# Patient Record
Sex: FEMALE | Race: WHITE | NOT HISPANIC OR LATINO | Employment: OTHER | URBAN - METROPOLITAN AREA
[De-identification: names, ages, dates, MRNs, and addresses within clinical notes are randomized per-mention and may not be internally consistent; named-entity substitution may affect disease eponyms.]

---

## 2017-01-02 ENCOUNTER — ANTICOAGULATION THERAPY VISIT (OUTPATIENT)
Dept: NURSING | Facility: CLINIC | Age: 72
End: 2017-01-02
Payer: MEDICARE

## 2017-01-02 DIAGNOSIS — Z79.01 LONG-TERM (CURRENT) USE OF ANTICOAGULANTS: Primary | ICD-10-CM

## 2017-01-02 LAB — INR POINT OF CARE: 2.1 (ref 0.86–1.14)

## 2017-01-02 PROCEDURE — 99207 ZZC NO CHARGE NURSE ONLY: CPT

## 2017-01-02 PROCEDURE — 85610 PROTHROMBIN TIME: CPT | Mod: QW

## 2017-01-02 PROCEDURE — 36416 COLLJ CAPILLARY BLOOD SPEC: CPT

## 2017-01-02 NOTE — PROGRESS NOTES
ANTICOAGULATION FOLLOW-UP CLINIC VISIT    Patient Name:  Agnes Saravia  Date:  1/2/2017  Contact Type:  Face to Face    SUBJECTIVE:        OBJECTIVE    INR PROTIME   Date Value Ref Range Status   01/02/2017 2.1* 0.86 - 1.14 Final       ASSESSMENT / PLAN  INR assessment THER    Recheck INR In: 2 WEEKS    INR Location Clinic      Anticoagulation Summary as of 1/2/2017     INR goal 2.0-3.0   Selected INR 2.1 (1/2/2017)   Maintenance plan 4 mg (1 mg x 4) on Wed, Sat; 3 mg (1 mg x 3) all other days   Full instructions 4 mg on Wed, Sat; 3 mg all other days   Weekly total 23 mg   No change documented Stacy Castle RN   Plan last modified Ritu Rayo RN (12/23/2016)   Next INR check 1/16/2017   Priority INR   Target end date     Indications   Long-term (current) use of anticoagulants [Z79.01] [Z79.01]  Pulmonary embolism (H) (Resolved) [I26.99]         Anticoagulation Episode Summary     INR check location     Preferred lab     Send INR reminders to [facility] ANTICOAGULATION    Comments       Anticoagulation Care Providers     Provider Role Specialty Phone number    KjlaurenJosé Miguel rios MD Responsible Internal Medicine [phone]            See the Encounter Report to view Anticoagulation Flowsheet and Dosing Calendar (Go to Encounters tab in chart review, and find the Anticoagulation Therapy Visit)    Patient is on prednisone. Dosing based on [facility] Protocol and Provider directed care plan.      Stacy Castle, RN

## 2017-01-02 NOTE — MR AVS SNAPSHOT
Agnes Saravia   1/2/2017 8:45 AM   Anticoagulation Therapy Visit    Description:  71 year old female   Provider:   ANTICOAGULATION CLINIC   Department:   Nurse           INR as of 1/2/2017     Selected INR 2.1 (1/2/2017)      Anticoagulation Summary as of 1/2/2017     INR goal 2.0-3.0   Selected INR 2.1 (1/2/2017)   Full instructions 4 mg on Wed, Sat; 3 mg all other days   Next INR check 1/16/2017    Indications   Long-term (current) use of anticoagulants [Z79.01] [Z79.01]  Pulmonary embolism (H) (Resolved) [I26.99]         Your next Anticoagulation Clinic appointment(s)     Jan 16, 2017  8:30 AM   Anticoagulation Visit with  ANTICOAGULATION CLINIC   [facility] [place] ([facility]    [address]  [place] [address]   [phone]              Contact Numbers     Clinic Number:         January 2017 Details    Sun Mon Tue Wed Thu Fri Sat     1               2      3 mg   See details      3      3 mg         4      4 mg         5      3 mg         6      3 mg         7      4 mg           8      3 mg         9      3 mg         10      3 mg         11      4 mg         12      3 mg         13      3 mg         14      4 mg           15      3 mg         16            17               18               19               20               21                 22               23               24               25               26               27               28                 29               30               31                    Date Details   01/02 This INR check       Date of next INR:  1/16/2017         How to take your warfarin dose     To take:  3 mg Take 3 of the 1 mg tablets.    To take:  4 mg Take 4 of the 1 mg tablets.

## 2017-01-03 ENCOUNTER — MYC MEDICAL ADVICE (OUTPATIENT)
Dept: FAMILY MEDICINE | Facility: CLINIC | Age: 72
End: 2017-01-03

## 2017-01-03 DIAGNOSIS — R07.9 ACUTE CHEST PAIN: Primary | ICD-10-CM

## 2017-01-03 RX ORDER — HYDROCODONE BITARTRATE AND ACETAMINOPHEN 5; 325 MG/1; MG/1
1 TABLET ORAL EVERY 4 HOURS PRN
Qty: 30 TABLET | Refills: 0 | Status: SHIPPED | OUTPATIENT
Start: 2017-01-03 | End: 2017-01-06

## 2017-01-04 ENCOUNTER — MYC MEDICAL ADVICE (OUTPATIENT)
Dept: FAMILY MEDICINE | Facility: CLINIC | Age: 72
End: 2017-01-04

## 2017-01-04 NOTE — TELEPHONE ENCOUNTER
Can we schedule her for after the INR appointment - OK to schedule her at 10:00 and I will try to see her earlier    oJsé Miguel Wallace MD

## 2017-01-04 NOTE — TELEPHONE ENCOUNTER
To PCP  Please see below message.  Looks like you have MPOC that morning.  Thank you.  Stacy Castle RN

## 2017-01-05 ENCOUNTER — CARE COORDINATION (OUTPATIENT)
Dept: CARE COORDINATION | Facility: CLINIC | Age: 72
End: 2017-01-05

## 2017-01-05 NOTE — PROGRESS NOTES
Clinic Care Coordination Contact  OUTREACH    Referral Information:  Referral Source: CTS  Reason for Contact: follow up  Care Conference: No     Universal Utilization:   ED Visits in last year: 1  Hospital visits in last year: 2  Last PCP appointment: 11/28/16  Missed Appointments: 0  Concerns: none  Multiple Providers or Specialists: Yes (Rheumatology, dermatology, pulmonology)    Clinical Concerns:  Current Medical Concerns:  Severe pain on Left side. Feels this is associated to months of hard coughing, she suspects she may have strained a muscle.[facility].  Was put on prednisone burst.  PCP prescribed [place] for pain.  Has f/u in clinic with PCP on 01/19/2017  Per patient, her pain was 10/10 without  Norco and 9/10 with it. She denies fever, worsening SOB, dizziness, lightheadedness, HA.  Has thick sticky yellow mucus.  Is taking Robitussin AC. Waking hourly at night d/t pain under L breast.  Pain is reproducible with palpation. Will get up and walk around and cough, then go back to sleep. Drinking plenty of fluids, using cough drops.  Current Behavioral Concerns: none  Education Provided to patient: continue home  Therapies, call with any worsening SOB or fever.    Clinical Pathway: None    Medication Management:  Patient was taking ibuprofen for RA 800mg BID prior to 11/23 admission.  No PCP or Rheum notes indicating need to d/c. Patient would appreciate PCP advice if she may re start ibuprofen.        Functional Status:  Mobility Status: Independent      Psychosocial:  Current living arrangement:: Lives in a private home  Financial/Insurance: no gaps identified        Advanced Care Plans/Directives on file:: Yes        Goals: relief of pain and cough     Patient/Caregiver understanding:  Patient states understanding of f/u advice and when to seek medical attentoin  Frequency of Care Coordination: prn  Upcoming appointment: 01/19/17     Plan:   Will route to PCP for advice  re: restarting PTA 800mg bid ibuprofen.   Patient to reach out as needed to clinic/RN CCC    Tamiko Knight RN  Clinic Care Coordinator  [facility]  [phone]

## 2017-01-05 NOTE — TELEPHONE ENCOUNTER
As she has recently started anticoagulation, I would recommend she avoid all NSAIDs in order to reduce bleeding risk.   José Miguel Wallace MD

## 2017-01-06 ENCOUNTER — MYC MEDICAL ADVICE (OUTPATIENT)
Dept: FAMILY MEDICINE | Facility: CLINIC | Age: 72
End: 2017-01-06

## 2017-01-06 DIAGNOSIS — R05.9 COUGH: Primary | ICD-10-CM

## 2017-01-06 DIAGNOSIS — R07.1 PAINFUL RESPIRATION: ICD-10-CM

## 2017-01-06 RX ORDER — BENZONATATE 200 MG/1
200 CAPSULE ORAL 3 TIMES DAILY PRN
Qty: 60 CAPSULE | Refills: 0 | Status: SHIPPED | OUTPATIENT
Start: 2017-01-06 | End: 2017-01-16

## 2017-01-06 RX ORDER — GABAPENTIN 100 MG/1
100 CAPSULE ORAL 3 TIMES DAILY
Qty: 90 CAPSULE | Refills: 0 | Status: SHIPPED | OUTPATIENT
Start: 2017-01-06 | End: 2017-02-02

## 2017-01-06 NOTE — TELEPHONE ENCOUNTER
I did speak to Agnes Saravia and we will stop the hydrocodone and start gabapentin today.  In addition a prescription for tessalon perles was sent in.  She will come into the nic on Monday AM.  I advised emergency department if worsening shortness of breath or pleuritic pain    José Miguel Wallace MD

## 2017-01-09 ENCOUNTER — RADIANT APPOINTMENT (OUTPATIENT)
Dept: GENERAL RADIOLOGY | Facility: CLINIC | Age: 72
End: 2017-01-09
Attending: INTERNAL MEDICINE
Payer: MEDICARE

## 2017-01-09 ENCOUNTER — OFFICE VISIT (OUTPATIENT)
Dept: FAMILY MEDICINE | Facility: CLINIC | Age: 72
End: 2017-01-09
Payer: MEDICARE

## 2017-01-09 VITALS
BODY MASS INDEX: 34.55 KG/M2 | OXYGEN SATURATION: 96 % | HEIGHT: 63 IN | HEART RATE: 80 BPM | TEMPERATURE: 97 F | SYSTOLIC BLOOD PRESSURE: 136 MMHG | WEIGHT: 195 LBS | DIASTOLIC BLOOD PRESSURE: 80 MMHG

## 2017-01-09 DIAGNOSIS — M35.00 SJOGREN'S SYNDROME (H): ICD-10-CM

## 2017-01-09 DIAGNOSIS — R73.01 IFG (IMPAIRED FASTING GLUCOSE): ICD-10-CM

## 2017-01-09 DIAGNOSIS — R07.1 PAINFUL RESPIRATION: ICD-10-CM

## 2017-01-09 DIAGNOSIS — I26.99 OTHER ACUTE PULMONARY EMBOLISM WITHOUT ACUTE COR PULMONALE (H): ICD-10-CM

## 2017-01-09 DIAGNOSIS — R05.9 COUGH: ICD-10-CM

## 2017-01-09 DIAGNOSIS — R07.1 PAINFUL RESPIRATION: Primary | ICD-10-CM

## 2017-01-09 LAB
ANION GAP SERPL CALCULATED.3IONS-SCNC: 9 MMOL/L (ref 3–14)
BASOPHILS # BLD AUTO: 0.1 10E9/L (ref 0–0.2)
BASOPHILS NFR BLD AUTO: 0.5 %
BUN SERPL-MCNC: 17 MG/DL (ref 7–30)
CALCIUM SERPL-MCNC: 9.3 MG/DL (ref 8.5–10.1)
CHLORIDE SERPL-SCNC: 98 MMOL/L (ref 94–109)
CO2 SERPL-SCNC: 30 MMOL/L (ref 20–32)
CREAT SERPL-MCNC: 0.74 MG/DL (ref 0.52–1.04)
DIFFERENTIAL METHOD BLD: ABNORMAL
EOSINOPHIL # BLD AUTO: 0.1 10E9/L (ref 0–0.7)
EOSINOPHIL NFR BLD AUTO: 0.3 %
ERYTHROCYTE [DISTWIDTH] IN BLOOD BY AUTOMATED COUNT: 15.4 % (ref 10–15)
GFR SERPL CREATININE-BSD FRML MDRD: 76 ML/MIN/1.7M2
GLUCOSE SERPL-MCNC: 128 MG/DL (ref 70–99)
HCT VFR BLD AUTO: 41 % (ref 35–47)
HGB BLD-MCNC: 13.3 G/DL (ref 11.7–15.7)
LYMPHOCYTES # BLD AUTO: 1 10E9/L (ref 0.8–5.3)
LYMPHOCYTES NFR BLD AUTO: 6.5 %
MCH RBC QN AUTO: 30.2 PG (ref 26.5–33)
MCHC RBC AUTO-ENTMCNC: 32.4 G/DL (ref 31.5–36.5)
MCV RBC AUTO: 93 FL (ref 78–100)
MONOCYTES # BLD AUTO: 0.8 10E9/L (ref 0–1.3)
MONOCYTES NFR BLD AUTO: 5.4 %
NEUTROPHILS # BLD AUTO: 13.3 10E9/L (ref 1.6–8.3)
NEUTROPHILS NFR BLD AUTO: 87.3 %
PLATELET # BLD AUTO: 442 10E9/L (ref 150–450)
POTASSIUM SERPL-SCNC: 3.4 MMOL/L (ref 3.4–5.3)
RBC # BLD AUTO: 4.41 10E12/L (ref 3.8–5.2)
SODIUM SERPL-SCNC: 137 MMOL/L (ref 133–144)
WBC # BLD AUTO: 15.2 10E9/L (ref 4–11)

## 2017-01-09 PROCEDURE — 99214 OFFICE O/P EST MOD 30 MIN: CPT | Performed by: INTERNAL MEDICINE

## 2017-01-09 PROCEDURE — 36415 COLL VENOUS BLD VENIPUNCTURE: CPT | Performed by: INTERNAL MEDICINE

## 2017-01-09 PROCEDURE — 87102 FUNGUS ISOLATION CULTURE: CPT | Performed by: INTERNAL MEDICINE

## 2017-01-09 PROCEDURE — 99000 SPECIMEN HANDLING OFFICE-LAB: CPT | Performed by: INTERNAL MEDICINE

## 2017-01-09 PROCEDURE — 71020 XR CHEST 2 VW: CPT

## 2017-01-09 PROCEDURE — 85025 COMPLETE CBC W/AUTO DIFF WBC: CPT | Performed by: INTERNAL MEDICINE

## 2017-01-09 PROCEDURE — 80048 BASIC METABOLIC PNL TOTAL CA: CPT | Performed by: INTERNAL MEDICINE

## 2017-01-09 PROCEDURE — 87070 CULTURE OTHR SPECIMN AEROBIC: CPT | Performed by: INTERNAL MEDICINE

## 2017-01-09 PROCEDURE — 87799 DETECT AGENT NOS DNA QUANT: CPT | Performed by: INTERNAL MEDICINE

## 2017-01-09 PROCEDURE — 87205 SMEAR GRAM STAIN: CPT | Performed by: INTERNAL MEDICINE

## 2017-01-09 RX ORDER — AZITHROMYCIN 250 MG/1
TABLET, FILM COATED ORAL
Qty: 6 TABLET | Refills: 0 | Status: SHIPPED | OUTPATIENT
Start: 2017-01-09 | End: 2017-01-16

## 2017-01-09 RX ORDER — CODEINE PHOSPHATE AND GUAIFENESIN 10; 100 MG/5ML; MG/5ML
2 SOLUTION ORAL EVERY 6 HOURS PRN
Qty: 180 ML | Refills: 3 | Status: SHIPPED | OUTPATIENT
Start: 2017-01-09 | End: 2017-05-08

## 2017-01-09 NOTE — PATIENT INSTRUCTIONS
(R07.1) Painful respiration  (primary encounter diagnosis)  Comment: From pulmonology notes the cough is thought to be secondary to underlying inflammatory condition.  I am suspicious for possible immune suppression and possible opportunistic infection.  We will not increase prednisone today and rather continue on current immune suppression and request a sputum culture and labs today.  We can treat empirically for atypical bacterial infection with azithromycin.  We will discuss with Dr. Carlos his thoughts regarding this condition.  We may wish to speak to a infectious disease specialist.  Plan: Sputum Culture Aerobic Bacterial, Gram stain,         Fungus Culture, non-blood, XR Chest 2 Views,         CMV DNA quantification, EBV DNA PCR         Quantitative Whole Blood, Respiratory Virus         Panel by PCR, RSV rapid antigen, azithromycin         (ZITHROMAX) 250 MG tablet            (M35.00) Sjogren's syndrome (H)  Comment: as above  - continue current immune supressant   Plan:     (I26.99) Other acute pulmonary embolism without acute cor pulmonale (H)  Comment: We will continue coumadin - azithromycin may accentuate your bleeding risk and will need follow up on Wednesday of Thursday  Plan:

## 2017-01-09 NOTE — TELEPHONE ENCOUNTER
PCP:    Please see below. FYI Pt is scheduled to see you today at 7:45. Thanks.     Daphne Mesa RN

## 2017-01-09 NOTE — PROGRESS NOTES
Quick Note:    Gurdeep Alarcon,    I have had the opportunity to review your recent results and an interpretation is as follows:  Your chest x-ray shows only stable linear atelectasis on the left side, but no significant effusion or pneumoina. Also your right sided atelectasis has improved. This is an encouraging chest x-ray - Let's see what your culutures show and follow up in pulm as we discussed.     Sincerely,  José Miguel Wallace MD  ______

## 2017-01-09 NOTE — PROGRESS NOTES
"[facility]  CLINIC PROGRESS NOTE    Subjective:  Pleurisy   Agnes Saravia returns to the clinic for return for ongoing left sided pain with history of cough ongoing since September.  She is not able to get much sleep.  She is taking robitussin AC and tessalon perles.  She has noticed that her breathing is OK, but she may have some shortness of breath.  She has wheezing.  She is using her inhaler which is working.,  She robitussin AC does help.  She did try gabapentin and it did not seem to help over the weekend.  She is currently in the middle of a prednisone burst as recommended by pulmonologist.  Per notes from pulmonary office visit it was felt that her pleuritic pain is due to underlying inflammatory condition such as RA and Sjogrens.  She is taking methotrexate, rituxan, prednisone.        Past medical history, medications, allergies, social history, family history reviewed and updated in [facility] as of 1/9/2017 .    ROS  CONSTITUTIONAL: + fatigue, no unexpected change in weight  SKIN: no worrisome rashes, no worrisome moles, no worrisome lesions  EYES: no acute vision problems or changes  ENT: no ear problems, no mouth problems, no throat problems  RESP: as above   CV: no chest pain, no palpitations, no new or worsening peripheral edema  GI: no nausea, no vomiting, no constipation, no diarrhea  : no frequency, no dysuria, no hematuria  MS: no claudication, no myalgias, no joint aches  PSYCHIATRIC: no changes in mood or affect      Objective:  Vitals  /80 mmHg  Pulse 80  Temp(Src) 97  F (36.1  C) (Oral)  Ht 5' 3\" (1.6 m)  Wt 195 lb (88.451 kg)  BMI 34.55 kg/m2  SpO2 96%  Breastfeeding? No  GEN: Alert Oriented x3 NAD  HEENT: Atraumatic, normocephalic, neck supple, no thyromegaly, negative cervical adenopathy  TM: TM bilaterally pearly and grey with normal light reflex  CV: RRR no murmurs or rubs  PULM: wheezing in all lung fields  ABD: Soft, nontender nondistended  SKIN: No visible " skin lesion or ulcerations  EXT:  no edema bilateral lower extremities  NEURO: Gait and station deferred, No focal neurologic deficits  PSYCH: Mood good, affect mood congruent    Results for orders placed or performed in visit on 01/09/17 (from the past 24 hour(s))   CBC with platelets and differential   Result Value Ref Range    WBC 15.2 (H) 4.0 - 11.0 10e9/L    RBC Count 4.41 3.8 - 5.2 10e12/L    Hemoglobin 13.3 11.7 - 15.7 g/dL    Hematocrit 41.0 35.0 - 47.0 %    MCV 93 78 - 100 fl    MCH 30.2 26.5 - 33.0 pg    MCHC 32.4 31.5 - 36.5 g/dL    RDW 15.4 (H) 10.0 - 15.0 %    Platelet Count 442 150 - 450 10e9/L    Diff Method Automated Method     % Neutrophils 87.3 %    % Lymphocytes 6.5 %    % Monocytes 5.4 %    % Eosinophils 0.3 %    % Basophils 0.5 %    Absolute Neutrophil 13.3 (H) 1.6 - 8.3 10e9/L    Absolute Lymphocytes 1.0 0.8 - 5.3 10e9/L    Absolute Monocytes 0.8 0.0 - 1.3 10e9/L    Absolute Eosinophils 0.1 0.0 - 0.7 10e9/L    Absolute Basophils 0.1 0.0 - 0.2 10e9/L   Basic metabolic panel   Result Value Ref Range    Sodium 137 133 - 144 mmol/L    Potassium 3.4 3.4 - 5.3 mmol/L    Chloride 98 94 - 109 mmol/L    Carbon Dioxide 30 20 - 32 mmol/L    Anion Gap 9 3 - 14 mmol/L    Glucose 128 (H) 70 - 99 mg/dL    Urea Nitrogen 17 7 - 30 mg/dL    Creatinine 0.74 0.52 - 1.04 mg/dL    GFR Estimate 76 >60 mL/min/1.7m2    GFR Estimate If Black >90   GFR Calc   >60 mL/min/1.7m2    Calcium 9.3 8.5 - 10.1 mg/dL       Assessment/Plan:  Patient Instructions   (R07.1) Painful respiration  (primary encounter diagnosis)  Comment: From pulmonology notes the cough is thought to be secondary to underlying inflammatory condition.  I am suspicious for possible immune suppression and possible opportunistic infection.  We will not increase prednisone today and rather continue on current immune suppression and request a sputum culture and labs today.  We can treat empirically for atypical bacterial infection with  azithromycin.  We will discuss with Dr. Carlos his thoughts regarding this condition.  We may wish to speak to a infectious disease specialist.  Plan: Sputum Culture Aerobic Bacterial, Gram stain,         Fungus Culture, non-blood, XR Chest 2 Views,         CMV DNA quantification, EBV DNA PCR         Quantitative Whole Blood, Respiratory Virus         Panel by PCR, RSV rapid antigen, azithromycin         (ZITHROMAX) 250 MG tablet            (M35.00) Sjogren's syndrome (H)  Comment: as above  - continue current immune supressant   Plan:     (I26.99) Other acute pulmonary embolism without acute cor pulmonale (H)  Comment: We will continue coumadin - azithromycin may accentuate your bleeding risk and will need follow up on Wednesday of Thursday  Plan:           Follow up in rheumatology     Disclaimer: This note consists of symbols derived from keyboarding, dictation and/or voice recognition software. As a result, there may be errors in the script that have gone undetected. Please consider this when interpreting information found in this chart.    José Miguel Wallace MD  [phone]

## 2017-01-09 NOTE — NURSING NOTE
"Chief Complaint   Patient presents with     Cough     4 mos, pain on left side,        Initial /80 mmHg  Pulse 80  Temp(Src) 97  F (36.1  C) (Oral)  Ht 5' 3\" (1.6 m)  Wt 195 lb (88.451 kg)  BMI 34.55 kg/m2  SpO2 96%  Breastfeeding? No Estimated body mass index is 34.55 kg/(m^2) as calculated from the following:    Height as of this encounter: 5' 3\" (1.6 m).    Weight as of this encounter: 195 lb (88.451 kg).  BP completed using cuff size: large, left arm  Luh Saravia CMA    "

## 2017-01-09 NOTE — MR AVS SNAPSHOT
After Visit Summary   1/9/2017    Agnes Saravia    MRN: [id number]           Patient Information     Date Of Birth          1945        Visit Information        Provider Department      1/9/2017 7:45 AM José Miguel Wallace MD [facility] [place]        Today's Diagnoses     Painful respiration    -  1     Sjogren's syndrome (H)         Other acute pulmonary embolism without acute cor pulmonale (H)           Care Instructions    (R07.1) Painful respiration  (primary encounter diagnosis)  Comment: From pulmonology notes the cough is thought to be secondary to underlying inflammatory condition.  I am suspicious for possible immune suppression and possible opportunistic infection.  We will not increase prednisone today and rather continue on current immune suppression and request a sputum culture and labs today.  We can treat empirically for atypical bacterial infection with azithromycin.  We will discuss with Dr. Carlos his thoughts regarding this condition.  We may wish to speak to a infectious disease specialist.  Plan: Sputum Culture Aerobic Bacterial, Gram stain,         Fungus Culture, non-blood, XR Chest 2 Views,         CMV DNA quantification, EBV DNA PCR         Quantitative Whole Blood, Respiratory Virus         Panel by PCR, RSV rapid antigen, azithromycin         (ZITHROMAX) 250 MG tablet            (M35.00) Sjogren's syndrome (H)  Comment: as above  - continue current immune supressant   Plan:     (I26.99) Other acute pulmonary embolism without acute cor pulmonale (H)  Comment: We will continue coumadin - azithromycin may accentuate your bleeding risk and will need follow up on Wednesday of Thursday  Plan:             Follow-ups after your visit        Your next 10 appointments already scheduled     Jan 16, 2017  9:45 AM   Anticoagulation Visit with  ANTICOAGULATION CLINIC   [facility] [place] ([facility])    [address]  [place] [address]    [phone]            Jan 16, 2017 10:00 AM   Office Visit with José Miguel Wallace MD   [facility]    [address]  [facility] [address] [phone]           Bring a current list of meds and any records pertaining to this visit.  For Physicals, please bring immunization records and any forms needing to be filled out.  Please arrive 10 minutes early to complete paperwork.              Future tests that were ordered for you today     Open Future Orders        Priority Expected Expires Ordered    Sputum Culture Aerobic Bacterial Routine  2/8/2017 1/9/2017    Gram stain Routine  3/10/2017 1/9/2017    XR Chest 2 Views Routine 1/9/2017 1/9/2018 1/9/2017            Who to contact     If you have questions or need follow up information about today's clinic visit or your schedule please contact [facility] directly at [phone].  Normal or non-critical lab and imaging results will be communicated to you by [employer], letter or phone within 4 business days after the clinic has received the results. If you do not hear from us within 7 days, please contact the clinic through [employer] or phone. If you have a critical or abnormal lab result, we will notify you by phone as soon as possible.  Submit refill requests through [employer] or call your pharmacy and they will forward the refill request to us. Please allow 3 business days for your refill to be completed.          Additional Information About Your Visit        [employer] Information     [employer] gives you secure access to your electronic health record. If you see a primary care provider, you can also send messages to your care team and make appointments. If you have questions, please call your primary care clinic.  If you do not have a primary care provider, please call [phone] and they will assist you.        Care EveryWhere ID     This is your Care EveryWhere ID. This could be used by other organizations  "to access your [facility] medical records  [id number]        Your Vitals Were     Pulse Temperature Height BMI (Body Mass Index) Pulse Oximetry Breastfeeding?    80 97  F (36.1  C) (Oral) 5' 3\" (1.6 m) 34.55 kg/m2 96% No       Blood Pressure from Last 3 Encounters:   01/09/17 136/80   12/31/16 131/77   11/28/16 128/74    Weight from Last 3 Encounters:   01/09/17 195 lb (88.451 kg)   12/31/16 195 lb (88.451 kg)   11/28/16 199 lb (90.266 kg)              We Performed the Following     CMV DNA quantification     EBV DNA PCR Quantitative Whole Blood     Fungus Culture, non-blood     Respiratory Virus Panel by PCR     RSV rapid antigen          Today's Medication Changes          These changes are accurate as of: 1/9/17  8:21 AM.  If you have any questions, ask your nurse or doctor.               Start taking these medicines.        Dose/Directions    azithromycin 250 MG tablet   Commonly known as:  ZITHROMAX   Used for:  Painful respiration   Started by:  José Miguel Wallace MD        Two tablets first day, then one tablet daily for four days.   Quantity:  6 tablet   Refills:  0            Where to get your medicines      These medications were sent to [id number] - [place], [place] - [address] [address]     Phone:  [phone]    - azithromycin 250 MG tablet             Primary Care Provider Office Phone # Fax #    José Miguel Wallace [phone]       [place] [address] [facility] [address]  [facility] [address]        Thank you!     Thank you for choosing [facility]  for your care. Our goal is always to provide you with excellent care. Hearing back from our patients is one way we can continue to improve our services. Please take a few minutes to complete the written survey that you may receive in the mail after your visit with us. Thank you!             Your Updated Medication List - Protect others around you: Learn how to safely use, " store and throw away your medicines at www.disposemymeds.org.          This list is accurate as of: 1/9/17  8:21 AM.  Always use your most recent med list.                   Brand Name Dispense Instructions for use    albuterol 108 (90 BASE) MCG/ACT Inhaler    PROAIR HFA/PROVENTIL HFA/VENTOLIN HFA    1 Inhaler    Inhale 2 puffs into the lungs every 4 hours as needed for shortness of breath / dyspnea or wheezing       azithromycin 250 MG tablet    ZITHROMAX    6 tablet    Two tablets first day, then one tablet daily for four days.       benzonatate 200 MG capsule    TESSALON    60 capsule    Take 1 capsule (200 mg) by mouth 3 times daily as needed for cough       cevimeline 30 MG capsule    EVOXAC     Take 30 mg by mouth 3 times daily.       Clorazepate Dipotassium 15 MG Tabs     90 tablet    Take 1 tablet by mouth daily       FOLIC ACID PO      Take 2 mg by mouth daily.       gabapentin 100 MG capsule    NEURONTIN    90 capsule    Take 1 capsule (100 mg) by mouth 3 times daily       guaiFENesin-codeine 100-10 MG/5ML Soln solution    ROBITUSSIN AC    180 mL    Take 10 mLs by mouth every 6 hours as needed       hydrochlorothiazide 50 MG tablet    HYDRODIURIL    90 tablet    Take 1 tablet (50 mg) by mouth daily Please call to schedule a follow up visit       methotrexate (Anti-Rheumatic) 2.5 MG Tabs      Take 5 tablets by mouth once a week On Tuesdays       ondansetron 4 MG tablet    ZOFRAN    10 tablet    Take 1 every 8 hours prn nausea       potassium chloride 10 MEQ tablet    K-TAB,KLOR-CON    180 tablet    Take 2 tablets (20 mEq) by mouth daily       * predniSONE 5 MG tablet    DELTASONE     Take 5 mg by mouth daily       * predniSONE 10 MG tablet    DELTASONE    32 tablet    Take 4 tablets daily for 5 days,  take 2 tablets daily for 3 days, take 1 tablet daily for 3 days, take half a tablet for 3 days.       RITUXAN 10 MG/ML injection   Generic drug:  riTUXimab      Receives 2 infusions, 2 weeks apart, Every 5  months       simvastatin 40 MG tablet    ZOCOR    90 tablet    Take 1 tablet (40 mg) by mouth At Bedtime       traZODone 50 MG tablet    DESYREL    45 tablet    Take 0.5 tablets (25 mg) by mouth At Bedtime       warfarin 1 MG tablet    COUMADIN    100 tablet    Take 3-5 tablets (3-5 mg) by mouth daily       * Notice:  This list has 2 medication(s) that are the same as other medications prescribed for you. Read the directions carefully, and ask your doctor or other care provider to review them with you.

## 2017-01-10 DIAGNOSIS — R07.1 PAINFUL RESPIRATION: ICD-10-CM

## 2017-01-10 LAB
EBV DNA # SPEC NAA+PROBE: NORMAL {COPIES}/ML
EBV DNA SPEC NAA+PROBE-LOG#: NORMAL {LOG_COPIES}/ML
GRAM STN SPEC: NORMAL
Lab: NORMAL
MICRO REPORT STATUS: NORMAL
RSV AG SPEC QL: ABNORMAL
SPECIMEN SOURCE: ABNORMAL
SPECIMEN SOURCE: NORMAL

## 2017-01-11 ENCOUNTER — MYC MEDICAL ADVICE (OUTPATIENT)
Dept: FAMILY MEDICINE | Facility: CLINIC | Age: 72
End: 2017-01-11

## 2017-01-12 ENCOUNTER — ANTICOAGULATION THERAPY VISIT (OUTPATIENT)
Dept: NURSING | Facility: CLINIC | Age: 72
End: 2017-01-12
Payer: MEDICARE

## 2017-01-12 DIAGNOSIS — Z79.01 LONG-TERM (CURRENT) USE OF ANTICOAGULANTS: Primary | ICD-10-CM

## 2017-01-12 DIAGNOSIS — I26.99 PULMONARY EMBOLISM, OTHER: ICD-10-CM

## 2017-01-12 LAB
BACTERIA SPEC CULT: NORMAL
CMV DNA SPEC NAA+PROBE-ACNC: NORMAL [IU]/ML
CMV DNA SPEC NAA+PROBE-LOG#: NORMAL {LOG_IU}/ML
INR POINT OF CARE: 1.9 (ref 0.86–1.14)
MICRO REPORT STATUS: NORMAL
SPECIMEN SOURCE: NORMAL
SPECIMEN SOURCE: NORMAL

## 2017-01-12 PROCEDURE — 85610 PROTHROMBIN TIME: CPT | Mod: QW

## 2017-01-12 PROCEDURE — 36416 COLLJ CAPILLARY BLOOD SPEC: CPT

## 2017-01-12 PROCEDURE — 99207 ZZC NO CHARGE NURSE ONLY: CPT

## 2017-01-12 RX ORDER — WARFARIN SODIUM 1 MG/1
3-5 TABLET ORAL DAILY
Qty: 100 TABLET | Refills: 0 | Status: SHIPPED | OUTPATIENT
Start: 2017-01-12 | End: 2017-04-20 | Stop reason: ALTCHOICE

## 2017-01-12 NOTE — MR AVS SNAPSHOT
Agnes Saravia   1/12/2017 8:30 AM   Anticoagulation Therapy Visit    Description:  71 year old female   Provider:   ANTICOAGULATION CLINIC   Department:   Nurse           INR as of 1/12/2017     Selected INR 1.9! (1/12/2017)      Anticoagulation Summary as of 1/12/2017     INR goal 2.0-3.0   Selected INR 1.9! (1/12/2017)   Full instructions 4 mg on Wed, Sat; 3 mg all other days   Next INR check 1/16/2017    Indications   Long-term (current) use of anticoagulants [Z79.01] [Z79.01]  Pulmonary embolism (H) (Resolved) [I26.99]         Your next Anticoagulation Clinic appointment(s)     Jan 12, 2017  8:30 AM   Anticoagulation Visit with  ANTICOAGULATION CLINIC   [facility]    [address]  [place] [address]   [phone]            Jan 16, 2017  9:45 AM   Anticoagulation Visit with  ANTICOAGULATION CLINIC   [facility]    [address]  [place] [address]   [phone]              Contact Numbers     Clinic Number:         January 2017 Details    Sun Mon Tue Wed Thu Fri Sat     1               2               3               4               5               6               7                 8               9               10               11               12      3 mg   See details      13      3 mg         14      4 mg           15      3 mg         16            17               18               19               20               21                 22               23               24               25               26               27               28                 29               30               31                    Date Details   01/12 This INR check       Date of next INR:  1/16/2017         How to take your warfarin dose     To take:  3 mg Take 3 of the 1 mg tablets.    To take:  4 mg Take 4 of the 1 mg tablets.

## 2017-01-12 NOTE — PROGRESS NOTES
ANTICOAGULATION FOLLOW-UP CLINIC VISIT    Patient Name:  Agnes Saravia  Date:  1/12/2017  Contact Type:  Face to Face    SUBJECTIVE:     Patient Findings     Comments Taking Zpak for URI: Concurrent use of AZITHROMYCIN and WARFARIN may result in an increased risk of bleeding.              OBJECTIVE    INR PROTIME   Date Value Ref Range Status   01/12/2017 1.9* 0.86 - 1.14 Final       ASSESSMENT / PLAN  INR assessment THER    Recheck INR In: 4 DAYS    INR Location Clinic      Anticoagulation Summary as of 1/12/2017     INR goal 2.0-3.0   Selected INR 1.9! (1/12/2017)   Maintenance plan 4 mg (1 mg x 4) on Wed, Sat; 3 mg (1 mg x 3) all other days   Full instructions 4 mg on Wed, Sat; 3 mg all other days   Weekly total 23 mg   No change documented Fadia Junior RN   Plan last modified Ritu Rayo RN (12/23/2016)   Next INR check 1/16/2017   Priority INR   Target end date     Indications   Long-term (current) use of anticoagulants [Z79.01] [Z79.01]  Pulmonary embolism (H) (Resolved) [I26.99]         Anticoagulation Episode Summary     INR check location     Preferred lab     Send INR reminders to CS ANTICOAGULATION    Comments       Anticoagulation Care Providers     Provider Role Specialty Phone number    José Miguel Wallace MD [facility] Internal Medicine [phone]            See the Encounter Report to view Anticoagulation Flowsheet and Dosing Calendar (Go to Encounters tab in chart review, and find the Anticoagulation Therapy Visit)    Dosage adjustment made based on physician directed care plan.    Fadia Junior RN

## 2017-01-16 ENCOUNTER — ANTICOAGULATION THERAPY VISIT (OUTPATIENT)
Dept: NURSING | Facility: CLINIC | Age: 72
End: 2017-01-16
Payer: MEDICARE

## 2017-01-16 ENCOUNTER — OFFICE VISIT (OUTPATIENT)
Dept: FAMILY MEDICINE | Facility: CLINIC | Age: 72
End: 2017-01-16
Payer: MEDICARE

## 2017-01-16 VITALS
HEIGHT: 63 IN | BODY MASS INDEX: 34.55 KG/M2 | DIASTOLIC BLOOD PRESSURE: 75 MMHG | OXYGEN SATURATION: 95 % | WEIGHT: 195 LBS | TEMPERATURE: 97.6 F | HEART RATE: 78 BPM | SYSTOLIC BLOOD PRESSURE: 127 MMHG

## 2017-01-16 DIAGNOSIS — R07.1 PAINFUL RESPIRATION: ICD-10-CM

## 2017-01-16 DIAGNOSIS — M35.00 SJOGREN'S SYNDROME (H): Primary | ICD-10-CM

## 2017-01-16 DIAGNOSIS — I26.99 OTHER ACUTE PULMONARY EMBOLISM WITHOUT ACUTE COR PULMONALE (H): ICD-10-CM

## 2017-01-16 DIAGNOSIS — Z79.01 LONG-TERM (CURRENT) USE OF ANTICOAGULANTS: Primary | ICD-10-CM

## 2017-01-16 LAB — INR POINT OF CARE: 1.8 (ref 0.86–1.14)

## 2017-01-16 PROCEDURE — 85610 PROTHROMBIN TIME: CPT | Mod: QW

## 2017-01-16 PROCEDURE — 99214 OFFICE O/P EST MOD 30 MIN: CPT | Performed by: INTERNAL MEDICINE

## 2017-01-16 PROCEDURE — 99207 ZZC NO CHARGE NURSE ONLY: CPT

## 2017-01-16 PROCEDURE — 36416 COLLJ CAPILLARY BLOOD SPEC: CPT

## 2017-01-16 RX ORDER — AZITHROMYCIN 250 MG/1
TABLET, FILM COATED ORAL
Qty: 5 TABLET | Refills: 0 | Status: SHIPPED | OUTPATIENT
Start: 2017-01-16 | End: 2017-02-14

## 2017-01-16 RX ORDER — TRAMADOL HYDROCHLORIDE 50 MG/1
50 TABLET ORAL EVERY 6 HOURS PRN
Qty: 60 TABLET | Refills: 0 | Status: SHIPPED | OUTPATIENT
Start: 2017-01-16 | End: 2017-02-13

## 2017-01-16 NOTE — PROGRESS NOTES
ANTICOAGULATION FOLLOW-UP CLINIC VISIT    Patient Name:  Agnes Saravia  Date:  1/16/2017  Contact Type:  Face to Face    SUBJECTIVE:     Patient Findings     Positives Antibiotic use or infection           OBJECTIVE    INR PROTIME   Date Value Ref Range Status   01/16/2017 1.8* 0.86 - 1.14 Final       ASSESSMENT / PLAN  No question data found.  Anticoagulation Summary as of 1/16/2017     INR goal 2.0-3.0   Selected INR 1.8! (1/16/2017)   Maintenance plan 4 mg (1 mg x 4) on Wed, Sat; 3 mg (1 mg x 3) all other days   Full instructions 1/16: 4 mg; 1/23: 4 mg; Otherwise 4 mg on Wed, Sat; 3 mg all other days   Weekly total 23 mg   Plan last modified Ritu Rayo RN (12/23/2016)   Next INR check 1/30/2017   Priority INR   Target end date     Indications   Long-term (current) use of anticoagulants [Z79.01] [Z79.01]  Pulmonary embolism (H) (Resolved) [I26.99]         Anticoagulation Episode Summary     INR check location     Preferred lab     Send INR reminders to CS ANTICOAGULATION    Comments       Anticoagulation Care Providers     Provider Role Specialty Phone number    Mohsenmary José Miguel Barrera MD Responsible Internal Medicine [phone]            See the Encounter Report to view Anticoagulation Flowsheet and Dosing Calendar (Go to Encounters tab in chart review, and find the Anticoagulation Therapy Visit)    Patient has been on antibiotics.  Dosing based on [facility] Protocol and Provider directed care plan.      Stacy Castle RN

## 2017-01-16 NOTE — PROGRESS NOTES
Quick Note:    3 please also fax these results to her pulmonologist as well as her rheumatologist    Gurdeep Alarcon,    I have had the opportunity to review your recent results and an interpretation is as follows:  Your viral DNA testing returned negative for both CMV and EBV  Your blood counts did show a persistent elevation of your white blood cell count which is consistent with recent prednisone administration  Your basic metabolic panel shows stable renal function and electrolytes   Your blood glucose is a bit elevated and I am slightly suspicious of the potential for steroid induced diabetes - we should check a hemoglobin A1c at your convenience    Sincerely,  José Miguel Wallace MD  ______

## 2017-01-16 NOTE — PROGRESS NOTES
Quick Note:    Can we also fax these results to her pulmonologist and rheumatologist?    Gurdeep Alarcon,    I have had the opportunity to review your recent results and an interpretation is as follows:  Thus far no growth from your sputum culture     Sincerely,  José Miguel Wallace MD  ______

## 2017-01-16 NOTE — MR AVS SNAPSHOT
After Visit Summary   1/16/2017    Agnes Saravia    MRN: [id number]           Patient Information     Date Of Birth          1945        Visit Information        Provider Department      1/16/2017 10:00 AM José Miguel Wallace MD [facility]        Today's Diagnoses     Sjogren's syndrome (H)    -  1     Painful respiration         Other acute pulmonary embolism without acute cor pulmonale (H)           Care Instructions    (M35.00) Sjogren's syndrome (H)  (primary encounter diagnosis)  Comment: Plan to follow up in rheum and note that you are no longer taking rituximab, and continuing on lower dose of prednisone and methotrexate  Plan: traMADol (ULTRAM) 50 MG tablet            (R07.1) Painful respiration  Comment: OK to continue gabapentin as well as tramadol.  Continue albuterol inhalers scheduled.  We will consider a follow up in pulmonologist - Referral given.  As you improved with course of azithromycin we will prolong this for another 5 days.  Plan: traMADol (ULTRAM) 50 MG tablet, azithromycin         (ZITHROMAX) 250 MG tablet            (I26.99) Other acute pulmonary embolism without acute cor pulmonale (H)  Comment: We will need another INR appointment as scheduled  Plan:             Follow-ups after your visit        Additional Services     PULMONARY MEDICINE REFERRAL       Your provider has referred you to: [facility]: Lung Disease and Pulmonary [facility] - [place] [phone]   [email or web address]    Please be aware that coverage of these services is subject to the terms and limitations of your health insurance plan.  Call member services at your health plan with any benefit or coverage questions.      Please bring the following with you to your appointment:    (1) Any X-Rays, CTs or MRIs which have been performed.  Contact the facility where they were done to arrange for  prior to your scheduled appointment.   "  (2) List of current medications   (3) This referral request   (4) Any documents/labs given to you for this referral                  Your next 10 appointments already scheduled     Jan 30, 2017  8:30 AM   Anticoagulation Visit with  ANTICOAGULATION CLINIC   [facility] ([facility] [place])    [address]  [place] [address] [phone]              Who to contact     If you have questions or need follow up information about today's clinic visit or your schedule please contact [facility] directly at [phone].  Normal or non-critical lab and imaging results will be communicated to you by [employer], letter or phone within 4 business days after the clinic has received the results. If you do not hear from us within 7 days, please contact the clinic through [employer] or phone. If you have a critical or abnormal lab result, we will notify you by phone as soon as possible.  Submit refill requests through [employer] or call your pharmacy and they will forward the refill request to us. Please allow 3 business days for your refill to be completed.          Additional Information About Your Visit        MyChart Information     [employer] gives you secure access to your electronic health record. If you see a primary care provider, you can also send messages to your care team and make appointments. If you have questions, please call your primary care clinic.  If you do not have a primary care provider, please call [phone] and they will assist you.        Care EveryWhere ID     This is your Care EveryWhere ID. This could be used by other organizations to access your [place] medical records  [id number]        Your Vitals Were     Pulse Temperature Height BMI (Body Mass Index) Pulse Oximetry Breastfeeding?    78 97.6  F (36.4  C) (Oral) 5' 3\" (1.6 m) 34.55 kg/m2 95% No       Blood Pressure from Last 3 Encounters:   01/16/17 127/75   01/09/17 136/80   12/31/16 131/77    Weight from Last 3 " Encounters:   01/16/17 195 lb (88.451 kg)   01/09/17 195 lb (88.451 kg)   12/31/16 195 lb (88.451 kg)              We Performed the Following     PULMONARY MEDICINE REFERRAL          Today's Medication Changes          These changes are accurate as of: 1/16/17 10:40 AM.  If you have any questions, ask your nurse or doctor.               These medicines have changed or have updated prescriptions.        Dose/Directions    azithromycin 250 MG tablet   Commonly known as:  ZITHROMAX   This may have changed:  additional instructions   Used for:  Painful respiration   Changed by:  José Miguel Wallace MD        then one tablet daily for five days.   Quantity:  5 tablet   Refills:  0       traMADol 50 MG tablet   Commonly known as:  ULTRAM   This may have changed:  reasons to take this   Used for:  Sjogren's syndrome (H), Painful respiration   Changed by:  José Miguel Wallace MD        Dose:  50 mg   Take 1 tablet (50 mg) by mouth every 6 hours as needed   Quantity:  60 tablet   Refills:  0            Where to get your medicines      These medications were sent to [id number] - [place], [place] - [address]  [address] [place] [address]     Phone:  [phone]    - azithromycin 250 MG tablet      Some of these will need a paper prescription and others can be bought over the counter.  Ask your nurse if you have questions.     Bring a paper prescription for each of these medications    - traMADol 50 MG tablet             Primary Care Provider Office Phone # Fax #    José Miguel Wallace [phone]       [place] [address] [address]        Thank you!     Thank you for choosing [facility]  for your care. Our goal is always to provide you with excellent care. Hearing back from our patients is one way we can continue to improve our services. Please take a few minutes to complete the written survey that you may receive in the mail  after your visit with us. Thank you!             Your Updated Medication List - Protect others around you: Learn how to safely use, store and throw away your medicines at www.disposemymeds.org.          This list is accurate as of: 1/16/17 10:40 AM.  Always use your most recent med list.                   Brand Name Dispense Instructions for use    albuterol 108 (90 BASE) MCG/ACT Inhaler    PROAIR HFA/PROVENTIL HFA/VENTOLIN HFA    1 Inhaler    Inhale 2 puffs into the lungs every 4 hours as needed for shortness of breath / dyspnea or wheezing       azithromycin 250 MG tablet    ZITHROMAX    5 tablet    then one tablet daily for five days.       cevimeline 30 MG capsule    EVOXAC     Take 30 mg by mouth 3 times daily.       Clorazepate Dipotassium 15 MG Tabs     90 tablet    Take 1 tablet by mouth daily       FOLIC ACID PO      Take 2 mg by mouth daily.       gabapentin 100 MG capsule    NEURONTIN    90 capsule    Take 1 capsule (100 mg) by mouth 3 times daily       guaiFENesin-codeine 100-10 MG/5ML Soln solution    ROBITUSSIN AC    180 mL    Take 10 mLs by mouth every 6 hours as needed       hydrochlorothiazide 50 MG tablet    HYDRODIURIL    90 tablet    Take 1 tablet (50 mg) by mouth daily Please call to schedule a follow up visit       methotrexate (Anti-Rheumatic) 2.5 MG Tabs      Take 5 tablets by mouth once a week On Tuesdays       ondansetron 4 MG tablet    ZOFRAN    10 tablet    Take 1 every 8 hours prn nausea       potassium chloride 10 MEQ tablet    K-TAB,KLOR-CON    180 tablet    Take 2 tablets (20 mEq) by mouth daily       predniSONE 5 MG tablet    DELTASONE     Take 5 mg by mouth daily       RITUXAN 10 MG/ML injection   Generic drug:  riTUXimab      Receives 2 infusions, 2 weeks apart, Every 5 months       simvastatin 40 MG tablet    ZOCOR    90 tablet    Take 1 tablet (40 mg) by mouth At Bedtime       traMADol 50 MG tablet    ULTRAM    60 tablet    Take 1 tablet (50 mg) by mouth every 6 hours as needed        traZODone 50 MG tablet    DESYREL    45 tablet    Take 0.5 tablets (25 mg) by mouth At Bedtime       warfarin 1 MG tablet    COUMADIN    100 tablet    Take 3-5 tablets (3-5 mg) by mouth daily

## 2017-01-16 NOTE — PATIENT INSTRUCTIONS
(M35.00) Sjogren's syndrome (H)  (primary encounter diagnosis)  Comment: Plan to follow up in rheum and note that you are no longer taking rituximab, and continuing on lower dose of prednisone and methotrexate  Plan: traMADol (ULTRAM) 50 MG tablet            (R07.1) Painful respiration  Comment: OK to continue gabapentin as well as tramadol.  Continue albuterol inhalers scheduled.  We will consider a follow up in pulmonologist - Referral given.  As you improved with course of azithromycin we will prolong this for another 5 days.  Plan: traMADol (ULTRAM) 50 MG tablet, azithromycin         (ZITHROMAX) 250 MG tablet            (I26.99) Other acute pulmonary embolism without acute cor pulmonale (H)  Comment: We will need another INR appointment as scheduled  Plan:

## 2017-01-16 NOTE — NURSING NOTE
"Chief Complaint   Patient presents with     Flank Pain     Left sided       Initial /75 mmHg  Pulse 78  Temp(Src) 97.6  F (36.4  C) (Oral)  Ht 5' 3\" (1.6 m)  Wt 195 lb (88.451 kg)  BMI 34.55 kg/m2  SpO2 95%  Breastfeeding? No Estimated body mass index is 34.55 kg/(m^2) as calculated from the following:    Height as of this encounter: 5' 3\" (1.6 m).    Weight as of this encounter: 195 lb (88.451 kg).  BP completed using cuff size: large right arm  Karin Orlando- CMA      "

## 2017-01-16 NOTE — PROGRESS NOTES
"[facility]  CLINIC PROGRESS NOTE    Subjective:  Pleurisy   Agnes Saravia returns to the clinic for return for ongoing left sided pain with history of cough ongoing since September.  Over the weekend she was able to get better sleep.   From pulmonology notes the cough was thought to be secondary to underlying inflammatory condition.  Given our suspicion for possible immune suppression and possible opportunistic infection we decided not increase prednisone and rather continue on current immune suppression and request a sputum culture which was negative for any infectious pathogens.  We did treat empirically for atypical bacterial infection with azithromycin.  She continues to take robitussin AC and tessalon perles.  She has noticed that her breathing is OK.  She is using her inhaler which is working.  She did try gabapentin and this has been improving her pain.  She has recently completed prednisone burst as recommended by pulmonologist and now back to 5 mg daily      Past medical history, medications, allergies, social history, family history reviewed and updated in [facility] as of 1/16/2017.    ROS  CONSTITUTIONAL: + fatigue, no unexpected change in weight  SKIN: no worrisome rashes, no worrisome moles, no worrisome lesions  EYES: no acute vision problems or changes  ENT: no ear problems, no mouth problems, no throat problems  RESP: as above   CV: no chest pain, no palpitations, no new or worsening peripheral edema  GI: no nausea, no vomiting, no constipation, no diarrhea  : no frequency, no dysuria, no hematuria  MS: Not discussed  PSYCHIATRIC: no changes in mood or affect      Objective:  Vitals  /75 mmHg  Pulse 78  Temp(Src) 97.6  F (36.4  C) (Oral)  Ht 5' 3\" (1.6 m)  Wt 195 lb (88.451 kg)  BMI 34.55 kg/m2  SpO2 95%  Breastfeeding? No  GEN: Alert Oriented x3 NAD  HEENT: Atraumatic, normocephalic, neck supple, no thyromegaly, negative cervical adenopathy  CV: RRR no murmurs or " rubs  PULM: wheezing in all lung fields - no change  ABD: Soft, nontender nondistended  SKIN: No visible skin lesion or ulcerations  EXT:  no edema bilateral lower extremities   NEURO: Gait and station deferred, No focal neurologic deficits  PSYCH: Mood good, affect mood congruent    Results for orders placed or performed in visit on 01/16/17 (from the past 24 hour(s))   INR point of care   Result Value Ref Range    INR Protime 1.8 (A) 0.86 - 1.14       Assessment/Plan:  Patient Instructions   (M35.00) Sjogren's syndrome (H)  (primary encounter diagnosis)  Comment: Plan to follow up in rheum and note that you are no longer taking rituximab, and continuing on lower dose of prednisone and methotrexate  Plan: traMADol (ULTRAM) 50 MG tablet            (R07.1) Painful respiration  Comment: OK to continue gabapentin as well as tramadol.  Continue albuterol inhalers scheduled.  We will consider a follow up in pulmonologist - Referral given.  As you improved with course of azithromycin we will prolong this for another 5 days.  Plan: traMADol (ULTRAM) 50 MG tablet, azithromycin         (ZITHROMAX) 250 MG tablet            (I26.99) Other acute pulmonary embolism without acute cor pulmonale (H)  Comment: We will need another INR appointment as scheduled  Plan:           Follow up in rheumatology     Disclaimer: This note consists of symbols derived from keyboarding, dictation and/or voice recognition software. As a result, there may be errors in the script that have gone undetected. Please consider this when interpreting information found in this chart.    José Miguel Wallace MD  [phone]

## 2017-01-16 NOTE — MR AVS SNAPSHOT
Agnes Saravia   1/16/2017 9:45 AM   Anticoagulation Therapy Visit    Description:  71 year old female   Provider:   ANTICOAGULATION CLINIC   Department:   Nurse           INR as of 1/16/2017     Selected INR 1.8! (1/16/2017)      Anticoagulation Summary as of 1/16/2017     INR goal 2.0-3.0   Selected INR 1.8! (1/16/2017)   Full instructions 1/16: 4 mg; 1/23: 4 mg; Otherwise 4 mg on Wed, Sat; 3 mg all other days   Next INR check 1/30/2017    Indications   Long-term (current) use of anticoagulants [Z79.01] [Z79.01]  Pulmonary embolism (H) (Resolved) [I26.99]         Your next Anticoagulation Clinic appointment(s)     Jan 30, 2017  8:30 AM   Anticoagulation Visit with  ANTICOAGULATION CLINIC   [facility] [place] ([facility])    [address]  [place] [address]   [phone]              Contact Numbers     Clinic Number:         January 2017 Details    Sun Mon Tue Wed Thu Fri Sat     1               2               3               4               5               6               7                 8               9               10               11               12               13               14                 15               16      4 mg   See details      17      3 mg         18      4 mg         19      3 mg         20      3 mg         21      4 mg           22      3 mg         23      4 mg         24      3 mg         25      4 mg         26      3 mg         27      3 mg         28      4 mg           29      3 mg         30            31                    Date Details   01/16 This INR check       Date of next INR:  1/30/2017         How to take your warfarin dose     To take:  3 mg Take 3 of the 1 mg tablets.    To take:  4 mg Take 4 of the 1 mg tablets.

## 2017-01-17 ENCOUNTER — MYC MEDICAL ADVICE (OUTPATIENT)
Dept: FAMILY MEDICINE | Facility: CLINIC | Age: 72
End: 2017-01-17

## 2017-01-26 DIAGNOSIS — J18.9 PNEUMONIA OF LEFT LOWER LOBE DUE TO INFECTIOUS ORGANISM: Primary | ICD-10-CM

## 2017-01-26 RX ORDER — ALBUTEROL SULFATE 90 UG/1
AEROSOL, METERED RESPIRATORY (INHALATION)
Qty: 18 G | Refills: 0 | Status: SHIPPED | OUTPATIENT
Start: 2017-01-26 | End: 2017-01-26

## 2017-01-26 RX ORDER — ALBUTEROL SULFATE 90 UG/1
AEROSOL, METERED RESPIRATORY (INHALATION)
Qty: 18 G | Refills: 0 | Status: SHIPPED | OUTPATIENT
Start: 2017-01-26 | End: 2017-02-02

## 2017-01-26 NOTE — TELEPHONE ENCOUNTER
Ventolin       Last Written Prescription Date: 11/15/2016  Last Fill Quantity: 1, # refills: 1    Last Office Visit with [facility] or [facility] prescribing provider:  1/16/2017   Future Office Visit:       Date of Last Asthma Action Plan Letter:   There are no preventive care reminders to display for this patient.   Asthma Control Test: No flowsheet data found.    Date of Last Spirometry Test:   No results found for this or any previous visit.    Prescription approved per [facility] or [facility] refill protocol.  rTacy Graham RN  Triage Flex Workforce

## 2017-01-28 ENCOUNTER — HOSPITAL ENCOUNTER (EMERGENCY)
Facility: CLINIC | Age: 72
Discharge: HOME | End: 2017-01-28
Attending: EMERGENCY MEDICINE | Admitting: EMERGENCY MEDICINE
Payer: MEDICARE

## 2017-01-28 ENCOUNTER — APPOINTMENT (OUTPATIENT)
Dept: GENERAL RADIOLOGY | Facility: CLINIC | Age: 72
End: 2017-01-28
Attending: EMERGENCY MEDICINE
Payer: MEDICARE

## 2017-01-28 VITALS
DIASTOLIC BLOOD PRESSURE: 70 MMHG | TEMPERATURE: 98.9 F | BODY MASS INDEX: 34.55 KG/M2 | HEIGHT: 63 IN | WEIGHT: 195 LBS | OXYGEN SATURATION: 97 % | HEART RATE: 78 BPM | SYSTOLIC BLOOD PRESSURE: 137 MMHG | RESPIRATION RATE: 20 BRPM

## 2017-01-28 DIAGNOSIS — R07.89 CHEST WALL PAIN: ICD-10-CM

## 2017-01-28 DIAGNOSIS — J40 BRONCHITIS: ICD-10-CM

## 2017-01-28 LAB
ALBUMIN SERPL-MCNC: 3.2 G/DL (ref 3.4–5)
ALP SERPL-CCNC: 152 U/L (ref 40–150)
ALT SERPL W P-5'-P-CCNC: 43 U/L (ref 0–50)
ANION GAP SERPL CALCULATED.3IONS-SCNC: 10 MMOL/L (ref 3–14)
AST SERPL W P-5'-P-CCNC: 42 U/L (ref 0–45)
BASOPHILS # BLD AUTO: 0.1 10E9/L (ref 0–0.2)
BASOPHILS NFR BLD AUTO: 0.5 %
BILIRUB SERPL-MCNC: 0.4 MG/DL (ref 0.2–1.3)
BUN SERPL-MCNC: 15 MG/DL (ref 7–30)
CALCIUM SERPL-MCNC: 8.7 MG/DL (ref 8.5–10.1)
CHLORIDE SERPL-SCNC: 101 MMOL/L (ref 94–109)
CO2 SERPL-SCNC: 27 MMOL/L (ref 20–32)
CREAT SERPL-MCNC: 0.71 MG/DL (ref 0.52–1.04)
D DIMER PPP FEU-MCNC: NORMAL UG/ML FEU (ref 0–0.5)
DIFFERENTIAL METHOD BLD: ABNORMAL
EOSINOPHIL # BLD AUTO: 0.2 10E9/L (ref 0–0.7)
EOSINOPHIL NFR BLD AUTO: 1.6 %
ERYTHROCYTE [DISTWIDTH] IN BLOOD BY AUTOMATED COUNT: 14.5 % (ref 10–15)
GFR SERPL CREATININE-BSD FRML MDRD: 80 ML/MIN/1.7M2
GLUCOSE SERPL-MCNC: 109 MG/DL (ref 70–99)
HCT VFR BLD AUTO: 37.9 % (ref 35–47)
HGB BLD-MCNC: 13 G/DL (ref 11.7–15.7)
IMM GRANULOCYTES # BLD: 0.1 10E9/L (ref 0–0.4)
IMM GRANULOCYTES NFR BLD: 0.5 %
INR PPP: 1.48 (ref 0.86–1.14)
INTERPRETATION ECG - MUSE: NORMAL
LIPASE SERPL-CCNC: 211 U/L (ref 73–393)
LYMPHOCYTES # BLD AUTO: 2.6 10E9/L (ref 0.8–5.3)
LYMPHOCYTES NFR BLD AUTO: 19.4 %
MCH RBC QN AUTO: 31.4 PG (ref 26.5–33)
MCHC RBC AUTO-ENTMCNC: 34.3 G/DL (ref 31.5–36.5)
MCV RBC AUTO: 92 FL (ref 78–100)
MONOCYTES # BLD AUTO: 1.1 10E9/L (ref 0–1.3)
MONOCYTES NFR BLD AUTO: 8.4 %
NEUTROPHILS # BLD AUTO: 9.2 10E9/L (ref 1.6–8.3)
NEUTROPHILS NFR BLD AUTO: 69.6 %
NRBC # BLD AUTO: 0 10*3/UL
NRBC BLD AUTO-RTO: 0 /100
PLATELET # BLD AUTO: 474 10E9/L (ref 150–450)
POTASSIUM SERPL-SCNC: 3.3 MMOL/L (ref 3.4–5.3)
PROT SERPL-MCNC: 7.4 G/DL (ref 6.8–8.8)
RBC # BLD AUTO: 4.14 10E12/L (ref 3.8–5.2)
SODIUM SERPL-SCNC: 138 MMOL/L (ref 133–144)
TROPONIN I SERPL-MCNC: NORMAL UG/L (ref 0–0.04)
WBC # BLD AUTO: 13.3 10E9/L (ref 4–11)

## 2017-01-28 PROCEDURE — 96375 TX/PRO/DX INJ NEW DRUG ADDON: CPT

## 2017-01-28 PROCEDURE — 84484 ASSAY OF TROPONIN QUANT: CPT | Performed by: EMERGENCY MEDICINE

## 2017-01-28 PROCEDURE — 71020 XR CHEST 2 VW: CPT

## 2017-01-28 PROCEDURE — 94640 AIRWAY INHALATION TREATMENT: CPT | Mod: 76

## 2017-01-28 PROCEDURE — 94640 AIRWAY INHALATION TREATMENT: CPT

## 2017-01-28 PROCEDURE — 96374 THER/PROPH/DIAG INJ IV PUSH: CPT

## 2017-01-28 PROCEDURE — 85025 COMPLETE CBC W/AUTO DIFF WBC: CPT | Performed by: EMERGENCY MEDICINE

## 2017-01-28 PROCEDURE — 25000125 ZZHC RX 250: Performed by: EMERGENCY MEDICINE

## 2017-01-28 PROCEDURE — 25000128 H RX IP 250 OP 636: Performed by: EMERGENCY MEDICINE

## 2017-01-28 PROCEDURE — 85379 FIBRIN DEGRADATION QUANT: CPT | Performed by: EMERGENCY MEDICINE

## 2017-01-28 PROCEDURE — 83690 ASSAY OF LIPASE: CPT | Performed by: EMERGENCY MEDICINE

## 2017-01-28 PROCEDURE — 85610 PROTHROMBIN TIME: CPT | Performed by: EMERGENCY MEDICINE

## 2017-01-28 PROCEDURE — 80053 COMPREHEN METABOLIC PANEL: CPT | Performed by: EMERGENCY MEDICINE

## 2017-01-28 PROCEDURE — 99285 EMERGENCY DEPT VISIT HI MDM: CPT | Mod: 25

## 2017-01-28 PROCEDURE — 93005 ELECTROCARDIOGRAM TRACING: CPT

## 2017-01-28 RX ORDER — IPRATROPIUM BROMIDE AND ALBUTEROL SULFATE 2.5; .5 MG/3ML; MG/3ML
3 SOLUTION RESPIRATORY (INHALATION) ONCE
Status: COMPLETED | OUTPATIENT
Start: 2017-01-28 | End: 2017-01-28

## 2017-01-28 RX ORDER — PREDNISONE 20 MG/1
TABLET ORAL
Qty: 20 TABLET | Refills: 0 | Status: SHIPPED | OUTPATIENT
Start: 2017-01-28 | End: 2017-02-14

## 2017-01-28 RX ORDER — HYDROMORPHONE HYDROCHLORIDE 1 MG/ML
0.5 INJECTION, SOLUTION INTRAMUSCULAR; INTRAVENOUS; SUBCUTANEOUS
Status: DISCONTINUED | OUTPATIENT
Start: 2017-01-28 | End: 2017-01-29 | Stop reason: HOSPADM

## 2017-01-28 RX ORDER — HYDROMORPHONE HYDROCHLORIDE 2 MG/1
2 TABLET ORAL EVERY 4 HOURS PRN
Qty: 18 TABLET | Refills: 0 | Status: SHIPPED | OUTPATIENT
Start: 2017-01-28 | End: 2017-03-29

## 2017-01-28 RX ORDER — MORPHINE SULFATE 4 MG/ML
4 INJECTION, SOLUTION INTRAMUSCULAR; INTRAVENOUS ONCE
Status: COMPLETED | OUTPATIENT
Start: 2017-01-28 | End: 2017-01-28

## 2017-01-28 RX ORDER — METHYLPREDNISOLONE SODIUM SUCCINATE 125 MG/2ML
125 INJECTION, POWDER, LYOPHILIZED, FOR SOLUTION INTRAMUSCULAR; INTRAVENOUS ONCE
Status: COMPLETED | OUTPATIENT
Start: 2017-01-28 | End: 2017-01-28

## 2017-01-28 RX ORDER — LIDOCAINE 40 MG/G
CREAM TOPICAL
Status: DISCONTINUED | OUTPATIENT
Start: 2017-01-28 | End: 2017-01-29 | Stop reason: HOSPADM

## 2017-01-28 RX ORDER — ALBUTEROL SULFATE 0.83 MG/ML
1 SOLUTION RESPIRATORY (INHALATION) EVERY 4 HOURS PRN
Qty: 1 BOX | Refills: 0 | Status: SHIPPED | OUTPATIENT
Start: 2017-01-28 | End: 2017-02-28

## 2017-01-28 RX ADMIN — METHYLPREDNISOLONE SODIUM SUCCINATE 125 MG: 125 INJECTION, POWDER, FOR SOLUTION INTRAMUSCULAR; INTRAVENOUS at 22:33

## 2017-01-28 RX ADMIN — HYDROMORPHONE HYDROCHLORIDE 0.5 MG: 1 INJECTION, SOLUTION INTRAMUSCULAR; INTRAVENOUS; SUBCUTANEOUS at 22:33

## 2017-01-28 RX ADMIN — IPRATROPIUM BROMIDE AND ALBUTEROL SULFATE 3 ML: .5; 3 SOLUTION RESPIRATORY (INHALATION) at 22:33

## 2017-01-28 RX ADMIN — MORPHINE SULFATE 4 MG: 4 INJECTION, SOLUTION INTRAMUSCULAR; INTRAVENOUS at 21:16

## 2017-01-28 RX ADMIN — IPRATROPIUM BROMIDE AND ALBUTEROL SULFATE 3 ML: .5; 3 SOLUTION RESPIRATORY (INHALATION) at 20:54

## 2017-01-28 ASSESSMENT — ENCOUNTER SYMPTOMS
NAUSEA: 0
ABDOMINAL PAIN: 1
VOMITING: 0
FEVER: 0
DIZZINESS: 0
LIGHT-HEADEDNESS: 0
COUGH: 1
SHORTNESS OF BREATH: 1

## 2017-01-28 NOTE — ED AVS SNAPSHOT
Emergency Department    [address] [facility] [address]    Phone:  [phone]    Fax:  [phone]                                       Agnes Saravia   MRN: [id number]    Department:   Emergency Department   Date of Visit:  1/28/2017           Patient Information     Date Of Birth          1945        Your diagnoses for this visit were:     Bronchitis     Chest wall pain        You were seen by Rambo Ruiz MD.      Follow-up Information     Follow up with José Miguel Wallace MD.    Specialty:  Internal Medicine    Contact information:    [facility]  [address]  [facility] [address]  [phone]          Follow up with your pulmonologist In 2 days.        Discharge Instructions         Bronchitis with Wheezing (Viral or Bacterial: Adult)    Bronchitis is an infection of the air passages. It often occurs during the common cold and is usually caused by a virus. Symptoms include cough with mucus (phlegm) and low-grade fever. This illness is contagious during the first few days and is spread through the air by coughing and sneezing, or by direct contact (touching the sick person and then touching your own eyes, nose, or mouth).  If there is a lot of inflammation, air flow is restricted. The air passages may also go into spasm, especially if you have asthma. This causes wheezing and difficulty breathing even in people who do not have asthma.  Bronchitis usually lasts 7 to 14 days. The wheezing should improve with treatment during the first week. An inhaler is often prescribed to relax the air passages and stop wheezing. Antibiotics will be prescribed if your doctor thinks there is also a secondary bacterial infection.  Home care    If symptoms are severe, rest at home for the first 2 to 3 days. When you go back to your usual activities, don't let yourself get too tired.    Do not smoke. Also avoid being exposed to secondhand smoke.    You may use  over-the-counter medicine to control fever or pain, unless another medicine was prescribed. Note: If you have chronic liver or kidney disease or have ever had a stomach ulcer or gastrointestinal bleeding, talk with your healthcare provider before using these medicines. Also talk to your provider if you are taking medicine to prevent blood clots.) Aspirin should never be given to anyone younger than 18 years of age who is ill with a viral infection or fever. It may cause severe liver or brain damage.    Your appetite may be poor, so a light diet is fine. Avoid dehydration by drinking 6 to 8 glasses of fluids per day (such as water, soft drinks, sports drinks, juices, tea, or soup). Extra fluids will help loosen secretions in the nose and lungs.    Over-the-counter cough, cold, and sore-throat medicines will not shorten the length of the illness, but they may be helpful to reduce symptoms. (Note: Do not use decongestants if you have high blood pressure.)    If you were given an inhaler, use it exactly as directed. If you need to use it more often than prescribed, your condition may be worsening. If this happens, contact your healthcare provider.    If prescribed, finish all antibiotic medicine, even if you are feeling better after only a few days.  Follow-up care  Follow up with your healthcare provider, or as advised. If you had an X-ray or ECG (electrocardiogram), a specialist will review it. You will be notified of any new findings that may affect your care.  Note: If you are age 65 or older, or if you have a chronic lung disease or condition that affects your immune system, or you smoke, talk to your healthcare provider about having a pneumococcal vaccinations and a yearly influenza vaccination (flu shot).  When to seek medical advice   Call your healthcare provider right away if any of these occur:    Fever of 100.4 F (38 C) or higher    Coughing up increasing amounts of colored sputum    Weakness, drowsiness,  headache, facial pain, ear pain, or a stiff neck  Call 911, or get immediate medical care  Contact emergency services right away if any of these occur.    Coughing up blood    Worsening weakness, drowsiness, headache, or stiff neck    Increased wheezing not helped with medication, shortness of breath, or pain with breathing    6634-3306 [employer]. [address] [place] [address], [place] [address]. All rights reserved. This information is not intended as a substitute for professional medical care. Always follow your healthcare professional's instructions.          Chest Wall Pain: Costochondritis    The chest pain that you have had today is caused by costochondritis. This condition is caused by an inflammation of the cartilage joining your ribs to your breastbone. It is not caused by heart or lung problems. The inflammation may have been brought on by a blow to the chest, lifting heavy objects, intense exercise, or an illness that made you cough and sneeze. It often occurs during times of emotional stress. It can be painful, but it is not dangerous. It usually goes away in 1 to 2 weeks. But it may happen again. Rarely, a more serious condition may cause symptoms similar to costochondritis. That s why it s important to watch for the warning signs listed below.  Home care  Follow these guidelines when caring for yourself at home:    If you feel that emotional stress is a cause of your condition, try to figure out the sources of that stress. It may not be obvious! Learn ways to deal with the stress in your life. This can include regular exercise, muscle relaxation, meditation, or simply taking time out for yourself. For more information about this, talk with your health care provider. Or go to your local library and look at books on  stress reduction.     You may use acetaminophen or ibuprofen to control pain, unless another pain medicine was prescribed. If you have liver disease or ever had a stomach ulcer,  talk with your health care provider before using these medicines.    You can also help ease pain by using a hot, wet compress or heating pad. Use this with or without a medicated skin cream that helps relieves pain.    Do stretching exercise as advised by your provider.    Take any prescribed medicines as directed.  Follow-up care  Follow up with your health care provider, or as advised, if you do not start to get better in the next 2 days.  When to seek medical advice  Call your health care provider right away if any of these occur:    A change in the type of pain. Call if it feels different, becomes more serious, lasts longer, or spreads into your shoulder, arm, neck, jaw, or back.    Shortness of breath or pain gets worse when you breathe    Weakness, dizziness, or fainting    Cough with dark-colored sputum (phlegm) or blood    Abdominal pain    Dark red or black stools    Fever of 100.4 F (38 C) or higher, or as directed by your health care provider    6387-9110 [employer]. [address] [place] [address]. All rights reserved. This information is not intended as a substitute for professional medical care. Always follow your healthcare professional's instructions.          Future Appointments        Provider Department Dept Phone Center    1/30/2017 8:30 AM  ANTICOAGULATION CLINIC [facility] [phone]     3/29/2017 1:30 PM Pulmonary Function Lab [facility] [phone] [facility]    3/29/2017 2:45 PM Arlet Tomlinson MD [facility] [phone] [facility]      24 Hour Appointment Hotline       To make an appointment at any [facility] call [phone] ([phone]). If you don't have a family doctor or clinic, we will help you find one. [facility] are conveniently located to serve the needs of you and your family.             Review of your medicines      START taking        Dose / Directions Last  dose taken    HYDROmorphone 2 MG tablet   Commonly known as:  DILAUDID   Dose:  2 mg   Quantity:  18 tablet        Take 1 tablet (2 mg) by mouth every 4 hours as needed for pain maximum 6 tablet(s) per day   Refills:  0          CONTINUE these medicines which may have CHANGED, or have new prescriptions. If we are uncertain of the size of tablets/capsules you have at home, strength may be listed as something that might have changed.        Dose / Directions Last dose taken    * albuterol 108 (90 BASE) MCG/ACT Inhaler   Commonly known as:  VENTOLIN HFA   What changed:  Another medication with the same name was added. Make sure you understand how and when to take each.   Quantity:  18 g        INHALE TWO PUFFS BY MOUTH EVERY FOUR HOURS AS NEEDED FOR SHORTNESS OF BREATH/ DYSPNEA OR WHEEZING   Refills:  0        * albuterol (2.5 MG/3ML) 0.083% neb solution   Dose:  1 vial   What changed:  You were already taking a medication with the same name, and this prescription was added. Make sure you understand how and when to take each.   Quantity:  1 Box        Take 1 vial (2.5 mg) by nebulization every 4 hours as needed for shortness of breath / dyspnea   Refills:  0        * predniSONE 5 MG tablet   Commonly known as:  DELTASONE   Dose:  5 mg   What changed:  Another medication with the same name was added. Make sure you understand how and when to take each.        Take 5 mg by mouth daily   Refills:  0        * predniSONE 20 MG tablet   Commonly known as:  DELTASONE   What changed:  You were already taking a medication with the same name, and this prescription was added. Make sure you understand how and when to take each.   Quantity:  20 tablet        Take 3 tabs (60 mg) by mouth daily x 3 days, 2 tabs (40 mg) daily x 3 days, 1 tab (20 mg) daily x 3 days, then 1/2 tab (10 mg) x 3 days.   Refills:  0        * Notice:  This list has 4 medication(s) that are the same as other medications prescribed for you. Read the directions  carefully, and ask your doctor or other care provider to review them with you.      Our records show that you are taking the medicines listed below. If these are incorrect, please call your family doctor or clinic.        Dose / Directions Last dose taken    azithromycin 250 MG tablet   Commonly known as:  ZITHROMAX   Quantity:  5 tablet        then one tablet daily for five days.   Refills:  0        cevimeline 30 MG capsule   Commonly known as:  EVOXAC   Dose:  30 mg        Take 30 mg by mouth 3 times daily.   Refills:  0        Clorazepate Dipotassium 15 MG Tabs   Dose:  1 tablet   Quantity:  90 tablet        Take 1 tablet by mouth daily   Refills:  1        FOLIC ACID PO   Dose:  2 mg        Take 2 mg by mouth daily.   Refills:  0        gabapentin 100 MG capsule   Commonly known as:  NEURONTIN   Dose:  100 mg   Quantity:  90 capsule        Take 1 capsule (100 mg) by mouth 3 times daily   Refills:  0        guaiFENesin-codeine 100-10 MG/5ML Soln solution   Commonly known as:  ROBITUSSIN AC   Dose:  2 tsp.   Quantity:  180 mL        Take 10 mLs by mouth every 6 hours as needed   Refills:  3        hydrochlorothiazide 50 MG tablet   Commonly known as:  HYDRODIURIL   Dose:  50 mg   Quantity:  90 tablet        Take 1 tablet (50 mg) by mouth daily Please call to schedule a follow up visit   Refills:  2        methotrexate (Anti-Rheumatic) 2.5 MG Tabs   Dose:  5 tablet        Take 5 tablets by mouth once a week On Tuesdays   Refills:  0        ondansetron 4 MG tablet   Commonly known as:  ZOFRAN   Quantity:  10 tablet        Take 1 every 8 hours prn nausea   Refills:  0        potassium chloride 10 MEQ tablet   Commonly known as:  K-TAB,KLOR-CON   Dose:  20 mEq   Quantity:  180 tablet        Take 2 tablets (20 mEq) by mouth daily   Refills:  0        RITUXAN 10 MG/ML injection   Generic drug:  riTUXimab        Receives 2 infusions, 2 weeks apart, Every 5 months   Refills:  0        simvastatin 40 MG tablet   Commonly  known as:  ZOCOR   Dose:  40 mg   Quantity:  90 tablet        Take 1 tablet (40 mg) by mouth At Bedtime   Refills:  3        traMADol 50 MG tablet   Commonly known as:  ULTRAM   Dose:  50 mg   Quantity:  60 tablet        Take 1 tablet (50 mg) by mouth every 6 hours as needed   Refills:  0        traZODone 50 MG tablet   Commonly known as:  DESYREL   Dose:  25 mg   Quantity:  45 tablet        Take 0.5 tablets (25 mg) by mouth At Bedtime   Refills:  3        warfarin 1 MG tablet   Commonly known as:  COUMADIN   Dose:  3-5 mg   Quantity:  100 tablet        Take 3-5 tablets (3-5 mg) by mouth daily   Refills:  0                Prescriptions were sent or printed at these locations (3 Prescriptions)                   Other Prescriptions                Printed at Department/Unit printer (3 of 3)         HYDROmorphone (DILAUDID) 2 MG tablet               albuterol (2.5 MG/3ML) 0.083% neb solution               predniSONE (DELTASONE) 20 MG tablet                Procedures and tests performed during your visit     Procedure/Test Number of Times Performed    CBC with platelets differential 1    Cardiac Continuous Monitoring 1    Comprehensive metabolic panel 1    D dimer quantitative 1    EKG 12-lead, tracing only 2    INR 1    Lipase 1    Peripheral IV: Standard 1    Pulse oximetry nursing 1    Troponin I 1    XR Chest 2 Views 1      Orders Needing Specimen Collection     None      Pending Results     No orders found from 1/27/2017 to 1/29/2017.            Pending Culture Results     No orders found from 1/27/2017 to 1/29/2017.       Test Results from your hospital stay           1/28/2017  8:59 PM - Interface, Decisivb Results      Component Results     Component Value Ref Range & Units Status    WBC 13.3 (H) 4.0 - 11.0 10e9/L Final    RBC Count 4.14 3.8 - 5.2 10e12/L Final    Hemoglobin 13.0 11.7 - 15.7 g/dL Final    Hematocrit 37.9 35.0 - 47.0 % Final    MCV 92 78 - 100 fl Final    MCH 31.4 26.5 - 33.0 pg Final    MCHC 34.3  31.5 - 36.5 g/dL Final    RDW 14.5 10.0 - 15.0 % Final    Platelet Count 474 (H) 150 - 450 10e9/L Final    Diff Method Automated Method  Final    % Neutrophils 69.6 % Final    % Lymphocytes 19.4 % Final    % Monocytes 8.4 % Final    % Eosinophils 1.6 % Final    % Basophils 0.5 % Final    % Immature Granulocytes 0.5 % Final    Nucleated RBCs 0 0 /100 Final    Absolute Neutrophil 9.2 (H) 1.6 - 8.3 10e9/L Final    Absolute Lymphocytes 2.6 0.8 - 5.3 10e9/L Final    Absolute Monocytes 1.1 0.0 - 1.3 10e9/L Final    Absolute Eosinophils 0.2 0.0 - 0.7 10e9/L Final    Absolute Basophils 0.1 0.0 - 0.2 10e9/L Final    Abs Immature Granulocytes 0.1 0 - 0.4 10e9/L Final    Absolute Nucleated RBC 0.0  Final         1/28/2017  9:17 PM - Interface, Flexilab Results      Component Results     Component Value Ref Range & Units Status    Troponin I ES  0.000 - 0.045 ug/L Final    <0.015  The 99th percentile for upper reference range is 0.045 ug/L.  Troponin values in   the range of 0.045 - 0.120 ug/L may be associated with risks of adverse   clinical events.           1/28/2017  9:17 PM - Interface, Flexilab Results      Component Results     Component Value Ref Range & Units Status    Sodium 138 133 - 144 mmol/L Final    Potassium 3.3 (L) 3.4 - 5.3 mmol/L Final    Chloride 101 94 - 109 mmol/L Final    Carbon Dioxide 27 20 - 32 mmol/L Final    Anion Gap 10 3 - 14 mmol/L Final    Glucose 109 (H) 70 - 99 mg/dL Final    Urea Nitrogen 15 7 - 30 mg/dL Final    Creatinine 0.71 0.52 - 1.04 mg/dL Final    GFR Estimate 80 >60 mL/min/1.7m2 Final    Non  GFR Calc    GFR Estimate If Black >90   GFR Calc   >60 mL/min/1.7m2 Final    Calcium 8.7 8.5 - 10.1 mg/dL Final    Bilirubin Total 0.4 0.2 - 1.3 mg/dL Final    Albumin 3.2 (L) 3.4 - 5.0 g/dL Final    Protein Total 7.4 6.8 - 8.8 g/dL Final    Alkaline Phosphatase 152 (H) 40 - 150 U/L Final    ALT 43 0 - 50 U/L Final    AST 42 0 - 45 U/L Final         1/28/2017   9:12 PM - Interface, Flexilab Results      Component Results     Component Value Ref Range & Units Status    Lipase 211 73 - 393 U/L Final         1/28/2017 10:51 PM - Interface, Radiant Ib      Narrative     CHEST TWO VIEW 1/28/2017 9:20 PM     COMPARISON: Two-view chest x-ray 1/9/2017.    HISTORY: Chest pain.    FINDINGS: Streaky fibrotic change in the lower lateral left lung again  noted. The cardiac silhouette, pulmonary vasculature, lungs and  pleural spaces are within normal limits.        Impression     IMPRESSION: Clear lungs.    WILLIAN LANTIGUA MD         1/28/2017  9:12 PM - Interface, Flexilab Results      Component Results     Component Value Ref Range & Units Status    INR 1.48 (H) 0.86 - 1.14 Final         1/28/2017  9:12 PM - Interface, Flexilab Results      Component Results     Component Value Ref Range & Units Status    D Dimer  0.0 - 0.50 ug/ml FEU Final    <0.3  This D-dimer assay is intended for use in conjuntion with a clinical pretest   probability assessment model to exclude pulmonary embolism (PE) and as an aid   in the diagnosis of deep venous thrombosis (DVT) in outpatients suspected of PE   or DVT. The cut-off value is 0.5 g/mL FEU.                  Clinical Quality Measure: Blood Pressure Screening     Your blood pressure was checked while you were in the emergency department today. The last reading we obtained was  BP: 137/70 mmHg . Please read the guidelines below about what these numbers mean and what you should do about them.  If your systolic blood pressure (the top number) is less than 120 and your diastolic blood pressure (the bottom number) is less than 80, then your blood pressure is normal. There is nothing more that you need to do about it.  If your systolic blood pressure (the top number) is 120-139 or your diastolic blood pressure (the bottom number) is 80-89, your blood pressure may be higher than it should be. You should have your blood pressure rechecked within a year by a  primary care provider.  If your systolic blood pressure (the top number) is 140 or greater or your diastolic blood pressure (the bottom number) is 90 or greater, you may have high blood pressure. High blood pressure is treatable, but if left untreated over time it can put you at risk for heart attack, stroke, or kidney failure. You should have your blood pressure rechecked by a primary care provider within the next 4 weeks.  If your provider in the emergency department today gave you specific instructions to follow-up with your doctor or provider even sooner than that, you should follow that instruction and not wait for up to 4 weeks for your follow-up visit.        Thank you for choosing [facility]       Thank you for choosing [facility] for your care. Our goal is always to provide you with excellent care. Hearing back from our patients is one way we can continue to improve our services. Please take a few minutes to complete the written survey that you may receive in the mail after you visit with us. Thank you!        [employer] Information     [employer] gives you secure access to your electronic health record. If you see a primary care provider, you can also send messages to your care team and make appointments. If you have questions, please call your primary care clinic.  If you do not have a primary care provider, please call [phone] and they will assist you.        Care EveryWhere ID     This is your Care EveryWhere ID. This could be used by other organizations to access your [facility] medical records  [id number]        After Visit Summary       This is your record. Keep this with you and show to your community pharmacist(s) and doctor(s) at your next visit.

## 2017-01-28 NOTE — ED AVS SNAPSHOT
Emergency Department    [address] [place] [address]    Phone:  [phone]    Fax:  [phone]                                       Agnes Saravia   MRN: [id number]    Department:   Emergency Department   Date of Visit:  1/28/2017           After Visit Summary Signature Page     I have received my discharge instructions, and my questions have been answered. I have discussed any challenges I see with this plan with the nurse or doctor.    ..........................................................................................................................................  Patient/Patient Representative Signature      ..........................................................................................................................................  Patient Representative Print Name and Relationship to Patient    ..................................................               ................................................  Date                                            Time    ..........................................................................................................................................  Reviewed by Signature/Title    ...................................................              ..............................................  Date                                                            Time

## 2017-01-29 NOTE — DISCHARGE INSTRUCTIONS
Bronchitis with Wheezing (Viral or Bacterial: Adult)    Bronchitis is an infection of the air passages. It often occurs during the common cold and is usually caused by a virus. Symptoms include cough with mucus (phlegm) and low-grade fever. This illness is contagious during the first few days and is spread through the air by coughing and sneezing, or by direct contact (touching the sick person and then touching your own eyes, nose, or mouth).  If there is a lot of inflammation, air flow is restricted. The air passages may also go into spasm, especially if you have asthma. This causes wheezing and difficulty breathing even in people who do not have asthma.  Bronchitis usually lasts 7 to 14 days. The wheezing should improve with treatment during the first week. An inhaler is often prescribed to relax the air passages and stop wheezing. Antibiotics will be prescribed if your doctor thinks there is also a secondary bacterial infection.  Home care    If symptoms are severe, rest at home for the first 2 to 3 days. When you go back to your usual activities, don't let yourself get too tired.    Do not smoke. Also avoid being exposed to secondhand smoke.    You may use over-the-counter medicine to control fever or pain, unless another medicine was prescribed. Note: If you have chronic liver or kidney disease or have ever had a stomach ulcer or gastrointestinal bleeding, talk with your healthcare provider before using these medicines. Also talk to your provider if you are taking medicine to prevent blood clots.) Aspirin should never be given to anyone younger than 18 years of age who is ill with a viral infection or fever. It may cause severe liver or brain damage.    Your appetite may be poor, so a light diet is fine. Avoid dehydration by drinking 6 to 8 glasses of fluids per day (such as water, soft drinks, sports drinks, juices, tea, or soup). Extra fluids will help loosen secretions in the nose  and lungs.    Over-the-counter cough, cold, and sore-throat medicines will not shorten the length of the illness, but they may be helpful to reduce symptoms. (Note: Do not use decongestants if you have high blood pressure.)    If you were given an inhaler, use it exactly as directed. If you need to use it more often than prescribed, your condition may be worsening. If this happens, contact your healthcare provider.    If prescribed, finish all antibiotic medicine, even if you are feeling better after only a few days.  Follow-up care  Follow up with your healthcare provider, or as advised. If you had an X-ray or ECG (electrocardiogram), a specialist will review it. You will be notified of any new findings that may affect your care.  Note: If you are age 65 or older, or if you have a chronic lung disease or condition that affects your immune system, or you smoke, talk to your healthcare provider about having a pneumococcal vaccinations and a yearly influenza vaccination (flu shot).  When to seek medical advice   Call your healthcare provider right away if any of these occur:    Fever of 100.4 F (38 C) or higher    Coughing up increasing amounts of colored sputum    Weakness, drowsiness, headache, facial pain, ear pain, or a stiff neck  Call 911, or get immediate medical care  Contact emergency services right away if any of these occur.    Coughing up blood    Worsening weakness, drowsiness, headache, or stiff neck    Increased wheezing not helped with medication, shortness of breath, or pain with breathing    0091-6386 [employer]. [address] [place] [address], [place] [address]. All rights reserved. This information is not intended as a substitute for professional medical care. Always follow your healthcare professional's instructions.          Chest Wall Pain: Costochondritis    The chest pain that you have had today is caused by costochondritis. This condition is caused by an inflammation of the cartilage  joining your ribs to your breastbone. It is not caused by heart or lung problems. The inflammation may have been brought on by a blow to the chest, lifting heavy objects, intense exercise, or an illness that made you cough and sneeze. It often occurs during times of emotional stress. It can be painful, but it is not dangerous. It usually goes away in 1 to 2 weeks. But it may happen again. Rarely, a more serious condition may cause symptoms similar to costochondritis. That s why it s important to watch for the warning signs listed below.  Home care  Follow these guidelines when caring for yourself at home:    If you feel that emotional stress is a cause of your condition, try to figure out the sources of that stress. It may not be obvious! Learn ways to deal with the stress in your life. This can include regular exercise, muscle relaxation, meditation, or simply taking time out for yourself. For more information about this, talk with your health care provider. Or go to your local library and look at books on  stress reduction.     You may use acetaminophen or ibuprofen to control pain, unless another pain medicine was prescribed. If you have liver disease or ever had a stomach ulcer, talk with your health care provider before using these medicines.    You can also help ease pain by using a hot, wet compress or heating pad. Use this with or without a medicated skin cream that helps relieves pain.    Do stretching exercise as advised by your provider.    Take any prescribed medicines as directed.  Follow-up care  Follow up with your health care provider, or as advised, if you do not start to get better in the next 2 days.  When to seek medical advice  Call your health care provider right away if any of these occur:    A change in the type of pain. Call if it feels different, becomes more serious, lasts longer, or spreads into your shoulder, arm, neck, jaw, or back.    Shortness of breath or pain gets worse when you  breathe    Weakness, dizziness, or fainting    Cough with dark-colored sputum (phlegm) or blood    Abdominal pain    Dark red or black stools    Fever of 100.4 F (38 C) or higher, or as directed by your health care provider    [id number] [employer]. [address] [place] [address]. All rights reserved. This information is not intended as a substitute for professional medical care. Always follow your healthcare professional's instructions.

## 2017-01-29 NOTE — ED NOTES
Pt presents to the ED with c/o bilateral lower chest pain, right side > left side. Onset 1 week ago. Worsened with coughing and deep breathing. Associated wheezing, sinus congestion, and productive cough. Denies fevers, chills, or changes with bowel or bladder.    Lungs have audible wheezes bilaterally.    IV inserted on second attempt. Blood specimens obtained and sent to the lab.

## 2017-01-29 NOTE — ED NOTES
Patient discharged in stable condition. Patient received follow-up instructions and RXs. No questions at time of discharge. IV removed - catheter intact.

## 2017-01-29 NOTE — ED PROVIDER NOTES
History     Chief Complaint:  Shortness of Breath    HPI   Agnes Saravia is a 71 year old female with a history or PE, currently anticoagulated with coumadin who presents with shortness of breath. She further complains of a stabbing pain under the left breast, preventing her from breathing deeply. The patient does endorse similar pain on the left side as well, though this is much less severe. She states that she has difficulty coughing, though when she does her cough is extremely productive. The pain is not exacerbated with eating. The patient explains that she feels as though she is very wheezy. Denies any nausea, vomiting, fevers, issues with recent congestion or cold symptoms.     Allergies:  Mellaril  Percocet      Medications:    albuterol (VENTOLIN HFA) 108 (90 BASE) MCG/ACT Inhaler   traMADol (ULTRAM) 50 MG tablet   azithromycin (ZITHROMAX) 250 MG tablet   warfarin (COUMADIN) 1 MG tablet   guaiFENesin-codeine (ROBITUSSIN AC) 100-10 MG/5ML SOLN solution   gabapentin (NEURONTIN) 100 MG capsule   ondansetron (ZOFRAN) 4 MG tablet   traZODone (DESYREL) 50 MG tablet   simvastatin (ZOCOR) 40 MG tablet   potassium chloride (K-TAB,KLOR-CON) 10 MEQ tablet   Clorazepate Dipotassium 15 MG TABS   hydrochlorothiazide (HYDRODIURIL) 50 MG tablet   methotrexate, Anti-Rheumatic, 2.5 MG TABS   riTUXimab (RITUXAN) 10 MG/ML injection   FOLIC ACID PO   predniSONE (DELTASONE) 5 MG tablet   cevimeline (EVOXAC) 30 MG capsule     Past Medical History:    RA  Sjogren's syndrom  HDL  PONV  Insomnia  Anxiety     Past Surgical History:    Shoulder surgery  Blepharoplasty  Hysterectomy  Mammoplasty  Discectomy lumbar     Family History:    Cerebrovascular disease     Social History:  The patient was accompanied to the ED by family.  Smoking Status: Former smoker  Smokeless Tobacco: No  Alcohol Use: No   Marital Status:  Single [1]     Review of Systems   Constitutional: Negative for fever.   Respiratory: Positive for cough and  "shortness of breath.    Cardiovascular: Negative for chest pain.   Gastrointestinal: Positive for abdominal pain. Negative for nausea and vomiting.   Neurological: Negative for dizziness and light-headedness.   All other systems reviewed and are negative.    Physical Exam   Vitals:  Patient Vitals for the past 24 hrs:   BP Temp Temp src Pulse Heart Rate Resp SpO2 Height Weight   01/28/17 2239 137/70 mmHg - - 78 - 20 97 % - -   01/28/17 2014 158/76 mmHg 98.9  F (37.2  C) Oral 93 93 20 93 % 1.6 m (5' 3\") 88.451 kg (195 lb)      Physical Exam  GENERAL: well developed, pleasant  HEAD: atraumatic  EYES: pupils reactive, extraocular muscles intact, conjunctivae normal  ENT:  mucus membranes moist  NECK:  trachea midline, normal range of motion  RESPIRATORY: no tachypnea. Diffuse wheezing bilaterally.  CVS: normal S1/S2, no murmurs, intact distal pulses  ABDOMEN: soft, nondistention. Moderate right upper quadrant pain  MUSCULOSKELETAL: no deformities  SKIN: warm and dry, no acute rashes or ulceration  NEURO: GCS 15, cranial nerves intact, alert and oriented x3  PSYCH:  Mood/affect normal     Emergency Department Course     ECG:  ECG taken at 2016, ECG read at 2041  Normal sinus rhythm  Possible anterior infarct, age undetermined  Abnormal ECG  Rate 88 bpm. PA interval 180. QRS duration 82. QT/QTc 374/452. P-R-T axes 36 -16 54.     Imaging:  Radiology findings were communicated with the patient who voiced understanding of the findings.  XR Chest:  Clear lungs.  Final reading per radiology     Laboratory:  Laboratory findings were communicated with the patient who voiced understanding of the findings.  CBC: WBC: 13.3 (H), PLT: 474 (H) o/w WNL. (HGB 13.0)   CMP: Glucose: 109 (H), Albumin: 3.2 (L), AlkPhos: 152 (H), o/w WNL (Creatinine 0.71)  Lipase: 211  INR: 1.48 (H)  Troponin (Collected 2045): <0.015  D Dimer (Collected 2045): <0.3     Interventions:  2054 Duoneb 3 mL Nebulizer  2116 Morphine 4 mg IV   2233 Dilaudid 0.5 mg " IV  2233 Duoneb 3 mL Nebulizer  2233 Solumedrol 125 mg IV     Emergency Department Course:  Nursing notes and vitals reviewed.  I performed an exam of the patient as documented above.   IV was inserted and blood was drawn for laboratory testing, results above.  The patient was sent for a chest x-ray while in the emergency department, results above.    EKG obtained in the ED, see results above.    I updated the patient on the results of her imaging and laboratory tests.    I discussed the treatment plan with the patient. They expressed understanding of this plan and consented to discharge. They will be discharged home with instructions for care and follow up. In addition, the patient will return to the emergency department if their symptoms persist, worsen, if new symptoms arise or if there is any concern.  All questions were answered.    I personally reviewed the laboratory results with the Patient and answered all related questions prior to discharge.    Impression & Plan      Medical Decision Making:  Agnes Saravia is a 71 year old female who presents to the emergency department today with shortness of breath. She has a history of persistent wheezing and bronchitis type symptoms for the last several months. She recently completed antibiotics and notes pain to the right anterior, lateral rib cage that is worse with coughing and moving. Certainly musculoskeletal injury is high on the differential. Labs and x-rays were fairly stable. Patient was wheezing and was given nebs and pain medicine. She is still having a fair amount of pain and was given dilaudid with improvement of symptoms. Will send her home with neb machine with solution, dilaudid for pain control and having her stop the ultram and cough with codeine. Cautioned her about narcotics in terms of constipation and addiction. Ideally she would take ibuprofen for this like she has in the past. She is sub therapeutic in her INR though her d-dimer is  negative and I see that she had a CT of her chest in December, with similar presentation that was normal. Given a normal d-dimer and a semi-recent CT that was normal with similar symptoms I do not think another Ct is warranted. However, we will increase her coumadin.    Diagnosis:    ICD-10-CM    1. Bronchitis J40    2. Chest wall pain R07.89       Disposition:   Discharge to home    Discharge Medications:  Discharge Medication List as of 1/28/2017 11:02 PM      START taking these medications    Details   HYDROmorphone (DILAUDID) 2 MG tablet Take 1 tablet (2 mg) by mouth every 4 hours as needed for pain maximum 6 tablet(s) per day, Disp-18 tablet, R-0, Local Print      albuterol (2.5 MG/3ML) 0.083% neb solution Take 1 vial (2.5 mg) by nebulization every 4 hours as needed for shortness of breath / dyspnea, Disp-1 Box, R-0, Local Print      !! predniSONE (DELTASONE) 20 MG tablet Take 3 tabs (60 mg) by mouth daily x 3 days, 2 tabs (40 mg) daily x 3 days, 1 tab (20 mg) daily x 3 days, then 1/2 tab (10 mg) x 3 days., Disp-20 tablet, R-0, Local Print       !! - Potential duplicate medications found. Please discuss with provider.        Scribe Disclosure:  I, Shahid Bocanegra, am serving as a scribe at 8:31 PM on 1/28/2017 to document services personally performed by Rambo Ruiz MD, based on my observations and the provider's statements to me.   1/28/2017    EMERGENCY DEPARTMENT        Rambo Ruiz MD  01/30/17 0142

## 2017-01-30 ENCOUNTER — ANTICOAGULATION THERAPY VISIT (OUTPATIENT)
Dept: NURSING | Facility: CLINIC | Age: 72
End: 2017-01-30
Payer: MEDICARE

## 2017-01-30 ENCOUNTER — CARE COORDINATION (OUTPATIENT)
Dept: CARE COORDINATION | Facility: CLINIC | Age: 72
End: 2017-01-30

## 2017-01-30 DIAGNOSIS — Z79.01 LONG-TERM (CURRENT) USE OF ANTICOAGULANTS: Primary | ICD-10-CM

## 2017-01-30 LAB — INR POINT OF CARE: 1.8 (ref 0.86–1.14)

## 2017-01-30 PROCEDURE — 36416 COLLJ CAPILLARY BLOOD SPEC: CPT

## 2017-01-30 PROCEDURE — 85610 PROTHROMBIN TIME: CPT | Mod: QW

## 2017-01-30 PROCEDURE — 99207 ZZC NO CHARGE NURSE ONLY: CPT

## 2017-01-30 NOTE — PROGRESS NOTES
ANTICOAGULATION FOLLOW-UP CLINIC VISIT    Patient Name:  Agnes Saravia  Date:  1/30/2017  Contact Type:  Face to Face    SUBJECTIVE:     Patient Findings     Positives Hospitalization           OBJECTIVE    INR PROTIME   Date Value Ref Range Status   01/30/2017 1.8* 0.86 - 1.14 Final       ASSESSMENT / PLAN  INR assessment SUB    Recheck INR In: 1 WEEK    INR Location Clinic      Anticoagulation Summary as of 1/30/2017     INR goal 2.0-3.0   Selected INR 1.8! (1/30/2017)   Maintenance plan 4 mg (1 mg x 4) on Wed, Sat; 3 mg (1 mg x 3) all other days   Full instructions 1/30: 4 mg; 1/31: 4 mg; 2/3: 4 mg; 2/5: 4 mg; Otherwise 4 mg on Wed, Sat; 3 mg all other days   Weekly total 23 mg   Plan last modified Ritu Rayo RN (12/23/2016)   Next INR check 2/6/2017   Priority INR   Target end date     Indications   Long-term (current) use of anticoagulants [Z79.01] [Z79.01]  Pulmonary embolism (H) (Resolved) [I26.99]         Anticoagulation Episode Summary     INR check location     Preferred lab     Send INR reminders to [facility] ANTICOAGUL[facility]    Comments       Anticoagulation Care Providers     Provider Role Specialty Phone number    KjlaurenJosé Miguel rios MD Responsible Internal Medicine [phone]            See the Encounter Report to view Anticoagulation Flowsheet and Dosing Calendar (Go to Encounters tab in chart review, and find the Anticoagulation Therapy Visit)    Dosage adjustment made based on physician directed care plan.  Increased weekly level by 1 dose, spread throughout week as pt has been running low.    Tracy Montgomery, RN

## 2017-01-30 NOTE — TELEPHONE ENCOUNTER
Ventolin HFA Inhalation Aerosol Solution 108 (90base) MCG/ACT       Last Written Prescription Date: 2/27/2017  Last Fill Quantity: 1box,  # refills: 0   Last Office Visit with [facility], [facility] or [facility] prescribing provider: 1/16/2017

## 2017-01-30 NOTE — MR AVS SNAPSHOT
Agnes Saravia   1/30/2017 8:30 AM   Anticoagulation Therapy Visit    Description:  71 year old female   Provider:   ANTICOAGULATION CLINIC   Department:   Nurse           INR as of 1/30/2017     Selected INR 1.8! (1/30/2017)      Anticoagulation Summary as of 1/30/2017     INR goal 2.0-3.0   Selected INR 1.8! (1/30/2017)   Full instructions 1/30: 4 mg; 1/31: 4 mg; 2/3: 4 mg; 2/5: 4 mg; Otherwise 4 mg on Wed, Sat; 3 mg all other days   Next INR check 2/6/2017    Indications   Long-term (current) use of anticoagulants [Z79.01] [Z79.01]  Pulmonary embolism (H) (Resolved) [I26.99]         Your next Anticoagulation Clinic appointment(s)     Feb 06, 2017  8:45 AM   Anticoagulation Visit with  ANTICOAGULATION CLINIC   [facility] ([facility])    [address] [facility] [address]   [phone]              Contact Numbers     Clinic Number:         January 2017 Details    Sun Mon Tue Wed Thu Fri Sat     1               2               3               4               5               6               7                 8               9               10               11               12               13               14                 15               16               17               18               19               20               21                 22               23               24               25               26               27               28                 29               30      4 mg   See details      31      4 mg              Date Details   01/30 This INR check               How to take your warfarin dose     To take:  4 mg Take 4 of the 1 mg tablets.           February 2017 Details    Sun Mon Tue Wed Thu Fri Sat        1      4 mg         2      3 mg         3      4 mg         4      4 mg           5      4 mg         6            7               8               9               10               11                 12               13               14                15               16               17               18                 19               20               21               22               23               24               25                 26               27               28                    Date Details   No additional details    Date of next INR:  2/6/2017         How to take your warfarin dose     To take:  3 mg Take 3 of the 1 mg tablets.    To take:  4 mg Take 4 of the 1 mg tablets.

## 2017-01-30 NOTE — PROGRESS NOTES
"Clinic Care Coordination Contact  OUTREACH    Referral Information:  Referral Source: CTS  Reason for Contact: ED follow up call  [facility] ED 1/28/17  Care Conference: No     Universal Utilization:   ED Visits in last year: 2  Hospital visits in last year: 2  Last PCP appointment: 11/28/16  Missed Appointments: 0  Concerns: none  Multiple Providers or Specialists: Yes (Rheumatology, dermatology, pulmonology)    Clinical Concerns:  Current Medical Concerns: Patient seen at [facility] ED on 1/28 for c/o SOB and left breast pain.  Has 5 month history of bronchitis like symptoms and wheezing.  She recently completed antibiotic, pain is worse with coughing and moving. Certainly musculoskeletal injury is high on the differential. Labs and x-rays were fairly stable. Patient was wheezing and was given nebs and pain medicine. She was send home with neb machine with solution, dilaudid for pain control and having her stop the ultram and cough with codeine. Reports her energy level has been \"lousy\" due to keeping herself indoors for the better part of 5 months in order to reduce exposure to illness.   Per patient, pulmonology suggested patient seen ENT, and then perhaps an allergist as there wasn't much he could do.  Patient requested names of  affiliated ENT and Allergists in [place]. Provided list per [place] website.   Per patient, pulmonology said there is nothing for them to do.  Wanted to see pt \"in May to do a final complete workup.\"  Patient has no desire to the same pulmonologist again, does not feel there was any point as she feels nothing was done.      Current Behavioral Concerns: none  Education Provided to patient: Reviewed medications and use of nebulizer   Clinical Pathway Name: None    Medication Management:  As above     Functional Status:  Mobility Status: Independent     Transportation: drives self      Psychosocial:  Current living arrangement:: Lives  in a private home  Financial/Insurance: Medicare/BCBS   "   Resources and Interventions:  Current Resources: DME (Nebulizer)        Advanced Care Plans/Directives on file:: Yes        Goals: Establish with ENT and Allergist     Barriers: none  Strengths: Demonstrates health seeking behaviors.   Patient/Caregiver understanding: Patient states understanding to f/u as above.  Is frustrated that she has not had resolution of her symptoms.   Frequency of Care Coordination: PRN  Upcoming appointment:     Plan:   RN CCC To f/u with patient in 2-3 weeks.     Tamiko Knight RN  Clinic Care Coordinator  [facility]  [phone]\

## 2017-01-31 RX ORDER — ALBUTEROL SULFATE 0.83 MG/ML
1 SOLUTION RESPIRATORY (INHALATION) EVERY 4 HOURS PRN
Qty: 1 BOX | Refills: 0 | OUTPATIENT
Start: 2017-01-31

## 2017-01-31 NOTE — TELEPHONE ENCOUNTER
Refill below denied.  Called Patient and verified that she has already picked up the medication.    Faith Banks RN

## 2017-02-06 ENCOUNTER — TELEPHONE (OUTPATIENT)
Dept: FAMILY MEDICINE | Facility: CLINIC | Age: 72
End: 2017-02-06

## 2017-02-06 ENCOUNTER — ANTICOAGULATION THERAPY VISIT (OUTPATIENT)
Dept: NURSING | Facility: CLINIC | Age: 72
End: 2017-02-06
Payer: MEDICARE

## 2017-02-06 DIAGNOSIS — I26.99 PULMONARY EMBOLISM, OTHER: ICD-10-CM

## 2017-02-06 DIAGNOSIS — Z79.01 LONG-TERM (CURRENT) USE OF ANTICOAGULANTS: Primary | ICD-10-CM

## 2017-02-06 LAB — INR POINT OF CARE: 1.6 (ref 0.86–1.14)

## 2017-02-06 PROCEDURE — 85610 PROTHROMBIN TIME: CPT | Mod: QW

## 2017-02-06 PROCEDURE — 36416 COLLJ CAPILLARY BLOOD SPEC: CPT

## 2017-02-06 PROCEDURE — 99207 ZZC NO CHARGE NURSE ONLY: CPT

## 2017-02-06 RX ORDER — WARFARIN SODIUM 1 MG/1
TABLET ORAL
Qty: 15 TABLET | Refills: 0 | Status: SHIPPED | OUTPATIENT
Start: 2017-02-06 | End: 2017-02-09

## 2017-02-06 RX ORDER — WARFARIN SODIUM 1 MG/1
TABLET ORAL
Qty: 120 TABLET | Refills: 0 | Status: SHIPPED | OUTPATIENT
Start: 2017-02-06 | End: 2017-02-06

## 2017-02-06 NOTE — TELEPHONE ENCOUNTER
I sent small quantity for [facility].   I called patient letting her know this was done.   She will get the other prescription for quantity #120 amount through [facility] once they have the medication in stock for her.     America Saldana RN

## 2017-02-06 NOTE — PROGRESS NOTES
"  ANTICOAGULATION FOLLOW-UP CLINIC VISIT    Patient Name:  Agnes Saravia  Date:  2/6/2017  Contact Type:  Face to Face    SUBJECTIVE:     Patient Findings     Positives Medication Changes, Unexplained INR or factor level change    Comments Patient has been prescribed TARO brand which may be causing the INR results to go down even though dosing has been increased.            OBJECTIVE    INR PROTIME   Date Value Ref Range Status   02/06/2017 1.6* 0.86 - 1.14 Final       ASSESSMENT / PLAN  INR assessment SUB    Recheck INR In: 3 DAYS    INR Location Clinic      Anticoagulation Summary as of 2/6/2017     INR goal 2.0-3.0   Selected INR 1.6! (2/6/2017)   Maintenance plan 4 mg (1 mg x 4) on Wed, Sat; 3 mg (1 mg x 3) all other days   Full instructions 2/6: 5 mg; 2/7: 4 mg; Otherwise 4 mg on Wed, Sat; 3 mg all other days   Weekly total 23 mg   Plan last modified Ritu Rayo RN (12/23/2016)   Next INR check 2/9/2017   Priority INR   Target end date     Indications   Long-term (current) use of anticoagulants [Z79.01] [Z79.01]  Pulmonary embolism (H) (Resolved) [I26.99]         Anticoagulation Episode Summary     INR check location     Preferred lab     Send INR reminders to [facility] ANTICOAGULATION    Comments       Anticoagulation Care Providers     Provider Role Specialty Phone number    MohsenmaryJosé Miguel MD Responsible Internal Medicine [phone]            See the Encounter Report to view Anticoagulation Flowsheet and Dosing Calendar (Go to Encounters tab in chart review, and find the Anticoagulation Therapy Visit)    Dosage adjustment made based on physician directed care plan.  Patient brought in Warfarin bottle. It is TARO brand which could be causing the fluctuation in INR results.   Dosing has been increased and INR results have gone down.   Sent in new prescription with note saying \"DO NOT DISPENSE TARO BRAND\"  Patient will  new prescription and start new medication tonight.   Recheck in 3 days. "     America Saldana, RN

## 2017-02-06 NOTE — TELEPHONE ENCOUNTER
Pt called and Cub has to order the coumadin, was wondering if the med can be sent to the [facility] pharmacy for 3 days?  Please call her back to confirm that this can be done. Thank you.     Nia Mccain MA

## 2017-02-06 NOTE — TELEPHONE ENCOUNTER
Reason for Call:  Other call back    Detailed comments: Patient was in today and receicevidan prescription a is unable to fill it  . An she needs a new prescription for some where else. And the medication is   warfarin (COUMADIN) 1 MG tablet 120 tablet 0 2/6/2017  Yes      Sig: Take 3-5 tablets (3-5 mg) by mouth daily.     Class: E-Prescribe           Phone Number Patient can be reached at: Cell number on file:    Telephone Information:   Mobile [phone]       Best Time: anytime    Can we leave a detailed message on this number? YES    Call taken on 2/6/2017 at 9:41 AM by Jenifer Park

## 2017-02-07 LAB
FUNGUS SPEC CULT: NORMAL
MICRO REPORT STATUS: NORMAL
SPECIMEN SOURCE: NORMAL

## 2017-02-09 ENCOUNTER — TRANSFERRED RECORDS (OUTPATIENT)
Dept: HEALTH INFORMATION MANAGEMENT | Facility: CLINIC | Age: 72
End: 2017-02-09

## 2017-02-09 ENCOUNTER — ANTICOAGULATION THERAPY VISIT (OUTPATIENT)
Dept: NURSING | Facility: CLINIC | Age: 72
End: 2017-02-09
Payer: MEDICARE

## 2017-02-09 DIAGNOSIS — Z79.01 LONG-TERM (CURRENT) USE OF ANTICOAGULANTS: Primary | ICD-10-CM

## 2017-02-09 DIAGNOSIS — I26.99 PULMONARY EMBOLISM, OTHER: ICD-10-CM

## 2017-02-09 LAB
AST SERPL-CCNC: 41 U/L (ref 5–34)
CREAT SERPL-MCNC: 0.74 MG/DL (ref 0.5–1.3)
GFR SERPL CREATININE-BSD FRML MDRD: 81.8 ML/MIN/1.73M2
INR POINT OF CARE: 1.9 (ref 0.86–1.14)

## 2017-02-09 PROCEDURE — 36416 COLLJ CAPILLARY BLOOD SPEC: CPT

## 2017-02-09 PROCEDURE — 85610 PROTHROMBIN TIME: CPT | Mod: QW

## 2017-02-09 PROCEDURE — 99207 ZZC NO CHARGE NURSE ONLY: CPT

## 2017-02-09 RX ORDER — WARFARIN SODIUM 1 MG/1
1 TABLET ORAL DAILY
Qty: 120 TABLET | Refills: 0 | COMMUNITY
Start: 2017-02-09 | End: 2017-02-23

## 2017-02-09 NOTE — PROGRESS NOTES
"  ANTICOAGULATION FOLLOW-UP CLINIC VISIT    Patient Name:  Agnes Saravia  Date:  2/9/2017  Contact Type:  Face to Face    SUBJECTIVE:     Patient Findings     Comments New RX was sent with last appointment for another Warfarin besides TARO, however pharmacy ordered \"Coumadin\" which was very expensive. There was a note on the RX to call the clinic with questions, which pharmacy did not do. Another manufactured Warfarin would have been OK.  Changed RX in epic for Jantoven going forward.            OBJECTIVE    INR PROTIME   Date Value Ref Range Status   02/09/2017 1.9* 0.86 - 1.14 Final       ASSESSMENT / PLAN  INR assessment THER    Recheck INR In: 5 DAYS    INR Location Clinic      Anticoagulation Summary as of 2/9/2017     INR goal 2.0-3.0   Selected INR 1.9! (2/9/2017)   Maintenance plan 3 mg (1 mg x 3) on Fri; 4 mg (1 mg x 4) all other days   Full instructions 3 mg on Fri; 4 mg all other days   Weekly total 27 mg   Plan last modified Fadia Junior RN (2/9/2017)   Next INR check 2/14/2017   Priority INR   Target end date     Indications   Long-term (current) use of anticoagulants [Z79.01] [Z79.01]  Pulmonary embolism (H) (Resolved) [I26.99]         Anticoagulation Episode Summary     INR check location     Preferred lab     Send INR reminders to CS ANTICOAGULATION    Comments       Anticoagulation Care Providers     Provider Role Specialty Phone number    MohsenroxyJosé Miguel rios MD [facility] Internal Medicine [phone]            See the Encounter Report to view Anticoagulation Flowsheet and Dosing Calendar (Go to Encounters tab in chart review, and find the Anticoagulation Therapy Visit)    Dosage adjustment made based on physician directed care plan.    Fadia Junior RN                 "

## 2017-02-09 NOTE — MR AVS SNAPSHOT
Agnes Saravia   2/9/2017 8:45 AM   Anticoagulation Therapy Visit    Description:  71 year old female   Provider:   ANTICOAGULATION CLINIC   Department:   Nurse           INR as of 2/9/2017     Selected INR 1.9! (2/9/2017)      Anticoagulation Summary as of 2/9/2017     INR goal 2.0-3.0   Selected INR 1.9! (2/9/2017)   Full instructions 3 mg on Fri; 4 mg all other days   Next INR check 2/14/2017    Indications   Long-term (current) use of anticoagulants [Z79.01] [Z79.01]  Pulmonary embolism (H) (Resolved) [I26.99]         Your next Anticoagulation Clinic appointment(s)     Feb 14, 2017  8:45 AM   Anticoagulation Visit with  ANTICOAGULATION CLINIC   [facility] [place] ([facility])    [address]  [place] [address]   [phone]              Contact Numbers     Clinic Number:         February 2017 Details    Sun Mon Tue Wed Thu Fri Sat        1               2               3               4                 5               6               7               8               9      4 mg   See details      10      3 mg         11      4 mg           12      4 mg         13      4 mg         14            15               16               17               18                 19               20               21               22               23               24               25                 26               27               28                    Date Details   02/09 This INR check       Date of next INR:  2/14/2017         How to take your warfarin dose     To take:  3 mg Take 3 of the 1 mg tablets.    To take:  4 mg Take 4 of the 1 mg tablets.

## 2017-02-12 ENCOUNTER — MYC MEDICAL ADVICE (OUTPATIENT)
Dept: FAMILY MEDICINE | Facility: CLINIC | Age: 72
End: 2017-02-12

## 2017-02-12 DIAGNOSIS — M35.00 SJOGREN'S SYNDROME (H): ICD-10-CM

## 2017-02-12 DIAGNOSIS — R07.1 PAINFUL RESPIRATION: ICD-10-CM

## 2017-02-13 RX ORDER — TRAMADOL HYDROCHLORIDE 50 MG/1
50 TABLET ORAL EVERY 6 HOURS PRN
Qty: 60 TABLET | Refills: 0 | Status: SHIPPED | OUTPATIENT
Start: 2017-02-13 | End: 2017-03-09

## 2017-02-13 NOTE — TELEPHONE ENCOUNTER
Controlled Substance Refill Request for Tramadol  Problem List Complete:  Yes    Last Written Prescription Date:  01/16/17  Last Fill Quantity: 60,   # refills: 0    Last Office Visit with [facility] primary care provider: 01/16/17    Clinic visit frequency required:      Future Office visit:     Controlled substance agreement on file: Yes:  Date 12/30/15.     Processing:  Fax Rx to [facility] pharmacy   checked in past 6 months?  No.   Nia Mccain MA

## 2017-02-14 ENCOUNTER — ANTICOAGULATION THERAPY VISIT (OUTPATIENT)
Dept: NURSING | Facility: CLINIC | Age: 72
End: 2017-02-14
Payer: MEDICARE

## 2017-02-14 DIAGNOSIS — Z79.01 LONG-TERM (CURRENT) USE OF ANTICOAGULANTS: ICD-10-CM

## 2017-02-14 DIAGNOSIS — Z53.9 ERRONEOUS ENCOUNTER--DISREGARD: Primary | ICD-10-CM

## 2017-02-14 LAB — INR POINT OF CARE: 2 (ref 0.86–1.14)

## 2017-02-14 PROCEDURE — 36416 COLLJ CAPILLARY BLOOD SPEC: CPT

## 2017-02-14 PROCEDURE — 99207 ZZC NO CHARGE NURSE ONLY: CPT

## 2017-02-14 PROCEDURE — 85610 PROTHROMBIN TIME: CPT | Mod: QW

## 2017-02-14 NOTE — PROGRESS NOTES
ANTICOAGULATION FOLLOW-UP CLINIC VISIT    Patient Name:  Agnes Saravia  Date:  2/14/2017  Contact Type:  Face to Face    SUBJECTIVE:        OBJECTIVE    INR Protime   Date Value Ref Range Status   02/14/2017 2.0 (A) 0.86 - 1.14 Final       ASSESSMENT / PLAN  INR assessment THER    Recheck INR In: 1 WEEK    INR Location Clinic      Anticoagulation Summary as of 2/14/2017     INR goal 2.0-3.0   Today's INR 2.0   Maintenance plan 3 mg (1 mg x 3) on Fri; 4 mg (1 mg x 4) all other days   Full instructions 3 mg on Fri; 4 mg all other days   Weekly total 27 mg   No change documented America Saldana RN   Plan last modified Fadia Junior RN (2/9/2017)   Next INR check 2/23/2017   Priority INR   Target end date     Indications   Long-term (current) use of anticoagulants [Z79.01] [Z79.01]  Pulmonary embolism (H) (Resolved) [I26.99]         Anticoagulation Episode Summary     INR check location     Preferred lab     Send INR reminders to [facility] ANTICOAGULATION    Comments       Anticoagulation Care Providers     Provider Role Specialty Phone number    José Miguel Wallace MD Responsible Internal Medicine [phone]            See the Encounter Report to view Anticoagulation Flowsheet and Dosing Calendar (Go to Encounters tab in chart review, and find the Anticoagulation Therapy Visit)    Dosage adjustment made based on physician directed care plan.  Patient recently was switched off of Taro Brand. She was then given 15 tablets of Jantoven.   Patient then picked up prescription for Coumadin for +$200 at [facility]. She will finish with the Coumadin prescription of 120 tablets. Then at next refill she will request the Jantoven brand.   INR is in range. Would like to recheck in 1 week to make sure INR does not elevate too much as we had increase her dose when she was taking TARO brand.     America Saldana RN

## 2017-02-14 NOTE — MR AVS SNAPSHOT
Agnes Saravia   2/14/2017 8:45 AM   Anticoagulation Therapy Visit    Description:  71 year old female   Provider:   ANTICOAGULATION CLINIC   Department:  Cs Nurse           INR as of 2/14/2017     Today's INR 2.0      Anticoagulation Summary as of 2/14/2017     INR goal 2.0-3.0   Today's INR 2.0   Full instructions 3 mg on Fri; 4 mg all other days   Next INR check 2/23/2017    Indications   Long-term (current) use of anticoagulants [Z79.01] [Z79.01]  Pulmonary embolism (H) (Resolved) [I26.99]         Your next Anticoagulation Clinic appointment(s)     Feb 23, 2017  8:30 AM CST   Anticoagulation Visit with  ANTICOAGULATION CLINIC   [facility] [place] ([facility])    [address]  [place] [address]   [phone]              Contact Numbers     Clinic Number:         February 2017 Details    Sun Mon Tue Wed Thu Fri Sat        1               2               3               4                 5               6               7               8               9               10               11                 12               13               14      4 mg   See details      15      4 mg         16      4 mg         17      3 mg         18      4 mg           19      4 mg         20      4 mg         21      4 mg         22      4 mg         23            24               25                 26               27               28                    Date Details   02/14 This INR check       Date of next INR:  2/23/2017         How to take your warfarin dose     To take:  3 mg Take 3 of the 1 mg tablets.    To take:  4 mg Take 4 of the 1 mg tablets.

## 2017-02-22 DIAGNOSIS — E87.6 HYPOKALEMIA: ICD-10-CM

## 2017-02-22 NOTE — TELEPHONE ENCOUNTER
Pending Prescriptions:                       Disp   Refills    potassium chloride (K-TAB,KLOR-CON) 10 ME*180 ta*0            Sig: Take 2 tablets (20 mEq) by mouth daily          Last Written Prescription Date: 09/30/16  Last Fill Quantity: 180, # refills: 0  Last Office Visit with [facility], P or [facility] prescribing provider: 01/16/17       Potassium   Date Value Ref Range Status   01/28/2017 3.3 (L) 3.4 - 5.3 mmol/L Final     Creatinine   Date Value Ref Range Status   01/28/2017 0.71 0.52 - 1.04 mg/dL Final     BP Readings from Last 3 Encounters:   01/28/17 137/70   01/16/17 127/75   01/09/17 136/80

## 2017-02-23 ENCOUNTER — ANTICOAGULATION THERAPY VISIT (OUTPATIENT)
Dept: NURSING | Facility: CLINIC | Age: 72
End: 2017-02-23
Payer: MEDICARE

## 2017-02-23 DIAGNOSIS — R73.01 IFG (IMPAIRED FASTING GLUCOSE): ICD-10-CM

## 2017-02-23 DIAGNOSIS — I26.99 PULMONARY EMBOLISM, OTHER: ICD-10-CM

## 2017-02-23 DIAGNOSIS — Z79.01 LONG-TERM (CURRENT) USE OF ANTICOAGULANTS: ICD-10-CM

## 2017-02-23 LAB
HBA1C MFR BLD: 5.9 % (ref 4.3–6)
INR POINT OF CARE: 2.2 (ref 0.86–1.14)

## 2017-02-23 PROCEDURE — 99207 ZZC NO CHARGE NURSE ONLY: CPT

## 2017-02-23 PROCEDURE — 36416 COLLJ CAPILLARY BLOOD SPEC: CPT

## 2017-02-23 PROCEDURE — 85610 PROTHROMBIN TIME: CPT | Mod: QW

## 2017-02-23 PROCEDURE — 83036 HEMOGLOBIN GLYCOSYLATED A1C: CPT | Performed by: INTERNAL MEDICINE

## 2017-02-23 RX ORDER — WARFARIN SODIUM 1 MG/1
3-4 TABLET ORAL DAILY
Qty: 120 TABLET | Refills: 0 | Status: SHIPPED | OUTPATIENT
Start: 2017-02-23 | End: 2017-04-11

## 2017-02-23 RX ORDER — POTASSIUM CHLORIDE 750 MG/1
20 TABLET, EXTENDED RELEASE ORAL DAILY
Qty: 180 TABLET | Refills: 3 | Status: SHIPPED | OUTPATIENT
Start: 2017-02-23 | End: 2018-03-04

## 2017-02-23 NOTE — MR AVS SNAPSHOT
Agnes Saravia   2/23/2017 8:30 AM   Anticoagulation Therapy Visit    Description:  71 year old female   Provider:   ANTICOAGULATION CLINIC   Department:   Nurse           INR as of 2/23/2017     Today's INR 2.2      Anticoagulation Summary as of 2/23/2017     INR goal 2.0-3.0   Today's INR 2.2   Full instructions 3 mg on Fri; 4 mg all other days   Next INR check 3/13/2017    Indications   Long-term (current) use of anticoagulants [Z79.01] [Z79.01]  Pulmonary embolism (H) (Resolved) [I26.99]         Your next Anticoagulation Clinic appointment(s)     Feb 23, 2017  8:30 AM CST   Anticoagulation Visit with  [facility]ULATION CLINIC   [facility]    [address]  [place] [address]   [phone]            Mar 13, 2017  8:45 AM CDT   Anticoagulation Visit with  ANTICOAGULATION CLINIC   [facility]    [address]  [place] [address]   [phone]              Contact Numbers     Clinic Number:         February 2017 Details    Sun Mon Tue Wed Thu Fri Sat        1               2               3               4                 5               6               7               8               9               10               11                 12               13               14               15               16               17               18                 19               20               21               22               23      4 mg   See details      24      3 mg         25      4 mg           26      4 mg         27      4 mg         28      4 mg              Date Details   02/23 This INR check               How to take your warfarin dose     To take:  3 mg Take 3 of the 1 mg tablets.    To take:  4 mg Take 4 of the 1 mg tablets.           March 2017 Details    Sun Mon Tue Wed Thu Fri Sat        1      4 mg         2      4 mg         3      3 mg         4      4 mg           5      4 mg         6      4 mg         7       4 mg         8      4 mg         9      4 mg         10      3 mg         11      4 mg           12      4 mg         13            14               15               16               17               18                 19               20               21               22               23               24               25                 26               27               28               29               30               31                 Date Details   No additional details    Date of next INR:  3/13/2017         How to take your warfarin dose     To take:  3 mg Take 3 of the 1 mg tablets.    To take:  4 mg Take 4 of the 1 mg tablets.

## 2017-02-23 NOTE — PROGRESS NOTES
ANTICOAGULATION FOLLOW-UP CLINIC VISIT    Patient Name:  Agnes Saravia  Date:  2/23/2017  Contact Type:  Face to Face    SUBJECTIVE:     Patient Findings     Positives No Problem Findings           OBJECTIVE    INR Protime   Date Value Ref Range Status   02/23/2017 2.2 (A) 0.86 - 1.14 Final       ASSESSMENT / PLAN  INR assessment THER    Recheck INR In: 2 WEEKS    INR Location Clinic      Anticoagulation Summary as of 2/23/2017     INR goal 2.0-3.0   Today's INR 2.2   Maintenance plan 3 mg (1 mg x 3) on Fri; 4 mg (1 mg x 4) all other days   Full instructions 3 mg on Fri; 4 mg all other days   Weekly total 27 mg   No change documented Stacy Castle RN   Plan last modified Fadia Junior RN (2/9/2017)   Next INR check 3/13/2017   Priority INR   Target end date     Indications   Long-term (current) use of anticoagulants [Z79.01] [Z79.01]  Pulmonary embolism (H) (Resolved) [I26.99]         Anticoagulation Episode Summary     INR check location     Preferred lab     Send INR reminders to [facility] ANTICOAGULATION    Comments       Anticoagulation Care Providers     Provider Role Specialty Phone number    José Miguel Wallace MD Responsible Internal Medicine [phone]            See the Encounter Report to view Anticoagulation Flowsheet and Dosing Calendar (Go to Encounters tab in chart review, and find the Anticoagulation Therapy Visit)    Dosing based on [facility] Protocol and Provider directed care plan.      Stacy Castle, RN

## 2017-02-23 NOTE — TELEPHONE ENCOUNTER
Routing refill request to provider for review/approval because:  Labs out of range:  Potassium  Last Rx from outside provider Govind SHI RN

## 2017-02-24 NOTE — PROGRESS NOTES
Gurdeep Alarcon,    I have had the opportunity to review your recent results and an interpretation is as follows:  Your hemoglobin A1c shows excellent average blood glucose control     Sincerely,  José Miguel Wallace MD

## 2017-02-28 ENCOUNTER — CARE COORDINATION (OUTPATIENT)
Dept: CARE COORDINATION | Facility: CLINIC | Age: 72
End: 2017-02-28

## 2017-02-28 NOTE — PROGRESS NOTES
Clinic Care Coordination Contact  OUTREACH    Referral Information:  Referral Source: CTS  Reason for Contact: Follow up Pleurisy   Care Conference: No     Universal Utilization:   ED Visits in last year: 2  Hospital visits in last year: 2  Last PCP appointment: 01/16/17  Missed Appointments: 0  Concerns: none  Multiple Providers or Specialists: Yes (Rheumatology, dermatology, pulmonology)    Clinical Concerns:  Current Medical Concerns: Pleurisy x 5 months. Productive cough of yellow brown, coming up in big spoonfuls.  All day, worse in the morning. Recent visit to ENT was unremarkable. .   Current Behavioral Concerns: none    Education Provided to patient: when to f/u with clinic. Respiratory hygiene.  Use of mucinex. Continue light activity at home as tolerated.   Clinical Pathway Name: None    Medication Management:  No medication changes. Patient self-manages medications.    Due for rtuxan infusion in April. Rheum holding until cleared from pulm perspective.  Managing pain with Tylenol, gabapentin, Guaifenesin with codeine.       Functional Status:  Mobility Status: Independent     Transportation: Drives self      Psychosocial:  Current living arrangement:: Lives in a private home with friend Lima who handles shopping  Financial/Insurance: Medicare/BCBS     Resources and Interventions:  Current Resources: DME ([employer])        Advanced Care Plans/Directives on file:: Yes        Goals:   Goal 1 Statement: I will follow up with pulmonology on 03/29/17.   Goal 1 Progression Percent: 30%  Goal 1 Progression Date: 02/28/17              Barriers: Reduced physiological reserve. Pain  Strengths: understanding and in agreement with medical advice.   Patient/Caregiver understanding: Patient to see new pulmonologist at the [facility] on 03/29.  Will call clinic if any new/worsening symptoms.   Frequency of Care Coordination: PRN  Upcoming appointment: 03/29/17 (Pulmonology)     Plan: RN CCC to f/u with patient in  4-5 wseks (after pulm visit).   Patient will outreach for support as needed.    Tamiko Knight RN  Clinic Care Coordinator  [facility]  [phone]

## 2017-02-28 NOTE — LETTER
[facility]  Complex Care Plan  About Me  Patient Name:  Agnes Saravia    YOB: 1945  Age:   71 year old   [place] MRN: [id number] Telephone Information:     Home Phone [phone]   Mobile [phone]       Address:    [address] [address] Email address:  [email or web address]      Emergency Contact(s)  Name Relationship Lgl Grd Work Phone Home Phone Mobile Phone   WILBER BURTON Roommate No [phone]           Primary language:  English     needed? No   [employer]:  [phone] op. 1  Other communication barriers: No  Preferred Method of Communication:  Maria Isabel Quan  Current living arrangement: I live in a private home  Mobility Status/ Medical Equipment: Independent  Other information to know about me:    Health Maintenance  Health Maintenance Reviewed: Up to date    My Access Plan  Medical Emergency 911   Primary Clinic Line [facility] [phone]   24 Hour Appointment Line [phone] or  [phone]) (toll-free)   24 Hour Nurse Line [phone] (toll-free)   Preferred Urgent Care [facility], [phone]   Preferred Hospital [facility]  [phone]   Preferred Pharmacy [id number] - [place], [place] - [address] [facility] Crisis Line [facility], [phone] or 911     My Care Team Members  Patient Care Team       Relationship Specialty Notifications Start End    José Miguel Wallace MD PCP - General Internal Medicine  3/4/13     Phone: [phone] Pager: [phone] Fax: [phone]        [facility] [address]  [facility] [address]    Bruce Nash MD MD   5/16/13     Phone: [phone] Fax: [phone]         [facility] [address]  [facility] [address]    Ajay Scott MD MD Internal Medicine  5/16/13     Phone:  [phone] Fax: [phone]         [facility] [address]  [place] [address]    Dudley Bernal MD MD Neurosurgery  8/21/14     Phone: [phone] Fax: [phone]         [facility] [address] [place] [address]    Tamiko Knight, RN Clinic Care Coordinator  Admissions 1/5/17     Comment:  Ph: [phone]         My Care Plans  Self Management and Treatment Plan  Goals and (Comments)  Goal #1: I will follow up with pulmonology on 03/29/17.       30% of goal reached    Action Plans on File: None  Advance Care Plans/Directives Type:   Type Advanced Care Plans/Directives: Advanced Directive - On File    My Medical and Care Information  Problem List   Patient Active Problem List   Diagnosis     Sjogren's syndrome (H)     Post menopausal syndrome     HTN (hypertension)     Hyperlipidemia LDL goal <130     Hypokalemia     Pain     Squamous cell carcinoma (H)     Lumbar disc disease     Health Care Home     IFG (impaired fasting glucose)     Obesity     Advance Care Planning     Pneumonia     Other acute pulmonary embolism without acute cor pulmonale (H)     Long-term (current) use of anticoagulants [Z79.01]      Current Medications and Allergies:  See printed Medication Report.    Care Coordination Start Date:     Frequency of Care Coordination: PRN   Form Last Updated: 02/28/2017

## 2017-03-08 ENCOUNTER — MYC MEDICAL ADVICE (OUTPATIENT)
Dept: FAMILY MEDICINE | Facility: CLINIC | Age: 72
End: 2017-03-08

## 2017-03-09 ENCOUNTER — TRANSFERRED RECORDS (OUTPATIENT)
Dept: HEALTH INFORMATION MANAGEMENT | Facility: CLINIC | Age: 72
End: 2017-03-09

## 2017-03-09 DIAGNOSIS — R07.1 PAINFUL RESPIRATION: ICD-10-CM

## 2017-03-09 DIAGNOSIS — M35.00 SJOGREN'S SYNDROME (H): ICD-10-CM

## 2017-03-10 NOTE — TELEPHONE ENCOUNTER
traMADol (ULTRAM) 50 MG tablet      Last Written Prescription Date:  2/13/2017  Last Fill Quantity: 60,   # refills: 0  Last Office Visit with [facility], P or  Health prescribing provider: 1/16/2017  Future Office visit:       Routing refill request to provider for review/approval because:  Drug not on the [facility], [facility] or  Health refill protocol or controlled substance

## 2017-03-12 DIAGNOSIS — J18.9 PNEUMONIA OF LEFT LOWER LOBE DUE TO INFECTIOUS ORGANISM: ICD-10-CM

## 2017-03-13 ENCOUNTER — ANTICOAGULATION THERAPY VISIT (OUTPATIENT)
Dept: NURSING | Facility: CLINIC | Age: 72
End: 2017-03-13
Payer: MEDICARE

## 2017-03-13 DIAGNOSIS — Z79.01 LONG-TERM (CURRENT) USE OF ANTICOAGULANTS: ICD-10-CM

## 2017-03-13 LAB — INR POINT OF CARE: 2.5 (ref 0.86–1.14)

## 2017-03-13 PROCEDURE — 99207 ZZC NO CHARGE NURSE ONLY: CPT

## 2017-03-13 PROCEDURE — 36416 COLLJ CAPILLARY BLOOD SPEC: CPT

## 2017-03-13 PROCEDURE — 85610 PROTHROMBIN TIME: CPT | Mod: QW

## 2017-03-13 RX ORDER — TRAMADOL HYDROCHLORIDE 50 MG/1
TABLET ORAL
Qty: 60 TABLET | Refills: 3 | Status: SHIPPED | OUTPATIENT
Start: 2017-03-13 | End: 2017-05-11

## 2017-03-13 NOTE — TELEPHONE ENCOUNTER
Patient here for INR. Wondering about Tramadol prescription.     Please see pended medication    America Saldana RN

## 2017-03-13 NOTE — MR AVS SNAPSHOT
Agnes Saravia   3/13/2017 8:45 AM   Anticoagulation Therapy Visit    Description:  71 year old female   Provider:   ANTICOAGULATION CLINIC   Department:   Nurse           INR as of 3/13/2017     Today's INR 2.5      Anticoagulation Summary as of 3/13/2017     INR goal 2.0-3.0   Today's INR 2.5   Full instructions 3 mg on Fri; 4 mg all other days   Next INR check 4/11/2017    Indications   Long-term (current) use of anticoagulants [Z79.01] [Z79.01]  Pulmonary embolism (H) (Resolved) [I26.99]         Your next Anticoagulation Clinic appointment(s)     Mar 13, 2017  8:45 AM CDT   Anticoagulation Visit with  ANTICOAGULATION CLINIC   [facility]    [address]  [place] [address]   [phone]            Apr 11, 2017  8:30 AM CDT   Anticoagulation Visit with  ANTICOAGULATION CLINIC   [facility]    [address]  [place] [address]   [phone]              Contact Numbers     Clinic Number:         March 2017 Details    Sun Mon Tue Wed Thu Fri Sat        1               2               3               4                 5               6               7               8               9               10               11                 12               13      4 mg   See details      14      4 mg         15      4 mg         16      4 mg         17      3 mg         18      4 mg           19      4 mg         20      4 mg         21      4 mg         22      4 mg         23      4 mg         24      3 mg         25      4 mg           26      4 mg         27      4 mg         28      4 mg         29      4 mg         30      4 mg         31      3 mg           Date Details   03/13 This INR check               How to take your warfarin dose     To take:  3 mg Take 3 of the 1 mg tablets.    To take:  4 mg Take 4 of the 1 mg tablets.           April 2017 Details    Sun Mon Tue Wed Thu Fri Sat           1      4 mg           2       4 mg         3      4 mg         4      4 mg         5      4 mg         6      4 mg         7      3 mg         8      4 mg           9      4 mg         10      4 mg         11            12               13               14               15                 16               17               18               19               20               21               22                 23               24               25               26               27               28               29                 30                      Date Details   No additional details    Date of next INR:  4/11/2017         How to take your warfarin dose     To take:  3 mg Take 3 of the 1 mg tablets.    To take:  4 mg Take 4 of the 1 mg tablets.

## 2017-03-13 NOTE — PROGRESS NOTES
ANTICOAGULATION FOLLOW-UP CLINIC VISIT    Patient Name:  Agnes Saravia  Date:  3/13/2017  Contact Type:  Face to Face    SUBJECTIVE:        OBJECTIVE    INR Protime   Date Value Ref Range Status   03/13/2017 2.5 (A) 0.86 - 1.14 Final       ASSESSMENT / PLAN  INR assessment THER    Recheck INR In: 4 WEEKS    INR Location Clinic      Anticoagulation Summary as of 3/13/2017     INR goal 2.0-3.0   Today's INR 2.5   Maintenance plan 3 mg (1 mg x 3) on Fri; 4 mg (1 mg x 4) all other days   Full instructions 3 mg on Fri; 4 mg all other days   Weekly total 27 mg   No change documented America Saldana RN   Plan last modified Fadia Junior RN (2/9/2017)   Next INR check 4/11/2017   Priority INR   Target end date     Indications   Long-term (current) use of anticoagulants [Z79.01] [Z79.01]  Pulmonary embolism (H) (Resolved) [I26.99]         Anticoagulation Episode Summary     INR check location     Preferred lab     Send INR reminders to CS ANTICOAGULATION    Comments       Anticoagulation Care Providers     Provider Role Specialty Phone number    José Miguel Wallace MD [facility] Internal Medicine [phone]            See the Encounter Report to view Anticoagulation Flowsheet and Dosing Calendar (Go to Encounters tab in chart review, and find the Anticoagulation Therapy Visit)    Dosage adjustment made based on physician directed care plan.  No changes.    America Saldana RN

## 2017-03-14 RX ORDER — ALBUTEROL SULFATE 90 UG/1
AEROSOL, METERED RESPIRATORY (INHALATION)
Qty: 18 G | Refills: 3 | Status: SHIPPED | OUTPATIENT
Start: 2017-03-14 | End: 2017-05-08

## 2017-03-14 NOTE — TELEPHONE ENCOUNTER
Prescription approved per [facility] Refill Protocol.  Ritu Rayo RN

## 2017-03-14 NOTE — TELEPHONE ENCOUNTER
VENTOLIN  (90 BASE) MCG/ACT Inhaler         Last Written Prescription Date: 2/2/2017  Last Fill Quantity: 18g, # refills: 0    Last Office Visit with G, P or [facility] prescribing provider:  1/16/2017   Future Office Visit:       Date of Last Asthma Action Plan Letter:   There are no preventive care reminders to display for this patient.   Asthma Control Test: No flowsheet data found.    Date of Last Spirometry Test:   No results found for this or any previous visit.

## 2017-03-15 ENCOUNTER — MYC MEDICAL ADVICE (OUTPATIENT)
Dept: FAMILY MEDICINE | Facility: CLINIC | Age: 72
End: 2017-03-15

## 2017-03-17 ENCOUNTER — TRANSFERRED RECORDS (OUTPATIENT)
Dept: HEALTH INFORMATION MANAGEMENT | Facility: CLINIC | Age: 72
End: 2017-03-17

## 2017-03-17 DIAGNOSIS — M35.00 SJOGREN'S SYNDROME (H): ICD-10-CM

## 2017-03-20 NOTE — TELEPHONE ENCOUNTER
Routing refill request to provider for review/approval because:  Drug not on the [facility] refill protocol     Fadia Junior RN, BSN

## 2017-03-20 NOTE — TELEPHONE ENCOUNTER
Clorazepate Dipotassium 15 MG TABS        Last Written Prescription Date: 9/20/2016  Last Fill Quantity: 90,  # refills: 1   Last Office Visit with [facility], [facility] or [facility] prescribing provider: 1/16/2017

## 2017-03-21 ENCOUNTER — PRE VISIT (OUTPATIENT)
Dept: PULMONOLOGY | Facility: CLINIC | Age: 72
End: 2017-03-21

## 2017-03-21 RX ORDER — CLORAZEPATE DIPOTASSIUM 15 MG/1
TABLET ORAL
Qty: 90 TABLET | Refills: 0 | Status: SHIPPED | OUTPATIENT
Start: 2017-03-21 | End: 2017-06-11

## 2017-03-21 NOTE — TELEPHONE ENCOUNTER
1.  Date/reason for appt:  3/29/17   Painful Respiration    2.  Referring provider:  Dr Wallace    3.  Call to patient (Yes / No - short description):  No, referred.  Evaluations by [facility] and [facility] are in [facility]    Records reviewed.  All records are in [facility] and imaging is in PACS.

## 2017-03-26 ASSESSMENT — ENCOUNTER SYMPTOMS
DIZZINESS: 0
ARTHRALGIAS: 1
COUGH: 1
INCREASED ENERGY: 1
BACK PAIN: 1
WEIGHT LOSS: 0
MEMORY LOSS: 0
SMELL DISTURBANCE: 0
HALLUCINATIONS: 0
WEAKNESS: 0
POSTURAL DYSPNEA: 0
MUSCLE WEAKNESS: 0
SPEECH CHANGE: 0
WEIGHT GAIN: 0
COUGH DISTURBING SLEEP: 0
FEVER: 0
NECK MASS: 0
DYSPNEA ON EXERTION: 1
DISTURBANCES IN COORDINATION: 0
PARALYSIS: 0
HOARSE VOICE: 0
POLYPHAGIA: 0
HEADACHES: 0
HEMOPTYSIS: 0
FATIGUE: 1
CHILLS: 0
SNORES LOUDLY: 0
WHEEZING: 1
SEIZURES: 0
TREMORS: 0
RESPIRATORY PAIN: 0
ALTERED TEMPERATURE REGULATION: 0
TROUBLE SWALLOWING: 0
MUSCLE CRAMPS: 0
SINUS CONGESTION: 1
TINGLING: 0
DECREASED APPETITE: 0
SINUS PAIN: 0
TASTE DISTURBANCE: 0
LOSS OF CONSCIOUSNESS: 0
SHORTNESS OF BREATH: 0
POLYDIPSIA: 0
NUMBNESS: 0
NIGHT SWEATS: 0
SPUTUM PRODUCTION: 1
NECK PAIN: 0
JOINT SWELLING: 1
STIFFNESS: 1
SORE THROAT: 0
MYALGIAS: 1

## 2017-03-29 ENCOUNTER — OFFICE VISIT (OUTPATIENT)
Dept: PULMONOLOGY | Facility: CLINIC | Age: 72
End: 2017-03-29
Attending: INTERNAL MEDICINE
Payer: MEDICARE

## 2017-03-29 VITALS
HEART RATE: 83 BPM | OXYGEN SATURATION: 97 % | DIASTOLIC BLOOD PRESSURE: 66 MMHG | RESPIRATION RATE: 16 BRPM | SYSTOLIC BLOOD PRESSURE: 117 MMHG

## 2017-03-29 DIAGNOSIS — R05.3 CHRONIC COUGH: Primary | ICD-10-CM

## 2017-03-29 DIAGNOSIS — I10 HTN (HYPERTENSION): Primary | ICD-10-CM

## 2017-03-29 PROCEDURE — 99212 OFFICE O/P EST SF 10 MIN: CPT | Mod: ZF

## 2017-03-29 ASSESSMENT — PAIN SCALES - GENERAL: PAINLEVEL: NO PAIN (0)

## 2017-03-29 NOTE — NURSING NOTE
Chief Complaint   Patient presents with     Consult     Patient is being seen for consultation of breathing issues      Lubna Resendiz CMA at 4:11 PM on 3/29/2017

## 2017-03-29 NOTE — PROGRESS NOTES
[facility]  Pulmonary Medicine - Initial Visit Clinic Note  March 29, 2017         ASSESSMENT & PLAN   Ms. Agnes Saravia is a 71-year old female with history of RA and Sjogrens on MTX, rituximab, and prednisone 5 mg daily, who was referred to Pulmonary for evaluation of chronic productive cough and wheezing.    Chronic productive cough with upper airway wheezing  Normal PFTs and relatively normal CT chest, however, imaging is PE protocol. I suspect her symptoms are related to upper airway (sinuses) rather then lower/pulmonary.    Pulmonary nodule TAMMI 0.5 cm  Seen in both 9/2016 and 12/2016 imaging. High risk patient given immunosuppression. Needs follow-up to complete 24 months.     RECOMMENDATIONS:  1. CT chest without contrast, high resolution with inspiratory and expiratory cuts.  2. CT sinuses and ENT consult.  3. Consider Speech Pathology, pending ENT consult for probable vocal cord dysfunction.     Medical records reviewed extensively.     RTC in 3 months, preferably after she has been seen by ENT.     Patient was staffed with Dr. Brock.    Arlet Tomlinson MD PGY-5  Pulmonary and Critical Care Medicine Fellow       Today's visit note:     REASON FOR VISIT: Referred by Dr. Wallace, PCP from [facility], for evaluation of productive cough and wheezing.     HISTORY OF PRESENT ILLNESS:  Ms. Agnes Saravia is a 71-year old female with history of RA and Sjogrens on MTX, rituximab, and prednisone 5 mg daily, who was referred to Pulmonary for evaluation of chronic cough and wheezing.    In 9/2016, she reported having a sore throat lasting a few hours, then developed cough productive of white/yellow sputum. She was told she had pneumonia so she was treated with antibiotics. No infiltrates on CXR or CT chest. She also reports constantly having to blow her nose. She continued to have symptoms. In 11/2016, she was found to have a RUL segmental PE and was started on warfarin. She also had  pleurisy, now resolved. She had a repeat CT chest PE protocol that showed resolution of PE, though she continued to have productive cough and wheezing. She feels that the wheezing is coming from her throat area. She denies acid reflux/GERD symptoms. She findings her albuterol helps sometimes, especially when she has wheezing. She was also treated with Zpack X2 though no significant improvement.     No fevers, chills, nausea, vomiting, and abdominal pain.     REVIEW OF SYSTEMS:  12-systems reviewed with pertinent positives and negatives mentioned in the HPI.     Pulmonary ROS:  Cough: Yes, see above.   Dyspnea: Yes when walking long distances.  Wheezing: Yes, see above.   Hemoptysis: No  Exposures: Denies. No mold exposure.     Hobbies: Fishing.   TB: None.    Pets: No birds, cats, or dogs.   Tobacco: <5 pack years tobacco use quit >40 yrs ago.     PHYSICAL EXAM:  /66 (BP Location: Right arm, Patient Position: Chair, Cuff Size: Adult Large)  Pulse 83  Resp 16  SpO2 97% on RA.     Constitutional:     Awake, alert and in no apparent distress. Voice is hoarse.    Eyes:    Anicteric, PERRL   ENT:    Oral mucosa moist without lesions. Erythematous nares bilaterally.    Neck:    Supple without supraclavicular or cervical lymphadenopathy. Loud upper airway wheezing.    Lungs:    Good air entry both lungs. No crackles. No rhonchi. No wheezes.    Cardiovascular:    Normal S1 and S2. No JVD, murmur, rub, piedad.   Musculoskeletal:    Trace/+1 bilateral lower extremity edema.    Neurologic:    Alert and conversant.    Skin:    Warm, dry. No rash on limited exam.               Past Medical and Surgical History:     Past Medical History:   Diagnosis Date     Anxiety      Hyperlipidemia      Insomnia      PONV (postoperative nausea and vomiting)      Rheumatoid arthritis(714.0)      Sjogren's syndrome (H)      Past Surgical History:   Procedure Laterality Date     BLEPHAROPLASTY       DISCECTOMY LUMBAR POSTERIOR  MICROSCOPIC ONE LEVEL  8/14/2014    Procedure: DISCECTOMY LUMBAR POSTERIOR MICROSCOPIC ONE LEVEL;  Surgeon: Dudley Bernal MD;  Location: [facility] OR     HYSTERECTOMY TOTAL ABDOMINAL, BILATERAL SALPINGO-OOPHORECTOMY, COMBINED       HYSTERECTOMY, PAP NO LONGER INDICATED       MAMMOPLASTY REDUCTION BILATERAL  5/14/2013    Procedure: MAMMOPLASTY REDUCTION BILATERAL;  BILATERAL REDUCTION MAMMOPLASTY;  Surgeon: Bruce Nash MD;  Location:  [facility]     SHOULDER SURGERY             Family History:     Family History   Problem Relation Age of Onset     CEREBROVASCULAR DISEASE Mother      CEREBROVASCULAR DISEASE Father    Aunt with history of sinus cancer.          Social History:     Social History     Social History     Marital status: Single     Spouse name: N/A     Number of children: N/A     Years of education: N/A     Occupational History     Not on file.     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[employer]            Medications:     Current Outpatient Prescriptions   Medication     Clorazepate Dipotassium 15 MG TABS     VENTOLIN  (90 BASE) MCG/ACT Inhaler     traMADol (ULTRAM) 50 MG tablet     potassium chloride (K-TAB,KLOR-CON) 10 MEQ tablet     warfarin (JANTOVEN) 1 MG tablet     gabapentin (NEURONTIN) 100 MG capsule     HYDROmorphone (DILAUDID) 2 MG tablet     warfarin (COUMADIN) 1 MG tablet     guaiFENesin-codeine (ROBITUSSIN AC) 100-10 MG/5ML SOLN solution     traZODone (DESYREL) 50 MG tablet      simvastatin (ZOCOR) 40 MG tablet     hydrochlorothiazide (HYDRODIURIL) 50 MG tablet     methotrexate, Anti-Rheumatic, 2.5 MG TABS     riTUXimab (RITUXAN) 10 MG/ML injection     FOLIC ACID PO     predniSONE (DELTASONE) 5 MG tablet     cevimeline (EVOXAC) 30 MG capsule     No current facility-administered medications for this visit.               Data:   All laboratory and imaging data reviewed.      PFT: Reviewed by me.   3/29/2017: Normal spirometry, DLCO, and lung volumes.       CT chests, PE protocol from 9/2016, 11/2016, and 12/2016: Reviewed by me. No obvious infiltrates seen. TAMMI nodule 0.5 cm seen in 9/2016 and 12/2016. RUL segmental PE found in 11/2016, resolved on 12/2016.     Attending statement:    The patient was seen and examined by me with the pulmonary fellow, Dr Tomlinson.  The case was discussed at length.  Vitals, lab results and imaging from our visit were reviewed.  The note written by Dr Tomlinson above reflects our joint assessment and plan.    Dean Brock MD

## 2017-03-29 NOTE — LETTER
3/29/2017     RE: Agnes Saravia  [address]  [place] [address]     Dear Colleague,    Thank you for referring your patient, Agnes Saravia, to the [facility] FOR LUNG SCIENCE AND HEALTH at [facility]. Please see a copy of my visit note below.    [facility]  Pulmonary Medicine - Initial Visit Clinic Note  March 29, 2017         ASSESSMENT & PLAN   Ms. Agnes Saravia is a 71-year old female with history of RA and Sjogrens on MTX, rituximab, and prednisone 5 mg daily, who was referred to Pulmonary for evaluation of chronic productive cough and wheezing.    Chronic productive cough with upper airway wheezing  Normal PFTs and relatively normal CT chest, however, imaging is PE protocol. I suspect her symptoms are related to upper airway (sinuses) rather then lower/pulmonary.    Pulmonary nodule TAMMI 0.5 cm  Seen in both 9/2016 and 12/2016 imaging. High risk patient given immunosuppression. Needs follow-up to complete 24 months.     RECOMMENDATIONS:  1. CT chest without contrast, high resolution with inspiratory and expiratory cuts.  2. CT sinuses and ENT consult.  3. Consider Speech Pathology, pending ENT consult for probable vocal cord dysfunction.     Medical records reviewed extensively.     RTC in 3 months, preferably after she has been seen by ENT.     Patient was staffed with Dr. Brock.    Arlet Tomlinson MD PGY-5  Pulmonary and Critical Care Medicine Fellow       Today's visit note:     REASON FOR VISIT: Referred by Dr. Wallace, PCP from [facility], for evaluation of productive cough and wheezing.     HISTORY OF PRESENT ILLNESS:  Ms. Agnes Saravia is a 71-year old female with history of RA and Sjogrens on MTX, rituximab, and prednisone 5 mg daily, who was referred to Pulmonary for evaluation of chronic cough and wheezing.    In 9/2016, she reported having a sore throat lasting a few hours, then developed cough  productive of white/yellow sputum. She was told she had pneumonia so she was treated with antibiotics. No infiltrates on CXR or CT chest. She also reports constantly having to blow her nose. She continued to have symptoms. In 11/2016, she was found to have a RUL segmental PE and was started on warfarin. She also had pleurisy, now resolved. She had a repeat CT chest PE protocol that showed resolution of PE, though she continued to have productive cough and wheezing. She feels that the wheezing is coming from her throat area. She denies acid reflux/GERD symptoms. She findings her albuterol helps sometimes, especially when she has wheezing. She was also treated with Zpack X2 though no significant improvement.     No fevers, chills, nausea, vomiting, and abdominal pain.     REVIEW OF SYSTEMS:  12-systems reviewed with pertinent positives and negatives mentioned in the HPI.     Pulmonary ROS:  Cough: Yes, see above.   Dyspnea: Yes when walking long distances.  Wheezing: Yes, see above.   Hemoptysis: No  Exposures: Denies. No mold exposure.     Hobbies: Fishing.   TB: None.    Pets: No birds, cats, or dogs.   Tobacco: <5 pack years tobacco use quit >40 yrs ago.     PHYSICAL EXAM:  /66 (BP Location: Right arm, Patient Position: Chair, Cuff Size: Adult Large)  Pulse 83  Resp 16  SpO2 97% on RA.     Constitutional:     Awake, alert and in no apparent distress. Voice is hoarse.    Eyes:    Anicteric, PERRL   ENT:    Oral mucosa moist without lesions. Erythematous nares bilaterally.    Neck:    Supple without supraclavicular or cervical lymphadenopathy. Loud upper airway wheezing.    Lungs:    Good air entry both lungs. No crackles. No rhonchi. No wheezes.    Cardiovascular:    Normal S1 and S2. No JVD, murmur, rub, piedad.   Musculoskeletal:    Trace/+1 bilateral lower extremity edema.    Neurologic:    Alert and conversant.    Skin:    Warm, dry. No rash on limited exam.               Past Medical and Surgical  History:     Past Medical History:   Diagnosis Date     Anxiety      Hyperlipidemia      Insomnia      PONV (postoperative nausea and vomiting)      Rheumatoid arthritis(714.0)      Sjogren's syndrome (H)      Past Surgical History:   Procedure Laterality Date     BLEPHAROPLASTY       DISCECTOMY LUMBAR POSTERIOR MICROSCOPIC ONE LEVEL  8/14/2014    Procedure: DISCECTOMY LUMBAR POSTERIOR MICROSCOPIC ONE LEVEL;  Surgeon: Dudley Bernal MD;  Location:  OR     HYSTERECTOMY TOTAL ABDOMINAL, BILATERAL SALPINGO-OOPHORECTOMY, COMBINED       HYSTERECTOMY, PAP NO LONGER INDICATED       MAMMOPLASTY REDUCTION BILATERAL  5/14/2013    Procedure: MAMMOPLASTY REDUCTION BILATERAL;  BILATERAL REDUCTION MAMMOPLASTY;  Surgeon: Bruce Nash MD;  Location:  [facility]     SHOULDER SURGERY             Family History:     Family History   Problem Relation Age of Onset     CEREBROVASCULAR DISEASE Mother      CEREBROVASCULAR DISEASE Father    Aunt with history of sinus cancer.          Social History:     Social History     Social History     Marital status: Single     Spouse name: N/A     Number of children: N/A     Years of education: N/A     Occupational History     Not on file.     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[employer]            Medications:     Current Outpatient Prescriptions   Medication     Clorazepate Dipotassium 15 MG TABS     VENTOLIN  (90 BASE) MCG/ACT Inhaler     traMADol (ULTRAM) 50 MG tablet      potassium chloride (K-TAB,KLOR-CON) 10 MEQ tablet     warfarin (JANTOVEN) 1 MG tablet     gabapentin (NEURONTIN) 100 MG capsule     HYDROmorphone (DILAUDID) 2 MG tablet     warfarin (COUMADIN) 1 MG tablet     guaiFENesin-codeine (ROBITUSSIN AC) 100-10 MG/5ML SOLN solution     traZODone (DESYREL) 50 MG tablet     simvastatin (ZOCOR) 40 MG tablet     hydrochlorothiazide (HYDRODIURIL) 50 MG tablet     methotrexate, Anti-Rheumatic, 2.5 MG TABS     riTUXimab (RITUXAN) 10 MG/ML injection     FOLIC ACID PO     predniSONE (DELTASONE) 5 MG tablet     cevimeline (EVOXAC) 30 MG capsule     No current facility-administered medications for this visit.               Data:   All laboratory and imaging data reviewed.      PFT: Reviewed by me.   3/29/2017: Normal spirometry, DLCO, and lung volumes.       CT chests, PE protocol from 9/2016, 11/2016, and 12/2016: Reviewed by me. No obvious infiltrates seen. TAMMI nodule 0.5 cm seen in 9/2016 and 12/2016. RUL segmental PE found in 11/2016, resolved on 12/2016.     Attending statement:    The patient was seen and examined by me with the pulmonary fellow, Dr Tomlinson.  The case was discussed at length.  Vitals, lab results and imaging from our visit were reviewed.  The note written by Dr Tomlinson above reflects our joint assessment and plan.    Dean Brock MD

## 2017-03-29 NOTE — MR AVS SNAPSHOT
After Visit Summary   3/29/2017    Agnes Saravia    MRN: [id number]           Patient Information     Date Of Birth          1945        Visit Information        Provider Department      3/29/2017 2:45 PM Arlet Tomlinson MD [facility]        Today's Diagnoses     Chronic cough    -  1       Follow-ups after your visit        Additional Services     ENT referral       Specify: hoarse voice, coughing, and upper airway wheezing on examination. History of Sjogren's and RA on immunosuppression (MTX, rituximab, and prednisone 5 mg).            Thoracic Surgery Adult IP Consult       Patient known to Dr. Munoz. History of probable right side empyema/loculated pleural effusion. He has persistent right sided loculated pleural effusion; please evaluate need for decortication. Thanks.                  Follow-up notes from your care team     Return in about 3 months (around 6/29/2017).      Your next 10 appointments already scheduled     Mar 29, 2017  4:20 PM CDT   (Arrive by 4:05 PM)   CT CHEST W/O CONTRAST with UCCT2   [facility] [place] ([facility])    [address] [address] [phone]           Please bring any scans or X-rays taken at other hospitals, if similar tests were done. Also bring a list of your medicines, including vitamins, minerals and over-the-counter drugs. It is safest to leave personal items at home.  Be sure to tell your doctor:   If you have any allergies.   If there s any chance you are pregnant.   If you are breastfeeding.   If you have any special needs.  You do not need to do anything special to prepare.  Please wear loose clothing, such as a sweat suit or jogging clothes. Avoid snaps, zippers and other metal. We may ask you to undress and put on a hospital gown.            Mar 29, 2017  4:40 PM CDT   (Arrive by 4:25 PM)   CT MAXIOLLOFACIAL W/O & W CONTRAST with  UCCT2   [facility] ([facility])    [address]  1st Floor  [facility] [address] [phone]           Please bring any scans or X-rays taken at other hospitals, if similar tests were done. Also bring a list of your medicines, including vitamins, minerals and over-the-counter drugs. It is safest to leave personal items at home.  Be sure to tell your doctor:   If you have any allergies.   If there s any chance you are pregnant.   If you are breastfeeding.   If you have any special needs.  You will have contrast for this exam. To prepare:   Do not eat or drink for 2 hours before your exam. If you need to take medicine, you may take it with small sips of water. (We may ask you to take liquid medicine as well.)   The day before your exam, drink extra fluids at least six 8-ounce glasses (unless your doctor tells you to restrict your fluids).  Patients over 70 or patients with diabetes or kidney problems:   If you haven t had a blood test (creatinine test) within the last 30 days, go to your clinic or Diagnostic Imaging Department for this test.  If you have diabetes:   If your kidney function is normal, continue taking your metformin (Avandamet, Glucophage, Glucovance, Metaglip) on the day of your exam.   If your kidney function is abnormal, wait 48 hours before restarting this medicine.  Please wear loose clothing, such as a sweat suit or jogging clothes. Avoid snaps, zippers and other metal. We may ask you to undress and put on a hospital gown.  If you have any questions, please call the Imaging Department where you will have your exam.            Apr 11, 2017  8:30 AM CDT   Anticoagulation Visit with  ANTICOAGULATION CLINIC   [facility] ([facility])    [address]  [place] [address]   [phone]            May 11, 2017  1:30 PM CDT   (Arrive by 1:15 PM)   New Patient Visit with Harjeet Lynn MD   [facility] ([facility])    [address]  [address] [facility] [address] [phone]              Future tests that were ordered for you today     Open Future Orders        Priority Expected Expires Ordered    CT Chest w/o contrast Routine  4/28/2017 3/29/2017    CT Maxiollofacial w/o & w Contrast Routine  3/29/2018 3/29/2017            Who to contact     If you have questions or need follow up information about today's clinic visit or your schedule please contact [facility] directly at [phone].  Normal or non-critical lab and imaging results will be communicated to you by [employer], letter or phone within 4 business days after the clinic has received the results. If you do not hear from us within 7 days, please contact the clinic through [employer] or phone. If you have a critical or abnormal lab result, we will notify you by phone as soon as possible.  Submit refill requests through [employer] or call your pharmacy and they will forward the refill request to us. Please allow 3 business days for your refill to be completed.          Additional Information About Your Visit        Recorded Futurehart Information     [employer] gives you secure access to your electronic health record. If you see a primary care provider, you can also send messages to your care team and make appointments. If you have questions, please call your primary care clinic.  If you do not have a primary care provider, please call [phone] and they will assist you.        Care EveryWhere ID     This is your Care EveryWhere ID. This could be used by other organizations to access your [place] medical records  [id number]        Your Vitals Were     Pulse Respirations Pulse Oximetry             83 16 97%          Blood Pressure from Last 3 Encounters:   03/29/17 117/66   01/28/17 137/70   01/16/17 127/75    Weight from Last 3 Encounters:   01/28/17 88.5 kg (195 lb)   01/16/17 88.5 kg (195 lb)    01/09/17 88.5 kg (195 lb)              We Performed the Following     ENT referral     Thoracic Surgery Adult IP Consult        Primary Care Provider Office Phone # Fax #    José Miguel Wallace [phone]       [facility] [address] [facility] [address]  [facility] [address]        Thank you!     Thank you for choosing [facility]  for your care. Our goal is always to provide you with excellent care. Hearing back from our patients is one way we can continue to improve our services. Please take a few minutes to complete the written survey that you may receive in the mail after your visit with us. Thank you!             Your Updated Medication List - Protect others around you: Learn how to safely use, store and throw away your medicines at www.disposemymeds.org.          This list is accurate as of: 3/29/17  3:59 PM.  Always use your most recent med list.                   Brand Name Dispense Instructions for use    cevimeline 30 MG capsule    EVOXAC     Take 30 mg by mouth 3 times daily.       Clorazepate Dipotassium 15 MG Tabs     90 tablet    TAKE ONE TABLET BY MOUTH ONE TIME DAILY       FOLIC ACID PO      Take 2 mg by mouth daily.       gabapentin 100 MG capsule    NEURONTIN    90 capsule    TAKE 1 CAPSULE BY MOUTH 3 TIMES DAILY       guaiFENesin-codeine 100-10 MG/5ML Soln solution    ROBITUSSIN AC    180 mL    Take 10 mLs by mouth every 6 hours as needed       hydrochlorothiazide 50 MG tablet    HYDRODIURIL    90 tablet    Take 1 tablet (50 mg) by mouth daily Please call to schedule a follow up visit       methotrexate (Anti-Rheumatic) 2.5 MG Tabs      Take 5 tablets by mouth once a week On Tuesdays       potassium chloride 10 MEQ tablet    K-TAB,KLOR-CON    180 tablet    Take 2 tablets (20 mEq) by mouth daily       predniSONE 5 MG tablet    DELTASONE     Take 5 mg by mouth daily       RITUXAN 10 MG/ML injection   Generic drug:  riTUXimab      Reported on  3/29/2017       simvastatin 40 MG tablet    ZOCOR    90 tablet    Take 1 tablet (40 mg) by mouth At Bedtime       traMADol 50 MG tablet    ULTRAM    60 tablet    TAKE ONE TABLET BY MOUTH EVERY SIX HOURS AS NEEDED       traZODone 50 MG tablet    DESYREL    45 tablet    Take 0.5 tablets (25 mg) by mouth At Bedtime       VENTOLIN  (90 BASE) MCG/ACT Inhaler   Generic drug:  albuterol     18 g    inhale 2 puffs by mouth every 4 hours as needed for shortness of breath/dyspnea or wheezing       * warfarin 1 MG tablet    COUMADIN    100 tablet    Take 3-5 tablets (3-5 mg) by mouth daily       * warfarin 1 MG tablet    JANTOVEN    120 tablet    Take 3-4 tablets (3-4 mg) by mouth daily Or as directed by your INR clinic (3 mg on Fri; 4 mg all other days).       * Notice:  This list has 2 medication(s) that are the same as other medications prescribed for you. Read the directions carefully, and ask your doctor or other care provider to review them with you.

## 2017-04-04 ENCOUNTER — MYC MEDICAL ADVICE (OUTPATIENT)
Dept: FAMILY MEDICINE | Facility: CLINIC | Age: 72
End: 2017-04-04

## 2017-04-04 ENCOUNTER — TRANSFERRED RECORDS (OUTPATIENT)
Dept: HEALTH INFORMATION MANAGEMENT | Facility: CLINIC | Age: 72
End: 2017-04-04

## 2017-04-04 LAB
ALT SERPL-CCNC: 37 IU/L (ref 5–35)
AST SERPL-CCNC: 31 U/L (ref 5–34)
CREAT SERPL-MCNC: 0.66 MG/DL (ref 0.5–1.3)
GFR SERPL CREATININE-BSD FRML MDRD: 93.3 ML/MIN/1.73M2

## 2017-04-04 NOTE — TELEPHONE ENCOUNTER
PCP: Have you received anything from [facility] from a visit the patient had on 3/29/17?    See [employer] message below. I did respond to patient already    America Saldana RN

## 2017-04-05 ENCOUNTER — MYC MEDICAL ADVICE (OUTPATIENT)
Dept: FAMILY MEDICINE | Facility: CLINIC | Age: 72
End: 2017-04-05

## 2017-04-06 NOTE — TELEPHONE ENCOUNTER
Reason for Call:  Other returning call    Detailed comments: patient returned call. Wanted to see Dr Wallace only.  She scheduled with him for 4/21.     Call taken on 4/6/2017 at 12:05 PM by Zaira Bailey

## 2017-04-11 ENCOUNTER — ANTICOAGULATION THERAPY VISIT (OUTPATIENT)
Dept: NURSING | Facility: CLINIC | Age: 72
End: 2017-04-11
Payer: MEDICARE

## 2017-04-11 ENCOUNTER — HOSPITAL ENCOUNTER (OUTPATIENT)
Dept: MAMMOGRAPHY | Facility: CLINIC | Age: 72
Discharge: HOME | End: 2017-04-11
Attending: INTERNAL MEDICINE | Admitting: INTERNAL MEDICINE
Payer: MEDICARE

## 2017-04-11 DIAGNOSIS — Z12.31 ENCOUNTER FOR SCREENING MAMMOGRAM FOR BREAST CANCER: ICD-10-CM

## 2017-04-11 DIAGNOSIS — Z79.01 LONG-TERM (CURRENT) USE OF ANTICOAGULANTS: ICD-10-CM

## 2017-04-11 DIAGNOSIS — I26.99 PULMONARY EMBOLISM, OTHER: ICD-10-CM

## 2017-04-11 LAB — INR POINT OF CARE: 2.6 (ref 0.86–1.14)

## 2017-04-11 PROCEDURE — 36416 COLLJ CAPILLARY BLOOD SPEC: CPT

## 2017-04-11 PROCEDURE — G0202 SCR MAMMO BI INCL CAD: HCPCS

## 2017-04-11 PROCEDURE — 99207 ZZC NO CHARGE NURSE ONLY: CPT

## 2017-04-11 PROCEDURE — 85610 PROTHROMBIN TIME: CPT | Mod: QW

## 2017-04-11 RX ORDER — WARFARIN SODIUM 1 MG/1
3-4 TABLET ORAL DAILY
Qty: 360 TABLET | Refills: 0 | Status: SHIPPED | OUTPATIENT
Start: 2017-04-11 | End: 2017-05-08

## 2017-04-11 NOTE — MR AVS SNAPSHOT
Agnes Saravia   4/11/2017 8:30 AM   Anticoagulation Therapy Visit    Description:  71 year old female   Provider:   ANTICOAGULATION CLINIC   Department:  [facility] Nurse           INR as of 4/11/2017     Today's INR 2.6      Anticoagulation Summary as of 4/11/2017     INR goal 2.0-3.0   Today's INR 2.6   Full instructions 3 mg on Fri; 4 mg all other days   Next INR check 5/16/2017    Indications   Long-term (current) use of anticoagulants [Z79.01] [Z79.01]  Pulmonary embolism (H) (Resolved) [I26.99]         Your next Anticoagulation Clinic appointment(s)     May 16, 2017  8:30 AM CDT   Anticoagulation Visit with  ANTICOAGULATION CLINIC   [facility] [place] ([facility])    [address]  [place] [address] [phone]              Contact Numbers     Clinic Number:         April 2017 Details    Sun Mon Tue Wed Thu Fri Sat           1                 2               3               4               5               6               7               8                 9               10               11      4 mg   See details      12      4 mg         13      4 mg         14      3 mg         15      4 mg           16      4 mg         17      4 mg         18      4 mg         19      4 mg         20      4 mg         21      3 mg         22      4 mg           23      4 mg         24      4 mg         25      4 mg         26      4 mg         27      4 mg         28      3 mg         29      4 mg           30      4 mg                Date Details   04/11 This INR check               How to take your warfarin dose     To take:  3 mg Take 3 of the 1 mg tablets.    To take:  4 mg Take 4 of the 1 mg tablets.           May 2017 Details    Sun Mon Tue Wed Thu Fri Sat      1      4 mg         2      4 mg         3      4 mg         4      4 mg         5      3 mg         6      4 mg           7      4 mg         8      4 mg         9      4 mg         10      4 mg         11      4 mg         12       3 mg         13      4 mg           14      4 mg         15      4 mg         16            17               18               19               20                 21               22               23               24               25               26               27                 28               29               30               31                   Date Details   No additional details    Date of next INR:  5/16/2017         How to take your warfarin dose     To take:  3 mg Take 3 of the 1 mg tablets.    To take:  4 mg Take 4 of the 1 mg tablets.

## 2017-04-11 NOTE — PROGRESS NOTES
ANTICOAGULATION FOLLOW-UP CLINIC VISIT    Patient Name:  Agnes Saravia  Date:  4/11/2017  Contact Type:  Face to Face    SUBJECTIVE:     Patient Findings     Positives No Problem Findings           OBJECTIVE    INR Protime   Date Value Ref Range Status   04/11/2017 2.6 (A) 0.86 - 1.14 Final       ASSESSMENT / PLAN  INR assessment THER    Recheck INR In: 5 WEEKS    INR Location Clinic      Anticoagulation Summary as of 4/11/2017     INR goal 2.0-3.0   Today's INR 2.6   Maintenance plan 3 mg (1 mg x 3) on Fri; 4 mg (1 mg x 4) all other days   Full instructions 3 mg on Fri; 4 mg all other days   Weekly total 27 mg   No change documented America Saldana RN   Plan last modified Fadia Junior RN (2/9/2017)   Next INR check 5/16/2017   Priority INR   Target end date     Indications   Long-term (current) use of anticoagulants [Z79.01] [Z79.01]  Pulmonary embolism (H) (Resolved) [I26.99]         Anticoagulation Episode Summary     INR check location     Preferred lab     Send INR reminders to CS ANTICOAGULATION    Comments       Anticoagulation Care Providers     Provider Role Specialty Phone number    José Miguel Wallace MD Responsible Internal Medicine [phone]            See the Encounter Report to view Anticoagulation Flowsheet and Dosing Calendar (Go to Encounters tab in chart review, and find the Anticoagulation Therapy Visit)    Dosage adjustment made based on physician directed care plan.  No changes.   Patient has appointment with Dr. Wallace next week 4/21/17 to discuss whether she should discontinue Jantoven.   Patient states she has imaging done that shows her PE has resolved.     America Saldana RN

## 2017-04-19 LAB
DLCOUNC-%PRED-PRE: 91 %
DLCOUNC-PRE: 18 ML/MIN/MMHG
DLCOUNC-PRED: 19.69 ML/MIN/MMHG
ERV-%PRED-PRE: 128 %
ERV-PRE: 0.27 L
ERV-PRED: 0.21 L
EXPTIME-PRE: 6.65 SEC
FEF2575-%PRED-PRE: 94 %
FEF2575-PRE: 1.6 L/SEC
FEF2575-PRED: 1.7 L/SEC
FEFMAX-%PRED-PRE: 113 %
FEFMAX-PRE: 5.93 L/SEC
FEFMAX-PRED: 5.2 L/SEC
FEV1-%PRED-PRE: 81 %
FEV1-PRE: 1.65 L
FEV1FEV6-PRE: 81 %
FEV1FEV6-PRED: 79 %
FEV1FVC-PRE: 81 %
FEV1FVC-PRED: 78 %
FEV1SVC-PRE: 75 %
FEV1SVC-PRED: 71 %
FIFMAX-PRE: 3.92 L/SEC
FRCPLETH-%PRED-PRE: 71 %
FRCPLETH-PRE: 1.91 L
FRCPLETH-PRED: 2.66 L
FVC-%PRED-PRE: 77 %
FVC-PRE: 2.05 L
FVC-PRED: 2.63 L
IC-%PRED-PRE: 73 %
IC-PRE: 1.94 L
IC-PRED: 2.64 L
RVPLETH-%PRED-PRE: 78 %
RVPLETH-PRE: 1.64 L
RVPLETH-PRED: 2.08 L
TLCPLETH-%PRED-PRE: 80 %
TLCPLETH-PRE: 3.85 L
TLCPLETH-PRED: 4.77 L
VA-%PRED-PRE: 76 %
VA-PRE: 3.74 L
VC-%PRED-PRE: 77 %
VC-PRE: 2.21 L
VC-PRED: 2.85 L

## 2017-04-20 ENCOUNTER — CARE COORDINATION (OUTPATIENT)
Dept: CARE COORDINATION | Facility: CLINIC | Age: 72
End: 2017-04-20

## 2017-04-20 NOTE — PROGRESS NOTES
5/11 f/u with ENT review CT.   Infusion today for RA, next f/u in September.  Every 5 months.    Planning to d/c coumadin in May  Cataract surgery   Clinic Care Coordination Contact  OUTREACH    Referral Information:  Referral Source: CTS  Reason for Contact: Follow up        Universal Utilization:   ED Visits in last year: 2  Hospital visits in last year: 2  Last PCP appointment: 01/16/17  Missed Appointments: 0  Concerns: none  Multiple Providers or Specialists: Yes (Rheumatology, dermatology, pulmonology)    Clinical Concerns:  Current Medical Concerns: upcming cataract surgery, preop sched with PCP on 05/08/17.   Has f/u with ENT to discuss nasal symptoms on 05/11/17, will review CT.    Current Behavioral Concerns: none   Education Provided to patient: none   Clinical Pathway Name: None    Medication Management:  No longer taking mucinex     Functional Status:  Mobility Status: Independent     Transportation: drives self     Psychosocial:  Current living arrangement:: lives  in a private home  Financial/Insurance: Medicare/Mobivery     Resources and Interventions:  Current Resources: DME ([employer]);       Advanced Care Plans/Directives on file:: Yes      Goals:    Determine cause of raspy voice/nasal sound.      Barriers: none  Strengths: proactive in care  Patient/Caregiver understanding: patient to f/u with scheduled appts, will call if questions/concerns.  Frequency of Care Coordination: PRN  Upcoming appointment: 03/29/17 (Pulmonology)     Plan:   RN CCC will follow for outcome of ENT visit then possibly close to RN care coordination.     Tamiko Knight RN  Clinic Care Coordinator  [facility]  [phone]

## 2017-05-03 ENCOUNTER — PRE VISIT (OUTPATIENT)
Dept: OTOLARYNGOLOGY | Facility: CLINIC | Age: 72
End: 2017-05-03

## 2017-05-03 NOTE — TELEPHONE ENCOUNTER
1.  Date/reason for appt: 5/11/17 -- chronic cough  2.  Referring provider: Arlet Tomlinson  3.  Call to patient (Yes / No - short description): no, referred  4.  Previous care at / records requested from: [facility] -- records and imaging in epic/pacs

## 2017-05-08 ENCOUNTER — OFFICE VISIT (OUTPATIENT)
Dept: FAMILY MEDICINE | Facility: CLINIC | Age: 72
End: 2017-05-08
Payer: MEDICARE

## 2017-05-08 ENCOUNTER — ANTICOAGULATION THERAPY VISIT (OUTPATIENT)
Dept: NURSING | Facility: CLINIC | Age: 72
End: 2017-05-08
Payer: MEDICARE

## 2017-05-08 VITALS
SYSTOLIC BLOOD PRESSURE: 120 MMHG | HEART RATE: 71 BPM | DIASTOLIC BLOOD PRESSURE: 69 MMHG | OXYGEN SATURATION: 98 % | HEIGHT: 63 IN | BODY MASS INDEX: 36.32 KG/M2 | WEIGHT: 205 LBS | TEMPERATURE: 97.3 F

## 2017-05-08 DIAGNOSIS — M35.00 SJOGREN'S SYNDROME (H): ICD-10-CM

## 2017-05-08 DIAGNOSIS — Z79.01 LONG-TERM (CURRENT) USE OF ANTICOAGULANTS: ICD-10-CM

## 2017-05-08 DIAGNOSIS — I26.99 PULMONARY EMBOLISM, OTHER: ICD-10-CM

## 2017-05-08 DIAGNOSIS — Z01.818 PREOP GENERAL PHYSICAL EXAM: Primary | ICD-10-CM

## 2017-05-08 LAB
ALBUMIN SERPL-MCNC: 3.7 G/DL (ref 3.4–5)
ALP SERPL-CCNC: 112 U/L (ref 40–150)
ALT SERPL W P-5'-P-CCNC: 45 U/L (ref 0–50)
ANION GAP SERPL CALCULATED.3IONS-SCNC: 8 MMOL/L (ref 3–14)
AST SERPL W P-5'-P-CCNC: 33 U/L (ref 0–45)
BILIRUB SERPL-MCNC: 0.6 MG/DL (ref 0.2–1.3)
BUN SERPL-MCNC: 15 MG/DL (ref 7–30)
CALCIUM SERPL-MCNC: 9.2 MG/DL (ref 8.5–10.1)
CHLORIDE SERPL-SCNC: 101 MMOL/L (ref 94–109)
CO2 SERPL-SCNC: 29 MMOL/L (ref 20–32)
CREAT SERPL-MCNC: 0.73 MG/DL (ref 0.52–1.04)
ERYTHROCYTE [DISTWIDTH] IN BLOOD BY AUTOMATED COUNT: 15.5 % (ref 10–15)
GFR SERPL CREATININE-BSD FRML MDRD: 78 ML/MIN/1.7M2
GLUCOSE SERPL-MCNC: 120 MG/DL (ref 70–99)
HCT VFR BLD AUTO: 40.4 % (ref 35–47)
HGB BLD-MCNC: 12.7 G/DL (ref 11.7–15.7)
INR POINT OF CARE: 2.8 (ref 0.86–1.14)
MCH RBC QN AUTO: 29 PG (ref 26.5–33)
MCHC RBC AUTO-ENTMCNC: 31.4 G/DL (ref 31.5–36.5)
MCV RBC AUTO: 92 FL (ref 78–100)
PLATELET # BLD AUTO: 396 10E9/L (ref 150–450)
POTASSIUM SERPL-SCNC: 3.6 MMOL/L (ref 3.4–5.3)
PROT SERPL-MCNC: 7.3 G/DL (ref 6.8–8.8)
RBC # BLD AUTO: 4.38 10E12/L (ref 3.8–5.2)
SODIUM SERPL-SCNC: 138 MMOL/L (ref 133–144)
WBC # BLD AUTO: 13.6 10E9/L (ref 4–11)

## 2017-05-08 PROCEDURE — 85610 PROTHROMBIN TIME: CPT | Mod: QW

## 2017-05-08 PROCEDURE — 36416 COLLJ CAPILLARY BLOOD SPEC: CPT

## 2017-05-08 PROCEDURE — 99214 OFFICE O/P EST MOD 30 MIN: CPT | Performed by: INTERNAL MEDICINE

## 2017-05-08 PROCEDURE — 99207 ZZC NO CHARGE NURSE ONLY: CPT

## 2017-05-08 PROCEDURE — 80053 COMPREHEN METABOLIC PANEL: CPT | Performed by: INTERNAL MEDICINE

## 2017-05-08 PROCEDURE — 85027 COMPLETE CBC AUTOMATED: CPT | Performed by: INTERNAL MEDICINE

## 2017-05-08 NOTE — PROGRESS NOTES
[address] [address]  [phone]  Dept: [phone]    PRE-OP EVALUATION:  Today's date: 2017    Agnes Saravia (: 1945) presents for pre-operative evaluation assessment as requested by Dr. COSMO Blanco .  She requires evaluation and anesthesia risk assessment prior to undergoing surgery/procedure for treatment of Cataracts  .  Proposed procedure: RIGHT Cataract on 5-15-17  THEN LEFT Cataract  17    Date of Surgery/ Procedure: RIGHT Cataract on 5-15-17  THEN LEFT Cataract  17    Time of Surgery/ Procedure:   Hospital/Surgical Facility: [facility]   Fax number for surgical facility: [phone]  Primary Physician: José Miguel Wallace  Type of Anesthesia Anticipated: General    Patient has a Health Care Directive or Living Will:  YES on file in Epic     1. NO - Do you have a history of heart attack, stroke, stent, bypass or surgery on an artery in the head, neck, heart or legs?  2. NO - Do you ever have any pain or discomfort in your chest?  3. NO - Do you have a history of  Heart Failure?  4. NO - Are you troubled by shortness of breath when: walking on the level, up a slight hill or at night?  5. NO - Do you currently have a cold, bronchitis or other respiratory infection?  6. YES - DO YOU HAVE A COUGH, SHORTNESS OF BREATH OR WHEEZING? Sinus   7. NO - Do you sometimes get pains in the calves of your legs when you walk?  8. YES - Do you or anyone in your family have previous history of blood clots? - treatment for pulmonary embolism with warfarin  9. NO - Do you or does anyone in your family have a serious bleeding problem such as prolonged bleeding following surgeries or cuts?  10. NO - Have you ever had problems with anemia or been told to take iron pills?  11. NO - Have you had any abnormal blood loss such as black, tarry or bloody stools, or abnormal vaginal bleeding?  12. NO - Have you ever had a blood transfusion?  13. NO -  Have you or any of your relatives ever had problems with anesthesia?  14. NO - Do you have sleep apnea, excessive snoring or daytime drowsiness?  15. NO - Do you have any prosthetic heart valves?  16. NO - Do you have prosthetic joints?  17. NO - Is there any chance that you may be pregnant?      HPI:                                                      Brief HPI related to upcoming procedure: cataract      See problem list for active medical problems.  Problems all longstanding and stable, except as noted/documented.  See ROS for pertinent symptoms related to these conditions.                                                                                                  .    MEDICAL HISTORY:                                                      Patient Active Problem List    Diagnosis Date Noted     Long-term (current) use of anticoagulants [Z79.01] 11/25/2016     Priority: Medium     Other acute pulmonary embolism without acute cor pulmonale (H) 11/21/2016     Priority: Medium     Pneumonia 09/28/2016     Priority: Medium     Advance Care Planning 02/15/2016     Priority: Medium     Advance Care Planning 2/15/2016: Receipt of ACP document:  Received: Health Care Directive which was witnessed or notarized on 1-.  Document not previously scanned.  Validation form completed and sent with document to be scanned.  Code Status needs to be updated to reflect choices in most recent ACP document. Orders placed.  Confirmed/documented designated decision maker(s).  Added by Reyna Kaufman             Obesity 10/02/2015     Priority: Medium     IFG (impaired fasting glucose) 10/01/2015     Priority: Medium     Health Care Home 08/21/2014     Priority: Medium     State Tier Level:  unknown  Status:  Declined  Care Coordinator:  Marisol Trejo     August 21, 2014         Lumbar disc disease 08/14/2014     Priority: Medium     Squamous cell carcinoma (H) 09/20/2013     Priority: Medium     Pain 05/14/2013      Priority: Medium     [place]  checked 12/23/15 - no issues noted       Hypokalemia 04/28/2013     Priority: Medium     Sjogren's syndrome (H) 03/04/2013     Priority: Medium     Dr. Carlso       Post menopausal syndrome 03/04/2013     Priority: Medium     HTN (hypertension) 03/04/2013     Priority: Medium     Hyperlipidemia LDL goal <130 03/04/2013     Priority: Medium      Past Medical History:   Diagnosis Date     Anxiety      Hyperlipidemia      Insomnia      PONV (postoperative nausea and vomiting)      Rheumatoid arthritis(714.0)      Sjogren's syndrome (H)      Past Surgical History:   Procedure Laterality Date     BLEPHAROPLASTY       DISCECTOMY LUMBAR POSTERIOR MICROSCOPIC ONE LEVEL  8/14/2014    Procedure: DISCECTOMY LUMBAR POSTERIOR MICROSCOPIC ONE LEVEL;  Surgeon: Dudley Bernal MD;  Location:  OR     HYSTERECTOMY TOTAL ABDOMINAL, BILATERAL SALPINGO-OOPHORECTOMY, COMBINED       HYSTERECTOMY, PAP NO LONGER INDICATED       MAMMOPLASTY REDUCTION BILATERAL  5/14/2013    Procedure: MAMMOPLASTY REDUCTION BILATERAL;  BILATERAL REDUCTION MAMMOPLASTY;  Surgeon: Bruce Nash MD;  Location: [facility]     SHOULDER SURGERY       Current Outpatient Prescriptions   Medication Sig Dispense Refill     warfarin (JANTOVEN) 1 MG tablet Take 3-4 tablets (3-4 mg) by mouth daily Or as directed by your INR clinic (3 mg on Fri; 4 mg all other days). 360 tablet 0     Clorazepate Dipotassium 15 MG TABS TAKE ONE TABLET BY MOUTH ONE TIME DAILY  90 tablet 0     traMADol (ULTRAM) 50 MG tablet TAKE ONE TABLET BY MOUTH EVERY SIX HOURS AS NEEDED  60 tablet 3     potassium chloride (K-TAB,KLOR-CON) 10 MEQ tablet Take 2 tablets (20 mEq) by mouth daily 180 tablet 3     gabapentin (NEURONTIN) 100 MG capsule TAKE 1 CAPSULE BY MOUTH 3 TIMES DAILY 90 capsule 3     traZODone (DESYREL) 50 MG tablet Take 0.5 tablets (25 mg) by mouth At Bedtime 45 tablet 3     simvastatin (ZOCOR) 40 MG tablet Take 1 tablet (40 mg) by mouth At Bedtime  "90 tablet 3     hydrochlorothiazide (HYDRODIURIL) 50 MG tablet Take 1 tablet (50 mg) by mouth daily Please call to schedule a follow up visit 90 tablet 2     methotrexate, Anti-Rheumatic, 2.5 MG TABS Take 5 tablets by mouth once a week On Tuesdays       riTUXimab (RITUXAN) 10 MG/ML injection Reported on 3/29/2017       FOLIC ACID PO Take 2 mg by mouth daily.       predniSONE (DELTASONE) 5 MG tablet Take 5 mg by mouth daily        cevimeline (EVOXAC) 30 MG capsule Take 30 mg by mouth 3 times daily.       OTC products: Aspirin    Allergies   Allergen Reactions     Mellaril [Thioridazine Hcl] Anaphylaxis     Percocet [Oxycodone-Acetaminophen] Anaphylaxis and Swelling     Tolerates hydrocodone and codeine in cough medicine      Latex Allergy: NO    Social History   Substance Use Topics     Smoking status: Former Smoker     Packs/day: 0.25     Years: 10.00     Types: Cigarettes     Start date: 1/1/1971     Quit date: 1/1/1981     Smokeless tobacco: Never Used     Alcohol use 0.0 oz/week     0 Standard drinks or equivalent per week      Comment: beer in summer when mowing lawn      History   Drug Use No       REVIEW OF SYSTEMS:                                                    Constitutional, neuro, ENT, endocrine, pulmonary - has sinusitis subacute, cardiac, gastrointestinal, genitourinary, musculoskeletal, integument and psychiatric systems are negative, except as otherwise noted.     EXAM:                                                    /69 (BP Location: Left arm, Patient Position: Chair, Cuff Size: Adult Large)  Pulse 71  Temp 97.3  F (36.3  C) (Oral)  Ht 5' 3\" (1.6 m)  Wt 205 lb (93 kg)  SpO2 98%  Breastfeeding? No  BMI 36.31 kg/m2    GENERAL APPEARANCE: healthy, alert and no distress     EYES: EOMI, PERRL     HENT: ear canals and TM's normal and nose and mouth without ulcers or lesions     NECK: no adenopathy, no asymmetry, masses, or scars and thyroid normal to palpation     RESP: lungs clear to " auscultation - no rales, rhonchi or wheezes     CV: regular rates and rhythm, normal S1 S2, no S3 or S4 and no murmur, click or rub     ABDOMEN:  soft, nontender, no HSM or masses and bowel sounds normal     MS: extremities normal- no gross deformities noted, no evidence of inflammation in joints, FROM in all extremities.     SKIN: no suspicious lesions or rashes     NEURO: Normal strength and tone, sensory exam grossly normal, mentation intact and speech normal     PSYCH: mentation appears normal. and affect normal/bright     LYMPHATICS: No axillary, cervical, or supraclavicular nodes    DIAGNOSTICS:                                                      No EKG required for low risk surgery (cataract, skin procedure, breast biopsy, etc)  Labs Resulted Today:   Results for orders placed or performed in visit on 05/08/17   INR point of care   Result Value Ref Range    INR Protime 2.8 (A) 0.86 - 1.14     Labs Drawn and in Process:   Unresulted Labs Ordered in the Past 30 Days of this Admission     No orders found from 3/9/2017 to 5/9/2017.          Recent Labs   Lab Test 04/11/17 04/04/17 03/13/17 02/23/17   0849  02/09/17 01/28/17   2045   01/09/17   0843   HGB   --    --    --    --    --    --    --   13.0   --   13.3   PLT   --    --    --    --    --    --    --   474*   --   442   INR  2.6*   --   2.5*   --    < >  1.9*   < >  1.48*   < >   --    NA   --    --    --    --    --    --    --   138   --   137   POTASSIUM   --    --    --    --    --    --    --   3.3*   --   3.4   CR   --   0.660   --    --    --   0.740   --   0.71   --   0.74   A1C   --    --    --   5.9   --    --    --    --    --    --     < > = values in this interval not displayed.        IMPRESSION:                                                    Reason for surgery/procedure: Cataract  Diagnosis/reason for consult: Pre-operative Evaluation    The proposed surgical procedure is considered LOW risk.    REVISED CARDIAC RISK INDEX  The  patient has the following serious cardiovascular risks for perioperative complications such as (MI, PE, VFib and 3  AV Block):  No serious cardiac risks  INTERPRETATION: 0 risks: Class I (very low risk - 0.4% complication rate)    The patient has the following additional risks for perioperative complications:  No identified additional risks    No diagnosis found.    RECOMMENDATIONS:                                                        (Z01.818) Preop general physical exam  (primary encounter diagnosis)  Comment: You are medically optimized for your upcoming surgery pending labs today.    Plan: Comprehensive metabolic panel, CBC with         platelets            (I26.99) Pulmonary embolism, other (H)  Comment:  Ultimatley as your clot was presumed secondary to premarin use which has been discontiued we can stop use of warfarin today and plan to start aspirin 81 mg daily for stroke prevention.   Plan:     (M35.00) Sjogren's syndrome (H)  Comment: No acute concerns - continue methotrexate and rituximab   Plan:     (Z79.01) Long-term (current) use of anticoagulants  Comment: as above - we will stop warfarin today and monitor closely   Plan:                Signed Electronically by: José Miguel Wallace MD, MD    Copy of this evaluation report is provided to requesting physician.    [place] Preop Guidelines

## 2017-05-08 NOTE — PROGRESS NOTES
ANTICOAGULATION FOLLOW-UP CLINIC VISIT    Patient Name:  Agnes Saarvia  Date:  5/8/2017  Contact Type:  Face to Face    SUBJECTIVE:        OBJECTIVE    INR Protime   Date Value Ref Range Status   05/08/2017 2.8 (A) 0.86 - 1.14 Final       ASSESSMENT / PLAN  No question data found.  Anticoagulation Summary as of 5/8/2017     INR goal 2.0-3.0   Today's INR 2.8   Maintenance plan 3 mg (1 mg x 3) on Fri; 4 mg (1 mg x 4) all other days   Full instructions 3 mg on Fri; 4 mg all other days   Weekly total 27 mg   No change documented Tracy Montgomery, RN   Plan last modified Fadia Junior RN (2/9/2017)   Next INR check    Target end date     Indications   Long-term (current) use of anticoagulants [Z79.01] [Z79.01]  Pulmonary embolism (H) (Resolved) [I26.99]         Anticoagulation Episode Summary     INR check location     Preferred lab     Resolved date 5/8/2017    Resolved reason Therapy  Complete    Send INR reminders to [facility] ANTICOAGULATION    Comments       Anticoagulation Care Providers     Provider Role Specialty Phone number    José Miguel Wallace MD [facility] Internal Medicine [phone]            See the Encounter Report to view Anticoagulation Flowsheet and Dosing Calendar (Go to Encounters tab in chart review, and find the Anticoagulation Therapy Visit)    Pt was discontinued on INR today.  Case is resolved. No further f/u needed at this point.    Tracy Montgomery RN

## 2017-05-08 NOTE — MR AVS SNAPSHOT
After Visit Summary   5/8/2017    Agnes Saravia    MRN: [id number]           Patient Information     Date Of Birth          1945        Visit Information        Provider Department      5/8/2017 9:00 AM José Miguel Wallace MD [facility]        Today's Diagnoses     Preop general physical exam    -  1    Pulmonary embolism, other (H)        Sjogren's syndrome (H)        Long-term (current) use of anticoagulants          Care Instructions      Before Your Surgery      Call your surgeon if there is any change in your health. This includes signs of a cold or flu (such as a sore throat, runny nose, cough, rash or fever).    Do not smoke, drink alcohol or take over the counter medicine (unless your surgeon or primary care doctor tells you to) for the 24 hours before and after surgery.    If you take prescribed drugs: Follow your doctor s orders about which medicines to take and which to stop until after surgery.    Eating and drinking prior to surgery: follow the instructions from your surgeon    Take a shower or bath the night before surgery. Use the soap your surgeon gave you to gently clean your skin. If you do not have soap from your surgeon, use your regular soap. Do not shave or scrub the surgery site.  Wear clean pajamas and have clean sheets on your bed.     (Z01.818) Preop general physical exam  (primary encounter diagnosis)  Comment: You are medically optimized for your upcoming surgery pending labs today.    Plan: Comprehensive metabolic panel, CBC with         platelets            (I26.99) Pulmonary embolism, other (H)  Comment: Continue on warfarin during your perioperative period.  Ultimatley as your clot was presumed secondary to premarin use which has been discontiued we can stop use of warfarin today and plan to start aspirin 81 mg daily for stroke prevention.   Plan:     (M35.00) Sjogren's syndrome (H)  Comment: No acute concerns - continue methotrexate and  rituximab   Plan:     (Z79.01) Long-term (current) use of anticoagulants  Comment: as above - we will stop warfarin today and monitor closely   Plan:             Follow-ups after your visit        Your next 10 appointments already scheduled     May 11, 2017  1:30 PM CDT   (Arrive by 1:15 PM)   New Patient Visit with Harjeet Lynn MD   [facility] ([facility])    [address]  [facility] [address]   [phone]            Jun 12, 2017  8:30 AM CDT   Anticoagulation Visit with  ANTICOAGULATION CLINIC   [facility]    [address]  [facility] [address] [phone]              Who to contact     If you have questions or need follow up information about today's clinic visit or your schedule please contact [facility] directly at [phone].  Normal or non-critical lab and imaging results will be communicated to you by MyChart, letter or phone within 4 business days after the clinic has received the results. If you do not hear from us within 7 days, please contact the clinic through [employer] or phone. If you have a critical or abnormal lab result, we will notify you by phone as soon as possible.  Submit refill requests through [employer] or call your pharmacy and they will forward the refill request to us. Please allow 3 business days for your refill to be completed.          Additional Information About Your Visit        [employer] Information     [employer] gives you secure access to your electronic health record. If you see a primary care provider, you can also send messages to your care team and make appointments. If you have questions, please call your primary care clinic.  If you do not have a primary care provider, please call [phone] and they will assist you.        Care EveryWhere ID     This is your Care EveryWhere ID. This could be used by other organizations to access your [facility]  "medical records  [id number]        Your Vitals Were     Pulse Temperature Height Pulse Oximetry Breastfeeding? BMI (Body Mass Index)    71 97.3  F (36.3  C) (Oral) 5' 3\" (1.6 m) 98% No 36.31 kg/m2       Blood Pressure from Last 3 Encounters:   05/08/17 120/69   03/29/17 117/66   01/28/17 137/70    Weight from Last 3 Encounters:   05/08/17 205 lb (93 kg)   01/28/17 195 lb (88.5 kg)   01/16/17 195 lb (88.5 kg)              We Performed the Following     CBC with platelets     Comprehensive metabolic panel          Today's Medication Changes          These changes are accurate as of: 5/8/17  9:45 AM.  If you have any questions, ask your nurse or doctor.               Stop taking these medicines if you haven't already. Please contact your care team if you have questions.     warfarin 1 MG tablet   Commonly known as:  JANTOVEN   Stopped by:  José Miguel Wallace MD                    Primary Care Provider Office Phone # Fax #    José Miguel Wallace [phone]       [facility] [address] [place] [address] [address]  [facility] [address]        Thank you!     Thank you for choosing [facility]  for your care. Our goal is always to provide you with excellent care. Hearing back from our patients is one way we can continue to improve our services. Please take a few minutes to complete the written survey that you may receive in the mail after your visit with us. Thank you!             Your Updated Medication List - Protect others around you: Learn how to safely use, store and throw away your medicines at www.disposemymeds.org.          This list is accurate as of: 5/8/17  9:45 AM.  Always use your most recent med list.                   Brand Name Dispense Instructions for use    aspirin 81 MG EC tablet      Take 81 mg by mouth daily       cevimeline 30 MG capsule    EVOXAC     Take 30 mg by mouth 3 times daily.       Clorazepate Dipotassium 15 MG Tabs     90 tablet    TAKE ONE TABLET BY MOUTH " ONE TIME DAILY       FOLIC ACID PO      Take 2 mg by mouth daily.       gabapentin 100 MG capsule    NEURONTIN    90 capsule    TAKE 1 CAPSULE BY MOUTH 3 TIMES DAILY       hydrochlorothiazide 50 MG tablet    HYDRODIURIL    90 tablet    Take 1 tablet (50 mg) by mouth daily Please call to schedule a follow up visit       methotrexate (Anti-Rheumatic) 2.5 MG Tabs      Take 5 tablets by mouth once a week On Tuesdays       potassium chloride 10 MEQ tablet    K-TAB,KLOR-CON    180 tablet    Take 2 tablets (20 mEq) by mouth daily       predniSONE 5 MG tablet    DELTASONE     Take 5 mg by mouth daily       RITUXAN 10 MG/ML injection   Generic drug:  riTUXimab      Reported on 3/29/2017       simvastatin 40 MG tablet    ZOCOR    90 tablet    Take 1 tablet (40 mg) by mouth At Bedtime       traMADol 50 MG tablet    ULTRAM    60 tablet    TAKE ONE TABLET BY MOUTH EVERY SIX HOURS AS NEEDED       traZODone 50 MG tablet    DESYREL    45 tablet    Take 0.5 tablets (25 mg) by mouth At Bedtime

## 2017-05-08 NOTE — PATIENT INSTRUCTIONS
Before Your Surgery      Call your surgeon if there is any change in your health. This includes signs of a cold or flu (such as a sore throat, runny nose, cough, rash or fever).    Do not smoke, drink alcohol or take over the counter medicine (unless your surgeon or primary care doctor tells you to) for the 24 hours before and after surgery.    If you take prescribed drugs: Follow your doctor s orders about which medicines to take and which to stop until after surgery.    Eating and drinking prior to surgery: follow the instructions from your surgeon    Take a shower or bath the night before surgery. Use the soap your surgeon gave you to gently clean your skin. If you do not have soap from your surgeon, use your regular soap. Do not shave or scrub the surgery site.  Wear clean pajamas and have clean sheets on your bed.     (Z01.818) Preop general physical exam  (primary encounter diagnosis)  Comment: You are medically optimized for your upcoming surgery pending labs today.    Plan: Comprehensive metabolic panel, CBC with         platelets            (I26.99) Pulmonary embolism, other (H)  Comment: Ultimatley as your clot was presumed secondary to premarin use which has been discontiued we can stop use of warfarin today and plan to start aspirin 81 mg daily for stroke prevention.   Plan:     (M35.00) Sjogren's syndrome (H)  Comment: No acute concerns - continue methotrexate and rituximab   Plan:     (Z79.01) Long-term (current) use of anticoagulants  Comment: as above - we will stop warfarin today and monitor closely   Plan:

## 2017-05-09 NOTE — PROGRESS NOTES
Gurdeep Alarcon,    I have had the opportunity to review your recent results and an interpretation is as follows:  Your follow up labs are stable for your upcoming surgery    Your white blood cell count is still a bit elevated and we should look into rechecking this in the next few weeks to ensure that your counts return back to within normal limits    Sincerely,  José Miguel Wallace MD

## 2017-05-11 ENCOUNTER — OFFICE VISIT (OUTPATIENT)
Dept: OTOLARYNGOLOGY | Facility: CLINIC | Age: 72
End: 2017-05-11

## 2017-05-11 DIAGNOSIS — J34.2 DEVIATED NASAL SEPTUM: ICD-10-CM

## 2017-05-11 DIAGNOSIS — J32.0 CHRONIC MAXILLARY SINUSITIS: Primary | ICD-10-CM

## 2017-05-11 DIAGNOSIS — R05.3 CHRONIC COUGH: ICD-10-CM

## 2017-05-11 DIAGNOSIS — J34.3 NASAL TURBINATE HYPERTROPHY: ICD-10-CM

## 2017-05-11 RX ORDER — FLUTICASONE PROPIONATE 50 MCG
2 SPRAY, SUSPENSION (ML) NASAL DAILY
Qty: 16 G | Refills: 1 | Status: SHIPPED | OUTPATIENT
Start: 2017-05-11 | End: 2017-06-10

## 2017-05-11 RX ORDER — LORATADINE 10 MG/1
10 CAPSULE, LIQUID FILLED ORAL DAILY
Qty: 60 CAPSULE | Refills: 3 | Status: SHIPPED | OUTPATIENT
Start: 2017-05-11 | End: 2017-07-10

## 2017-05-11 RX ORDER — NYSTATIN 100000/ML
SUSPENSION, ORAL (FINAL DOSE FORM) ORAL
Qty: 1200 ML | Refills: 1 | Status: SHIPPED | OUTPATIENT
Start: 2017-05-11 | End: 2017-05-18

## 2017-05-11 RX ORDER — CLINDAMYCIN HCL 150 MG
CAPSULE ORAL
Qty: 10 CAPSULE | Refills: 3 | Status: SHIPPED | OUTPATIENT
Start: 2017-05-11 | End: 2017-07-31

## 2017-05-11 ASSESSMENT — PAIN SCALES - GENERAL: PAINLEVEL: NO PAIN (0)

## 2017-05-11 NOTE — LETTER
5/11/2017       RE: Agnes Saravia  [address]  [place] [address]     Dear Colleague,    Thank you for referring your patient, Agnes Saravia, to the [facility] EAR NOSE AND THROAT at [facility]. Please see a copy of my visit note below.    The patient presents with a history of chronic sinusitis. She reports that this began approximately eight months ago with a respiratory infection and she has not been able to resolve her chronic coughing. The patient has been working with pulmonology specialist to address a chest infection. A recent CT scan demonstrates chronic bilateral maxillary and ethmoid sinusitis.  The patient is also having difficulty breathing through the nostrils.  The patient reports difficulty with facial pain and purulent discharge associated with the events of infection. The patient denies chronic or recurrent tonsillitis, chronic or recurrent pharyngitis. The patient denies otalgia, otorrhea, eustachian tube dysfunction, ear infections, dizziness or tinnitus. The patient denies any previous difficulty with such coughing or sinus infections.     This patient is seen in consultation at the request of Dr. Arlet Tomlinson.    All other systems were reviewed and they are either negative or they are not directly pertinent to this Otolaryngology examination.      Past Medical History:    Past Medical History:   Diagnosis Date     Anxiety      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     Depressive disorder      Hyperlipidemia      Insomnia      PONV (postoperative nausea and vomiting)      Rheumatoid arthritis(714.0)      Sjogren's syndrome (H)        Past Surgical History:    Past Surgical History:   Procedure Laterality Date     BLEPHAROPLASTY       DISCECTOMY LUMBAR POSTERIOR MICROSCOPIC ONE LEVEL  8/14/2014    Procedure: DISCECTOMY LUMBAR POSTERIOR MICROSCOPIC ONE LEVEL;  Surgeon: Dudley Bernal MD;  Location: [facility] OR     HYSTERECTOMY TOTAL  ABDOMINAL, BILATERAL SALPINGO-OOPHORECTOMY, COMBINED       HYSTERECTOMY, PAP NO LONGER INDICATED       MAMMOPLASTY REDUCTION BILATERAL  5/14/2013    Procedure: MAMMOPLASTY REDUCTION BILATERAL;  BILATERAL REDUCTION MAMMOPLASTY;  Surgeon: Bruce Nash MD;  Location: [facility]     SHOULDER SURGERY         Medications:      Current Outpatient Prescriptions:      aspirin 81 MG EC tablet, Take 81 mg by mouth daily, Disp: , Rfl:      Clorazepate Dipotassium 15 MG TABS, TAKE ONE TABLET BY MOUTH ONE TIME DAILY , Disp: 90 tablet, Rfl: 0     potassium chloride (K-TAB,KLOR-CON) 10 MEQ tablet, Take 2 tablets (20 mEq) by mouth daily, Disp: 180 tablet, Rfl: 3     gabapentin (NEURONTIN) 100 MG capsule, TAKE 1 CAPSULE BY MOUTH 3 TIMES DAILY, Disp: 90 capsule, Rfl: 3     traZODone (DESYREL) 50 MG tablet, Take 0.5 tablets (25 mg) by mouth At Bedtime, Disp: 45 tablet, Rfl: 3     simvastatin (ZOCOR) 40 MG tablet, Take 1 tablet (40 mg) by mouth At Bedtime, Disp: 90 tablet, Rfl: 3     hydrochlorothiazide (HYDRODIURIL) 50 MG tablet, Take 1 tablet (50 mg) by mouth daily Please call to schedule a follow up visit, Disp: 90 tablet, Rfl: 2     methotrexate, Anti-Rheumatic, 2.5 MG TABS, Take 5 tablets by mouth once a week On Tuesdays, Disp: , Rfl:      riTUXimab (RITUXAN) 10 MG/ML injection, Reported on 3/29/2017, Disp: , Rfl:      FOLIC ACID PO, Take 2 mg by mouth daily., Disp: , Rfl:      predniSONE (DELTASONE) 5 MG tablet, Take 5 mg by mouth daily , Disp: , Rfl:      cevimeline (EVOXAC) 30 MG capsule, Take 30 mg by mouth 3 times daily., Disp: , Rfl:     Allergies:    Mellaril [thioridazine hcl] and Percocet [oxycodone-acetaminophen]    Physical Examination:    The patient is a well developed, well nourished female in no apparent distress.  She is normocephalic, atraumatic with pupils equally round and reactive to light.    Oral Cavity Examination:  Thrush of the tongue and oral cavity  Nasal Examination:  Engorged nasal turbinates and a  deviated septum.  There are no masses or lesions in either nostril and no discharge or infection in either nostril at this time.  Ear Examination: Ear canals clear, tympanic membranes and middle ear spaces normal  Neurological Examination: Facial nerve function intact and symmetric  Integumentary Examination: No lesions on the skin of the head and neck  Neck Examination: No masses or lesions, no lymphadenopathy  Endocrine Examination: Normal thyroid examination  Flexible Fiberoptic Laryngoscopy:  Normal nasopharynx, base of tongue, pyriform sinuses, epiglottis, valleculae and false vocal cords. There is some irritation of the true vocal cords, and larynx.  Normal motion of the vocal cords with no lesions, masses, nodules, or polyps bilaterally.     Assessment and Plan:    The patient presents with a history of chronic sinusitis and oral thrush as well as a chronic cough. The patient will be treated with Cleocin Nasal Rinses, Flonase Nasal Spray and Claritin as well as Nystatin swish, gargle and spit solution. The patient will also be referred for an allergy evaluation with Dr. Juan Carcamo as well as a consultation with Dr. Yolette Worthington for further management of her chronic sinusitis.    CC: Dr. Arlet Tomlinson      Again, thank you for allowing me to participate in the care of your patient.      Sincerely,    Harjeet Lynn MD

## 2017-05-11 NOTE — PROGRESS NOTES
The patient presents with a history of chronic sinusitis. She reports that this began approximately eight months ago with a respiratory infection and she has not been able to resolve her chronic coughing. The patient has been working with pulmonology specialist to address a chest infection. A recent CT scan demonstrates chronic bilateral maxillary and ethmoid sinusitis.  The patient is also having difficulty breathing through the nostrils.  The patient reports difficulty with facial pain and purulent discharge associated with the events of infection. The patient denies chronic or recurrent tonsillitis, chronic or recurrent pharyngitis. The patient denies otalgia, otorrhea, eustachian tube dysfunction, ear infections, dizziness or tinnitus. The patient denies any previous difficulty with such coughing or sinus infections.     This patient is seen in consultation at the request of Dr. Arlet Tomlinson.    All other systems were reviewed and they are either negative or they are not directly pertinent to this Otolaryngology examination.      Past Medical History:    Past Medical History:   Diagnosis Date     Anxiety      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     Depressive disorder      Hyperlipidemia      Insomnia      PONV (postoperative nausea and vomiting)      Rheumatoid arthritis(714.0)      Sjogren's syndrome (H)        Past Surgical History:    Past Surgical History:   Procedure Laterality Date     BLEPHAROPLASTY       DISCECTOMY LUMBAR POSTERIOR MICROSCOPIC ONE LEVEL  8/14/2014    Procedure: DISCECTOMY LUMBAR POSTERIOR MICROSCOPIC ONE LEVEL;  Surgeon: Dudley Bernal MD;  Location:  OR     HYSTERECTOMY TOTAL ABDOMINAL, BILATERAL SALPINGO-OOPHORECTOMY, COMBINED       HYSTERECTOMY, PAP NO LONGER INDICATED       MAMMOPLASTY REDUCTION BILATERAL  5/14/2013    Procedure: MAMMOPLASTY REDUCTION BILATERAL;  BILATERAL REDUCTION MAMMOPLASTY;  Surgeon: Bruce Nash MD;  Location: [facility]     SHOULDER SURGERY          Medications:      Current Outpatient Prescriptions:      aspirin 81 MG EC tablet, Take 81 mg by mouth daily, Disp: , Rfl:      Clorazepate Dipotassium 15 MG TABS, TAKE ONE TABLET BY MOUTH ONE TIME DAILY , Disp: 90 tablet, Rfl: 0     potassium chloride (K-TAB,KLOR-CON) 10 MEQ tablet, Take 2 tablets (20 mEq) by mouth daily, Disp: 180 tablet, Rfl: 3     gabapentin (NEURONTIN) 100 MG capsule, TAKE 1 CAPSULE BY MOUTH 3 TIMES DAILY, Disp: 90 capsule, Rfl: 3     traZODone (DESYREL) 50 MG tablet, Take 0.5 tablets (25 mg) by mouth At Bedtime, Disp: 45 tablet, Rfl: 3     simvastatin (ZOCOR) 40 MG tablet, Take 1 tablet (40 mg) by mouth At Bedtime, Disp: 90 tablet, Rfl: 3     hydrochlorothiazide (HYDRODIURIL) 50 MG tablet, Take 1 tablet (50 mg) by mouth daily Please call to schedule a follow up visit, Disp: 90 tablet, Rfl: 2     methotrexate, Anti-Rheumatic, 2.5 MG TABS, Take 5 tablets by mouth once a week On Tuesdays, Disp: , Rfl:      riTUXimab (RITUXAN) 10 MG/ML injection, Reported on 3/29/2017, Disp: , Rfl:      FOLIC ACID PO, Take 2 mg by mouth daily., Disp: , Rfl:      predniSONE (DELTASONE) 5 MG tablet, Take 5 mg by mouth daily , Disp: , Rfl:      cevimeline (EVOXAC) 30 MG capsule, Take 30 mg by mouth 3 times daily., Disp: , Rfl:     Allergies:    Mellaril [thioridazine hcl] and Percocet [oxycodone-acetaminophen]    Physical Examination:    The patient is a well developed, well nourished female in no apparent distress.  She is normocephalic, atraumatic with pupils equally round and reactive to light.    Oral Cavity Examination:  Thrush of the tongue and oral cavity  Nasal Examination:  Engorged nasal turbinates and a deviated septum.  There are no masses or lesions in either nostril and no discharge or infection in either nostril at this time.  Ear Examination: Ear canals clear, tympanic membranes and middle ear spaces normal  Neurological Examination: Facial nerve function intact and symmetric  Integumentary  Examination: No lesions on the skin of the head and neck  Neck Examination: No masses or lesions, no lymphadenopathy  Endocrine Examination: Normal thyroid examination  Flexible Fiberoptic Laryngoscopy:  Normal nasopharynx, base of tongue, pyriform sinuses, epiglottis, valleculae and false vocal cords. There is some irritation of the true vocal cords, and larynx.  Normal motion of the vocal cords with no lesions, masses, nodules, or polyps bilaterally.     Assessment and Plan:    The patient presents with a history of chronic sinusitis and oral thrush as well as a chronic cough. The patient will be treated with Cleocin Nasal Rinses, Flonase Nasal Spray and Claritin as well as Nystatin swish, gargle and spit solution. The patient will also be referred for an allergy evaluation with Dr. Juan Carcamo as well as a consultation with Dr. Yolette Worthington for further management of her chronic sinusitis.    CC: Dr. Arlet Tomlinson

## 2017-05-11 NOTE — PATIENT INSTRUCTIONS
The patient presents with a history of chronic sinusitis and oral thrush as well as a chronic cough. The patient will be treated with Cleocin Nasal Rinses, Flonase Nasal Spray and Claritin as well as Nystatin swish, gargle and spit solution. The patient will also be referred for an allergy evaluation with Dr. Juan Carcamo as well as a consultation with Dr. Yolette Worthington for further management of her chronic sinusitis.

## 2017-05-11 NOTE — MR AVS SNAPSHOT
After Visit Summary   5/11/2017    Agnes Saravia    MRN: [id number]           Patient Information     Date Of Birth          1945        Visit Information        Provider Department      5/11/2017 1:30 PM Harjeet Lynn MD [facility] Ear Nose and Throat        Today's Diagnoses     Chronic maxillary sinusitis    -  1    Deviated nasal septum        Nasal turbinate hypertrophy        Chronic cough          Care Instructions    The patient presents with a history of chronic sinusitis and oral thrush as well as a chronic cough. The patient will be treated with Cleocin Nasal Rinses, Flonase Nasal Spray and Claritin as well as Nystatin swish, gargle and spit solution. The patient will also be referred for an allergy evaluation with Dr. Juan Carcamo as well as a consultation with Dr. Yolette Worthington for further management of her chronic sinusitis.        Follow-ups after your visit        Your next 10 appointments already scheduled     Jun 12, 2017  8:30 AM CDT   Anticoagulation Visit with  ANTICOAGULATION CLINIC   [facility]    [address]  [facility] [address]   [phone]            Jul 31, 2017  2:15 PM CDT   (Arrive by 2:00 PM)   New Allergy with Juan Carcamo MD   [facility] ([facility])    [address] [place] [address] [phone]           Do not take anti-histamines or Zantac for seven day prior to your appointment.            Jul 31, 2017  3:15 PM CDT   (Arrive by 3:00 PM)   New Patient Visit with Yolette Worthington MD   [facility] Ear Nose [facility] Throat ([facility])    [address] [place] [address]  [address] [facility] [address] [phone]              Who to contact     Please call your clinic at [phone] to:    Ask questions about your health    Make or cancel appointments    Discuss your medicines    Learn about  your test results    Speak to your doctor   If you have compliments or concerns about an experience at your clinic, or if you wish to file a complaint, please contact [facility] Patient Relations at [phone] or email us at [email or web address]         Additional Information About Your Visit        [employer] Information     [employer] gives you secure access to your electronic health record. If you see a primary care provider, you can also send messages to your care team and make appointments. If you have questions, please call your primary care clinic.  If you do not have a primary care provider, please call [phone] and they will assist you.      [employer] is an electronic gateway that provides easy, online access to your medical records. With [employer], you can request a clinic appointment, read your test results, renew a prescription or communicate with your care team.     To access your existing account, please contact your [facility] or call [phone] for assistance.        Care EveryWhere ID     This is your Care EveryWhere ID. This could be used by other organizations to access your [facility] medical records  [id number]         Blood Pressure from Last 3 Encounters:   05/08/17 120/69   03/29/17 117/66   01/28/17 137/70    Weight from Last 3 Encounters:   05/08/17 93 kg (205 lb)   01/28/17 88.5 kg (195 lb)   01/16/17 88.5 kg (195 lb)              We Performed the Following     LARYNGOSCOPY FLEX FIBEROPTIC, DIAGNOSTIC     OFFICE CONSULTATION,LEVEL III          Today's Medication Changes          These changes are accurate as of: 5/11/17  2:41 PM.  If you have any questions, ask your nurse or doctor.               Start taking these medicines.        Dose/Directions    clindamycin 150 MG capsule   Commonly known as:  CLEOCIN   Used for:  Chronic maxillary sinusitis, Deviated nasal septum, Nasal turbinate hypertrophy, Chronic cough   Started  by:  Harjeet Lynn MD        Open one capsule and place in 1 Litre of Normal Saline and mix. Irrigate with 20 cc each nostril QID.   Quantity:  10 capsule   Refills:  3       fluticasone 50 MCG/ACT spray   Commonly known as:  FLONASE ALLERGY RELIEF   Used for:  Chronic maxillary sinusitis, Deviated nasal septum, Nasal turbinate hypertrophy, Chronic cough   Started by:  Harjeet Lynn MD        Dose:  2 spray   Spray 2 sprays into both nostrils daily   Quantity:  16 g   Refills:  1       loratadine 10 MG capsule   Commonly known as:  CLARITIN   Used for:  Chronic maxillary sinusitis, Deviated nasal septum, Nasal turbinate hypertrophy, Chronic cough   Started by:  Harjeet Lynn MD        Dose:  10 mg   Take 10 mg by mouth daily   Quantity:  60 capsule   Refills:  3       nystatin 695806 UNIT/ML suspension   Commonly known as:  MYCOSTATIN   Used for:  Chronic maxillary sinusitis, Deviated nasal septum, Nasal turbinate hypertrophy, Chronic cough   Started by:  Harjeet Lynn MD        10 cc swish, gargle and spit QID x 1 month   Quantity:  1200 mL   Refills:  1       sodium chloride 0.9 % Soln   Used for:  Chronic maxillary sinusitis, Deviated nasal septum, Nasal turbinate hypertrophy, Chronic cough   Started by:  Harjeet Lynn MD        Irrigate 20 cc each nostril QID X 2 month supply   Quantity:  76309 mL   Refills:  3         Stop taking these medicines if you haven't already. Please contact your care team if you have questions.     traMADol 50 MG tablet   Commonly known as:  ULTRAM   Stopped by:  Harjeet Lynn MD                Where to get your medicines      These medications were sent to [id number] - [place], [place] - [address]  [address] [facility] [address]     Phone:  [phone]     clindamycin 150 MG capsule    fluticasone 50 MCG/ACT spray    loratadine 10 MG capsule    nystatin [id number] UNIT/ML suspension    sodium  chloride 0.9 % Soln                Primary Care Provider Office Phone # Fax #    José Miguel Wallace [phone]       [facility] [address] [facility] [address]  [facility] [address]        Thank you!     Thank you for choosing [facility] EAR NOSE AND THROAT  for your care. Our goal is always to provide you with excellent care. Hearing back from our patients is one way we can continue to improve our services. Please take a few minutes to complete the written survey that you may receive in the mail after your visit with us. Thank you!             Your Updated Medication List - Protect others around you: Learn how to safely use, store and throw away your medicines at www.disposemymeds.org.          This list is accurate as of: 5/11/17  2:41 PM.  Always use your most recent med list.                   Brand Name Dispense Instructions for use    aspirin 81 MG EC tablet      Take 81 mg by mouth daily       cevimeline 30 MG capsule    EVOXAC     Take 30 mg by mouth 3 times daily.       clindamycin 150 MG capsule    CLEOCIN    10 capsule    Open one capsule and place in 1 Litre of Normal Saline and mix. Irrigate with 20 cc each nostril QID.       Clorazepate Dipotassium 15 MG Tabs     90 tablet    TAKE ONE TABLET BY MOUTH ONE TIME DAILY       fluticasone 50 MCG/ACT spray    FLONASE ALLERGY RELIEF    16 g    Spray 2 sprays into both nostrils daily       FOLIC ACID PO      Take 2 mg by mouth daily.       gabapentin 100 MG capsule    NEURONTIN    90 capsule    TAKE 1 CAPSULE BY MOUTH 3 TIMES DAILY       hydrochlorothiazide 50 MG tablet    HYDRODIURIL    90 tablet    Take 1 tablet (50 mg) by mouth daily Please call to schedule a follow up visit       loratadine 10 MG capsule    CLARITIN    60 capsule    Take 10 mg by mouth daily       methotrexate (Anti-Rheumatic) 2.5 MG Tabs      Take 5 tablets by mouth once a week On Tuesdays       nystatin [id number] UNIT/ML suspension    MYCOSTATIN    1200 mL    10 cc  swish, gargle and spit QID x 1 month       potassium chloride 10 MEQ tablet    K-TAB,KLOR-CON    180 tablet    Take 2 tablets (20 mEq) by mouth daily       predniSONE 5 MG tablet    DELTASONE     Take 5 mg by mouth daily       RITUXAN 10 MG/ML injection   Generic drug:  riTUXimab      Reported on 3/29/2017       simvastatin 40 MG tablet    ZOCOR    90 tablet    Take 1 tablet (40 mg) by mouth At Bedtime       sodium chloride 0.9 % Soln     08031 mL    Irrigate 20 cc each nostril QID X 2 month supply       traZODone 50 MG tablet    DESYREL    45 tablet    Take 0.5 tablets (25 mg) by mouth At Bedtime

## 2017-05-31 DIAGNOSIS — I10 ESSENTIAL HYPERTENSION: ICD-10-CM

## 2017-05-31 NOTE — TELEPHONE ENCOUNTER
Pending Prescriptions:                       Disp   Refills    hydrochlorothiazide (HYDRODIURIL) 50 MG t*90 tab*2            Sig: Take 1 tablet (50 mg) by mouth daily Please call           to schedule a follow up visit          Last Written Prescription Date: 08/25/16  Last Fill Quantity: 90, # refills: 2  Last Office Visit with [facility], P or [facility] prescribing provider: 05/08/17       Potassium   Date Value Ref Range Status   05/08/2017 3.6 3.4 - 5.3 mmol/L Final     Creatinine   Date Value Ref Range Status   05/08/2017 0.73 0.52 - 1.04 mg/dL Final     BP Readings from Last 3 Encounters:   05/08/17 120/69   03/29/17 117/66   01/28/17 137/70

## 2017-06-01 RX ORDER — HYDROCHLOROTHIAZIDE 50 MG/1
50 TABLET ORAL DAILY
Qty: 90 TABLET | Refills: 0 | Status: SHIPPED | OUTPATIENT
Start: 2017-06-01 | End: 2017-08-19

## 2017-06-01 NOTE — TELEPHONE ENCOUNTER
Routing refill request to provider for review/approval because:  HTN has not specifically been addressed in th last year.  Please authorize if appropriate.  Thanks,  Iona Campos RN

## 2017-06-09 ENCOUNTER — MYC MEDICAL ADVICE (OUTPATIENT)
Dept: FAMILY MEDICINE | Facility: CLINIC | Age: 72
End: 2017-06-09

## 2017-06-09 ENCOUNTER — ANTICOAGULATION THERAPY VISIT (OUTPATIENT)
Dept: FAMILY MEDICINE | Facility: CLINIC | Age: 72
End: 2017-06-09

## 2017-06-11 DIAGNOSIS — F41.1 GAD (GENERALIZED ANXIETY DISORDER): Primary | ICD-10-CM

## 2017-06-11 DIAGNOSIS — M35.00 SJOGREN'S SYNDROME (H): ICD-10-CM

## 2017-06-12 NOTE — TELEPHONE ENCOUNTER
Clorazepate Dipotassium 15 MG TABS        Last Written Prescription Date: 3/21/2017  Last Fill Quantity: 90,  # refills: 0   Last Office Visit with [facility], [facility] or [facility] prescribing provider: 5/8/2017

## 2017-06-13 PROBLEM — F41.1 GAD (GENERALIZED ANXIETY DISORDER): Status: ACTIVE | Noted: 2017-06-13

## 2017-06-13 RX ORDER — CLORAZEPATE DIPOTASSIUM 15 MG/1
TABLET ORAL
Qty: 90 TABLET | Refills: 1 | Status: SHIPPED | OUTPATIENT
Start: 2017-06-13 | End: 2017-10-23

## 2017-06-26 ENCOUNTER — TRANSFERRED RECORDS (OUTPATIENT)
Dept: HEALTH INFORMATION MANAGEMENT | Facility: CLINIC | Age: 72
End: 2017-06-26

## 2017-07-17 ASSESSMENT — ENCOUNTER SYMPTOMS
DYSPNEA ON EXERTION: 0
MUSCLE WEAKNESS: 0
SHORTNESS OF BREATH: 0
COUGH: 1
SINUS CONGESTION: 1
HEMOPTYSIS: 0
SNORES LOUDLY: 0
POSTURAL DYSPNEA: 0
SORE THROAT: 0
TASTE DISTURBANCE: 0
COUGH DISTURBING SLEEP: 1
NECK MASS: 0
SMELL DISTURBANCE: 0
BACK PAIN: 0
RESPIRATORY PAIN: 0
JOINT SWELLING: 1
MYALGIAS: 1
ARTHRALGIAS: 1
SINUS PAIN: 1
MUSCLE CRAMPS: 0
SPUTUM PRODUCTION: 1
STIFFNESS: 1
TROUBLE SWALLOWING: 0
HOARSE VOICE: 1
WHEEZING: 1
NECK PAIN: 0

## 2017-07-21 ENCOUNTER — PRE VISIT (OUTPATIENT)
Dept: OTOLARYNGOLOGY | Facility: CLINIC | Age: 72
End: 2017-07-21

## 2017-07-21 NOTE — TELEPHONE ENCOUNTER
1.  Date/reason for appt:  7/31/17   Sinusitis    2.  Referring provider:  Dr Lynn, Internal    3.  Call to patient (Yes / No - short description):  No, referred    Records reviewed.  All records are in [facility] and imaging is in PACS.

## 2017-07-24 ENCOUNTER — TELEPHONE (OUTPATIENT)
Dept: PULMONOLOGY | Facility: CLINIC | Age: 72
End: 2017-07-24

## 2017-07-24 NOTE — TELEPHONE ENCOUNTER
Writer called patient to remind her to hold any antihistamines, and the medication Zantac, one full week prior to her appointment with Dr. Carcamo. Patient stated we are not to worry, as she does not take any antihistamines or the medication Zantac. Patient verbalized understanding of the information given.

## 2017-07-25 ENCOUNTER — CARE COORDINATION (OUTPATIENT)
Dept: CARE COORDINATION | Facility: CLINIC | Age: 72
End: 2017-07-25

## 2017-07-26 ENCOUNTER — PRE VISIT (OUTPATIENT)
Dept: PULMONOLOGY | Facility: CLINIC | Age: 72
End: 2017-07-26

## 2017-07-26 NOTE — TELEPHONE ENCOUNTER
1.  Date/reason for appt:7/31/17, Allergy  2.  Referring provider:RAMON SAWANT  3.  Call to patient (Yes / No - short description): No, referred   4.  Previous care at / records requested from:   [facility]  5.  Other:

## 2017-07-31 ENCOUNTER — OFFICE VISIT (OUTPATIENT)
Dept: OTOLARYNGOLOGY | Facility: CLINIC | Age: 72
End: 2017-07-31

## 2017-07-31 ENCOUNTER — OFFICE VISIT (OUTPATIENT)
Dept: PULMONOLOGY | Facility: CLINIC | Age: 72
End: 2017-07-31
Attending: ALLERGY & IMMUNOLOGY
Payer: MEDICARE

## 2017-07-31 VITALS
OXYGEN SATURATION: 96 % | SYSTOLIC BLOOD PRESSURE: 118 MMHG | DIASTOLIC BLOOD PRESSURE: 71 MMHG | RESPIRATION RATE: 16 BRPM | HEART RATE: 69 BPM

## 2017-07-31 VITALS — HEIGHT: 63 IN | WEIGHT: 198 LBS | BODY MASS INDEX: 35.08 KG/M2

## 2017-07-31 DIAGNOSIS — J32.4 CHRONIC PANSINUSITIS: Primary | ICD-10-CM

## 2017-07-31 DIAGNOSIS — J31.0 OTHER CHRONIC RHINITIS: ICD-10-CM

## 2017-07-31 DIAGNOSIS — R05.9 COUGH: Primary | ICD-10-CM

## 2017-07-31 PROCEDURE — 95004 PERQ TESTS W/ALRGNC XTRCS: CPT | Performed by: ALLERGY & IMMUNOLOGY

## 2017-07-31 PROCEDURE — 99212 OFFICE O/P EST SF 10 MIN: CPT | Mod: ZF

## 2017-07-31 RX ORDER — PREDNISONE 20 MG/1
TABLET ORAL
Qty: 15 TABLET | Refills: 0 | Status: SHIPPED | OUTPATIENT
Start: 2017-07-31 | End: 2017-10-23 | Stop reason: DRUGHIGH

## 2017-07-31 ASSESSMENT — PAIN SCALES - GENERAL
PAINLEVEL: NO PAIN (0)
PAINLEVEL: NO PAIN (0)

## 2017-07-31 NOTE — NURSING NOTE
Chief Complaint   Patient presents with     Consult     chronic maxillary rhinology     Daphne Lilly Medical Assistant

## 2017-07-31 NOTE — LETTER
2017       RE: Agnes Saravia  [address]  [place] [address]     Dear Colleague,    Thank you for referring your patient, Agnes Saravia, to the [facility] EAR NOSE AND THROAT at [facility]. Please see a copy of my visit note below.    Otolaryngology Adult Consultation    Patient: Agnes Saravia   : 1945     Patient presents with:  Consult:   Chief Complaint   Patient presents with     Consult     chronic maxillary rhinology        Referring Provider: Harjeet Lynn   Consulting Physician:  Yolette Worthington MD       Assessment/Plan: Agnes has chronic rhinosinusitis.  She would benefit from an appropriate trial of maximal medical therapy including Augmentin for three weeks and a prednisone taper.  If after three to four weeks she does not improve, I would like to repeat her CT scan and see her back in clinic to discuss surgery.  If she is improving, then I would like to see her back in six weeks with a repeat CT scan to discuss ongoing management.  She seems comfortable with this plan.  There was nothing to culture today so we will start her on empiric antibiotic therapy.          History of Present Illness: Agnes Saravia is a 72-year-old female seen today in the Otolaryngology Clinic for a history of sinus congestion and cough.  She had pneumonia 11 months ago, also a pulmonary embolism.  She is recovered from all of that and now has persistent cough and nasal drainage.   Previous medical therapies tried and their effects include nasal steroids, no benefit and topical cleocin with no benefit.  Previous work up has been performed including chest CT, which looks okay and allergy testing today, which was negative.      Current Outpatient Prescriptions on File Prior to Visit:  CHERATUSSIN -10 MG/5ML SYRP solution TAKE 10 MILLILITERS BY MOUTH EVERY 6 HOUURS AS NEEDED   Clorazepate Dipotassium 15 MG TABS TAKE 1 TABLET BY  MOUTH ONCE DAILY   hydrochlorothiazide (HYDRODIURIL) 50 MG tablet Take 1 tablet (50 mg) by mouth daily Please call to schedule a follow up visit   aspirin 81 MG EC tablet Take 81 mg by mouth daily   potassium chloride (K-TAB,KLOR-CON) 10 MEQ tablet Take 2 tablets (20 mEq) by mouth daily   gabapentin (NEURONTIN) 100 MG capsule TAKE 1 CAPSULE BY MOUTH 3 TIMES DAILY (Patient taking differently: TAKE 3 CAPSULE BY MOUTH 3 TIMES DAILY)   traZODone (DESYREL) 50 MG tablet Take 0.5 tablets (25 mg) by mouth At Bedtime   simvastatin (ZOCOR) 40 MG tablet Take 1 tablet (40 mg) by mouth At Bedtime   methotrexate, Anti-Rheumatic, 2.5 MG TABS Take 5 tablets by mouth once a week On Tuesdays   riTUXimab (RITUXAN) 10 MG/ML injection Reported on 3/29/2017 every 5 months   FOLIC ACID PO Take 2 mg by mouth daily.   predniSONE (DELTASONE) 5 MG tablet Take 5 mg by mouth daily    cevimeline (EVOXAC) 30 MG capsule Take 30 mg by mouth 3 times daily.     No current facility-administered medications on file prior to visit.        Allergies   Allergen Reactions     Mellaril [Thioridazine Hcl] Anaphylaxis     Percocet [Oxycodone-Acetaminophen] Anaphylaxis and Swelling     Tolerates hydrocodone and codeine in cough medicine       Past Medical History:   Diagnosis Date     Anxiety      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     Depressive disorder      Hyperlipidemia      Insomnia      PONV (postoperative nausea and vomiting)      Rheumatoid arthritis(714.0)      Sjogren's syndrome (H)          Review of Systems   ENT ROS 7/17/2017   Ears, Nose, Throat Nasal congestion or drainage, Hoarseness   Cardiopulmonary Cough, Wheezing   Musculoskeletal -        14 point review of systems was negative other than the symptoms noted above.    Physical Exam:    General Assessment   The patient is alert, oriented and in no acute distress. Coughing during visit.    Head/Face/Scalp  Grossly normal.     Nose  External nose is straight, skin is normal. Intranasal exam (anterior  rhinoscopy) reveals normal moist mucosa, turbinate tissue without edema, erythema or crusting.  Septum mainly in the midline.      Neck  No significant adenopathy, thyroid or salivary gland abnormality.       Procedure:  Nasal endoscopy performed to examine the posterior nasal passages for pathology.    Verbal consent obtained. Topical anesthetic/decongestant solution applied per patient request.   A rigid endoscope was passed into each nasal cavity separately.  Findings are as follows:   Left middle meatus:  Normal healthy meatus, no purulence or polyps.    Left posterior nasal cavity:  No masses, lesions or abnormal tissue.   Right middle meatus:  Normal healthy meatus, no purulence or polyps   Right posterior nasal cavity:  No masses, lesions or abnormal tissue   Nasopharynx:  Clear, no lesions, crusting or inflammation    Imaging:    Results for orders placed in visit on 03/29/17   CT MAXILLOFACIAL W/O CONTRAST    Status: Normal 3/29/2017    Narrative CT MAXILLOFACIAL W/O CONTRAST 3/29/2017 4:37 PM    History: Upper airway wheezing, hoarse voice, and nasal congestion.  Please evaluate sinus disease., Cough     Comparison: None     Technique:  Using thin collimation multidetector helical acquisition  technique, axial, coronal, and sagittal thin section CT images were  reconstructed through the paranasal sinuses. Images were reviewed in  bone and soft tissue windows.    Findings: Mucosal thickening and near complete opacification of the  maxillary sinuses. Complete opacification of some middle ethmoid air  cells. Posterior ethmoid air cells are relatively clear. Mucosal  thickening is noted in the anterior ethmoid air cells. Mild mucosal  thickening in the right frontal sinus floor. Otherwise frontal sinuses  are clear. Mild mucosal thickening in both locules of sphenoid sinus.  No evidence of bony dehiscence.  Bilateral obstructed ostiomeatal units. The bony walls of the  paranasal sinuses are intact.    The  adenoid tonsils in the nasopharynx are unremarkable.      Impression Impression:    Chronic pansinusitis.    I have personally reviewed the examination and initial interpretation  and I agree with the findings.    SANAM BARRERA MD      HB/ms      Again, thank you for allowing me to participate in the care of your patient.      Sincerely,    Yolette Worthington MD

## 2017-07-31 NOTE — PROGRESS NOTES
Otolaryngology Adult Consultation    Patient: gAnes Saravia   : 1945     Patient presents with:  Consult:   Chief Complaint   Patient presents with     Consult     chronic maxillary rhinology        Referring Provider: Harjeet Lynn   Consulting Physician:  Yolette Worthington MD       Assessment/Plan: Agnes has chronic rhinosinusitis.  She would benefit from an appropriate trial of maximal medical therapy including Augmentin for three weeks and a prednisone taper.  If after three to four weeks she does not improve, I would like to repeat her CT scan and see her back in clinic to discuss surgery.  If she is improving, then I would like to see her back in six weeks with a repeat CT scan to discuss ongoing management.  She seems comfortable with this plan.  There was nothing to culture today so we will start her on empiric antibiotic therapy.          History of Present Illness: Agnes Saravia is a 72-year-old female seen today in the Otolaryngology Clinic for a history of sinus congestion and cough.  She had pneumonia 11 months ago, also a pulmonary embolism.  She is recovered from all of that and now has persistent cough and nasal drainage.   Previous medical therapies tried and their effects include nasal steroids, no benefit and topical cleocin with no benefit.  Previous work up has been performed including chest CT, which looks okay and allergy testing today, which was negative.      Current Outpatient Prescriptions on File Prior to Visit:  CHERATUSSIN -10 MG/5ML SYRP solution TAKE 10 MILLILITERS BY MOUTH EVERY 6 HOUURS AS NEEDED   Clorazepate Dipotassium 15 MG TABS TAKE 1 TABLET BY MOUTH ONCE DAILY   hydrochlorothiazide (HYDRODIURIL) 50 MG tablet Take 1 tablet (50 mg) by mouth daily Please call to schedule a follow up visit   aspirin 81 MG EC tablet Take 81 mg by mouth daily   potassium chloride (K-TAB,KLOR-CON) 10 MEQ tablet Take 2 tablets (20 mEq) by mouth daily   gabapentin  (NEURONTIN) 100 MG capsule TAKE 1 CAPSULE BY MOUTH 3 TIMES DAILY (Patient taking differently: TAKE 3 CAPSULE BY MOUTH 3 TIMES DAILY)   traZODone (DESYREL) 50 MG tablet Take 0.5 tablets (25 mg) by mouth At Bedtime   simvastatin (ZOCOR) 40 MG tablet Take 1 tablet (40 mg) by mouth At Bedtime   methotrexate, Anti-Rheumatic, 2.5 MG TABS Take 5 tablets by mouth once a week On Tuesdays   riTUXimab (RITUXAN) 10 MG/ML injection Reported on 3/29/2017 every 5 months   FOLIC ACID PO Take 2 mg by mouth daily.   predniSONE (DELTASONE) 5 MG tablet Take 5 mg by mouth daily    cevimeline (EVOXAC) 30 MG capsule Take 30 mg by mouth 3 times daily.     No current facility-administered medications on file prior to visit.        Allergies   Allergen Reactions     Mellaril [Thioridazine Hcl] Anaphylaxis     Percocet [Oxycodone-Acetaminophen] Anaphylaxis and Swelling     Tolerates hydrocodone and codeine in cough medicine       Past Medical History:   Diagnosis Date     Anxiety      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     Depressive disorder      Hyperlipidemia      Insomnia      PONV (postoperative nausea and vomiting)      Rheumatoid arthritis(714.0)      Sjogren's syndrome (H)          Review of Systems   ENT ROS 7/17/2017   Ears, Nose, Throat Nasal congestion or drainage, Hoarseness   Cardiopulmonary Cough, Wheezing   Musculoskeletal -        14 point review of systems was negative other than the symptoms noted above.    Physical Exam:    General Assessment   The patient is alert, oriented and in no acute distress. Coughing during visit.    Head/Face/Scalp  Grossly normal.     Nose  External nose is straight, skin is normal. Intranasal exam (anterior rhinoscopy) reveals normal moist mucosa, turbinate tissue without edema, erythema or crusting.  Septum mainly in the midline.      Neck  No significant adenopathy, thyroid or salivary gland abnormality.       Procedure:  Nasal endoscopy performed to examine the posterior nasal passages for pathology.     Verbal consent obtained. Topical anesthetic/decongestant solution applied per patient request.   A rigid endoscope was passed into each nasal cavity separately.  Findings are as follows:   Left middle meatus:  Normal healthy meatus, no purulence or polyps.    Left posterior nasal cavity:  No masses, lesions or abnormal tissue.   Right middle meatus:  Normal healthy meatus, no purulence or polyps   Right posterior nasal cavity:  No masses, lesions or abnormal tissue   Nasopharynx:  Clear, no lesions, crusting or inflammation    Imaging:    Results for orders placed in visit on 03/29/17   CT MAXILLOFACIAL W/O CONTRAST    Status: Normal 3/29/2017    Narrative CT MAXILLOFACIAL W/O CONTRAST 3/29/2017 4:37 PM    History: Upper airway wheezing, hoarse voice, and nasal congestion.  Please evaluate sinus disease., Cough     Comparison: None     Technique:  Using thin collimation multidetector helical acquisition  technique, axial, coronal, and sagittal thin section CT images were  reconstructed through the paranasal sinuses. Images were reviewed in  bone and soft tissue windows.    Findings: Mucosal thickening and near complete opacification of the  maxillary sinuses. Complete opacification of some middle ethmoid air  cells. Posterior ethmoid air cells are relatively clear. Mucosal  thickening is noted in the anterior ethmoid air cells. Mild mucosal  thickening in the right frontal sinus floor. Otherwise frontal sinuses  are clear. Mild mucosal thickening in both locules of sphenoid sinus.  No evidence of bony dehiscence.  Bilateral obstructed ostiomeatal units. The bony walls of the  paranasal sinuses are intact.    The adenoid tonsils in the nasopharynx are unremarkable.      Impression Impression:    Chronic pansinusitis.    I have personally reviewed the examination and initial interpretation  and I agree with the findings.    SANAM BARRERA MD      HB/ms

## 2017-07-31 NOTE — NURSING NOTE
Chief Complaint   Patient presents with     Consult     Patient is being seen for consultaiton of allergies      Lubna Resendiz CMA at 1:47 PM on 7/31/2017

## 2017-07-31 NOTE — PROGRESS NOTES
Reason for Visit  Agnes Saravia is a 72 year old female who is referred by José Miguel Wallace for possible allergies.    Allergy HPI  Agnes presents today for initial consultation regarding  cough for 11 months. She'll like the cough is in her upper chest. She also notices that her sinuses feel congested but she can breathe out of her nose. Sounds like she always has a cold. She had pneumonia and pleurisy and a blood clot after that is uncertain if this is the only reason for her symptoms. Prior to 11 months ago she is currently fine. Said overall she doesn't get sick very often. She has occasional sneezing but no itchy eyes. She does note her equilibrium is off. She's tried nasal itches Flonase and loratadine and  nasal rinses with antibiotics all without any benefit. She tried inhalers which may help a little bit. She has history of Sjogren's. She had history cataract surgery.    Social history no pets she does not smoke she smoked at age 19 socially      Family history no asthma or allergies.    The patient was seen and examined by Juan Thakkar MD   Current Outpatient Prescriptions   Medication     Clorazepate Dipotassium 15 MG TABS     hydrochlorothiazide (HYDRODIURIL) 50 MG tablet     aspirin 81 MG EC tablet     potassium chloride (K-TAB,KLOR-CON) 10 MEQ tablet     gabapentin (NEURONTIN) 100 MG capsule     traZODone (DESYREL) 50 MG tablet     simvastatin (ZOCOR) 40 MG tablet     methotrexate, Anti-Rheumatic, 2.5 MG TABS     riTUXimab (RITUXAN) 10 MG/ML injection     FOLIC ACID PO     predniSONE (DELTASONE) 5 MG tablet     cevimeline (EVOXAC) 30 MG capsule     CHERATUSSIN -10 MG/5ML SYRP solution     No current facility-administered medications for this visit.      Allergies   Allergen Reactions     Mellaril [Thioridazine Hcl] Anaphylaxis     Percocet [Oxycodone-Acetaminophen] Anaphylaxis and Swelling     Tolerates hydrocodone and codeine in cough medicine     Social History      Social History     Marital status: Single     Spouse name: N/A     Number of children: N/A     Years of education: N/A     Occupational History     Not on file.     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 [employer]     Past Medical History:   Diagnosis Date     Anxiety      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     Depressive disorder      Hyperlipidemia      Insomnia      PONV (postoperative nausea and vomiting)      Rheumatoid arthritis(714.0)      Sjogren's syndrome (H)      Past Surgical History:   Procedure Laterality Date     BLEPHAROPLASTY       DISCECTOMY LUMBAR POSTERIOR MICROSCOPIC ONE LEVEL  8/14/2014    Procedure: DISCECTOMY LUMBAR POSTERIOR MICROSCOPIC ONE LEVEL;  Surgeon: Dudley Bernal MD;  Location:  OR     HYSTERECTOMY TOTAL ABDOMINAL, BILATERAL SALPINGO-OOPHORECTOMY, COMBINED       HYSTERECTOMY, PAP NO LONGER INDICATED       MAMMOPLASTY REDUCTION BILATERAL  5/14/2013    Procedure: MAMMOPLASTY REDUCTION BILATERAL;  BILATERAL REDUCTION MAMMOPLASTY;  Surgeon: Bruce Nash MD;  Location: [facility]     SHOULDER SURGERY       Family History   Problem Relation Age of Onset     CEREBROVASCULAR DISEASE Mother      CEREBROVASCULAR DISEASE Father          ROS   A complete ROS was otherwise negative except as noted in the HPI and the end of the note.  /71 (BP Location: Right arm, Patient Position: Chair, Cuff Size: Adult Large)  Pulse 69  Resp 16  SpO2 96%  Exam:   GENERAL APPEARANCE: Well developed, well  nourished, alert, and in no apparent distress.  EYES: PERRL, EOMI, conjunctiva clear non-injected  HENT: Nasal mucosa with no edema and no discharge. No nasal polyps.  No facial tenderness.  EARS: Canals clear, TMs normal  MOUTH: Oral mucosa is moist, without any lesions, no tonsillar enlargement, no oropharyngeal exudate.  RESP: Expiratory wheezing in all lung fields.    CV: Normal S1, S2, regular rhythm, normal rate. No murmur.  No rub. No gallop. No LE edema.   MS: Extremities normal. No clubbing. No cyanosis.  SKIN: No rashes noted  NEURO: Speech normal, normal strength and tone, normal gait and stance  PSYCH: Normal mentation, orientation to person, place, and time.  Results: 38 percutaneous environmental allergen (and 2 controls) extracts placed by MA and read by MD.  All negative.      Assessment and plan: No allergens noted, I do not feel that these symptoms are allergy related.  She will continue to work with her other providers.           Answers for HPI/ROS submitted by the patient on 7/17/2017   General Symptoms: No  Skin Symptoms: No  HENT Symptoms: Yes  EYE SYMPTOMS: No  HEART SYMPTOMS: No  LUNG SYMPTOMS: Yes  INTESTINAL SYMPTOMS: No  URINARY SYMPTOMS: No  GYNECOLOGIC SYMPTOMS: No  BREAST SYMPTOMS: No  SKELETAL SYMPTOMS: Yes  BLOOD SYMPTOMS: No  NERVOUS SYSTEM SYMPTOMS: No  MENTAL HEALTH SYMPTOMS: No  Ear pain: No  Ear discharge: No  Hearing loss: No  Tinnitus: No  Nosebleeds: No  Congestion: Yes  Sinus pain: Yes  Trouble swallowing: No   Voice hoarseness: Yes  Mouth sores: No  Sore throat: No  Tooth pain: No  Gum tenderness: No  Bleeding gums: No  Change in taste: No  Change in sense of smell: No  Dry mouth: No  Hearing aid used: No  Neck lump: No  Cough: Yes  Sputum or phlegm: Yes  Coughing up blood: No  Difficulty breating or shortness of breath: No  Snoring: No  Wheezing: Yes  Difficulty breathing on exertion: No  Respiratory pain: No  Nighttime Cough: Yes  Difficulty breathing when lying flat:  No  Back pain: No  Muscle aches: Yes  Neck pain: No  Swollen joints: Yes  Joint pain: Yes  Bone pain: No  Muscle cramps: No  Muscle weakness: No  Joint stiffness: Yes  Bone fracture: No    Answers for HPI/ROS submitted by the patient on 7/17/2017   General Symptoms: No  Skin Symptoms: No  HENT Symptoms: Yes  EYE SYMPTOMS: No  HEART SYMPTOMS: No  LUNG SYMPTOMS: Yes  INTESTINAL SYMPTOMS: No  URINARY SYMPTOMS: No  GYNECOLOGIC SYMPTOMS: No  BREAST SYMPTOMS: No  SKELETAL SYMPTOMS: Yes  BLOOD SYMPTOMS: No  NERVOUS SYSTEM SYMPTOMS: No  MENTAL HEALTH SYMPTOMS: No  Ear pain: No  Ear discharge: No  Hearing loss: No  Tinnitus: No  Nosebleeds: No  Congestion: Yes  Sinus pain: Yes  Trouble swallowing: No   Voice hoarseness: Yes  Mouth sores: No  Sore throat: No  Tooth pain: No  Gum tenderness: No  Bleeding gums: No  Change in taste: No  Change in sense of smell: No  Dry mouth: No  Hearing aid used: No  Neck lump: No  Cough: Yes  Sputum or phlegm: Yes  Coughing up blood: No  Difficulty breating or shortness of breath: No  Snoring: No  Wheezing: Yes  Difficulty breathing on exertion: No  Respiratory pain: No  Nighttime Cough: Yes  Difficulty breathing when lying flat: No  Back pain: No  Muscle aches: Yes  Neck pain: No  Swollen joints: Yes  Joint pain: Yes  Bone pain: No  Muscle cramps: No  Muscle weakness: No  Joint stiffness: Yes  Bone fracture: No

## 2017-07-31 NOTE — PATIENT INSTRUCTIONS
Plan of care:  Augmentin twice/day for 3 weeks  Prednisone 40mg/day for 5 days then 20mg/day for 5 days  If you are not better in 3 weeks, go ahead and get a CT done. Call [phone] to schedule at [facility]  If you are doing well, get the CT in 6 weeks.  Either way, call Kandi and let her know you got the CT done  Clinic contact information:  1. To schedule an appointment call [phone], option 1  2. To talk to the Triage RN call [phone], option 3  3. If you need to speak to Kandi or get a message to your doctor on a Friday, call the triage RN  4. KandiRN: [phone]  5. Surgery scheduling:      Carolina Webb: [phone]      Candy Green: [phone]  6. Fax: [phone]  7. [phone]

## 2017-07-31 NOTE — MR AVS SNAPSHOT
After Visit Summary   2017    Agnes Saravia    MRN: [id number]           Patient Information     Date Of Birth          1945        Visit Information        Provider Department      2017 3:15 PM Yolette Worthington MD [facility]        Today's Diagnoses     Chronic pansinusitis    -  1      Care Instructions    Plan of care:  Augmentin twice/day for 3 weeks  Prednisone 40mg/day for 5 days then 20mg/day for 5 days  If you are not better in 3 weeks, go ahead and get a CT done. Call [phone] to schedule at [facility]  If you are doing well, get the CT in 6 weeks.  Either way, call Kandi and let her know you got the CT done  Clinic contact information:  1. To schedule an appointment call [phone], option 1  2. To talk to the Triage RN call [phone], option 3  3. If you need to speak to Kandi or get a message to your doctor on a Friday, call the triage RN  4. KandiRN: [phone]  5. Surgery scheduling:      Carolina Webb: [phone]      Candy Green: [phone]  6. Fax: [phone]  7. [phone]            Follow-ups after your visit        Your next 10 appointments already scheduled     Sep 27, 2017  2:05 PM CDT   (Arrive by 1:50 PM)   Return Visit with Arlet Tomlinson MD   [facility] ([facility])    [address] [place] [id number] [phone]              Future tests that were ordered for you today     Open Future Orders        Priority Expected Expires Ordered    CT Maxillofacial w/o contrast Routine  2018            Who to contact     Please call your clinic at [phone] to:    Ask questions about your health    Make or cancel appointments    Discuss your medicines    Learn about your test results    Speak to your doctor   If you have compliments or concerns about an experience at your clinic, or if you wish to file a  "complaint, please contact [facility] Patient Relations at [phone] or email us at [email or web address]         Additional Information About Your Visit        [employer] Information     [employer] gives you secure access to your electronic health record. If you see a primary care provider, you can also send messages to your care team and make appointments. If you have questions, please call your primary care clinic.  If you do not have a primary care provider, please call [phone] and they will assist you.      [employer] is an electronic gateway that provides easy, online access to your medical records. With [employer], you can request a clinic appointment, read your test results, renew a prescription or communicate with your care team.     To access your existing account, please contact your [facility] or call [phone] for assistance.        Care EveryWhere ID     This is your Care EveryWhere ID. This could be used by other organizations to access your [place] medical records  [id number]        Your Vitals Were     Height BMI (Body Mass Index)                1.6 m (5' 3\") 35.07 kg/m2           Blood Pressure from Last 3 Encounters:   07/31/17 118/71   05/08/17 120/69   03/29/17 117/66    Weight from Last 3 Encounters:   07/31/17 89.8 kg (198 lb)   05/08/17 93 kg (205 lb)   01/28/17 88.5 kg (195 lb)                 Today's Medication Changes          These changes are accurate as of: 7/31/17  3:56 PM.  If you have any questions, ask your nurse or doctor.               Start taking these medicines.        Dose/Directions    amoxicillin-clavulanate 875-125 MG per tablet   Commonly known as:  AUGMENTIN   Used for:  Chronic pansinusitis   Started by:  Yolette Worthington MD        Dose:  1 tablet   Take 1 tablet by mouth every 12 hours for 21 days   Quantity:  42 tablet   Refills:  0         These medicines have changed or have updated prescriptions.  "       Dose/Directions    gabapentin 100 MG capsule   Commonly known as:  NEURONTIN   This may have changed:  See the new instructions.   Used for:  Pain        TAKE 1 CAPSULE BY MOUTH 3 TIMES DAILY   Quantity:  90 capsule   Refills:  3       * predniSONE 5 MG tablet   Commonly known as:  DELTASONE   This may have changed:  Another medication with the same name was added. Make sure you understand how and when to take each.   Changed by:  José Miguel Wallace MD        Dose:  5 mg   Take 5 mg by mouth daily   Refills:  0       * predniSONE 20 MG tablet   Commonly known as:  DELTASONE   This may have changed:  You were already taking a medication with the same name, and this prescription was added. Make sure you understand how and when to take each.   Used for:  Chronic pansinusitis   Changed by:  Yolette Worthington MD        40mg/day for 5 days then 20mg/day for 5 days   Quantity:  15 tablet   Refills:  0       * Notice:  This list has 2 medication(s) that are the same as other medications prescribed for you. Read the directions carefully, and ask your doctor or other care provider to review them with you.         Where to get your medicines      These medications were sent to [id number] - [place], [place] - [address]  [address] [place] [address]     Phone:  [phone]     amoxicillin-clavulanate 875-125 MG per tablet    predniSONE 20 MG tablet                Primary Care Provider Office Phone # Fax #    José Miguel Wallace [phone]       [facility] [address] [facility] [address]  [facility] [address]        Equal Access to Services     [facility] AH: Hadii aad ku hadasho Soomaali, waaxda luqadaha, qaybta kaalmada adeegyada, waxay fabricio kimball. So [facility] [phone].    ATENCIÓN: Si habla español, tiene a pedraza disposición servicios gratuitos de asistencia lingüística. Llame al [phone].    We comply with applicable federal civil rights laws and  [place] laws. We do not discriminate on the basis of race, color, national origin, age, disability sex, sexual orientation or gender identity.            Thank you!     Thank you for choosing [facility]  for your care. Our goal is always to provide you with excellent care. Hearing back from our patients is one way we can continue to improve our services. Please take a few minutes to complete the written survey that you may receive in the mail after your visit with us. Thank you!             Your Updated Medication List - Protect others around you: Learn how to safely use, store and throw away your medicines at www.disposemymeds.org.          This list is accurate as of: 7/31/17  3:56 PM.  Always use your most recent med list.                   Brand Name Dispense Instructions for use Diagnosis    amoxicillin-clavulanate 875-125 MG per tablet    AUGMENTIN    42 tablet    Take 1 tablet by mouth every 12 hours for 21 days    Chronic pansinusitis       aspirin 81 MG EC tablet      Take 81 mg by mouth daily        cevimeline 30 MG capsule    EVOXAC     Take 30 mg by mouth 3 times daily.        CHERATUSSIN -10 MG/5ML Soln solution   Generic drug:  guaiFENesin-codeine     180 mL    TAKE 10 MILLILITERS BY MOUTH EVERY 6 HOUURS AS NEEDED    Cough       Clorazepate Dipotassium 15 MG Tabs     90 tablet    TAKE 1 TABLET BY MOUTH ONCE DAILY    Sjogren's syndrome (H), DANA (generalized anxiety disorder)       FOLIC ACID PO      Take 2 mg by mouth daily.        gabapentin 100 MG capsule    NEURONTIN    90 capsule    TAKE 1 CAPSULE BY MOUTH 3 TIMES DAILY    Pain       hydrochlorothiazide 50 MG tablet    HYDRODIURIL    90 tablet    Take 1 tablet (50 mg) by mouth daily Please call to schedule a follow up visit    Essential hypertension       methotrexate (Anti-Rheumatic) 2.5 MG Tabs      Take 5 tablets by mouth once a week On Tuesdays        potassium chloride 10 MEQ tablet    K-TAB,KLOR-CON    180 tablet     Take 2 tablets (20 mEq) by mouth daily    Hypokalemia       * predniSONE 5 MG tablet    DELTASONE     Take 5 mg by mouth daily        * predniSONE 20 MG tablet    DELTASONE    15 tablet    40mg/day for 5 days then 20mg/day for 5 days    Chronic pansinusitis       RITUXAN 10 MG/ML injection   Generic drug:  riTUXimab      Reported on 3/29/2017 every 5 months        simvastatin 40 MG tablet    ZOCOR    90 tablet    Take 1 tablet (40 mg) by mouth At Bedtime    Hyperlipidemia LDL goal <130       traZODone 50 MG tablet    DESYREL    45 tablet    Take 0.5 tablets (25 mg) by mouth At Bedtime    Insomnia       * Notice:  This list has 2 medication(s) that are the same as other medications prescribed for you. Read the directions carefully, and ask your doctor or other care provider to review them with you.

## 2017-07-31 NOTE — LETTER
2017      RE: Agnes Saravia  [address]  [place] [address]       Otolaryngology Adult Consultation    Patient: Agnes Saravia   : 1945     Patient presents with:  Consult:   Chief Complaint   Patient presents with     Consult     chronic maxillary rhinology        Referring Provider: Harjeet Lynn   Consulting Physician:  Yolette Worthington MD       Assessment/Plan: Agnes has chronic rhinosinusitis.  She would benefit from an appropriate trial of maximal medical therapy including Augmentin for three weeks and a prednisone taper.  If after three to four weeks she does not improve, I would like to repeat her CT scan and see her back in clinic to discuss surgery.  If she is improving, then I would like to see her back in six weeks with a repeat CT scan to discuss ongoing management.  She seems comfortable with this plan.  There was nothing to culture today so we will start her on empiric antibiotic therapy.          History of Present Illness: Agnes Saravia is a 72-year-old female seen today in the Otolaryngology Clinic for a history of sinus congestion and cough.  She had pneumonia 11 months ago, also a pulmonary embolism.  She is recovered from all of that and now has persistent cough and nasal drainage.   Previous medical therapies tried and their effects include nasal steroids, no benefit and topical cleocin with no benefit.  Previous work up has been performed including chest CT, which looks okay and allergy testing today, which was negative.      Current Outpatient Prescriptions on File Prior to Visit:  CHERATUSSIN -10 MG/5ML SYRP solution TAKE 10 MILLILITERS BY MOUTH EVERY 6 HOUURS AS NEEDED   Clorazepate Dipotassium 15 MG TABS TAKE 1 TABLET BY MOUTH ONCE DAILY   hydrochlorothiazide (HYDRODIURIL) 50 MG tablet Take 1 tablet (50 mg) by mouth daily Please call to schedule a follow up visit   aspirin 81 MG EC tablet Take 81 mg by mouth daily   potassium  chloride (K-TAB,KLOR-CON) 10 MEQ tablet Take 2 tablets (20 mEq) by mouth daily   gabapentin (NEURONTIN) 100 MG capsule TAKE 1 CAPSULE BY MOUTH 3 TIMES DAILY (Patient taking differently: TAKE 3 CAPSULE BY MOUTH 3 TIMES DAILY)   traZODone (DESYREL) 50 MG tablet Take 0.5 tablets (25 mg) by mouth At Bedtime   simvastatin (ZOCOR) 40 MG tablet Take 1 tablet (40 mg) by mouth At Bedtime   methotrexate, Anti-Rheumatic, 2.5 MG TABS Take 5 tablets by mouth once a week On Tuesdays   riTUXimab (RITUXAN) 10 MG/ML injection Reported on 3/29/2017 every 5 months   FOLIC ACID PO Take 2 mg by mouth daily.   predniSONE (DELTASONE) 5 MG tablet Take 5 mg by mouth daily    cevimeline (EVOXAC) 30 MG capsule Take 30 mg by mouth 3 times daily.     No current facility-administered medications on file prior to visit.        Allergies   Allergen Reactions     Mellaril [Thioridazine Hcl] Anaphylaxis     Percocet [Oxycodone-Acetaminophen] Anaphylaxis and Swelling     Tolerates hydrocodone and codeine in cough medicine       Past Medical History:   Diagnosis Date     Anxiety      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     Depressive disorder      Hyperlipidemia      Insomnia      PONV (postoperative nausea and vomiting)      Rheumatoid arthritis(714.0)      Sjogren's syndrome (H)          Review of Systems   ENT ROS 7/17/2017   Ears, Nose, Throat Nasal congestion or drainage, Hoarseness   Cardiopulmonary Cough, Wheezing   Musculoskeletal -        14 point review of systems was negative other than the symptoms noted above.    Physical Exam:    General Assessment   The patient is alert, oriented and in no acute distress. Coughing during visit.    Head/Face/Scalp  Grossly normal.     Nose  External nose is straight, skin is normal. Intranasal exam (anterior rhinoscopy) reveals normal moist mucosa, turbinate tissue without edema, erythema or crusting.  Septum mainly in the midline.      Neck  No significant adenopathy, thyroid or salivary gland abnormality.        Procedure:  Nasal endoscopy performed to examine the posterior nasal passages for pathology.    Verbal consent obtained. Topical anesthetic/decongestant solution applied per patient request.   A rigid endoscope was passed into each nasal cavity separately.  Findings are as follows:   Left middle meatus:  Normal healthy meatus, no purulence or polyps.    Left posterior nasal cavity:  No masses, lesions or abnormal tissue.   Right middle meatus:  Normal healthy meatus, no purulence or polyps   Right posterior nasal cavity:  No masses, lesions or abnormal tissue   Nasopharynx:  Clear, no lesions, crusting or inflammation    Imaging:    Results for orders placed in visit on 03/29/17   CT MAXILLOFACIAL W/O CONTRAST    Status: Normal 3/29/2017    Narrative CT MAXILLOFACIAL W/O CONTRAST 3/29/2017 4:37 PM    History: Upper airway wheezing, hoarse voice, and nasal congestion.  Please evaluate sinus disease., Cough     Comparison: None     Technique:  Using thin collimation multidetector helical acquisition  technique, axial, coronal, and sagittal thin section CT images were  reconstructed through the paranasal sinuses. Images were reviewed in  bone and soft tissue windows.    Findings: Mucosal thickening and near complete opacification of the  maxillary sinuses. Complete opacification of some middle ethmoid air  cells. Posterior ethmoid air cells are relatively clear. Mucosal  thickening is noted in the anterior ethmoid air cells. Mild mucosal  thickening in the right frontal sinus floor. Otherwise frontal sinuses  are clear. Mild mucosal thickening in both locules of sphenoid sinus.  No evidence of bony dehiscence.  Bilateral obstructed ostiomeatal units. The bony walls of the  paranasal sinuses are intact.    The adenoid tonsils in the nasopharynx are unremarkable.      Impression Impression:    Chronic pansinusitis.    I have personally reviewed the examination and initial interpretation  and I agree with the  findings.    SANAM BARRERA MD      /ms      Yolette Worthington MD

## 2017-07-31 NOTE — NURSING NOTE
Patient prescribed Augmentin by Dr Worthington but it interacts with Methotrexate. Patient will call rheumatologist to see if it is ok to stop Methotrexate for 3 weeks, will get back to clinic either way.

## 2017-07-31 NOTE — LETTER
7/31/2017       RE: Agnes Saravia  [address]  [place] [address]     Dear Colleague,    Thank you for referring your patient, Agnes Saravia, to the [facility] at [facility]. Please see a copy of my visit note below.    Reason for Visit  Agnes Saravia is a 72 year old female who is referred by José Miguel Wallace for possible allergies.    Allergy HPI  Agnes presents today for initial consultation regarding  cough for 11 months. She'll like the cough is in her upper chest. She also notices that her sinuses feel congested but she can breathe out of her nose. Sounds like she always has a cold. She had pneumonia and pleurisy and a blood clot after that is uncertain if this is the only reason for her symptoms. Prior to 11 months ago she is currently fine. Said overall she doesn't get sick very often. She has occasional sneezing but no itchy eyes. She does note her equilibrium is off. She's tried nasal itches Flonase and loratadine and  nasal rinses with antibiotics all without any benefit. She tried inhalers which may help a little bit. She has history of Sjogren's. She had history cataract surgery.    Social history no pets she does not smoke she smoked at age 19 socially      Family history no asthma or allergies.    The patient was seen and examined by Juan Thakkar MD   Current Outpatient Prescriptions   Medication     Clorazepate Dipotassium 15 MG TABS     hydrochlorothiazide (HYDRODIURIL) 50 MG tablet     aspirin 81 MG EC tablet     potassium chloride (K-TAB,KLOR-CON) 10 MEQ tablet     gabapentin (NEURONTIN) 100 MG capsule     traZODone (DESYREL) 50 MG tablet     simvastatin (ZOCOR) 40 MG tablet     methotrexate, Anti-Rheumatic, 2.5 MG TABS     riTUXimab (RITUXAN) 10 MG/ML injection     FOLIC ACID PO     predniSONE (DELTASONE) 5 MG tablet     cevimeline (EVOXAC) 30 MG capsule     CHERATUSSIN AC  100-10 MG/5ML SYRP solution     No current facility-administered medications for this visit.      Allergies   Allergen Reactions     Mellaril [Thioridazine Hcl] Anaphylaxis     Percocet [Oxycodone-Acetaminophen] Anaphylaxis and Swelling     Tolerates hydrocodone and codeine in cough medicine     Social History     Social History     Marital status: Single     Spouse name: N/A     Number of children: N/A     Years of education: N/A     Occupational History     Not on file.     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 [employer]     Past Medical History:   Diagnosis Date     Anxiety      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     Depressive disorder      Hyperlipidemia      Insomnia      PONV (postoperative nausea and vomiting)      Rheumatoid arthritis(714.0)      Sjogren's syndrome (H)      Past Surgical History:   Procedure Laterality Date     BLEPHAROPLASTY       DISCECTOMY LUMBAR POSTERIOR MICROSCOPIC ONE LEVEL  8/14/2014    Procedure: DISCECTOMY LUMBAR POSTERIOR MICROSCOPIC ONE LEVEL;  Surgeon: Dudley Bernal MD;  Location:  OR     HYSTERECTOMY TOTAL ABDOMINAL, BILATERAL SALPINGO-OOPHORECTOMY, COMBINED       HYSTERECTOMY, PAP NO LONGER INDICATED       MAMMOPLASTY REDUCTION BILATERAL  5/14/2013    Procedure: MAMMOPLASTY REDUCTION BILATERAL;  BILATERAL REDUCTION MAMMOPLASTY;  Surgeon: Bruce Nash MD;  Location: [facility]     SHOULDER SURGERY       Family History   Problem Relation Age of Onset     CEREBROVASCULAR DISEASE  Mother      CEREBROVASCULAR DISEASE Father          ROS   A complete ROS was otherwise negative except as noted in the HPI and the end of the note.  /71 (BP Location: Right arm, Patient Position: Chair, Cuff Size: Adult Large)  Pulse 69  Resp 16  SpO2 96%  Exam:   GENERAL APPEARANCE: Well developed, well nourished, alert, and in no apparent distress.  EYES: PERRL, EOMI, conjunctiva clear non-injected  HENT: Nasal mucosa with no edema and no discharge. No nasal polyps.  No facial tenderness.  EARS: Canals clear, TMs normal  MOUTH: Oral mucosa is moist, without any lesions, no tonsillar enlargement, no oropharyngeal exudate.  RESP: Expiratory wheezing in all lung fields.    CV: Normal S1, S2, regular rhythm, normal rate. No murmur.  No rub. No gallop. No LE edema.   MS: Extremities normal. No clubbing. No cyanosis.  SKIN: No rashes noted  NEURO: Speech normal, normal strength and tone, normal gait and stance  PSYCH: Normal mentation, orientation to person, place, and time.  Results: 38 percutaneous environmental allergen (and 2 controls) extracts placed by MA and read by MD.  All negative.      Assessment and plan: No allergens noted, I do not feel that these symptoms are allergy related.  She will continue to work with her other providers.       Juan Thakkar MD

## 2017-07-31 NOTE — MR AVS SNAPSHOT
After Visit Summary   7/31/2017    Agnes Saravia    MRN: [id number]           Patient Information     Date Of Birth          1945        Visit Information        Provider Department      7/31/2017 2:15 PM Juan Carcamo MD [facility]         Follow-ups after your visit        Your next 10 appointments already scheduled     Sep 27, 2017  2:05 PM CDT   (Arrive by 1:50 PM)   Return Visit with Arlet Tomlinson MD   [facility] ([facility] [place] [facility])    [address] [place] [address] [id number] [phone]              Future tests that were ordered for you today     Open Future Orders        Priority Expected Expires Ordered    CT Maxillofacial w/o contrast Routine  7/31/2018 7/31/2017            Who to contact     If you have questions or need follow up information about today's clinic visit or your schedule please contact [facility] directly at [phone].  Normal or non-critical lab and imaging results will be communicated to you by [employer], letter or phone within 4 business days after the clinic has received the results. If you do not hear from us within 7 days, please contact the clinic through [employer] or phone. If you have a critical or abnormal lab result, we will notify you by phone as soon as possible.  Submit refill requests through [employer] or call your pharmacy and they will forward the refill request to us. Please allow 3 business days for your refill to be completed.          Additional Information About Your Visit        [employer] Information     [employer] gives you secure access to your electronic health record. If you see a primary care provider, you can also send messages to your care team and make appointments. If you have questions, please call your primary care clinic.  If you do not have a primary care provider, please call  [phone] and they will assist you.        Care EveryWhere ID     This is your Care EveryWhere ID. This could be used by other organizations to access your [employer] medical records  [id number]        Your Vitals Were     Pulse Respirations Pulse Oximetry             69 16 96%          Blood Pressure from Last 3 Encounters:   07/31/17 118/71   05/08/17 120/69   03/29/17 117/66    Weight from Last 3 Encounters:   07/31/17 89.8 kg (198 lb)   05/08/17 93 kg (205 lb)   01/28/17 88.5 kg (195 lb)              Today, you had the following     No orders found for display         Today's Medication Changes          These changes are accurate as of: 7/31/17  5:39 PM.  If you have any questions, ask your nurse or doctor.               Start taking these medicines.        Dose/Directions    amoxicillin-clavulanate 875-125 MG per tablet   Commonly known as:  AUGMENTIN   Used for:  Chronic pansinusitis   Started by:  Yolette Worthington MD        Dose:  1 tablet   Take 1 tablet by mouth every 12 hours for 21 days   Quantity:  42 tablet   Refills:  0         These medicines have changed or have updated prescriptions.        Dose/Directions    gabapentin 100 MG capsule   Commonly known as:  NEURONTIN   This may have changed:  See the new instructions.   Used for:  Pain        TAKE 1 CAPSULE BY MOUTH 3 TIMES DAILY   Quantity:  90 capsule   Refills:  3       * predniSONE 5 MG tablet   Commonly known as:  DELTASONE   This may have changed:  Another medication with the same name was added. Make sure you understand how and when to take each.   Changed by:  José Miguel Wallace MD        Dose:  5 mg   Take 5 mg by mouth daily   Refills:  0       * predniSONE 20 MG tablet   Commonly known as:  DELTASONE   This may have changed:  You were already taking a medication with the same name, and this prescription was added. Make sure you understand how and when to take each.   Used for:  Chronic pansinusitis   Changed by:  Yolette Worthington MD         40mg/day for 5 days then 20mg/day for 5 days   Quantity:  15 tablet   Refills:  0       * Notice:  This list has 2 medication(s) that are the same as other medications prescribed for you. Read the directions carefully, and ask your doctor or other care provider to review them with you.         Where to get your medicines      These medications were sent to [id number] - [place], [place] - [address]  [address] [facility], [place] [address]     Phone:  [phone]     amoxicillin-clavulanate 875-125 MG per tablet    predniSONE 20 MG tablet                Primary Care Provider Office Phone # Fax #    José Miguel Wallace [phone]       [address]  [facility] [address]        Equal Access to Services     MAYITO CHERRY : Hadii tucker desai hadasho Soomaali, waaxda luqadaha, qaybta kaalmada adeegyada, michelle wu . So [facility] [phone].    ATENCIÓN: Si habla español, tiene a pedraza disposición servicios gratuitos de asistencia lingüística. Estela al [phone].    We comply with applicable federal civil rights laws and [place] laws. We do not discriminate on the basis of race, color, national origin, age, disability sex, sexual orientation or gender identity.            Thank you!     Thank you for choosing [facility]  for your care. Our goal is always to provide you with excellent care. Hearing back from our patients is one way we can continue to improve our services. Please take a few minutes to complete the written survey that you may receive in the mail after your visit with us. Thank you!             Your Updated Medication List - Protect others around you: Learn how to safely use, store and throw away your medicines at www.disposemymeds.org.          This list is accurate as of: 7/31/17  5:39 PM.  Always use your most recent med list.                   Brand Name Dispense Instructions for use  Diagnosis    amoxicillin-clavulanate 875-125 MG per tablet    AUGMENTIN    42 tablet    Take 1 tablet by mouth every 12 hours for 21 days    Chronic pansinusitis       aspirin 81 MG EC tablet      Take 81 mg by mouth daily        cevimeline 30 MG capsule    EVOXAC     Take 30 mg by mouth 3 times daily.        CHERATUSSIN -10 MG/5ML Soln solution   Generic drug:  guaiFENesin-codeine     180 mL    TAKE 10 MILLILITERS BY MOUTH EVERY 6 HOUURS AS NEEDED    Cough       Clorazepate Dipotassium 15 MG Tabs     90 tablet    TAKE 1 TABLET BY MOUTH ONCE DAILY    Sjogren's syndrome (H), DANA (generalized anxiety disorder)       FOLIC ACID PO      Take 2 mg by mouth daily.        gabapentin 100 MG capsule    NEURONTIN    90 capsule    TAKE 1 CAPSULE BY MOUTH 3 TIMES DAILY    Pain       hydrochlorothiazide 50 MG tablet    HYDRODIURIL    90 tablet    Take 1 tablet (50 mg) by mouth daily Please call to schedule a follow up visit    Essential hypertension       methotrexate (Anti-Rheumatic) 2.5 MG Tabs      Take 5 tablets by mouth once a week On Tuesdays        potassium chloride 10 MEQ tablet    K-TAB,KLOR-CON    180 tablet    Take 2 tablets (20 mEq) by mouth daily    Hypokalemia       * predniSONE 5 MG tablet    DELTASONE     Take 5 mg by mouth daily        * predniSONE 20 MG tablet    DELTASONE    15 tablet    40mg/day for 5 days then 20mg/day for 5 days    Chronic pansinusitis       RITUXAN 10 MG/ML injection   Generic drug:  riTUXimab      Reported on 3/29/2017 every 5 months        simvastatin 40 MG tablet    ZOCOR    90 tablet    Take 1 tablet (40 mg) by mouth At Bedtime    Hyperlipidemia LDL goal <130       traZODone 50 MG tablet    DESYREL    45 tablet    Take 0.5 tablets (25 mg) by mouth At Bedtime    Insomnia       * Notice:  This list has 2 medication(s) that are the same as other medications prescribed for you. Read the directions carefully, and ask your doctor or other care provider to review them with you.

## 2017-08-19 DIAGNOSIS — I10 ESSENTIAL HYPERTENSION: ICD-10-CM

## 2017-08-19 NOTE — TELEPHONE ENCOUNTER
hydrochlorothiazide (HYDRODIURIL) 50 MG tablet      Last Written Prescription Date: 6/1/17  Last Fill Quantity: 90, # refills: 0  Last Office Visit with [facility], [facility] or [facility] prescribing provider: 5/8/17       Potassium   Date Value Ref Range Status   05/08/2017 3.6 3.4 - 5.3 mmol/L Final     Creatinine   Date Value Ref Range Status   05/08/2017 0.73 0.52 - 1.04 mg/dL Final     BP Readings from Last 3 Encounters:   07/31/17 118/71   05/08/17 120/69   03/29/17 117/66         Keke Cosme RT(R)

## 2017-08-21 RX ORDER — HYDROCHLOROTHIAZIDE 50 MG/1
50 TABLET ORAL DAILY
Qty: 90 TABLET | Refills: 2 | Status: SHIPPED | OUTPATIENT
Start: 2017-08-21 | End: 2017-10-23

## 2017-08-21 NOTE — TELEPHONE ENCOUNTER
Prescription approved per [facility] Refill Protocol.    Daphne Mesa RN

## 2017-08-24 ENCOUNTER — MYC MEDICAL ADVICE (OUTPATIENT)
Dept: FAMILY MEDICINE | Facility: CLINIC | Age: 72
End: 2017-08-24

## 2017-09-19 ENCOUNTER — CARE COORDINATION (OUTPATIENT)
Dept: OTOLARYNGOLOGY | Facility: CLINIC | Age: 72
End: 2017-09-19

## 2017-09-19 DIAGNOSIS — J32.4 CHRONIC PANSINUSITIS: ICD-10-CM

## 2017-09-19 NOTE — PROGRESS NOTES
Call placed in response to patient's [employer] message, 9/18/17.  Repeat Augmentin, CT as scheduled, most likely patient will need surgery, will be discussed at 10/4 appointment.  Patient wants to proceed with surgery plans.

## 2017-09-20 ASSESSMENT — ENCOUNTER SYMPTOMS
NECK MASS: 0
COUGH: 1
TASTE DISTURBANCE: 0
SINUS CONGESTION: 1
WHEEZING: 1
SORE THROAT: 0
COUGH DISTURBING SLEEP: 0
TROUBLE SWALLOWING: 0
SNORES LOUDLY: 1
SMELL DISTURBANCE: 0
SINUS PAIN: 1
HEMOPTYSIS: 0
DYSPNEA ON EXERTION: 0
HOARSE VOICE: 0
SPUTUM PRODUCTION: 1
POSTURAL DYSPNEA: 0
SHORTNESS OF BREATH: 0
RESPIRATORY PAIN: 0

## 2017-09-27 ENCOUNTER — OFFICE VISIT (OUTPATIENT)
Dept: PULMONOLOGY | Facility: CLINIC | Age: 72
End: 2017-09-27
Attending: INTERNAL MEDICINE
Payer: MEDICARE

## 2017-09-27 VITALS
HEIGHT: 63 IN | RESPIRATION RATE: 18 BRPM | HEART RATE: 81 BPM | BODY MASS INDEX: 35.08 KG/M2 | DIASTOLIC BLOOD PRESSURE: 73 MMHG | SYSTOLIC BLOOD PRESSURE: 118 MMHG | WEIGHT: 198 LBS | OXYGEN SATURATION: 94 %

## 2017-09-27 DIAGNOSIS — M06.9 RHEUMATOID ARTHRITIS, INVOLVING UNSPECIFIED SITE, UNSPECIFIED RHEUMATOID FACTOR PRESENCE: Primary | ICD-10-CM

## 2017-09-27 DIAGNOSIS — Z23 ENCOUNTER FOR IMMUNIZATION: ICD-10-CM

## 2017-09-27 DIAGNOSIS — R05.9 COUGH: ICD-10-CM

## 2017-09-27 DIAGNOSIS — Z00.00 ROUTINE GENERAL MEDICAL EXAMINATION AT A HEALTH CARE FACILITY: ICD-10-CM

## 2017-09-27 PROCEDURE — G0008 ADMIN INFLUENZA VIRUS VAC: HCPCS | Mod: ZF

## 2017-09-27 PROCEDURE — 99212 OFFICE O/P EST SF 10 MIN: CPT | Mod: ZF

## 2017-09-27 PROCEDURE — 90662 IIV NO PRSV INCREASED AG IM: CPT | Mod: ZF | Performed by: INTERNAL MEDICINE

## 2017-09-27 PROCEDURE — 25000128 H RX IP 250 OP 636: Mod: ZF | Performed by: INTERNAL MEDICINE

## 2017-09-27 RX ADMIN — INFLUENZA A VIRUSA/MICHIGAN/45/2015 X-275 (H1N1) ANTIGEN (FORMALDEHYDE INACTIVATED), INFLUENZA A VIRUS A/HONG KONG/4801/2014 X-263B (H3N2) ANTIGEN (FORMALDEHYDE INACTIVATED), AND INFLUENZA B VIRUS B/BRISBANE/60/2008 ANTIGEN (FORMALDEHYDE INACTIVATED) 0.5 ML: 60; 60; 60 INJECTION, SUSPENSION INTRAMUSCULAR at 14:10

## 2017-09-27 ASSESSMENT — PAIN SCALES - GENERAL: PAINLEVEL: NO PAIN (0)

## 2017-09-27 NOTE — MR AVS SNAPSHOT
After Visit Summary   9/27/2017    Agnes Saravia    MRN: [id number]           Patient Information     Date Of Birth          1945        Visit Information        Provider Department      9/27/2017 2:05 PM Arlet Tomlinson MD [facility]        Today's Diagnoses     Rheumatoid arthritis, involving unspecified site, unspecified rheumatoid factor presence (H)    -  1    Routine general medical examination at a health care facility        Cough        Encounter for immunization            Follow-ups after your visit        Your next 10 appointments already scheduled     Oct 04, 2017  1:45 PM CDT   (Arrive by 1:30 PM)   RETURN RHINOLOGY with Yolette Worthington MD   [facility] ([facility])    [address]  [address] [facility] [address] [phone]              Who to contact     If you have questions or need follow up information about today's clinic visit or your schedule please contact [facility] directly at [phone].  Normal or non-critical lab and imaging results will be communicated to you by [employer], letter or phone within 4 business days after the clinic has received the results. If you do not hear from us within 7 days, please contact the clinic through [employer] or phone. If you have a critical or abnormal lab result, we will notify you by phone as soon as possible.  Submit refill requests through [employer] or call your pharmacy and they will forward the refill request to us. Please allow 3 business days for your refill to be completed.          Additional Information About Your Visit        [employer] Information     [employer] gives you secure access to your electronic health record. If you see a primary care provider, you can also send messages to your care team and make appointments. If you have questions, please call your primary care clinic.  If you do not  "have a primary care provider, please call [phone] and they will assist you.        Care EveryWhere ID     This is your Care EveryWhere ID. This could be used by other organizations to access your [place] medical records  [id number]        Your Vitals Were     Pulse Respirations Height Pulse Oximetry BMI (Body Mass Index)       81 18 1.6 m (5' 3\") 94% 35.07 kg/m2        Blood Pressure from Last 3 Encounters:   09/27/17 118/73   07/31/17 118/71   05/08/17 120/69    Weight from Last 3 Encounters:   09/27/17 89.8 kg (198 lb)   07/31/17 89.8 kg (198 lb)   05/08/17 93 kg (205 lb)              Today, you had the following     No orders found for display         Today's Medication Changes          These changes are accurate as of: 9/27/17  2:17 PM.  If you have any questions, ask your nurse or doctor.               These medicines have changed or have updated prescriptions.        Dose/Directions    gabapentin 100 MG capsule   Commonly known as:  NEURONTIN   This may have changed:  See the new instructions.   Used for:  Pain        TAKE 1 CAPSULE BY MOUTH 3 TIMES DAILY   Quantity:  90 capsule   Refills:  3                Primary Care Provider Office Phone # Fax #    José Miguel Wallace [phone] [address] [address]        Equal Access to Services     [facility] Hospital[facility] : Hadii tucker desai hadnicoleo Yg, waaxda luqadaha, qaybta kaalmada evelyn, michelle kimball. So [facility] [phone].    ATENCIÓN: Si habla español, tiene a pedraza disposición servicios gratuitos de asistencia lingüística. Llame al [phone].    We comply with applicable federal civil rights laws and [place] laws. We do not discriminate on the basis of race, color, national origin, age, disability sex, sexual orientation or gender identity.            Thank you!     Thank you for choosing [facility]  for your care. Our goal is always to provide you " with excellent care. Hearing back from our patients is one way we can continue to improve our services. Please take a few minutes to complete the written survey that you may receive in the mail after your visit with us. Thank you!             Your Updated Medication List - Protect others around you: Learn how to safely use, store and throw away your medicines at www.disposemymeds.org.          This list is accurate as of: 9/27/17  2:17 PM.  Always use your most recent med list.                   Brand Name Dispense Instructions for use Diagnosis    amoxicillin-clavulanate 875-125 MG per tablet    AUGMENTIN    42 tablet    Take 1 tablet by mouth every 12 hours for 21 days    Chronic pansinusitis       aspirin 81 MG EC tablet      Take 81 mg by mouth daily        cevimeline 30 MG capsule    EVOXAC     Take 30 mg by mouth 3 times daily.        CHERATUSSIN -10 MG/5ML Soln solution   Generic drug:  guaiFENesin-codeine     180 mL    TAKE 10 MILLILITERS BY MOUTH EVERY 6 HOUURS AS NEEDED    Cough       Clorazepate Dipotassium 15 MG Tabs     90 tablet    TAKE 1 TABLET BY MOUTH ONCE DAILY    Sjogren's syndrome (H), DANA (generalized anxiety disorder)       FOLIC ACID PO      Take 2 mg by mouth daily.        gabapentin 100 MG capsule    NEURONTIN    90 capsule    TAKE 1 CAPSULE BY MOUTH 3 TIMES DAILY    Pain       hydrochlorothiazide 50 MG tablet    HYDRODIURIL    90 tablet    Take 1 tablet (50 mg) by mouth daily Please call to schedule a follow up visit    Essential hypertension       methotrexate (Anti-Rheumatic) 2.5 MG Tabs      Take 5 tablets by mouth once a week On Tuesdays        potassium chloride 10 MEQ tablet    K-TAB,KLOR-CON    180 tablet    Take 2 tablets (20 mEq) by mouth daily    Hypokalemia       * predniSONE 5 MG tablet    DELTASONE     Take 5 mg by mouth daily        * predniSONE 20 MG tablet    DELTASONE    15 tablet    40mg/day for 5 days then 20mg/day for 5 days    Chronic pansinusitis       RITUXAN 10  MG/ML injection   Generic drug:  riTUXimab      Reported on 3/29/2017 every 5 months        simvastatin 40 MG tablet    ZOCOR    90 tablet    Take 1 tablet (40 mg) by mouth At Bedtime    Hyperlipidemia LDL goal <130       traZODone 50 MG tablet    DESYREL    45 tablet    Take 0.5 tablets (25 mg) by mouth At Bedtime    Insomnia       * Notice:  This list has 2 medication(s) that are the same as other medications prescribed for you. Read the directions carefully, and ask your doctor or other care provider to review them with you.

## 2017-09-27 NOTE — LETTER
9/27/2017      RE: Agnes Saravia  [address]  [place] [address]       [facility]  Pulmonary Medicine - Follow-up Visit Clinic Note  9/27/2017       ASSESSMENT & PLAN   Ms. Agnes Saravia is a 71-year old female with history of RA and Sjogrens on MTX, rituximab, and prednisone 5 mg daily, who was referred to Pulmonary for evaluation of chronic productive cough and wheezing.    1. Chronic productive cough with upper airway wheezing from chronic sinusitis  Normal PFTs and relatively normal CT chest, so unlikely pulmonary. She has now been found to have chronic sinusitis, as seen in her CT sinuses, which is likely causing her cough from post-nasal drip/upper airway cough syndrome. Not from allergies either, per Allergy evaluation.   -- Defer further management to ENT. Currently on antibiotics.     2. Pulmonary nodule TAMMI 0.5 cm  Seen in 9/2016, 12/2016, and 3/29/17 imaging. High risk patient given immunosuppression. Needs follow-up to complete 24 months. Due to complete 24 months duration in 9/2018.   -- PCP should order CT chest in 1 year to complete follow-up for this     3. Upper airway wheezing concerning for possible vocal cord dysfunction  -- Would defer further evaluation for to ENT; may benefit from Speech Pathology    Medical records reviewed extensively.     RTC as needed.     Patient was staffed with Dr. Lety Ryder.    Arlet Tomlinson MD  Pulmonary and Critical Care Medicine Fellow      I saw and evaluated patient with Fellow.  Case discussed - agree with note.    LETY RYDER M.D.           Today's visit note:     REASON FOR VISIT: Follow-up productive cough and wheezing.     HISTORY OF PRESENT ILLNESS:  Ms. Agnes Saravia is a 71-year old female with history of RA and Sjogrens on MTX, rituximab, and prednisone 5 mg daily, who was referred to Pulmonary for evaluation of chronic cough and wheezing, found to have chronic and symptomatic sinusitis on CT imaging.  "    At this point, ENT has started 3 weeks of Augmentin with prednisone burst X 10 days. She reports significant improvement of cough and hoarseness. Unfortunately, symptoms came back so she is on another course of Augmentin at this time. She had a follow-up CT sinuses today which showed some improvement. She has follow-up scheduled with ENT.     Per my initial Pulmonary clinic note from 3/2017:  In 9/2016, she reported having a sore throat lasting a few hours, then developed cough productive of white/yellow sputum. She was told she had pneumonia so she was treated with antibiotics. No infiltrates on CXR or CT chest. She also reports constantly having to blow her nose. She continued to have symptoms. In 11/2016, she was found to have a RUL segmental PE and was started on warfarin. She also had pleurisy, now resolved. She had a repeat CT chest PE protocol that showed resolution of PE, though she continued to have productive cough and wheezing. She feels that the wheezing is coming from her throat area. She denies acid reflux/GERD symptoms. She findings her albuterol helps sometimes, especially when she has wheezing. She was also treated with Zpack X2 though no significant improvement.     No fevers, chills, nausea, vomiting, and abdominal pain.     REVIEW OF SYSTEMS:  12-systems reviewed with pertinent positives and negatives mentioned in the HPI.     Pulmonary ROS:  Cough: Yes, see above.   Dyspnea: Yes when walking long distances.  Wheezing: Yes, see above.   Hemoptysis: No  Exposures: Denies. No mold exposure.     Hobbies: Fishing.   TB: None.    Pets: No birds, cats, or dogs.   Tobacco: <5 pack years tobacco use quit >40 yrs ago.     PHYSICAL EXAM:  /73  Pulse 81  Resp 18  Ht 1.6 m (5' 3\")  Wt 89.8 kg (198 lb)  SpO2 94%  BMI 35.07 kg/m2 on RA.     Constitutional:     Awake, alert and in no apparent distress. Voice is hoarse.    Eyes:    Anicteric, PERRL   ENT:    Oral mucosa moist without lesions. " Erythematous nares bilaterally.    Neck:    Supple without supraclavicular or cervical lymphadenopathy. Upper airway wheezing again heard.     Lungs:    Wheezing bilaterally, likely from upper airway. Good air entry both lungs. No crackles. No rhonchi.    Cardiovascular:    Normal S1 and S2. No JVD, murmur, rub, piedad.   Musculoskeletal:    Trace/+1 bilateral lower extremity edema.    Neurologic:    Alert and conversant.    Skin:    Warm, dry. No rash on limited exam.               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     Depressive disorder      Hyperlipidemia      Insomnia      PONV (postoperative nausea and vomiting)      Rheumatoid arthritis(714.0)      Sjogren's syndrome (H)      Past Surgical History:   Procedure Laterality Date     BLEPHAROPLASTY       DISCECTOMY LUMBAR POSTERIOR MICROSCOPIC ONE LEVEL  8/14/2014    Procedure: DISCECTOMY LUMBAR POSTERIOR MICROSCOPIC ONE LEVEL;  Surgeon: Dudley Bernal MD;  Location:  OR     HYSTERECTOMY TOTAL ABDOMINAL, BILATERAL SALPINGO-OOPHORECTOMY, COMBINED       HYSTERECTOMY, PAP NO LONGER INDICATED       MAMMOPLASTY REDUCTION BILATERAL  5/14/2013    Procedure: MAMMOPLASTY REDUCTION BILATERAL;  BILATERAL REDUCTION MAMMOPLASTY;  Surgeon: Bruce Nash MD;  Location:  [facility]     SHOULDER SURGERY             Family History:     Family History   Problem Relation Age of Onset     CEREBROVASCULAR DISEASE Mother      CEREBROVASCULAR DISEASE Father    Aunt with history of sinus cancer.          Social History:     Social History     Social History     Marital status: Single     Spouse name: N/A     Number of children: N/A     Years of education: N/A     Occupational History     Not on file.     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 [employer]            Medications:     Current Outpatient Prescriptions   Medication     amoxicillin-clavulanate (AUGMENTIN) 875-125 MG per tablet     hydrochlorothiazide (HYDRODIURIL) 50 MG tablet     predniSONE (DELTASONE) 20 MG tablet     CHERATUSSIN -10 MG/5ML SYRP solution     Clorazepate Dipotassium 15 MG TABS     aspirin 81 MG EC tablet     potassium chloride (K-TAB,KLOR-CON) 10 MEQ tablet     gabapentin (NEURONTIN) 100 MG capsule     traZODone (DESYREL) 50 MG tablet     simvastatin (ZOCOR) 40 MG tablet     methotrexate, Anti-Rheumatic, 2.5 MG TABS     riTUXimab (RITUXAN) 10 MG/ML injection     FOLIC ACID PO     predniSONE (DELTASONE) 5 MG tablet     cevimeline (EVOXAC) 30 MG capsule     No current facility-administered medications for this visit.               Data:   All laboratory and imaging data reviewed.      PFT: Reviewed by me.   3/29/2017: Normal spirometry, DLCO, and lung volumes.     CT chest 3/2017 with inspiratory and expiratory cuts: Reviewed by me. No acute abnormalities. No air trapping. Stable 5 mm TAMMI nodule (follow-up recommended in 12 months to complete 24 months of follow-up).     CT chests, PE protocol from 9/2016, 11/2016, and 12/2016: Reviewed by me. No obvious infiltrates seen. TAMMI nodule 0.5 cm seen in 9/2016 and 12/2016. RUL segmental PE found in 11/2016, resolved on 12/2016.       Arlet Tomlinson MD

## 2017-10-04 ENCOUNTER — OFFICE VISIT (OUTPATIENT)
Dept: OTOLARYNGOLOGY | Facility: CLINIC | Age: 72
End: 2017-10-04

## 2017-10-04 DIAGNOSIS — J32.0 CHRONIC MAXILLARY SINUSITIS: Primary | ICD-10-CM

## 2017-10-04 RX ORDER — PREDNISONE 20 MG/1
20 TABLET ORAL DAILY
Qty: 7 TABLET | Refills: 0 | Status: SHIPPED | OUTPATIENT
Start: 2017-10-04 | End: 2017-10-11

## 2017-10-04 NOTE — NURSING NOTE
Chief Complaint   Patient presents with     RECHECK     Review of CT scan      Rosalio Wilkinson

## 2017-10-04 NOTE — PROGRESS NOTES
HISTORY OF PRESENT ILLNESS:  Agnes Saravia is a very pleasant 72-year-old female.  She is accompanied by her  today.  They have discussed her course since I last saw her at the end of July.  At that time, she was suffering from chronic rhinosinusitis contributing to severe coughing, and this was proven by imaging exam.  I treated her with a course of Augmentin and prednisone.  She did have clinical improvement.  Unfortunately, however, her symptoms have now recurred over the last couple of weeks.  We have called in another course of Augmentin for her.  She comes in, and she still feels quite congested.  We did have a post-treatment CT scan ordered which showed residual disease in her maxillary sinuses but improvement in her upper sinuses.  Her cough is quite disruptive.  She is having trouble sleeping.  She feels weak and tired.  They have very legitimate questions today regarding whether or not rituximab could be contributing to this.  She does not have a long history of sinus or pulmonary problems.  This has happened more in her adulthood.  She is on rituximab to treat her arthritis, and it seems like since that time, she has had more difficulty with sinus and pulmonary infections.      PHYSICAL EXAMINATION:  She is examined today.  On anterior rhinoscopy, she has some clear secretions and mild congestion.  No purulence or polyps is seen.      IMAGING:  We reviewed her CT scan together.  I showed her the slight improvement from her scan in March to her scan in September.      ASSESSMENT AND PLAN:  This is a patient with adult onset sinus and pulmonary disease.  It is unclear if it is related to the rituximab use.  She will be meeting with her rheumatologist in the next two weeks to discuss this.  Given that she had benefit in the past from Augmentin plus prednisone and that she is not completely resolving with this current course of Augmentin alone, I am going to add prednisone again at 20 mg a day for  7 days.  After her discussion with the rheumatologist, we will determine if we want to continue to observe her on or off the rituximab therapy or if we want to pursue surgical intervention.  She seems comfortable with this plan.  She will keep in contact with Kandi, our care coordinator for sinus patient, through [employer].

## 2017-10-04 NOTE — LETTER
10/4/2017       RE: Agnes Saravia  [address]  [place] [address]     Dear Colleague,    Thank you for referring your patient, Agnes Saravia, to the [facility] at [facility]. Please see a copy of my visit note below.    HISTORY OF PRESENT ILLNESS:  Agnes Saravia is a very pleasant 72-year-old female.  She is accompanied by her  today.  They have discussed her course since I last saw her at the end of July.  At that time, she was suffering from chronic rhinosinusitis contributing to severe coughing, and this was proven by imaging exam.  I treated her with a course of Augmentin and prednisone.  She did have clinical improvement.  Unfortunately, however, her symptoms have now recurred over the last couple of weeks.  We have called in another course of Augmentin for her.  She comes in, and she still feels quite congested.  We did have a post-treatment CT scan ordered which showed residual disease in her maxillary sinuses but improvement in her upper sinuses.  Her cough is quite disruptive.  She is having trouble sleeping.  She feels weak and tired.  They have very legitimate questions today regarding whether or not rituximab could be contributing to this.  She does not have a long history of sinus or pulmonary problems.  This has happened more in her adulthood.  She is on rituximab to treat her arthritis, and it seems like since that time, she has had more difficulty with sinus and pulmonary infections.      PHYSICAL EXAMINATION:  She is examined today.  On anterior rhinoscopy, she has some clear secretions and mild congestion.  No purulence or polyps is seen.      IMAGING:  We reviewed her CT scan together.  I showed her the slight improvement from her scan in March to her scan in September.      ASSESSMENT AND PLAN:  This is a patient with adult onset sinus and pulmonary disease.  It is unclear if it is related to the  rituximab use.  She will be meeting with her rheumatologist in the next two weeks to discuss this.  Given that she had benefit in the past from Augmentin plus prednisone and that she is not completely resolving with this current course of Augmentin alone, I am going to add prednisone again at 20 mg a day for 7 days.  After her discussion with the rheumatologist, we will determine if we want to continue to observe her on or off the rituximab therapy or if we want to pursue surgical intervention.  She seems comfortable with this plan.  She will keep in contact with Kandi, our care coordinator for sinus patient, through [employer].         Again, thank you for allowing me to participate in the care of your patient.      Sincerely,    Yolette Worthington MD

## 2017-10-19 ENCOUNTER — TRANSFERRED RECORDS (OUTPATIENT)
Dept: HEALTH INFORMATION MANAGEMENT | Facility: CLINIC | Age: 72
End: 2017-10-19

## 2017-10-23 ENCOUNTER — OFFICE VISIT (OUTPATIENT)
Dept: FAMILY MEDICINE | Facility: CLINIC | Age: 72
End: 2017-10-23
Payer: MEDICARE

## 2017-10-23 VITALS
HEIGHT: 63 IN | WEIGHT: 198 LBS | BODY MASS INDEX: 35.08 KG/M2 | OXYGEN SATURATION: 96 % | SYSTOLIC BLOOD PRESSURE: 133 MMHG | HEART RATE: 80 BPM | DIASTOLIC BLOOD PRESSURE: 61 MMHG | TEMPERATURE: 98.2 F

## 2017-10-23 DIAGNOSIS — I10 ESSENTIAL HYPERTENSION: ICD-10-CM

## 2017-10-23 DIAGNOSIS — E78.5 HYPERLIPIDEMIA LDL GOAL <130: ICD-10-CM

## 2017-10-23 DIAGNOSIS — M35.00 SJOGREN'S SYNDROME, WITH UNSPECIFIED ORGAN INVOLVEMENT (H): ICD-10-CM

## 2017-10-23 DIAGNOSIS — G47.00 INSOMNIA, UNSPECIFIED TYPE: ICD-10-CM

## 2017-10-23 DIAGNOSIS — F41.1 GAD (GENERALIZED ANXIETY DISORDER): ICD-10-CM

## 2017-10-23 DIAGNOSIS — R05.9 COUGH: ICD-10-CM

## 2017-10-23 DIAGNOSIS — R52 PAIN: ICD-10-CM

## 2017-10-23 DIAGNOSIS — J18.9 PNEUMONIA OF LEFT LOWER LOBE DUE TO INFECTIOUS ORGANISM: ICD-10-CM

## 2017-10-23 LAB
ALBUMIN SERPL-MCNC: 3.3 G/DL (ref 3.4–5)
ALP SERPL-CCNC: 84 U/L (ref 40–150)
ALT SERPL W P-5'-P-CCNC: 22 U/L (ref 0–50)
ANION GAP SERPL CALCULATED.3IONS-SCNC: 10 MMOL/L (ref 3–14)
AST SERPL W P-5'-P-CCNC: 17 U/L (ref 0–45)
BASOPHILS # BLD AUTO: 0.1 10E9/L (ref 0–0.2)
BASOPHILS NFR BLD AUTO: 0.8 %
BILIRUB SERPL-MCNC: 0.7 MG/DL (ref 0.2–1.3)
BUN SERPL-MCNC: 23 MG/DL (ref 7–30)
CALCIUM SERPL-MCNC: 9.2 MG/DL (ref 8.5–10.1)
CHLORIDE SERPL-SCNC: 102 MMOL/L (ref 94–109)
CHOLEST SERPL-MCNC: 175 MG/DL
CO2 SERPL-SCNC: 27 MMOL/L (ref 20–32)
CREAT SERPL-MCNC: 0.69 MG/DL (ref 0.52–1.04)
CRP SERPL-MCNC: 27 MG/L (ref 0–8)
DIFFERENTIAL METHOD BLD: ABNORMAL
EOSINOPHIL # BLD AUTO: 0.4 10E9/L (ref 0–0.7)
EOSINOPHIL NFR BLD AUTO: 2.7 %
ERYTHROCYTE [DISTWIDTH] IN BLOOD BY AUTOMATED COUNT: 14.9 % (ref 10–15)
ERYTHROCYTE [SEDIMENTATION RATE] IN BLOOD BY WESTERGREN METHOD: 27 MM/H (ref 0–30)
GFR SERPL CREATININE-BSD FRML MDRD: 83 ML/MIN/1.7M2
GLUCOSE SERPL-MCNC: 118 MG/DL (ref 70–99)
HCT VFR BLD AUTO: 42.4 % (ref 35–47)
HDLC SERPL-MCNC: 49 MG/DL
HGB BLD-MCNC: 13.8 G/DL (ref 11.7–15.7)
LDLC SERPL CALC-MCNC: 92 MG/DL
LYMPHOCYTES # BLD AUTO: 0.9 10E9/L (ref 0.8–5.3)
LYMPHOCYTES NFR BLD AUTO: 6.2 %
MCH RBC QN AUTO: 29.6 PG (ref 26.5–33)
MCHC RBC AUTO-ENTMCNC: 32.5 G/DL (ref 31.5–36.5)
MCV RBC AUTO: 91 FL (ref 78–100)
MONOCYTES # BLD AUTO: 0.9 10E9/L (ref 0–1.3)
MONOCYTES NFR BLD AUTO: 6.3 %
NEUTROPHILS # BLD AUTO: 12.1 10E9/L (ref 1.6–8.3)
NEUTROPHILS NFR BLD AUTO: 84 %
NONHDLC SERPL-MCNC: 126 MG/DL
PLATELET # BLD AUTO: 336 10E9/L (ref 150–450)
POTASSIUM SERPL-SCNC: 3.2 MMOL/L (ref 3.4–5.3)
PROT SERPL-MCNC: 7 G/DL (ref 6.8–8.8)
RBC # BLD AUTO: 4.66 10E12/L (ref 3.8–5.2)
SODIUM SERPL-SCNC: 139 MMOL/L (ref 133–144)
TRIGL SERPL-MCNC: 169 MG/DL
WBC # BLD AUTO: 14.4 10E9/L (ref 4–11)

## 2017-10-23 PROCEDURE — 82784 ASSAY IGA/IGD/IGG/IGM EACH: CPT | Mod: 59 | Performed by: INTERNAL MEDICINE

## 2017-10-23 PROCEDURE — 80053 COMPREHEN METABOLIC PANEL: CPT | Performed by: INTERNAL MEDICINE

## 2017-10-23 PROCEDURE — 80061 LIPID PANEL: CPT | Performed by: INTERNAL MEDICINE

## 2017-10-23 PROCEDURE — 99214 OFFICE O/P EST MOD 30 MIN: CPT | Performed by: INTERNAL MEDICINE

## 2017-10-23 PROCEDURE — 36415 COLL VENOUS BLD VENIPUNCTURE: CPT | Performed by: INTERNAL MEDICINE

## 2017-10-23 PROCEDURE — 85652 RBC SED RATE AUTOMATED: CPT | Performed by: INTERNAL MEDICINE

## 2017-10-23 PROCEDURE — 82784 ASSAY IGA/IGD/IGG/IGM EACH: CPT | Performed by: INTERNAL MEDICINE

## 2017-10-23 PROCEDURE — 86140 C-REACTIVE PROTEIN: CPT | Performed by: INTERNAL MEDICINE

## 2017-10-23 PROCEDURE — 85025 COMPLETE CBC W/AUTO DIFF WBC: CPT | Performed by: INTERNAL MEDICINE

## 2017-10-23 RX ORDER — TRAZODONE HYDROCHLORIDE 50 MG/1
25 TABLET, FILM COATED ORAL AT BEDTIME
Qty: 45 TABLET | Refills: 3 | Status: SHIPPED | OUTPATIENT
Start: 2017-10-23 | End: 2018-11-13

## 2017-10-23 RX ORDER — CLORAZEPATE DIPOTASSIUM 15 MG/1
1 TABLET ORAL DAILY
Qty: 90 TABLET | Refills: 1 | Status: SHIPPED | OUTPATIENT
Start: 2017-10-23 | End: 2018-06-03

## 2017-10-23 RX ORDER — HYDROCHLOROTHIAZIDE 50 MG/1
50 TABLET ORAL DAILY
Qty: 90 TABLET | Refills: 3 | Status: SHIPPED | OUTPATIENT
Start: 2017-10-23 | End: 2018-11-17

## 2017-10-23 RX ORDER — GABAPENTIN 100 MG/1
CAPSULE ORAL
Qty: 90 CAPSULE | Refills: 3 | Status: SHIPPED | OUTPATIENT
Start: 2017-10-23 | End: 2018-01-30

## 2017-10-23 RX ORDER — SIMVASTATIN 40 MG
40 TABLET ORAL AT BEDTIME
Qty: 90 TABLET | Refills: 3 | Status: SHIPPED | OUTPATIENT
Start: 2017-10-23 | End: 2018-11-13

## 2017-10-23 RX ORDER — ALBUTEROL SULFATE 90 UG/1
1-2 AEROSOL, METERED RESPIRATORY (INHALATION) EVERY 4 HOURS PRN
Qty: 18 G | Refills: 11 | Status: SHIPPED | OUTPATIENT
Start: 2017-10-23 | End: 2018-07-23

## 2017-10-23 RX ORDER — CODEINE PHOSPHATE AND GUAIFENESIN 10; 100 MG/5ML; MG/5ML
SOLUTION ORAL
Qty: 180 ML | Refills: 2 | Status: SHIPPED | OUTPATIENT
Start: 2017-10-23 | End: 2018-01-05

## 2017-10-23 NOTE — NURSING NOTE
"No chief complaint on file.      Initial /61 (BP Location: Left arm, Patient Position: Chair, Cuff Size: Adult Large)  Pulse 80  Temp 98.2  F (36.8  C) (Oral)  Ht 5' 3\" (1.6 m)  Wt 198 lb (89.8 kg)  SpO2 96%  BMI 35.07 kg/m2 Estimated body mass index is 35.07 kg/(m^2) as calculated from the following:    Height as of this encounter: 5' 3\" (1.6 m).    Weight as of this encounter: 198 lb (89.8 kg).  Medication Reconciliation: complete   Marilyn Gonzalez MA  "

## 2017-10-23 NOTE — PATIENT INSTRUCTIONS
(R05) Cough  Comment: We will plan to follow up in ENT and pulmonology and will refill prescription for guaifenesin  Plan: guaiFENesin-codeine (CHERATUSSIN AC) 100-10         MG/5ML SOLN solution            (M35.00) Sjogren's syndrome, with unspecified organ involvement (H)  Comment: Doing well.  No issues of dry eyes at this time.  Plan to follow up in rheum and reconsider need for Rituximab as well as methotrexate  Plan: Clorazepate Dipotassium 15 MG TABS            (F41.1) DANA (generalized anxiety disorder)  Comment: Doing very well. OK to refill clorazepate.  Plan: Clorazepate Dipotassium 15 MG TABS            (E78.5) Hyperlipidemia LDL goal <130  Comment: Check fasting lipid panel today and we will continue simvastatin   Plan: simvastatin (ZOCOR) 40 MG tablet, Lipid panel         reflex to direct LDL, Comprehensive metabolic         panel            (G47.00) Insomnia, unspecified type  Comment: We will continue trazodone at current dose  Plan: traZODone (DESYREL) 50 MG tablet            (R52) Pain  Comment: OK to refill gabapentin  Plan: gabapentin (NEURONTIN) 100 MG capsule            (I10) Essential hypertension  Comment: blood pressure is well controlled  Plan: CBC with platelets, Comprehensive metabolic         panel, hydrochlorothiazide (HYDRODIURIL) 50 MG         tablet           Wheezing  Comment: We will refill prescription for albuterol

## 2017-10-23 NOTE — PROGRESS NOTES
"  SUBJECTIVE:   Agnes Saravia is a 72 year old female who presents to clinic today for the following health issues:      [facility]  CLINIC PROGRESS NOTE    Subjective:  Sinusitis   Agnes Saravia was recently treated with both Augmentin and Prednisone for 2 weeks.  She then had a Rituxan infusion Sept 12 and immediately her symptoms returned.  She did discuss this with Dr. Carlos and will hold off on continuation of Rituxan and will plan on following up in ENT after Rituxan is out of her system.   She also was advised to hold off on methotrexate.    Data Unavailable    Past medical history, medications, allergies, social history, family history reviewed and updated in [facility] as of 10/23/2017 .    ROS  CONSTITUTIONAL: no fatigue, no unexpected change in weight  SKIN: no worrisome rashes, no worrisome moles, no worrisome lesions  EYES: no acute vision problems or changes  ENT: no ear problems, no mouth problems, no throat problems  RESP: no significant cough, no shortness of breath  CV: no chest pain, no palpitations, no new or worsening peripheral edema  GI: no nausea, no vomiting, no constipation, no diarrhea  : no frequency, no dysuria, no hematuria  MS: no claudication, no myalgias, no joint aches  PSYCHIATRIC: no changes in mood or affect      Objective:  Vitals  /61 (BP Location: Left arm, Patient Position: Chair, Cuff Size: Adult Large)  Pulse 80  Temp 98.2  F (36.8  C) (Oral)  Ht 5' 3\" (1.6 m)  Wt 198 lb (89.8 kg)  SpO2 96%  BMI 35.07 kg/m2  GEN: Alert Oriented x3 NAD  HEENT: Atraumatic, normocephalic, neck supple, no thyromegaly, negative cervical adenopathy  TM: TM bilaterally pearly and grey with normal light reflex  CV: RRR no murmurs or rubs  PULM: diffuse Wheezing all lung fields  ABD: Soft, nontender nondistended, no hepatosplenomegally  SKIN: No visible skin lesion or ulcerations  EXT:  no edema bilateral lower extremities  NEURO: Gait and station normal, " No focal neurologic deficits  PSYCH: Mood good, affect mood congruent    No results found for this or any previous visit (from the past 24 hour(s)).    Assessment/Plan:  Patient Instructions   (R05) Cough  Comment: We will plan to follow up in ENT and pulmonology and will refill prescription for guaifenesin  Plan: guaiFENesin-codeine (CHERATUSSIN AC) 100-10         MG/5ML SOLN solution            (M35.00) Sjogren's syndrome, with unspecified organ involvement (H)  Comment: Doing well.  No issues of dry eyes at this time.  Plan to follow up in rheum and reconsider need for Rituximab as well as methotrexate  Plan: Clorazepate Dipotassium 15 MG TABS            (F41.1) DANA (generalized anxiety disorder)  Comment: Doing very well. OK to refill clorazepate.  Plan: Clorazepate Dipotassium 15 MG TABS            (E78.5) Hyperlipidemia LDL goal <130  Comment: Check fasting lipid panel today and we will continue simvastatin   Plan: simvastatin (ZOCOR) 40 MG tablet, Lipid panel         reflex to direct LDL, Comprehensive metabolic         panel            (G47.00) Insomnia, unspecified type  Comment: We will continue trazodone at current dose  Plan: traZODone (DESYREL) 50 MG tablet            (R52) Pain  Comment: OK to refill gabapentin  Plan: gabapentin (NEURONTIN) 100 MG capsule            (I10) Essential hypertension  Comment: blood pressure is well controlled  Plan: CBC with platelets, Comprehensive metabolic         panel, hydrochlorothiazide (HYDRODIURIL) 50 MG         tablet           Wheezing  Comment: We will refill prescription for albuterol    Follow up in 3 month    Disclaimer: This note consists of symbols derived from keyboarding, dictation and/or voice recognition software. As a result, there may be errors in the script that have gone undetected. Please consider this when interpreting information found in this chart.    José Miguel Wallace MD  [phone]

## 2017-10-23 NOTE — MR AVS SNAPSHOT
After Visit Summary   10/23/2017    Agnes Saravia    MRN: [id number]           Patient Information     Date Of Birth          1945        Visit Information        Provider Department      10/23/2017 8:30 AM José Miguel Wallace MD [facility]        Today's Diagnoses     Cough        Sjogren's syndrome, with unspecified organ involvement (H)        DANA (generalized anxiety disorder)        Hyperlipidemia LDL goal <130        Insomnia, unspecified type        Pain        Essential hypertension          Care Instructions    (R05) Cough  Comment: We will plan to follow up in ENT and pulmonology and will refill prescription for guaifenesin  Plan: guaiFENesin-codeine (CHERATUSSIN AC) 100-10         MG/5ML SOLN solution            (M35.00) Sjogren's syndrome, with unspecified organ involvement (H)  Comment: Doing well.  No issues of dry eyes at this time.  Plan to follow up in rheum and reconsider need for Rituximab as well as methotrexate  Plan: Clorazepate Dipotassium 15 MG TABS            (F41.1) DANA (generalized anxiety disorder)  Comment: Doing very well. OK to refill clorazepate.  Plan: Clorazepate Dipotassium 15 MG TABS            (E78.5) Hyperlipidemia LDL goal <130  Comment: Check fasting lipid panel today and we will continue simvastatin   Plan: simvastatin (ZOCOR) 40 MG tablet, Lipid panel         reflex to direct LDL, Comprehensive metabolic         panel            (G47.00) Insomnia, unspecified type  Comment: We will continue trazodone at current dose  Plan: traZODone (DESYREL) 50 MG tablet            (R52) Pain  Comment: OK to refill gabapentin  Plan: gabapentin (NEURONTIN) 100 MG capsule            (I10) Essential hypertension  Comment: blood pressure is well controlled  Plan: CBC with platelets, Comprehensive metabolic         panel, hydrochlorothiazide (HYDRODIURIL) 50 MG         tablet                     Follow-ups after your visit        Who to contact     If you  "have questions or need follow up information about today's clinic visit or your schedule please contact [facility] directly at [phone].  Normal or non-critical lab and imaging results will be communicated to you by [employer], letter or phone within 4 business days after the clinic has received the results. If you do not hear from us within 7 days, please contact the clinic through [employer] or phone. If you have a critical or abnormal lab result, we will notify you by phone as soon as possible.  Submit refill requests through [employer] or call your pharmacy and they will forward the refill request to us. Please allow 3 business days for your refill to be completed.          Additional Information About Your Visit        [employer] Information     [employer] gives you secure access to your electronic health record. If you see a primary care provider, you can also send messages to your care team and make appointments. If you have questions, please call your primary care clinic.  If you do not have a primary care provider, please call [phone] and they will assist you.        Care EveryWhere ID     This is your Care EveryWhere ID. This could be used by other organizations to access your [place] medical records  [id number]        Your Vitals Were     Pulse Temperature Height Pulse Oximetry BMI (Body Mass Index)       80 98.2  F (36.8  C) (Oral) 5' 3\" (1.6 m) 96% 35.07 kg/m2        Blood Pressure from Last 3 Encounters:   10/23/17 133/61   09/27/17 118/73   07/31/17 118/71    Weight from Last 3 Encounters:   10/23/17 198 lb (89.8 kg)   09/27/17 198 lb (89.8 kg)   07/31/17 198 lb (89.8 kg)              We Performed the Following     CBC with platelets     Comprehensive metabolic panel     Lipid panel reflex to direct LDL          Today's Medication Changes          These changes are accurate as of: 10/23/17  8:48 AM.  If you have any questions, ask your nurse or doctor.               These medicines have " changed or have updated prescriptions.        Dose/Directions    Clorazepate Dipotassium 15 MG Tabs   This may have changed:  See the new instructions.   Used for:  Sjogren's syndrome, with unspecified organ involvement (H), DANA (generalized anxiety disorder)   Changed by:  José Miguel Wallace MD        Dose:  1 tablet   Take 1 tablet by mouth daily   Quantity:  90 tablet   Refills:  1       gabapentin 100 MG capsule   Commonly known as:  NEURONTIN   This may have changed:  See the new instructions.   Used for:  Pain   Changed by:  José Miguel Wallace MD        TAKE 1 CAPSULE BY MOUTH 3 TIMES DAILY   Quantity:  90 capsule   Refills:  3       guaiFENesin-codeine 100-10 MG/5ML Soln solution   Commonly known as:  CHERATUSSIN AC   This may have changed:  See the new instructions.   Used for:  Cough   Changed by:  José Miguel Wallace MD        TAKE 10 MILLILITERS BY MOUTH EVERY 6 HOUURS AS NEEDED   Quantity:  180 mL   Refills:  2       predniSONE 5 MG tablet   Commonly known as:  DELTASONE   This may have changed:  Another medication with the same name was removed. Continue taking this medication, and follow the directions you see here.   Changed by:  José Miguel Wallace MD        Dose:  5 mg   Take 5 mg by mouth daily   Refills:  0            Where to get your medicines      These medications were sent to [id number] - [place], [place]  [facility] [address]     Phone:  [phone]     gabapentin 100 MG capsule    hydrochlorothiazide 50 MG tablet    simvastatin 40 MG tablet    traZODone 50 MG tablet         Some of these will need a paper prescription and others can be bought over the counter.  Ask your nurse if you have questions.     Bring a paper prescription for each of these medications     Clorazepate Dipotassium 15 MG Tabs    guaiFENesin-codeine 100-10 MG/5ML Soln solution                Primary Care Provider Office Phone # Fax #    José Miguel Barrera  MD [place] [phone]       [address] [facility] [address]  [facility] [address]        Equal Access to Services     MAYITO CHERRY : Hadyessica tucker desai salvador Ronquillo, wajessida santosdonald, qamamtata kasivanda evelyn, michelle caesarin hayaan jahairanatalya rocha laerinjosé miguel kimball. So [facility] [phone].    ATENCIÓN: Si habla español, tiene a pedraza disposición servicios gratuitos de asistencia lingüística. Llame al [phone].    We comply with applicable federal civil rights laws and [place] laws. We do not discriminate on the basis of race, color, national origin, age, disability, sex, sexual orientation, or gender identity.            Thank you!     Thank you for choosing [facility]  for your care. Our goal is always to provide you with excellent care. Hearing back from our patients is one way we can continue to improve our services. Please take a few minutes to complete the written survey that you may receive in the mail after your visit with us. Thank you!             Your Updated Medication List - Protect others around you: Learn how to safely use, store and throw away your medicines at www.disposemymeds.org.          This list is accurate as of: 10/23/17  8:48 AM.  Always use your most recent med list.                   Brand Name Dispense Instructions for use Diagnosis    aspirin 81 MG EC tablet      Take 81 mg by mouth daily        cevimeline 30 MG capsule    EVOXAC     Take 30 mg by mouth 3 times daily.        Clorazepate Dipotassium 15 MG Tabs     90 tablet    Take 1 tablet by mouth daily    Sjogren's syndrome, with unspecified organ involvement (H), DANA (generalized anxiety disorder)       FOLIC ACID PO      Take 2 mg by mouth daily.        gabapentin 100 MG capsule    NEURONTIN    90 capsule    TAKE 1 CAPSULE BY MOUTH 3 TIMES DAILY    Pain       guaiFENesin-codeine 100-10 MG/5ML Soln solution    CHERATUSSIN AC    180 mL    TAKE 10 MILLILITERS BY MOUTH EVERY 6 HOUURS AS NEEDED    Cough       hydrochlorothiazide 50 MG  tablet    HYDRODIURIL    90 tablet    Take 1 tablet (50 mg) by mouth daily Please call to schedule a follow up visit    Essential hypertension       methotrexate (Anti-Rheumatic) 2.5 MG Tabs      Take 5 tablets by mouth once a week On Tuesdays        potassium chloride 10 MEQ tablet    K-TAB,KLOR-CON    180 tablet    Take 2 tablets (20 mEq) by mouth daily    Hypokalemia       predniSONE 5 MG tablet    DELTASONE     Take 5 mg by mouth daily        RITUXAN 10 MG/ML injection   Generic drug:  riTUXimab      Reported on 3/29/2017 every 5 months        simvastatin 40 MG tablet    ZOCOR    90 tablet    Take 1 tablet (40 mg) by mouth At Bedtime    Hyperlipidemia LDL goal <130       traZODone 50 MG tablet    DESYREL    45 tablet    Take 0.5 tablets (25 mg) by mouth At Bedtime    Insomnia, unspecified type

## 2017-10-24 LAB
IGA SERPL-MCNC: 118 MG/DL (ref 70–380)
IGG SERPL-MCNC: 503 MG/DL (ref 695–1620)
IGM SERPL-MCNC: 71 MG/DL (ref 60–265)

## 2017-10-29 ENCOUNTER — TELEPHONE (OUTPATIENT)
Dept: FAMILY MEDICINE | Facility: CLINIC | Age: 72
End: 2017-10-29

## 2017-10-29 DIAGNOSIS — E87.6 HYPOKALEMIA: Primary | ICD-10-CM

## 2017-10-30 NOTE — TELEPHONE ENCOUNTER
Can we call Agnes Saravia and let her know that     Gurdeep Jiangara,    I had the opportunity to review your recent labs and a summary of your labs reads as follows:    Your complete blood counts show no sign of anemia, stable elevated white blood cell count and platelets.  Your comprehensive metabolic panel showed normal renal function, normal liver function, and stable fasting blood glucose indicating no evidence of diabetes mellitus.  There was however a finding of a low potassium and we should recheck this when you are able in the next 1-2 weeks  Your fasting lipid panel show  - normal HDL (good) cholesterol -as your goal is greater than 40  - low LDL (bad) cholesterol as your goal is less than 130   - stable triglyceride levels    Your test for immunnedeficiency did show a slightly low IgG level - I would recommend a follow up discussion in immunology/allergy - I'm not sure if you have an appointment scheduled, but if not we can help to coordinate this for you      Sincerely,  José Miguel Wallace MD

## 2017-10-30 NOTE — TELEPHONE ENCOUNTER
Patient has already viewed results on "Mobile Location, IP"t      Dr. Wallace,  Spoke with patient. She states she is not concerned about potassium at the moment. States has always been on the lower side. Agreeable to follow up but not within the next 1-2 weeks. Patient states unsure when willing to follow up to recheck Potassium      Would you like to place a future order now anyway?

## 2017-10-30 NOTE — PROGRESS NOTES
Gurdeep Alarcon,    I had the opportunity to review your recent labs and a summary of your labs reads as follows:    Your complete blood counts show no sign of anemia, stable elevated white blood cell count and platelets.  Your comprehensive metabolic panel showed normal renal function, normal liver function, and stable fasting blood glucose indicating no evidence of diabetes mellitus.  There was however a finding of a low potassium and we should recheck this when you are able in the next 1-2 weeks  Your fasting lipid panel show  - normal HDL (good) cholesterol -as your goal is greater than 40  - low LDL (bad) cholesterol as your goal is less than 130   - stable triglyceride levels    Your test for immunnedeficiency did show a slightly low IgG level - I would recommend a follow up discussion in immunology/allergy - I'm not sure if you have an appointment scheduled, but if not we can help to coordinate this for you    Sincerely,  José Miguel Wallace MD

## 2017-10-31 DIAGNOSIS — J32.4 CHRONIC PANSINUSITIS: ICD-10-CM

## 2017-10-31 RX ORDER — PREDNISONE 20 MG/1
TABLET ORAL
Qty: 15 TABLET | Refills: 0 | Status: SHIPPED | OUTPATIENT
Start: 2017-10-31 | End: 2018-01-16

## 2017-11-28 DIAGNOSIS — E87.6 HYPOKALEMIA: ICD-10-CM

## 2017-11-28 LAB — POTASSIUM SERPL-SCNC: 3.8 MMOL/L (ref 3.4–5.3)

## 2017-11-28 PROCEDURE — 84132 ASSAY OF SERUM POTASSIUM: CPT | Performed by: INTERNAL MEDICINE

## 2017-11-28 PROCEDURE — 36415 COLL VENOUS BLD VENIPUNCTURE: CPT | Performed by: INTERNAL MEDICINE

## 2017-12-03 NOTE — PROGRESS NOTES
Gurdeep Alarcon,    I have had the opportunity to review your recent results and an interpretation is as follows:  Improved potassium      Sincerely,  José Miguel Wallace MD

## 2018-01-02 ENCOUNTER — TRANSFERRED RECORDS (OUTPATIENT)
Dept: HEALTH INFORMATION MANAGEMENT | Facility: CLINIC | Age: 73
End: 2018-01-02

## 2018-01-05 DIAGNOSIS — R05.9 COUGH: ICD-10-CM

## 2018-01-05 RX ORDER — CODEINE PHOSPHATE AND GUAIFENESIN 10; 100 MG/5ML; MG/5ML
SOLUTION ORAL
Qty: 180 ML | Refills: 1 | Status: SHIPPED | OUTPATIENT
Start: 2018-01-05 | End: 2018-01-16

## 2018-01-05 NOTE — TELEPHONE ENCOUNTER
Pending Prescriptions:                       Disp   Refills    guaiFENesin-codeine (ROBITUSSIN AC) 100-1*180 mL 1            Sig: TAKE 10 ML BY MOUTH EVERY 6 HOURS AS NEEDED          Last Written Prescription Date:  10/23/17  Last Fill Quantity: 180,   # refills: 2  Last Office Visit: 10/23/17  Future Office visit:       Routing refill request to provider for review/approval because:  Drug not on the G, P or [facility] refill protocol or controlled substance

## 2018-01-09 ENCOUNTER — MYC MEDICAL ADVICE (OUTPATIENT)
Dept: FAMILY MEDICINE | Facility: CLINIC | Age: 73
End: 2018-01-09

## 2018-01-09 DIAGNOSIS — R05.9 COUGH: ICD-10-CM

## 2018-01-09 NOTE — TELEPHONE ENCOUNTER
DISREGARD THIS REQUEST    1-5-2018 request for guaiFENesin-codeine (ROBITUSSIN AC)  Was manually  faxed 1-8-18 at 2:20 PM     I called [facility] and confirmed they received yesterday's fax.  Haven't had a chance to get ready for pt yet.       I replied to pt's separate MY CHART message    Tammy SALMON MA

## 2018-01-10 RX ORDER — CODEINE PHOSPHATE AND GUAIFENESIN 10; 100 MG/5ML; MG/5ML
SOLUTION ORAL
OUTPATIENT
Start: 2018-01-10

## 2018-01-16 ENCOUNTER — OFFICE VISIT (OUTPATIENT)
Dept: FAMILY MEDICINE | Facility: CLINIC | Age: 73
End: 2018-01-16
Payer: MEDICARE

## 2018-01-16 ENCOUNTER — MYC MEDICAL ADVICE (OUTPATIENT)
Dept: FAMILY MEDICINE | Facility: CLINIC | Age: 73
End: 2018-01-16

## 2018-01-16 VITALS
WEIGHT: 198 LBS | BODY MASS INDEX: 35.08 KG/M2 | HEIGHT: 63 IN | DIASTOLIC BLOOD PRESSURE: 73 MMHG | SYSTOLIC BLOOD PRESSURE: 125 MMHG | HEART RATE: 70 BPM | TEMPERATURE: 99.3 F | OXYGEN SATURATION: 96 %

## 2018-01-16 DIAGNOSIS — R06.2 WHEEZING: Primary | ICD-10-CM

## 2018-01-16 DIAGNOSIS — J32.4 CHRONIC PANSINUSITIS: ICD-10-CM

## 2018-01-16 DIAGNOSIS — M35.00 SJOGREN'S SYNDROME, WITH UNSPECIFIED ORGAN INVOLVEMENT (H): ICD-10-CM

## 2018-01-16 DIAGNOSIS — R05.9 COUGH: ICD-10-CM

## 2018-01-16 LAB
BASOPHILS # BLD AUTO: 0.1 10E9/L (ref 0–0.2)
BASOPHILS NFR BLD AUTO: 0.6 %
DIFFERENTIAL METHOD BLD: ABNORMAL
EOSINOPHIL # BLD AUTO: 0.1 10E9/L (ref 0–0.7)
EOSINOPHIL NFR BLD AUTO: 0.7 %
ERYTHROCYTE [DISTWIDTH] IN BLOOD BY AUTOMATED COUNT: 14.2 % (ref 10–15)
FLUAV+FLUBV AG SPEC QL: NEGATIVE
FLUAV+FLUBV AG SPEC QL: NEGATIVE
GRAM STN SPEC: NORMAL
HCT VFR BLD AUTO: 40.1 % (ref 35–47)
HGB BLD-MCNC: 12.8 G/DL (ref 11.7–15.7)
LYMPHOCYTES # BLD AUTO: 1.5 10E9/L (ref 0.8–5.3)
LYMPHOCYTES NFR BLD AUTO: 10.5 %
Lab: NORMAL
MCH RBC QN AUTO: 28.5 PG (ref 26.5–33)
MCHC RBC AUTO-ENTMCNC: 31.9 G/DL (ref 31.5–36.5)
MCV RBC AUTO: 89 FL (ref 78–100)
MONOCYTES # BLD AUTO: 1.1 10E9/L (ref 0–1.3)
MONOCYTES NFR BLD AUTO: 7.9 %
NEUTROPHILS # BLD AUTO: 11.3 10E9/L (ref 1.6–8.3)
NEUTROPHILS NFR BLD AUTO: 80.3 %
PLATELET # BLD AUTO: 366 10E9/L (ref 150–450)
RBC # BLD AUTO: 4.49 10E12/L (ref 3.8–5.2)
SPECIMEN SOURCE: NORMAL
SPECIMEN SOURCE: NORMAL
WBC # BLD AUTO: 14.1 10E9/L (ref 4–11)

## 2018-01-16 PROCEDURE — 87070 CULTURE OTHR SPECIMN AEROBIC: CPT | Performed by: INTERNAL MEDICINE

## 2018-01-16 PROCEDURE — 99214 OFFICE O/P EST MOD 30 MIN: CPT | Performed by: INTERNAL MEDICINE

## 2018-01-16 PROCEDURE — 85025 COMPLETE CBC W/AUTO DIFF WBC: CPT | Performed by: INTERNAL MEDICINE

## 2018-01-16 PROCEDURE — 87804 INFLUENZA ASSAY W/OPTIC: CPT | Mod: 59 | Performed by: INTERNAL MEDICINE

## 2018-01-16 PROCEDURE — 36415 COLL VENOUS BLD VENIPUNCTURE: CPT | Performed by: INTERNAL MEDICINE

## 2018-01-16 PROCEDURE — 87205 SMEAR GRAM STAIN: CPT | Performed by: INTERNAL MEDICINE

## 2018-01-16 RX ORDER — PREDNISONE 10 MG/1
TABLET ORAL
Qty: 40 TABLET | Refills: 0 | Status: SHIPPED | OUTPATIENT
Start: 2018-01-16 | End: 2018-03-22

## 2018-01-16 RX ORDER — CODEINE PHOSPHATE AND GUAIFENESIN 10; 100 MG/5ML; MG/5ML
SOLUTION ORAL
Qty: 236 ML | Refills: 3 | Status: SHIPPED | OUTPATIENT
Start: 2018-01-16 | End: 2018-01-30

## 2018-01-16 NOTE — MR AVS SNAPSHOT
After Visit Summary   1/16/2018    Agnes Saravia    MRN: [id number]           Patient Information     Date Of Birth          1945        Visit Information        Provider Department      1/16/2018 1:00 PM José Miguel Wallace MD [facility]        Today's Diagnoses     Wheezing    -  1    Sjogren's syndrome, with unspecified organ involvement (H)        Cough        Chronic pansinusitis          Care Instructions    (R06.2) Wheezing  (primary encounter diagnosis)  Comment: We will continue albuterol to be used as needed and will check complete blood counts, influenza swab and CT.  Plan: Influenza A/B antigen, CT Chest w/o Contrast,         CBC with platelets and differential            (M35.00) Sjogren's syndrome, with unspecified organ involvement (H)  Comment: Follow up in rheum - this is stable without methotrexate or rituximab   Plan: Influenza A/B antigen, CBC with platelets and         differential            (R05) Cough  Comment: We will treat with similar regiment was successful one year ago - Prednisone taper and Augmenting x 3 weeks.   Plan: Influenza A/B antigen, CT Chest w/o Contrast,         CBC with platelets and differential            (J32.4) Chronic pansinusitis  Comment: as above   Plan: predniSONE (DELTASONE) 10 MG tablet, CBC with         platelets and differential,         amoxicillin-clavulanate (AUGMENTIN) 875-125 MG         per tablet            Follow up in 1 week             Follow-ups after your visit        Your next 10 appointments already scheduled     Jan 23, 2018  8:00 AM CST   [facility] José Miguel Wallace [facility]   [facility]    [address] [id number] [phone]           Bring a current list of meds and any records pertaining to this visit. For Physicals, please bring immunization records and any forms needing to be filled out. Please arrive 10 minutes early to complete  "paperwork.              Future tests that were ordered for you today     Open Future Orders        Priority Expected Expires Ordered    Sputum Culture Aerobic Bacterial Routine  2/15/2018 1/16/2018    Gram stain Routine  3/17/2018 1/16/2018    CT Chest w/o Contrast Routine  1/16/2019 1/16/2018            Who to contact     If you have questions or need follow up information about today's clinic visit or your schedule please contact [facility] directly at [phone].  Normal or non-critical lab and imaging results will be communicated to you by [employer], letter or phone within 4 business days after the clinic has received the results. If you do not hear from us within 7 days, please contact the clinic through [employer] or phone. If you have a critical or abnormal lab result, we will notify you by phone as soon as possible.  Submit refill requests through [employer] or call your pharmacy and they will forward the refill request to us. Please allow 3 business days for your refill to be completed.          Additional Information About Your Visit        [employer] Information     [employer] gives you secure access to your electronic health record. If you see a primary care provider, you can also send messages to your care team and make appointments. If you have questions, please call your primary care clinic.  If you do not have a primary care provider, please call [phone] and they will assist you.        Care EveryWhere ID     This is your Care EveryWhere ID. This could be used by other organizations to access your [facility] medical records  [id number]        Your Vitals Were     Pulse Temperature Height Pulse Oximetry Breastfeeding? BMI (Body Mass Index)    70 99.3  F (37.4  C) (Oral) 5' 3\" (1.6 m) 96% No 35.07 kg/m2       Blood Pressure from Last 3 Encounters:   01/16/18 125/73   10/23/17 133/61   09/27/17 118/73    Weight from Last 3 Encounters:   01/16/18 198 lb (89.8 kg)   10/23/17 198 lb (89.8 kg) "   09/27/17 198 lb (89.8 kg)              We Performed the Following     CBC with platelets and differential     Influenza A/B antigen          Today's Medication Changes          These changes are accurate as of: 1/16/18  1:26 PM.  If you have any questions, ask your nurse or doctor.               These medicines have changed or have updated prescriptions.        Dose/Directions    guaiFENesin-codeine 100-10 MG/5ML Soln solution   Commonly known as:  ROBITUSSIN AC   This may have changed:  See the new instructions.   Used for:  Cough   Changed by:  José Miguel Wallace MD        TAKE 10 ML BY MOUTH EVERY 6 HOURS AS NEEDED   Quantity:  236 mL   Refills:  3       predniSONE 10 MG tablet   Commonly known as:  DELTASONE   This may have changed:    - medication strength  - additional instructions  - Another medication with the same name was removed. Continue taking this medication, and follow the directions you see here.   Used for:  Chronic pansinusitis   Changed by:  José Miguel Wallace MD        40mg/day for 5 days then 20mg/day for 5 days then 10mg/day for 5 days then 5 mg/day for 5 days   Quantity:  40 tablet   Refills:  0         Stop taking these medicines if you haven't already. Please contact your care team if you have questions.     FOLIC ACID PO   Stopped by:  José Miguel Wallace MD           methotrexate (Anti-Rheumatic) 2.5 MG Tabs   Stopped by:  José Miguel Wallace MD           RITUXAN 10 MG/ML injection   Generic drug:  riTUXimab   Stopped by:  José Miguel Wallace MD                Where to get your medicines      These medications were sent to [id number] [place], [place] - [address] [address]     Phone:  [phone]     amoxicillin-clavulanate 875-125 MG per tablet    predniSONE 10 MG tablet         Some of these will need a paper prescription and others can be bought over the counter.  Ask your nurse if you have questions.     Bring a  paper prescription for each of these medications     guaiFENesin-codeine 100-10 MG/5ML Soln solution                Primary Care Provider Office Phone # Fax #    José Miguel Wallace [phone] [address] [address]        Equal Access to Services     [facility] : Hadii aad ku hadasho Soomaali, waaxda luqadaha, qaybta kaalmada adeegyada, waxay idiin hayaan adeeg anaarely larigoberto . [facility] [phone].    ATENCIÓN: Si habla español, tiene a pedraza disposición servicios gratuitos de asistencia lingüística. [facility] [phone].    We comply with applicable federal civil rights laws and [place] laws. We do not discriminate on the basis of race, color, national origin, age, disability, sex, sexual orientation, or gender identity.            Thank you!     Thank you for choosing [facility]  for your care. Our goal is always to provide you with excellent care. Hearing back from our patients is one way we can continue to improve our services. Please take a few minutes to complete the written survey that you may receive in the mail after your visit with us. Thank you!             Your Updated Medication List - Protect others around you: Learn how to safely use, store and throw away your medicines at www.disposemymeds.org.          This list is accurate as of: 1/16/18  1:26 PM.  Always use your most recent med list.                   Brand Name Dispense Instructions for use Diagnosis    albuterol 108 (90 BASE) MCG/ACT Inhaler    VENTOLIN HFA    18 g    Inhale 1-2 puffs into the lungs every 4 hours as needed for shortness of breath / dyspnea or wheezing    Pneumonia of left lower lobe due to infectious organism (H)       amoxicillin-clavulanate 875-125 MG per tablet    AUGMENTIN    42 tablet    Take 1 tablet by mouth every 12 hours    Chronic pansinusitis       aspirin 81 MG EC tablet      Take 81 mg by mouth daily        cevimeline 30 MG capsule    EVOXAC     Take  30 mg by mouth 3 times daily.        Clorazepate Dipotassium 15 MG Tabs     90 tablet    Take 1 tablet by mouth daily    Sjogren's syndrome, with unspecified organ involvement (H), DANA (generalized anxiety disorder)       gabapentin 100 MG capsule    NEURONTIN    90 capsule    TAKE 1 CAPSULE BY MOUTH 3 TIMES DAILY    Pain       guaiFENesin-codeine 100-10 MG/5ML Soln solution    ROBITUSSIN AC    236 mL    TAKE 10 ML BY MOUTH EVERY 6 HOURS AS NEEDED    Cough       hydrochlorothiazide 50 MG tablet    HYDRODIURIL    90 tablet    Take 1 tablet (50 mg) by mouth daily Please call to schedule a follow up visit    Essential hypertension       potassium chloride 10 MEQ tablet    K-TAB,KLOR-CON    180 tablet    Take 2 tablets (20 mEq) by mouth daily    Hypokalemia       predniSONE 10 MG tablet    DELTASONE    40 tablet    40mg/day for 5 days then 20mg/day for 5 days then 10mg/day for 5 days then 5 mg/day for 5 days    Chronic pansinusitis       simvastatin 40 MG tablet    ZOCOR    90 tablet    Take 1 tablet (40 mg) by mouth At Bedtime    Hyperlipidemia LDL goal <130       traZODone 50 MG tablet    DESYREL    45 tablet    Take 0.5 tablets (25 mg) by mouth At Bedtime    Insomnia, unspecified type

## 2018-01-16 NOTE — NURSING NOTE
"Chief Complaint   Patient presents with     Cough       Initial /73  Pulse 70  Temp 99.3  F (37.4  C) (Oral)  Ht 5' 3\" (1.6 m)  Wt 198 lb (89.8 kg)  SpO2 96%  Breastfeeding? No  BMI 35.07 kg/m2 Estimated body mass index is 35.07 kg/(m^2) as calculated from the following:    Height as of this encounter: 5' 3\" (1.6 m).    Weight as of this encounter: 198 lb (89.8 kg).  Medication Reconciliation: complete   Grace Matta CMA      "

## 2018-01-16 NOTE — PROGRESS NOTES
"  SUBJECTIVE:   Agnes Saravia is a 72 year old female who presents to clinic today for the following health issues:      Cough that won\"t go away.  Patient has a 99.3 temperature today.        Problem list and histories reviewed & adjusted, as indicated.  Additional history: as documented    [facility]  CLINIC PROGRESS NOTE    Subjective:  Cough   Agnes Saravia started to have faint right sided chest pain that started about 3 weeks ago.   She did have resolution of this similar symptoms about a year ago, but now back.  She has had coughing.  She has not had any recent rituximab infusion.  She also has stopped taking methotrexate.   Her last prescription for augmentin and prednisone was taken on 10/31.  She completed antibiotics at the end of November.   She did see rheumatology last week and she has yet to resume rituximab.  She has worse cough in the AM, but better as the day goes on.  She has been more wheezy than usual.      Past medical history, medications, allergies, social history, family history reviewed and updated in [facility] as of 1/16/2018 .    ROS  CONSTITUTIONAL: no fatigue, no unexpected change in weight  SKIN: no worrisome rashes, no worrisome moles, no worrisome lesions  EYES: no acute vision problems or changes  ENT: no ear problems, no mouth problems, no throat problems  RESP: as above   CV: no chest pain, no palpitations, no new or worsening peripheral edema  GI: no nausea, no vomiting, no constipation, no diarrhea  : no frequency, no dysuria, no hematuria  MS: no claudication, no myalgias, no joint aches  PSYCHIATRIC: no changes in mood or affect      Objective:  Vitals  /73  Pulse 70  Temp 99.3  F (37.4  C) (Oral)  Ht 5' 3\" (1.6 m)  Wt 198 lb (89.8 kg)  SpO2 96%  Breastfeeding? No  BMI 35.07 kg/m2  GEN: Alert Oriented x3 NAD  HEENT: Atraumatic, normocephalic, neck supple, no thyromegaly, negative cervical adenopathy  TM: TM bilaterally pearly and grey with normal " light reflex  CV: RRR no murmurs or rubs  PULM: wheezing all lung fields  ABD: Soft, nontender nondistended, no hepatosplenomegally  SKIN: No visible skin lesion or ulcerations  EXT: no edema bilateral lower extremities  NEURO: Gait and station normal, No focal neurologic deficits  PSYCH: Mood good, affect mood congruent    No images are attached to the encounter.    No results found for this or any previous visit (from the past 24 hour(s)).    Assessment/Plan:  Patient Instructions   (R06.2) Wheezing  (primary encounter diagnosis)  Comment: We will continue albuterol to be used as needed and will check complete blood counts, influenza swab and CT.  Plan: Influenza A/B antigen, CT Chest w/o Contrast,         CBC with platelets and differential            (M35.00) Sjogren's syndrome, with unspecified organ involvement (H)  Comment: Follow up in rheum - this is stable without methotrexate or rituximab   Plan: Influenza A/B antigen, CBC with platelets and         differential            (R05) Cough  Comment: We will treat with similar regiment was successful one year ago - Prednisone taper and Augmenting x 3 weeks.   Plan: Influenza A/B antigen, CT Chest w/o Contrast,         CBC with platelets and differential            (J32.4) Chronic pansinusitis  Comment: as above   Plan: predniSONE (DELTASONE) 10 MG tablet, CBC with         platelets and differential,         amoxicillin-clavulanate (AUGMENTIN) 875-125 MG         per tablet            Follow up in 1 week       Follow up in 1 week    Disclaimer: This note consists of symbols derived from keyboarding, dictation and/or voice recognition software. As a result, there may be errors in the script that have gone undetected. Please consider this when interpreting information found in this chart.    José Miguel Wallace MD  [phone]

## 2018-01-16 NOTE — PROGRESS NOTES
Gurdeep Alarcon,    I have had the opportunity to review your recent results and an interpretation is as follows:  Your complete blood counts shows stable hemoglobin, still elevated white blood cell count and stable platelets.  We can review this at our upcoming follow up office visit     Sincerely,  José Miguel Wallace MD

## 2018-01-16 NOTE — PATIENT INSTRUCTIONS
(R06.2) Wheezing  (primary encounter diagnosis)  Comment: We will continue albuterol to be used as needed and will check complete blood counts, influenza swab and CT.  Plan: Influenza A/B antigen, CT Chest w/o Contrast,         CBC with platelets and differential            (M35.00) Sjogren's syndrome, with unspecified organ involvement (H)  Comment: Follow up in rheum - this is stable without methotrexate or rituximab   Plan: Influenza A/B antigen, CBC with platelets and         differential            (R05) Cough  Comment: We will treat with similar regiment was successful one year ago - Prednisone taper and Augmenting x 3 weeks.   Plan: Influenza A/B antigen, CT Chest w/o Contrast,         CBC with platelets and differential            (J32.4) Chronic pansinusitis  Comment: as above   Plan: predniSONE (DELTASONE) 10 MG tablet, CBC with         platelets and differential,         amoxicillin-clavulanate (AUGMENTIN) 875-125 MG         per tablet            Follow up in 1 week

## 2018-01-17 ENCOUNTER — HOSPITAL ENCOUNTER (OUTPATIENT)
Dept: CT IMAGING | Facility: CLINIC | Age: 73
Discharge: HOME | End: 2018-01-17
Attending: INTERNAL MEDICINE | Admitting: INTERNAL MEDICINE
Payer: MEDICARE

## 2018-01-17 DIAGNOSIS — R06.2 WHEEZING: ICD-10-CM

## 2018-01-17 DIAGNOSIS — R05.9 COUGH: ICD-10-CM

## 2018-01-17 PROCEDURE — 71250 CT THORAX DX C-: CPT

## 2018-01-19 LAB
BACTERIA SPEC CULT: NORMAL
SPECIMEN SOURCE: NORMAL

## 2018-01-20 NOTE — PROGRESS NOTES
Gurdeep Alarcon,    I have had the opportunity to review your recent results and an interpretation is as follows:  Your CT shows slight increase in your left upper lobe nodule and new findings of airspace disease consistent with your recent exacerbation of bronchitis - We can review this in more detail at your follow up appointment next week.  For now continue antibiotics and steroids.       Sincerely,  José Miguel Wallace MD

## 2018-01-30 ENCOUNTER — OFFICE VISIT (OUTPATIENT)
Dept: FAMILY MEDICINE | Facility: CLINIC | Age: 73
End: 2018-01-30
Payer: MEDICARE

## 2018-01-30 VITALS
DIASTOLIC BLOOD PRESSURE: 68 MMHG | OXYGEN SATURATION: 93 % | BODY MASS INDEX: 35.08 KG/M2 | SYSTOLIC BLOOD PRESSURE: 129 MMHG | HEART RATE: 80 BPM | WEIGHT: 198 LBS | TEMPERATURE: 97.1 F | HEIGHT: 63 IN

## 2018-01-30 DIAGNOSIS — R05.9 COUGH: ICD-10-CM

## 2018-01-30 DIAGNOSIS — M35.00 SJOGREN'S SYNDROME, WITH UNSPECIFIED ORGAN INVOLVEMENT (H): ICD-10-CM

## 2018-01-30 DIAGNOSIS — J18.9 PNEUMONIA OF LEFT LOWER LOBE DUE TO INFECTIOUS ORGANISM: Primary | ICD-10-CM

## 2018-01-30 DIAGNOSIS — R73.01 IFG (IMPAIRED FASTING GLUCOSE): ICD-10-CM

## 2018-01-30 DIAGNOSIS — I10 ESSENTIAL HYPERTENSION: ICD-10-CM

## 2018-01-30 LAB
ANION GAP SERPL CALCULATED.3IONS-SCNC: 8 MMOL/L (ref 3–14)
BASOPHILS # BLD AUTO: 0.1 10E9/L (ref 0–0.2)
BASOPHILS NFR BLD AUTO: 0.5 %
BUN SERPL-MCNC: 24 MG/DL (ref 7–30)
CALCIUM SERPL-MCNC: 9.1 MG/DL (ref 8.5–10.1)
CHLORIDE SERPL-SCNC: 101 MMOL/L (ref 94–109)
CO2 SERPL-SCNC: 29 MMOL/L (ref 20–32)
CREAT SERPL-MCNC: 0.65 MG/DL (ref 0.52–1.04)
DIFFERENTIAL METHOD BLD: ABNORMAL
EOSINOPHIL # BLD AUTO: 0.2 10E9/L (ref 0–0.7)
EOSINOPHIL NFR BLD AUTO: 0.8 %
ERYTHROCYTE [DISTWIDTH] IN BLOOD BY AUTOMATED COUNT: 14.4 % (ref 10–15)
GFR SERPL CREATININE-BSD FRML MDRD: 89 ML/MIN/1.7M2
GLUCOSE SERPL-MCNC: 117 MG/DL (ref 70–99)
HBA1C MFR BLD: 6.1 % (ref 4.3–6)
HCT VFR BLD AUTO: 42.8 % (ref 35–47)
HGB BLD-MCNC: 14 G/DL (ref 11.7–15.7)
LYMPHOCYTES # BLD AUTO: 0.9 10E9/L (ref 0.8–5.3)
LYMPHOCYTES NFR BLD AUTO: 5 %
MCH RBC QN AUTO: 29 PG (ref 26.5–33)
MCHC RBC AUTO-ENTMCNC: 32.7 G/DL (ref 31.5–36.5)
MCV RBC AUTO: 89 FL (ref 78–100)
MONOCYTES # BLD AUTO: 1 10E9/L (ref 0–1.3)
MONOCYTES NFR BLD AUTO: 5.5 %
NEUTROPHILS # BLD AUTO: 15.6 10E9/L (ref 1.6–8.3)
NEUTROPHILS NFR BLD AUTO: 88.2 %
PLATELET # BLD AUTO: 359 10E9/L (ref 150–450)
POTASSIUM SERPL-SCNC: 3.5 MMOL/L (ref 3.4–5.3)
RBC # BLD AUTO: 4.83 10E12/L (ref 3.8–5.2)
SODIUM SERPL-SCNC: 138 MMOL/L (ref 133–144)
WBC # BLD AUTO: 17.7 10E9/L (ref 4–11)

## 2018-01-30 PROCEDURE — 80048 BASIC METABOLIC PNL TOTAL CA: CPT | Performed by: INTERNAL MEDICINE

## 2018-01-30 PROCEDURE — 36415 COLL VENOUS BLD VENIPUNCTURE: CPT | Performed by: INTERNAL MEDICINE

## 2018-01-30 PROCEDURE — 99214 OFFICE O/P EST MOD 30 MIN: CPT | Performed by: INTERNAL MEDICINE

## 2018-01-30 PROCEDURE — 85025 COMPLETE CBC W/AUTO DIFF WBC: CPT | Performed by: INTERNAL MEDICINE

## 2018-01-30 PROCEDURE — 83036 HEMOGLOBIN GLYCOSYLATED A1C: CPT | Performed by: INTERNAL MEDICINE

## 2018-01-30 RX ORDER — GABAPENTIN 300 MG/1
300 CAPSULE ORAL 3 TIMES DAILY
Status: ON HOLD | COMMUNITY
End: 2020-03-25

## 2018-01-30 RX ORDER — CODEINE PHOSPHATE AND GUAIFENESIN 10; 100 MG/5ML; MG/5ML
SOLUTION ORAL
Qty: 236 ML | Refills: 3 | Status: SHIPPED | OUTPATIENT
Start: 2018-01-30 | End: 2018-05-12

## 2018-01-30 NOTE — PROGRESS NOTES
"  SUBJECTIVE:   Agnes Saravia is a 72 year old female who presents to clinic today for the following health issues:      Chief Complaint   Patient presents with     Follow Up For     Wheezing, cough, CT results      [facility]  CLINIC PROGRESS NOTE    Subjective:  Nodular infiltrate   Agnes Saravia continues to have same cough and congestion.  She has not had fever.  She has been doing well augmentin and prednisone.  No side effects from antibiotics.  She has yet to have have any resolution of her symptoms.  She did have a CT that did show new findings being right sided nodular atelectasis and/or infiltrate in addition to increased sie of left upper lobe nodule.      Past medical history, medications, allergies, social history, family history reviewed and updated in [facility] as of 1/30/2018 .    ROS  CONSTITUTIONAL: no fatigue, no unexpected change in weight  SKIN: no worrisome rashes, no worrisome moles, no worrisome lesions  EYES: no acute vision problems or changes  ENT: no ear problems, no mouth problems, no throat problems  RESP: as above   CV: no chest pain, no palpitations, no new or worsening peripheral edema  GI: no nausea, no vomiting, no constipation, no diarrhea  : no frequency, no dysuria, no hematuria  MS: no claudication, no myalgias, no joint aches  PSYCHIATRIC: no changes in mood or affect      Objective:  Vitals  /68 (BP Location: Right arm, Cuff Size: Adult Large)  Pulse 80  Temp 97.1  F (36.2  C) (Oral)  Ht 5' 3\" (1.6 m)  Wt 198 lb (89.8 kg)  SpO2 93%  BMI 35.07 kg/m2  GEN: Alert Oriented x3 NAD  HEENT: Atraumatic, normocephalic, neck supple, no thyromegaly, negative cervical adenopathy  TM: TM bilaterally pearly and grey with normal light reflex  CV: RRR no murmurs or rubs  PULM: Diffuse wheezing all lung fields  ABD: Soft, nontender nondistended  SKIN: No visible skin lesion or ulcerations  EXT: 2+ dorsal pedis pulses, no edema bilateral lower extremities  NEURO: " Gait and station deferred, No focal neurologic deficits  PSYCH: Mood good, affect mood congruent    No images are attached to the encounter.    No results found for this or any previous visit (from the past 24 hour(s)).    Assessment/Plan:  Patient Instructions   (J18.1) Pneumonia of left lower lobe due to infectious organism (H)  (primary encounter diagnosis)  Comment: We will plan to schedule an urgent follow up appointment in pulmonology and for the time being we will continue on your prednisone and augmentin.    Plan: HEMOGLOBIN A1C, CBC with platelets and         differential, Basic metabolic panel            (M35.00) Sjogren's syndrome, with unspecified organ involvement (H)  Comment: We will send labs to Dr. Carlos in rheumatology  Plan: HEMOGLOBIN A1C, CBC with platelets and         differential, Basic metabolic panel            (I10) Essential hypertension  Comment: blood pressure is well controlled   Plan: HEMOGLOBIN A1C, CBC with platelets and         differential, Basic metabolic panel            (R73.01) IFG (impaired fasting glucose)  Comment: check hemoglobin A1c today   Plan: HEMOGLOBIN A1C               Follow up in pulmonology     Disclaimer: This note consists of symbols derived from keyboarding, dictation and/or voice recognition software. As a result, there may be errors in the script that have gone undetected. Please consider this when interpreting information found in this chart.    José Miguel Wallace MD  [phone]

## 2018-01-30 NOTE — NURSING NOTE
"Chief Complaint   Patient presents with     Follow Up For     Wheezing, cough, CT results        Initial /68 (BP Location: Right arm, Cuff Size: Adult Large)  Pulse 80  Temp 97.1  F (36.2  C) (Oral)  Ht 5' 3\" (1.6 m)  Wt 198 lb (89.8 kg)  SpO2 93%  BMI 35.07 kg/m2 Estimated body mass index is 35.07 kg/(m^2) as calculated from the following:    Height as of this encounter: 5' 3\" (1.6 m).    Weight as of this encounter: 198 lb (89.8 kg).  Medication Reconciliation: complete       No Sofia CMA      "

## 2018-01-30 NOTE — PATIENT INSTRUCTIONS
(J18.1) Pneumonia of left lower lobe due to infectious organism (H)  (primary encounter diagnosis)  Comment: We will plan to schedule an urgent follow up appointment in pulmonology and for the time being we will continue on your prednisone and augmentin.    Plan: HEMOGLOBIN A1C, CBC with platelets and         differential, Basic metabolic panel            (M35.00) Sjogren's syndrome, with unspecified organ involvement (H)  Comment: We will send labs to Dr. Carlos in rheumatology  Plan: HEMOGLOBIN A1C, CBC with platelets and         differential, Basic metabolic panel            (I10) Essential hypertension  Comment: blood pressure is well controlled   Plan: HEMOGLOBIN A1C, CBC with platelets and         differential, Basic metabolic panel            (R73.01) IFG (impaired fasting glucose)  Comment: check hemoglobin A1c today   Plan: HEMOGLOBIN A1C

## 2018-01-30 NOTE — MR AVS SNAPSHOT
After Visit Summary   1/30/2018    Agnes Saravia    MRN: [id number]           Patient Information     Date Of Birth          1945        Visit Information        Provider Department      1/30/2018 8:00 AM José Miguel Wallace MD [facility]        Today's Diagnoses     Pneumonia of left lower lobe due to infectious organism (H)    -  1    Sjogren's syndrome, with unspecified organ involvement (H)        Essential hypertension        IFG (impaired fasting glucose)        Cough          Care Instructions    (J18.1) Pneumonia of left lower lobe due to infectious organism (H)  (primary encounter diagnosis)  Comment: We will plan to schedule an urgent follow up appointment in pulmonology and for the time being we will continue on your prednisone and augmentin.    Plan: HEMOGLOBIN A1C, CBC with platelets and         differential, Basic metabolic panel            (M35.00) Sjogren's syndrome, with unspecified organ involvement (H)  Comment: We will send labs to Dr. Carlos in rheumatology  Plan: HEMOGLOBIN A1C, CBC with platelets and         differential, Basic metabolic panel            (I10) Essential hypertension  Comment: blood pressure is well controlled   Plan: HEMOGLOBIN A1C, CBC with platelets and         differential, Basic metabolic panel            (R73.01) IFG (impaired fasting glucose)  Comment: check hemoglobin A1c today   Plan: HEMOGLOBIN A1C                     Follow-ups after your visit        Who to contact     If you have questions or need follow up information about today's clinic visit or your schedule please contact [facility] directly at [phone].  Normal or non-critical lab and imaging results will be communicated to you by MyChart, letter or phone within 4 business days after the clinic has received the results. If you do not hear from us within 7 days, please contact the clinic through MyChart or phone. If you have a critical or abnormal lab  "result, we will notify you by phone as soon as possible.  Submit refill requests through [employer] or call your pharmacy and they will forward the refill request to us. Please allow 3 business days for your refill to be completed.          Additional Information About Your Visit        The [employer] Collective[employer] Information     [employer] gives you secure access to your electronic health record. If you see a primary care provider, you can also send messages to your care team and make appointments. If you have questions, please call your primary care clinic.  If you do not have a primary care provider, please call [phone] and they will assist you.        Care EveryWhere ID     This is your Care EveryWhere ID. This could be used by other organizations to access your [place] medical records  [id number]        Your Vitals Were     Pulse Temperature Height Pulse Oximetry BMI (Body Mass Index)       80 97.1  F (36.2  C) (Oral) 5' 3\" (1.6 m) 93% 35.07 kg/m2        Blood Pressure from Last 3 Encounters:   01/30/18 129/68   01/16/18 125/73   10/23/17 133/61    Weight from Last 3 Encounters:   01/30/18 198 lb (89.8 kg)   01/16/18 198 lb (89.8 kg)   10/23/17 198 lb (89.8 kg)              We Performed the Following     Basic metabolic panel     CBC with platelets and differential     HEMOGLOBIN A1C          Today's Medication Changes          These changes are accurate as of 1/30/18  8:31 AM.  If you have any questions, ask your nurse or doctor.               These medicines have changed or have updated prescriptions.        Dose/Directions    gabapentin 300 MG capsule   Commonly known as:  NEURONTIN   This may have changed:  Another medication with the same name was removed. Continue taking this medication, and follow the directions you see here.   Changed by:  José Miguel Wallace MD        Dose:  300 mg   Take 300 mg by mouth 3 times daily   Refills:  0            Where to get your medicines      Some of these will need a paper " prescription and others can be bought over the counter.  Ask your nurse if you have questions.     Bring a paper prescription for each of these medications     guaiFENesin-codeine 100-10 MG/5ML Soln solution                Primary Care Provider Office Phone # Fax #    José Miguel Wallace [phone] [address] [facility] [address]  [facility] [address]        Equal Access to Services     [facility] : Hadii aad ku hadasho Soomaali, waaxda luqadaha, qaybta kaalmada adeegyada, waxay idiin hayaan adeeg kharash la'maria guadalupen ah. So [facility] [phone].    ATENCIÓN: Si tjla love, tiene a pedraza disposición servicios gratuitos de asistencia lingüística. Llame al [phone].    We comply with applicable federal civil rights laws and [place] laws. We do not discriminate on the basis of race, color, national origin, age, disability, sex, sexual orientation, or gender identity.            Thank you!     Thank you for choosing [facility]  for your care. Our goal is always to provide you with excellent care. Hearing back from our patients is one way we can continue to improve our services. Please take a few minutes to complete the written survey that you may receive in the mail after your visit with us. Thank you!             Your Updated Medication List - Protect others around you: Learn how to safely use, store and throw away your medicines at www.disposemymeds.org.          This list is accurate as of 1/30/18  8:31 AM.  Always use your most recent med list.                   Brand Name Dispense Instructions for use Diagnosis    albuterol 108 (90 BASE) MCG/ACT Inhaler    VENTOLIN HFA    18 g    Inhale 1-2 puffs into the lungs every 4 hours as needed for shortness of breath / dyspnea or wheezing    Pneumonia of left lower lobe due to infectious organism (H)       amoxicillin-clavulanate 875-125 MG per tablet    AUGMENTIN    42 tablet    Take 1 tablet by mouth every 12 hours    Chronic pansinusitis        aspirin 81 MG EC tablet      Take 81 mg by mouth daily        cevimeline 30 MG capsule    EVOXAC     Take 30 mg by mouth 3 times daily.        Clorazepate Dipotassium 15 MG Tabs     90 tablet    Take 1 tablet by mouth daily    Sjogren's syndrome, with unspecified organ involvement (H), DANA (generalized anxiety disorder)       gabapentin 300 MG capsule    NEURONTIN     Take 300 mg by mouth 3 times daily        guaiFENesin-codeine 100-10 MG/5ML Soln solution    ROBITUSSIN AC    236 mL    TAKE 10 ML BY MOUTH EVERY 6 HOURS AS NEEDED    Cough       hydrochlorothiazide 50 MG tablet    HYDRODIURIL    90 tablet    Take 1 tablet (50 mg) by mouth daily Please call to schedule a follow up visit    Essential hypertension       potassium chloride 10 MEQ tablet    K-TAB,KLOR-CON    180 tablet    Take 2 tablets (20 mEq) by mouth daily    Hypokalemia       predniSONE 10 MG tablet    DELTASONE    40 tablet    40mg/day for 5 days then 20mg/day for 5 days then 10mg/day for 5 days then 5 mg/day for 5 days    Chronic pansinusitis       simvastatin 40 MG tablet    ZOCOR    90 tablet    Take 1 tablet (40 mg) by mouth At Bedtime    Hyperlipidemia LDL goal <130       traZODone 50 MG tablet    DESYREL    45 tablet    Take 0.5 tablets (25 mg) by mouth At Bedtime    Insomnia, unspecified type

## 2018-01-31 ASSESSMENT — ENCOUNTER SYMPTOMS
SINUS CONGESTION: 1
BLOATING: 0
NECK PAIN: 0
HOARSE VOICE: 0
POSTURAL DYSPNEA: 0
NAUSEA: 0
MYALGIAS: 0
ABDOMINAL PAIN: 0
COUGH: 1
HEMOPTYSIS: 0
TROUBLE SWALLOWING: 0
ARTHRALGIAS: 1
HEARTBURN: 1
MUSCLE CRAMPS: 0
BOWEL INCONTINENCE: 0
BLOOD IN STOOL: 0
SMELL DISTURBANCE: 0
DYSPNEA ON EXERTION: 0
MUSCLE WEAKNESS: 0
SORE THROAT: 0
SNORES LOUDLY: 1
RECTAL PAIN: 0
COUGH DISTURBING SLEEP: 0
CONSTIPATION: 0
SPUTUM PRODUCTION: 1
SHORTNESS OF BREATH: 0
STIFFNESS: 1
JAUNDICE: 0
DIARRHEA: 0
BACK PAIN: 0
VOMITING: 0
SINUS PAIN: 1
NECK MASS: 0
JOINT SWELLING: 0
TASTE DISTURBANCE: 0
WHEEZING: 1

## 2018-01-31 NOTE — PROGRESS NOTES
Gurdeep Alarcon,    I have had the opportunity to review your recent results and an interpretation is as follows:  Your follow up complete blood counts shows a slight rise in white blood cell count which is consistent with your current treatment with prednisone  Your basic metabolic panel shows stable renal function   Your hemoglobin A1c also shows stable average elevated blood glucose     Sincerely,  José Miguel Wallace MD

## 2018-02-01 ENCOUNTER — APPOINTMENT (OUTPATIENT)
Dept: LAB | Facility: CLINIC | Age: 73
End: 2018-02-01
Payer: MEDICARE

## 2018-02-01 ENCOUNTER — OFFICE VISIT (OUTPATIENT)
Dept: PULMONOLOGY | Facility: CLINIC | Age: 73
End: 2018-02-01
Payer: MEDICARE

## 2018-02-01 VITALS
HEART RATE: 72 BPM | BODY MASS INDEX: 35.08 KG/M2 | RESPIRATION RATE: 16 BRPM | WEIGHT: 198 LBS | HEIGHT: 63 IN | DIASTOLIC BLOOD PRESSURE: 81 MMHG | OXYGEN SATURATION: 96 % | SYSTOLIC BLOOD PRESSURE: 135 MMHG

## 2018-02-01 DIAGNOSIS — J32.4 CHRONIC PANSINUSITIS: ICD-10-CM

## 2018-02-01 DIAGNOSIS — R06.2 WHEEZING: Primary | ICD-10-CM

## 2018-02-01 DIAGNOSIS — R91.8 PULMONARY NODULES: ICD-10-CM

## 2018-02-01 PROCEDURE — G0463 HOSPITAL OUTPT CLINIC VISIT: HCPCS | Mod: ZF

## 2018-02-01 PROCEDURE — 86038 ANTINUCLEAR ANTIBODIES: CPT

## 2018-02-01 PROCEDURE — 36415 COLL VENOUS BLD VENIPUNCTURE: CPT

## 2018-02-01 PROCEDURE — 86255 FLUORESCENT ANTIBODY SCREEN: CPT

## 2018-02-01 PROCEDURE — 86200 CCP ANTIBODY: CPT

## 2018-02-01 PROCEDURE — 86431 RHEUMATOID FACTOR QUANT: CPT

## 2018-02-01 RX ORDER — FLUTICASONE PROPIONATE 50 MCG
1 SPRAY, SUSPENSION (ML) NASAL DAILY
Qty: 1 BOTTLE | Refills: 3 | Status: SHIPPED | OUTPATIENT
Start: 2018-02-01 | End: 2019-05-13

## 2018-02-01 ASSESSMENT — PAIN SCALES - GENERAL: PAINLEVEL: NO PAIN (0)

## 2018-02-01 NOTE — MR AVS SNAPSHOT
After Visit Summary   2/1/2018    Agnes Saravia    MRN: [id number]           Patient Information     Date Of Birth          1945        Visit Information        Provider Department      2/1/2018 9:40 AM Manuel Conway MD [facility]        Today's Diagnoses     Wheezing    -  1    Pulmonary nodules        Chronic pansinusitis           Follow-ups after your visit        Follow-up notes from your care team     Return in about 6 weeks (around 3/15/2018).      Your next 10 appointments already scheduled     Mar 22, 2018  9:40 AM CDT   (Arrive by 9:25 AM)   Return Visit with Manuel Conway MD   [facility] ([facility])    [address] [facility]  [address] [place] [id number] [phone]              Who to contact     If you have questions or need follow up information about today's clinic visit or your schedule please contact [facility] directly at [phone].  Normal or non-critical lab and imaging results will be communicated to you by Clowdyhart, letter or phone within 4 business days after the clinic has received the results. If you do not hear from us within 7 days, please contact the clinic through Clowdyhart or phone. If you have a critical or abnormal lab result, we will notify you by phone as soon as possible.  Submit refill requests through [employer] or call your pharmacy and they will forward the refill request to us. Please allow 3 business days for your refill to be completed.          Additional Information About Your Visit        MyChart Information     [employer] gives you secure access to your electronic health record. If you see a primary care provider, you can also send messages to your care team and make appointments. If you have questions, please call your primary care clinic.  If you do not have a primary care provider, please call  "[phone] and they will assist you.        Care EveryWhere ID     This is your Care EveryWhere ID. This could be used by other organizations to access your [place] medical records  [id number]        Your Vitals Were     Pulse Respirations Height Pulse Oximetry BMI (Body Mass Index)       72 16 1.6 m (5' 3\") 96% 35.07 kg/m2        Blood Pressure from Last 3 Encounters:   02/01/18 135/81   01/30/18 129/68   01/16/18 125/73    Weight from Last 3 Encounters:   02/01/18 89.8 kg (198 lb)   01/30/18 89.8 kg (198 lb)   01/16/18 89.8 kg (198 lb)              Today, you had the following     No orders found for display       Primary Care Provider Office Phone # Fax #    José Miguel Wallace [phone] [address]   [place] [address]        Equal Access to Services     [facility]: Hadii aad ku hadasho Soomaali, waaxda luqadaha, qaybta kaalmada adeegyada, waxay caesarin haymaria guadalupen nubia wu . So [facility] [phone].    ATENCIÓN: Si habla español, tiene a pedraza disposición servicios gratuitos de asistencia lingüística. Llame al [phone].    We comply with applicable federal civil rights laws and [place] laws. We do not discriminate on the basis of race, color, national origin, age, disability, sex, sexual orientation, or gender identity.            Thank you!     Thank you for choosing [facility]  for your care. Our goal is always to provide you with excellent care. Hearing back from our patients is one way we can continue to improve our services. Please take a few minutes to complete the written survey that you may receive in the mail after your visit with us. Thank you!             Your Updated Medication List - Protect others around you: Learn how to safely use, store and throw away your medicines at www.disposemymeds.org.          This list is accurate as of 2/1/18 10:45 AM.  Always use your most recent med list.                   Brand Name " Dispense Instructions for use Diagnosis    albuterol 108 (90 BASE) MCG/ACT Inhaler    VENTOLIN HFA    18 g    Inhale 1-2 puffs into the lungs every 4 hours as needed for shortness of breath / dyspnea or wheezing    Pneumonia of left lower lobe due to infectious organism (H)       amoxicillin-clavulanate 875-125 MG per tablet    AUGMENTIN    42 tablet    Take 1 tablet by mouth every 12 hours    Chronic pansinusitis       aspirin 81 MG EC tablet      Take 81 mg by mouth daily        cevimeline 30 MG capsule    EVOXAC     Take 30 mg by mouth 3 times daily.        Clorazepate Dipotassium 15 MG Tabs     90 tablet    Take 1 tablet by mouth daily    Sjogren's syndrome, with unspecified organ involvement (H), DANA (generalized anxiety disorder)       gabapentin 300 MG capsule    NEURONTIN     Take 300 mg by mouth 3 times daily        guaiFENesin-codeine 100-10 MG/5ML Soln solution    ROBITUSSIN AC    236 mL    TAKE 10 ML BY MOUTH EVERY 6 HOURS AS NEEDED    Cough       hydrochlorothiazide 50 MG tablet    HYDRODIURIL    90 tablet    Take 1 tablet (50 mg) by mouth daily Please call to schedule a follow up visit    Essential hypertension       potassium chloride 10 MEQ tablet    K-TAB,KLOR-CON    180 tablet    Take 2 tablets (20 mEq) by mouth daily    Hypokalemia       predniSONE 10 MG tablet    DELTASONE    40 tablet    40mg/day for 5 days then 20mg/day for 5 days then 10mg/day for 5 days then 5 mg/day for 5 days    Chronic pansinusitis       simvastatin 40 MG tablet    ZOCOR    90 tablet    Take 1 tablet (40 mg) by mouth At Bedtime    Hyperlipidemia LDL goal <130       traZODone 50 MG tablet    DESYREL    45 tablet    Take 0.5 tablets (25 mg) by mouth At Bedtime    Insomnia, unspecified type

## 2018-02-01 NOTE — NURSING NOTE
Chief Complaint   Patient presents with     RECHECK     Continuing cough, increasing size of pulm nodules, hx of immunosuppression    Nikki Linder, CMA

## 2018-02-01 NOTE — LETTER
2/1/2018       RE: Agnes Saravia  [id number]  [place] [address]     Dear Colleague,    Thank you for referring your patient, Agnes Saravia, to the [facility] at [facility]. Please see a copy of my visit note below.    [facility]  Pulmonary Medicine  Visit Clinic Note  February 1, 2018         ASSESSMENT & PLAN       Cough - Ms Saravia has had a protracted cough, along with wheeze, sputum production and nasal congestion.  She has been diagnosed with pansinusitis, and has new nodular opacities vs atelectasis that is present in her lungs.      Looking at her chest CT scan, rheumatoid arthritis could explain much of the findings.  There is evidence for small airways disease, bronchitis, bronchiectasis and nodules.  She has been off treatment for her RA for awhile now.  While RA is a potential cause for her lung disease, it does not necessarily explain her nasal congestion.      New onset asthma could also explain a lot of her pulmonary symptoms.      Vasculitis could tie both sinus symptoms and pulmonary nodules together.      I will check an DEN, ANCA and repeat her RA labs of RF and anti - CCP. She also needs repeat spirometry with a bronchodilator.  I will start her on advair to try to help out with her wheezing and cough. She can continue to take her PRN albuterol.      Pulmonary nodules - see above  Sinusitis - see above    I would like to see her in follow up in about 8 weeks      Kang Conway MD          Today's visit note:     Chief Complaint: Agnes Saravia is a 72 year old year old female who is being seen for RECHECK (Continuing cough, increasing size of pulm nodules, hx of immunosuppression)      HISTORY OF PRESENT ILLNESS:  72 year old female presents for evaluation of cough, wheeze and nasal congestion.      These symptoms have been present since around 9/2016. She tells me that she  had a pneumonia in 9/2016, then pleurisy in October that same year.  This was follow up with a pulmonary embolism noted in 11/2016. A lot of these CT scans were performed for her symptoms in the presence of sharp RUQ abdominal and lower right chest pain.  Throughout this time she has had terrible nasal and pulmonary congestion.  She is always on 5 mg of prednisone for her rheumatoid arthritis, but she has taken bursts of higher doses, as well as about 4 different rounds of antibiotics.  She is currently on a prednisone taper (at 10 mg now), and finishing a 3 week course of augmentin for chronic sinusitis.      She received a dose of rituximab in 9/2017 for her RA, and says her congestion symptoms became much worse after that. She did not receive a 2nd dose.  She also has not had any of her methotrexate since 9/2017 because of the chronic infection she has been dealing with.      She was diagnosed with rheumatoid arthritis in 1981. Hands, hips, knees.  Now taking gabapentin for pain control. She says that her joint symptoms right now are better than usual, but she is still limited in what she can do.      Also diagnosed with sjogren's  because her galndwas getting bloated.     Always on 5 mg prednisone.  Currently on 10 mg.      augmentin and prednisone helped a little bit, but not as much as usual.           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     Depressive disorder      Hyperlipidemia      Insomnia      PONV (postoperative nausea and vomiting)      Rheumatoid arthritis(714.0)      Sjogren's syndrome (H)      Past Surgical History:   Procedure Laterality Date     BLEPHAROPLASTY       DISCECTOMY LUMBAR POSTERIOR MICROSCOPIC ONE LEVEL  8/14/2014    Procedure: DISCECTOMY LUMBAR POSTERIOR MICROSCOPIC ONE LEVEL;  Surgeon: Dudley Bernal MD;  Location: [facility]     HYSTERECTOMY TOTAL ABDOMINAL, BILATERAL SALPINGO-OOPHORECTOMY,  COMBINED       HYSTERECTOMY, PAP NO LONGER INDICATED       MAMMOPLASTY REDUCTION BILATERAL  5/14/2013    Procedure: MAMMOPLASTY REDUCTION BILATERAL;  BILATERAL REDUCTION MAMMOPLASTY;  Surgeon: Bruce Nash MD;  Location: [facility]     SHOULDER SURGERY             Family History:     Family History   Problem Relation Age of Onset     CEREBROVASCULAR DISEASE Mother      CEREBROVASCULAR DISEASE Father               Social History:     Social History     Social History     Marital status: Single     Spouse name: N/A     Number of children: N/A     Years of education: N/A     Occupational History     Not on file.     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 [employer]            Medications:     Current Outpatient Prescriptions   Medication     gabapentin (NEURONTIN) 300 MG capsule     guaiFENesin-codeine (ROBITUSSIN AC) 100-10 MG/5ML SOLN solution     predniSONE (DELTASONE) 10 MG tablet     amoxicillin-clavulanate (AUGMENTIN) 875-125 MG per tablet     Clorazepate Dipotassium 15 MG TABS     simvastatin (ZOCOR) 40 MG tablet     traZODone (DESYREL) 50 MG tablet     hydrochlorothiazide (HYDRODIURIL) 50 MG tablet     albuterol (VENTOLIN HFA) 108 (90 BASE) MCG/ACT Inhaler     aspirin 81 MG EC tablet     potassium chloride (K-TAB,KLOR-CON) 10 MEQ tablet     cevimeline (EVOXAC) 30 MG capsule     No current facility-administered medications for this visit.             Review of Systems:       Answers for HPI/ROS submitted by the patient  "on 1/31/2018   General Symptoms: No  Skin Symptoms: No  HENT Symptoms: Yes  EYE SYMPTOMS: No  HEART SYMPTOMS: No  LUNG SYMPTOMS: Yes  INTESTINAL SYMPTOMS: Yes  URINARY SYMPTOMS: No  GYNECOLOGIC SYMPTOMS: No  BREAST SYMPTOMS: No  SKELETAL SYMPTOMS: Yes  BLOOD SYMPTOMS: No  NERVOUS SYSTEM SYMPTOMS: No  MENTAL HEALTH SYMPTOMS: No  Ear pain: No  Ear discharge: No  Hearing loss: No  Tinnitus: No  Nosebleeds: No  Congestion: Yes  Sinus pain: Yes  Trouble swallowing: No   Voice hoarseness: No  Mouth sores: No  Sore throat: No  Tooth pain: No  Gum tenderness: No  Bleeding gums: No  Change in taste: No  Change in sense of smell: No  Dry mouth: Yes  Hearing aid used: No  Neck lump: No  Cough: Yes  Sputum or phlegm: Yes  Coughing up blood: No  Difficulty breating or shortness of breath: No  Snoring: Yes  Wheezing: Yes  Difficulty breathing on exertion: No  Nighttime Cough: No  Difficulty breathing when lying flat: No  Heart burn or indigestion: Yes  Nausea: No  Vomiting: No  Abdominal pain: No  Bloating: No  Constipation: No  Diarrhea: No  Blood in stool: No  Black stools: No  Rectal or Anal pain: No  Fecal incontinence: No  Yellowing of skin or eyes: No  Vomit with blood: No  Change in stools: No  Back pain: No  Muscle aches: No  Neck pain: No  Swollen joints: No  Joint pain: Yes  Bone pain: No  Muscle cramps: No  Muscle weakness: No  Joint stiffness: Yes  Bone fracture: No        PHYSICAL EXAM:  /81  Pulse 72  Resp 16  Ht 1.6 m (5' 3\")  Wt 89.8 kg (198 lb)  SpO2 96%  BMI 35.07 kg/m2     General: Well developed, well nourished, No apparent distress  Eyes: Anicteric  Nose: red inflamed nasal turbinates. Some mucus.    Ears: Hearing grossly normal  Mouth: Oral mucosa is moist, without any lesions. No oropharyngeal exudate.  Neck: supple, no thyromegaly  Lymphatics: No cervical or supraclavicular nodes  Respiratory: diffuse expiratory wheezing all over lung fields.  Occasional rhonchi  Cardiac: RRR, normal S1, S2. No " murmurs. No JVD  Abdomen: Soft, NT/ND  Musculoskeletal: Extremities normal. No clubbing. No cyanosis.  Skin: No rash on limited exam  Neuro: Normal mentation. Normal speech.  Psych:Normal affect           Data:   All laboratory and imaging data reviewed.      PFT:   2/1/2018: FEV1/FVC ratio 0.78. FEV1 1.65 (81% predicted). FVC 2.05 (77% predicted).     PFT Interpretation:  No airflow obstruction  Valid Maneuver    Sinus CT:  Findings:   Since the prior examination there has been mildly decreased mucosal  thickening in the bilateral maxillary sinuses, ethmoid air cells,  right frontal sinus and sphenoid locules.     The ostiomeatal units appear partially obstructed by mucosal  thickening bilaterally. The bony walls of the paranasal sinuses are  intact.     Normal retromaxillary and pterygopalatine fat.     The adenoid tonsils in the nasopharynx are unremarkable.         Impression:    Mildly improved inflammatory pansinusitis.    Chest CT: I have reviewed the images and agree with the radiologist's read below  11 mm nodule in the right upper lobe with some surrounding  airspace disease. 1.1 cm nodular density in the right middle lobe.  These could be areas of nodular atelectasis and/or infiltrate. They  may be discrete nodules. A nodule seen in the left upper lobe now  measures 6 mm, increased from 5 mm previously. Scattered peripheral  atelectasis and/or fibrosis noted. Some areas of bronchiectasis are  seen in both lower lobes. No pleural or pericardial effusion. There  are mild atherosclerotic changes of the visualized aorta and its  branches. There is no evidence of aortic aneurysm. There are minimal  coronary vascular calcifications consistent with coronary artery  disease. No acute findings in the visualized upper abdomen. No frankly  destructive bony lesions. Healed rib fractures are seen on the left.         IMPRESSION: Increased size of the nodule in the left upper lobe since  the comparison study. New  right-sided nodular airspace disease versus  nodules. Consider three-month followup chest CT versus PET CT for  further evaluation.     Recent Results (from the past 168 hour(s))   HEMOGLOBIN A1C    Collection Time: 01/30/18  8:39 AM   Result Value Ref Range    Hemoglobin A1C 6.1 (H) 4.3 - 6.0 %   CBC with platelets and differential    Collection Time: 01/30/18  8:39 AM   Result Value Ref Range    WBC 17.7 (H) 4.0 - 11.0 10e9/L    RBC Count 4.83 3.8 - 5.2 10e12/L    Hemoglobin 14.0 11.7 - 15.7 g/dL    Hematocrit 42.8 35.0 - 47.0 %    MCV 89 78 - 100 fl    MCH 29.0 26.5 - 33.0 pg    MCHC 32.7 31.5 - 36.5 g/dL    RDW 14.4 10.0 - 15.0 %    Platelet Count 359 150 - 450 10e9/L    Diff Method Automated Method     % Neutrophils 88.2 %    % Lymphocytes 5.0 %    % Monocytes 5.5 %    % Eosinophils 0.8 %    % Basophils 0.5 %    Absolute Neutrophil 15.6 (H) 1.6 - 8.3 10e9/L    Absolute Lymphocytes 0.9 0.8 - 5.3 10e9/L    Absolute Monocytes 1.0 0.0 - 1.3 10e9/L    Absolute Eosinophils 0.2 0.0 - 0.7 10e9/L    Absolute Basophils 0.1 0.0 - 0.2 10e9/L   Basic metabolic panel    Collection Time: 01/30/18  8:39 AM   Result Value Ref Range    Sodium 138 133 - 144 mmol/L    Potassium 3.5 3.4 - 5.3 mmol/L    Chloride 101 94 - 109 mmol/L    Carbon Dioxide 29 20 - 32 mmol/L    Anion Gap 8 3 - 14 mmol/L    Glucose 117 (H) 70 - 99 mg/dL    Urea Nitrogen 24 7 - 30 mg/dL    Creatinine 0.65 0.52 - 1.04 mg/dL    GFR Estimate 89 >60 mL/min/1.7m2    GFR Estimate If Black >90 >60 mL/min/1.7m2    Calcium 9.1 8.5 - 10.1 mg/dL     Sincerely,    Manuel Conway MD

## 2018-02-01 NOTE — PROGRESS NOTES
[facility]  Pulmonary Medicine  Visit Clinic Note  February 1, 2018         ASSESSMENT & PLAN       Cough - Ms Saravia has had a protracted cough, along with wheeze, sputum production and nasal congestion.  She has been diagnosed with pansinusitis, and has new nodular opacities vs atelectasis that is present in her lungs.      Looking at her chest CT scan, rheumatoid arthritis could explain much of the findings.  There is evidence for small airways disease, bronchitis, bronchiectasis and nodules.  She has been off treatment for her RA for awhile now.  While RA is a potential cause for her lung disease, it does not necessarily explain her nasal congestion.      New onset asthma could also explain a lot of her pulmonary symptoms.      Vasculitis could tie both sinus symptoms and pulmonary nodules together.      I will check an DEN, ANCA and repeat her RA labs of RF and anti - CCP. She also needs repeat spirometry with a bronchodilator.  I will start her on advair to try to help out with her wheezing and cough. She can continue to take her PRN albuterol.      Pulmonary nodules - see above  Sinusitis - see above    I would like to see her in follow up in about 8 weeks      Kang Conway MD          Today's visit note:     Chief Complaint: Agnes Saravia is a 72 year old year old female who is being seen for RECHECK (Continuing cough, increasing size of pulm nodules, hx of immunosuppression)      HISTORY OF PRESENT ILLNESS:  72 year old female presents for evaluation of cough, wheeze and nasal congestion.      These symptoms have been present since around 9/2016. She tells me that she had a pneumonia in 9/2016, then pleurisy in October that same year.  This was follow up with a pulmonary embolism noted in 11/2016. A lot of these CT scans were performed for her symptoms in the presence of sharp RUQ abdominal and lower right chest pain.  Throughout this time she has had terrible nasal and pulmonary  congestion.  She is always on 5 mg of prednisone for her rheumatoid arthritis, but she has taken bursts of higher doses, as well as about 4 different rounds of antibiotics.  She is currently on a prednisone taper (at 10 mg now), and finishing a 3 week course of augmentin for chronic sinusitis.      She received a dose of rituximab in 9/2017 for her RA, and says her congestion symptoms became much worse after that. She did not receive a 2nd dose.  She also has not had any of her methotrexate since 9/2017 because of the chronic infection she has been dealing with.      She was diagnosed with rheumatoid arthritis in 1981. Hands, hips, knees.  Now taking gabapentin for pain control. She says that her joint symptoms right now are better than usual, but she is still limited in what she can do.      Also diagnosed with sjogren's  because her galndwas getting bloated.     Always on 5 mg prednisone.  Currently on 10 mg.      augmentin and prednisone helped a little bit, but not as much as usual.           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     Depressive disorder      Hyperlipidemia      Insomnia      PONV (postoperative nausea and vomiting)      Rheumatoid arthritis(714.0)      Sjogren's syndrome (H)      Past Surgical History:   Procedure Laterality Date     BLEPHAROPLASTY       DISCECTOMY LUMBAR POSTERIOR MICROSCOPIC ONE LEVEL  8/14/2014    Procedure: DISCECTOMY LUMBAR POSTERIOR MICROSCOPIC ONE LEVEL;  Surgeon: Dudley Bernal MD;  Location:  OR     HYSTERECTOMY TOTAL ABDOMINAL, BILATERAL SALPINGO-OOPHORECTOMY, COMBINED       HYSTERECTOMY, PAP NO LONGER INDICATED       MAMMOPLASTY REDUCTION BILATERAL  5/14/2013    Procedure: MAMMOPLASTY REDUCTION BILATERAL;  BILATERAL REDUCTION MAMMOPLASTY;  Surgeon: Bruce Nash MD;  Location: [facility]     SHOULDER SURGERY             Family History:     Family History   Problem Relation  Age of Onset     CEREBROVASCULAR DISEASE Mother      CEREBROVASCULAR DISEASE Father               Social History:     Social History     Social History     Marital status: Single     Spouse name: N/A     Number of children: N/A     Years of education: N/A     Occupational History     Not on file.     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[employer]            Medications:     Current Outpatient Prescriptions   Medication     gabapentin (NEURONTIN) 300 MG capsule     guaiFENesin-codeine (ROBITUSSIN AC) 100-10 MG/5ML SOLN solution     predniSONE (DELTASONE) 10 MG tablet     amoxicillin-clavulanate (AUGMENTIN) 875-125 MG per tablet     Clorazepate Dipotassium 15 MG TABS     simvastatin (ZOCOR) 40 MG tablet     traZODone (DESYREL) 50 MG tablet     hydrochlorothiazide (HYDRODIURIL) 50 MG tablet     albuterol (VENTOLIN HFA) 108 (90 BASE) MCG/ACT Inhaler     aspirin 81 MG EC tablet     potassium chloride (K-TAB,KLOR-CON) 10 MEQ tablet     cevimeline (EVOXAC) 30 MG capsule     No current facility-administered medications for this visit.             Review of Systems:       Answers for HPI/ROS submitted by the patient on 1/31/2018   General Symptoms: No  Skin Symptoms: No  HENT Symptoms: Yes  EYE SYMPTOMS: No  HEART SYMPTOMS: No  LUNG SYMPTOMS: Yes  INTESTINAL SYMPTOMS: Yes  URINARY SYMPTOMS: No  GYNECOLOGIC SYMPTOMS: No  BREAST SYMPTOMS: No  SKELETAL SYMPTOMS: Yes  BLOOD SYMPTOMS: No  NERVOUS SYSTEM SYMPTOMS: No  MENTAL HEALTH  "SYMPTOMS: No  Ear pain: No  Ear discharge: No  Hearing loss: No  Tinnitus: No  Nosebleeds: No  Congestion: Yes  Sinus pain: Yes  Trouble swallowing: No   Voice hoarseness: No  Mouth sores: No  Sore throat: No  Tooth pain: No  Gum tenderness: No  Bleeding gums: No  Change in taste: No  Change in sense of smell: No  Dry mouth: Yes  Hearing aid used: No  Neck lump: No  Cough: Yes  Sputum or phlegm: Yes  Coughing up blood: No  Difficulty breating or shortness of breath: No  Snoring: Yes  Wheezing: Yes  Difficulty breathing on exertion: No  Nighttime Cough: No  Difficulty breathing when lying flat: No  Heart burn or indigestion: Yes  Nausea: No  Vomiting: No  Abdominal pain: No  Bloating: No  Constipation: No  Diarrhea: No  Blood in stool: No  Black stools: No  Rectal or Anal pain: No  Fecal incontinence: No  Yellowing of skin or eyes: No  Vomit with blood: No  Change in stools: No  Back pain: No  Muscle aches: No  Neck pain: No  Swollen joints: No  Joint pain: Yes  Bone pain: No  Muscle cramps: No  Muscle weakness: No  Joint stiffness: Yes  Bone fracture: No        PHYSICAL EXAM:  /81  Pulse 72  Resp 16  Ht 1.6 m (5' 3\")  Wt 89.8 kg (198 lb)  SpO2 96%  BMI 35.07 kg/m2     General: Well developed, well nourished, No apparent distress  Eyes: Anicteric  Nose: red inflamed nasal turbinates. Some mucus.    Ears: Hearing grossly normal  Mouth: Oral mucosa is moist, without any lesions. No oropharyngeal exudate.  Neck: supple, no thyromegaly  Lymphatics: No cervical or supraclavicular nodes  Respiratory: diffuse expiratory wheezing all over lung fields.  Occasional rhonchi  Cardiac: RRR, normal S1, S2. No murmurs. No JVD  Abdomen: Soft, NT/ND  Musculoskeletal: Extremities normal. No clubbing. No cyanosis.  Skin: No rash on limited exam  Neuro: Normal mentation. Normal speech.  Psych:Normal affect           Data:   All laboratory and imaging data reviewed.      PFT:   2/1/2018: FEV1/FVC ratio 0.78. FEV1 1.65 (81% " predicted). FVC 2.05 (77% predicted).     PFT Interpretation:  No airflow obstruction  Valid Maneuver    Sinus CT:  Findings:   Since the prior examination there has been mildly decreased mucosal  thickening in the bilateral maxillary sinuses, ethmoid air cells,  right frontal sinus and sphenoid locules.     The ostiomeatal units appear partially obstructed by mucosal  thickening bilaterally. The bony walls of the paranasal sinuses are  intact.     Normal retromaxillary and pterygopalatine fat.     The adenoid tonsils in the nasopharynx are unremarkable.         Impression:    Mildly improved inflammatory pansinusitis.    Chest CT: I have reviewed the images and agree with the radiologist's read below  11 mm nodule in the right upper lobe with some surrounding  airspace disease. 1.1 cm nodular density in the right middle lobe.  These could be areas of nodular atelectasis and/or infiltrate. They  may be discrete nodules. A nodule seen in the left upper lobe now  measures 6 mm, increased from 5 mm previously. Scattered peripheral  atelectasis and/or fibrosis noted. Some areas of bronchiectasis are  seen in both lower lobes. No pleural or pericardial effusion. There  are mild atherosclerotic changes of the visualized aorta and its  branches. There is no evidence of aortic aneurysm. There are minimal  coronary vascular calcifications consistent with coronary artery  disease. No acute findings in the visualized upper abdomen. No frankly  destructive bony lesions. Healed rib fractures are seen on the left.         IMPRESSION: Increased size of the nodule in the left upper lobe since  the comparison study. New right-sided nodular airspace disease versus  nodules. Consider three-month followup chest CT versus PET CT for  further evaluation.     Recent Results (from the past 168 hour(s))   HEMOGLOBIN A1C    Collection Time: 01/30/18  8:39 AM   Result Value Ref Range    Hemoglobin A1C 6.1 (H) 4.3 - 6.0 %   CBC with platelets  and differential    Collection Time: 01/30/18  8:39 AM   Result Value Ref Range    WBC 17.7 (H) 4.0 - 11.0 10e9/L    RBC Count 4.83 3.8 - 5.2 10e12/L    Hemoglobin 14.0 11.7 - 15.7 g/dL    Hematocrit 42.8 35.0 - 47.0 %    MCV 89 78 - 100 fl    MCH 29.0 26.5 - 33.0 pg    MCHC 32.7 31.5 - 36.5 g/dL    RDW 14.4 10.0 - 15.0 %    Platelet Count 359 150 - 450 10e9/L    Diff Method Automated Method     % Neutrophils 88.2 %    % Lymphocytes 5.0 %    % Monocytes 5.5 %    % Eosinophils 0.8 %    % Basophils 0.5 %    Absolute Neutrophil 15.6 (H) 1.6 - 8.3 10e9/L    Absolute Lymphocytes 0.9 0.8 - 5.3 10e9/L    Absolute Monocytes 1.0 0.0 - 1.3 10e9/L    Absolute Eosinophils 0.2 0.0 - 0.7 10e9/L    Absolute Basophils 0.1 0.0 - 0.2 10e9/L   Basic metabolic panel    Collection Time: 01/30/18  8:39 AM   Result Value Ref Range    Sodium 138 133 - 144 mmol/L    Potassium 3.5 3.4 - 5.3 mmol/L    Chloride 101 94 - 109 mmol/L    Carbon Dioxide 29 20 - 32 mmol/L    Anion Gap 8 3 - 14 mmol/L    Glucose 117 (H) 70 - 99 mg/dL    Urea Nitrogen 24 7 - 30 mg/dL    Creatinine 0.65 0.52 - 1.04 mg/dL    GFR Estimate 89 >60 mL/min/1.7m2    GFR Estimate If Black >90 >60 mL/min/1.7m2    Calcium 9.1 8.5 - 10.1 mg/dL

## 2018-02-02 LAB
ANA SER QL IF: NEGATIVE
CCP AB SER IA-ACNC: 2 U/ML
RHEUMATOID FACT SER NEPH-ACNC: <20 IU/ML (ref 0–20)

## 2018-02-04 LAB — ANCA IGG TITR SER IF: NORMAL {TITER}

## 2018-02-20 ENCOUNTER — MYC MEDICAL ADVICE (OUTPATIENT)
Dept: PULMONOLOGY | Facility: CLINIC | Age: 73
End: 2018-02-20

## 2018-02-20 DIAGNOSIS — J32.0 CHRONIC MAXILLARY SINUSITIS: Primary | ICD-10-CM

## 2018-03-02 RX ORDER — ECHINACEA PURPUREA EXTRACT 125 MG
1 TABLET ORAL DAILY PRN
Qty: 50 ML | Refills: 3 | Status: ON HOLD | OUTPATIENT
Start: 2018-03-02 | End: 2020-03-25

## 2018-03-04 DIAGNOSIS — E87.6 HYPOKALEMIA: ICD-10-CM

## 2018-03-05 NOTE — TELEPHONE ENCOUNTER
"potassium chloride (K-TAB,KLOR-CON) 10 MEQ tablet 180 tablet 3 2/23/2017     Last Written Prescription Date:  2/23/17  Last Fill Quantity: 180,  # refills: 3   Last office visit: 1/30/2018 with prescribing provider:  [place]   Future Office Visit:      Requested Prescriptions   Pending Prescriptions Disp Refills     potassium chloride (K-TAB,KLOR-CON) 10 MEQ tablet [Pharmacy Med Name: Potassium Chloride ER Oral Tablet Extended Release 10 MEQ] 180 tablet 2     Sig: TAKE TWO TABLETS BY MOUTH DAILY    Potassium Supplements Protocol Passed    3/4/2018 10:14 AM       Passed - Recent (12 mo) or future (30 days) visit within the authorizing provider's specialty    Patient had office visit in the last year or has a visit in the next 30 days with authorizing provider.  See \"Patient Info\" tab in inbasket, or \"Choose Columns\" in Meds & Orders section of the refill encounter.            Passed - Patient is age 18 or older       Passed - Normal serum potassium in past 12 months    Recent Labs   Lab Test  01/30/18   0839   POTASSIUM  3.5                    No flowsheet data found.    "

## 2018-03-06 RX ORDER — POTASSIUM CHLORIDE 750 MG/1
TABLET, EXTENDED RELEASE ORAL
Qty: 180 TABLET | Refills: 2 | Status: SHIPPED | OUTPATIENT
Start: 2018-03-06 | End: 2018-12-01

## 2018-03-06 NOTE — TELEPHONE ENCOUNTER
Prescription approved per [facility] Refill Protocol.  Belkys Johnston RN

## 2018-03-15 ASSESSMENT — ENCOUNTER SYMPTOMS
WHEEZING: 1
STIFFNESS: 1
SPUTUM PRODUCTION: 1
BACK PAIN: 0
DYSPNEA ON EXERTION: 0
SMELL DISTURBANCE: 0
HOARSE VOICE: 0
SORE THROAT: 0
COUGH DISTURBING SLEEP: 0
COUGH: 1
ARTHRALGIAS: 1
SINUS PAIN: 1
SINUS CONGESTION: 1
SLEEP DISTURBANCES DUE TO BREATHING: 0
HYPERTENSION: 0
SNORES LOUDLY: 1
ORTHOPNEA: 0
MUSCLE CRAMPS: 0
JOINT SWELLING: 0
TASTE DISTURBANCE: 0
MYALGIAS: 0
MUSCLE WEAKNESS: 0
PALPITATIONS: 0
POSTURAL DYSPNEA: 0
EXERCISE INTOLERANCE: 1
LIGHT-HEADEDNESS: 0
TROUBLE SWALLOWING: 0
SHORTNESS OF BREATH: 0
SYNCOPE: 0
NECK MASS: 0
HEMOPTYSIS: 0
HYPOTENSION: 0
LEG PAIN: 0
NECK PAIN: 0

## 2018-03-22 ENCOUNTER — OFFICE VISIT (OUTPATIENT)
Dept: PULMONOLOGY | Facility: CLINIC | Age: 73
End: 2018-03-22
Payer: MEDICARE

## 2018-03-22 VITALS
SYSTOLIC BLOOD PRESSURE: 133 MMHG | HEART RATE: 78 BPM | OXYGEN SATURATION: 95 % | DIASTOLIC BLOOD PRESSURE: 79 MMHG | RESPIRATION RATE: 16 BRPM

## 2018-03-22 DIAGNOSIS — R91.1 PULMONARY NODULE: ICD-10-CM

## 2018-03-22 DIAGNOSIS — J84.9 ILD (INTERSTITIAL LUNG DISEASE) (H): ICD-10-CM

## 2018-03-22 DIAGNOSIS — R06.1 STRIDOR: ICD-10-CM

## 2018-03-22 DIAGNOSIS — M06.9 RHEUMATOID ARTHRITIS, INVOLVING UNSPECIFIED SITE, UNSPECIFIED RHEUMATOID FACTOR PRESENCE: ICD-10-CM

## 2018-03-22 DIAGNOSIS — R06.2 WHEEZING: ICD-10-CM

## 2018-03-22 DIAGNOSIS — R06.2 WHEEZING: Primary | ICD-10-CM

## 2018-03-22 PROCEDURE — G0463 HOSPITAL OUTPT CLINIC VISIT: HCPCS | Mod: ZF

## 2018-03-22 PROCEDURE — 31624 DX BRONCHOSCOPE/LAVAGE: CPT | Mod: ZF

## 2018-03-22 ASSESSMENT — PAIN SCALES - GENERAL: PAINLEVEL: NO PAIN (0)

## 2018-03-22 NOTE — NURSING NOTE
RN was asked my MD to review bronch instructions with pt. Reviewed NPO, need for  and holding blood thinners/NSAIDS. Reviewed procedure and answered questions.  Pt verbalized understanding and stated no further questions at this time. Pt was given direct nurse triage line if any questions or concerns. Pt was also given handout with all information regarding CT scan and Bronch Scheduled April 11th.   Bere Wynne RN BSN

## 2018-03-22 NOTE — LETTER
3/22/2018       RE: Agnes Saravia  [id number]  [place] [address]     Dear Colleague,    Thank you for referring your patient, Agnes Saravia, to the [facility] at [facility]. Please see a copy of my visit note below.    [facility]  Pulmonary Medicine  Visit Clinic Note  March 22, 2018         ASSESSMENT & PLAN       Cough - Her cough did not improve with an ICS/LABA inhaler. On exam, her wheezing sounds upper airway rather than in her lungs. Her sinus symptoms are improved on flonase.      Her chest CT was reviewed again today. She has bibasilar interstitial changes that spare the pleura.  This could be atelectasis, or potentially and ILD like NSIP.  She has a connective tissue disease history, and RA can cause NSIP. ILD may be a cause for her cough.      I will plan on getting another chest CT scan with prone and supine imaging to see if this represents atelectasis vs fibrosis.   I will also arrange for a bronchoscopy with BAL.  Mainly to look at her airway given her stridor on exhalation, but also to get a BAL looking for any eosinophils.      Pulmonary nodule -  The one nodule in her left upper lung has changed from 4.8 mm to 5.8 mm in a year. This is a small change, and hard to place any significance to it.  Too small to pursue any PET imaging.  We are getting a Chest CT in a couple weeks prior to the bronchoscopy, and if there continues to be change we will have to follow this more carefully.     Kang Conway MD          Today's visit note:     Chief Complaint: Agnes Saravia is a 72 year old year old female who is being seen for Breathing Problem (Patient is being seen for follow up of cough/breathing issues/pulm nodule)      HISTORY OF PRESENT ILLNESS:  This is a 68-year-old female with a history of rheumatoid arthritis and Sjogren's disease is presenting for follow-up of cough and  "wheeze.    At her last visit she had diffuse expiratory wheezes with relatively normal spirometry.  She described a lot of mucus production and both her lungs as well as her sinuses.  She has sinus CT scan that showed pansinusitis.  At that visit she was prescribed Flonase and an ICS-LABA inhaler.  She has noted significant improvement in her sinus symptoms, but she still has a cough.  She does not think the inhalers helped too much.  She can now breathe easily through her nose.     She still has a cough which is \"rattling\", and gets much better after she takes guaifenesin codeine cough syrup.    She is starting to notice more pain in her hands, feet, knees and hips.  She thinks she needs to start taking something for her rheumatoid arthritis before she really starts to stiffen up.               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     Depressive disorder      Hyperlipidemia      Insomnia      PONV (postoperative nausea and vomiting)      Rheumatoid arthritis(714.0)      Sjogren's syndrome (H)      Past Surgical History:   Procedure Laterality Date     BLEPHAROPLASTY       DISCECTOMY LUMBAR POSTERIOR MICROSCOPIC ONE LEVEL  8/14/2014    Procedure: DISCECTOMY LUMBAR POSTERIOR MICROSCOPIC ONE LEVEL;  Surgeon: Dudley Bernal MD;  Location:  OR     HYSTERECTOMY TOTAL ABDOMINAL, BILATERAL SALPINGO-OOPHORECTOMY, COMBINED       HYSTERECTOMY, PAP NO LONGER INDICATED       MAMMOPLASTY REDUCTION BILATERAL  5/14/2013    Procedure: MAMMOPLASTY REDUCTION BILATERAL;  BILATERAL REDUCTION MAMMOPLASTY;  Surgeon: Bruce Nash MD;  Location:  [facility]     SHOULDER SURGERY             Family History:     Family History   Problem Relation Age of Onset     CEREBROVASCULAR DISEASE Mother      CEREBROVASCULAR DISEASE Father               Social History:     Social History     Social History     Marital status: Single     Spouse name: N/A     " Number of children: N/A     Years of education: N/A     Occupational History     Not on file.     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 [employer]            Medications:     Current Outpatient Prescriptions   Medication     potassium chloride (K-TAB,KLOR-CON) 10 MEQ tablet     sodium chloride (OCEAN NASAL SPRAY) 0.65 % nasal spray     fluticasone-salmeterol (ADVAIR) 500-50 MCG/DOSE diskus inhaler     fluticasone (FLONASE) 50 MCG/ACT spray     gabapentin (NEURONTIN) 300 MG capsule     guaiFENesin-codeine (ROBITUSSIN AC) 100-10 MG/5ML SOLN solution     predniSONE (DELTASONE) 10 MG tablet     amoxicillin-clavulanate (AUGMENTIN) 875-125 MG per tablet     Clorazepate Dipotassium 15 MG TABS     simvastatin (ZOCOR) 40 MG tablet     traZODone (DESYREL) 50 MG tablet     hydrochlorothiazide (HYDRODIURIL) 50 MG tablet     albuterol (VENTOLIN HFA) 108 (90 BASE) MCG/ACT Inhaler     aspirin 81 MG EC tablet     cevimeline (EVOXAC) 30 MG capsule     No current facility-administered medications for this visit.             Review of Systems:       Answers for HPI/ROS submitted by the patient on 3/15/2018   General Symptoms: No  Skin Symptoms: No  HENT Symptoms: Yes  EYE SYMPTOMS: No  HEART SYMPTOMS: Yes  LUNG SYMPTOMS: Yes  INTESTINAL SYMPTOMS: No  URINARY SYMPTOMS: No  GYNECOLOGIC SYMPTOMS: No  BREAST SYMPTOMS: No  SKELETAL SYMPTOMS: Yes  BLOOD SYMPTOMS: No  NERVOUS SYSTEM SYMPTOMS: No  MENTAL HEALTH SYMPTOMS: No  Ear pain: No  Ear  discharge: No  Hearing loss: No  Tinnitus: No  Nosebleeds: No  Congestion: Yes  Sinus pain: Yes  Trouble swallowing: No   Voice hoarseness: No  Mouth sores: No  Sore throat: No  Tooth pain: No  Gum tenderness: No  Bleeding gums: No  Change in taste: No  Change in sense of smell: No  Dry mouth: No  Hearing aid used: No  Neck lump: No  Cough: Yes  Sputum or phlegm: Yes  Coughing up blood: No  Difficulty breating or shortness of breath: No  Snoring: Yes  Wheezing: Yes  Difficulty breathing on exertion: No  Nighttime Cough: No  Difficulty breathing when lying flat: No  Chest pain or pressure: No  Fast or irregular heartbeat: No  Pain in legs with walking: No  Trouble breathing while lying down: No  Fingers or toes appear blue: No  High blood pressure: No  Low blood pressure: No  Fainting: No  Murmurs: No  Pacemaker: No  Varicose veins: No  Edema or swelling: No  Wake up at night with shortness of breath: No  Light-headedness: No  Exercise intolerance: Yes  Back pain: No  Muscle aches: No  Neck pain: No  Swollen joints: No  Joint pain: Yes  Bone pain: No  Muscle cramps: No  Muscle weakness: No  Joint stiffness: Yes  Bone fracture: No        PHYSICAL EXAM:  /79 (BP Location: Right arm, Patient Position: Chair, Cuff Size: Adult Large)  Pulse 78  Resp 16  SpO2 95%     General: Well developed, well nourished, No apparent distress  Eyes: Anicteric  Nose: nasal mucosa appears normal.   Ears: Hearing grossly normal  Mouth: Oral mucosa is moist, without any lesions. No oropharyngeal exudate.  Neck: supple, no thyromegaly  Lymphatics: No cervical or supraclavicular nodes  Respiratory: harsh upper airway wheezing on exhalation that emanates to both lungs.    Cardiac: RRR, normal S1, S2. No murmurs. No JVD  Abdomen: Soft, NT/ND  Musculoskeletal: Extremities normal. No clubbing. No cyanosis.  Skin: No rash on limited exam  Neuro: Normal mentation. Normal speech.  Psych:Normal affect           Data:   All laboratory and  imaging data reviewed.      PFT:   2/1/2018: FEV1/FVC ratio 0.78. FEV1 1.65 (81% predicted). FVC 2.05 (77% predicted).     3/22/2018: FEV1/FVC ratio is 82%.  FEV1 is 1.56 L which is 78% predicted and increases to 1.67 L after administration of albuterol.  This is a 7% increase.  Her FVC is 1.9 L which is 73% predicted and increases to 1.95 L after administration of albuterol which is a 2% increase.    PFT Interpretation:  No airflow obstruction. No bronchodilator response  Valid Maneuver    _______________________________________________________  Previously reviewed data:    Sinus CT:  Findings:   Since the prior examination there has been mildly decreased mucosal  thickening in the bilateral maxillary sinuses, ethmoid air cells,  right frontal sinus and sphenoid locules.     The ostiomeatal units appear partially obstructed by mucosal  thickening bilaterally. The bony walls of the paranasal sinuses are  intact.     Normal retromaxillary and pterygopalatine fat.     The adenoid tonsils in the nasopharynx are unremarkable.         Impression:    Mildly improved inflammatory pansinusitis.    Chest CT: I have reviewed the images and agree with the radiologist's read below  11 mm nodule in the right upper lobe with some surrounding  airspace disease. 1.1 cm nodular density in the right middle lobe.  These could be areas of nodular atelectasis and/or infiltrate. They  may be discrete nodules. A nodule seen in the left upper lobe now  measures 6 mm, increased from 5 mm previously. Scattered peripheral  atelectasis and/or fibrosis noted. Some areas of bronchiectasis are  seen in both lower lobes. No pleural or pericardial effusion. There  are mild atherosclerotic changes of the visualized aorta and its  branches. There is no evidence of aortic aneurysm. There are minimal  coronary vascular calcifications consistent with coronary artery  disease. No acute findings in the visualized upper abdomen. No frankly  destructive  bony lesions. Healed rib fractures are seen on the left.         IMPRESSION: Increased size of the nodule in the left upper lobe since  the comparison study. New right-sided nodular airspace disease versus  nodules. Consider three-month followup chest CT versus PET CT for  further evaluation.     Recent Results (from the past 168 hour(s))   General PFT Lab (Please always keep checked)    Collection Time: 03/22/18  8:31 AM   Result Value Ref Range    FVC-Pred 2.60 L    FVC-Pre 1.90 L    FVC-%Pred-Pre 73 %    FEV1-Pre 1.56 L    FEV1-%Pred-Pre 78 %    FEV1FVC-Pred 75 %    FEV1FVC-Pre 82 %    FEFMax-Pred 5.11 L/sec    FEFMax-Pre 6.19 L/sec    FEFMax-%Pred-Pre 121 %    FEF2575-Pred 1.67 L/sec    FEF2575-Pre 1.61 L/sec    SSP9666-%Pred-Pre 96 %    FEF2575-Post 2.18 L/sec    GIO2363-%Pred-Post 130 %    ExpTime-Pre 8.05 sec    FIFMax-Pre 3.48 L/sec    FEV1FEV6-Pred 78 %    FEV1FEV6-Pre 82 %     Again, thank you for allowing me to participate in the care of your patient.      Sincerely,    Manuel Conway MD

## 2018-03-22 NOTE — PROGRESS NOTES
[facility]  Pulmonary Medicine  Visit Clinic Note  March 22, 2018         ASSESSMENT & PLAN       Cough - Her cough did not improve with an ICS/LABA inhaler. On exam, her wheezing sounds upper airway rather than in her lungs. Her sinus symptoms are improved on flonase.      Her chest CT was reviewed again today. She has bibasilar interstitial changes that spare the pleura.  This could be atelectasis, or potentially and ILD like NSIP.  She has a connective tissue disease history, and RA can cause NSIP. ILD may be a cause for her cough.      I will plan on getting another chest CT scan with prone and supine imaging to see if this represents atelectasis vs fibrosis.   I will also arrange for a bronchoscopy with BAL.  Mainly to look at her airway given her stridor on exhalation, but also to get a BAL looking for any eosinophils.      Pulmonary nodule -  The one nodule in her left upper lung has changed from 4.8 mm to 5.8 mm in a year. This is a small change, and hard to place any significance to it.  Too small to pursue any PET imaging.  We are getting a Chest CT in a couple weeks prior to the bronchoscopy, and if there continues to be change we will have to follow this more carefully.     Kang Conway MD          Today's visit note:     Chief Complaint: Agnes Saravia is a 72 year old year old female who is being seen for Breathing Problem (Patient is being seen for follow up of cough/breathing issues/pulm nodule)      HISTORY OF PRESENT ILLNESS:  This is a 68-year-old female with a history of rheumatoid arthritis and Sjogren's disease is presenting for follow-up of cough and wheeze.    At her last visit she had diffuse expiratory wheezes with relatively normal spirometry.  She described a lot of mucus production and both her lungs as well as her sinuses.  She has sinus CT scan that showed pansinusitis.  At that visit she was prescribed Flonase and an ICS-LABA inhaler.  She has noted  "significant improvement in her sinus symptoms, but she still has a cough.  She does not think the inhalers helped too much.  She can now breathe easily through her nose.     She still has a cough which is \"rattling\", and gets much better after she takes guaifenesin codeine cough syrup.    She is starting to notice more pain in her hands, feet, knees and hips.  She thinks she needs to start taking something for her rheumatoid arthritis before she really starts to stiffen up.               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     Depressive disorder      Hyperlipidemia      Insomnia      PONV (postoperative nausea and vomiting)      Rheumatoid arthritis(714.0)      Sjogren's syndrome (H)      Past Surgical History:   Procedure Laterality Date     BLEPHAROPLASTY       DISCECTOMY LUMBAR POSTERIOR MICROSCOPIC ONE LEVEL  8/14/2014    Procedure: DISCECTOMY LUMBAR POSTERIOR MICROSCOPIC ONE LEVEL;  Surgeon: Dudley Bernal MD;  Location:  OR     HYSTERECTOMY TOTAL ABDOMINAL, BILATERAL SALPINGO-OOPHORECTOMY, COMBINED       HYSTERECTOMY, PAP NO LONGER INDICATED       MAMMOPLASTY REDUCTION BILATERAL  5/14/2013    Procedure: MAMMOPLASTY REDUCTION BILATERAL;  BILATERAL REDUCTION MAMMOPLASTY;  Surgeon: Bruce Nash MD;  Location:  [facility]     SHOULDER SURGERY             Family History:     Family History   Problem Relation Age of Onset     CEREBROVASCULAR DISEASE Mother      CEREBROVASCULAR DISEASE Father               Social History:     Social History     Social History     Marital status: Single     Spouse name: N/A     Number of children: N/A     Years of education: N/A     Occupational History     Not on file.     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[employer]            Medications:     Current Outpatient Prescriptions   Medication     potassium chloride (K-TAB,KLOR-CON) 10 MEQ tablet     sodium chloride (OCEAN NASAL SPRAY) 0.65 % nasal spray     fluticasone-salmeterol (ADVAIR) 500-50 MCG/DOSE diskus inhaler     fluticasone (FLONASE) 50 MCG/ACT spray     gabapentin (NEURONTIN) 300 MG capsule     guaiFENesin-codeine (ROBITUSSIN AC) 100-10 MG/5ML SOLN solution     predniSONE (DELTASONE) 10 MG tablet     amoxicillin-clavulanate (AUGMENTIN) 875-125 MG per tablet     Clorazepate Dipotassium 15 MG TABS     simvastatin (ZOCOR) 40 MG tablet     traZODone (DESYREL) 50 MG tablet     hydrochlorothiazide (HYDRODIURIL) 50 MG tablet     albuterol (VENTOLIN HFA) 108 (90 BASE) MCG/ACT Inhaler     aspirin 81 MG EC tablet     cevimeline (EVOXAC) 30 MG capsule     No current facility-administered medications for this visit.             Review of Systems:       Answers for HPI/ROS submitted by the patient on 3/15/2018   General Symptoms: No  Skin Symptoms: No  HENT Symptoms: Yes  EYE SYMPTOMS: No  HEART SYMPTOMS: Yes  LUNG SYMPTOMS: Yes  INTESTINAL SYMPTOMS: No  URINARY SYMPTOMS: No  GYNECOLOGIC SYMPTOMS: No  BREAST SYMPTOMS: No  SKELETAL SYMPTOMS: Yes  BLOOD SYMPTOMS: No  NERVOUS SYSTEM SYMPTOMS: No  MENTAL HEALTH SYMPTOMS: No  Ear pain: No  Ear discharge: No  Hearing loss: No  Tinnitus: No  Nosebleeds: No  Congestion: Yes  Sinus pain: Yes  Trouble swallowing: No   Voice hoarseness: No  Mouth sores: No  Sore throat: No  Tooth pain: No  Gum tenderness: No  Bleeding gums: No  Change in taste: No  Change in sense of smell: No  Dry mouth: No  Hearing aid used:  No  Neck lump: No  Cough: Yes  Sputum or phlegm: Yes  Coughing up blood: No  Difficulty breating or shortness of breath: No  Snoring: Yes  Wheezing: Yes  Difficulty breathing on exertion: No  Nighttime Cough: No  Difficulty breathing when lying flat: No  Chest pain or pressure: No  Fast or irregular heartbeat: No  Pain in legs with walking: No  Trouble breathing while lying down: No  Fingers or toes appear blue: No  High blood pressure: No  Low blood pressure: No  Fainting: No  Murmurs: No  Pacemaker: No  Varicose veins: No  Edema or swelling: No  Wake up at night with shortness of breath: No  Light-headedness: No  Exercise intolerance: Yes  Back pain: No  Muscle aches: No  Neck pain: No  Swollen joints: No  Joint pain: Yes  Bone pain: No  Muscle cramps: No  Muscle weakness: No  Joint stiffness: Yes  Bone fracture: No        PHYSICAL EXAM:  /79 (BP Location: Right arm, Patient Position: Chair, Cuff Size: Adult Large)  Pulse 78  Resp 16  SpO2 95%     General: Well developed, well nourished, No apparent distress  Eyes: Anicteric  Nose: nasal mucosa appears normal.   Ears: Hearing grossly normal  Mouth: Oral mucosa is moist, without any lesions. No oropharyngeal exudate.  Neck: supple, no thyromegaly  Lymphatics: No cervical or supraclavicular nodes  Respiratory: harsh upper airway wheezing on exhalation that emanates to both lungs.    Cardiac: RRR, normal S1, S2. No murmurs. No JVD  Abdomen: Soft, NT/ND  Musculoskeletal: Extremities normal. No clubbing. No cyanosis.  Skin: No rash on limited exam  Neuro: Normal mentation. Normal speech.  Psych:Normal affect           Data:   All laboratory and imaging data reviewed.      PFT:   2/1/2018: FEV1/FVC ratio 0.78. FEV1 1.65 (81% predicted). FVC 2.05 (77% predicted).     3/22/2018: FEV1/FVC ratio is 82%.  FEV1 is 1.56 L which is 78% predicted and increases to 1.67 L after administration of albuterol.  This is a 7% increase.  Her FVC is 1.9 L which is 73% predicted  and increases to 1.95 L after administration of albuterol which is a 2% increase.    PFT Interpretation:  No airflow obstruction. No bronchodilator response  Valid Maneuver    _______________________________________________________  Previously reviewed data:    Sinus CT:  Findings:   Since the prior examination there has been mildly decreased mucosal  thickening in the bilateral maxillary sinuses, ethmoid air cells,  right frontal sinus and sphenoid locules.     The ostiomeatal units appear partially obstructed by mucosal  thickening bilaterally. The bony walls of the paranasal sinuses are  intact.     Normal retromaxillary and pterygopalatine fat.     The adenoid tonsils in the nasopharynx are unremarkable.         Impression:    Mildly improved inflammatory pansinusitis.    Chest CT: I have reviewed the images and agree with the radiologist's read below  11 mm nodule in the right upper lobe with some surrounding  airspace disease. 1.1 cm nodular density in the right middle lobe.  These could be areas of nodular atelectasis and/or infiltrate. They  may be discrete nodules. A nodule seen in the left upper lobe now  measures 6 mm, increased from 5 mm previously. Scattered peripheral  atelectasis and/or fibrosis noted. Some areas of bronchiectasis are  seen in both lower lobes. No pleural or pericardial effusion. There  are mild atherosclerotic changes of the visualized aorta and its  branches. There is no evidence of aortic aneurysm. There are minimal  coronary vascular calcifications consistent with coronary artery  disease. No acute findings in the visualized upper abdomen. No frankly  destructive bony lesions. Healed rib fractures are seen on the left.         IMPRESSION: Increased size of the nodule in the left upper lobe since  the comparison study. New right-sided nodular airspace disease versus  nodules. Consider three-month followup chest CT versus PET CT for  further evaluation.     Recent Results (from the  past 168 hour(s))   General PFT Lab (Please always keep checked)    Collection Time: 03/22/18  8:31 AM   Result Value Ref Range    FVC-Pred 2.60 L    FVC-Pre 1.90 L    FVC-%Pred-Pre 73 %    FEV1-Pre 1.56 L    FEV1-%Pred-Pre 78 %    FEV1FVC-Pred 75 %    FEV1FVC-Pre 82 %    FEFMax-Pred 5.11 L/sec    FEFMax-Pre 6.19 L/sec    FEFMax-%Pred-Pre 121 %    FEF2575-Pred 1.67 L/sec    FEF2575-Pre 1.61 L/sec    [id number]-%Pred-Pre 96 %    FEF2575-Post 2.18 L/sec    [id number]-%Pred-Post 130 %    ExpTime-Pre 8.05 sec    FIFMax-Pre 3.48 L/sec    FEV1FEV6-Pred 78 %    FEV1FEV6-Pre 82 %

## 2018-03-22 NOTE — NURSING NOTE
Chief Complaint   Patient presents with     Breathing Problem     Patient is being seen for follow up of cough/breathing issues/pulm nodule      Lubna Resendiz CMA at 9:21 AM on 3/22/2018

## 2018-04-11 ENCOUNTER — TELEPHONE (OUTPATIENT)
Dept: PULMONOLOGY | Facility: CLINIC | Age: 73
End: 2018-04-11

## 2018-04-11 ENCOUNTER — HOSPITAL ENCOUNTER (OUTPATIENT)
Dept: CT IMAGING | Facility: CLINIC | Age: 73
End: 2018-04-11
Payer: MEDICARE

## 2018-04-11 ENCOUNTER — HOSPITAL ENCOUNTER (OUTPATIENT)
Facility: CLINIC | Age: 73
Discharge: HOME | End: 2018-04-11
Payer: MEDICARE

## 2018-04-11 ENCOUNTER — SURGERY (OUTPATIENT)
Age: 73
End: 2018-04-11

## 2018-04-11 VITALS
SYSTOLIC BLOOD PRESSURE: 116 MMHG | BODY MASS INDEX: 35.08 KG/M2 | HEIGHT: 63 IN | RESPIRATION RATE: 13 BRPM | OXYGEN SATURATION: 96 % | DIASTOLIC BLOOD PRESSURE: 81 MMHG | WEIGHT: 198 LBS

## 2018-04-11 DIAGNOSIS — J84.9 ILD (INTERSTITIAL LUNG DISEASE) (H): Primary | ICD-10-CM

## 2018-04-11 DIAGNOSIS — J84.9 ILD (INTERSTITIAL LUNG DISEASE) (H): ICD-10-CM

## 2018-04-11 LAB
APPEARANCE FLD: CLEAR
CK SERPL-CCNC: 37 U/L (ref 30–225)
COLOR FLD: COLORLESS
COPATH REPORT: NORMAL
CRP SERPL-MCNC: 20 MG/L (ref 0–8)
ERYTHROCYTE [SEDIMENTATION RATE] IN BLOOD BY WESTERGREN METHOD: 20 MM/H (ref 0–30)
GRAM STN SPEC: NORMAL
GRAM STN SPEC: NORMAL
LYMPHOCYTES NFR FLD MANUAL: 41 %
NEUTS BAND NFR FLD MANUAL: 3 %
OTHER CELLS FLD MANUAL: 56 %
SPECIMEN SOURCE FLD: NORMAL
SPECIMEN SOURCE: NORMAL
WBC # FLD AUTO: 247 /UL

## 2018-04-11 PROCEDURE — 88108 CYTOPATH CONCENTRATE TECH: CPT

## 2018-04-11 PROCEDURE — 82085 ASSAY OF ALDOLASE: CPT

## 2018-04-11 PROCEDURE — 86334 IMMUNOFIX E-PHORESIS SERUM: CPT

## 2018-04-11 PROCEDURE — 87206 SMEAR FLUORESCENT/ACID STAI: CPT

## 2018-04-11 PROCEDURE — 85652 RBC SED RATE AUTOMATED: CPT

## 2018-04-11 PROCEDURE — 99152 MOD SED SAME PHYS/QHP 5/>YRS: CPT

## 2018-04-11 PROCEDURE — 87633 RESP VIRUS 12-25 TARGETS: CPT

## 2018-04-11 PROCEDURE — 36415 COLL VENOUS BLD VENIPUNCTURE: CPT

## 2018-04-11 PROCEDURE — 86235 NUCLEAR ANTIGEN ANTIBODY: CPT

## 2018-04-11 PROCEDURE — 86140 C-REACTIVE PROTEIN: CPT

## 2018-04-11 PROCEDURE — 87015 SPECIMEN INFECT AGNT CONCNTJ: CPT

## 2018-04-11 PROCEDURE — 27210995 ZZH RX 272

## 2018-04-11 PROCEDURE — 82784 ASSAY IGA/IGD/IGG/IGM EACH: CPT

## 2018-04-11 PROCEDURE — 86606 ASPERGILLUS ANTIBODY: CPT | Mod: 91

## 2018-04-11 PROCEDURE — 89051 BODY FLUID CELL COUNT: CPT

## 2018-04-11 PROCEDURE — 71250 CT THORAX DX C-: CPT

## 2018-04-11 PROCEDURE — 25000128 H RX IP 250 OP 636

## 2018-04-11 PROCEDURE — 87070 CULTURE OTHR SPECIMN AEROBIC: CPT

## 2018-04-11 PROCEDURE — 87102 FUNGUS ISOLATION CULTURE: CPT

## 2018-04-11 PROCEDURE — 31624 DX BRONCHOSCOPE/LAVAGE: CPT

## 2018-04-11 PROCEDURE — 84165 PROTEIN E-PHORESIS SERUM: CPT

## 2018-04-11 PROCEDURE — 82787 IGG 1 2 3 OR 4 EACH: CPT

## 2018-04-11 PROCEDURE — 00000402 ZZHCL STATISTIC TOTAL PROTEIN

## 2018-04-11 PROCEDURE — 82550 ASSAY OF CK (CPK): CPT

## 2018-04-11 PROCEDURE — 86331 IMMUNODIFFUSION OUCHTERLONY: CPT

## 2018-04-11 PROCEDURE — 88312 SPECIAL STAINS GROUP 1: CPT

## 2018-04-11 PROCEDURE — 87116 MYCOBACTERIA CULTURE: CPT

## 2018-04-11 PROCEDURE — 87205 SMEAR GRAM STAIN: CPT

## 2018-04-11 PROCEDURE — 25000125 ZZHC RX 250

## 2018-04-11 RX ORDER — LIDOCAINE HYDROCHLORIDE 40 MG/ML
INJECTION, SOLUTION RETROBULBAR PRN
Status: DISCONTINUED | OUTPATIENT
Start: 2018-04-11 | End: 2018-04-11 | Stop reason: HOSPADM

## 2018-04-11 RX ORDER — ALBUTEROL SULFATE 0.83 MG/ML
SOLUTION RESPIRATORY (INHALATION) PRN
Status: DISCONTINUED | OUTPATIENT
Start: 2018-04-11 | End: 2018-04-11 | Stop reason: HOSPADM

## 2018-04-11 RX ORDER — MAGNESIUM HYDROXIDE 1200 MG/15ML
LIQUID ORAL PRN
Status: DISCONTINUED | OUTPATIENT
Start: 2018-04-11 | End: 2018-04-11 | Stop reason: HOSPADM

## 2018-04-11 RX ORDER — FENTANYL CITRATE 50 UG/ML
INJECTION, SOLUTION INTRAMUSCULAR; INTRAVENOUS PRN
Status: DISCONTINUED | OUTPATIENT
Start: 2018-04-11 | End: 2018-04-11 | Stop reason: HOSPADM

## 2018-04-11 RX ADMIN — FENTANYL CITRATE 50 MCG: 50 INJECTION, SOLUTION INTRAMUSCULAR; INTRAVENOUS at 11:36

## 2018-04-11 RX ADMIN — LIDOCAINE HYDROCHLORIDE 10 ML: 20 SOLUTION ORAL; TOPICAL at 11:27

## 2018-04-11 RX ADMIN — LIDOCAINE HYDROCHLORIDE 9 ML: 10 INJECTION, SOLUTION EPIDURAL; INFILTRATION; INTRACAUDAL; PERINEURAL at 11:40

## 2018-04-11 RX ADMIN — MIDAZOLAM 1 MG: 1 INJECTION INTRAMUSCULAR; INTRAVENOUS at 11:36

## 2018-04-11 RX ADMIN — SODIUM CHLORIDE 120 ML: 900 IRRIGANT IRRIGATION at 11:40

## 2018-04-11 RX ADMIN — LIDOCAINE HYDROCHLORIDE 9 ML: 40 INJECTION, SOLUTION RETROBULBAR; TOPICAL at 11:39

## 2018-04-11 RX ADMIN — MIDAZOLAM 1 MG: 1 INJECTION INTRAMUSCULAR; INTRAVENOUS at 11:33

## 2018-04-11 RX ADMIN — LIDOCAINE HYDROCHLORIDE 3 ML: 10 INJECTION, SOLUTION EPIDURAL; INFILTRATION; INTRACAUDAL; PERINEURAL at 11:42

## 2018-04-11 RX ADMIN — FENTANYL CITRATE 25 MCG: 50 INJECTION, SOLUTION INTRAMUSCULAR; INTRAVENOUS at 11:33

## 2018-04-11 RX ADMIN — ALBUTEROL SULFATE 2.5 MG: 2.5 SOLUTION RESPIRATORY (INHALATION) at 11:10

## 2018-04-11 RX ADMIN — FENTANYL CITRATE 50 MCG: 50 INJECTION, SOLUTION INTRAMUSCULAR; INTRAVENOUS at 11:25

## 2018-04-11 RX ADMIN — LIDOCAINE HYDROCHLORIDE 3 ML: 40 INJECTION, SOLUTION RETROBULBAR; TOPICAL at 11:10

## 2018-04-11 NOTE — OR NURSING
Procedure: bronch with lavage  Sedation: conscious sedation   Specimens: sent to lab.   O2: 2L/NC  Tolerated procedure: fair  Pt to recovery area in stable condition accompanied by RN.   Other:  none    Ida Ibarra RN

## 2018-04-12 LAB
ALBUMIN SERPL ELPH-MCNC: 3.8 G/DL (ref 3.7–5.1)
ALDOLASE SERPL-CCNC: 3 U/L (ref 1.5–8.1)
ALPHA1 GLOB SERPL ELPH-MCNC: 0.4 G/DL (ref 0.2–0.4)
ALPHA2 GLOB SERPL ELPH-MCNC: 1 G/DL (ref 0.5–0.9)
B-GLOBULIN SERPL ELPH-MCNC: 0.8 G/DL (ref 0.6–1)
BRONCHOSCOPY: NORMAL
CMV DNA SPEC NAA+PROBE-ACNC: NORMAL [IU]/ML
CMV DNA SPEC NAA+PROBE-LOG#: NORMAL {LOG_IU}/ML
ENA JO1 IGG SER-ACNC: <0.2 AI (ref 0–0.9)
ENA SCL70 IGG SER IA-ACNC: <0.2 AI (ref 0–0.9)
ENA SS-A IGG SER IA-ACNC: <0.2 AI (ref 0–0.9)
ENA SS-B IGG SER IA-ACNC: <0.2 AI (ref 0–0.9)
FLUAV H1 2009 PAND RNA SPEC QL NAA+PROBE: NEGATIVE
FLUAV H1 RNA SPEC QL NAA+PROBE: NEGATIVE
FLUAV H3 RNA SPEC QL NAA+PROBE: NEGATIVE
FLUAV RNA SPEC QL NAA+PROBE: NEGATIVE
FLUBV RNA SPEC QL NAA+PROBE: NEGATIVE
GAMMA GLOB SERPL ELPH-MCNC: 0.6 G/DL (ref 0.7–1.6)
HADV DNA SPEC QL NAA+PROBE: NEGATIVE
HADV DNA SPEC QL NAA+PROBE: NEGATIVE
HMPV RNA SPEC QL NAA+PROBE: NEGATIVE
HPIV1 RNA SPEC QL NAA+PROBE: NEGATIVE
HPIV2 RNA SPEC QL NAA+PROBE: NEGATIVE
HPIV3 RNA SPEC QL NAA+PROBE: NEGATIVE
IGA SERPL-MCNC: 125 MG/DL (ref 70–380)
IGM SERPL-MCNC: 75 MG/DL (ref 60–265)
M PROTEIN SERPL ELPH-MCNC: 0 G/DL
MICROBIOLOGIST REVIEW: NORMAL
PROT PATTERN SERPL ELPH-IMP: ABNORMAL
PROT PATTERN SERPL IFE-IMP: NORMAL
RHINOVIRUS RNA SPEC QL NAA+PROBE: NEGATIVE
RSV RNA SPEC QL NAA+PROBE: NEGATIVE
RSV RNA SPEC QL NAA+PROBE: NEGATIVE
SPECIMEN SOURCE: NORMAL
SPECIMEN SOURCE: NORMAL

## 2018-04-13 LAB
ACID FAST STN SPEC QL: NORMAL
ACID FAST STN SPEC QL: NORMAL
BACTERIA SPEC CULT: NO GROWTH
IGG SERPL-MCNC: 569 MG/DL (ref 695–1620)
IGG1 SER-MCNC: 306 MG/DL (ref 300–856)
IGG2 SER-MCNC: 106 MG/DL (ref 158–761)
IGG3 SER-MCNC: 25 MG/DL (ref 24–192)
IGG4 SER-MCNC: 16 MG/DL (ref 11–86)
SPECIMEN SOURCE: NORMAL
SPECIMEN SOURCE: NORMAL

## 2018-04-16 ENCOUNTER — TELEPHONE (OUTPATIENT)
Dept: PULMONOLOGY | Facility: CLINIC | Age: 73
End: 2018-04-16

## 2018-04-16 DIAGNOSIS — R91.1 PULMONARY NODULE: Primary | ICD-10-CM

## 2018-04-16 LAB
A FLAVUS AB SER QL ID: NORMAL
A FUMIGATUS1 AB SER QL ID: NORMAL
A FUMIGATUS2 AB SER QL: NORMAL
A FUMIGATUS3 AB SER QL ID: NORMAL
A FUMIGATUS6 AB SER QL ID: NORMAL
A PULLULANS AB SER QL ID: NORMAL
LACEYELLA SACCHARI AB SER QL: NORMAL
PIGEON DROP IGE QN: NORMAL
S RECTIVIRGULA AB SER QL ID: NORMAL
S VIRIDIS AB SER QL: NORMAL
T CANDIDUS AB SER QL: NORMAL
T VULGARIS AB SER QL ID: NORMAL

## 2018-04-16 NOTE — TELEPHONE ENCOUNTER
Case discussed with radiology.      There is nodule that is growing in Left upper lobe.  Spiculated.  Concerning for malignancy.      Will get PET-CT to investigate.     Ms Manjit understands and agrees.

## 2018-04-23 ENCOUNTER — HOSPITAL ENCOUNTER (OUTPATIENT)
Dept: PET IMAGING | Facility: CLINIC | Age: 73
Discharge: HOME | End: 2018-04-23
Payer: MEDICARE

## 2018-04-23 DIAGNOSIS — R91.1 PULMONARY NODULE: ICD-10-CM

## 2018-04-23 LAB
CREAT BLD-MCNC: 0.8 MG/DL (ref 0.52–1.04)
GFR SERPL CREATININE-BSD FRML MDRD: 70 ML/MIN/1.7M2
GLUCOSE BLDC GLUCOMTR-MCNC: 104 MG/DL (ref 70–99)

## 2018-04-23 PROCEDURE — 82962 GLUCOSE BLOOD TEST: CPT

## 2018-04-23 PROCEDURE — 82565 ASSAY OF CREATININE: CPT

## 2018-04-23 PROCEDURE — 71260 CT THORAX DX C+: CPT

## 2018-04-23 PROCEDURE — 34300033 ZZH RX 343

## 2018-04-23 PROCEDURE — A9552 F18 FDG: HCPCS

## 2018-04-23 PROCEDURE — 25000128 H RX IP 250 OP 636

## 2018-04-23 RX ORDER — IOPAMIDOL 755 MG/ML
1-135 INJECTION, SOLUTION INTRAVASCULAR ONCE
Status: COMPLETED | OUTPATIENT
Start: 2018-04-23 | End: 2018-04-23

## 2018-04-23 RX ADMIN — FLUDEOXYGLUCOSE F-18 11.11 MCI.: 500 INJECTION, SOLUTION INTRAVENOUS at 10:36

## 2018-04-23 RX ADMIN — IOPAMIDOL 100 ML: 755 INJECTION, SOLUTION INTRAVENOUS at 11:35

## 2018-04-30 ENCOUNTER — TELEPHONE (OUTPATIENT)
Dept: PULMONOLOGY | Facility: CLINIC | Age: 73
End: 2018-04-30

## 2018-04-30 DIAGNOSIS — J98.01 BRONCHOSPASM: Primary | ICD-10-CM

## 2018-04-30 RX ORDER — PREDNISONE 20 MG/1
40 TABLET ORAL DAILY
Qty: 20 TABLET | Refills: 0 | Status: SHIPPED | OUTPATIENT
Start: 2018-04-30 | End: 2018-05-10

## 2018-04-30 NOTE — TELEPHONE ENCOUNTER
PET-CT did not show activity in the TAMMI, however the nodule is small.  She will need a repeat CT ordered.  I will order it for 6 months from now.      To help treat her symptoms, I will give her 2 weeks of 40 mg of prednisone.  This is to treat the airway edema that was noted on bronchoscopy, and possible diagnosis of asthma.      I will call her back in 2 weeks to see if there is any improvement in her symptoms.  If so, then will put her back on inhalers.

## 2018-05-10 LAB
FUNGUS SPEC CULT: NORMAL
SPECIMEN SOURCE: NORMAL

## 2018-05-12 DIAGNOSIS — R05.9 COUGH: ICD-10-CM

## 2018-05-12 NOTE — TELEPHONE ENCOUNTER
Requested Prescriptions   Pending Prescriptions Disp Refills     guaiFENesin-codeine (ROBITUSSIN AC) 100-10 MG/5ML SOLN solution [Pharmacy Med Name: Guaifenesin-Codeine Oral Solution 100-10 MG/5ML] 236 mL 2     Sig: TAKE 10 MILLILITERS BY MOUTH EVERY 6 HOURS AS NEEDED    There is no refill protocol information for this order            Last Written Prescription Date:  1/30/18  Last Fill Quantity: 236 mL,   # refills: 3  Last Office Visit: 1/30/18 ([place])  Future Office visit:       Routing refill request to provider for review/approval because:  Drug not on the [facility], [facility] or [facility] refill protocol or controlled substance

## 2018-05-15 ENCOUNTER — MYC MEDICAL ADVICE (OUTPATIENT)
Dept: FAMILY MEDICINE | Facility: CLINIC | Age: 73
End: 2018-05-15

## 2018-05-15 DIAGNOSIS — R05.9 COUGH: ICD-10-CM

## 2018-05-15 RX ORDER — CODEINE PHOSPHATE AND GUAIFENESIN 10; 100 MG/5ML; MG/5ML
SOLUTION ORAL
Qty: 236 ML | Refills: 2 | Status: SHIPPED | OUTPATIENT
Start: 2018-05-15 | End: 2018-05-15

## 2018-05-16 ENCOUNTER — TELEPHONE (OUTPATIENT)
Dept: PULMONOLOGY | Facility: CLINIC | Age: 73
End: 2018-05-16

## 2018-05-16 DIAGNOSIS — R91.8 PULMONARY NODULES: ICD-10-CM

## 2018-05-16 DIAGNOSIS — K21.9 GASTROESOPHAGEAL REFLUX DISEASE, ESOPHAGITIS PRESENCE NOT SPECIFIED: Primary | ICD-10-CM

## 2018-05-16 RX ORDER — PANTOPRAZOLE SODIUM 40 MG/1
40 TABLET, DELAYED RELEASE ORAL DAILY
Qty: 30 TABLET | Refills: 3 | Status: SHIPPED | OUTPATIENT
Start: 2018-05-16 | End: 2018-08-17

## 2018-05-16 RX ORDER — CODEINE PHOSPHATE AND GUAIFENESIN 10; 100 MG/5ML; MG/5ML
SOLUTION ORAL
Qty: 236 ML | Refills: 2 | Status: SHIPPED | OUTPATIENT
Start: 2018-05-16 | End: 2018-07-23

## 2018-05-16 NOTE — TELEPHONE ENCOUNTER
Still with cough, hoarse voice.  She did not think that 40 mg prednisone increase helped much.      I think we should try to treat any potential silent acid reflux.  Will put her on 40 mg pantoprazole a day. Give a 2 month trial.    Also, I am concerned that her cevimeline could be causing bronchoconstriction.  This may be a cause of her wheezing.  I have asked her to try to back off on the dose if not stop it altogether.     Finally, her PET-CT did not show that the nodule was active.  It is a sub-centimeter nodule though.  Will repeat a chest CT scan in 6 months.

## 2018-05-16 NOTE — TELEPHONE ENCOUNTER
Pt is calling in after speaking with her pharmacy this morning.  They still have not received the Rx order for her cough syrup.  Please refax over to pharmacy.      [id number] - [place], [place] - [address]    Pt is leaving in early afternoon for the cabin.  Please do as soon as possible.

## 2018-05-16 NOTE — TELEPHONE ENCOUNTER
guaiFENesin-codeine (ROBITUSSIN AC) 100-10 MG/5ML SOLN solution 236 mL 2 5/15/2018       Last Written Prescription Date:  5/15/2018  Last Fill Quantity: 236mL,   # refills: 2  Last Office Visit: 1/30/2018  Future Office visit: Unknown       Routing refill request to provider for review/approval because:  Drug not on the [facility], P or [facility] refill protocol or controlled substance

## 2018-06-05 ENCOUNTER — MYC MEDICAL ADVICE (OUTPATIENT)
Dept: FAMILY MEDICINE | Facility: CLINIC | Age: 73
End: 2018-06-05

## 2018-06-05 DIAGNOSIS — F41.1 GAD (GENERALIZED ANXIETY DISORDER): ICD-10-CM

## 2018-06-05 DIAGNOSIS — M35.00 SJOGREN'S SYNDROME, WITH UNSPECIFIED ORGAN INVOLVEMENT (H): ICD-10-CM

## 2018-06-06 LAB
MYCOBACTERIUM SPEC CULT: NORMAL
MYCOBACTERIUM SPEC CULT: NORMAL
SPECIMEN SOURCE: NORMAL

## 2018-06-06 RX ORDER — CLORAZEPATE DIPOTASSIUM 15 MG/1
TABLET ORAL
COMMUNITY
Start: 2018-06-06

## 2018-06-06 NOTE — TELEPHONE ENCOUNTER
I let the patient now via orderTalk that this script was sent to [facility].    Jeb Mack, [facility]

## 2018-07-23 ENCOUNTER — OFFICE VISIT (OUTPATIENT)
Dept: FAMILY MEDICINE | Facility: CLINIC | Age: 73
End: 2018-07-23
Payer: MEDICARE

## 2018-07-23 VITALS
TEMPERATURE: 97.6 F | DIASTOLIC BLOOD PRESSURE: 74 MMHG | HEIGHT: 63 IN | OXYGEN SATURATION: 97 % | HEART RATE: 65 BPM | SYSTOLIC BLOOD PRESSURE: 132 MMHG

## 2018-07-23 DIAGNOSIS — Z78.0 POST-MENOPAUSAL: ICD-10-CM

## 2018-07-23 DIAGNOSIS — Z12.31 ENCOUNTER FOR SCREENING MAMMOGRAM FOR BREAST CANCER: ICD-10-CM

## 2018-07-23 DIAGNOSIS — I10 ESSENTIAL HYPERTENSION: ICD-10-CM

## 2018-07-23 DIAGNOSIS — R91.8 PULMONARY NODULES: Primary | ICD-10-CM

## 2018-07-23 DIAGNOSIS — R05.9 COUGH: ICD-10-CM

## 2018-07-23 DIAGNOSIS — M35.00 SJOGREN'S SYNDROME, WITH UNSPECIFIED ORGAN INVOLVEMENT (H): ICD-10-CM

## 2018-07-23 LAB
ANION GAP SERPL CALCULATED.3IONS-SCNC: 8 MMOL/L (ref 3–14)
BASOPHILS # BLD AUTO: 0.1 10E9/L (ref 0–0.2)
BASOPHILS NFR BLD AUTO: 0.5 %
BUN SERPL-MCNC: 17 MG/DL (ref 7–30)
CALCIUM SERPL-MCNC: 9.2 MG/DL (ref 8.5–10.1)
CHLORIDE SERPL-SCNC: 101 MMOL/L (ref 94–109)
CO2 SERPL-SCNC: 31 MMOL/L (ref 20–32)
CREAT SERPL-MCNC: 0.77 MG/DL (ref 0.52–1.04)
DIFFERENTIAL METHOD BLD: ABNORMAL
EOSINOPHIL # BLD AUTO: 0.1 10E9/L (ref 0–0.7)
EOSINOPHIL NFR BLD AUTO: 0.9 %
ERYTHROCYTE [DISTWIDTH] IN BLOOD BY AUTOMATED COUNT: 14.7 % (ref 10–15)
GFR SERPL CREATININE-BSD FRML MDRD: 73 ML/MIN/1.7M2
GLUCOSE SERPL-MCNC: 121 MG/DL (ref 70–99)
HCT VFR BLD AUTO: 41.4 % (ref 35–47)
HGB BLD-MCNC: 13.1 G/DL (ref 11.7–15.7)
LYMPHOCYTES # BLD AUTO: 1.1 10E9/L (ref 0.8–5.3)
LYMPHOCYTES NFR BLD AUTO: 8.4 %
MCH RBC QN AUTO: 28.4 PG (ref 26.5–33)
MCHC RBC AUTO-ENTMCNC: 31.6 G/DL (ref 31.5–36.5)
MCV RBC AUTO: 90 FL (ref 78–100)
MONOCYTES # BLD AUTO: 0.9 10E9/L (ref 0–1.3)
MONOCYTES NFR BLD AUTO: 6.7 %
NEUTROPHILS # BLD AUTO: 10.9 10E9/L (ref 1.6–8.3)
NEUTROPHILS NFR BLD AUTO: 83.5 %
PLATELET # BLD AUTO: 372 10E9/L (ref 150–450)
POTASSIUM SERPL-SCNC: 3.5 MMOL/L (ref 3.4–5.3)
RBC # BLD AUTO: 4.61 10E12/L (ref 3.8–5.2)
SODIUM SERPL-SCNC: 140 MMOL/L (ref 133–144)
WBC # BLD AUTO: 13.1 10E9/L (ref 4–11)

## 2018-07-23 PROCEDURE — 99214 OFFICE O/P EST MOD 30 MIN: CPT | Performed by: INTERNAL MEDICINE

## 2018-07-23 PROCEDURE — 80048 BASIC METABOLIC PNL TOTAL CA: CPT | Performed by: INTERNAL MEDICINE

## 2018-07-23 PROCEDURE — 85025 COMPLETE CBC W/AUTO DIFF WBC: CPT | Performed by: INTERNAL MEDICINE

## 2018-07-23 PROCEDURE — 36415 COLL VENOUS BLD VENIPUNCTURE: CPT | Performed by: INTERNAL MEDICINE

## 2018-07-23 RX ORDER — CODEINE PHOSPHATE AND GUAIFENESIN 10; 100 MG/5ML; MG/5ML
SOLUTION ORAL
Qty: 473 ML | Refills: 5 | Status: SHIPPED | OUTPATIENT
Start: 2018-07-23 | End: 2019-05-13

## 2018-07-23 NOTE — PROGRESS NOTES
"[facility]  CLINIC PROGRESS NOTE    Subjective:  Pulmonary nodules    Agnes Saravia has continued to have cough and doing well with cough syrup.  She is feeling a bit better with protonix.  She is also staying active.  No acute concerns today.  She is due for recheck of her basic metabolic panel and complete blood counts  Hypertension   Blood pressure has been well controlled with current dose of hydrochlorothiazide 50 mg.     Past medical history, medications, allergies, social history, family history reviewed and updated in [facility] as of 7/23/2018 .    ROS  CONSTITUTIONAL: no fatigue, no unexpected change in weight  SKIN: no worrisome rashes, no worrisome moles, no worrisome lesions  EYES: no acute vision problems or changes  ENT: no ear problems, no mouth problems, no throat problems  RESP: as above   CV: no chest pain, no palpitations, no new or worsening peripheral edema  GI: no nausea, no vomiting, no constipation, no diarrhea  : no frequency, no dysuria, no hematuria  MS: no claudication, no myalgias, no joint aches  PSYCHIATRIC: no changes in mood or affect      Objective:  Vitals  /74 (BP Location: Left arm, Patient Position: Chair, Cuff Size: Adult Large)  Pulse 65  Temp 97.6  F (36.4  C) (Tympanic)  Ht 5' 3\" (1.6 m)  SpO2 97%  GEN: Alert Oriented x3 NAD  HEENT: Atraumatic, normocephalic, neck supple, no thyromegaly, negative cervical adenopathy  TM: TM bilaterally pearly and grey with normal light reflex  CV: RRR no murmurs or rubs  PULM: wheezing all lung fields  ABD: Soft, nontender nondistended, no hepatosplenomegally  SKIN: No visible skin lesion or ulcerations  EXT: No edema bilateral lower extremities  NEURO: Gait and station, No focal neurologic deficits  PSYCH: Mood good, affect mood congruent    No images are attached to the encounter.    No results found for this or any previous visit (from the past 24 hour(s)).    Assessment/Plan:  Patient Instructions   (R91.8) " Pulmonary nodules  (primary encounter diagnosis)  Comment: We will plan for CT scan again in the fall.  Noted that PET scan did not show any active nodules  Plan:     (I10) Essential hypertension  Comment: blood pressure is excellent today   Plan: BASIC METABOLIC PANEL, CBC with platelets and         differential            (R05) Cough  Comment: OK to refill cough suppressant   Plan: guaiFENesin-codeine (ROBITUSSIN AC) 100-10         MG/5ML SOLN solution            (M35.00) Sjogren's syndrome, with unspecified organ involvement (H)  Comment: Plan to follow up in rheumatology - no longer taking rituximab at this time.  Plan: CBC with platelets and differential               Follow up in 6 months      Disclaimer: This note consists of symbols derived from keyboarding, dictation and/or voice recognition software. As a result, there may be errors in the script that have gone undetected. Please consider this when interpreting information found in this chart.    José Miguel Wallace MD  [phone]

## 2018-07-23 NOTE — PATIENT INSTRUCTIONS
(R91.8) Pulmonary nodules  (primary encounter diagnosis)  Comment: We will plan for CT scan again in the fall.  Noted that PET scan did not show any active nodules  Plan:     (I10) Essential hypertension  Comment: blood pressure is excellent today   Plan: BASIC METABOLIC PANEL, CBC with platelets and         differential            (R05) Cough  Comment: OK to refill cough suppressant   Plan: guaiFENesin-codeine (ROBITUSSIN AC) 100-10         MG/5ML SOLN solution            (M35.00) Sjogren's syndrome, with unspecified organ involvement (H)  Comment: Plan to follow up in rheumatology - no longer taking rituximab at this time.  Plan: CBC with platelets and differential

## 2018-07-23 NOTE — MR AVS SNAPSHOT
After Visit Summary   7/23/2018    Agnes Saravia    MRN: [id number]           Patient Information     Date Of Birth          1945        Visit Information        Provider Department      7/23/2018 8:30 AM José Miguel Wallace MD [facility]        Today's Diagnoses     Pulmonary nodules    -  1    Essential hypertension        Cough        Sjogren's syndrome, with unspecified organ involvement (H)        Encounter for screening mammogram for breast cancer        Post-menopausal          Care Instructions    (R91.8) Pulmonary nodules  (primary encounter diagnosis)  Comment: We will plan for CT scan again in the fall.  Noted that PET scan did not show any active nodules  Plan:     (I10) Essential hypertension  Comment: blood pressure is excellent today   Plan: BASIC METABOLIC PANEL, CBC with platelets and         differential            (R05) Cough  Comment: OK to refill cough suppressant   Plan: guaiFENesin-codeine (ROBITUSSIN AC) 100-10         MG/5ML SOLN solution            (M35.00) Sjogren's syndrome, with unspecified organ involvement (H)  Comment: Plan to follow up in rheumatology - no longer taking rituximab at this time.  Plan: CBC with platelets and differential                     Follow-ups after your visit        Your next 10 appointments already scheduled     Nov 05, 2018  7:00 AM CST   CT CHEST W/O CONTRAST with SHCT2   [facility] CT ([facility])    [address] [place] [address] [phone]           Please bring any scans or X-rays taken at other hospitals, if similar tests were done. Also bring a list of your medicines, including vitamins, minerals and over-the-counter drugs. It is safest to leave personal items at home.  Be sure to tell your doctor:   If you have any allergies.   If there s any chance you are pregnant.   If you are breastfeeding.  You do not need to do anything special to prepare for this exam.  Please  wear loose clothing, such as a sweat suit or jogging clothes. Avoid snaps, zippers and other metal. We may ask you to undress and put on a hospital gown.            Nov 15, 2018  9:30 AM CST   FULL PULMONARY FUNCTION with  PFL KWABENA   [facility] ([facility])    [address]  3rd [address]  [facility] [address]   [phone]            Nov 15, 2018 10:40 AM CST   (Arrive by 10:25 AM)   Return Visit with Manuel Conway MD   [facility] ([facility])    [address]  [address]  [place] [address]   [phone]              Future tests that were ordered for you today     Open Future Orders        Priority Expected Expires Ordered    DX Hip/Pelvis/Spine Routine 7/23/2018 7/23/2019 7/23/2018    *MA Screening Digital Bilateral Routine  7/23/2019 7/23/2018            Who to contact     If you have questions or need follow up information about today's clinic visit or your schedule please contact [facility] directly at [phone].  Normal or non-critical lab and imaging results will be communicated to you by [employer], letter or phone within 4 business days after the clinic has received the results. If you do not hear from us within 7 days, please contact the clinic through [employer] or phone. If you have a critical or abnormal lab result, we will notify you by phone as soon as possible.  Submit refill requests through [employer] or call your pharmacy and they will forward the refill request to us. Please allow 3 business days for your refill to be completed.          Additional Information About Your Visit        [employer] Information     [employer] gives you secure access to your electronic health record. If you see a primary care provider, you can also send messages to your care team and make appointments. If you have questions, please call your primary care clinic.  If you do not have  "a primary care provider, please call [phone] and they will assist you.        Care EveryWhere ID     This is your Care EveryWhere ID. This could be used by other organizations to access your [place] medical records  [id number]        Your Vitals Were     Pulse Temperature Height Pulse Oximetry          65 97.6  F (36.4  C) (Tympanic) 5' 3\" (1.6 m) 97%         Blood Pressure from Last 3 Encounters:   07/23/18 132/74   04/11/18 116/81   03/22/18 133/79    Weight from Last 3 Encounters:   04/11/18 198 lb (89.8 kg)   02/01/18 198 lb (89.8 kg)   01/30/18 198 lb (89.8 kg)              We Performed the Following     BASIC METABOLIC PANEL     CBC with platelets and differential          Where to get your medicines      Some of these will need a paper prescription and others can be bought over the counter.  Ask your nurse if you have questions.     Bring a paper prescription for each of these medications     guaiFENesin-codeine 100-10 MG/5ML Soln solution          Primary Care Provider Office Phone # Fax #    José Miguel Wallace [phone] [address]  [facility] [address]        Equal Access to Services     [facility] : Hadii aad ku hadasho Soomaali, waaxda luqadaha, qaybta kaalmada adeegyada, michelle wu . So [facility] [phone].    ATENCIÓN: Si habla español, tiene a pedraza disposición servicios gratuitos de asistencia lingüística. Llame al [phone].    We comply with applicable federal civil rights laws and [place] laws. We do not discriminate on the basis of race, color, national origin, age, disability, sex, sexual orientation, or gender identity.            Thank you!     Thank you for choosing [facility]  for your care. Our goal is always to provide you with excellent care. Hearing back from our patients is one way we can continue to improve our services. Please take a few minutes to complete the written survey that you may receive " in the mail after your visit with us. Thank you!             Your Updated Medication List - Protect others around you: Learn how to safely use, store and throw away your medicines at www.disposemymeds.org.          This list is accurate as of 7/23/18  9:04 AM.  Always use your most recent med list.                   Brand Name Dispense Instructions for use Diagnosis    aspirin 81 MG EC tablet      Take 81 mg by mouth daily        cevimeline 30 MG capsule    EVOXAC     Take 30 mg by mouth 3 times daily.        Clorazepate Dipotassium 15 MG Tabs     90 tablet    TAKE 1 TABLET BY MOUTH DAILY    Sjogren's syndrome, with unspecified organ involvement (H), DANA (generalized anxiety disorder)       fluticasone 50 MCG/ACT spray    FLONASE    1 Bottle    Spray 1 spray into both nostrils daily    Chronic pansinusitis       gabapentin 300 MG capsule    NEURONTIN     Take 300 mg by mouth 3 times daily        guaiFENesin-codeine 100-10 MG/5ML Soln solution    ROBITUSSIN AC    473 mL    TAKE 10 MILLILITERS BY MOUTH EVERY 6 HOURS AS NEEDED    Cough       hydrochlorothiazide 50 MG tablet    HYDRODIURIL    90 tablet    Take 1 tablet (50 mg) by mouth daily Please call to schedule a follow up visit    Essential hypertension       pantoprazole 40 MG EC tablet    PROTONIX    30 tablet    Take 1 tablet (40 mg) by mouth daily Take 30-60 minutes before a meal.    Gastroesophageal reflux disease, esophagitis presence not specified       potassium chloride 10 MEQ tablet    K-TAB,KLOR-CON    180 tablet    TAKE TWO TABLETS BY MOUTH DAILY    Hypokalemia       simvastatin 40 MG tablet    ZOCOR    90 tablet    Take 1 tablet (40 mg) by mouth At Bedtime    Hyperlipidemia LDL goal <130       sodium chloride 0.65 % nasal spray    OCEAN NASAL SPRAY    50 mL    Spray 1 spray into both nostrils daily as needed for congestion    Chronic maxillary sinusitis       traZODone 50 MG tablet    DESYREL    45 tablet    Take 0.5 tablets (25 mg) by mouth At Bedtime     Insomnia, unspecified type

## 2018-07-23 NOTE — NURSING NOTE
"Chief Complaint   Patient presents with     Consult        Initial /72 (BP Location: Left arm, Patient Position: Chair, Cuff Size: Adult Large)  Pulse 65  Temp 97.6  F (36.4  C) (Tympanic)  Ht 5' 3\" (1.6 m)  SpO2 97% Estimated body mass index is 35.07 kg/(m^2) as calculated from the following:    Height as of 4/11/18: 5' 3\" (1.6 m).    Weight as of 4/11/18: 198 lb (89.8 kg)..    BP completed using cuff size: large  MEDICATIONS REVIEWED  SOCIAL AND FAMILY HX REVIEWED  Tabitha Petersen CMA  "

## 2018-07-24 NOTE — PROGRESS NOTES
Gurdeep Alarcon,    I have had the opportunity to review your recent results and an interpretation is as follows:  Labs remain stable   Slightly elevated white blood cell count and elevated neutrophil  Your basic metabolic panel shows stable renal function    It was nice to see you today    Sincerely,  José Miguel Wallace MD

## 2018-07-28 ENCOUNTER — HEALTH MAINTENANCE LETTER (OUTPATIENT)
Age: 73
End: 2018-07-28

## 2018-08-02 ENCOUNTER — HOSPITAL ENCOUNTER (OUTPATIENT)
Dept: MAMMOGRAPHY | Facility: CLINIC | Age: 73
End: 2018-08-02
Attending: INTERNAL MEDICINE
Payer: MEDICARE

## 2018-08-02 ENCOUNTER — HOSPITAL ENCOUNTER (OUTPATIENT)
Dept: BONE DENSITY | Facility: CLINIC | Age: 73
Discharge: HOME | End: 2018-08-02
Attending: INTERNAL MEDICINE | Admitting: INTERNAL MEDICINE
Payer: MEDICARE

## 2018-08-02 DIAGNOSIS — Z78.0 POST-MENOPAUSAL: ICD-10-CM

## 2018-08-02 DIAGNOSIS — Z12.31 ENCOUNTER FOR SCREENING MAMMOGRAM FOR BREAST CANCER: ICD-10-CM

## 2018-08-02 PROCEDURE — 77067 SCR MAMMO BI INCL CAD: CPT

## 2018-08-02 PROCEDURE — 77080 DXA BONE DENSITY AXIAL: CPT

## 2018-08-03 NOTE — PROGRESS NOTES
Gurdeep Alarcon,    I have had the opportunity to review your recent results and an interpretation is as follows:  Your bone density scan does show mild osteopenia, however your risk of fracture is calculated to be 20% which is the threshold for which taking a medication for prevention of fracture is recommended.  I would recommend initiation of calcium plus vitamin D 600 mg/200 units twice a day and consider taking medication such as Fosamax once a week.  Let me know if you have any questions with regards to these recommendations.    Sincerely,  José Miguel Wallace MD

## 2018-08-17 DIAGNOSIS — K21.9 GASTROESOPHAGEAL REFLUX DISEASE, ESOPHAGITIS PRESENCE NOT SPECIFIED: ICD-10-CM

## 2018-08-17 RX ORDER — PANTOPRAZOLE SODIUM 40 MG/1
40 TABLET, DELAYED RELEASE ORAL DAILY
Qty: 90 TABLET | Refills: 0 | Status: SHIPPED | OUTPATIENT
Start: 2018-08-17 | End: 2018-11-15

## 2018-08-31 DIAGNOSIS — M35.00 SJOGREN'S SYNDROME, WITH UNSPECIFIED ORGAN INVOLVEMENT (H): ICD-10-CM

## 2018-08-31 DIAGNOSIS — F41.1 GAD (GENERALIZED ANXIETY DISORDER): ICD-10-CM

## 2018-08-31 RX ORDER — CLORAZEPATE DIPOTASSIUM 15 MG/1
TABLET ORAL
Qty: 90 TABLET | Refills: 0 | Status: SHIPPED | OUTPATIENT
Start: 2018-08-31 | End: 2018-12-01

## 2018-08-31 NOTE — TELEPHONE ENCOUNTER
Clorazepate Dipotassium 15 mg    Last Written Prescription Date:  06/05/18  Last Fill Quantity: 90 tablets,  # refills: 0   Last office visit: 7/23/2018 with prescribing provider:  [place]   Future Office Visit:      Routing refill request to provider for review/approval because:  Drug not on the [facility], P or [facility] refill protocol or controlled substance

## 2018-11-01 ASSESSMENT — ENCOUNTER SYMPTOMS
JOINT SWELLING: 1
MUSCLE CRAMPS: 0
NECK PAIN: 0
MUSCLE WEAKNESS: 0
ARTHRALGIAS: 1
STIFFNESS: 1
BACK PAIN: 1
MYALGIAS: 1

## 2018-11-02 ENCOUNTER — MYC MEDICAL ADVICE (OUTPATIENT)
Dept: FAMILY MEDICINE | Facility: CLINIC | Age: 73
End: 2018-11-02

## 2018-11-05 ENCOUNTER — HOSPITAL ENCOUNTER (OUTPATIENT)
Dept: CT IMAGING | Facility: CLINIC | Age: 73
Discharge: HOME | End: 2018-11-05
Payer: MEDICARE

## 2018-11-05 DIAGNOSIS — R91.8 PULMONARY NODULES: ICD-10-CM

## 2018-11-05 PROCEDURE — 71250 CT THORAX DX C-: CPT

## 2018-11-08 ENCOUNTER — OFFICE VISIT (OUTPATIENT)
Dept: PULMONOLOGY | Facility: CLINIC | Age: 73
End: 2018-11-08
Payer: MEDICARE

## 2018-11-08 VITALS
DIASTOLIC BLOOD PRESSURE: 79 MMHG | OXYGEN SATURATION: 92 % | SYSTOLIC BLOOD PRESSURE: 123 MMHG | HEART RATE: 72 BPM | BODY MASS INDEX: 37.21 KG/M2 | WEIGHT: 210 LBS | RESPIRATION RATE: 16 BRPM | HEIGHT: 63 IN

## 2018-11-08 DIAGNOSIS — K21.9 GASTROESOPHAGEAL REFLUX DISEASE, ESOPHAGITIS PRESENCE NOT SPECIFIED: ICD-10-CM

## 2018-11-08 DIAGNOSIS — R91.1 PULMONARY NODULE: Primary | ICD-10-CM

## 2018-11-08 DIAGNOSIS — R06.2 WHEEZING: Primary | ICD-10-CM

## 2018-11-08 PROCEDURE — G0463 HOSPITAL OUTPT CLINIC VISIT: HCPCS | Mod: ZF

## 2018-11-08 ASSESSMENT — PAIN SCALES - GENERAL: PAINLEVEL: NO PAIN (0)

## 2018-11-08 NOTE — MR AVS SNAPSHOT
After Visit Summary   11/8/2018    Agnes Saravia    MRN: [id number]           Patient Information     Date Of Birth          1945        Visit Information        Provider Department      11/8/2018 11:40 AM Manuel Conway MD [facility]        Today's Diagnoses     Pulmonary nodule    -  1    Gastroesophageal reflux disease, esophagitis presence not specified           Follow-ups after your visit        Follow-up notes from your care team     Return in about 6 months (around 5/8/2019).      Your next 10 appointments already scheduled     May 13, 2019 10:35 AM CDT   (Arrive by 10:20 AM)   Return Visit with Manuel Conway MD   [facility] ([facility])    [address] [place] [address]  [address] [place] [address] [phone]              Future tests that were ordered for you today     Open Future Orders        Priority Expected Expires Ordered    CT Chest w/o contrast Routine 5/8/2019 11/5/2019 11/8/2018            Who to contact     If you have questions or need follow up information about today's clinic visit or your schedule please contact [facility] directly at [phone].  Normal or non-critical lab and imaging results will be communicated to you by MyChart, letter or phone within 4 business days after the clinic has received the results. If you do not hear from us within 7 days, please contact the clinic through MyChart or phone. If you have a critical or abnormal lab result, we will notify you by phone as soon as possible.  Submit refill requests through [employer] or call your pharmacy and they will forward the refill request to us. Please allow 3 business days for your refill to be completed.          Additional Information About Your Visit        MyChart Information     [employer] gives you secure access to your electronic health record. If you see a  "primary care provider, you can also send messages to your care team and make appointments. If you have questions, please call your primary care clinic.  If you do not have a primary care provider, please call [phone] and they will assist you.        Care EveryWhere ID     This is your Care EveryWhere ID. This could be used by other organizations to access your [place] medical records  [id number]        Your Vitals Were     Pulse Respirations Height Pulse Oximetry BMI (Body Mass Index)       72 16 1.6 m (5' 2.99\") 92% 37.21 kg/m2        Blood Pressure from Last 3 Encounters:   11/08/18 123/79   07/23/18 132/74   04/11/18 116/81    Weight from Last 3 Encounters:   11/08/18 95.3 kg (210 lb)   04/11/18 89.8 kg (198 lb)   02/01/18 89.8 kg (198 lb)               Primary Care Provider Office Phone # Fax #    José Miguel Wallace [phone] [address] [facility] [address]  [facility] [address]        Equal Access to Services     [facility]: Hadii aad ku hadasho Sojason, waaxda luqadaha, qaybta kaalmakwame rivas, michelle wu . So [facility] [phone].    ATENCIÓN: Si habla español, tiene a pedraza disposición servicios gratuitos de asistencia lingüística. [facility] [phone].    We comply with applicable federal civil rights laws and [place] laws. We do not discriminate on the basis of race, color, national origin, age, disability, sex, sexual orientation, or gender identity.            Thank you!     Thank you for choosing [facility]  for your care. Our goal is always to provide you with excellent care. Hearing back from our patients is one way we can continue to improve our services. Please take a few minutes to complete the written survey that you may receive in the mail after your visit with us. Thank you!             Your Updated Medication List - Protect others around you: Learn how to safely use, store and throw away your medicines " at www.disposemymeds.org.          This list is accurate as of 11/8/18  4:00 PM.  Always use your most recent med list.                   Brand Name Dispense Instructions for use Diagnosis    aspirin 81 MG EC tablet      Take 81 mg by mouth daily        cevimeline 30 MG capsule    EVOXAC     Take 30 mg by mouth 3 times daily.        Clorazepate Dipotassium 15 MG Tabs     90 tablet    TAKE ONE TABLET BY MOUTH ONE TIME DAILY    Sjogren's syndrome, with unspecified organ involvement (H), DANA (generalized anxiety disorder)       fluticasone 50 MCG/ACT spray    FLONASE    1 Bottle    Spray 1 spray into both nostrils daily    Chronic pansinusitis       gabapentin 300 MG capsule    NEURONTIN     Take 300 mg by mouth 3 times daily        guaiFENesin-codeine 100-10 MG/5ML Soln solution    ROBITUSSIN AC    473 mL    TAKE 10 MILLILITERS BY MOUTH EVERY 6 HOURS AS NEEDED    Cough       hydrochlorothiazide 50 MG tablet    HYDRODIURIL    90 tablet    Take 1 tablet (50 mg) by mouth daily Please call to schedule a follow up visit    Essential hypertension       pantoprazole 40 MG EC tablet    PROTONIX    90 tablet    Take 1 tablet (40 mg) by mouth daily Take 30-60 minutes before a meal.    Gastroesophageal reflux disease, esophagitis presence not specified       potassium chloride 10 MEQ tablet    K-TAB,KLOR-CON    180 tablet    TAKE TWO TABLETS BY MOUTH DAILY    Hypokalemia       simvastatin 40 MG tablet    ZOCOR    90 tablet    Take 1 tablet (40 mg) by mouth At Bedtime    Hyperlipidemia LDL goal <130       sodium chloride 0.65 % nasal spray    OCEAN NASAL SPRAY    50 mL    Spray 1 spray into both nostrils daily as needed for congestion    Chronic maxillary sinusitis       traZODone 50 MG tablet    DESYREL    45 tablet    Take 0.5 tablets (25 mg) by mouth At Bedtime    Insomnia, unspecified type

## 2018-11-08 NOTE — PROGRESS NOTES
[facility]  Pulmonary Medicine  Visit Clinic Note  November 8, 2018         ASSESSMENT & PLAN       Pulmonary Nodule: TAMMI, stable.  Radiology recommending 6 month follow up.  Will schedule for May    GERD: Her symptoms of cough, wheeze, nasal congestion improved with a PPI.  I suspect all these symptoms were related to GERD.  She is interested in stopping the medication.  I told her that she could, and monitor her symptoms closely.  If they come back, she can start the medication again.  She can stop her flonase.      Fibrosis of lungs. In her right lower lobe, there appears to be some subpleural fibrotic changes.  She tells me that she had a bad pneumonia with pleurisy on that side a long time ago.  This may be the reason.  We are getting a repeat chest CT in 6 months to follow the nodule, so we will be able to follow these changes as well.      Kang Conway MD          Today's visit note:     Chief Complaint: Agnes Saravia is a 73 year old year old female who is being seen for RECHECK (follow up )      HISTORY OF PRESENT ILLNESS:    73 year old female with arthritis, pulmonary nodule who is presenting today for follow up on her pulmonary nodule.     In the past she was having a lot of issues with wheezing, nasal congestion. She was concerned that may have been related to an injection of rituximab that she was getting for her arthritis. It took a lot of pulmonary work up for the wheezing, but no clear diagnosis was made.  She was empirically started on a PPI for possible GERD and then about 1 week later her symptoms started to resolve.  Now she is not having any cough, wheeze or nasal congestion.  She is having a lot of joint pains though.  Hands, hips, knees, feet.  She is not able to exercise as much as she would like due to the pain.             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     Depressive disorder       Hyperlipidemia      Insomnia      PONV (postoperative nausea and vomiting)      Rheumatoid arthritis(714.0)      Sjogren's syndrome (H)      Past Surgical History:   Procedure Laterality Date     BLEPHAROPLASTY       BRONCHOSCOPY (RIGID OR FLEXIBLE), DIAGNOSTIC N/A 4/11/2018    Procedure: COMBINED BRONCHOSCOPY (RIGID OR FLEXIBLE), LAVAGE;  Bronchoscopy with Lavage;  Surgeon: Manuel Conway MD;  Location:  GI     DISCECTOMY LUMBAR POSTERIOR MICROSCOPIC ONE LEVEL  8/14/2014    Procedure: DISCECTOMY LUMBAR POSTERIOR MICROSCOPIC ONE LEVEL;  Surgeon: Dudley Bernal MD;  Location:  OR     HYSTERECTOMY TOTAL ABDOMINAL, BILATERAL SALPINGO-OOPHORECTOMY, COMBINED       HYSTERECTOMY, PAP NO LONGER INDICATED       MAMMOPLASTY REDUCTION BILATERAL  5/14/2013    Procedure: MAMMOPLASTY REDUCTION BILATERAL;  BILATERAL REDUCTION MAMMOPLASTY;  Surgeon: Bruce Nash MD;  Location: [facility]     SHOULDER SURGERY             Family History:     Family History   Problem Relation Age of Onset     Cerebrovascular Disease Mother      Cerebrovascular Disease Father      Colon Cancer No family hx of               Social History:     Social History     Social History     Marital status: Single     Spouse name: N/A     Number of children: N/A     Years of education: N/A     Occupational History     Not on file.     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 [employer]            Medications:     Current Outpatient Prescriptions  "  Medication     aspirin 81 MG EC tablet     cevimeline (EVOXAC) 30 MG capsule     Clorazepate Dipotassium 15 MG TABS     fluticasone (FLONASE) 50 MCG/ACT spray     gabapentin (NEURONTIN) 300 MG capsule     hydrochlorothiazide (HYDRODIURIL) 50 MG tablet     pantoprazole (PROTONIX) 40 MG EC tablet     potassium chloride (K-TAB,KLOR-CON) 10 MEQ tablet     simvastatin (ZOCOR) 40 MG tablet     traZODone (DESYREL) 50 MG tablet     guaiFENesin-codeine (ROBITUSSIN AC) 100-10 MG/5ML SOLN solution     sodium chloride (OCEAN NASAL SPRAY) 0.65 % nasal spray     No current facility-administered medications for this visit.             Review of Systems:       Answers for HPI/ROS submitted by the patient on 11/1/2018   General Symptoms: No  Skin Symptoms: No  HENT Symptoms: No  EYE SYMPTOMS: No  HEART SYMPTOMS: No  LUNG SYMPTOMS: No  INTESTINAL SYMPTOMS: No  URINARY SYMPTOMS: No  GYNECOLOGIC SYMPTOMS: No  BREAST SYMPTOMS: No  SKELETAL SYMPTOMS: Yes  BLOOD SYMPTOMS: No  NERVOUS SYSTEM SYMPTOMS: No  MENTAL HEALTH SYMPTOMS: No  Back pain: Yes  Muscle aches: Yes  Neck pain: No  Swollen joints: Yes  Joint pain: Yes  Bone pain: No  Muscle cramps: No  Muscle weakness: No  Joint stiffness: Yes  Bone fracture: No        PHYSICAL EXAM:  /79 (BP Location: Right arm, Patient Position: Chair, Cuff Size: Adult Large)  Pulse 72  Resp 16  Ht 1.6 m (5' 2.99\")  Wt 95.3 kg (210 lb)  SpO2 92%  BMI 37.21 kg/m2     General: Well developed, well nourished, No apparent distress  Eyes: Anicteric  Ears: Hearing grossly normal  Mouth: Oral mucosa is moist, without any lesions. No oropharyngeal exudate.  Neck: supple, no thyromegaly  Lymphatics: No cervical or supraclavicular nodes  Respiratory: Good air movement. No crackles. No rhonchi. No wheezes  Cardiac: RRR, normal S1, S2. No murmurs. No JVD  Abdomen: Soft, NT/ND  Musculoskeletal: Extremities normal. No clubbing. No cyanosis. No edema.  Skin: No rash on limited exam  Neuro: Normal " mentation. Normal speech.  Psych:Normal affect           Data:   All laboratory and imaging data reviewed.      PFT:       PFT Interpretation:  No airflow obstruction  Low normal vital capacity.   Low normal diffusion capacity.   Valid Maneuver    Chest CT: I have reviewed the chest CT and agree with the radiologist interpretation below:    FINDINGS: Stable 8 mm nodule in the central left upper lobe image 63  series 7. Trace peripheral fibrosis and/or atelectasis similar to  previous. Mild bronchiectasis and bronchial wall thickening evident  most prominent in the right lower lobe. No new nodules. No pleural or  pericardial effusion. There are mild coronary vascular calcifications  consistent with coronary artery disease. There are mild  atherosclerotic changes of the visualized aorta and its branches.  There is no evidence of aortic aneurysm. No acute findings visualized  upper abdomen.         IMPRESSION: Stable 8 mm left upper lobe nodule. Continued follow-up in  six months recommended.  Recent Results (from the past 168 hour(s))   General PFT Lab (Please always keep checked)    Collection Time: 11/08/18 10:44 AM   Result Value Ref Range    FVC-Pred 2.59 L    FVC-Pre 1.94 L    FVC-%Pred-Pre 74 %    FEV1-Pre 1.60 L    FEV1-%Pred-Pre 80 %    FEV1FVC-Pred 75 %    FEV1FVC-Pre 82 %    FEFMax-Pred 5.06 L/sec    FEFMax-Pre 6.78 L/sec    FEFMax-%Pred-Pre 134 %    FEF2575-Pred 1.65 L/sec    FEF2575-Pre 1.71 L/sec    NDP9593-%Pred-Pre 103 %    FEF2575-Post 2.18 L/sec    PDA2505-%Pred-Post 130 %    ExpTime-Pre 6.17 sec    FIFMax-Pre 4.39 L/sec    FEV1FEV6-Pred 78 %    FEV1FEV6-Pre 82 %    VC-Pred 2.82 L    VC-Pre 2.03 L    VC-%Pred-Pre 71 %    IC-Pred 2.65 L    IC-Pre 1.94 L    IC-%Pred-Pre 73 %    ERV-Pred 0.17 L    ERV-Pre 0.09 L    ERV-%Pred-Pre 51 %    DLCOunc-Pred 19.51 ml/min/mmHg    DLCOunc-Pre 16.14 ml/min/mmHg    DLCOunc-%Pred-Pre 82 %    VA-Pre 3.46 L    VA-%Pred-Pre 70 %    FEV1SVC-Pred 71 %    FEV1SVC-Pre 79 %    General PFT Lab (Please always keep checked)    Collection Time: 11/08/18 10:44 AM   Result Value Ref Range    FVC-Pred 2.59 L    FVC-Pre 1.94 L    FVC-%Pred-Pre 74 %    FEV1-Pre 1.60 L    FEV1-%Pred-Pre 80 %    FEV1FVC-Pred 75 %    FEV1FVC-Pre 82 %    FEFMax-Pred 5.06 L/sec    FEFMax-Pre 6.78 L/sec    FEFMax-%Pred-Pre 134 %    FEF2575-Pred 1.65 L/sec    FEF2575-Pre 1.71 L/sec    SWP7596-%Pred-Pre 103 %    ExpTime-Pre 6.17 sec    FIFMax-Pre 4.39 L/sec    VC-Pred 2.82 L    VC-Pre 2.03 L    VC-%Pred-Pre 71 %    IC-Pred 2.65 L    IC-Pre 1.94 L    IC-%Pred-Pre 73 %    ERV-Pred 0.17 L    ERV-Pre 0.09 L    ERV-%Pred-Pre 51 %    FEV1FEV6-Pred 78 %    FEV1FEV6-Pre 82 %    DLCOunc-Pred 19.51 ml/min/mmHg    DLCOunc-Pre 16.14 ml/min/mmHg    DLCOunc-%Pred-Pre 82 %    VA-Pre 3.46 L    VA-%Pred-Pre 70 %    FEV1SVC-Pred 71 %    FEV1SVC-Pre 79 %

## 2018-11-08 NOTE — LETTER
11/8/2018       RE: Agnes Saravia  [address]  [place] [address]     Dear Colleague,    Thank you for referring your patient, Agnes Saravia, to the [facility] at [facility]. Please see a copy of my visit note below.    [facility]  Pulmonary Medicine  Visit Clinic Note  November 8, 2018         ASSESSMENT & PLAN       Pulmonary Nodule: TAMMI, stable.  Radiology recommending 6 month follow up.  Will schedule for May    GERD: Her symptoms of cough, wheeze, nasal congestion improved with a PPI.  I suspect all these symptoms were related to GERD.  She is interested in stopping the medication.  I told her that she could, and monitor her symptoms closely.  If they come back, she can start the medication again.  She can stop her flonase.      Fibrosis of lungs. In her right lower lobe, there appears to be some subpleural fibrotic changes.  She tells me that she had a bad pneumonia with pleurisy on that side a long time ago.  This may be the reason.  We are getting a repeat chest CT in 6 months to follow the nodule, so we will be able to follow these changes as well.      Kang Conway MD          Today's visit note:     Chief Complaint: Agnes Saravia is a 73 year old year old female who is being seen for RECHECK (follow up )      HISTORY OF PRESENT ILLNESS:    73 year old female with arthritis, pulmonary nodule who is presenting today for follow up on her pulmonary nodule.     In the past she was having a lot of issues with wheezing, nasal congestion. She was concerned that may have been related to an injection of rituximab that she was getting for her arthritis. It took a lot of pulmonary work up for the wheezing, but no clear diagnosis was made.  She was empirically started on a PPI for possible GERD and then about 1 week later her symptoms started to resolve.  Now she is not having any cough, wheeze  or nasal congestion.  She is having a lot of joint pains though.  Hands, hips, knees, feet.  She is not able to exercise as much as she would like due to the pain.             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     Depressive disorder      Hyperlipidemia      Insomnia      PONV (postoperative nausea and vomiting)      Rheumatoid arthritis(714.0)      Sjogren's syndrome (H)      Past Surgical History:   Procedure Laterality Date     BLEPHAROPLASTY       BRONCHOSCOPY (RIGID OR FLEXIBLE), DIAGNOSTIC N/A 4/11/2018    Procedure: COMBINED BRONCHOSCOPY (RIGID OR FLEXIBLE), LAVAGE;  Bronchoscopy with Lavage;  Surgeon: Manuel Conway MD;  Location:  GI     DISCECTOMY LUMBAR POSTERIOR MICROSCOPIC ONE LEVEL  8/14/2014    Procedure: DISCECTOMY LUMBAR POSTERIOR MICROSCOPIC ONE LEVEL;  Surgeon: Dudley Bernal MD;  Location:  OR     HYSTERECTOMY TOTAL ABDOMINAL, BILATERAL SALPINGO-OOPHORECTOMY, COMBINED       HYSTERECTOMY, PAP NO LONGER INDICATED       MAMMOPLASTY REDUCTION BILATERAL  5/14/2013    Procedure: MAMMOPLASTY REDUCTION BILATERAL;  BILATERAL REDUCTION MAMMOPLASTY;  Surgeon: Bruce Nash MD;  Location:  [facility]     SHOULDER SURGERY             Family History:     Family History   Problem Relation Age of Onset     Cerebrovascular Disease Mother      Cerebrovascular Disease Father      Colon Cancer No family hx of               Social History:     Social History     Social History     Marital status: Single     Spouse name: N/A     Number of children: N/A     Years of education: N/A     Occupational History     Not on file.     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[employer]            Medications:     Current Outpatient Prescriptions   Medication     aspirin 81 MG EC tablet     cevimeline (EVOXAC) 30 MG capsule     Clorazepate Dipotassium 15 MG TABS     fluticasone (FLONASE) 50 MCG/ACT spray     gabapentin (NEURONTIN) 300 MG capsule     hydrochlorothiazide (HYDRODIURIL) 50 MG tablet     pantoprazole (PROTONIX) 40 MG EC tablet     potassium chloride (K-TAB,KLOR-CON) 10 MEQ tablet     simvastatin (ZOCOR) 40 MG tablet     traZODone (DESYREL) 50 MG tablet     guaiFENesin-codeine (ROBITUSSIN AC) 100-10 MG/5ML SOLN solution     sodium chloride (OCEAN NASAL SPRAY) 0.65 % nasal spray     No current facility-administered medications for this visit.             Review of Systems:       Answers for HPI/ROS submitted by the patient on 11/1/2018   General Symptoms: No  Skin Symptoms: No  HENT Symptoms: No  EYE SYMPTOMS: No  HEART SYMPTOMS: No  LUNG SYMPTOMS: No  INTESTINAL SYMPTOMS: No  URINARY SYMPTOMS: No  GYNECOLOGIC SYMPTOMS: No  BREAST SYMPTOMS: No  SKELETAL SYMPTOMS: Yes  BLOOD SYMPTOMS: No  NERVOUS SYSTEM SYMPTOMS: No  MENTAL HEALTH SYMPTOMS: No  Back pain: Yes  Muscle aches: Yes  Neck pain: No  Swollen joints: Yes  Joint pain: Yes  Bone pain: No  Muscle cramps: No  Muscle weakness: No  Joint stiffness: Yes  Bone fracture: No        PHYSICAL EXAM:  /79 (BP Location: Right arm, Patient Position: Chair, Cuff Size: Adult Large)  Pulse 72  Resp 16  Ht 1.6 m (5' 2.99\")  Wt 95.3 kg (210 lb)  SpO2 92%  BMI 37.21 kg/m2     General: Well developed, well nourished, No apparent distress  Eyes: Anicteric  Ears: Hearing grossly normal  Mouth: Oral mucosa is moist, without any lesions. No oropharyngeal exudate.  Neck: supple, no " thyromegaly  Lymphatics: No cervical or supraclavicular nodes  Respiratory: Good air movement. No crackles. No rhonchi. No wheezes  Cardiac: RRR, normal S1, S2. No murmurs. No JVD  Abdomen: Soft, NT/ND  Musculoskeletal: Extremities normal. No clubbing. No cyanosis. No edema.  Skin: No rash on limited exam  Neuro: Normal mentation. Normal speech.  Psych:Normal affect           Data:   All laboratory and imaging data reviewed.      PFT:       PFT Interpretation:  No airflow obstruction  Low normal vital capacity.   Low normal diffusion capacity.   Valid Maneuver    Chest CT: I have reviewed the chest CT and agree with the radiologist interpretation below:    FINDINGS: Stable 8 mm nodule in the central left upper lobe image 63  series 7. Trace peripheral fibrosis and/or atelectasis similar to  previous. Mild bronchiectasis and bronchial wall thickening evident  most prominent in the right lower lobe. No new nodules. No pleural or  pericardial effusion. There are mild coronary vascular calcifications  consistent with coronary artery disease. There are mild  atherosclerotic changes of the visualized aorta and its branches.  There is no evidence of aortic aneurysm. No acute findings visualized  upper abdomen.         IMPRESSION: Stable 8 mm left upper lobe nodule. Continued follow-up in  six months recommended.  Recent Results (from the past 168 hour(s))   General PFT Lab (Please always keep checked)    Collection Time: 11/08/18 10:44 AM   Result Value Ref Range    FVC-Pred 2.59 L    FVC-Pre 1.94 L    FVC-%Pred-Pre 74 %    FEV1-Pre 1.60 L    FEV1-%Pred-Pre 80 %    FEV1FVC-Pred 75 %    FEV1FVC-Pre 82 %    FEFMax-Pred 5.06 L/sec    FEFMax-Pre 6.78 L/sec    FEFMax-%Pred-Pre 134 %    FEF2575-Pred 1.65 L/sec    FEF2575-Pre 1.71 L/sec    KNH0486-%Pred-Pre 103 %    FEF2575-Post 2.18 L/sec    ZUW1077-%Pred-Post 130 %    ExpTime-Pre 6.17 sec    FIFMax-Pre 4.39 L/sec    FEV1FEV6-Pred 78 %    FEV1FEV6-Pre 82 %    VC-Pred 2.82 L     VC-Pre 2.03 L    VC-%Pred-Pre 71 %    IC-Pred 2.65 L    IC-Pre 1.94 L    IC-%Pred-Pre 73 %    ERV-Pred 0.17 L    ERV-Pre 0.09 L    ERV-%Pred-Pre 51 %    DLCOunc-Pred 19.51 ml/min/mmHg    DLCOunc-Pre 16.14 ml/min/mmHg    DLCOunc-%Pred-Pre 82 %    VA-Pre 3.46 L    VA-%Pred-Pre 70 %    FEV1SVC-Pred 71 %    FEV1SVC-Pre 79 %   General PFT Lab (Please always keep checked)    Collection Time: 11/08/18 10:44 AM   Result Value Ref Range    FVC-Pred 2.59 L    FVC-Pre 1.94 L    FVC-%Pred-Pre 74 %    FEV1-Pre 1.60 L    FEV1-%Pred-Pre 80 %    FEV1FVC-Pred 75 %    FEV1FVC-Pre 82 %    FEFMax-Pred 5.06 L/sec    FEFMax-Pre 6.78 L/sec    FEFMax-%Pred-Pre 134 %    FEF2575-Pred 1.65 L/sec    FEF2575-Pre 1.71 L/sec    [id number]-%Pred-Pre 103 %    ExpTime-Pre 6.17 sec    FIFMax-Pre 4.39 L/sec    VC-Pred 2.82 L    VC-Pre 2.03 L    VC-%Pred-Pre 71 %    IC-Pred 2.65 L    IC-Pre 1.94 L    IC-%Pred-Pre 73 %    ERV-Pred 0.17 L    ERV-Pre 0.09 L    ERV-%Pred-Pre 51 %    FEV1FEV6-Pred 78 %    FEV1FEV6-Pre 82 %    DLCOunc-Pred 19.51 ml/min/mmHg    DLCOunc-Pre 16.14 ml/min/mmHg    DLCOunc-%Pred-Pre 82 %    VA-Pre 3.46 L    VA-%Pred-Pre 70 %    FEV1SVC-Pred 71 %    FEV1SVC-Pre 79 %         Again, thank you for allowing me to participate in the care of your patient.      Sincerely,    Manuel Conway MD

## 2018-11-10 LAB
DLCOUNC-%PRED-PRE: 82 %
DLCOUNC-PRE: 16.14 ML/MIN/MMHG
DLCOUNC-PRED: 19.51 ML/MIN/MMHG
ERV-%PRED-PRE: 51 %
ERV-PRE: 0.09 L
ERV-PRED: 0.17 L
EXPTIME-PRE: 6.17 SEC
FEF2575-%PRED-PRE: 103 %
FEF2575-PRE: 1.71 L/SEC
FEF2575-PRED: 1.65 L/SEC
FEFMAX-%PRED-PRE: 134 %
FEFMAX-PRE: 6.78 L/SEC
FEFMAX-PRED: 5.06 L/SEC
FEV1-%PRED-PRE: 80 %
FEV1-PRE: 1.6 L
FEV1FEV6-PRE: 82 %
FEV1FEV6-PRED: 78 %
FEV1FVC-PRE: 82 %
FEV1FVC-PRED: 75 %
FEV1SVC-PRE: 79 %
FEV1SVC-PRED: 71 %
FIFMAX-PRE: 4.39 L/SEC
FVC-%PRED-PRE: 74 %
FVC-PRE: 1.94 L
FVC-PRED: 2.59 L
IC-%PRED-PRE: 73 %
IC-PRE: 1.94 L
IC-PRED: 2.65 L
VA-%PRED-PRE: 70 %
VA-PRE: 3.46 L
VC-%PRED-PRE: 71 %
VC-PRE: 2.03 L
VC-PRED: 2.82 L

## 2018-11-12 LAB
DLCOUNC-%PRED-PRE: 82 %
DLCOUNC-PRE: 16.14 ML/MIN/MMHG
DLCOUNC-PRED: 19.51 ML/MIN/MMHG
ERV-%PRED-PRE: 51 %
ERV-PRE: 0.09 L
ERV-PRED: 0.17 L
EXPTIME-PRE: 6.17 SEC
FEF2575-%PRED-POST: 130 %
FEF2575-%PRED-PRE: 103 %
FEF2575-POST: 2.18 L/SEC
FEF2575-PRE: 1.71 L/SEC
FEF2575-PRED: 1.65 L/SEC
FEFMAX-%PRED-PRE: 134 %
FEFMAX-PRE: 6.78 L/SEC
FEFMAX-PRED: 5.06 L/SEC
FEV1-%PRED-PRE: 80 %
FEV1-PRE: 1.6 L
FEV1FEV6-PRE: 82 %
FEV1FEV6-PRED: 78 %
FEV1FVC-PRE: 82 %
FEV1FVC-PRED: 75 %
FEV1SVC-PRE: 79 %
FEV1SVC-PRED: 71 %
FIFMAX-PRE: 4.39 L/SEC
FVC-%PRED-PRE: 74 %
FVC-PRE: 1.94 L
FVC-PRED: 2.59 L
IC-%PRED-PRE: 73 %
IC-PRE: 1.94 L
IC-PRED: 2.65 L
VA-%PRED-PRE: 70 %
VA-PRE: 3.46 L
VC-%PRED-PRE: 71 %
VC-PRE: 2.03 L
VC-PRED: 2.82 L

## 2018-11-13 DIAGNOSIS — G47.00 INSOMNIA, UNSPECIFIED TYPE: ICD-10-CM

## 2018-11-13 DIAGNOSIS — E78.5 HYPERLIPIDEMIA LDL GOAL <130: ICD-10-CM

## 2018-11-13 NOTE — TELEPHONE ENCOUNTER
"simvastatin (ZOCOR) 40 MG tablet 90 tablet 3 10/23/2017     Last Written Prescription Date:  10/23/17  Last Fill Quantity: 90,  # refills: 3   Last office visit: 7/23/2018 with prescribing provider:  Madison   Future Office Visit:  none  Requested Prescriptions   Pending Prescriptions Disp Refills     simvastatin (ZOCOR) 40 MG tablet [Pharmacy Med Name: Simvastatin Oral Tablet 40 MG] 90 tablet 2     Sig: Take 1 tablet by mouth once daily at bedtime    Statins Protocol Failed    11/13/2018  8:17 AM       Failed - LDL on file in past 12 months    Recent Labs   Lab Test  10/23/17   0901   LDL  92            Passed - No abnormal creatine kinase in past 12 months    Recent Labs   Lab Test  04/11/18   1325   CKT  37               Passed - Recent (12 mo) or future (30 days) visit within the authorizing provider's specialty    Patient had office visit in the last 12 months or has a visit in the next 30 days with authorizing provider or within the authorizing provider's specialty.  See \"Patient Info\" tab in inbasket, or \"Choose Columns\" in Meds & Orders section of the refill encounter.             Passed - Patient is age 18 or older       Passed - No active pregnancy on record       Passed - No positive pregnancy test in past 12 months        traZODone (DESYREL) 50 MG tablet [Pharmacy Med Name: TraZODone HCl Oral Tablet 50 MG] 45 tablet 2     Sig: Take 0.5 tablets (25 mg) by mouth once daily at bedtime    Serotonin Modulators Passed    11/13/2018  8:17 AM       Passed - Recent (12 mo) or future (30 days) visit within the authorizing provider's specialty    Patient had office visit in the last 12 months or has a visit in the next 30 days with authorizing provider or within the authorizing provider's specialty.  See \"Patient Info\" tab in inbasket, or \"Choose Columns\" in Meds & Orders section of the refill encounter.             Passed - Patient is age 18 or older       Passed - No active pregnancy on record       Passed - No " positive pregnancy test in past 12 months        No flowsheet data found.     traZODone (DESYREL) 50 MG tablet 45 tablet 3 10/23/2017     Last Written Prescription Date:  10/23/17  Last Fill Quantity: 45,  # refills: 3   Last office visit: 7/23/2018 with prescribing provider:  Madison   Future Office Visit:  none

## 2018-11-14 RX ORDER — TRAZODONE HYDROCHLORIDE 50 MG/1
TABLET, FILM COATED ORAL
Qty: 45 TABLET | Refills: 2 | Status: SHIPPED | OUTPATIENT
Start: 2018-11-14 | End: 2019-06-04

## 2018-11-14 NOTE — TELEPHONE ENCOUNTER
Routing refill request to provider for review/approval because:  Labs not current:  LDL  Filled per [facility] protocol-LUCILLE TuckerN, RN  [employer] Triage

## 2018-11-15 DIAGNOSIS — K21.9 GASTROESOPHAGEAL REFLUX DISEASE, ESOPHAGITIS PRESENCE NOT SPECIFIED: ICD-10-CM

## 2018-11-16 RX ORDER — SIMVASTATIN 40 MG
TABLET ORAL
Qty: 90 TABLET | Refills: 2 | Status: SHIPPED | OUTPATIENT
Start: 2018-11-16 | End: 2019-06-04

## 2018-11-19 RX ORDER — PANTOPRAZOLE SODIUM 40 MG/1
40 TABLET, DELAYED RELEASE ORAL DAILY
Qty: 90 TABLET | Refills: 3 | Status: SHIPPED | OUTPATIENT
Start: 2018-11-19 | End: 2019-05-13

## 2018-12-01 DIAGNOSIS — F41.1 GAD (GENERALIZED ANXIETY DISORDER): ICD-10-CM

## 2018-12-01 DIAGNOSIS — M35.00 SJOGREN'S SYNDROME, WITH UNSPECIFIED ORGAN INVOLVEMENT (H): ICD-10-CM

## 2018-12-03 ENCOUNTER — TRANSFERRED RECORDS (OUTPATIENT)
Dept: HEALTH INFORMATION MANAGEMENT | Facility: CLINIC | Age: 73
End: 2018-12-03

## 2018-12-03 NOTE — TELEPHONE ENCOUNTER
Clorazepate Dipotassium 15 MG TABS  Last Written Prescription Date:  8/31/18  Last Fill Quantity: 90,  # refills: 0   Last office visit: 7/23/2018 with prescribing provider:  MAINE   Future Office Visit:        Requested Prescriptions   Pending Prescriptions Disp Refills     clorazepate dipotassium (TRANXENE) 15 MG tablet [Pharmacy Med Name: Clorazepate Dipotassium Oral Tablet 15 MG] 90 tablet 0     Sig: TAKE ONE TABLET BY MOUTH ONE TIME DAILY    There is no refill protocol information for this order

## 2018-12-04 RX ORDER — CLORAZEPATE DIPOTASSIUM 15 MG/1
TABLET ORAL
Qty: 90 TABLET | Refills: 0 | Status: SHIPPED | OUTPATIENT
Start: 2018-12-04 | End: 2019-03-09

## 2018-12-04 NOTE — TELEPHONE ENCOUNTER
Routing refill request to provider for review/approval because:  Drug not on the [facility] refill protocol     Please review and authorize if appropriate.    Thank you,   Song CAPUTO RN

## 2019-01-08 ENCOUNTER — MYC MEDICAL ADVICE (OUTPATIENT)
Dept: FAMILY MEDICINE | Facility: CLINIC | Age: 74
End: 2019-01-08

## 2019-01-08 DIAGNOSIS — R73.01 IFG (IMPAIRED FASTING GLUCOSE): Primary | ICD-10-CM

## 2019-01-08 DIAGNOSIS — E78.5 HYPERLIPIDEMIA LDL GOAL <130: ICD-10-CM

## 2019-01-08 DIAGNOSIS — I10 ESSENTIAL HYPERTENSION: ICD-10-CM

## 2019-01-11 ENCOUNTER — OFFICE VISIT (OUTPATIENT)
Dept: FAMILY MEDICINE | Facility: CLINIC | Age: 74
End: 2019-01-11
Payer: MEDICARE

## 2019-01-11 VITALS
SYSTOLIC BLOOD PRESSURE: 128 MMHG | BODY MASS INDEX: 37.21 KG/M2 | TEMPERATURE: 97 F | OXYGEN SATURATION: 97 % | HEIGHT: 63 IN | WEIGHT: 210 LBS | DIASTOLIC BLOOD PRESSURE: 68 MMHG | HEART RATE: 71 BPM

## 2019-01-11 DIAGNOSIS — Z01.818 PREOP GENERAL PHYSICAL EXAM: Primary | ICD-10-CM

## 2019-01-11 DIAGNOSIS — E78.5 HYPERLIPIDEMIA LDL GOAL <130: ICD-10-CM

## 2019-01-11 DIAGNOSIS — I10 ESSENTIAL HYPERTENSION: ICD-10-CM

## 2019-01-11 DIAGNOSIS — R73.01 IFG (IMPAIRED FASTING GLUCOSE): ICD-10-CM

## 2019-01-11 DIAGNOSIS — H54.7 VISION IMPAIRMENT: ICD-10-CM

## 2019-01-11 PROBLEM — E66.01 MORBID OBESITY (H): Status: ACTIVE | Noted: 2019-01-11

## 2019-01-11 LAB
ERYTHROCYTE [DISTWIDTH] IN BLOOD BY AUTOMATED COUNT: 14.7 % (ref 10–15)
HCT VFR BLD AUTO: 40.5 % (ref 35–47)
HGB BLD-MCNC: 13.1 G/DL (ref 11.7–15.7)
MCH RBC QN AUTO: 29.8 PG (ref 26.5–33)
MCHC RBC AUTO-ENTMCNC: 32.3 G/DL (ref 31.5–36.5)
MCV RBC AUTO: 92 FL (ref 78–100)
PLATELET # BLD AUTO: 381 10E9/L (ref 150–450)
RBC # BLD AUTO: 4.39 10E12/L (ref 3.8–5.2)
WBC # BLD AUTO: 10.9 10E9/L (ref 4–11)

## 2019-01-11 PROCEDURE — 99214 OFFICE O/P EST MOD 30 MIN: CPT | Performed by: NURSE PRACTITIONER

## 2019-01-11 PROCEDURE — 36415 COLL VENOUS BLD VENIPUNCTURE: CPT | Performed by: INTERNAL MEDICINE

## 2019-01-11 PROCEDURE — 85027 COMPLETE CBC AUTOMATED: CPT | Performed by: INTERNAL MEDICINE

## 2019-01-11 ASSESSMENT — MIFFLIN-ST. JEOR: SCORE: 1416.68

## 2019-01-11 NOTE — PROGRESS NOTES
[address] [address]  [phone]  Dept: [phone]    PRE-OP EVALUATION:  Today's date: 2019    Agnes Saravia (: 1945) presents for pre-operative evaluation assessment as requested by Dr. mahajan.  She requires evaluation and anesthesia risk assessment prior to undergoing surgery/procedure for treatment of left eye cataract .    Proposed Surgery/ Procedure: left YAG CAP  Date of Surgery/ Procedure: 19  Time of Surgery/ Procedure: 730am  Hospital/Surgical Facility: [facility]  [phone]  Primary Physician: José Miguel Wallace  Type of Anesthesia Anticipated: to be determined    Patient has a Health Care Directive or Living Will:  YES     1. NO - Do you have a history of heart attack, stroke, stent, bypass or surgery on an artery in the head, neck, heart or legs?  2. NO - Do you ever have any pain or discomfort in your chest?  3. NO - Do you have a history of  Heart Failure?  4. NO - Are you troubled by shortness of breath when: walking on the level, up a slight hill or at night?  5. NO - Do you currently have a cold, bronchitis or other respiratory infection?  6. NO - Do you have a cough, shortness of breath or wheezing?  7. NO - Do you sometimes get pains in the calves of your legs when you walk?  8. yes - Do you or anyone in your family have previous history of blood clots?  9. NO - Do you or does anyone in your family have a serious bleeding problem such as prolonged bleeding following surgeries or cuts?  10. NO - Have you ever had problems with anemia or been told to take iron pills?  11. NO - Have you had any abnormal blood loss such as black, tarry or bloody stools, or abnormal vaginal bleeding?  12. NO - Have you ever had a blood transfusion?  13. NO - Have you or any of your relatives ever had problems with anesthesia?  14. NO - Do you have sleep apnea, excessive snoring or daytime drowsiness?  15. NO - Do you have any prosthetic  heart valves?  16. NO - Do you have prosthetic joints?  17. NO - Is there any chance that you may be pregnant?      HPI:     HPI related to upcoming procedure: left YAG laser posterior capsulotomy; original cataract extraction 6/2017   See problem list for active medical problems.  Problems all longstanding and stable, except as noted/documented.  See ROS for pertinent symptoms related to these conditions.                                                                                                                                                          .    MEDICAL HISTORY:     Patient Active Problem List    Diagnosis Date Noted     Obesity (BMI 35.0-39.9) with comorbidity (H) 01/11/2019     Priority: Medium     Pulmonary nodules 07/23/2018     Priority: Medium     DANA (generalized anxiety disorder) 06/13/2017     Priority: Medium     Pulmonary embolism, other 05/08/2017     Priority: Medium     Long-term (current) use of anticoagulants [Z79.01] 11/25/2016     Priority: Medium     Other acute pulmonary embolism without acute cor pulmonale (H) 11/21/2016     Priority: Medium     Pneumonia 09/28/2016     Priority: Medium     Advance Care Planning 02/15/2016     Priority: Medium     Advance Care Planning 2/15/2016: Receipt of ACP document:  Received: Health Care Directive which was witnessed or notarized on 1-.  Document not previously scanned.  Validation form completed and sent with document to be scanned.  Code Status needs to be updated to reflect choices in most recent ACP document. Orders placed.  Confirmed/documented designated decision maker(s).  Added by Reyna Kaufman             Obesity 10/02/2015     Priority: Medium     IFG (impaired fasting glucose) 10/01/2015     Priority: Medium     Health Care Home 08/21/2014     Priority: Medium     State Tier Level:  unknown  Status:  Declined  Care Coordinator:  Marisol Trejo     August 21, 2014         Lumbar disc disease 08/14/2014     Priority:  Medium     Squamous cell carcinoma 09/20/2013     Priority: Medium     Pain 05/14/2013     Priority: Medium     [place]  checked 12/23/15 - no issues noted       Hypokalemia 04/28/2013     Priority: Medium     Sjogren's syndrome (H) 03/04/2013     Priority: Medium     Dr. Carlos       Post menopausal syndrome 03/04/2013     Priority: Medium     HTN (hypertension) 03/04/2013     Priority: Medium     Hyperlipidemia LDL goal <130 03/04/2013     Priority: Medium      Past Medical History:   Diagnosis Date     Anxiety      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     Depressive disorder      Hyperlipidemia      Insomnia      PONV (postoperative nausea and vomiting)      Rheumatoid arthritis(714.0)      Sjogren's syndrome (H)      Past Surgical History:   Procedure Laterality Date     BLEPHAROPLASTY       BRONCHOSCOPY (RIGID OR FLEXIBLE), DIAGNOSTIC N/A 4/11/2018    Procedure: COMBINED BRONCHOSCOPY (RIGID OR FLEXIBLE), LAVAGE;  Bronchoscopy with Lavage;  Surgeon: Manuel Conway MD;  Location:  GI     DISCECTOMY LUMBAR POSTERIOR MICROSCOPIC ONE LEVEL  8/14/2014    Procedure: DISCECTOMY LUMBAR POSTERIOR MICROSCOPIC ONE LEVEL;  Surgeon: Dudley Bernal MD;  Location:  OR     HYSTERECTOMY TOTAL ABDOMINAL, BILATERAL SALPINGO-OOPHORECTOMY, COMBINED       HYSTERECTOMY, PAP NO LONGER INDICATED       MAMMOPLASTY REDUCTION BILATERAL  5/14/2013    Procedure: MAMMOPLASTY REDUCTION BILATERAL;  BILATERAL REDUCTION MAMMOPLASTY;  Surgeon: Bruce Nash MD;  Location: [facility]     SHOULDER SURGERY       Current Outpatient Medications   Medication Sig Dispense Refill     aspirin 81 MG EC tablet Take 81 mg by mouth daily       cevimeline (EVOXAC) 30 MG capsule Take 30 mg by mouth 3 times daily.       clorazepate dipotassium (TRANXENE) 15 MG tablet TAKE ONE TABLET BY MOUTH ONE TIME DAILY  90 tablet 0     fluticasone (FLONASE) 50 MCG/ACT spray Spray 1 spray into both nostrils daily 1 Bottle 3     gabapentin (NEURONTIN) 300 MG capsule  "Take 300 mg by mouth 3 times daily       hydrochlorothiazide (HYDRODIURIL) 50 MG tablet TAKE 1 TABLET BY MOUTH DAILY. 90 tablet 1     pantoprazole (PROTONIX) 40 MG EC tablet Take 1 tablet (40 mg) by mouth daily Take 30-60 minutes before a meal. 90 tablet 3     potassium chloride ER (K-TAB/KLOR-CON) 10 MEQ CR tablet TAKE 2 TABLETS BY MOUTH DAILY 180 tablet 1     simvastatin (ZOCOR) 40 MG tablet Take 1 tablet by mouth once daily at bedtime 90 tablet 2     sodium chloride (OCEAN NASAL SPRAY) 0.65 % nasal spray Spray 1 spray into both nostrils daily as needed for congestion 50 mL 3     traZODone (DESYREL) 50 MG tablet Take 0.5 tablets (25 mg) by mouth once daily at bedtime 45 tablet 2     guaiFENesin-codeine (ROBITUSSIN AC) 100-10 MG/5ML SOLN solution TAKE 10 MILLILITERS BY MOUTH EVERY 6 HOURS AS NEEDED (Patient not taking: Reported on 2019) 473 mL 5     OTC products: ibuprofen 400mg tid    Allergies   Allergen Reactions     Mellaril [Thioridazine Hcl] Anaphylaxis     Percocet [Oxycodone-Acetaminophen] Anaphylaxis and Swelling     Tolerates hydrocodone and codeine in cough medicine      Latex Allergy: NO    Social History     Tobacco Use     Smoking status: Former Smoker     Packs/day: 0.50     Years: 10.00     Pack years: 5.00     Types: Cigarettes     Start date: 1971     Last attempt to quit: 1981     Years since quittin.0     Smokeless tobacco: Never Used   Substance Use Topics     Alcohol use: Yes     Alcohol/week: 0.0 oz     Comment: beer in summer when mowing lawn      History   Drug Use No       REVIEW OF SYSTEMS:   CONSTITUTIONAL: NEGATIVE for fever, chills, change in weight  ENT/MOUTH: NEGATIVE for ear, mouth and throat problems  RESP: NEGATIVE for significant cough or SOB  CV: NEGATIVE for chest pain, palpitations or peripheral edema    EXAM:   /68 (BP Location: Left arm, Patient Position: Chair, Cuff Size: Adult Large)   Pulse 71   Temp 97  F (36.1  C) (Oral)   Ht 1.6 m (5' 3\")   " Wt 95.3 kg (210 lb)   SpO2 97%   Breastfeeding? No   BMI 37.20 kg/m    GENERAL APPEARANCE: healthy, alert and no distress  HENT: ear canals and TM's normal and nose and mouth without ulcers or lesions  RESP: lungs clear to auscultation - no rales, rhonchi or wheezes  CV: regular rate and rhythm, normal S1 S2, no S3 or S4 and no murmur, click or rub   ABDOMEN: soft, nontender, no HSM or masses and bowel sounds normal  NEURO: Normal strength and tone, sensory exam grossly normal, mentation intact and speech normal    DIAGNOSTICS:   No labs or EKG required for low risk surgery (cataract, skin procedure, breast biopsy, etc)    Recent Labs   Lab Test 07/23/18  0918 01/30/18  0839  05/08/17 04/11/17 02/23/17  0849   HGB 13.1 14.0   < >  --   --   --   --     359   < >  --   --   --   --    INR  --   --   --  2.8* 2.6*   < >  --     138   < >  --   --   --   --    POTASSIUM 3.5 3.5   < >  --   --   --   --    CR 0.77 0.65   < >  --   --    < >  --    A1C  --  6.1*  --   --   --   --  5.9    < > = values in this interval not displayed.      Results for orders placed or performed in visit on 01/11/19   CBC with platelets   Result Value Ref Range    WBC 10.9 4.0 - 11.0 10e9/L    RBC Count 4.39 3.8 - 5.2 10e12/L    Hemoglobin 13.1 11.7 - 15.7 g/dL    Hematocrit 40.5 35.0 - 47.0 %    MCV 92 78 - 100 fl    MCH 29.8 26.5 - 33.0 pg    MCHC 32.3 31.5 - 36.5 g/dL    RDW 14.7 10.0 - 15.0 %    Platelet Count 381 150 - 450 10e9/L       IMPRESSION:   Reason for surgery/procedure: left YAG CAP  Diagnosis/reason for consult: pre op consult    The proposed surgical procedure is considered LOW risk.    REVISED CARDIAC RISK INDEX  The patient has the following serious cardiovascular risks for perioperative complications such as (MI, PE, VFib and 3  AV Block):  No serious cardiac risks  INTERPRETATION: 0 risks: Class I (very low risk - 0.4% complication rate)    The patient has the following additional risks for perioperative  complications:  No identified additional risks      ICD-10-CM    1. Preop general physical exam Z01.818                            2 Vision impairment H54.7        RECOMMENDATIONS:       --Patient is to take all scheduled medications on the day of surgery EXCEPT for modifications listed below.  Will hold hydrochlorothiazide the morning of surgery    APPROVAL GIVEN to proceed with proposed procedure, without further diagnostic evaluation       Signed Electronically by: RYAN Donaldson CNP    Addenda: 2/14/19   This pre op consult is addendad to include additional surgery on left eye on March 14    Copy of this evaluation report is provided to requesting physician.    [facility] Preop Guidelines    Revised Cardiac Risk Index

## 2019-01-14 NOTE — MR AVS SNAPSHOT
Agnes Saravia   5/8/2017 8:45 AM   Anticoagulation Therapy Visit    Description:  71 year old female   Provider:   [facility]ULATION CLINIC   Department:  [facility]           INR as of 5/8/2017     Today's INR 2.8      Anticoagulation Summary as of 5/8/2017     INR goal 2.0-3.0   Today's INR 2.8   Full instructions 3 mg on Fri; 4 mg all other days   Next INR check 6/12/2017    Indications   Long-term (current) use of anticoagulants [Z79.01] [Z79.01]  Pulmonary embolism (H) (Resolved) [I26.99]         Your next Anticoagulation Clinic appointment(s)     Jun 12, 2017  8:30 AM CDT   Anticoagulation Visit with  ANTICOAGULATION CLINIC   [facility] ([facility])    [address]  [place] [address] [phone]              Contact Numbers     Clinic Number:         May 2017 Details    Sun Mon Tue Wed Thu Fri Sat      1               2               3               4               5               6                 7               8      4 mg   See details      9      4 mg         10      4 mg         11      4 mg         12      3 mg         13      4 mg           14      4 mg         15      4 mg         16      4 mg         17      4 mg         18      4 mg         19      3 mg         20      4 mg           21      4 mg         22      4 mg         23      4 mg         24      4 mg         25      4 mg         26      3 mg         27      4 mg           28      4 mg         29      4 mg         30      4 mg         31      4 mg             Date Details   05/08 This INR check               How to take your warfarin dose     To take:  3 mg Take 3 of the 1 mg tablets.    To take:  4 mg Take 4 of the 1 mg tablets.           June 2017 Details    Sun Mon Tue Wed Thu Fri Sat         1      4 mg         2      3 mg         3      4 mg           4      4 mg         5      4 mg         6      4 mg         7      4 mg         8      4 mg         9      3 mg         10      4 mg           11  Anesthesia Pre-Procedure Evaluation    Patient: Misty Parham   MRN: [id number] : 1985          Preoperative Diagnosis: * No pre-op diagnosis entered *    * No procedures listed *    Past Medical History:   Diagnosis Date     Depressive disorder      No past surgical history on file.    Anesthesia Evaluation     . Pt has had prior anesthetic.     No history of anesthetic complications          ROS/MED HX    ENT/Pulmonary:      (-) sleep apnea   Neurologic:       Cardiovascular:         METS/Exercise Tolerance:     Hematologic:         Musculoskeletal:         GI/Hepatic:        (-) GERD   Renal/Genitourinary:  - ROS Renal section negative       Endo:  - neg endo ROS   (+) Obesity (BMI 39), .      Psychiatric:     (+) psychiatric history (severe) depression      Infectious Disease:         Malignancy:         Other:                            Physical Exam  Normal systems: cardiovascular and pulmonary    Airway   Mallampati: II  TM distance: >3 FB  Neck ROM: full    Dental   (+) chipped    Cardiovascular       Pulmonary             Lab Results   Component Value Date    WBC 7.5 2019    HGB 12.1 2019    HCT 37.3 2019     2019     2019    POTASSIUM 3.7 2019    CHLORIDE 108 2019    CO2 25 2019    BUN 10 2019    CR 0.84 2019    GLC 93 2019    CRISTINE 8.7 2019    ALBUMIN 3.7 2019    PROTTOTAL 7.8 2019    ALT 21 2019    AST 20 2019    ALKPHOS 76 2019    BILITOTAL 0.4 2019    TSH 0.80 2019    HCG Negative 2019       Preop Vitals  BP Readings from Last 3 Encounters:   19 117/71   18 107/70   18 119/74    Pulse Readings from Last 3 Encounters:   19 76   18 92      Resp Readings from Last 3 Encounters:   19 16   18 16   18 18    SpO2 Readings from Last 3 Encounters:   19 97%   18 99%   18 97%      Temp Readings from Last 1       4 mg         12            13               14               15               16               17                 18               19               20               21               22               23               24                 25               26               27               28               29               30                 Date Details   No additional details    Date of next INR:  6/12/2017         How to take your warfarin dose     To take:  3 mg Take 3 of the 1 mg tablets.    To take:  4 mg Take 4 of the 1 mg tablets.            "Encounters:   01/14/19 36.3  C (97.3  F) (Oral)    Ht Readings from Last 1 Encounters:   01/03/19 1.651 m (5' 5\")      Wt Readings from Last 1 Encounters:   01/08/19 104 kg (229 lb 4.8 oz)    Estimated body mass index is 38.16 kg/m  as calculated from the following:    Height as of 1/3/19: 1.651 m (5' 5\").    Weight as of 1/8/19: 104 kg (229 lb 4.8 oz).       Anesthesia Plan      History & Physical Review  History and physical reviewed and following examination; no interval change.    ASA Status:  3 .    NPO Status:  > 8 hours    Plan for General with Intravenous induction.          Postoperative Care  Postoperative pain management:  IV analgesics.      Consents  Anesthetic plan, risks, benefits and alternatives discussed with:  Patient..                   Kelvin Meier MD  "

## 2019-01-18 NOTE — RESULT ENCOUNTER NOTE
Gurdeep Alarcon,    I have had the opportunity to review your recent results and an interpretation is as follows:  Your follow up complete blood counts returned within normal limits      Sincerely,  José Miguel Wallace MD

## 2019-02-13 ENCOUNTER — MYC MEDICAL ADVICE (OUTPATIENT)
Dept: FAMILY MEDICINE | Facility: CLINIC | Age: 74
End: 2019-02-13

## 2019-02-14 NOTE — TELEPHONE ENCOUNTER
I believe Sharita can addend her note to state she is optimized for her upcoming surgery if she believes that the previous office visit would be sufficient.    José Miguel Wallace MD

## 2019-02-14 NOTE — TELEPHONE ENCOUNTER
The pre op consultation note has been addendad to include upcoming surgery on 3/14 also  Please fax the 1/11/19 pre op to her surgery center

## 2019-02-14 NOTE — TELEPHONE ENCOUNTER
PCP/ Lesie,    Please see MyChart message below and advise. Are you able to modify the pre-op? Pt has surgery scheduled again March 14.    Thank you,  Song CAPUTO RN

## 2019-03-04 ENCOUNTER — TRANSFERRED RECORDS (OUTPATIENT)
Dept: HEALTH INFORMATION MANAGEMENT | Facility: CLINIC | Age: 74
End: 2019-03-04

## 2019-03-09 DIAGNOSIS — F41.1 GAD (GENERALIZED ANXIETY DISORDER): ICD-10-CM

## 2019-03-09 DIAGNOSIS — M35.00 SJOGREN'S SYNDROME, WITH UNSPECIFIED ORGAN INVOLVEMENT (H): ICD-10-CM

## 2019-03-11 NOTE — TELEPHONE ENCOUNTER
clorazepate dipotassium (TRANXENE) 15 MG tablet 90 tablet 0 12/4/2018           Last Written Prescription Date:  12/04/2018  Last Fill Quantity: 90,   # refills: 0  Last Office Visit: 01/11/2019 Toi 07/23/2018  Future Office visit:   Unknown    Routing refill request to provider for review/approval because:  Drug not on the [facility], [facility] or  Health refill protocol or controlled substance

## 2019-03-12 RX ORDER — CLORAZEPATE DIPOTASSIUM 15 MG/1
TABLET ORAL
Qty: 90 TABLET | Refills: 0 | Status: SHIPPED | OUTPATIENT
Start: 2019-03-12 | End: 2019-06-04

## 2019-04-29 ASSESSMENT — ENCOUNTER SYMPTOMS
EYE PAIN: 0
EYE REDNESS: 0
EYE WATERING: 0
EYE IRRITATION: 1
DOUBLE VISION: 0

## 2019-05-01 ENCOUNTER — HOSPITAL ENCOUNTER (OUTPATIENT)
Dept: CT IMAGING | Facility: CLINIC | Age: 74
Discharge: HOME | End: 2019-05-01
Payer: MEDICARE

## 2019-05-01 DIAGNOSIS — R91.1 PULMONARY NODULE: ICD-10-CM

## 2019-05-01 PROCEDURE — 71250 CT THORAX DX C-: CPT

## 2019-05-02 ENCOUNTER — MYC MEDICAL ADVICE (OUTPATIENT)
Dept: FAMILY MEDICINE | Facility: CLINIC | Age: 74
End: 2019-05-02

## 2019-05-02 DIAGNOSIS — Z00.00 ROUTINE GENERAL MEDICAL EXAMINATION AT A HEALTH CARE FACILITY: Primary | ICD-10-CM

## 2019-05-13 ENCOUNTER — OFFICE VISIT (OUTPATIENT)
Dept: PULMONOLOGY | Facility: CLINIC | Age: 74
End: 2019-05-13
Payer: MEDICARE

## 2019-05-13 VITALS
BODY MASS INDEX: 35.44 KG/M2 | RESPIRATION RATE: 17 BRPM | OXYGEN SATURATION: 95 % | SYSTOLIC BLOOD PRESSURE: 121 MMHG | HEART RATE: 72 BPM | WEIGHT: 200 LBS | HEIGHT: 63 IN | DIASTOLIC BLOOD PRESSURE: 77 MMHG

## 2019-05-13 DIAGNOSIS — K21.9 GASTROESOPHAGEAL REFLUX DISEASE, ESOPHAGITIS PRESENCE NOT SPECIFIED: ICD-10-CM

## 2019-05-13 DIAGNOSIS — R91.1 LUNG NODULE: Primary | ICD-10-CM

## 2019-05-13 PROCEDURE — G0463 HOSPITAL OUTPT CLINIC VISIT: HCPCS | Mod: ZF

## 2019-05-13 RX ORDER — FOLIC ACID 1 MG/1
1 TABLET ORAL
COMMUNITY
Start: 2019-05-13

## 2019-05-13 RX ORDER — PREDNISONE 5 MG/1
5 TABLET ORAL DAILY
COMMUNITY
Start: 2019-05-13

## 2019-05-13 RX ORDER — PANTOPRAZOLE SODIUM 20 MG/1
20 TABLET, DELAYED RELEASE ORAL DAILY
Qty: 90 TABLET | Refills: 3 | Status: SHIPPED | OUTPATIENT
Start: 2019-05-13 | End: 2019-10-02

## 2019-05-13 ASSESSMENT — PAIN SCALES - GENERAL: PAINLEVEL: EXTREME PAIN (8)

## 2019-05-13 ASSESSMENT — MIFFLIN-ST. JEOR: SCORE: 1371.32

## 2019-05-13 NOTE — NURSING NOTE
Chief Complaint   Patient presents with     RECHECK     Cough/nodule    Medications reviewed and vital signs taken.   Nikki Linder, CMA

## 2019-05-13 NOTE — LETTER
5/13/2019       RE: Agnes Saravia  [address]  [place] [address]     Dear Colleague,    Thank you for referring your patient, Agnes Saravia, to the [facility] FOR LUNG SCIENCE AND HEALTH at [facility]. Please see a copy of my visit note below.    [facility]  Pulmonary Medicine  Visit Clinic Note  May 13, 2019         ASSESSMENT & PLAN       Pulmonary nodule: She has a left upper lobe nodule that is been slowly growing since 2016.  Currently measures 7 x 8 mm.  A PET CT scan has been previously performed, and it was negative.  However the nodule may be too small for PET detection.  She has a meager smoking history, but was exposed to secondhand smoke growing up.    If this is a lung cancer, I would be a slow-growing lung cancer such as an adenocarcinoma in situ.  She has a diagnosis of seronegative rheumatoid arthritis as well as Sjogren's.  The nodule could be a result of these autoimmune diseases. Our current plan is to repeat a chest CT scan in 1 year.  I will discuss her case with our interventional pulmonologists, and see if we can get her case presented at nodule conference.  It may be too small for actual biopsy at this point.    GERD: She is being treated with a proton pump inhibitor for cough related to reflux.  I decrease this dose to 20 mg today to see if we can keep good control of her symptoms on a lower dose of PPI.    Kang Conway MD          Today's visit note:     Chief Complaint: Agnes Saravia is a 73 year old year old female who is being seen for RECHECK (Cough/nodule)      HISTORY OF PRESENT ILLNESS:    This is a 73-year-old female who presents the pulmonary clinic today for follow-up evaluation on her lung nodule.    She has a history of seronegative rheumatoid arthritis.  She is currently on 5 mg of prednisone and an unknown dose of methotrexate to me.  Her joint pain is bad and ruining her quality  of life.  Respiratory symptoms are much improved.  She is to have a really bad cough, which is better with the proton pump inhibitor.  She is taking 40 mg of pantoprazole right now.  She does have shortness of breath, but this is not as much of a concern to her as the joint pain.    We have been seeing each other to follow-up a lung nodule in her left upper lobe.  She denies any hemoptysis.  No fevers chills night sweats.  No weight loss.             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     Depressive disorder      Hyperlipidemia      Insomnia      PONV (postoperative nausea and vomiting)      Rheumatoid arthritis(714.0)      Sjogren's syndrome (H)      Past Surgical History:   Procedure Laterality Date     BLEPHAROPLASTY       BRONCHOSCOPY (RIGID OR FLEXIBLE), DIAGNOSTIC N/A 4/11/2018    Procedure: COMBINED BRONCHOSCOPY (RIGID OR FLEXIBLE), LAVAGE;  Bronchoscopy with Lavage;  Surgeon: Manuel Conway MD;  Location:  GI     DISCECTOMY LUMBAR POSTERIOR MICROSCOPIC ONE LEVEL  8/14/2014    Procedure: DISCECTOMY LUMBAR POSTERIOR MICROSCOPIC ONE LEVEL;  Surgeon: Dudley Bernal MD;  Location:  OR     HYSTERECTOMY TOTAL ABDOMINAL, BILATERAL SALPINGO-OOPHORECTOMY, COMBINED       HYSTERECTOMY, PAP NO LONGER INDICATED       MAMMOPLASTY REDUCTION BILATERAL  5/14/2013    Procedure: MAMMOPLASTY REDUCTION BILATERAL;  BILATERAL REDUCTION MAMMOPLASTY;  Surgeon: Bruce Nash MD;  Location: [facility] [place]     SHOULDER SURGERY             Family History:     Family History   Problem Relation Age of Onset     Cerebrovascular Disease Mother      Cerebrovascular Disease Father      Colon Cancer No family hx of               Social History:     Social History     Socioeconomic History     Marital status: Single     Spouse name: Not on file     Number of children: Not on file     Years of education: Not on file     Highest education level:  Not on file   Occupational History     Not on file   Social Needs     Financial resource strain: Not on file     Food insecurity:     Worry: Not on file     Inability: Not on file     Transportation needs:     Medical: Not on file     Non-medical: Not on file   Tobacco Use     Smoking status: Former Smoker     Packs/day: 0.25     Years: 10.00     Pack years: 2.50     Types: Cigarettes     Start date: 1971     Last attempt to quit: 1981     Years since quittin.3     Smokeless tobacco: Never Used   Substance and Sexual Activity     Alcohol use: Yes     Alcohol/week: 0.0 oz     Comment: beer in summer when mowing lawn      Drug use: No     Sexual activity: Yes     Partners: Male   Lifestyle     Physical activity:     Days per week: Not on file     Minutes per session: Not on file     Stress: Not on file   Relationships     Social connections:     Talks on phone: Not on file     Gets together: Not on file     Attends Jain service: Not on file     Active member of club or organization: Not on file     Attends meetings of clubs or organizations: Not on file     Relationship status: Not on file     Intimate partner violence:     Fear of current or ex partner: Not on file     Emotionally abused: Not on file     Physically abused: Not on file     Forced sexual activity: Not on file   Other Topics Concern     Parent/sibling w/ CABG, MI or angioplasty before 65F 55M? 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 [employer]            Medications:     Current Outpatient Medications   Medication     aspirin 81 MG EC tablet     clorazepate dipotassium (TRANXENE) 15 MG tablet     gabapentin (NEURONTIN) 300 MG capsule     hydrochlorothiazide (HYDRODIURIL) 50 MG tablet     pantoprazole (PROTONIX) 40 MG EC tablet     potassium chloride ER (K-TAB/KLOR-CON) 10 MEQ CR tablet     simvastatin (ZOCOR) 40 MG tablet     sodium chloride (OCEAN NASAL  "SPRAY) 0.65 % nasal spray     traZODone (DESYREL) 50 MG tablet     cevimeline (EVOXAC) 30 MG capsule     fluticasone (FLONASE) 50 MCG/ACT spray     guaiFENesin-codeine (ROBITUSSIN AC) 100-10 MG/5ML SOLN solution     No current facility-administered medications for this visit.             Review of Systems:       Answers for HPI/ROS submitted by the patient on 4/29/2019   General Symptoms: No  Skin Symptoms: No  HENT Symptoms: No  EYE SYMPTOMS: Yes  HEART SYMPTOMS: No  LUNG SYMPTOMS: No  INTESTINAL SYMPTOMS: No  URINARY SYMPTOMS: No  GYNECOLOGIC SYMPTOMS: No  BREAST SYMPTOMS: No  SKELETAL SYMPTOMS: No  BLOOD SYMPTOMS: No  NERVOUS SYSTEM SYMPTOMS: No  MENTAL HEALTH SYMPTOMS: No  Eye pain: No  Vision loss: No  Dry eyes: Yes  Watery eyes: No  Eye bulging: No  Double vision: No  Flashing of lights: No  Spots: No  Floaters: Yes  Redness: No  Crossed eyes: No  Tunnel Vision: No  Yellowing of eyes: No  Eye irritation: Yes        PHYSICAL EXAM:  /77   Pulse 72   Resp 17   Ht 1.6 m (5' 3\")   Wt 90.7 kg (200 lb)   SpO2 95%   BMI 35.43 kg/m        General: Pleasant, NAD  Eyes: Anicteric  Ears: Hearing grossly normal  Mouth: Oral mucosa is moist, without any lesions. No oropharyngeal exudate.  Neck: supple, no thyromegaly  Lymphatics: No cervical or supraclavicular nodes  Respiratory: Good air movement. No crackles. No rhonchi. No wheezes  Cardiac: RRR, normal S1, S2. No murmurs. No JVD  Musculoskeletal: No clubbing. No cyanosis. No edema.  Skin: No rash on limited exam  Neuro: Normal mentation. Normal speech.  Psych:Normal affect           Data:   All laboratory and imaging data reviewed.        Chest CT: I have reviewed the chest CT images and agree with the radiologist interpretation below:  FINDINGS: 7 x 8 mm nodule is unchanged in the left upper lobe since  the comparison study. No new nodules. There are mild coronary vascular  calcifications consistent with coronary artery disease. There are " mild  atherosclerotic changes of the visualized aorta and its branches.  There is no evidence of aortic aneurysm. No pleural or pericardial  effusion. Trace peripheral fibrosis and/or atelectasis. No acute  findings in the visualized upper abdomen. No frankly destructive bony  lesions.                                                                      IMPRESSION: Stable nodule at the left apex since the most recent  comparison study, however this nodule has been slowly growing since  2016.    Again, thank you for allowing me to participate in the care of your patient.      Sincerely,    Manuel Conway MD

## 2019-05-13 NOTE — PROGRESS NOTES
[facility]  Pulmonary Medicine  Visit Clinic Note  May 13, 2019         ASSESSMENT & PLAN       Pulmonary nodule: She has a left upper lobe nodule that is been slowly growing since 2016.  Currently measures 7 x 8 mm.  A PET CT scan has been previously performed, and it was negative.  However the nodule may be too small for PET detection.  She has a meager smoking history, but was exposed to secondhand smoke growing up.    If this is a lung cancer, I would be a slow-growing lung cancer such as an adenocarcinoma in situ.  She has a diagnosis of seronegative rheumatoid arthritis as well as Sjogren's.  The nodule could be a result of these autoimmune diseases. Our current plan is to repeat a chest CT scan in 1 year.  I will discuss her case with our interventional pulmonologists, and see if we can get her case presented at nodule conference.  It may be too small for actual biopsy at this point.    GERD: She is being treated with a proton pump inhibitor for cough related to reflux.  I decrease this dose to 20 mg today to see if we can keep good control of her symptoms on a lower dose of PPI.    Kang Conway MD          Today's visit note:     Chief Complaint: Agnes Saravia is a 73 year old year old female who is being seen for RECHECK (Cough/nodule)      HISTORY OF PRESENT ILLNESS:    This is a 73-year-old female who presents the pulmonary clinic today for follow-up evaluation on her lung nodule.    She has a history of seronegative rheumatoid arthritis.  She is currently on 5 mg of prednisone and an unknown dose of methotrexate to me.  Her joint pain is bad and ruining her quality of life.  Respiratory symptoms are much improved.  She is to have a really bad cough, which is better with the proton pump inhibitor.  She is taking 40 mg of pantoprazole right now.  She does have shortness of breath, but this is not as much of a concern to her as the joint pain.    We have been seeing each other to  follow-up a lung nodule in her left upper lobe.  She denies any hemoptysis.  No fevers chills night sweats.  No weight loss.             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     Depressive disorder      Hyperlipidemia      Insomnia      PONV (postoperative nausea and vomiting)      Rheumatoid arthritis(714.0)      Sjogren's syndrome (H)      Past Surgical History:   Procedure Laterality Date     BLEPHAROPLASTY       BRONCHOSCOPY (RIGID OR FLEXIBLE), DIAGNOSTIC N/A 4/11/2018    Procedure: COMBINED BRONCHOSCOPY (RIGID OR FLEXIBLE), LAVAGE;  Bronchoscopy with Lavage;  Surgeon: Manuel Conway MD;  Location:  GI     DISCECTOMY LUMBAR POSTERIOR MICROSCOPIC ONE LEVEL  8/14/2014    Procedure: DISCECTOMY LUMBAR POSTERIOR MICROSCOPIC ONE LEVEL;  Surgeon: Dudley Bernal MD;  Location:  OR     HYSTERECTOMY TOTAL ABDOMINAL, BILATERAL SALPINGO-OOPHORECTOMY, COMBINED       HYSTERECTOMY, PAP NO LONGER INDICATED       MAMMOPLASTY REDUCTION BILATERAL  5/14/2013    Procedure: MAMMOPLASTY REDUCTION BILATERAL;  BILATERAL REDUCTION MAMMOPLASTY;  Surgeon: Bruce Nash MD;  Location:  [facility]     SHOULDER SURGERY             Family History:     Family History   Problem Relation Age of Onset     Cerebrovascular Disease Mother      Cerebrovascular Disease Father      Colon Cancer No family hx of               Social History:     Social History     Socioeconomic History     Marital status: Single     Spouse name: Not on file     Number of children: Not on file     Years of education: Not on file     Highest education level: Not on file   Occupational History     Not on file   Social Needs     Financial resource strain: Not on file     Food insecurity:     Worry: Not on file     Inability: Not on file     Transportation needs:     Medical: Not on file     Non-medical: Not on file   Tobacco Use     Smoking status: Former Smoker      Packs/day: 0.25     Years: 10.00     Pack years: 2.50     Types: Cigarettes     Start date: 1971     Last attempt to quit: 1981     Years since quittin.3     Smokeless tobacco: Never Used   Substance and Sexual Activity     Alcohol use: Yes     Alcohol/week: 0.0 oz     Comment: beer in summer when mowing lawn      Drug use: No     Sexual activity: Yes     Partners: Male   Lifestyle     Physical activity:     Days per week: Not on file     Minutes per session: Not on file     Stress: Not on file   Relationships     Social connections:     Talks on phone: Not on file     Gets together: Not on file     Attends Protestant service: Not on file     Active member of club or organization: Not on file     Attends meetings of clubs or organizations: Not on file     Relationship status: Not on file     Intimate partner violence:     Fear of current or ex partner: Not on file     Emotionally abused: Not on file     Physically abused: Not on file     Forced sexual activity: Not on file   Other Topics Concern     Parent/sibling w/ CABG, MI or angioplasty before 65F 55M? 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[employer]            Medications:     Current Outpatient Medications   Medication     aspirin 81 MG EC tablet     clorazepate dipotassium (TRANXENE) 15 MG tablet     gabapentin (NEURONTIN) 300 MG capsule     hydrochlorothiazide (HYDRODIURIL) 50 MG tablet     pantoprazole (PROTONIX) 40 MG EC tablet     potassium chloride ER (K-TAB/KLOR-CON) 10 MEQ CR tablet     simvastatin (ZOCOR) 40 MG tablet     sodium chloride (OCEAN NASAL SPRAY) 0.65 % nasal spray     traZODone (DESYREL) 50 MG tablet     cevimeline (EVOXAC) 30 MG capsule     fluticasone (FLONASE) 50 MCG/ACT spray     guaiFENesin-codeine (ROBITUSSIN AC) 100-10 MG/5ML SOLN solution     No current facility-administered medications for this visit.             Review of Systems:  "      Answers for HPI/ROS submitted by the patient on 4/29/2019   General Symptoms: No  Skin Symptoms: No  HENT Symptoms: No  EYE SYMPTOMS: Yes  HEART SYMPTOMS: No  LUNG SYMPTOMS: No  INTESTINAL SYMPTOMS: No  URINARY SYMPTOMS: No  GYNECOLOGIC SYMPTOMS: No  BREAST SYMPTOMS: No  SKELETAL SYMPTOMS: No  BLOOD SYMPTOMS: No  NERVOUS SYSTEM SYMPTOMS: No  MENTAL HEALTH SYMPTOMS: No  Eye pain: No  Vision loss: No  Dry eyes: Yes  Watery eyes: No  Eye bulging: No  Double vision: No  Flashing of lights: No  Spots: No  Floaters: Yes  Redness: No  Crossed eyes: No  Tunnel Vision: No  Yellowing of eyes: No  Eye irritation: Yes        PHYSICAL EXAM:  /77   Pulse 72   Resp 17   Ht 1.6 m (5' 3\")   Wt 90.7 kg (200 lb)   SpO2 95%   BMI 35.43 kg/m       General: Pleasant, NAD  Eyes: Anicteric  Ears: Hearing grossly normal  Mouth: Oral mucosa is moist, without any lesions. No oropharyngeal exudate.  Neck: supple, no thyromegaly  Lymphatics: No cervical or supraclavicular nodes  Respiratory: Good air movement. No crackles. No rhonchi. No wheezes  Cardiac: RRR, normal S1, S2. No murmurs. No JVD  Musculoskeletal: No clubbing. No cyanosis. No edema.  Skin: No rash on limited exam  Neuro: Normal mentation. Normal speech.  Psych:Normal affect           Data:   All laboratory and imaging data reviewed.        Chest CT: I have reviewed the chest CT images and agree with the radiologist interpretation below:  FINDINGS: 7 x 8 mm nodule is unchanged in the left upper lobe since  the comparison study. No new nodules. There are mild coronary vascular  calcifications consistent with coronary artery disease. There are mild  atherosclerotic changes of the visualized aorta and its branches.  There is no evidence of aortic aneurysm. No pleural or pericardial  effusion. Trace peripheral fibrosis and/or atelectasis. No acute  findings in the visualized upper abdomen. No frankly destructive bony  lesions.                                           "                            IMPRESSION: Stable nodule at the left apex since the most recent  comparison study, however this nodule has been slowly growing since  2016.

## 2019-05-13 NOTE — Clinical Note
This is one of my clinic patients with a TAMMI nodule that has been slowing growing since 2016, but over the last year has been stable.  I was hoping all could take a look at it and see if you think it would be possible to biopsy bronchoscopically.  TTNA I suppose is an option, but it's small and she has a lot of adipose tissue to go through. She has autoimmune disease, but my concern is that it could be a slow growing adeno. Our current plan is to repeat CT in 1 year. Is this something that could be presented at nodule conference?

## 2019-05-19 DIAGNOSIS — E87.6 HYPOKALEMIA: ICD-10-CM

## 2019-05-19 DIAGNOSIS — I10 ESSENTIAL HYPERTENSION: ICD-10-CM

## 2019-05-21 RX ORDER — POTASSIUM CHLORIDE 750 MG/1
TABLET, EXTENDED RELEASE ORAL
Qty: 60 TABLET | Refills: 0 | Status: SHIPPED | OUTPATIENT
Start: 2019-05-21 | End: 2019-05-24

## 2019-05-21 RX ORDER — HYDROCHLOROTHIAZIDE 50 MG/1
TABLET ORAL
Qty: 30 TABLET | Refills: 0 | Status: SHIPPED | OUTPATIENT
Start: 2019-05-21 | End: 2019-06-04

## 2019-05-21 NOTE — TELEPHONE ENCOUNTER
"Florian-Rony  Last Written Prescription Date:  12/3/18  Last Fill Quantity: 180,  # refills: 1   Last office visit: 1/11/2019 with prescribing provider:  7/23/18   Future Office Visit:   Next 5 appointments (look out 90 days)    Jun 04, 2019  7:30 AM CDT  PHYSICAL with José Miguel Wallace MD  [facility] ([facility] [address] [facility] [address]  [phone]         Hydrochlorothiazide   Last Written Prescription Date:  11/19/18  Last Fill Quantity: 90,  # refills: 1   Last office visit: 1/11/2019 with prescribing provider:  7/23/18   Future Office Visit:   Next 5 appointments (look out 90 days)    Jun 04, 2019  7:30 AM CDT  PHYSICAL with José Miguel Wallace MD  [facility] ([facility]) [address] [facility] [address]  [phone]         Requested Prescriptions   Pending Prescriptions Disp Refills     hydrochlorothiazide (HYDRODIURIL) 50 MG tablet [Pharmacy Med Name: hydroCHLOROthiazide Oral Tablet 50 MG] 90 tablet 0     Sig: TAKE 1 TABLET BY MOUTH DAILY.       Diuretics (Including Combos) Protocol Passed - 5/19/2019  7:47 AM        Passed - Blood pressure under 140/90 in past 12 months     BP Readings from Last 3 Encounters:   05/13/19 121/77   01/11/19 128/68   11/08/18 123/79                 Passed - Recent (12 mo) or future (30 days) visit within the authorizing provider's specialty     Patient had office visit in the last 12 months or has a visit in the next 30 days with authorizing provider or within the authorizing provider's specialty.  See \"Patient Info\" tab in inbasket, or \"Choose Columns\" in Meds & Orders section of the refill encounter.              Passed - Medication is active on med list        Passed - Patient is age 18 or older        Passed - No active pregancy on record        Passed - Normal serum creatinine on file in past 12 months     Recent Labs   Lab Test 07/23/18  0918   CR 0.77              Passed - Normal serum " "potassium on file in past 12 months     Recent Labs   Lab Test 07/23/18 0918   POTASSIUM 3.5                    Passed - Normal serum sodium on file in past 12 months     Recent Labs   Lab Test 07/23/18 0918                 Passed - No positive pregnancy test in past 12 months        potassium chloride ER (K-TAB/KLOR-CON) 10 MEQ CR tablet [Pharmacy Med Name: Potassium Chloride ER Oral Tablet Extended Release 10 MEQ] 180 tablet 0     Sig: TAKE 2 TABLETS BY MOUTH DAILY       Potassium Supplements Protocol Passed - 5/19/2019  7:47 AM        Passed - Recent (12 mo) or future (30 days) visit within the authorizing provider's specialty     Patient had office visit in the last 12 months or has a visit in the next 30 days with authorizing provider or within the authorizing provider's specialty.  See \"Patient Info\" tab in inbasket, or \"Choose Columns\" in Meds & Orders section of the refill encounter.              Passed - Medication is active on med list        Passed - Patient is age 18 or older        Passed - Normal serum potassium in past 12 months     Recent Labs   Lab Test 07/23/18 0918   POTASSIUM 3.5                      "

## 2019-05-21 NOTE — TELEPHONE ENCOUNTER
Medication is being filled for 1 time refill only due to:  Due and scheduled 6/4 for apt.   Iona Campos RN

## 2019-06-04 ENCOUNTER — OFFICE VISIT (OUTPATIENT)
Dept: FAMILY MEDICINE | Facility: CLINIC | Age: 74
End: 2019-06-04
Payer: MEDICARE

## 2019-06-04 ENCOUNTER — TELEPHONE (OUTPATIENT)
Dept: FAMILY MEDICINE | Facility: CLINIC | Age: 74
End: 2019-06-04

## 2019-06-04 VITALS
HEIGHT: 63 IN | TEMPERATURE: 97 F | WEIGHT: 200 LBS | BODY MASS INDEX: 35.44 KG/M2 | SYSTOLIC BLOOD PRESSURE: 126 MMHG | DIASTOLIC BLOOD PRESSURE: 82 MMHG | OXYGEN SATURATION: 97 % | HEART RATE: 66 BPM

## 2019-06-04 DIAGNOSIS — F41.1 GAD (GENERALIZED ANXIETY DISORDER): ICD-10-CM

## 2019-06-04 DIAGNOSIS — J84.9 ILD (INTERSTITIAL LUNG DISEASE) (H): ICD-10-CM

## 2019-06-04 DIAGNOSIS — I10 ESSENTIAL HYPERTENSION: ICD-10-CM

## 2019-06-04 DIAGNOSIS — E87.6 HYPOKALEMIA: ICD-10-CM

## 2019-06-04 DIAGNOSIS — M35.00 SJOGREN'S SYNDROME, WITH UNSPECIFIED ORGAN INVOLVEMENT (H): ICD-10-CM

## 2019-06-04 DIAGNOSIS — Z00.00 ROUTINE GENERAL MEDICAL EXAMINATION AT A HEALTH CARE FACILITY: Primary | ICD-10-CM

## 2019-06-04 DIAGNOSIS — G47.00 INSOMNIA, UNSPECIFIED TYPE: ICD-10-CM

## 2019-06-04 DIAGNOSIS — E78.5 HYPERLIPIDEMIA LDL GOAL <130: ICD-10-CM

## 2019-06-04 DIAGNOSIS — E66.01 MORBID OBESITY (H): ICD-10-CM

## 2019-06-04 DIAGNOSIS — Z12.11 ENCOUNTER FOR SCREENING FOR MALIGNANT NEOPLASM OF COLON: ICD-10-CM

## 2019-06-04 PROBLEM — C80.1 PRIMARY MALIGNANT NEOPLASM (H): Status: ACTIVE | Noted: 2019-06-04

## 2019-06-04 LAB
ALBUMIN SERPL-MCNC: 3.8 G/DL (ref 3.4–5)
ALP SERPL-CCNC: 84 U/L (ref 40–150)
ALT SERPL W P-5'-P-CCNC: 23 U/L (ref 0–50)
ANION GAP SERPL CALCULATED.3IONS-SCNC: 9 MMOL/L (ref 3–14)
AST SERPL W P-5'-P-CCNC: 21 U/L (ref 0–45)
BILIRUB SERPL-MCNC: 0.6 MG/DL (ref 0.2–1.3)
BUN SERPL-MCNC: 16 MG/DL (ref 7–30)
CALCIUM SERPL-MCNC: 9.1 MG/DL (ref 8.5–10.1)
CHLORIDE SERPL-SCNC: 101 MMOL/L (ref 94–109)
CHOLEST SERPL-MCNC: 204 MG/DL
CO2 SERPL-SCNC: 28 MMOL/L (ref 20–32)
CREAT SERPL-MCNC: 0.76 MG/DL (ref 0.52–1.04)
ERYTHROCYTE [DISTWIDTH] IN BLOOD BY AUTOMATED COUNT: 15.3 % (ref 10–15)
GFR SERPL CREATININE-BSD FRML MDRD: 77 ML/MIN/{1.73_M2}
GLUCOSE SERPL-MCNC: 115 MG/DL (ref 70–99)
HCT VFR BLD AUTO: 40.6 % (ref 35–47)
HDLC SERPL-MCNC: 41 MG/DL
HGB BLD-MCNC: 13.5 G/DL (ref 11.7–15.7)
LDLC SERPL CALC-MCNC: 113 MG/DL
MCH RBC QN AUTO: 31 PG (ref 26.5–33)
MCHC RBC AUTO-ENTMCNC: 33.3 G/DL (ref 31.5–36.5)
MCV RBC AUTO: 93 FL (ref 78–100)
NONHDLC SERPL-MCNC: 163 MG/DL
PLATELET # BLD AUTO: 357 10E9/L (ref 150–450)
POTASSIUM SERPL-SCNC: 3.9 MMOL/L (ref 3.4–5.3)
PROT SERPL-MCNC: 7.8 G/DL (ref 6.8–8.8)
RBC # BLD AUTO: 4.35 10E12/L (ref 3.8–5.2)
SODIUM SERPL-SCNC: 138 MMOL/L (ref 133–144)
TRIGL SERPL-MCNC: 251 MG/DL
WBC # BLD AUTO: 11.8 10E9/L (ref 4–11)

## 2019-06-04 PROCEDURE — 80061 LIPID PANEL: CPT | Performed by: INTERNAL MEDICINE

## 2019-06-04 PROCEDURE — 99213 OFFICE O/P EST LOW 20 MIN: CPT | Mod: 25 | Performed by: INTERNAL MEDICINE

## 2019-06-04 PROCEDURE — 80053 COMPREHEN METABOLIC PANEL: CPT | Performed by: INTERNAL MEDICINE

## 2019-06-04 PROCEDURE — 85027 COMPLETE CBC AUTOMATED: CPT | Performed by: INTERNAL MEDICINE

## 2019-06-04 PROCEDURE — G0439 PPPS, SUBSEQ VISIT: HCPCS | Performed by: INTERNAL MEDICINE

## 2019-06-04 PROCEDURE — 36415 COLL VENOUS BLD VENIPUNCTURE: CPT | Performed by: INTERNAL MEDICINE

## 2019-06-04 RX ORDER — TRAZODONE HYDROCHLORIDE 50 MG/1
TABLET, FILM COATED ORAL
Qty: 45 TABLET | Refills: 3 | Status: SHIPPED | OUTPATIENT
Start: 2019-06-04 | End: 2020-06-29

## 2019-06-04 RX ORDER — SIMVASTATIN 40 MG
40 TABLET ORAL AT BEDTIME
Qty: 90 TABLET | Refills: 3 | Status: SHIPPED | OUTPATIENT
Start: 2019-06-04 | End: 2020-06-29

## 2019-06-04 RX ORDER — POTASSIUM CHLORIDE 750 MG/1
20 TABLET, EXTENDED RELEASE ORAL DAILY
Qty: 180 TABLET | Refills: 3 | Status: SHIPPED | OUTPATIENT
Start: 2019-06-04 | End: 2020-06-29

## 2019-06-04 RX ORDER — HYDROCHLOROTHIAZIDE 50 MG/1
50 TABLET ORAL DAILY
Qty: 90 TABLET | Refills: 3 | Status: SHIPPED | OUTPATIENT
Start: 2019-06-04 | End: 2020-06-29

## 2019-06-04 RX ORDER — CLORAZEPATE DIPOTASSIUM 15 MG/1
15 TABLET ORAL DAILY
Qty: 90 TABLET | Refills: 3 | Status: SHIPPED | OUTPATIENT
Start: 2019-06-04 | End: 2019-10-02

## 2019-06-04 ASSESSMENT — MIFFLIN-ST. JEOR: SCORE: 1371.32

## 2019-06-04 ASSESSMENT — ACTIVITIES OF DAILY LIVING (ADL): CURRENT_FUNCTION: NO ASSISTANCE NEEDED

## 2019-06-04 NOTE — TELEPHONE ENCOUNTER
Rx for Clorazepate dipotassium (Tranxene) faxed to [id number] - [place], [place] - [address]

## 2019-06-04 NOTE — RESULT ENCOUNTER NOTE
Gurdeep Alarcon,    I had the opportunity to review your recent labs and a summary of your labs reads as follows:    Your complete blood counts show no sign of anemia, stale elevated white blood cell count and platelets.  Your comprehensive metabolic panel showed normal renal function, normal liver function, and stable elevated fasting blood glucose indicating no evidence of diabetes mellitus.  Your fasting lipid panel show  - normal HDL (good) cholesterol -as your goal is greater than 40  - low LDL (bad) cholesterol as your goal is less than 130  - stable triglyceride levels    Congratulaions on your excellent results      Sincerely,  José Miguel Wallace MD

## 2019-06-04 NOTE — PROGRESS NOTES
"SUBJECTIVE:   Agnes Saravia is a 73 year old female who presents for Preventive Visit.  click delete button to remove this line now  click delete button to remove this line now  Are you in the first 12 months of your Medicare coverage?  No    Healthy Habits:    In general, how would you rate your overall health?  Good    Frequency of exercise:  4-5 days/week (less in winter )    Duration of exercise:  15-30 minutes    Do you usually eat at least 4 servings of fruit and vegetables a day, include whole grains    & fiber and avoid regularly eating high fat or \"junk\" foods?  No    Taking medications regularly:  Yes    Barriers to taking medications:  None    Medication side effects:  None    Ability to successfully perform activities of daily living:  No assistance needed    Home Safety:  No safety concerns identified    Hearing Impairment:  No hearing concerns    In the past 6 months, have you been bothered by leaking of urine?  No    In general, how would you rate your overall mental or emotional health?  Very good      PHQ-2 Total Score:    Do you feel safe in your environment? Yes    Do you have a Health Care Directive? Yes: Advance Directive has been received and scanned.      Fall risk  Fallen 2 or more times in the past year?: No  Any fall with injury in the past year?: No      1) Repeat 3 items (Leader, Season, Table)    2) Clock draw: ABNORMAL   3) 3 item recall: Recalls 3 objects  Results: 3 items recalled: COGNITIVE IMPAIRMENT LESS LIKELY    Mini-CogTM Copyright S Juanjose. Licensed by the author for use in [facility]; reprinted with permission (soob@.edu). All rights reserved.    Mini-CogTM Copyright S Juanjose. Licensed by the author for use in [facility]; reprinted with permission (soob@.edu). All rights reserved.      Do you have sleep apnea, excessive snoring or daytime drowsiness?: no    Reviewed and updated as needed this visit by clinical staff         Reviewed and " updated as needed this visit by Provider        Social History     Tobacco Use     Smoking status: Former Smoker     Packs/day: 0.25     Years: 10.00     Pack years: 2.50     Types: Cigarettes     Start date: 1971     Last attempt to quit: 1981     Years since quittin.4     Smokeless tobacco: Never Used   Substance Use Topics     Alcohol use: Yes     Alcohol/week: 0.0 oz     Comment: beer in summer when mowing lawn          Alcohol Use 2015   Prescreen: >3 drinks/day or >7 drinks/week? The patient does not drink >3 drinks per day nor >7 drinks per week.           PROBLEMS TO ADD ON...    Current providers sharing in care for this patient include:   Patient Care Team:  José Miguel Wallace MD as PCP - General (Internal Medicine)  Bruce Nash MD as Ajay Grigsby MD as MD (Internal Medicine)  Dudley Bernal MD as MD (Neurosurgery)  Arlet Tomlinson MD as MD (Internal Medicine)  Harjeet Lynn MD as MD (Otolaryngology)  Juan Carcamo MD as MD (Allergy & Immunology)  Kandi Riley, RN as Registered Nurse (Otolaryngology)  José Miguel Wallace MD as Assigned PCP    The following health maintenance items are reviewed in Epic and correct as of today:  Health Maintenance   Topic Date Due     OP ANNUAL INR REFERRAL  1946     ZOSTER IMMUNIZATION (1 of 2) 1993     MEDICARE ANNUAL WELLNESS VISIT  2016     BMP  2019     A1C  2019     FALL RISK ASSESSMENT  2019     MAMMO SCREENING  2019     CBC  2020     COLONOSCOPY  2020     DEXA  2020     ADVANCED DIRECTIVE PLANNING  02/15/2021     LIPID  10/23/2022     DTAP/TDAP/TD IMMUNIZATION (3 - Td) 2023     PHQ-2  Completed     INFLUENZA VACCINE  Completed     IPV IMMUNIZATION  Aged Out     MENINGITIS IMMUNIZATION  Aged Out     Lab work is in process       Sjogren's syndrome, with unspecified organ involvement (H)   She has had follow up  "in rheumatology.  No longer on rituximab and immune systems is improved.  However as she has reduced her immunosuppression she has had worsening joint pain and balance concerns.  She is taking methotrexate at lower dose than previous  DANA (generalized anxiety disorder)   Mood has improved given cough has resolved  Essential hypertension   Blood pressure is well controlled with current blood pressure medications.   Hypokalemia   Due for labs  Morbid obesity (H)   Weight has improved and muscle strength headache simporved  ILD (interstitial lung disease) (H)    She has followed with pulmonology      Review of Systems  Constitutional, HEENT, cardiovascular, pulmonary, GI, , musculoskeletal, neuro, skin, endocrine and psych systems are negative, except as otherwise noted.    OBJECTIVE:   /82 (BP Location: Right arm, Patient Position: Chair, Cuff Size: Adult Regular)   Pulse 66   Temp 97  F (36.1  C) (Tympanic)   Ht 1.6 m (5' 3\")   Wt 90.7 kg (200 lb)   SpO2 97%   BMI 35.43 kg/m   Estimated body mass index is 35.43 kg/m  as calculated from the following:    Height as of this encounter: 1.6 m (5' 3\").    Weight as of this encounter: 90.7 kg (200 lb).  Physical Exam  GENERAL: healthy, alert and no distress  EYES: Eyes grossly normal to inspection, PERRL and conjunctivae and sclerae normal  HENT: ear canals and TM's normal, nose and mouth without ulcers or lesions  NECK: no adenopathy, no asymmetry, masses, or scars and thyroid normal to palpation  RESP: lungs clear to auscultation - no rales, rhonchi or wheezes  BREAST: deferred  CV: regular rate and rhythm, normal S1 S2, no S3 or S4, no murmur, click or rub, trace peripheral edema and peripheral pulses strong  ABDOMEN: soft, nontender, no hepatosplenomegaly, no masses and bowel sounds normal  MS: no gross musculoskeletal defects noted, no edema  SKIN: no suspicious lesions or rashes  NEURO: Normal strength and tone, mentation intact and speech normal  PSYCH: " mentation appears normal, affect normal/bright    Diagnostic Test Results:  Labs reviewed in Epic    ASSESSMENT / PLAN:     Patient Instructions   (Z00.00) Routine general medical examination at a health care facility  (primary encounter diagnosis)  Comment: For routine exam, we will draw labs as ordered, cholesterol, diabetes mellitus check, liver function, renal function,  Due for mammogram in August  We will also update vaccination history.  Plan: CBC with platelets, Lipid panel reflex to         direct LDL Fasting, Comprehensive metabolic         panel            (M35.00) Sjogren's syndrome, with unspecified organ involvement (H)  Comment: Continue follow up in rheumatology - consider increasing your dose of methotrexate  Plan: clorazepate dipotassium (TRANXENE) 15 MG tablet            (F41.1) DANA (generalized anxiety disorder)  Comment: Continue current medications   Plan: clorazepate dipotassium (TRANXENE) 15 MG tablet            (I10) Essential hypertension  Comment: blood pressure is well controlled  Plan: hydrochlorothiazide (HYDRODIURIL) 50 MG tablet            (E87.6) Hypokalemia  Comment: check potassium today  Plan: potassium chloride ER (K-TAB/KLOR-CON) 10 MEQ         CR tablet            (E78.5) Hyperlipidemia LDL goal <130  Comment: check fasting lipid panel today and continue simvastatin   Plan: simvastatin (ZOCOR) 40 MG tablet            (G47.00) Insomnia, unspecified type  Comment: continue trazodone  Plan: traZODone (DESYREL) 50 MG tablet            (E66.01) Morbid obesity (H)  Comment: continue with healthy diet and exercise  Plan:     (J84.9) ILD (interstitial lung disease) (H)  Comment: doing very well.  We will continue with current medications   Plan:           End of Life Planning:  Patient currently has an advanced directive: Yes.  Practitioner is supportive of decision.    COUNSELING:  Reviewed preventive health counseling, as reflected in patient instructions    Estimated body mass index  "is 35.43 kg/m  as calculated from the following:    Height as of this encounter: 1.6 m (5' 3\").    Weight as of this encounter: 90.7 kg (200 lb).         reports that she quit smoking about 38 years ago. Her smoking use included cigarettes. She started smoking about 48 years ago. She has a 2.50 pack-year smoking history. She has never used smokeless tobacco.      Appropriate preventive services were discussed with this patient, including applicable screening as appropriate for cardiovascular disease, diabetes, osteopenia/osteoporosis, and glaucoma.  As appropriate for age/gender, discussed screening for colorectal cancer, prostate cancer, breast cancer, and cervical cancer. Checklist reviewing preventive services available has been given to the patient.    Reviewed patients plan of care and provided an AVS. The Basic Care Plan (routine screening as documented in Health Maintenance) for Agnes meets the Care Plan requirement. This Care Plan has been established and reviewed with the Patient.    Counseling Resources:  ATP IV Guidelines  Pooled Cohorts Equation Calculator  Breast Cancer Risk Calculator  FRAX Risk Assessment  ICSI Preventive Guidelines  Dietary Guidelines for Americans, 2010  USDA's MyPlate  ASA Prophylaxis  Lung CA Screening    José Miguel Wallace MD, MD  [facility]    Identified Health Risks:  "

## 2019-06-04 NOTE — PATIENT INSTRUCTIONS
(Z00.00) Routine general medical examination at a health care facility  (primary encounter diagnosis)  Comment: For routine exam, we will draw labs as ordered, cholesterol, diabetes mellitus check, liver function, renal function,  Due for mammogram in August  We will also update vaccination history.  Plan: CBC with platelets, Lipid panel reflex to         direct LDL Fasting, Comprehensive metabolic         panel            (M35.00) Sjogren's syndrome, with unspecified organ involvement (H)  Comment: Continue follow up in rheumatology - consider increasing your dose of methotrexate  Plan: clorazepate dipotassium (TRANXENE) 15 MG tablet            (F41.1) DANA (generalized anxiety disorder)  Comment: Continue current medications   Plan: clorazepate dipotassium (TRANXENE) 15 MG tablet            (I10) Essential hypertension  Comment: blood pressure is well controlled  Plan: hydrochlorothiazide (HYDRODIURIL) 50 MG tablet            (E87.6) Hypokalemia  Comment: check potassium today  Plan: potassium chloride ER (K-TAB/KLOR-CON) 10 MEQ         CR tablet            (E78.5) Hyperlipidemia LDL goal <130  Comment: check fasting lipid panel today and continue simvastatin   Plan: simvastatin (ZOCOR) 40 MG tablet            (G47.00) Insomnia, unspecified type  Comment: continue trazodone  Plan: traZODone (DESYREL) 50 MG tablet            (E66.01) Morbid obesity (H)  Comment: continue with healthy diet and exercise  Plan:     (J84.9) ILD (interstitial lung disease) (H)  Comment: doing very well.  We will continue with current medications   Plan:

## 2019-06-10 PROCEDURE — 82274 ASSAY TEST FOR BLOOD FECAL: CPT | Performed by: INTERNAL MEDICINE

## 2019-06-13 DIAGNOSIS — Z12.11 ENCOUNTER FOR SCREENING FOR MALIGNANT NEOPLASM OF COLON: ICD-10-CM

## 2019-06-13 DIAGNOSIS — Z00.00 ROUTINE GENERAL MEDICAL EXAMINATION AT A HEALTH CARE FACILITY: ICD-10-CM

## 2019-06-13 LAB — HEMOCCULT STL QL IA: NEGATIVE

## 2019-06-14 NOTE — RESULT ENCOUNTER NOTE
Gurdeep Alarcon,    I have had the opportunity to review your recent results and an interpretation is as follows:  A negative FIT test indicates no evidence of colon cancer, but it should be repeated every year to have comparable sensitivity to that of a colonoscopy.       Sincerely,  José Miguel Wallace MD

## 2019-06-20 ENCOUNTER — TRANSFERRED RECORDS (OUTPATIENT)
Dept: HEALTH INFORMATION MANAGEMENT | Facility: CLINIC | Age: 74
End: 2019-06-20

## 2019-07-25 ENCOUNTER — TRANSFERRED RECORDS (OUTPATIENT)
Dept: HEALTH INFORMATION MANAGEMENT | Facility: CLINIC | Age: 74
End: 2019-07-25

## 2019-07-26 ENCOUNTER — TRANSFERRED RECORDS (OUTPATIENT)
Dept: HEALTH INFORMATION MANAGEMENT | Facility: CLINIC | Age: 74
End: 2019-07-26

## 2019-09-27 ENCOUNTER — TRANSFERRED RECORDS (OUTPATIENT)
Dept: HEALTH INFORMATION MANAGEMENT | Facility: CLINIC | Age: 74
End: 2019-09-27

## 2019-10-02 ENCOUNTER — HEALTH MAINTENANCE LETTER (OUTPATIENT)
Age: 74
End: 2019-10-02

## 2019-10-02 ENCOUNTER — OFFICE VISIT (OUTPATIENT)
Dept: FAMILY MEDICINE | Facility: CLINIC | Age: 74
End: 2019-10-02
Payer: MEDICARE

## 2019-10-02 DIAGNOSIS — K21.9 GASTROESOPHAGEAL REFLUX DISEASE, ESOPHAGITIS PRESENCE NOT SPECIFIED: ICD-10-CM

## 2019-10-02 DIAGNOSIS — M51.9 LUMBAR DISC DISEASE: Primary | ICD-10-CM

## 2019-10-02 DIAGNOSIS — F41.1 GAD (GENERALIZED ANXIETY DISORDER): ICD-10-CM

## 2019-10-02 DIAGNOSIS — M35.00 SJOGREN'S SYNDROME, WITH UNSPECIFIED ORGAN INVOLVEMENT (H): ICD-10-CM

## 2019-10-02 PROCEDURE — 99214 OFFICE O/P EST MOD 30 MIN: CPT | Mod: 25 | Performed by: INTERNAL MEDICINE

## 2019-10-02 PROCEDURE — 90662 IIV NO PRSV INCREASED AG IM: CPT | Performed by: INTERNAL MEDICINE

## 2019-10-02 PROCEDURE — G0008 ADMIN INFLUENZA VIRUS VAC: HCPCS | Performed by: INTERNAL MEDICINE

## 2019-10-02 RX ORDER — CLORAZEPATE DIPOTASSIUM 15 MG/1
15 TABLET ORAL DAILY
Qty: 90 TABLET | Refills: 3 | Status: SHIPPED | OUTPATIENT
Start: 2019-10-02 | End: 2020-06-02

## 2019-10-02 RX ORDER — PANTOPRAZOLE SODIUM 40 MG/1
40 TABLET, DELAYED RELEASE ORAL DAILY
Qty: 90 TABLET | Refills: 3 | Status: SHIPPED | OUTPATIENT
Start: 2019-10-02 | End: 2020-12-28

## 2019-10-02 ASSESSMENT — MIFFLIN-ST. JEOR: SCORE: 1366.32

## 2019-10-02 NOTE — NURSING NOTE
Prior to immunization administration, verified patients identity using patient s name and date of birth. Please see Immunization Activity for additional information.     Screening Questionnaire for Adult Immunization    Are you sick today?   No   Do you have allergies to medications, food, a vaccine component or latex?   No   Have you ever had a serious reaction after receiving a vaccination?   No   Do you have a long-term health problem with heart disease, lung disease, asthma, kidney disease, metabolic disease (e.g. diabetes), anemia, or other blood disorder?   No   Do you have cancer, leukemia, HIV/AIDS, or any other immune system problem?   No   In the past 3 months, have you taken medications that affect  your immune system, such as prednisone, other steroids, or anticancer drugs; drugs for the treatment of rheumatoid arthritis, Crohn s disease, or psoriasis; or have you had radiation treatments?   No   Have you had a seizure, or a brain or other nervous system problem?   No   During the past year, have you received a transfusion of blood or blood     products, or been given immune (gamma) globulin or antiviral drug?   No   For women: Are you pregnant or is there a chance you could become        pregnant during the next month?   No   Have you received any vaccinations in the past 4 weeks?   No     Immunization questionnaire answers were all negative.        Per orders of Dr. Wallace, injection of Flu given by Grace Baker CMA. Patient instructed to remain in clinic for 15 minutes afterwards, and to report any adverse reaction to me immediately.  Due to injection administration, patient instructed to remain in clinic for 15 minutes  afterwards, and to report any adverse reaction to me immediately.       Screening performed by Grace Baker CMA on 10/2/2019 at 9:27 AM.

## 2019-10-02 NOTE — PROGRESS NOTES
"[facility]  CLINIC PROGRESS NOTE    Subjective:   Sjogren's syndrome, with unspecified organ involvement (H)   Agnes Saravia has done well with current use of her prednisone and methotrexate. No longer getting rituximab.    DANA (generalized anxiety disorder)   Continues on trazodone and as needed clorazepate  Lumbar degenerative disc disease   Agnes Saravia has contineud occupational therapy have lower back pain that is described a \"grabbing' pain.  She particularly notes pain when standing from a seated position.      Past medical history, medications, allergies, social history, family history reviewed and updated in [facility] as of 10/2/2019 .    ROS  CONSTITUTIONAL: no fatigue, no unexpected change in weight  SKIN: no worrisome rashes, no worrisome moles, no worrisome lesions  EYES: no acute vision problems or changes  ENT: no ear problems, no mouth problems, no throat problems  RESP: no significant cough, no shortness of breath  CV: no chest pain, no palpitations, no new or worsening peripheral edema  GI: no nausea, no vomiting, no constipation, no diarrhea  : no frequency, no dysuria, no hematuria  MS: as above   PSYCHIATRIC: no changes in mood or affect      Objective:  Vitals  /82   Temp 97  F (36.1  C) (Oral)   Ht 1.6 m (5' 3\")   Wt 90.7 kg (200 lb)   SpO2 96%   BMI 35.43 kg/m    GEN: Alert Oriented x3 NAD  HEENT: Atraumatic, normocephalic, neck supple,    CV: RRR no murmurs or rubs  PULM: CTA no wheezes or crackles  ABD: Obese, soft, nontender nondistended,   SKIN: No visible skin lesion or ulcerations  EXT:  No edema bilateral lower extremities  NEURO: Gait and station deferred, No focal neurologic deficits  PSYCH: Mood good, affect mood congruent    No images are attached to the encounter.    No results found for this or any previous visit (from the past 24 hour(s)).    Assessment/Plan:  Patient Instructions   (M51.9) Lumbar disc disease  (primary encounter " diagnosis)  Comment: recommend referral to sports medicine  Plan: SPORTS MEDICINE REFERRAL            (M35.00) Sjogren's syndrome, with unspecified organ involvement (H)  Comment: Continue follow up in rheumatology  Plan: clorazepate dipotassium (TRANXENE) 15 MG tablet            (F41.1) DANA (generalized anxiety disorder)  Comment: Discussed pros/cons of clorazepate and we will continue to monitor  Plan: clorazepate dipotassium (TRANXENE) 15 MG tablet            (K21.9) Gastroesophageal reflux disease, esophagitis presence not specified  Comment: refill pantoprazole  Plan: pantoprazole (PROTONIX) 40 MG EC tablet               Follow up in 3 months    25 minutes spent with patient.  Over 50% of time counseling      Disclaimer: This note consists of symbols derived from keyboarding, dictation and/or voice recognition software. As a result, there may be errors in the script that have gone undetected. Please consider this when interpreting information found in this chart.    José Miguel Wallace MD  [phone]

## 2019-10-02 NOTE — PATIENT INSTRUCTIONS
(M51.9) Lumbar disc disease  (primary encounter diagnosis)  Comment: recommend referral to sports medicine  Plan: SPORTS MEDICINE REFERRAL            (M35.00) Sjogren's syndrome, with unspecified organ involvement (H)  Comment: Continue follow up in rheumatology  Plan: clorazepate dipotassium (TRANXENE) 15 MG tablet            (F41.1) DANA (generalized anxiety disorder)  Comment: Discussed pros/cons of clorazepate and we will continue to monitor  Plan: clorazepate dipotassium (TRANXENE) 15 MG tablet            (K21.9) Gastroesophageal reflux disease, esophagitis presence not specified  Comment: refill pantoprazole  Plan: pantoprazole (PROTONIX) 40 MG EC tablet

## 2019-10-06 VITALS
HEIGHT: 63 IN | TEMPERATURE: 97 F | WEIGHT: 200 LBS | OXYGEN SATURATION: 96 % | DIASTOLIC BLOOD PRESSURE: 82 MMHG | SYSTOLIC BLOOD PRESSURE: 126 MMHG | BODY MASS INDEX: 35.44 KG/M2

## 2019-10-14 ENCOUNTER — OFFICE VISIT (OUTPATIENT)
Dept: NEUROSURGERY | Facility: CLINIC | Age: 74
End: 2019-10-14
Attending: NEUROLOGICAL SURGERY
Payer: MEDICARE

## 2019-10-14 VITALS
TEMPERATURE: 97.5 F | DIASTOLIC BLOOD PRESSURE: 84 MMHG | SYSTOLIC BLOOD PRESSURE: 130 MMHG | RESPIRATION RATE: 16 BRPM | BODY MASS INDEX: 35.44 KG/M2 | HEART RATE: 77 BPM | OXYGEN SATURATION: 95 % | WEIGHT: 200 LBS | HEIGHT: 63 IN

## 2019-10-14 DIAGNOSIS — M53.3 SI (SACROILIAC) JOINT DYSFUNCTION: Primary | ICD-10-CM

## 2019-10-14 PROCEDURE — 99203 OFFICE O/P NEW LOW 30 MIN: CPT | Performed by: NEUROLOGICAL SURGERY

## 2019-10-14 PROCEDURE — G0463 HOSPITAL OUTPT CLINIC VISIT: HCPCS

## 2019-10-14 ASSESSMENT — PAIN SCALES - GENERAL: PAINLEVEL: EXTREME PAIN (8)

## 2019-10-14 ASSESSMENT — MIFFLIN-ST. JEOR: SCORE: 1366.32

## 2019-10-14 NOTE — PROGRESS NOTES
I was asked by Dr. Wallace to see this patient in consultation    74F w/ right low back pain, history of prior right L2-3 microdiscectomy.  Prior surgery in 2016 with Dr. Bernal.  Now with 6 months of progressive right low back pain.  Some radiation to lateral thigh and knee.  Occasional left sided symptoms as well.  MR with post-surgical changes, some lipomatosis, no significant stenosis.       Past Medical History:   Diagnosis Date     Anxiety      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     Depressive disorder      Hyperlipidemia      Insomnia      PONV (postoperative nausea and vomiting)      Rheumatoid arthritis(714.0)      Sjogren's syndrome (H)      Past Surgical History:   Procedure Laterality Date     BLEPHAROPLASTY       BRONCHOSCOPY (RIGID OR FLEXIBLE), DIAGNOSTIC N/A 4/11/2018    Procedure: COMBINED BRONCHOSCOPY (RIGID OR FLEXIBLE), LAVAGE;  Bronchoscopy with Lavage;  Surgeon: Manuel Conway MD;  Location:  GI     DISCECTOMY LUMBAR POSTERIOR MICROSCOPIC ONE LEVEL  8/14/2014    Procedure: DISCECTOMY LUMBAR POSTERIOR MICROSCOPIC ONE LEVEL;  Surgeon: Dudley Bernal MD;  Location:  OR     HYSTERECTOMY TOTAL ABDOMINAL, BILATERAL SALPINGO-OOPHORECTOMY, COMBINED       HYSTERECTOMY, PAP NO LONGER INDICATED       MAMMOPLASTY REDUCTION BILATERAL  5/14/2013    Procedure: MAMMOPLASTY REDUCTION BILATERAL;  BILATERAL REDUCTION MAMMOPLASTY;  Surgeon: Bruce Nash MD;  Location:  [facility]     SHOULDER SURGERY       Social History     Socioeconomic History     Marital status: Single     Spouse name: Not on file     Number of children: Not on file     Years of education: Not on file     Highest education level: Not on file   Occupational History     Not on file   Social Needs     Financial resource strain: Not on file     Food insecurity:     Worry: Not on file     Inability: Not on file     Transportation needs:     Medical: Not on file     Non-medical: Not on file   Tobacco Use     Smoking status: Former  "Smoker     Packs/day: 0.25     Years: 10.00     Pack years: 2.50     Types: Cigarettes     Start date: 1971     Last attempt to quit: 1981     Years since quittin.8     Smokeless tobacco: Never Used   Substance and Sexual Activity     Alcohol use: Yes     Alcohol/week: 0.0 standard drinks     Comment: beer in summer when mowing lawn      Drug use: No     Sexual activity: Yes     Partners: Male   Lifestyle     Physical activity:     Days per week: Not on file     Minutes per session: Not on file     Stress: Not on file   Relationships     Social connections:     Talks on phone: Not on file     Gets together: Not on file     Attends Mosque service: Not on file     Active member of club or organization: Not on file     Attends meetings of clubs or organizations: Not on file     Relationship status: Not on file     Intimate partner violence:     Fear of current or ex partner: Not on file     Emotionally abused: Not on file     Physically abused: Not on file     Forced sexual activity: Not on file   Other Topics Concern     Parent/sibling w/ CABG, MI or angioplasty before 65F 55M? 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    Physical Exam  /84   Pulse 77   Temp 97.5  F (36.4  C) (Oral)   Resp 16   Ht 1.6 m (5' 3\")   Wt 90.7 kg (200 lb)   SpO2 95%   BMI 35.43 kg/m    HEENT:  Normocephalic, atraumatic.  PERRLA.  EOM s intact.  Visual fields full to gross exam  Neck:  Supple, non-tender, without lymphadenopathy.  Heart:  No peripheral edema  Lungs:  No SOB  Abdomen:  Non-distended.   Skin:  Warm and dry.  Extremities:  No edema, cyanosis or clubbing.  Psychiatric:  No apparent " distress  Musculoskeletal:  Normal bulk and tone    NEUROLOGICAL EXAMINATION:     Mental status:  Alert and Oriented x 3, speech is fluent.  Cranial nerves:  II-XII intact.   Motor:    Shoulder Abduction:  Right:  5/5   Left:  5/5  Biceps:                      Right:  5/5   Left:  5/5  Triceps:                     Right:  5/5   Left:  5/5  Wrist Extensors:       Right:  5/5   Left:  5/5  Wrist Flexors:           Right:  5/5   Left:  5/5  interosseus :            Right:  5/5   Left:  5/5  Hip Flexion:                Right: 5/5  Left:  5/5  Quadriceps:             Right:  5/5  Left:  5/5  Hamstrings:             Right:  5/5  Left:  5/5  Gastroc Soleus:        Right:  5/5  Left:  5/5  Tib/Ant:                      Right:  5/5  Left:  5/5  EHL:                     Right:  5/5  Left:  5/5  Sensation:  Intact  Reflexes:  Negative Babinski.  Negative Clonus.  Negative Kraus's.  Coordination:  Smooth finger to nose testing.   Negative pronator drift.  Smooth tandem walking.  Tenderness to palpation over SI joints    A/P:  74F w/ right low back pain, history of prior right L2-3 microdiscectomy    I had a discussion with the patient, reviewing the history, symptoms, and imaging  Clinically concerning for SI joint dysfunction  Will refer to Dr. Hammer with [facility] for possible pelvic joint adjustment

## 2019-10-14 NOTE — NURSING NOTE
"Agnes Saravia is a 74 year old female who presents for:  Chief Complaint   Patient presents with     Back Pain        Initial Vitals:  /84   Pulse 77   Temp 97.5  F (36.4  C) (Oral)   Resp 16   Ht 5' 3\" (1.6 m)   Wt 200 lb (90.7 kg)   SpO2 95%   BMI 35.43 kg/m   Estimated body mass index is 35.43 kg/m  as calculated from the following:    Height as of this encounter: 5' 3\" (1.6 m).    Weight as of this encounter: 200 lb (90.7 kg).. Body surface area is 2.01 meters squared. BP completed using cuff size: regular  Extreme Pain (8)        Nursing Comments: Pt present today for back pain.        Luis Duggan CMA    "

## 2019-10-14 NOTE — PATIENT INSTRUCTIONS
Referral to Dr. Hammer at [facility] for further evaluation. You can call their clinic to schedule: [phone]    Please call our clinic with further questions/concerns.    ELVA Kerr    [facility]  Phone: [phone]

## 2019-10-14 NOTE — LETTER
10/14/2019         RE: Agnes Saravia  [id number]  [place] [address]        Dear Colleague,    Thank you for referring your patient, Agnes Saravia, to the [facility]. Please see a copy of my visit note below.    I was asked by Dr. Wallace to see this patient in consultation    74F w/ right low back pain, history of prior right L2-3 microdiscectomy.  Prior surgery in 2016 with Dr. Bernal.  Now with 6 months of progressive right low back pain.  Some radiation to lateral thigh and knee.  Occasional left sided symptoms as well.  MR with post-surgical changes, some lipomatosis, no significant stenosis.       Past Medical History:   Diagnosis Date     Anxiety      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     Depressive disorder      Hyperlipidemia      Insomnia      PONV (postoperative nausea and vomiting)      Rheumatoid arthritis(714.0)      Sjogren's syndrome (H)      Past Surgical History:   Procedure Laterality Date     BLEPHAROPLASTY       BRONCHOSCOPY (RIGID OR FLEXIBLE), DIAGNOSTIC N/A 4/11/2018    Procedure: COMBINED BRONCHOSCOPY (RIGID OR FLEXIBLE), LAVAGE;  Bronchoscopy with Lavage;  Surgeon: Manuel Conway MD;  Location:  GI     DISCECTOMY LUMBAR POSTERIOR MICROSCOPIC ONE LEVEL  8/14/2014    Procedure: DISCECTOMY LUMBAR POSTERIOR MICROSCOPIC ONE LEVEL;  Surgeon: Dudley Bernal MD;  Location:  OR     HYSTERECTOMY TOTAL ABDOMINAL, BILATERAL SALPINGO-OOPHORECTOMY, COMBINED       HYSTERECTOMY, PAP NO LONGER INDICATED       MAMMOPLASTY REDUCTION BILATERAL  5/14/2013    Procedure: MAMMOPLASTY REDUCTION BILATERAL;  BILATERAL REDUCTION MAMMOPLASTY;  Surgeon: Bruce Nash MD;  Location:  SD     SHOULDER SURGERY       Social History     Socioeconomic History     Marital status: Single     Spouse name: Not on file     Number of children: Not on file     Years of education: Not on file     Highest education level: Not on file   Occupational  "History     Not on file   Social Needs     Financial resource strain: Not on file     Food insecurity:     Worry: Not on file     Inability: Not on file     Transportation needs:     Medical: Not on file     Non-medical: Not on file   Tobacco Use     Smoking status: Former Smoker     Packs/day: 0.25     Years: 10.00     Pack years: 2.50     Types: Cigarettes     Start date: 1971     Last attempt to quit: 1981     Years since quittin.8     Smokeless tobacco: Never Used   Substance and Sexual Activity     Alcohol use: Yes     Alcohol/week: 0.0 standard drinks     Comment: beer in summer when mowing lawn      Drug use: No     Sexual activity: Yes     Partners: Male   Lifestyle     Physical activity:     Days per week: Not on file     Minutes per session: Not on file     Stress: Not on file   Relationships     Social connections:     Talks on phone: Not on file     Gets together: Not on file     Attends Christian service: Not on file     Active member of club or organization: Not on file     Attends meetings of clubs or organizations: Not on file     Relationship status: Not on file     Intimate partner violence:     Fear of current or ex partner: Not on file     Emotionally abused: Not on file     Physically abused: Not on file     Forced sexual activity: Not on file   Other Topics Concern     Parent/sibling w/ CABG, MI or angioplasty before 65F 55M? 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    Physical Exam  /84   Pulse 77   Temp 97.5  F (36.4  C) (Oral)   Resp 16   Ht 1.6 m (5' 3\")   Wt 90.7 kg (200 lb)   SpO2 95%   BMI 35.43 kg/m     HEENT:  Normocephalic, atraumatic.  " PERRLA.  EOM s intact.  Visual fields full to gross exam  Neck:  Supple, non-tender, without lymphadenopathy.  Heart:  No peripheral edema  Lungs:  No SOB  Abdomen:  Non-distended.   Skin:  Warm and dry.  Extremities:  No edema, cyanosis or clubbing.  Psychiatric:  No apparent distress  Musculoskeletal:  Normal bulk and tone    NEUROLOGICAL EXAMINATION:     Mental status:  Alert and Oriented x 3, speech is fluent.  Cranial nerves:  II-XII intact.   Motor:    Shoulder Abduction:  Right:  5/5   Left:  5/5  Biceps:                      Right:  5/5   Left:  5/5  Triceps:                     Right:  5/5   Left:  5/5  Wrist Extensors:       Right:  5/5   Left:  5/5  Wrist Flexors:           Right:  5/5   Left:  5/5  interosseus :            Right:  5/5   Left:  5/5  Hip Flexion:                Right: 5/5  Left:  5/5  Quadriceps:             Right:  5/5  Left:  5/5  Hamstrings:             Right:  5/5  Left:  5/5  Gastroc Soleus:        Right:  5/5  Left:  5/5  Tib/Ant:                      Right:  5/5  Left:  5/5  EHL:                     Right:  5/5  Left:  5/5  Sensation:  Intact  Reflexes:  Negative Babinski.  Negative Clonus.  Negative Kraus's.  Coordination:  Smooth finger to nose testing.   Negative pronator drift.  Smooth tandem walking.  Tenderness to palpation over SI joints    A/P:  74F w/ right low back pain, history of prior right L2-3 microdiscectomy    I had a discussion with the patient, reviewing the history, symptoms, and imaging  Clinically concerning for SI joint dysfunction  Will refer to Dr. Hammer with [facility] for possible pelvic joint adjustment         Again, thank you for allowing me to participate in the care of your patient.        Sincerely,        Osbaldo Bhakta MD

## 2019-10-22 ENCOUNTER — MYC MEDICAL ADVICE (OUTPATIENT)
Dept: FAMILY MEDICINE | Facility: CLINIC | Age: 74
End: 2019-10-22

## 2019-10-22 NOTE — TELEPHONE ENCOUNTER
PCP see below,     Pt experienced an episode of laryngeal spasm while in the dental office     He is asking if this is something he will need to be concerned for in the future?    Please advise   Cassy QUISPE RN

## 2019-11-08 ENCOUNTER — TELEPHONE (OUTPATIENT)
Dept: FAMILY MEDICINE | Facility: CLINIC | Age: 74
End: 2019-11-08

## 2019-11-08 NOTE — TELEPHONE ENCOUNTER
PCP,    Pt scheduled Lab Only appt for Monday 11/11/19.  She is due for A1c- ordered as future. Is there anything else you'd like done at that time?    Thank you,  Song CAPUTO RN

## 2019-11-08 NOTE — TELEPHONE ENCOUNTER
Reason for Call: Request for an order     Order or referral being requested:     Scheduled for lab on Monday 11/11/2019  Just wanted to be sure of what labs she needs done at that time. Please update chart as needed    Date needed: as soon as possible    Has the patient been seen by the PCP for this problem? YES      Phone number Patient can be reached at:  Home number on file [phone] (home)    Best Time:  any    Can we leave a detailed message on this number?  YES    Call taken on 11/8/2019 at 12:43 PM by Renuka Pacheco

## 2019-11-11 ENCOUNTER — HOSPITAL ENCOUNTER (OUTPATIENT)
Dept: MAMMOGRAPHY | Facility: CLINIC | Age: 74
Discharge: HOME | End: 2019-11-11
Attending: INTERNAL MEDICINE | Admitting: INTERNAL MEDICINE
Payer: MEDICARE

## 2019-11-11 DIAGNOSIS — Z00.00 ROUTINE GENERAL MEDICAL EXAMINATION AT A HEALTH CARE FACILITY: ICD-10-CM

## 2019-11-11 DIAGNOSIS — Z12.31 VISIT FOR SCREENING MAMMOGRAM: ICD-10-CM

## 2019-11-11 LAB — HBA1C MFR BLD: 6 % (ref 0–5.6)

## 2019-11-11 PROCEDURE — 36415 COLL VENOUS BLD VENIPUNCTURE: CPT | Performed by: INTERNAL MEDICINE

## 2019-11-11 PROCEDURE — 83036 HEMOGLOBIN GLYCOSYLATED A1C: CPT | Performed by: INTERNAL MEDICINE

## 2019-11-11 PROCEDURE — 77063 BREAST TOMOSYNTHESIS BI: CPT

## 2019-11-11 NOTE — RESULT ENCOUNTER NOTE
Gurdeep Alarcon,    I have had the opportunity to review your recent results and an interpretation is as follows:  Your follow-up hemoglobin A1c shows stable average blood glucose.  Congratulations on your excellent results    Sincerely,  José Miguel Wallace MD

## 2020-03-24 ENCOUNTER — HOSPITAL ENCOUNTER (INPATIENT)
Facility: CLINIC | Age: 75
LOS: 2 days | Discharge: HOME | DRG: 372 | End: 2020-03-27
Attending: EMERGENCY MEDICINE | Admitting: INTERNAL MEDICINE
Payer: MEDICARE

## 2020-03-24 DIAGNOSIS — E87.6 HYPOKALEMIA: ICD-10-CM

## 2020-03-24 DIAGNOSIS — K52.9 GASTROENTERITIS: ICD-10-CM

## 2020-03-24 DIAGNOSIS — A49.8 CLOSTRIDIUM DIFFICILE INFECTION: Primary | ICD-10-CM

## 2020-03-24 PROCEDURE — 99285 EMERGENCY DEPT VISIT HI MDM: CPT | Mod: 25

## 2020-03-25 ENCOUNTER — APPOINTMENT (OUTPATIENT)
Dept: CT IMAGING | Facility: CLINIC | Age: 75
DRG: 372 | End: 2020-03-25
Attending: EMERGENCY MEDICINE
Payer: MEDICARE

## 2020-03-25 PROBLEM — A04.72 C. DIFFICILE ENTERITIS: Status: ACTIVE | Noted: 2020-03-25

## 2020-03-25 PROBLEM — A08.4 VIRAL GASTROENTERITIS: Status: ACTIVE | Noted: 2020-03-25

## 2020-03-25 LAB
ALBUMIN SERPL-MCNC: 3.4 G/DL (ref 3.4–5)
ALP SERPL-CCNC: 78 U/L (ref 40–150)
ALT SERPL W P-5'-P-CCNC: 19 U/L (ref 0–50)
ANION GAP SERPL CALCULATED.3IONS-SCNC: 8 MMOL/L (ref 3–14)
AST SERPL W P-5'-P-CCNC: 15 U/L (ref 0–45)
BASOPHILS # BLD AUTO: 0 10E9/L (ref 0–0.2)
BASOPHILS NFR BLD AUTO: 0.2 %
BILIRUB SERPL-MCNC: 0.6 MG/DL (ref 0.2–1.3)
BUN SERPL-MCNC: 25 MG/DL (ref 7–30)
C COLI+JEJUNI+LARI FUSA STL QL NAA+PROBE: NOT DETECTED
C DIFF TOX B STL QL: POSITIVE
CALCIUM SERPL-MCNC: 8.7 MG/DL (ref 8.5–10.1)
CHLORIDE SERPL-SCNC: 105 MMOL/L (ref 94–109)
CO2 SERPL-SCNC: 27 MMOL/L (ref 20–32)
CREAT SERPL-MCNC: 0.64 MG/DL (ref 0.52–1.04)
DIFFERENTIAL METHOD BLD: ABNORMAL
EC STX1 GENE STL QL NAA+PROBE: NOT DETECTED
EC STX2 GENE STL QL NAA+PROBE: NOT DETECTED
ENTERIC PATHOGEN COMMENT: NORMAL
EOSINOPHIL # BLD AUTO: 0.4 10E9/L (ref 0–0.7)
EOSINOPHIL NFR BLD AUTO: 2.2 %
ERYTHROCYTE [DISTWIDTH] IN BLOOD BY AUTOMATED COUNT: 14.9 % (ref 10–15)
GFR SERPL CREATININE-BSD FRML MDRD: 86 ML/MIN/{1.73_M2}
GLUCOSE SERPL-MCNC: 135 MG/DL (ref 70–99)
HCT VFR BLD AUTO: 42.4 % (ref 35–47)
HGB BLD-MCNC: 13.6 G/DL (ref 11.7–15.7)
IMM GRANULOCYTES # BLD: 0.1 10E9/L (ref 0–0.4)
IMM GRANULOCYTES NFR BLD: 0.4 %
INTERPRETATION ECG - MUSE: NORMAL
LACTATE BLD-SCNC: 1.3 MMOL/L (ref 0.7–2)
LACTATE BLD-SCNC: 1.4 MMOL/L (ref 0.7–2)
LACTATE BLD-SCNC: 2.3 MMOL/L (ref 0.7–2)
LYMPHOCYTES # BLD AUTO: 0.9 10E9/L (ref 0.8–5.3)
LYMPHOCYTES NFR BLD AUTO: 5.1 %
MCH RBC QN AUTO: 29.2 PG (ref 26.5–33)
MCHC RBC AUTO-ENTMCNC: 32.1 G/DL (ref 31.5–36.5)
MCV RBC AUTO: 91 FL (ref 78–100)
MONOCYTES # BLD AUTO: 1.3 10E9/L (ref 0–1.3)
MONOCYTES NFR BLD AUTO: 7.9 %
NEUTROPHILS # BLD AUTO: 14 10E9/L (ref 1.6–8.3)
NEUTROPHILS NFR BLD AUTO: 84.2 %
NOROV GI+II ORF1-ORF2 JNC STL QL NAA+PR: NOT DETECTED
PLATELET # BLD AUTO: 276 10E9/L (ref 150–450)
POTASSIUM SERPL-SCNC: 2.8 MMOL/L (ref 3.4–5.3)
POTASSIUM SERPL-SCNC: 3.3 MMOL/L (ref 3.4–5.3)
POTASSIUM SERPL-SCNC: 4.2 MMOL/L (ref 3.4–5.3)
PROT SERPL-MCNC: 7.3 G/DL (ref 6.8–8.8)
RBC # BLD AUTO: 4.66 10E12/L (ref 3.8–5.2)
RVA NSP5 STL QL NAA+PROBE: NOT DETECTED
SALMONELLA SP RPOD STL QL NAA+PROBE: NOT DETECTED
SHIGELLA SP+EIEC IPAH STL QL NAA+PROBE: NOT DETECTED
SODIUM SERPL-SCNC: 140 MMOL/L (ref 133–144)
SPECIMEN SOURCE: ABNORMAL
TROPONIN I SERPL-MCNC: <0.015 UG/L (ref 0–0.04)
V CHOL+PARA RFBL+TRKH+TNAA STL QL NAA+PR: NOT DETECTED
WBC # BLD AUTO: 16.7 10E9/L (ref 4–11)
Y ENTERO RECN STL QL NAA+PROBE: NOT DETECTED

## 2020-03-25 PROCEDURE — 36415 COLL VENOUS BLD VENIPUNCTURE: CPT | Performed by: INTERNAL MEDICINE

## 2020-03-25 PROCEDURE — 25000128 H RX IP 250 OP 636

## 2020-03-25 PROCEDURE — 87493 C DIFF AMPLIFIED PROBE: CPT | Performed by: PHYSICIAN ASSISTANT

## 2020-03-25 PROCEDURE — 83605 ASSAY OF LACTIC ACID: CPT | Performed by: INTERNAL MEDICINE

## 2020-03-25 PROCEDURE — 25000132 ZZH RX MED GY IP 250 OP 250 PS 637: Mod: GY | Performed by: EMERGENCY MEDICINE

## 2020-03-25 PROCEDURE — 96376 TX/PRO/DX INJ SAME DRUG ADON: CPT

## 2020-03-25 PROCEDURE — 87506 IADNA-DNA/RNA PROBE TQ 6-11: CPT | Performed by: INTERNAL MEDICINE

## 2020-03-25 PROCEDURE — 36415 COLL VENOUS BLD VENIPUNCTURE: CPT | Performed by: EMERGENCY MEDICINE

## 2020-03-25 PROCEDURE — G0378 HOSPITAL OBSERVATION PER HR: HCPCS

## 2020-03-25 PROCEDURE — 84484 ASSAY OF TROPONIN QUANT: CPT | Performed by: EMERGENCY MEDICINE

## 2020-03-25 PROCEDURE — 96365 THER/PROPH/DIAG IV INF INIT: CPT | Mod: 59

## 2020-03-25 PROCEDURE — 96367 TX/PROPH/DG ADDL SEQ IV INF: CPT

## 2020-03-25 PROCEDURE — 96361 HYDRATE IV INFUSION ADD-ON: CPT

## 2020-03-25 PROCEDURE — 25000131 ZZH RX MED GY IP 250 OP 636 PS 637: Mod: GY | Performed by: PHYSICIAN ASSISTANT

## 2020-03-25 PROCEDURE — 25000132 ZZH RX MED GY IP 250 OP 250 PS 637: Mod: GY | Performed by: INTERNAL MEDICINE

## 2020-03-25 PROCEDURE — 12000011 ZZH R&B MS OVERFLOW

## 2020-03-25 PROCEDURE — 83605 ASSAY OF LACTIC ACID: CPT | Performed by: EMERGENCY MEDICINE

## 2020-03-25 PROCEDURE — 80053 COMPREHEN METABOLIC PANEL: CPT | Performed by: EMERGENCY MEDICINE

## 2020-03-25 PROCEDURE — 85025 COMPLETE CBC W/AUTO DIFF WBC: CPT | Performed by: EMERGENCY MEDICINE

## 2020-03-25 PROCEDURE — 36415 COLL VENOUS BLD VENIPUNCTURE: CPT | Performed by: PHYSICIAN ASSISTANT

## 2020-03-25 PROCEDURE — 74176 CT ABD & PELVIS W/O CONTRAST: CPT

## 2020-03-25 PROCEDURE — 99220 ZZC INITIAL OBSERVATION CARE,LEVL III: CPT | Performed by: INTERNAL MEDICINE

## 2020-03-25 PROCEDURE — 25800030 ZZH RX IP 258 OP 636: Performed by: EMERGENCY MEDICINE

## 2020-03-25 PROCEDURE — 25000128 H RX IP 250 OP 636: Performed by: EMERGENCY MEDICINE

## 2020-03-25 PROCEDURE — 96375 TX/PRO/DX INJ NEW DRUG ADDON: CPT

## 2020-03-25 PROCEDURE — 25000132 ZZH RX MED GY IP 250 OP 250 PS 637: Mod: GY | Performed by: PHYSICIAN ASSISTANT

## 2020-03-25 PROCEDURE — 25000128 H RX IP 250 OP 636: Performed by: INTERNAL MEDICINE

## 2020-03-25 PROCEDURE — 84132 ASSAY OF SERUM POTASSIUM: CPT | Performed by: PHYSICIAN ASSISTANT

## 2020-03-25 PROCEDURE — 25000132 ZZH RX MED GY IP 250 OP 250 PS 637: Mod: GY | Performed by: NURSE PRACTITIONER

## 2020-03-25 RX ORDER — POTASSIUM CHLORIDE 1.5 G/1.58G
20-40 POWDER, FOR SOLUTION ORAL
Status: DISCONTINUED | OUTPATIENT
Start: 2020-03-25 | End: 2020-03-27 | Stop reason: HOSPADM

## 2020-03-25 RX ORDER — POTASSIUM CL/LIDO/0.9 % NACL 10MEQ/0.1L
10 INTRAVENOUS SOLUTION, PIGGYBACK (ML) INTRAVENOUS
Status: DISCONTINUED | OUTPATIENT
Start: 2020-03-25 | End: 2020-03-27 | Stop reason: HOSPADM

## 2020-03-25 RX ORDER — POTASSIUM CHLORIDE 1500 MG/1
20-40 TABLET, EXTENDED RELEASE ORAL
Status: DISCONTINUED | OUTPATIENT
Start: 2020-03-25 | End: 2020-03-27 | Stop reason: HOSPADM

## 2020-03-25 RX ORDER — PREDNISONE 5 MG/1
5 TABLET ORAL DAILY
Status: DISCONTINUED | OUTPATIENT
Start: 2020-03-25 | End: 2020-03-27 | Stop reason: HOSPADM

## 2020-03-25 RX ORDER — MAGNESIUM SULFATE HEPTAHYDRATE 40 MG/ML
2 INJECTION, SOLUTION INTRAVENOUS ONCE
Status: COMPLETED | OUTPATIENT
Start: 2020-03-25 | End: 2020-03-25

## 2020-03-25 RX ORDER — ONDANSETRON 2 MG/ML
4 INJECTION INTRAMUSCULAR; INTRAVENOUS ONCE
Status: COMPLETED | OUTPATIENT
Start: 2020-03-25 | End: 2020-03-25

## 2020-03-25 RX ORDER — FOLIC ACID 1 MG/1
1 TABLET ORAL DAILY
Status: DISCONTINUED | OUTPATIENT
Start: 2020-03-25 | End: 2020-03-27 | Stop reason: HOSPADM

## 2020-03-25 RX ORDER — ACETAMINOPHEN 650 MG/1
650 SUPPOSITORY RECTAL EVERY 4 HOURS PRN
Status: DISCONTINUED | OUTPATIENT
Start: 2020-03-25 | End: 2020-03-27 | Stop reason: HOSPADM

## 2020-03-25 RX ORDER — VANCOMYCIN HYDROCHLORIDE 50 MG/ML
250 KIT ORAL 4 TIMES DAILY
Status: DISCONTINUED | OUTPATIENT
Start: 2020-03-25 | End: 2020-03-25

## 2020-03-25 RX ORDER — POTASSIUM CHLORIDE 1500 MG/1
20 TABLET, EXTENDED RELEASE ORAL ONCE
Status: COMPLETED | OUTPATIENT
Start: 2020-03-25 | End: 2020-03-25

## 2020-03-25 RX ORDER — GABAPENTIN 400 MG/1
400 CAPSULE ORAL 3 TIMES DAILY
Status: DISCONTINUED | OUTPATIENT
Start: 2020-03-25 | End: 2020-03-27 | Stop reason: HOSPADM

## 2020-03-25 RX ORDER — SODIUM CHLORIDE AND POTASSIUM CHLORIDE 150; 900 MG/100ML; MG/100ML
INJECTION, SOLUTION INTRAVENOUS CONTINUOUS
Status: DISCONTINUED | OUTPATIENT
Start: 2020-03-25 | End: 2020-03-27 | Stop reason: HOSPADM

## 2020-03-25 RX ORDER — LORAZEPAM 2 MG/ML
0.5 INJECTION INTRAMUSCULAR ONCE
Status: COMPLETED | OUTPATIENT
Start: 2020-03-25 | End: 2020-03-25

## 2020-03-25 RX ORDER — SIMVASTATIN 40 MG
40 TABLET ORAL AT BEDTIME
Status: DISCONTINUED | OUTPATIENT
Start: 2020-03-25 | End: 2020-03-27 | Stop reason: HOSPADM

## 2020-03-25 RX ORDER — PANTOPRAZOLE SODIUM 40 MG/1
40 TABLET, DELAYED RELEASE ORAL DAILY
Status: DISCONTINUED | OUTPATIENT
Start: 2020-03-25 | End: 2020-03-27 | Stop reason: HOSPADM

## 2020-03-25 RX ORDER — ONDANSETRON 4 MG/1
4 TABLET, ORALLY DISINTEGRATING ORAL EVERY 6 HOURS PRN
Status: DISCONTINUED | OUTPATIENT
Start: 2020-03-25 | End: 2020-03-27 | Stop reason: HOSPADM

## 2020-03-25 RX ORDER — ONDANSETRON 2 MG/ML
INJECTION INTRAMUSCULAR; INTRAVENOUS
Status: COMPLETED
Start: 2020-03-25 | End: 2020-03-25

## 2020-03-25 RX ORDER — IOPAMIDOL 755 MG/ML
101 INJECTION, SOLUTION INTRAVASCULAR ONCE
Status: DISCONTINUED | OUTPATIENT
Start: 2020-03-25 | End: 2020-03-25

## 2020-03-25 RX ORDER — GABAPENTIN 400 MG/1
400 CAPSULE ORAL 3 TIMES DAILY
COMMUNITY

## 2020-03-25 RX ORDER — CLORAZEPATE DIPOTASSIUM 3.75 MG/1
15 TABLET ORAL DAILY
Status: DISCONTINUED | OUTPATIENT
Start: 2020-03-25 | End: 2020-03-27 | Stop reason: HOSPADM

## 2020-03-25 RX ORDER — POTASSIUM CHLORIDE 1500 MG/1
40 TABLET, EXTENDED RELEASE ORAL ONCE
Status: COMPLETED | OUTPATIENT
Start: 2020-03-25 | End: 2020-03-25

## 2020-03-25 RX ORDER — ACETAMINOPHEN 325 MG/1
650 TABLET ORAL EVERY 4 HOURS PRN
Status: DISCONTINUED | OUTPATIENT
Start: 2020-03-25 | End: 2020-03-27 | Stop reason: HOSPADM

## 2020-03-25 RX ORDER — POTASSIUM CHLORIDE 750 MG/1
20 TABLET, EXTENDED RELEASE ORAL DAILY
Status: DISCONTINUED | OUTPATIENT
Start: 2020-03-25 | End: 2020-03-27 | Stop reason: HOSPADM

## 2020-03-25 RX ORDER — LIDOCAINE 40 MG/G
CREAM TOPICAL
Status: DISCONTINUED | OUTPATIENT
Start: 2020-03-25 | End: 2020-03-27 | Stop reason: HOSPADM

## 2020-03-25 RX ORDER — NALOXONE HYDROCHLORIDE 0.4 MG/ML
.1-.4 INJECTION, SOLUTION INTRAMUSCULAR; INTRAVENOUS; SUBCUTANEOUS
Status: DISCONTINUED | OUTPATIENT
Start: 2020-03-25 | End: 2020-03-27 | Stop reason: HOSPADM

## 2020-03-25 RX ORDER — POTASSIUM CHLORIDE 7.45 MG/ML
10 INJECTION INTRAVENOUS ONCE
Status: DISCONTINUED | OUTPATIENT
Start: 2020-03-25 | End: 2020-03-25 | Stop reason: CLARIF

## 2020-03-25 RX ORDER — POTASSIUM CHLORIDE 1.5 G/1.58G
40 POWDER, FOR SOLUTION ORAL ONCE
Status: COMPLETED | OUTPATIENT
Start: 2020-03-25 | End: 2020-03-25

## 2020-03-25 RX ORDER — IBUPROFEN 400 MG/1
800 TABLET, FILM COATED ORAL
COMMUNITY

## 2020-03-25 RX ORDER — VANCOMYCIN HYDROCHLORIDE 50 MG/ML
125 KIT ORAL 4 TIMES DAILY
Status: DISCONTINUED | OUTPATIENT
Start: 2020-03-25 | End: 2020-03-27 | Stop reason: HOSPADM

## 2020-03-25 RX ORDER — ONDANSETRON 2 MG/ML
4 INJECTION INTRAMUSCULAR; INTRAVENOUS EVERY 30 MIN PRN
Status: DISCONTINUED | OUTPATIENT
Start: 2020-03-25 | End: 2020-03-25

## 2020-03-25 RX ORDER — POTASSIUM CHLORIDE 29.8 MG/ML
20 INJECTION INTRAVENOUS
Status: DISCONTINUED | OUTPATIENT
Start: 2020-03-25 | End: 2020-03-27 | Stop reason: HOSPADM

## 2020-03-25 RX ORDER — POTASSIUM CHLORIDE 7.45 MG/ML
10 INJECTION INTRAVENOUS
Status: DISCONTINUED | OUTPATIENT
Start: 2020-03-25 | End: 2020-03-27 | Stop reason: HOSPADM

## 2020-03-25 RX ORDER — ONDANSETRON 2 MG/ML
4 INJECTION INTRAMUSCULAR; INTRAVENOUS EVERY 6 HOURS PRN
Status: DISCONTINUED | OUTPATIENT
Start: 2020-03-25 | End: 2020-03-27 | Stop reason: HOSPADM

## 2020-03-25 RX ADMIN — SODIUM CHLORIDE 1000 ML: 9 INJECTION, SOLUTION INTRAVENOUS at 02:36

## 2020-03-25 RX ADMIN — SIMVASTATIN 40 MG: 40 TABLET, FILM COATED ORAL at 21:19

## 2020-03-25 RX ADMIN — GABAPENTIN 400 MG: 400 CAPSULE ORAL at 13:17

## 2020-03-25 RX ADMIN — GABAPENTIN 400 MG: 400 CAPSULE ORAL at 10:27

## 2020-03-25 RX ADMIN — FOLIC ACID 1 MG: 1 TABLET ORAL at 10:27

## 2020-03-25 RX ADMIN — ONDANSETRON 4 MG: 2 INJECTION INTRAMUSCULAR; INTRAVENOUS at 02:34

## 2020-03-25 RX ADMIN — POTASSIUM CHLORIDE 40 MEQ: 1500 TABLET, EXTENDED RELEASE ORAL at 11:18

## 2020-03-25 RX ADMIN — PREDNISONE 5 MG: 5 TABLET ORAL at 10:27

## 2020-03-25 RX ADMIN — POTASSIUM CHLORIDE 40 MEQ: 1.5 POWDER, FOR SOLUTION ORAL at 03:48

## 2020-03-25 RX ADMIN — POTASSIUM CHLORIDE 20 MEQ: 10 TABLET, EXTENDED RELEASE ORAL at 08:31

## 2020-03-25 RX ADMIN — MAGNESIUM SULFATE HEPTAHYDRATE 2 G: 40 INJECTION, SOLUTION INTRAVENOUS at 03:42

## 2020-03-25 RX ADMIN — PANTOPRAZOLE SODIUM 40 MG: 40 TABLET, DELAYED RELEASE ORAL at 08:31

## 2020-03-25 RX ADMIN — ONDANSETRON 4 MG: 2 INJECTION INTRAMUSCULAR; INTRAVENOUS at 00:28

## 2020-03-25 RX ADMIN — POTASSIUM CHLORIDE 20 MEQ: 1500 TABLET, EXTENDED RELEASE ORAL at 13:17

## 2020-03-25 RX ADMIN — CLORAZEPATE DIPOTASSIUM 15 MG: 3.75 TABLET ORAL at 10:27

## 2020-03-25 RX ADMIN — SODIUM CHLORIDE 1000 ML: 9 INJECTION, SOLUTION INTRAVENOUS at 00:28

## 2020-03-25 RX ADMIN — POTASSIUM CHLORIDE AND SODIUM CHLORIDE: 900; 150 INJECTION, SOLUTION INTRAVENOUS at 06:17

## 2020-03-25 RX ADMIN — TRAZODONE HYDROCHLORIDE 25 MG: 50 TABLET ORAL at 21:19

## 2020-03-25 RX ADMIN — GABAPENTIN 400 MG: 400 CAPSULE ORAL at 18:16

## 2020-03-25 RX ADMIN — Medication 10 MEQ: at 02:39

## 2020-03-25 RX ADMIN — VANCOMYCIN HYDROCHLORIDE 125 MG: KIT at 17:09

## 2020-03-25 RX ADMIN — LORAZEPAM 0.5 MG: 2 INJECTION INTRAMUSCULAR; INTRAVENOUS at 02:35

## 2020-03-25 RX ADMIN — VANCOMYCIN HYDROCHLORIDE 125 MG: KIT at 19:27

## 2020-03-25 ASSESSMENT — ACTIVITIES OF DAILY LIVING (ADL)
RETIRED_EATING: 0-->INDEPENDENT
SWALLOWING: 0-->SWALLOWS FOODS/LIQUIDS WITHOUT DIFFICULTY
NUMBER_OF_TIMES_PATIENT_HAS_FALLEN_WITHIN_LAST_SIX_MONTHS: 1
BATHING: 0-->INDEPENDENT
TOILETING: 0-->INDEPENDENT
ADLS_ACUITY_SCORE: 16
DRESS: 0-->INDEPENDENT
RETIRED_COMMUNICATION: 0-->UNDERSTANDS/COMMUNICATES WITHOUT DIFFICULTY

## 2020-03-25 ASSESSMENT — ENCOUNTER SYMPTOMS
WEAKNESS: 1
NAUSEA: 1
VOMITING: 1
BLOOD IN STOOL: 0
DIARRHEA: 1

## 2020-03-25 ASSESSMENT — MIFFLIN-ST. JEOR: SCORE: 1447.52

## 2020-03-25 NOTE — PROGRESS NOTES
Observation goals  PRIOR TO DISCHARGE     Comments: List all goals to be met before discharge home   - Nausea/vomiting (diarrhea if present) improved.- NOT  MET - C-DIFF POSITIVE  - Tolerating oral intake to maintain hydration .- MET   - Vital signs normal or at patient baseline and orthostatic vitals are normal and patient not lightheaded with standing .- MET   - Diagnostic tests and consults completed and resulted if ordered .- MET   - Adequate pain control on oral analgesia .- MET   - Tolerating oral antibiotics if ordered .- MET   - Safe disposition plan has been identified .- MET   - Nurse to notify provider when observation goals have been met and patient is ready for discharge

## 2020-03-25 NOTE — PHARMACY-ADMISSION MEDICATION HISTORY
Pharmacy Medication History  Admission medication history interview status for the 3/24/2020  admission is complete. See EPIC admission navigator for prior to admission medications     Medication history sources: Patient, Surescripts and Pharmacy (Verified gabapentin dose with patient's retail pharmacy. )  Medication history source reliability: Good  Adherence assessment: Good    Significant changes made to the medication list:  1) Added ibuprofen to PTA med list  2) Methotrexate dose updated from 7.5mg weekly to 12.5mg weekly.       Medication reconciliation completed by provider prior to medication history? Yes    Time spent in this activity: 25 minutes      Prior to Admission medications    Medication Sig Last Dose Taking? Auth Provider   aspirin 81 MG EC tablet Take 81 mg by mouth At Bedtime  Past Week at hs Yes Reported, Patient   cevimeline (EVOXAC) 30 MG capsule Take 30 mg by mouth 3 times daily (AM, Noon, Dinner) 3/24/2020 at Unknown time Yes Reported, Patient   clorazepate dipotassium (TRANXENE) 15 MG tablet Take 1 tablet (15 mg) by mouth daily 3/24/2020 at am Yes José Miguel Wallace MD   folic acid (FOLVITE) 1 MG tablet Take 1 tablet (1 mg) by mouth daily 3/24/2020 at am Yes Manuel Conway MD   gabapentin (NEURONTIN) 400 MG capsule Take 400 mg by mouth 3 times daily (AM, Noon, Dinner) 3/24/2020 at Unknown time Yes Unknown, Entered By History   hydrochlorothiazide (HYDRODIURIL) 50 MG tablet Take 1 tablet (50 mg) by mouth daily 3/24/2020 at am Yes José Miguel Wallace MD   ibuprofen (ADVIL/MOTRIN) 400 MG tablet Take 400 mg by mouth 2 times daily (Noon and Dinner) 3/24/2020 at Unknown time Yes Unknown, Entered By History   methotrexate 2.5 MG tablet Take 12.5 mg by mouth every 7 days (Takes on Tuesdays) 3/17/2020 Yes Manuel Conway MD   pantoprazole (PROTONIX) 40 MG EC tablet Take 1 tablet (40 mg) by mouth daily Take 30-60 minutes before a meal. 3/24/2020 at am Yes José Miguel Wallace  MD Bruce   potassium chloride ER (K-TAB/KLOR-CON) 10 MEQ CR tablet Take 2 tablets (20 mEq) by mouth daily 3/24/2020 at am Yes José Miguel Wallace MD   predniSONE (DELTASONE) 5 MG tablet Take 5 mg by mouth daily. 3/24/2020 at am Yes Manuel Conway MD   simvastatin (ZOCOR) 40 MG tablet Take 1 tablet (40 mg) by mouth At Bedtime Past Week at hs Yes José Miguel Wallace MD   traZODone (DESYREL) 50 MG tablet Take 0.5 tablets (25 mg) by mouth once daily at bedtime Past Week at hs Yes José Miguel Wallace MD

## 2020-03-25 NOTE — ED PROVIDER NOTES
"  History   Chief Complaint:  Loss of Consciousness      HPI   Agnes Saravia is a 74 year old female with history of cancer, pulmonary embolism, hyperlipidemia, hypertension, among others who presents via EMS for evaluation after a syncopal episode. The patient states yesterday she felt below baseline yesterday, but she denied any symptoms at that time. This evening around 2000 she had acute onset of diarrhea and has had a total of 6 episodes since initial onset. While sitting on the toilet, she had a syncopal episode, prompting her to call EMS for evaluation.     En route, EMS administered 4 mg Zofran and 200 mL normal saline.    Here, the patient also endorses 3 total episodes of emesis as well as nausea, generalized weakness, and \"dry mouth\". She denies any hematochezia, ill contacts with similar symptoms, recent travel, or history of Crohn's disease.     Allergies:  Mellaril  Percocet    Medications:   Aspirin 81 mg   Evoxac  Tranxene  Folvite  Neurontin  Hydrodiuril  Methotrexate  Protonix  Deltasone  Zocor   Desyrel     Past Medical History:    Anxiety   Cancer  Depression  Hyperlipidemia  PONV  Rheumatoid arthritis   Sjogren's syndrome   Hypertension  Obesity   Pneumonia  Pulmonary embolism     Past Surgical History:    Blepharoplasty   Bronchoscopy   Discectomy lumbar posterior one level  Hysterectomy, total  Mammoplasty reduction, bilateral   Shoulder surgery      Family History:    Mother - Cerebrovascular disease   Father - Cerebrovascular disease    Social History:  The patient was accompanied to the ED by EMS.  Smoking Status: Former, 1981  Smokeless Tobacco: Never Used  Alcohol Use: Yes  Drug Use: No  PCP: José Miguel Wallace     Review of Systems   Gastrointestinal: Positive for diarrhea, nausea and vomiting. Negative for blood in stool.   Neurological: Positive for syncope and weakness.   All other systems reviewed and are negative.      Physical Exam     Patient Vitals for the past 24 " "hrs:   BP Temp Temp src Pulse Heart Rate Resp SpO2 Height   03/25/20 0315 -- -- -- -- -- -- 98 % --   03/25/20 0247 118/76 -- -- 84 -- -- 96 % --   03/25/20 0130 133/65 -- -- 77 -- -- 95 % --   03/25/20 0100 139/66 -- -- 82 -- -- 93 % --   03/25/20 0030 123/53 -- -- -- -- -- 91 % --   03/25/20 0004 130/66 97.6  F (36.4  C) Oral -- 77 18 98 % 1.6 m (5' 3\")     Physical Exam  Vitals: reviewed by me  General: Pt seen on hospital [facility], pleasant, cooperative, and alert to conversation  Eyes: Tracking well, clear conjunctiva BL  ENT: Dry mucous membranes. midline trachea.   Lungs:   No tachypnea, no accessory muscle use. No respiratory distress.   CV: Rate as above, regular rhythm.    Abd: Soft, non tender, no guarding, no rebound. Non distended  MSK: no peripheral edema or joint effusion.  No evidence of trauma  Skin: No rash, normal turgor and temperature  Neuro: Clear speech and no facial droop.  Psych: Not RIS, no e/o AH/VH    Emergency Department Course     ECG:  ECG taken at 0007, ECG read at 0025 by Bruce Ramos MD  Sinus rhythm with 1st degree AV block  Otherwise normal ECG  Rate 78 bpm. AL interval 242. QRS duration 100. QT/QTc 408/465. P-R-T axes 57 -25 70.      Imaging:  Radiology findings were communicated with the patient and Admitting MD who voiced understanding of the findings.    Abd/pelvis CT no contrast - Stone Protocol  IMPRESSION:   1.  No obstruction or acute inflammatory process. No findings to explain vomiting.   2.  Colonic diverticulosis. No evidence of diverticulitis.  Reading per radiology.      Laboratory:  Laboratory findings were communicated with the patient and Admitting MD who voiced understanding of the findings.    CBC: WBC 16.7 (H) o/w WNL (HGB 13.6, )   CMP: Potassium 2.8 (L), Glucose 125 (H) o/w WNL (Creatinine 0.64)   Troponin (Collected 0101): <0.015   Lactic Acid (Collected at 0101): 2.3 (H)   Lactic Acid (Collected at 0439): 1.4     Interventions:  0028 " 0.9% NaCl bolus 1000 mL IV   0028 Zofran 4 mg IV  0234 Zofran 4 mg IV  0235 Ativan 0.5 mg IV  0236 0.9% NaCl bolus 1000 mL IV   0239 Potassium chloride intermittent infusion 10 mEq IV  0342 Magnesium sulfate intermittent infusion 2 g IV  0348 Potassium chloride 40 mEq PO    Emergency Department Course:  Past medical records, nursing notes, and vitals reviewed.  EKG obtained in the ED, see results above.    The patient was sent for a Abd/pelvis CT no contrast - Stone Protocol while in the emergency department, results above.    IV was inserted and blood was drawn for laboratory testing, results above.     (0017)   I performed an exam of the patient as documented above. History obtained from patient.     (0208)   I rechecked the patient.    (0400)   Hospitalist paged.    (0418)   I spoke with Dr. Egan of the Hospitalist service regarding patient's presentation, findings, and plan of care.     (0426)   I rechecked the patient and discussed results and plan of care.     Findings and plan explained to the Patient who consents to admission. Discussed the patient with Dr. Egan, who will admit the patient to an observation bed for further monitoring, evaluation, and treatment. I personally reviewed the laboratory and imaging results with the Patient and answered all related questions prior to admission.     Impression & Plan   Medical Decision Making:  Agnes Saravia is a very pleasant 74 year old female who presents to the emergency room with nausea, vomiting, and diarrhea. I do think this is likely due to gastroenteritis. She has a benign abdominal exam at time of arrival and time of disposition. She has a negative CT scan of the abdomen, and I do think she is significantly dehydrated and requires observation for IV fluids and potassium repletion. Patient is okay with this plan and I spoke to Dr. Egan who has kindly agreed to accept care of the patient.         Diagnosis:    ICD-10-CM    1. Gastroenteritis  K52.9     2. Hypokalemia  E87.6      Disposition:  Admitted to an observation bed under the care of Dr. Egan.     Scribe Disclosure:  I, Mike Farrar, am serving as a scribe at 12:05 AM on 3/25/2020 to document services personally performed by Bruce Ramos MD based on my observations and the provider's statements to me.  March 25, 2020   [facility] EMERGENCY DEPARTMENT       Bruce Ramos MD  03/25/20 0536

## 2020-03-25 NOTE — H&P
[facility]  History and Physical  Hospitalist       Date of Admission:  3/24/2020    Assessment & Plan   Agnes Saravia is a pleasant 74 year old female with history of squamous cell carcinoma of the lung, rheumatoid arthritis, Sjogren's syndrome, dyslipidemia, generalized anxiety disorder, obesity who was admitted on 3/24/2020 with nausea, vomiting and dehydration resulting in syncopal episode. Too weak to go home so observation for IV fluids and replacement of potassium.     Gastroenteritis  Dehydration  Hypokalemia  Suspect norovirus or similar viral gastroenteritis. Typically takes potassium replacement along with her hydrochlorothiazide so not surprising that her hypokalemia is worse with this illness. Lactate 2.3 on presentation as well as potassium 2.8 which is likely related to dehydration.  No history of C. difficile infection or recent antibiotic use.  Abdominal exam is benign.  -admit to observation for rehydration  -IV fluids at 100/h and bland diet as tolerated  -enteric precautions  -check enteric viral/bacterial panel  -replace potassium per protocol  -zofran prn  -Recheck lactate later today    GERD  -resume PTA PPI daily    Generalized Anxiety Disorder  -continue tranxene  -hold PTA trazodone for now    Dyslipidemia  -hold PTA statin    Rheumatoid Arthritis  Sjogren's Syndrome  Takes hydrochlorothiazide to mitigate swelling effects of RA. Also takes cevimeline for sjogren's syndrome. Need to clarify prednisone and methotrexate dosing.  -hold PTA meds for now. Resume at discharge.    Hold all nonessential medications, supplements, vitamins given observation status.    DVT prophylaxis: Ambulate every shift  Code Status:  Full    Disposition: Expected discharge in 1 day    Shahrzad Egan MD  Text Page    ~~~~~~~~~~~~~~~~~~~~~~~~~~~~~~~~~~~~~~~~~~~~~~~~~~~~~  Primary Care Physician   José Miguel Wallace MD    Chief Complaint   Nausea, vomiting, fell off toilet    History is  obtained from the patient and medical records.    History of Present Illness   Agnes Saravia is a pleasant 74 year old female with history of squamous cell carcinoma of the lung, rheumatoid arthritis, Sjogren's syndrome, dyslipidemia, generalized anxiety disorder, obesity who presents with onset of nausea, vomiting, diarrhea earlier in the night.  She reports greater than 5 episodes each of vomiting and watery diarrhea since 8 PM on 3/24.  At one point she was trying to get up from the toilet and ended up fainting onto the floor.  She has not had any recent courses of antibiotics. No known ill contacts or travel. She denies any chest pain, shortness of breath, cough.  Endorses generalized myalgias and weakness.  Denies any flank pain but has had lower abdominal discomfort.    Case reviewed with Dr. Ramos from the Emergency Department. Labs and imaging reviewed.  Abdominal CT negative for any acute inflammatory process or obstruction.  Patient felt too weak to go home so admission for observation was requested.    Past Medical History    I have reviewed this patient's medical history and updated it with pertinent information if needed.   Past Medical History:   Diagnosis Date     Anxiety      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     Depressive disorder      Hyperlipidemia      Insomnia      PONV (postoperative nausea and vomiting)      Rheumatoid arthritis(714.0)      Sjogren's syndrome (H)      Past Surgical History   I have reviewed this patient's surgical history and updated it with pertinent information if needed.  Past Surgical History:   Procedure Laterality Date     BLEPHAROPLASTY       BRONCHOSCOPY (RIGID OR FLEXIBLE), DIAGNOSTIC N/A 4/11/2018    Procedure: COMBINED BRONCHOSCOPY (RIGID OR FLEXIBLE), LAVAGE;  Bronchoscopy with Lavage;  Surgeon: Manuel Conway MD;  Location: [facility]     DISCECTOMY LUMBAR POSTERIOR MICROSCOPIC ONE LEVEL  8/14/2014    Procedure: DISCECTOMY LUMBAR POSTERIOR MICROSCOPIC ONE  LEVEL;  Surgeon: Dudley Bernal MD;  Location: [facility]     HYSTERECTOMY TOTAL ABDOMINAL, BILATERAL SALPINGO-OOPHORECTOMY, COMBINED       HYSTERECTOMY, PAP NO LONGER INDICATED       MAMMOPLASTY REDUCTION BILATERAL  5/14/2013    Procedure: MAMMOPLASTY REDUCTION BILATERAL;  BILATERAL REDUCTION MAMMOPLASTY;  Surgeon: Bruce Nash MD;  Location:  [facility]     SHOULDER SURGERY       Prior to Admission Medications   Prior to Admission Medications   Prescriptions Last Dose Informant Patient Reported? Taking?   aspirin 81 MG EC tablet   Yes No   Sig: Take 81 mg by mouth daily   cevimeline (EVOXAC) 30 MG capsule  Self Yes No   Sig: Take 30 mg by mouth 3 times daily.   clorazepate dipotassium (TRANXENE) 15 MG tablet   No No   Sig: Take 1 tablet (15 mg) by mouth daily   folic acid (FOLVITE) 1 MG tablet   Yes No   Sig: Take 1 tablet (1 mg) by mouth daily   gabapentin (NEURONTIN) 300 MG capsule   Yes No   Sig: Take 300 mg by mouth 3 times daily   hydrochlorothiazide (HYDRODIURIL) 50 MG tablet   No No   Sig: Take 1 tablet (50 mg) by mouth daily   methotrexate 2.5 MG tablet   Yes No   Sig: Take 3 tablets (7.5 mg) by mouth every 7 days   pantoprazole (PROTONIX) 40 MG EC tablet   No No   Sig: Take 1 tablet (40 mg) by mouth daily Take 30-60 minutes before a meal.   potassium chloride ER (K-TAB/KLOR-CON) 10 MEQ CR tablet   No No   Sig: Take 2 tablets (20 mEq) by mouth daily   predniSONE (DELTASONE) 5 MG tablet   Yes No   Sig: Take 5 mg by mouth daily.   simvastatin (ZOCOR) 40 MG tablet   No No   Sig: Take 1 tablet (40 mg) by mouth At Bedtime   sodium chloride (OCEAN NASAL SPRAY) 0.65 % nasal spray   No No   Sig: Spray 1 spray into both nostrils daily as needed for congestion   traZODone (DESYREL) 50 MG tablet   No No   Sig: Take 0.5 tablets (25 mg) by mouth once daily at bedtime      Facility-Administered Medications: None     Allergies   Allergies   Allergen Reactions     Mellaril [Thioridazine Hcl] Anaphylaxis      Percocet [Oxycodone-Acetaminophen] Anaphylaxis and Swelling     Tolerates hydrocodone and codeine in cough medicine       Social History   I have reviewed this patient's social history and updated it with pertinent information if needed. Agnes Saravia  reports that she quit smoking about 39 years ago. Her smoking use included cigarettes. She started smoking about 49 years ago. She has a 2.50 pack-year smoking history. She has never used smokeless tobacco. She reports current alcohol use. She reports that she does not use drugs.    Family History   I have reviewed this patient's family history and updated it with pertinent information if needed.   Family History   Problem Relation Age of Onset     Cerebrovascular Disease Mother      Cerebrovascular Disease Father      Colon Cancer No family hx of        Review of Systems   The 10 point Review of Systems is negative other than noted in the HPI or here.    Physical Exam   Temp: 97.6  F (36.4  C) Temp src: Oral BP: 118/76 Pulse: 84 Heart Rate: 77 Resp: 18 SpO2: 98 %      Vital Signs with Ranges  Temp:  [97.6  F (36.4  C)] 97.6  F (36.4  C)  Pulse:  [77-84] 84  Heart Rate:  [77] 77  Resp:  [18] 18  BP: (118-139)/(53-76) 118/76  SpO2:  [91 %-98 %] 98 %  0 lbs 0 oz    Constitutional: Alert, NAD, pleasant and interactive, talkative  Eyes: PERRL, sclerae anicteric  HEENT: mmm, atraumatic  Respiratory: Lungs CTAB, no wheezes or crackles  Cardiovascular: RRR, no murmurs  no LE edema  GI: obese, soft, mild tenderness to palpation of lower quadrants, nondistended, hyperactive bowel sounds  Skin/Integument: warm, dry, no acute rashes  Musc: DAVALOS, normal limb strength x 4  Neuro: AOx3, no focal deficits, no tremors  Psych: not anxious, not confused      Data   Data reviewed today:  I personally reviewed the abdominal CT image(s) showing no obstruction or inflammatory process, no diverticulitis.  Recent Labs   Lab 03/25/20  0101   WBC 16.7*   HGB 13.6   MCV 91      NA  140   POTASSIUM 2.8*   CHLORIDE 105   CO2 27   BUN 25   CR 0.64   ANIONGAP 8   CHRISTOPHER 8.7   *   ALBUMIN 3.4   PROTTOTAL 7.3   BILITOTAL 0.6   ALKPHOS 78   ALT 19   AST 15   TROPI <0.015       Imaging:  Recent Results (from the past 24 hour(s))   Abd/pelvis CT no contrast - Stone Protocol    Narrative    EXAM: CT ABDOMEN PELVIS W/O CONTRAST  LOCATION: [facility]  DATE/TIME: 3/25/2020 3:29 AM    INDICATION: Nausea, vomiting  COMPARISON: None.  TECHNIQUE: CT scan of the abdomen and pelvis was performed without IV contrast. Multiplanar reformats were obtained. Dose reduction techniques were used.  CONTRAST: None.    FINDINGS:   LOWER CHEST: Mild dependent atelectasis, left greater than right base. No pleural effusion.    HEPATOBILIARY: No radiopaque gallstones or biliary dilatation. The liver appears normal.    PANCREAS: Normal.    SPLEEN: Normal.    ADRENAL GLANDS: Normal.    KIDNEYS/BLADDER: No urinary tract stone or hydronephrosis. Normal bladder    BOWEL: Stomach contains nonspecific debris and fluid, and does not appear distended. The large and small bowel are normal in caliber, without evidence of obstruction or acute inflammatory process. There is diverticulosis of the sigmoid. No   diverticulitis. Appendix is not seen. No findings to suggest appendicitis.    LYMPH NODES: Normal.    VASCULATURE: Unremarkable.    PELVIC ORGANS: Normal.    MUSCULOSKELETAL: Mild degenerative changes in the spine. No fracture or significant osseous abnormality. Tiny umbilical fat-containing hernia.      Impression    IMPRESSION:   1.  No obstruction or acute inflammatory process. No findings to explain vomiting.    2.  Colonic diverticulosis. No evidence of diverticulitis.

## 2020-03-25 NOTE — PROGRESS NOTES
Observation goals  PRIOR TO DISCHARGE     Comments: List all goals to be met before discharge home   - Nausea/vomiting (diarrhea if present) improved.- MET   - Tolerating oral intake to maintain hydration .- MET   - Vital signs normal or at patient baseline and orthostatic vitals are normal and patient not lightheaded with standing .- MET   - Diagnostic tests and consults completed and resulted if ordered .- MET   - Adequate pain control on oral analgesia .- MET   - Tolerating oral antibiotics if ordered .- MET   - Safe disposition plan has been identified .- MET   - Nurse to notify provider when observation goals have been met and patient is ready for discharge.

## 2020-03-25 NOTE — PROGRESS NOTES
END OF SHIFT REPORT :          DATE & TIME: March 25, 2020        SHIFT: 7 AM- 3 PM    Agnes Saravia 74 year old female  was admitted on 3/24/2020 due to N/V/D and syncope. Patient is:Full Code. ISOLATION:Yes- Enteric. : No. Pt. is not on telemetry. Patient is A, O x 4 and is up  with Stand with Assist x 1 .     VS are : Temp: 98  F (36.7  C) BP: (!) 158/44 Pulse: 83 Heart Rate: 74 Resp: 18  SpO2: 97 % O2 Device: None (Room air)    Abnormal Lab/Test/ Procedures K:3.3 and was replaced, Stool viral panel test result pending.  Diet: Regular.  IVF: NS + 20 KCL @ 100 cc/hr.  Skin intact.   Bowel/Bladder: 4 episodes of LBM,  continent/ make needs known.  Pain: decline pain med with 3-5/10 abdominal pain. Drains/Devices: none. Consult: none.  Tests/Procedures for next shift: K: at 6 PM .  Other Important Info: none.  Anticipated DC date: 3/26.

## 2020-03-25 NOTE — PROGRESS NOTES
[facility]    Hospitalist Progress Note    Assessment & Plan   Agnes Saravia is a 74 year old female who was admitted on 3/24/2020.     history of squamous cell carcinoma of the lung, rheumatoid arthritis, Sjogren's syndrome, Fibromyalgia, dyslipidemia, generalized anxiety disorder, obesity who was admitted on 3/24/2020 with nausea, vomiting and dehydration resulting in syncopal episode. Too weak to go home so observation for IV fluids and replacement of potassium.      C. Diff   Patient with numerous episodes of loose/watery diarrhea starting yesterday.  Abd/pelvis CT negative.  WBC of 16.7.  C. Diff positive.   --Enteric viral/bacterial panel in process.    --Oral Vancomycin 125 mg QID started.      Dehydration  Hypokalemia  Secondary to C. Diff.  Typically takes potassium replacement along with her hydrochlorothiazide so not surprising that her hypokalemia is worse with this illness. Lactate 2.3 on presentation as well as potassium 2.8 which is likely related to dehydration.  -Lactate recheck WNL at 1.4 and 1.3.    --Admit to observation for rehydration.  --IV fluids at 100/h and bland diet as tolerated.  --Enteric precautions.  --Replace potassium per protocol.  --Zofran prn.     GERD  --Resume PTA PPI daily.     Generalized Anxiety Disorder  --Continue tranxene daily.    --Hold PTA trazodone for now     Dyslipidemia  --Hold PTA statin.     Rheumatoid Arthritis  Sjogren's Syndrome  Takes hydrochlorothiazide to mitigate swelling effects of RA. Also takes cevimeline for sjogren's syndrome. Need to clarify prednisone and methotrexate dosing.  --Hold PTA cevimeline and methotrexate.    --Resume PTA folic acid 1 mg daily  and prednisone 5 mg daily.      Fibromyalgia:  --Resume PTA gabapentin 400 mg TID.       Hold all nonessential medications, supplements, vitamins given observation status.    Diet: Regular Diet Adult     DVT Prophylaxis: Pneumatic Compression Devices   Andujar Catheter: not  present  Code Status: Full Code     Disposition Plan    Expected discharge: 2 - 3 days, recommended to prior living arrangement once diarhhea has improved and patient demonstrates ability to maintain oral hydration.     Patient continues to have multiple diarrhea and poor oral intake.  C. Diff has returned positive.  UR contacted and due to poor oral intake and continued diarrhea will transfer to inpatient.    Entered: Juan Vieira PA-C 03/25/2020, 8:02 AM          The patient has been discussed with Dr. Egan, who agrees with the assessment and plan at this time.  Dr. Egan will evaluate the patient independently.      Rajesh Vieira PA-C   [phone]    Interval History   Patient was resting in bed upon arrival.  She continues to have frequent diarrhea.  She complained of chills, slight abdominal pain and bloating.  Otherwise she denied fevers, chest pain, shortness of breath.      She indicated that all her symptoms started abruptly last night with vomiting and diarrhea.  She then awoke to EMT providers in her bathroom.      Reviewed plan for the day.      -Data reviewed today: I reviewed all new labs and imaging results over the last 24 hours. I personally reviewed no images or EKG's today.    Physical Exam   Temp: 97.6  F (36.4  C) Temp src: Axillary BP: 137/69 Pulse: 83 Heart Rate: 85 Resp: 18 SpO2: 95 % O2 Device: None (Room air)    Vitals:    03/25/20 0608   Weight: 98.8 kg (217 lb 14.4 oz)     Vital Signs with Ranges  Temp:  [97.2  F (36.2  C)-97.6  F (36.4  C)] 97.6  F (36.4  C)  Pulse:  [77-84] 83  Heart Rate:  [71-85] 85  Resp:  [18] 18  BP: (118-139)/(53-76) 137/69  SpO2:  [91 %-98 %] 95 %  I/O last 3 completed shifts:  In: 2000 [IV Piggyback:2000]  Out: -       Constitutional: Awake, alert, cooperative, no apparent distress.   ENT: Normocephalic, without obvious abnormality, atraumatic, oral pharynx with moist mucus membranes, tonsils without erythema or exudates.  Neck: Supple,  symmetrical, trachea midline, no adenopathy.  Pulmonary: No increased work of breathing, good air exchange, clear to auscultation bilaterally, no crackles or wheezing.  Cardiovascular: Regular rate and rhythm, normal S1 and S2, no S3 or S4, and no murmur noted.  GI: Normal bowel sounds, soft, non-distended, non-tender.  Skin/Integumen: Clear.  Neuro: CN II-XII grossly intact.  Psych:  Alert and oriented x 3. Normal affect.  Extremities: No lower extremity edema noted, and calves are non-TTP bilaterally.       Medications     0.9% sodium chloride + KCl 20 mEq/L 100 mL/hr at 03/25/20 0617       clorazepate  15 mg Oral Daily     pantoprazole  40 mg Oral Daily     potassium chloride ER  20 mEq Oral Daily     sodium chloride (PF)  3 mL Intracatheter Q8H       Data   Recent Labs   Lab 03/25/20  0849 03/25/20  0101   WBC  --  16.7*   HGB  --  13.6   MCV  --  91   PLT  --  276   NA  --  140   POTASSIUM 3.3* 2.8*   CHLORIDE  --  105   CO2  --  27   BUN  --  25   CR  --  0.64   ANIONGAP  --  8   CHRISTOPHER  --  8.7   GLC  --  135*   ALBUMIN  --  3.4   PROTTOTAL  --  7.3   BILITOTAL  --  0.6   ALKPHOS  --  78   ALT  --  19   AST  --  15   TROPI  --  <0.015       Recent Results (from the past 24 hour(s))   Abd/pelvis CT no contrast - Stone Protocol    Narrative    EXAM: CT ABDOMEN PELVIS W/O CONTRAST  LOCATION: [facility]  DATE/TIME: 3/25/2020 3:29 AM    INDICATION: Nausea, vomiting  COMPARISON: None.  TECHNIQUE: CT scan of the abdomen and pelvis was performed without IV contrast. Multiplanar reformats were obtained. Dose reduction techniques were used.  CONTRAST: None.    FINDINGS:   LOWER CHEST: Mild dependent atelectasis, left greater than right base. No pleural effusion.    HEPATOBILIARY: No radiopaque gallstones or biliary dilatation. The liver appears normal.    PANCREAS: Normal.    SPLEEN: Normal.    ADRENAL GLANDS: Normal.    KIDNEYS/BLADDER: No urinary tract stone or hydronephrosis. Normal bladder    BOWEL:  Stomach contains nonspecific debris and fluid, and does not appear distended. The large and small bowel are normal in caliber, without evidence of obstruction or acute inflammatory process. There is diverticulosis of the sigmoid. No   diverticulitis. Appendix is not seen. No findings to suggest appendicitis.    LYMPH NODES: Normal.    VASCULATURE: Unremarkable.    PELVIC ORGANS: Normal.    MUSCULOSKELETAL: Mild degenerative changes in the spine. No fracture or significant osseous abnormality. Tiny umbilical fat-containing hernia.      Impression    IMPRESSION:   1.  No obstruction or acute inflammatory process. No findings to explain vomiting.    2.  Colonic diverticulosis. No evidence of diverticulitis.

## 2020-03-25 NOTE — PROGRESS NOTES
RECEIVING UNIT ED HANDOFF REVIEW    ED Nurse Handoff Report was reviewed by: Sabiha Lemos RN on March 25, 2020 at 4:59 AM

## 2020-03-25 NOTE — PROGRESS NOTES
END OF SHIFT REPORT :          DATE & TIME: March 25, 2020        SHIFT: 3 PM- 7PM     Agnes Saravia 74 year old female  was admitted on 3/24/2020 due to N/V/D and syncope. Patient is:Full Code. ISOLATION:Yes- Enteric . : No. Pt. is not on telemetry. Patient is A, O x 4 and is up  Independently .      VS are : Temp: 99.4  F (37.4  C) BP: (!) 161/52 Pulse: 83 Heart Rate: 84 Resp: 16  SpO2: 94 % O2 Device: None (Room air)     Abnormal Lab/Test/ Procedures K:3.3 and was replaced, (+) C diff started with Vancomycin PO.  Diet: Regular.  IVF: NS + 20 KCL @ 100 cc/hr.  Skin intact.   Bowel/Bladder: multiple episodes of LBM today,  continent/ make needs known.  Pain:none. Drains/Devices: none. Consult: none.Tests/Procedures for next shift: CBC, BMP in AM. Other Important Info: Admitted today as IP but will remain in Obs. Anticipated DC date:?.

## 2020-03-25 NOTE — PROGRESS NOTES
Observation goals  PRIOR TO DISCHARGE     Comments: List all goals to be met before discharge home   - Nausea/vomiting (diarrhea if present) improved.- MET   - Tolerating oral intake to maintain hydration .- MET   - Vital signs normal or at patient baseline and orthostatic vitals are normal and patient not lightheaded with standing .- MET   - Diagnostic tests and consults completed and resulted if ordered .- MET   - Adequate pain control on oral analgesia .- MET   - Tolerating oral antibiotics if ordered .- MET   - Safe disposition plan has been identified .- MET   - Nurse to notify provider when observation goals have been met and patient is ready for discharge

## 2020-03-25 NOTE — ED NOTES
Bed: ED20  Expected date:   Expected time:   Means of arrival:   Comments:  434  74F Syncopal  1086

## 2020-03-25 NOTE — ED NOTES
[facility]  ED Nurse Handoff Report    ED Chief complaint: Loss of Consciousness (Pt had onset of diarrhea at 2000 tonight. Reports 6 rounds of diarrhea. Pt was on toilet and had syncopal episode. Reports N/V/D. Feels weak. EMS report initial BPs 90/60. Improved to 110/60.)      ED Diagnosis:   Final diagnoses:   Gastroenteritis   Hypokalemia       Code Status: Full Code    Allergies:   Allergies   Allergen Reactions     Mellaril [Thioridazine Hcl] Anaphylaxis     Percocet [Oxycodone-Acetaminophen] Anaphylaxis and Swelling     Tolerates hydrocodone and codeine in cough medicine       Patient Story: Pt presents from home after having syncopal epidsode while on the toilet. Pt had started feeling unwell yesterday and then at 2000 tonight she developed vomiting and diarrhea.  Focused Assessment:  Pt pale and low energy on arrival to the ER. Brought in by EMS who placed an iv in her left ac and infused 200cc which was infiltrated on her arrival. Pt has been nauseated in the ER but no V/D. Pt much energy and alert after receiving fluids and medications. Pt does feel a little nauseated still.    Treatments and/or interventions provided: 2LNS, 8mg zofran, 0.5mg ativan; 10mEq potassium iv and 40mEq potassium po  Patient's response to treatments and/or interventions: Pt's color and energy level improved. Pt still nauseated but no further vomiting and diarrhea.    To be done/followed up on inpatient unit:  none pending    Does this patient have any cognitive concerns?: none    Activity level - Baseline/Home:  Independent with cane  Activity Level - Current:   Stand with Assist    Patient's Preferred language: English   Needed?: No    Isolation: None  Infection: Not Applicable  Bariatric?: No    Vital Signs:   Vitals:    03/25/20 0030 03/25/20 0100 03/25/20 0130 03/25/20 0247   BP: 123/53 139/66 133/65 118/76   Pulse:  82 77 84   Resp:       Temp:       TempSrc:       SpO2: 91% 93% 95% 96%   Height:            Cardiac Rhythm:     Was the PSS-3 completed:   Yes  What interventions are required if any?               Family Comments: Talked to pt's significant other Lima by phone and updated.  OBS brochure/video discussed/provided to patient/family: N/A              Name of person given brochure if not patient: N/A              Relationship to patient: N/A    For the majority of the shift this patient's behavior was Green.   Behavioral interventions performed were information.    ED NURSE PHONE NUMBER: [phone]

## 2020-03-25 NOTE — PLAN OF CARE
Pt arrived from ED with N/V/D around 0610. A&Ox4, VSS on RA. Enteric precautions in place. IVF @100/hr. Regular diet. C/o lightheadedness, weakness, and abdominal pain. Denied interventions for pain. Up Ax1 to bathroom with gait belt, uses cane at home.

## 2020-03-25 NOTE — PROGRESS NOTES
MD Notification    Notified Person: MD    Notified Person Name: Starla CAPUTO NP    Notification Date/Time: 6:24 PM    Notification Interaction: web page    Purpose of Notification: 3 Missing bedtime meds    Orders Received:    Comments:

## 2020-03-26 LAB
ANION GAP SERPL CALCULATED.3IONS-SCNC: 3 MMOL/L (ref 3–14)
BASOPHILS # BLD AUTO: 0 10E9/L (ref 0–0.2)
BASOPHILS NFR BLD AUTO: 0.2 %
BUN SERPL-MCNC: 9 MG/DL (ref 7–30)
CALCIUM SERPL-MCNC: 8.2 MG/DL (ref 8.5–10.1)
CHLORIDE SERPL-SCNC: 110 MMOL/L (ref 94–109)
CO2 SERPL-SCNC: 26 MMOL/L (ref 20–32)
CREAT SERPL-MCNC: 0.64 MG/DL (ref 0.52–1.04)
DIFFERENTIAL METHOD BLD: ABNORMAL
EOSINOPHIL # BLD AUTO: 0.2 10E9/L (ref 0–0.7)
EOSINOPHIL NFR BLD AUTO: 2.1 %
ERYTHROCYTE [DISTWIDTH] IN BLOOD BY AUTOMATED COUNT: 14.7 % (ref 10–15)
GFR SERPL CREATININE-BSD FRML MDRD: 86 ML/MIN/{1.73_M2}
GLUCOSE SERPL-MCNC: 89 MG/DL (ref 70–99)
HCT VFR BLD AUTO: 35.3 % (ref 35–47)
HGB BLD-MCNC: 11 G/DL (ref 11.7–15.7)
IMM GRANULOCYTES # BLD: 0 10E9/L (ref 0–0.4)
IMM GRANULOCYTES NFR BLD: 0.1 %
LYMPHOCYTES # BLD AUTO: 2.2 10E9/L (ref 0.8–5.3)
LYMPHOCYTES NFR BLD AUTO: 27.2 %
MCH RBC QN AUTO: 29.2 PG (ref 26.5–33)
MCHC RBC AUTO-ENTMCNC: 31.2 G/DL (ref 31.5–36.5)
MCV RBC AUTO: 94 FL (ref 78–100)
MONOCYTES # BLD AUTO: 0.6 10E9/L (ref 0–1.3)
MONOCYTES NFR BLD AUTO: 6.8 %
NEUTROPHILS # BLD AUTO: 5.1 10E9/L (ref 1.6–8.3)
NEUTROPHILS NFR BLD AUTO: 63.6 %
NRBC # BLD AUTO: 0 10*3/UL
NRBC BLD AUTO-RTO: 0 /100
PLATELET # BLD AUTO: 325 10E9/L (ref 150–450)
POTASSIUM SERPL-SCNC: 4 MMOL/L (ref 3.4–5.3)
RBC # BLD AUTO: 3.77 10E12/L (ref 3.8–5.2)
SODIUM SERPL-SCNC: 139 MMOL/L (ref 133–144)
WBC # BLD AUTO: 8.1 10E9/L (ref 4–11)

## 2020-03-26 PROCEDURE — 25000132 ZZH RX MED GY IP 250 OP 250 PS 637: Mod: GY | Performed by: INTERNAL MEDICINE

## 2020-03-26 PROCEDURE — 80048 BASIC METABOLIC PNL TOTAL CA: CPT | Performed by: PHYSICIAN ASSISTANT

## 2020-03-26 PROCEDURE — 25000132 ZZH RX MED GY IP 250 OP 250 PS 637: Mod: GY | Performed by: NURSE PRACTITIONER

## 2020-03-26 PROCEDURE — 99232 SBSQ HOSP IP/OBS MODERATE 35: CPT | Performed by: PHYSICIAN ASSISTANT

## 2020-03-26 PROCEDURE — 85025 COMPLETE CBC W/AUTO DIFF WBC: CPT | Performed by: PHYSICIAN ASSISTANT

## 2020-03-26 PROCEDURE — 36415 COLL VENOUS BLD VENIPUNCTURE: CPT | Performed by: PHYSICIAN ASSISTANT

## 2020-03-26 PROCEDURE — 25000131 ZZH RX MED GY IP 250 OP 636 PS 637: Mod: GY | Performed by: PHYSICIAN ASSISTANT

## 2020-03-26 PROCEDURE — 25000128 H RX IP 250 OP 636: Performed by: INTERNAL MEDICINE

## 2020-03-26 PROCEDURE — 25000132 ZZH RX MED GY IP 250 OP 250 PS 637: Mod: GY | Performed by: PHYSICIAN ASSISTANT

## 2020-03-26 PROCEDURE — 12000011 ZZH R&B MS OVERFLOW

## 2020-03-26 RX ORDER — SIMETHICONE 80 MG
80 TABLET,CHEWABLE ORAL 4 TIMES DAILY PRN
Status: DISCONTINUED | OUTPATIENT
Start: 2020-03-26 | End: 2020-03-27 | Stop reason: HOSPADM

## 2020-03-26 RX ORDER — ASPIRIN 81 MG/1
81 TABLET ORAL AT BEDTIME
Status: DISCONTINUED | OUTPATIENT
Start: 2020-03-26 | End: 2020-03-27 | Stop reason: HOSPADM

## 2020-03-26 RX ORDER — CEVIMELINE HYDROCHLORIDE 30 MG/1
30 CAPSULE ORAL
Status: DISCONTINUED | OUTPATIENT
Start: 2020-03-26 | End: 2020-03-27 | Stop reason: HOSPADM

## 2020-03-26 RX ADMIN — PANTOPRAZOLE SODIUM 40 MG: 40 TABLET, DELAYED RELEASE ORAL at 07:57

## 2020-03-26 RX ADMIN — VANCOMYCIN HYDROCHLORIDE 125 MG: KIT at 21:38

## 2020-03-26 RX ADMIN — GABAPENTIN 400 MG: 400 CAPSULE ORAL at 08:40

## 2020-03-26 RX ADMIN — ACETAMINOPHEN 650 MG: 325 TABLET, FILM COATED ORAL at 05:12

## 2020-03-26 RX ADMIN — SIMETHICONE CHEW TAB 80 MG 80 MG: 80 TABLET ORAL at 18:46

## 2020-03-26 RX ADMIN — CEVIMELINE 30 MG: 30 CAPSULE ORAL at 14:27

## 2020-03-26 RX ADMIN — VANCOMYCIN HYDROCHLORIDE 125 MG: KIT at 14:06

## 2020-03-26 RX ADMIN — VANCOMYCIN HYDROCHLORIDE 125 MG: KIT at 07:57

## 2020-03-26 RX ADMIN — GABAPENTIN 400 MG: 400 CAPSULE ORAL at 18:01

## 2020-03-26 RX ADMIN — ACETAMINOPHEN AND CODEINE PHOSPHATE 1 TABLET: 300; 30 TABLET ORAL at 11:25

## 2020-03-26 RX ADMIN — POTASSIUM CHLORIDE 20 MEQ: 10 TABLET, EXTENDED RELEASE ORAL at 07:56

## 2020-03-26 RX ADMIN — CLORAZEPATE DIPOTASSIUM 15 MG: 3.75 TABLET ORAL at 07:56

## 2020-03-26 RX ADMIN — GABAPENTIN 400 MG: 400 CAPSULE ORAL at 11:25

## 2020-03-26 RX ADMIN — CEVIMELINE 30 MG: 30 CAPSULE ORAL at 18:01

## 2020-03-26 RX ADMIN — FOLIC ACID 1 MG: 1 TABLET ORAL at 07:56

## 2020-03-26 RX ADMIN — POTASSIUM CHLORIDE AND SODIUM CHLORIDE: 900; 150 INJECTION, SOLUTION INTRAVENOUS at 01:07

## 2020-03-26 RX ADMIN — VANCOMYCIN HYDROCHLORIDE 125 MG: KIT at 11:25

## 2020-03-26 RX ADMIN — SIMVASTATIN 40 MG: 40 TABLET, FILM COATED ORAL at 21:38

## 2020-03-26 RX ADMIN — VANCOMYCIN HYDROCHLORIDE 125 MG: KIT at 16:28

## 2020-03-26 RX ADMIN — POTASSIUM CHLORIDE AND SODIUM CHLORIDE: 900; 150 INJECTION, SOLUTION INTRAVENOUS at 08:41

## 2020-03-26 RX ADMIN — PREDNISONE 5 MG: 5 TABLET ORAL at 07:57

## 2020-03-26 RX ADMIN — ASPIRIN 81 MG: 81 TABLET, DELAYED RELEASE ORAL at 21:38

## 2020-03-26 RX ADMIN — TRAZODONE HYDROCHLORIDE 25 MG: 50 TABLET ORAL at 21:38

## 2020-03-26 ASSESSMENT — ACTIVITIES OF DAILY LIVING (ADL)
ADLS_ACUITY_SCORE: 17
ADLS_ACUITY_SCORE: 17
ADLS_ACUITY_SCORE: 16
ADLS_ACUITY_SCORE: 17
ADLS_ACUITY_SCORE: 16
ADLS_ACUITY_SCORE: 17

## 2020-03-26 ASSESSMENT — MIFFLIN-ST. JEOR: SCORE: 1453.41

## 2020-03-26 NOTE — PLAN OF CARE
A&Ox4, VSS on RA. Up SBA to bathroom. Voiding adequately, no BM this shift. PRN Tylenol given for headache. Enteric precautions maintained. IVF @100/hr. Regular diet. Denies SOB, nausea, and dizziness. Continue to monitor.

## 2020-03-26 NOTE — PROGRESS NOTES
[facility]    Hospitalist Progress Note    Assessment & Plan   Agnes Saravia is a 74 year old female who was admitted on 3/24/2020.     history of squamous cell carcinoma of the lung, rheumatoid arthritis, Sjogren's syndrome, Fibromyalgia, dyslipidemia, generalized anxiety disorder, obesity who was admitted on 3/24/2020 with nausea, vomiting and dehydration resulting in syncopal episode. Too weak to go home so observation for IV fluids and replacement of potassium.      C. Diff   Patient with numerous episodes of loose/watery diarrhea starting yesterday.  Abd/pelvis CT negative.  WBC of 16.7.  C. Diff positive. Enteric viral/bacterial panel negative.    --Oral Vancomycin 125 mg QID started.      Dehydration  Hypokalemia  Secondary to C. Diff.  Typically takes potassium replacement along with her hydrochlorothiazide so not surprising that her hypokalemia is worse with this illness. Lactate 2.3 on presentation as well as potassium 2.8 which is likely related to dehydration.  -Lactate recheck WNL at 1.4 and 1.3.    --IV fluids at 100/h and bland diet as tolerated.   --Decrease IVF to 75 ml.    --Enteric precautions.  --Replace potassium per protocol.  --Zofran prn.     GERD  --Resume PTA PPI daily.     Generalized Anxiety Disorder  --Continue tranxene daily.    --Resume PTA trazodone.     Dyslipidemia  --Resume PTA statin.     Rheumatoid Arthritis  Sjogren's Syndrome  Takes hydrochlorothiazide to mitigate swelling effects of RA. Also takes cevimeline for sjogren's syndrome. Need to clarify prednisone and methotrexate dosing.  --Hold PTA cevimeline and methotrexate.    --Resume PTA folic acid 1 mg daily  and prednisone 5 mg daily.    --Patient requesting Tylenol #3 due to joint pain.      Fibromyalgia:  --Resume PTA gabapentin 400 mg TID.        Diet: Regular Diet Adult     DVT Prophylaxis: Pneumatic Compression Devices   Andujar Catheter: not present  Code Status: Full Code     Disposition Plan     Expected discharge: Tomorrow, recommended to prior living arrangement once diarhhea has improved and patient demonstrates ability to maintain oral hydration.     Entered: Juan Nino Vieira PA-C 03/26/2020, 7:12 AM          The patient has been discussed with Dr. Egan, who agrees with the assessment and plan at this time.  Dr. Egan will evaluate the patient independently.      Rajesh Vieira PA-C   [phone]    Interval History   Patient resting in bed upon arrival.  She continues to feel chills and has slight abdominal pain.  Her diarrhea has slowed down with last occurred this morning.  She has been able to eat a little bit and is drinking more water.      She complained of slight shortness of breath.  She indicated that it felt hard to take deep breaths.  She denied chest pain.  She also stated yesterday she was told that she had a wheeze.  She asked why her blood pressure was elevated yesterday.      Reviewed C Diff results and treatment.      Discussed plan for the day.      -Data reviewed today: I reviewed all new labs and imaging results over the last 24 hours. I personally reviewed no images or EKG's today.    Physical Exam   Temp: 98.5  F (36.9  C) Temp src: Oral BP: 125/44   Heart Rate: 68 Resp: 16 SpO2: 95 % O2 Device: None (Room air)    Vitals:    03/25/20 0608 03/26/20 0510   Weight: 98.8 kg (217 lb 14.4 oz) 99.4 kg (219 lb 3.2 oz)     Vital Signs with Ranges  Temp:  [97.6  F (36.4  C)-99.4  F (37.4  C)] 98.5  F (36.9  C)  Heart Rate:  [68-85] 68  Resp:  [16-20] 16  BP: (125-181)/(44-69) 125/44  SpO2:  [94 %-97 %] 95 %  I/O last 3 completed shifts:  In: 180 [P.O.:180]  Out: -       Constitutional: Awake, alert, cooperative, NAD.  ENT: NCAT, oral pharynx with moist mucus membranes, tonsils without erythema or exudates.  Neck: Supple, symmetrical, trachea midline, no adenopathy.  Pulmonary: No increased work of breathing, fair air exchange, clear to auscultation bilaterally, no crackles or  wheezing.  Cardiovascular: R/R/R, normal S1 and S2, no S3 or S4, and no murmur noted.  GI: Normal bowel sounds, soft, non-distended, non-tender.  Skin/Integumen: Clear.  Neuro: CN II-XII grossly intact.  Psych:  Alert and oriented x 3. Normal affect.  Extremities: No lower extremity edema noted, and calves are non-TTP bilaterally.       Medications     0.9% sodium chloride + KCl 20 mEq/L 100 mL/hr at 03/26/20 0107       clorazepate  15 mg Oral Daily     folic acid  1 mg Oral Daily     gabapentin  400 mg Oral TID     pantoprazole  40 mg Oral Daily     potassium chloride ER  20 mEq Oral Daily     predniSONE  5 mg Oral Daily     simvastatin  40 mg Oral At Bedtime     sodium chloride (PF)  3 mL Intracatheter Q8H     traZODone  25 mg Oral At Bedtime     vancomycin  125 mg Oral 4x Daily       Data   Recent Labs   Lab 03/26/20  0544 03/25/20  1759 03/25/20  0849 03/25/20  0101   WBC 8.1  --   --  16.7*   HGB 11.0*  --   --  13.6   MCV 94  --   --  91     --   --  276     --   --  140   POTASSIUM 4.0 4.2 3.3* 2.8*   CHLORIDE 110*  --   --  105   CO2 26  --   --  27   BUN 9  --   --  25   CR 0.64  --   --  0.64   ANIONGAP 3  --   --  8   CHRISTOPHER 8.2*  --   --  8.7   GLC 89  --   --  135*   ALBUMIN  --   --   --  3.4   PROTTOTAL  --   --   --  7.3   BILITOTAL  --   --   --  0.6   ALKPHOS  --   --   --  78   ALT  --   --   --  19   AST  --   --   --  15   TROPI  --   --   --  <0.015       No results found for this or any previous visit (from the past 24 hour(s)).

## 2020-03-26 NOTE — PROGRESS NOTES
Observation goals  PRIOR TO DISCHARGE     Comments: List all goals to be met before discharge home   - Nausea/vomiting (diarrhea if present) improved.- MET -LAST BM WAS A 2 AM  - Tolerating oral intake to maintain hydration .- MET   - Vital signs normal or at patient baseline and orthostatic vitals are normal and patient not lightheaded with standing .- MET   - Diagnostic tests and consults completed and resulted if ordered .- MET   - Adequate pain control on oral analgesia .- MET   - Tolerating oral antibiotics if ordered .- MET   - Safe disposition plan has been identified .- MET   - Nurse to notify provider when observation goals have been met and patient is ready for discharge

## 2020-03-26 NOTE — PROGRESS NOTES
MD Notification    Notified Person: MD    Notified Person Name: RUCHI Ford    Notification Date/Time: 6:26 3/26    Notification Interaction: web paging    Purpose of Notification: pt. complaining of being bloated and hard to take a deep breath    Orders Received:    Comments:

## 2020-03-26 NOTE — PROGRESS NOTES
END OF SHIFT REPORT :          DATE & TIME: March 25, 2020        SHIFT: 3PM-  7PM     Agnes Saravia 74 year old female  was admitted on 3/24/2020 due to N/V/D and syncope. Patient is:Full Code. ISOLATION:Yes- Enteric. : No. Pt. is not on telemetry. Patient is A, O x 4 and is up  with Stand with Assist .      VS are : Temp: 97.9  F (36.6  C) BP: (!) 151/49   Heart Rate: 66 Resp: 14  SpO2: 95 % O2 Device: None (Room air)     Abnormal Lab/Test/ Procedures  C. diff (+) . On her 2nd day of Vanco PO.  Diet: Regular.  IVF: NS + 20 KCL @ 75 cc/hr.  Skin intact.   Bowel/Bladder:  continent/ make needs known.  Pain:  Tylenol #3 for pain Drains/Devices: none. Consult: none.  Tests/Procedures for next shift: none .    Anticipated DC date: 3/27.

## 2020-03-26 NOTE — PROGRESS NOTES
Observation goals  PRIOR TO DISCHARGE      Comments: List all goals to be met before discharge home     - Nausea/vomiting (diarrhea if present) improved: partially met, +C-Diff, no nausea/vomiting  - Tolerating oral intake to maintain hydration: met   - Vital signs normal or at patient baseline and orthostatic vitals are normal and patient not lightheaded with standing: met   - Diagnostic tests and consults completed and resulted if ordered: met  - Adequate pain control on oral analgesia: met   - Tolerating oral antibiotics if ordered: met   - Safe disposition plan has been identified: met

## 2020-03-26 NOTE — PLAN OF CARE
A&OX4, VSS on RA. LS with expiratory wheezing. Denies pain, N&V. No stool in the last 4 hours. Up SBA. IVF at 100mL/hr. On regular diet. Enteric precaution maintained. Discharge pending improvements. Continue to monitor.

## 2020-03-27 VITALS
BODY MASS INDEX: 38.66 KG/M2 | SYSTOLIC BLOOD PRESSURE: 143 MMHG | RESPIRATION RATE: 16 BRPM | HEART RATE: 83 BPM | HEIGHT: 63 IN | DIASTOLIC BLOOD PRESSURE: 54 MMHG | OXYGEN SATURATION: 97 % | TEMPERATURE: 98.2 F | WEIGHT: 218.2 LBS

## 2020-03-27 PROCEDURE — 25000132 ZZH RX MED GY IP 250 OP 250 PS 637: Mod: GY | Performed by: INTERNAL MEDICINE

## 2020-03-27 PROCEDURE — 25000132 ZZH RX MED GY IP 250 OP 250 PS 637: Mod: GY | Performed by: PHYSICIAN ASSISTANT

## 2020-03-27 PROCEDURE — 25000132 ZZH RX MED GY IP 250 OP 250 PS 637: Mod: GY | Performed by: NURSE PRACTITIONER

## 2020-03-27 PROCEDURE — 25000131 ZZH RX MED GY IP 250 OP 636 PS 637: Mod: GY | Performed by: PHYSICIAN ASSISTANT

## 2020-03-27 PROCEDURE — 99239 HOSP IP/OBS DSCHRG MGMT >30: CPT | Performed by: INTERNAL MEDICINE

## 2020-03-27 PROCEDURE — 25000128 H RX IP 250 OP 636: Performed by: PHYSICIAN ASSISTANT

## 2020-03-27 RX ORDER — VANCOMYCIN HYDROCHLORIDE 50 MG/ML
125 KIT ORAL 4 TIMES DAILY
Qty: 80 ML | Refills: 0 | Status: SHIPPED | OUTPATIENT
Start: 2020-03-27 | End: 2020-06-29

## 2020-03-27 RX ORDER — SIMETHICONE 80 MG
80 TABLET,CHEWABLE ORAL 4 TIMES DAILY PRN
Qty: 20 TABLET | Refills: 0 | Status: SHIPPED | OUTPATIENT
Start: 2020-03-27 | End: 2020-06-29

## 2020-03-27 RX ADMIN — POTASSIUM CHLORIDE 20 MEQ: 10 TABLET, EXTENDED RELEASE ORAL at 08:21

## 2020-03-27 RX ADMIN — GABAPENTIN 400 MG: 400 CAPSULE ORAL at 08:22

## 2020-03-27 RX ADMIN — PREDNISONE 5 MG: 5 TABLET ORAL at 08:22

## 2020-03-27 RX ADMIN — FOLIC ACID 1 MG: 1 TABLET ORAL at 08:22

## 2020-03-27 RX ADMIN — POTASSIUM CHLORIDE AND SODIUM CHLORIDE: 900; 150 INJECTION, SOLUTION INTRAVENOUS at 00:12

## 2020-03-27 RX ADMIN — CLORAZEPATE DIPOTASSIUM 15 MG: 3.75 TABLET ORAL at 08:21

## 2020-03-27 RX ADMIN — SIMETHICONE CHEW TAB 80 MG 80 MG: 80 TABLET ORAL at 04:02

## 2020-03-27 RX ADMIN — VANCOMYCIN HYDROCHLORIDE 125 MG: KIT at 08:21

## 2020-03-27 RX ADMIN — PANTOPRAZOLE SODIUM 40 MG: 40 TABLET, DELAYED RELEASE ORAL at 08:22

## 2020-03-27 RX ADMIN — CEVIMELINE 30 MG: 30 CAPSULE ORAL at 08:21

## 2020-03-27 ASSESSMENT — ACTIVITIES OF DAILY LIVING (ADL)
ADLS_ACUITY_SCORE: 16

## 2020-03-27 ASSESSMENT — MIFFLIN-ST. JEOR: SCORE: 1448.88

## 2020-03-27 NOTE — PROGRESS NOTES
" Observation goals  PRIOR TO DISCHARGE      Comments: List all goals to be met before discharge home   - Nausea/vomiting (diarrhea if present) improved. -met.  - Tolerating oral intake to maintain hydration -not met, IV infusing.  - Vital signs normal or at patient baseline and orthostatic vitals are normal and patient not lightheaded with standing -met.  - Diagnostic tests and consults completed and resulted if ordered -met.  - Adequate pain control on oral analgesia -met.  - Tolerating oral antibiotics if ordered -met.  - Safe disposition plan has been identified -met.  - Nurse to notify provider when observation goals have been met and patient is ready for discharge.         [id number]: Patient alert and oriented, VSS on room air, denies pain. Up independently in room. PIV infusing. Tolerating regular diet but pt states \"I don't have much appetite.\" Complaint of bloating causing slight SOB, simethicone effective. Enteric isolation maintained. Plan for discharge home today with oral Vanco.  "

## 2020-03-27 NOTE — PLAN OF CARE
A&Ox4. VSS on RA. IV removed. Enteric precautions maintained, + C Diff, PO Vanco continued. Tolerating regular diet, appetite improving. Independent in room. K+ 4.0. BM x1 today. Denies pain, nausea, SOB, dizziness/lightheadedness. AVS and discharge meds explained and given to pt at discharge, all questions answered, 5 med rights used. Belongings sent w/ pt. Pt left unit via w/c by NA, family providing ride home for pt.

## 2020-03-27 NOTE — PROGRESS NOTES
Pt is A&Ox4, VSS on RA, IND, Reg diet, Enteric panel positive, pt started on oral vanco today, No loose stools during the shift, Denies N/V, PIV infusing. Pt complain of SOB related to abdominal distention, MD notified. Simethicone ordered and given-effective. Plan for discharge tomorrow.

## 2020-03-27 NOTE — PROGRESS NOTES
Observation goals  PRIOR TO DISCHARGE      Comments: List all goals to be met before discharge home   - Nausea/vomiting (diarrhea if present) improved. -met.  - Tolerating oral intake to maintain hydration -not met, IV infusing.  - Vital signs normal or at patient baseline and orthostatic vitals are normal and patient not lightheaded with standing -met.  - Diagnostic tests and consults completed and resulted if ordered -met.  - Adequate pain control on oral analgesia -met.  - Tolerating oral antibiotics if ordered -met.  - Safe disposition plan has been identified -met.  - Nurse to notify provider when observation goals have been met and patient is ready for discharge.

## 2020-03-27 NOTE — PROGRESS NOTES
Observation goals  PRIOR TO DISCHARGE      Comments: List all goals to be met before discharge home   - Nausea/vomiting (diarrhea if present) improved. -met.  - Tolerating oral intake to maintain hydration - partially met  - Vital signs normal or at patient baseline and orthostatic vitals are normal and patient not lightheaded with standing -met.  - Diagnostic tests and consults completed and resulted if ordered -met.  - Adequate pain control on oral analgesia -met.  - Tolerating oral antibiotics if ordered -met.  - Safe disposition plan has been identified -met.  - Nurse to notify provider when observation goals have been met and patient is ready for discharge.

## 2020-03-27 NOTE — PROGRESS NOTES
Care Coordination:      Mar 31, 2020  9:00 AM CDT   [facility]phone [facility]  [address] [facility] [id number] [phone]    Note: this is not an onsite visit; there is no need to come to the facility.       Patient has been scheduled for a telephone follow up appointment next Tuesday.    Ramila Kirk RN  BSN, Care Coordinator    [facility]/ Care Transitions          [email or web address]                Phone [phone]

## 2020-03-27 NOTE — DISCHARGE SUMMARY
[facility]    Discharge Summary  Hospitalist    Date of Admission:  3/24/2020  Date of Discharge:  3/27/2020  Discharging Provider: Debi Egan    Discharge Diagnoses   C.Diff  Dehydration: Resolved  Hypokalemia: Resolved  Lactic Acidosis dt dehydration: Resolved  Rheumatoid Arthritis  Sjogren's Syndrome  Fibromyalgia  GERD  Dyslipidemia   Anxiety    History of Present Illness   Agnes Saravia is an 74 year old female with PMhx of rheumatoid arthritis, Sjogren's syndrome, fibromyalgia, dyslipidemia, anxiety, hx of NSCLC and obesity who was admitted on 3/24/2020 for evaluation of nausea, vomiting and dehydration with a syncopal spell. She was subsequently found to be +for cdiff.     Hospital Course   Agnes Saravia was admitted on 3/24/2020.  The following problems were addressed during her hospitalization:    C.Diff  Dehydration: Resolved  Hypokalemia: Resolved  Lactic Acidosis dt dehydration: Resolved  Endorsed onset of frequent, loose/watery BMs the day prior to admission with associated nausea, vomiting and dehydration. On presentation, afebrile and VSS. WBC 16.7. Lactate 2.3. K 2.8. CT abd/pelvis was negative for acute pathology. Started on supportive cares with IVFs and potassium replacement. Stool studies were obtained -- enteric virus/bacteria panel was neg, cdiff was positive. Started on oral vancomycin with noted improvement. Some associated abdominal bloating was managed with simethicone. Condition improved. At time of discharge, she was tolerating regular diet and BMs were starting to appear more formed.     Rheumatoid Arthritis  Sjogren's Syndrome  Fibromyalgia  Chronic and stable on home meds this stay. No changes made to regimen  Reportedly takes HCTZ to help mitigate joint swelling -- held on admission given above but was resumed at discharge  Follow up with rheumatology as scheduled    GERD  Chronic and stable on PPI    Dyslipidemia   Chronic and stable on  statin    Anxiety  Chronic and stable on home meds, no changes made this stay.    Debi Egan DO    Significant Results and Procedures   None    Code Status   Full Code       Primary Care Physician   José Miguel Wallace MD    Physical Exam   Temp: 97.1  F (36.2  C) Temp src: Oral BP: 131/68   Heart Rate: 68 Resp: 16 SpO2: 94 % O2 Device: None (Room air)    Vitals:    03/25/20 0608 03/26/20 0510   Weight: 98.8 kg (217 lb 14.4 oz) 99.4 kg (219 lb 3.2 oz)     Vital Signs with Ranges  Temp:  [97  F (36.1  C)-97.9  F (36.6  C)] 97.1  F (36.2  C)  Heart Rate:  [64-70] 68  Resp:  [14-16] 16  BP: (122-151)/(49-68) 131/68  SpO2:  [94 %-96 %] 94 %  No intake/output data recorded.    General: Resting comfortably, alert, conversive, NAD  CVS: HRRR, no MGR, no LE edema  Respiratory: CTAB, no wheeze/rales/rhonchi, no increased work of breathing  GI: S, NT, ND, +BS  Skin: Warm/dry    Discharge Disposition   Discharged to home  Condition at discharge: Stable    Consultations This Hospital Stay   None    Time Spent on this Encounter   IDebi DO, personally saw the patient today and spent greater than 30 minutes discharging this patient.    Discharge Orders      Reason for your hospital stay    Evaluation of you gastrointestinal symptoms and weakness, which were found to be secondary to c.diff infection in your stool.     Follow-up and recommended labs and tests     Follow up with your PCP in the next 1-2 weeks.     Activity    Your activity upon discharge: activity as tolerated     Diet    Follow this diet upon discharge: Regular     Discharge Medications   Current Discharge Medication List      START taking these medications    Details   simethicone (MYLICON) 80 MG chewable tablet Take 1 tablet (80 mg) by mouth 4 times daily as needed for cramping  Qty: 20 tablet, Refills: 0    Associated Diagnoses: Clostridium difficile infection      vancomycin (FIRVANQ) 50 MG/ML oral solution Take 2.5 mLs  (125 mg) by mouth 4 times daily for 32 doses  Qty: 80 mL, Refills: 0    Associated Diagnoses: Clostridium difficile infection         CONTINUE these medications which have NOT CHANGED    Details   aspirin 81 MG EC tablet Take 81 mg by mouth At Bedtime       cevimeline (EVOXAC) 30 MG capsule Take 30 mg by mouth 3 times daily (AM, Noon, Dinner)      clorazepate dipotassium (TRANXENE) 15 MG tablet Take 1 tablet (15 mg) by mouth daily  Qty: 90 tablet, Refills: 3    Associated Diagnoses: Sjogren's syndrome, with unspecified organ involvement (H); DANA (generalized anxiety disorder)      folic acid (FOLVITE) 1 MG tablet Take 1 tablet (1 mg) by mouth daily      gabapentin (NEURONTIN) 400 MG capsule Take 400 mg by mouth 3 times daily (AM, Noon, Dinner)      hydrochlorothiazide (HYDRODIURIL) 50 MG tablet Take 1 tablet (50 mg) by mouth daily  Qty: 90 tablet, Refills: 3    Associated Diagnoses: Essential hypertension      ibuprofen (ADVIL/MOTRIN) 400 MG tablet Take 400 mg by mouth 2 times daily (Noon and Dinner)      methotrexate 2.5 MG tablet Take 12.5 mg by mouth every 7 days (Takes on Tuesdays)      pantoprazole (PROTONIX) 40 MG EC tablet Take 1 tablet (40 mg) by mouth daily Take 30-60 minutes before a meal.  Qty: 90 tablet, Refills: 3    Associated Diagnoses: Gastroesophageal reflux disease, esophagitis presence not specified      potassium chloride ER (K-TAB/KLOR-CON) 10 MEQ CR tablet Take 2 tablets (20 mEq) by mouth daily  Qty: 180 tablet, Refills: 3    Associated Diagnoses: Hypokalemia      predniSONE (DELTASONE) 5 MG tablet Take 5 mg by mouth daily.    Comments: Please include the quantity of tablets and (strength) per dose in sig.        simvastatin (ZOCOR) 40 MG tablet Take 1 tablet (40 mg) by mouth At Bedtime  Qty: 90 tablet, Refills: 3    Associated Diagnoses: Hyperlipidemia LDL goal <130      traZODone (DESYREL) 50 MG tablet Take 0.5 tablets (25 mg) by mouth once daily at bedtime  Qty: 45 tablet, Refills: 3     Associated Diagnoses: Insomnia, unspecified type           Allergies   Allergies   Allergen Reactions     Mellaril [Thioridazine Hcl] Anaphylaxis     Percocet [Oxycodone-Acetaminophen] Anaphylaxis and Swelling     Tolerates hydrocodone and codeine in cough medicine     Data   Most Recent 3 CBC's:  Recent Labs   Lab Test 03/26/20  0544 03/25/20  0101 06/04/19  0810   WBC 8.1 16.7* 11.8*   HGB 11.0* 13.6 13.5   MCV 94 91 93    276 357      Most Recent 3 BMP's:  Recent Labs   Lab Test 03/26/20  0544 03/25/20  1759 03/25/20  0849 03/25/20  0101 06/04/19  0810     --   --  140 138   POTASSIUM 4.0 4.2 3.3* 2.8* 3.9   CHLORIDE 110*  --   --  105 101   CO2 26  --   --  27 28   BUN 9  --   --  25 16   CR 0.64  --   --  0.64 0.76   ANIONGAP 3  --   --  8 9   CHRISTOPHER 8.2*  --   --  8.7 9.1   GLC 89  --   --  135* 115*     Most Recent 2 LFT's:  Recent Labs   Lab Test 03/25/20  0101 06/04/19  0810   AST 15 21   ALT 19 23   ALKPHOS 78 84   BILITOTAL 0.6 0.6       Results for orders placed or performed during the hospital encounter of 03/24/20   Abd/pelvis CT no contrast - Stone Protocol    Narrative    EXAM: CT ABDOMEN PELVIS W/O CONTRAST  LOCATION: [facility]  DATE/TIME: 3/25/2020 3:29 AM    INDICATION: Nausea, vomiting  COMPARISON: None.  TECHNIQUE: CT scan of the abdomen and pelvis was performed without IV contrast. Multiplanar reformats were obtained. Dose reduction techniques were used.  CONTRAST: None.    FINDINGS:   LOWER CHEST: Mild dependent atelectasis, left greater than right base. No pleural effusion.    HEPATOBILIARY: No radiopaque gallstones or biliary dilatation. The liver appears normal.    PANCREAS: Normal.    SPLEEN: Normal.    ADRENAL GLANDS: Normal.    KIDNEYS/BLADDER: No urinary tract stone or hydronephrosis. Normal bladder    BOWEL: Stomach contains nonspecific debris and fluid, and does not appear distended. The large and small bowel are normal in caliber, without evidence of  obstruction or acute inflammatory process. There is diverticulosis of the sigmoid. No   diverticulitis. Appendix is not seen. No findings to suggest appendicitis.    LYMPH NODES: Normal.    VASCULATURE: Unremarkable.    PELVIC ORGANS: Normal.    MUSCULOSKELETAL: Mild degenerative changes in the spine. No fracture or significant osseous abnormality. Tiny umbilical fat-containing hernia.      Impression    IMPRESSION:   1.  No obstruction or acute inflammatory process. No findings to explain vomiting.    2.  Colonic diverticulosis. No evidence of diverticulitis.

## 2020-03-30 ENCOUNTER — TELEPHONE (OUTPATIENT)
Dept: FAMILY MEDICINE | Facility: CLINIC | Age: 75
End: 2020-03-30

## 2020-03-30 NOTE — TELEPHONE ENCOUNTER
Chief Complaint: Gastroenteritis, Clostridium Difficile Infection,  FRI 27[birth year]MAR[birth year]2020  0 / 1    [phone] (home)

## 2020-03-31 ENCOUNTER — VIRTUAL VISIT (OUTPATIENT)
Dept: FAMILY MEDICINE | Facility: CLINIC | Age: 75
End: 2020-03-31
Payer: MEDICARE

## 2020-03-31 DIAGNOSIS — A04.72 C. DIFFICILE ENTERITIS: Primary | ICD-10-CM

## 2020-03-31 PROCEDURE — G2012 BRIEF CHECK IN BY MD/QHP: HCPCS | Performed by: INTERNAL MEDICINE

## 2020-03-31 NOTE — PROGRESS NOTES
"Agnes Saravia is a 74 year old female who is being evaluated via a telephone visit.      The patient has been notified of following (by SAURAV BIRCH CMA     \"We have found that certain health care needs can be provided without the need for a physical exam.  This service lets us provide the care you need with a short phone conversation.  If a prescription is necessary we can send it directly to your pharmacy.  If lab work is needed we can place an order for that and you can then stop by our lab to have the test done at a later time.    This telephone visit will be conducted via 3 way call with the you (the patient) , the physician/provider, and a me all on the line at the same time.  This allows your physician/provider to have the phone conversation with you while I will be taking notes for your medical record.  We will have full access to your [facility] medical record during this entire phone call.    Since this is like an office visit,  will bill your insurance company for this service.  Please check with your medical insurance if this type of telephone/virtual is covered . You may be responsible for the cost of this service if insurance coverage is denied.  The typical cost is $30 (10min), $59(11-20min) and $85 (21-30min)     If during the course of the call the physician/provider feels a telephone visit is not appropriate, you will not be charged for this service\"    Consent has been obtained for this service by care team member: yes.  See the scanned image in the medical record.    S: The history as provided by the patient to the provider during this 3 way call include     Pertinent parts of the the patient's medical history reviewed and confirmed by the provider included :   I spoke with Selina with regards to her recent hospitalization for C. difficile colitis.  She notices that her stools are a bit more formed now since leaving the hospital and starting on vancomycin however she did have a softer stool " this morning and is somewhat concerned.  I reassured her that there is not a linear course for recovery from this illness.  I would expect that she would continue to get better however over the course of the next 5 days.  Her antibiotics are set to complete on April 5.  I asked her to call me or email me on that day to let me know how she is feeling at which time we can either order a second course or discontinue antibiotics altogether.  As for prevention we discussed the pathophysiology of C. difficile and avoidance of antibiotics is recommended, also she is starting an over-the-counter probiotic which I reassured her would not be harmful, however I did not necessarily endorse this.  In my opinion, Florastor may have been a better choice, but she is already purchased the over-the-counter option.  Also I noted that she is taking pantoprazole for symptoms of gastroesophageal reflux disease.  I discussed with her that this medication can be helpful for symptom relief with regards to heartburn and cough, but it may not be necessarily helpful as far as prevention of C. difficile and that it might be wise to stop this medicine if she is able to.  She was willing to try going off of it.  I suggested she may need to taper given the rebound effect from stopping these medications abruptly.  I have set a reminder to follow-up with her on May 1 to consider rechecking her labs in our clinic.    Total time of call between patient and provider was 23 minutes     José Miguel Wallace MD, MD  (MD signature)  ===================================================    I have reviewed the note as documented above.  This accurately captures the substance of my conversation with the patient,    Additional provider notes:    Assessment/Plan:  No diagnosis found.

## 2020-04-14 ENCOUNTER — MYC MEDICAL ADVICE (OUTPATIENT)
Dept: FAMILY MEDICINE | Facility: CLINIC | Age: 75
End: 2020-04-14

## 2020-04-14 ENCOUNTER — VIRTUAL VISIT (OUTPATIENT)
Dept: FAMILY MEDICINE | Facility: CLINIC | Age: 75
End: 2020-04-14
Payer: MEDICARE

## 2020-04-14 DIAGNOSIS — A04.72 C. DIFFICILE ENTERITIS: ICD-10-CM

## 2020-04-14 DIAGNOSIS — S40.021A CONTUSION OF RIGHT UPPER EXTREMITY, INITIAL ENCOUNTER: Primary | ICD-10-CM

## 2020-04-14 PROCEDURE — 99442 ZZC PHYSICIAN TELEPHONE EVALUATION 11-20 MIN: CPT | Performed by: INTERNAL MEDICINE

## 2020-04-14 NOTE — PROGRESS NOTES
"Agnes Saravia is a 74 year old female who is being evaluated via a billable telephone visit.      The patient has been notified of following:     \"This telephone visit will be conducted via a call between you and your physician/provider. We have found that certain health care needs can be provided without the need for a physical exam.  This service lets us provide the care you need with a short phone conversation.  If a prescription is necessary we can send it directly to your pharmacy.  If lab work is needed we can place an order for that and you can then stop by our lab to have the test done at a later time.    Telephone visits are billed at different rates depending on your insurance coverage. During this emergency period, for some insurers they may be billed the same as an in-person visit.  Please reach out to your insurance provider with any questions.    If during the course of the call the physician/provider feels a telephone visit is not appropriate, you will not be charged for this service.\"    Patient has given verbal consent for Telephone visit?  Yes    How would you like to obtain your AVS? Ana Luisaharrobbin Dunbar     Agnes Saravia is a 74 year old female who presents to clinic today for the following health issues:    Right arm pain and numbness after a failed phlebotomy attempt to place a deep vein IV when in the ER on 3/24/20. Two different nurses tried to place IV above elbow, and patient states that the second one hit the radial nerve and she is continuing to experience pain, numbness and a cold extremity which is getting worse since leaving the hospital.   She does have a significant numbness and tingling when she extends her arm which is directly related to the pressure from the hematoma on her nerve she believes.      -------------------------------------    Patient Active Problem List   Diagnosis     Sjogren's syndrome (H)     Post menopausal syndrome     HTN (hypertension)     " Hyperlipidemia LDL goal <130     Hypokalemia     Pain     Squamous cell carcinoma     Lumbar disc disease     Health Care Home     IFG (impaired fasting glucose)     Obesity     Advance Care Planning     Pneumonia     Other acute pulmonary embolism without acute cor pulmonale (H)     Long-term (current) use of anticoagulants [Z79.01]     Pulmonary embolism, other     DANA (generalized anxiety disorder)     Pulmonary nodules     Obesity (BMI 35.0-39.9) with comorbidity (H)     ILD (interstitial lung disease) (H)     Primary malignant neoplasm (H)     Viral gastroenteritis     C. difficile enteritis     Past Surgical History:   Procedure Laterality Date     BLEPHAROPLASTY       BRONCHOSCOPY (RIGID OR FLEXIBLE), DIAGNOSTIC N/A 2018    Procedure: COMBINED BRONCHOSCOPY (RIGID OR FLEXIBLE), LAVAGE;  Bronchoscopy with Lavage;  Surgeon: Manuel Conway MD;  Location:  GI     DISCECTOMY LUMBAR POSTERIOR MICROSCOPIC ONE LEVEL  2014    Procedure: DISCECTOMY LUMBAR POSTERIOR MICROSCOPIC ONE LEVEL;  Surgeon: Dudley Bernal MD;  Location:  OR     HYSTERECTOMY TOTAL ABDOMINAL, BILATERAL SALPINGO-OOPHORECTOMY, COMBINED       HYSTERECTOMY, PAP NO LONGER INDICATED       MAMMOPLASTY REDUCTION BILATERAL  2013    Procedure: MAMMOPLASTY REDUCTION BILATERAL;  BILATERAL REDUCTION MAMMOPLASTY;  Surgeon: Bruce Nash MD;  Location: [facility]     SHOULDER SURGERY         Social History     Tobacco Use     Smoking status: Former Smoker     Packs/day: 0.25     Years: 10.00     Pack years: 2.50     Types: Cigarettes     Start date: 1971     Last attempt to quit: 1981     Years since quittin.3     Smokeless tobacco: Never Used   Substance Use Topics     Alcohol use: Yes     Alcohol/week: 0.0 standard drinks     Comment: beer in summer when mowing lawn      Family History   Problem Relation Age of Onset     Cerebrovascular Disease Mother      Cerebrovascular Disease Father      Colon Cancer No  family hx of          Current Outpatient Medications   Medication Sig Dispense Refill     aspirin 81 MG EC tablet Take 81 mg by mouth At Bedtime        cevimeline (EVOXAC) 30 MG capsule Take 30 mg by mouth 3 times daily (AM, Noon, Dinner)       clorazepate dipotassium (TRANXENE) 15 MG tablet Take 1 tablet (15 mg) by mouth daily 90 tablet 3     folic acid (FOLVITE) 1 MG tablet Take 1 tablet (1 mg) by mouth daily       gabapentin (NEURONTIN) 400 MG capsule Take 400 mg by mouth 3 times daily (AM, Noon, Dinner)       hydrochlorothiazide (HYDRODIURIL) 50 MG tablet Take 1 tablet (50 mg) by mouth daily 90 tablet 3     ibuprofen (ADVIL/MOTRIN) 400 MG tablet Take 400 mg by mouth 2 times daily (Noon and Dinner)       methotrexate 2.5 MG tablet Take 12.5 mg by mouth every 7 days (Takes on Tuesdays)       pantoprazole (PROTONIX) 40 MG EC tablet Take 1 tablet (40 mg) by mouth daily Take 30-60 minutes before a meal. 90 tablet 3     potassium chloride ER (K-TAB/KLOR-CON) 10 MEQ CR tablet Take 2 tablets (20 mEq) by mouth daily 180 tablet 3     predniSONE (DELTASONE) 5 MG tablet Take 5 mg by mouth daily.       simethicone (MYLICON) 80 MG chewable tablet Take 1 tablet (80 mg) by mouth 4 times daily as needed for cramping 20 tablet 0     simvastatin (ZOCOR) 40 MG tablet Take 1 tablet (40 mg) by mouth At Bedtime 90 tablet 3     traZODone (DESYREL) 50 MG tablet Take 0.5 tablets (25 mg) by mouth once daily at bedtime 45 tablet 3     Allergies   Allergen Reactions     Mellaril [Thioridazine Hcl] Anaphylaxis     Percocet [Oxycodone-Acetaminophen] Anaphylaxis and Swelling     Tolerates hydrocodone and codeine in cough medicine     Recent Labs   Lab Test 03/26/20  0544 03/25/20  1759  03/25/20  0101 11/11/19  0758 06/04/19  0810  01/30/18  0839  10/23/17  0901  02/23/17  0849  10/04/16  0810   A1C  --   --   --   --  6.0*  --   --  6.1*  --   --   --  5.9  --   --    LDL  --   --   --   --   --  113*  --   --   --  92  --   --   --  100*    HDL  --   --   --   --   --  41*  --   --   --  49*  --   --   --  45*   TRIG  --   --   --   --   --  251*  --   --   --  169*  --   --   --  262*   ALT  --   --   --  19  --  23  --   --   --  22   < >  --    < >  --    CR 0.64  --   --  0.64  --  0.76   < > 0.65  --  0.69   < >  --    < > 0.79   GFRESTIMATED 86  --   --  86  --  77   < > 89  --  83   < >  --    < > 71   GFRESTBLACK >90  --   --  >90  --  89   < > >90  --  >90   < >  --    < > 86   POTASSIUM 4.0 4.2   < > 2.8*  --  3.9   < > 3.5   < > 3.2*   < >  --    < > 3.9    < > = values in this interval not displayed.      BP Readings from Last 3 Encounters:   03/27/20 (!) 143/54   10/14/19 130/84   10/02/19 126/82    Wt Readings from Last 3 Encounters:   03/27/20 99 kg (218 lb 3.2 oz)   10/14/19 90.7 kg (200 lb)   10/02/19 90.7 kg (200 lb)                    Reviewed and updated as needed this visit by Provider         Review of Systems   ROS COMP: Constitutional, HEENT, cardiovascular, pulmonary, GI, , musculoskeletal, neuro, skin, endocrine and psych systems are negative, except as otherwise noted.       Objective   Reported vitals:  There were no vitals taken for this visit.   healthy, alert and no distress  PSYCH: Alert and oriented times 3; coherent speech, normal   rate and volume, able to articulate logical thoughts, able   to abstract reason, no tangential thoughts, no hallucinations   or delusions  Her affect is normal  RESP: No cough, no audible wheezing, able to talk in full sentences  Remainder of exam unable to be completed due to telephone visits  Skin: Photo visualized through my chart showing a large hematoma on her right arm    Diagnostic Test Results:  Labs reviewed in Epic        Assessment/Plan:  1. Contusion of right upper extremity, initial encounter  We discussed that she could continue with heat and compression of this hematoma to help it resolve.  I also advised elevation, but this is difficult given her shoulder injury.  I do  not dissipate that this will be a long-term concern.  In the interim she is going to continue on her aspirin 81 mg daily.  Ultimately I presume her sensation will resolve back to normal.    2. C. difficile enteritis  She has continued on vancomycin and her symptoms have completely resolved at this point.  I advised she could likely resume her methotrexate for inflammatory arthritis      No follow-ups on file.      Phone call duration:  12 minutes    José Miguel Wallace MD, MD

## 2020-05-30 DIAGNOSIS — F41.1 GAD (GENERALIZED ANXIETY DISORDER): ICD-10-CM

## 2020-05-30 DIAGNOSIS — M35.00 SJOGREN'S SYNDROME, WITH UNSPECIFIED ORGAN INVOLVEMENT (H): ICD-10-CM

## 2020-06-02 RX ORDER — CLORAZEPATE DIPOTASSIUM 15 MG/1
TABLET ORAL
Qty: 90 TABLET | Refills: 0 | Status: SHIPPED | OUTPATIENT
Start: 2020-06-02 | End: 2020-09-08

## 2020-06-02 NOTE — TELEPHONE ENCOUNTER
Routing refill request to provider for review/approval because:  Drug not on the [facility] refill protocol     Cassy QUISPE RN

## 2020-06-28 NOTE — PROGRESS NOTES
[facility]  Pulmonary Medicine - Follow-up Visit Clinic Note  9/27/2017       ASSESSMENT & PLAN   Ms. Agnes Saravia is a 71-year old female with history of RA and Sjogrens on MTX, rituximab, and prednisone 5 mg daily, who was referred to Pulmonary for evaluation of chronic productive cough and wheezing.    1. Chronic productive cough with upper airway wheezing from chronic sinusitis  Normal PFTs and relatively normal CT chest, so unlikely pulmonary. She has now been found to have chronic sinusitis, as seen in her CT sinuses, which is likely causing her cough from post-nasal drip/upper airway cough syndrome. Not from allergies either, per Allergy evaluation.   -- Defer further management to ENT. Currently on antibiotics.     2. Pulmonary nodule TAMMI 0.5 cm  Seen in 9/2016, 12/2016, and 3/29/17 imaging. High risk patient given immunosuppression. Needs follow-up to complete 24 months. Due to complete 24 months duration in 9/2018.   -- PCP should order CT chest in 1 year to complete follow-up for this     3. Upper airway wheezing concerning for possible vocal cord dysfunction  -- Would defer further evaluation for to ENT; may benefit from Speech Pathology    Medical records reviewed extensively.     RTC as needed.     Patient was staffed with Dr. Lety Ryder.    Arlet Tomlinson MD  Pulmonary and Critical Care Medicine Fellow      I saw and evaluated patient with Fellow.  Case discussed - agree with note.    LETY RYDER M.D.           Today's visit note:     REASON FOR VISIT: Follow-up productive cough and wheezing.     HISTORY OF PRESENT ILLNESS:  Ms. Agnes Saravia is a 71-year old female with history of RA and Sjogrens on MTX, rituximab, and prednisone 5 mg daily, who was referred to Pulmonary for evaluation of chronic cough and wheezing, found to have chronic and symptomatic sinusitis on CT imaging.     At this point, ENT has started 3 weeks of Augmentin with prednisone burst X 10 days. She  "reports significant improvement of cough and hoarseness. Unfortunately, symptoms came back so she is on another course of Augmentin at this time. She had a follow-up CT sinuses today which showed some improvement. She has follow-up scheduled with ENT.     Per my initial Pulmonary clinic note from 3/2017:  In 9/2016, she reported having a sore throat lasting a few hours, then developed cough productive of white/yellow sputum. She was told she had pneumonia so she was treated with antibiotics. No infiltrates on CXR or CT chest. She also reports constantly having to blow her nose. She continued to have symptoms. In 11/2016, she was found to have a RUL segmental PE and was started on warfarin. She also had pleurisy, now resolved. She had a repeat CT chest PE protocol that showed resolution of PE, though she continued to have productive cough and wheezing. She feels that the wheezing is coming from her throat area. She denies acid reflux/GERD symptoms. She findings her albuterol helps sometimes, especially when she has wheezing. She was also treated with Zpack X2 though no significant improvement.     No fevers, chills, nausea, vomiting, and abdominal pain.     REVIEW OF SYSTEMS:  12-systems reviewed with pertinent positives and negatives mentioned in the HPI.     Pulmonary ROS:  Cough: Yes, see above.   Dyspnea: Yes when walking long distances.  Wheezing: Yes, see above.   Hemoptysis: No  Exposures: Denies. No mold exposure.     Hobbies: Fishing.   TB: None.    Pets: No birds, cats, or dogs.   Tobacco: <5 pack years tobacco use quit >40 yrs ago.     PHYSICAL EXAM:  /73  Pulse 81  Resp 18  Ht 1.6 m (5' 3\")  Wt 89.8 kg (198 lb)  SpO2 94%  BMI 35.07 kg/m2 on RA.     Constitutional:     Awake, alert and in no apparent distress. Voice is hoarse.    Eyes:    Anicteric, PERRL   ENT:    Oral mucosa moist without lesions. Erythematous nares bilaterally.    Neck:    Supple without supraclavicular or cervical " lymphadenopathy. Upper airway wheezing again heard.     Lungs:    Wheezing bilaterally, likely from upper airway. Good air entry both lungs. No crackles. No rhonchi.    Cardiovascular:    Normal S1 and S2. No JVD, murmur, rub, piedad.   Musculoskeletal:    Trace/+1 bilateral lower extremity edema.    Neurologic:    Alert and conversant.    Skin:    Warm, dry. No rash on limited exam.               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     Depressive disorder      Hyperlipidemia      Insomnia      PONV (postoperative nausea and vomiting)      Rheumatoid arthritis(714.0)      Sjogren's syndrome (H)      Past Surgical History:   Procedure Laterality Date     BLEPHAROPLASTY       DISCECTOMY LUMBAR POSTERIOR MICROSCOPIC ONE LEVEL  8/14/2014    Procedure: DISCECTOMY LUMBAR POSTERIOR MICROSCOPIC ONE LEVEL;  Surgeon: Dudley Bernal MD;  Location:  OR     HYSTERECTOMY TOTAL ABDOMINAL, BILATERAL SALPINGO-OOPHORECTOMY, COMBINED       HYSTERECTOMY, PAP NO LONGER INDICATED       MAMMOPLASTY REDUCTION BILATERAL  5/14/2013    Procedure: MAMMOPLASTY REDUCTION BILATERAL;  BILATERAL REDUCTION MAMMOPLASTY;  Surgeon: Bruce Nash MD;  Location:  [facility]     SHOULDER SURGERY             Family History:     Family History   Problem Relation Age of Onset     CEREBROVASCULAR DISEASE Mother      CEREBROVASCULAR DISEASE Father    Aunt with history of sinus cancer.          Social History:     Social History     Social History     Marital status: Single     Spouse name: N/A     Number of children: N/A     Years of education: N/A     Occupational History     Not on file.     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 [employer]            Medications:     Current Outpatient Prescriptions   Medication     amoxicillin-clavulanate (AUGMENTIN) 875-125 MG per tablet     hydrochlorothiazide (HYDRODIURIL) 50 MG tablet     predniSONE (DELTASONE) 20 MG tablet     CHERATUSSIN -10 MG/5ML SYRP solution     Clorazepate Dipotassium 15 MG TABS     aspirin 81 MG EC tablet     potassium chloride (K-TAB,KLOR-CON) 10 MEQ tablet     gabapentin (NEURONTIN) 100 MG capsule     traZODone (DESYREL) 50 MG tablet     simvastatin (ZOCOR) 40 MG tablet     methotrexate, Anti-Rheumatic, 2.5 MG TABS     riTUXimab (RITUXAN) 10 MG/ML injection     FOLIC ACID PO     predniSONE (DELTASONE) 5 MG tablet     cevimeline (EVOXAC) 30 MG capsule     No current facility-administered medications for this visit.               Data:   All laboratory and imaging data reviewed.      PFT: Reviewed by me.   3/29/2017: Normal spirometry, DLCO, and lung volumes.     CT chest 3/2017 with inspiratory and expiratory cuts: Reviewed by me. No acute abnormalities. No air trapping. Stable 5 mm TAMMI nodule (follow-up recommended in 12 months to complete 24 months of follow-up).     CT chests, PE protocol from 9/2016, 11/2016, and 12/2016: Reviewed by me. No obvious infiltrates seen. TAMMI nodule 0.5 cm seen in 9/2016 and 12/2016. RUL segmental PE found in 11/2016, resolved on 12/2016.      Oriented - self; Oriented - place; Oriented - time

## 2020-06-29 ENCOUNTER — VIRTUAL VISIT (OUTPATIENT)
Dept: FAMILY MEDICINE | Facility: CLINIC | Age: 75
End: 2020-06-29
Payer: MEDICARE

## 2020-06-29 DIAGNOSIS — R05.9 COUGH: ICD-10-CM

## 2020-06-29 DIAGNOSIS — I10 ESSENTIAL HYPERTENSION: ICD-10-CM

## 2020-06-29 DIAGNOSIS — M47.896 OTHER OSTEOARTHRITIS OF SPINE, LUMBAR REGION: Primary | ICD-10-CM

## 2020-06-29 DIAGNOSIS — E87.6 HYPOKALEMIA: ICD-10-CM

## 2020-06-29 DIAGNOSIS — E78.5 HYPERLIPIDEMIA LDL GOAL <130: ICD-10-CM

## 2020-06-29 DIAGNOSIS — R09.1 PLEURITIS: ICD-10-CM

## 2020-06-29 DIAGNOSIS — G47.00 INSOMNIA, UNSPECIFIED TYPE: ICD-10-CM

## 2020-06-29 PROCEDURE — 99442 ZZC PHYSICIAN TELEPHONE EVALUATION 11-20 MIN: CPT | Performed by: INTERNAL MEDICINE

## 2020-06-29 RX ORDER — TRAZODONE HYDROCHLORIDE 50 MG/1
TABLET, FILM COATED ORAL
Qty: 45 TABLET | Refills: 3 | Status: SHIPPED | OUTPATIENT
Start: 2020-06-29 | End: 2021-06-16

## 2020-06-29 RX ORDER — SIMVASTATIN 40 MG
40 TABLET ORAL AT BEDTIME
Qty: 90 TABLET | Refills: 3 | Status: SHIPPED | OUTPATIENT
Start: 2020-06-29 | End: 2021-06-16

## 2020-06-29 RX ORDER — ALBUTEROL SULFATE 90 UG/1
2 AEROSOL, METERED RESPIRATORY (INHALATION) EVERY 4 HOURS PRN
Qty: 1 INHALER | Refills: 1 | Status: SHIPPED | OUTPATIENT
Start: 2020-06-29 | End: 2021-06-16

## 2020-06-29 RX ORDER — HYDROCHLOROTHIAZIDE 50 MG/1
50 TABLET ORAL DAILY
Qty: 90 TABLET | Refills: 3 | Status: SHIPPED | OUTPATIENT
Start: 2020-06-29 | End: 2021-06-16

## 2020-06-29 RX ORDER — POTASSIUM CHLORIDE 750 MG/1
20 TABLET, EXTENDED RELEASE ORAL DAILY
Qty: 180 TABLET | Refills: 3 | Status: SHIPPED | OUTPATIENT
Start: 2020-06-29 | End: 2021-08-24

## 2020-06-29 NOTE — PROGRESS NOTES
"Agnes Saravia is a 75 year old female who is being evaluated via a billable telephone visit.      The patient has been notified of following:     \"This telephone visit will be conducted via a call between you and your physician/provider. We have found that certain health care needs can be provided without the need for a physical exam.  This service lets us provide the care you need with a short phone conversation.  If a prescription is necessary we can send it directly to your pharmacy.  If lab work is needed we can place an order for that and you can then stop by our lab to have the test done at a later time.    Telephone visits are billed at different rates depending on your insurance coverage. During this emergency period, for some insurers they may be billed the same as an in-person visit.  Please reach out to your insurance provider with any questions.    If during the course of the call the physician/provider feels a telephone visit is not appropriate, you will not be charged for this service.\"    Patient has given verbal consent for Telephone visit?  Yes    What phone number would you like to be contacted at? [phone]    How would you like to obtain your AVS? Kadeem Dunbar     Agnes Saravia is a 75 year old female who presents via phone visit today for the following health issues:    HPI          Insomnia, unspecified type  Hyperlipidemia LDL goal <130  Hypokalemia  Essential hypertension  Other osteoarthritis of spine, lumbar region  Cough  Pleuritis   I spoke with Gabriel today over the phone with regards to her ongoing chronic conditions of high blood pressure, high cholesterol and insomnia.  She is doing well with her current medications and requests refills.  She is feeling much better with regards to her previous episode of C. difficile colitis.  She is managing well at home right now and due for follow-up labs.  She has been following with rheumatology over the years for ongoing " issues with rheumatoid arthritis.  She also has been seen in the spine clinic for lumbar disc disease.  She is still having difficulty with pain control and her limited mobility.    Patient Active Problem List   Diagnosis     Sjogren's syndrome (H)     Post menopausal syndrome     HTN (hypertension)     Hyperlipidemia LDL goal <130     Hypokalemia     Pain     Squamous cell carcinoma     Lumbar disc disease     Health Care Home     IFG (impaired fasting glucose)     Obesity     Advance Care Planning     Pneumonia     Other acute pulmonary embolism without acute cor pulmonale (H)     Long-term (current) use of anticoagulants [Z79.01]     Pulmonary embolism, other     DANA (generalized anxiety disorder)     Pulmonary nodules     Obesity (BMI 35.0-39.9) with comorbidity (H)     ILD (interstitial lung disease) (H)     Primary malignant neoplasm (H)     Viral gastroenteritis     C. difficile enteritis     Past Surgical History:   Procedure Laterality Date     BLEPHAROPLASTY       BRONCHOSCOPY (RIGID OR FLEXIBLE), DIAGNOSTIC N/A 2018    Procedure: COMBINED BRONCHOSCOPY (RIGID OR FLEXIBLE), LAVAGE;  Bronchoscopy with Lavage;  Surgeon: Manuel Conway MD;  Location:  GI     DISCECTOMY LUMBAR POSTERIOR MICROSCOPIC ONE LEVEL  2014    Procedure: DISCECTOMY LUMBAR POSTERIOR MICROSCOPIC ONE LEVEL;  Surgeon: Dudley Bernal MD;  Location:  OR     HYSTERECTOMY TOTAL ABDOMINAL, BILATERAL SALPINGO-OOPHORECTOMY, COMBINED       HYSTERECTOMY, PAP NO LONGER INDICATED       MAMMOPLASTY REDUCTION BILATERAL  2013    Procedure: MAMMOPLASTY REDUCTION BILATERAL;  BILATERAL REDUCTION MAMMOPLASTY;  Surgeon: Bruce Nash MD;  Location[facility]     SHOULDER SURGERY         Social History     Tobacco Use     Smoking status: Former Smoker     Packs/day: 0.25     Years: 10.00     Pack years: 2.50     Types: Cigarettes     Start date: 1971     Last attempt to quit: 1981     Years since quittin.5      Smokeless tobacco: Never Used   Substance Use Topics     Alcohol use: Yes     Alcohol/week: 0.0 standard drinks     Comment: beer in summer when mowing lawn      Family History   Problem Relation Age of Onset     Cerebrovascular Disease Mother      Cerebrovascular Disease Father      Colon Cancer No family hx of          Current Outpatient Medications   Medication Sig Dispense Refill     albuterol (PROAIR HFA/PROVENTIL HFA/VENTOLIN HFA) 108 (90 Base) MCG/ACT inhaler Inhale 2 puffs into the lungs every 4 hours as needed for shortness of breath / dyspnea or wheezing 1 Inhaler 1     aspirin 81 MG EC tablet Take 81 mg by mouth At Bedtime        cevimeline (EVOXAC) 30 MG capsule Take 30 mg by mouth 3 times daily (AM, Noon, Dinner)       clorazepate dipotassium (TRANXENE) 15 MG tablet TAKE 1 TABLET BY MOUTH ONCE DAILY 90 tablet 0     folic acid (FOLVITE) 1 MG tablet Take 1 tablet (1 mg) by mouth daily       gabapentin (NEURONTIN) 400 MG capsule Take 400 mg by mouth 3 times daily (AM, Noon, Dinner)       hydrochlorothiazide (HYDRODIURIL) 50 MG tablet Take 1 tablet (50 mg) by mouth daily 90 tablet 3     ibuprofen (ADVIL/MOTRIN) 400 MG tablet Take 400 mg by mouth 2 times daily (Noon and Dinner)       methotrexate 2.5 MG tablet Take 12.5 mg by mouth every 7 days (Takes on Tuesdays)       pantoprazole (PROTONIX) 40 MG EC tablet Take 1 tablet (40 mg) by mouth daily Take 30-60 minutes before a meal. 90 tablet 3     potassium chloride ER (K-TAB/KLOR-CON) 10 MEQ CR tablet Take 2 tablets (20 mEq) by mouth daily 180 tablet 3     predniSONE (DELTASONE) 5 MG tablet Take 5 mg by mouth daily.       simvastatin (ZOCOR) 40 MG tablet Take 1 tablet (40 mg) by mouth At Bedtime 90 tablet 3     traZODone (DESYREL) 50 MG tablet Take 0.5 tablets (25 mg) by mouth once daily at bedtime 45 tablet 3     Allergies   Allergen Reactions     Mellaril [Thioridazine Hcl] Anaphylaxis     Percocet [Oxycodone-Acetaminophen] Anaphylaxis and Swelling      Tolerates hydrocodone and codeine in cough medicine     Recent Labs   Lab Test 07/03/20  0905 03/26/20  0544  03/25/20  0101 11/11/19  0758 06/04/19  0810  01/30/18  0839  10/23/17  0901  02/23/17  0849   A1C  --   --   --   --  6.0*  --   --  6.1*  --   --   --  5.9   *  --   --   --   --  113*  --   --   --  92  --   --    HDL 46*  --   --   --   --  41*  --   --   --  49*  --   --    TRIG 244*  --   --   --   --  251*  --   --   --  169*  --   --    ALT 32  --   --  19  --  23  --   --   --  22   < >  --    CR 0.70 0.64  --  0.64  --  0.76   < > 0.65  --  0.69   < >  --    GFRESTIMATED 84 86  --  86  --  77   < > 89  --  83   < >  --    GFRESTBLACK >90 >90  --  >90  --  89   < > >90  --  >90   < >  --    POTASSIUM 3.6 4.0   < > 2.8*  --  3.9   < > 3.5   < > 3.2*   < >  --     < > = values in this interval not displayed.      BP Readings from Last 3 Encounters:   03/27/20 (!) 143/54   10/14/19 130/84   10/02/19 126/82    Wt Readings from Last 3 Encounters:   03/27/20 99 kg (218 lb 3.2 oz)   10/14/19 90.7 kg (200 lb)   10/02/19 90.7 kg (200 lb)                    Reviewed and updated as needed this visit by Provider         Review of Systems   Constitutional, HEENT, cardiovascular, pulmonary, GI, , musculoskeletal, neuro, skin, endocrine and psych systems are negative, except as otherwise noted.       Objective   Reported vitals:  There were no vitals taken for this visit.   healthy, alert and no distress  PSYCH: Alert and oriented times 3; coherent speech, normal   rate and volume, able to articulate logical thoughts, able   to abstract reason, no tangential thoughts, no hallucinations   or delusions  Her affect is normal  RESP: No cough, no audible wheezing, able to talk in full sentences  Remainder of exam unable to be completed due to telephone visits    Diagnostic Test Results:  Labs reviewed in Epic        Assessment/Plan:  1. Insomnia, unspecified type  We will refill trazodone at current dose 25  mg  - traZODone (DESYREL) 50 MG tablet; Take 0.5 tablets (25 mg) by mouth once daily at bedtime  Dispense: 45 tablet; Refill: 3    2. Hyperlipidemia LDL goal <130  Continue simvastatin and plan for cholesterol check  - simvastatin (ZOCOR) 40 MG tablet; Take 1 tablet (40 mg) by mouth At Bedtime  Dispense: 90 tablet; Refill: 3  - Lipid panel reflex to direct LDL Fasting; Future    3. Hypokalemia  Discussed the need for ongoing use of potassium refill this medication and check potassium again in the next few weeks  - potassium chloride ER (K-TAB/KLOR-CON) 10 MEQ CR tablet; Take 2 tablets (20 mEq) by mouth daily  Dispense: 180 tablet; Refill: 3  - Comprehensive metabolic panel; Future    4. Essential hypertension  I did recommend she have her blood pressure checked when she comes in for labs.  We will continue with hydrochlorothiazide at current dose  - hydrochlorothiazide (HYDRODIURIL) 50 MG tablet; Take 1 tablet (50 mg) by mouth daily  Dispense: 90 tablet; Refill: 3  - CBC with platelets; Future    5. Other osteoarthritis of spine, lumbar region  For pain control I did place a referral for pain management.  She will follow-up as she is able to  - PAIN MANAGEMENT REFERRAL    6. Cough  Continue albuterol for cough  - albuterol (PROAIR HFA/PROVENTIL HFA/VENTOLIN HFA) 108 (90 Base) MCG/ACT inhaler; Inhale 2 puffs into the lungs every 4 hours as needed for shortness of breath / dyspnea or wheezing  Dispense: 1 Inhaler; Refill: 1    7. Pleuritis  As above  - albuterol (PROAIR HFA/PROVENTIL HFA/VENTOLIN HFA) 108 (90 Base) MCG/ACT inhaler; Inhale 2 puffs into the lungs every 4 hours as needed for shortness of breath / dyspnea or wheezing  Dispense: 1 Inhaler; Refill: 1    No follow-ups on file.      Phone call duration:  18 minutes    José Miguel Wallace MD, MD

## 2020-06-30 ENCOUNTER — TELEPHONE (OUTPATIENT)
Dept: PALLIATIVE MEDICINE | Facility: CLINIC | Age: 75
End: 2020-06-30

## 2020-06-30 NOTE — TELEPHONE ENCOUNTER

## 2020-07-03 DIAGNOSIS — E78.5 HYPERLIPIDEMIA LDL GOAL <130: ICD-10-CM

## 2020-07-03 DIAGNOSIS — I10 ESSENTIAL HYPERTENSION: ICD-10-CM

## 2020-07-03 DIAGNOSIS — E87.6 HYPOKALEMIA: ICD-10-CM

## 2020-07-03 LAB
ERYTHROCYTE [DISTWIDTH] IN BLOOD BY AUTOMATED COUNT: 14.7 % (ref 10–15)
HCT VFR BLD AUTO: 39.7 % (ref 35–47)
HGB BLD-MCNC: 13.4 G/DL (ref 11.7–15.7)
MCH RBC QN AUTO: 30.8 PG (ref 26.5–33)
MCHC RBC AUTO-ENTMCNC: 33.8 G/DL (ref 31.5–36.5)
MCV RBC AUTO: 91 FL (ref 78–100)
PLATELET # BLD AUTO: 359 10E9/L (ref 150–450)
RBC # BLD AUTO: 4.35 10E12/L (ref 3.8–5.2)
WBC # BLD AUTO: 13 10E9/L (ref 4–11)

## 2020-07-03 PROCEDURE — 85027 COMPLETE CBC AUTOMATED: CPT | Performed by: INTERNAL MEDICINE

## 2020-07-03 PROCEDURE — 80053 COMPREHEN METABOLIC PANEL: CPT | Performed by: INTERNAL MEDICINE

## 2020-07-03 PROCEDURE — 36415 COLL VENOUS BLD VENIPUNCTURE: CPT | Performed by: INTERNAL MEDICINE

## 2020-07-03 PROCEDURE — 80061 LIPID PANEL: CPT | Performed by: INTERNAL MEDICINE

## 2020-07-04 LAB
ALBUMIN SERPL-MCNC: 3.8 G/DL (ref 3.4–5)
ALP SERPL-CCNC: 82 U/L (ref 40–150)
ALT SERPL W P-5'-P-CCNC: 32 U/L (ref 0–50)
ANION GAP SERPL CALCULATED.3IONS-SCNC: 9 MMOL/L (ref 3–14)
AST SERPL W P-5'-P-CCNC: 26 U/L (ref 0–45)
BILIRUB SERPL-MCNC: 1 MG/DL (ref 0.2–1.3)
BUN SERPL-MCNC: 16 MG/DL (ref 7–30)
CALCIUM SERPL-MCNC: 9.1 MG/DL (ref 8.5–10.1)
CHLORIDE SERPL-SCNC: 98 MMOL/L (ref 94–109)
CHOLEST SERPL-MCNC: 208 MG/DL
CO2 SERPL-SCNC: 28 MMOL/L (ref 20–32)
CREAT SERPL-MCNC: 0.7 MG/DL (ref 0.52–1.04)
GFR SERPL CREATININE-BSD FRML MDRD: 84 ML/MIN/{1.73_M2}
GLUCOSE SERPL-MCNC: 117 MG/DL (ref 70–99)
HDLC SERPL-MCNC: 46 MG/DL
LDLC SERPL CALC-MCNC: 113 MG/DL
NONHDLC SERPL-MCNC: 162 MG/DL
POTASSIUM SERPL-SCNC: 3.6 MMOL/L (ref 3.4–5.3)
PROT SERPL-MCNC: 7.7 G/DL (ref 6.8–8.8)
SODIUM SERPL-SCNC: 135 MMOL/L (ref 133–144)
TRIGL SERPL-MCNC: 244 MG/DL

## 2020-07-05 NOTE — RESULT ENCOUNTER NOTE
Gurdeep Alarcon,     I had the opportunity to review your recent labs and a summary of your labs reads as follows:     Your complete blood counts show no sign of anemia, stale elevated white blood cell count and normal platelets.   Your comprehensive metabolic panel showed normal renal function, normal liver function, and stable elevated fasting blood glucose indicating no evidence of diabetes mellitus.   Your fasting lipid panel show   - normal HDL (good) cholesterol -as your goal is greater than 40   - low LDL (bad) cholesterol as your goal is less than 130   - stable triglyceride levels     Congratulaions on your excellent results       Sincerely,   José Miguel Wallace MD

## 2020-07-14 NOTE — PROGRESS NOTES
"Agnes Saravia is a 75 year old female who is being evaluated via a billable video visit.      The patient has been notified of following:     \"This video visit will be conducted via a call between you and your physician/provider. We have found that certain health care needs can be provided without the need for an in-person physical exam.  This service lets us provide the care you need with a video conversation.  If a prescription is necessary we can send it directly to your pharmacy.  If lab work is needed we can place an order for that and you can then stop by our lab to have the test done at a later time.    Video visits are billed at different rates depending on your insurance coverage.  Please reach out to your insurance provider with any questions.    If during the course of the call the physician/provider feels a video visit is not appropriate, you will not be charged for this service.\"    Patient has given verbal consent for Video visit? Yes  How would you like to obtain your AVS? MyChart  Patient would like the video invitation sent by: Text to cell phone: [phone]  Will anyone else be joining your video visit? No       Agnes Sifuentes, [facility] Consultation      This patient is being seen in consultation at the request of her provider Dr. José Miguel Wallace.    Primary Care Provider is José Miguel Wallace.    The current opiate pain medications are being prescribed by: NA    Please see the [facility] health questionnaire which the patient completed and reviewed with me in detail    Chief Complaint:  Back pain    History of Present Illness:  Agnes Saravia is a 75 year old female with history of Sjogren's syndrome, rheumatoid arthritis, hx of PE, interstitial lung disease, HTN, DANA. She presents today for evaluation of low back pain. She has a hx of right L2-3 microdiscectomy in 2016 by Dr. Bernal which helped with pain for some period " of time. She was again seen by Neurosurgery in October 2019 for right sided low back pain with radiation into the lateral thigh and knee and evaluation was suggestive of SI joint dysfunction.     Today she reports pain is located in bilateral low back/buttock. Pain is primarily located below the belt line. No radiation. Pain is fairly constant. Aggravated by bending forward while doing activities within 5 minutes of being up and getting up from laying down. Relieved with sitting in a recliner chair. Pain is stabbing in quality. This has been ongoing for 2-3 years. Insidious in onset.     The patient otherwise denies bowel or bladder incontinence, parasthesias, weakness, saddle anesthesia, unintentional weight loss, or fever/chills/sweats.     Pain Information:   Onset/Progression:  Pain started 2-3 years ago.   Pain quality: Stabbing    Pain timing: Constant     Pain rating: intensity ranges from 7/10 to 10/10   Aggravating factors include: activity, bending, getting up from laying down   Relieving factors include: sitting in a recliner     Past Pain Treatments:   Pain Clinic: No   PT: No, she does walk twice a day but needs assistance of a cane and typically someone to walk with her   Psychologist: No  Relaxation techniques/biofeedback: No  Chiropractor: No - does not believe in them  Acupuncture: No  TENs Unit: Yes - H in past but doesn't last. Does not know where her unit is now.   Injections: No  Self-care:   Heat - [facility] temporary benenefit, ice - [facility]  Surgeries related to pain: Yes, right L2-3 microdiscectomy in 2016 by Dr. Gabe DEMPSEY for short period of time.     Current Pain Relevant Medications:    Ibuprofen 800 mg TID - [facility]  Gabapentin 400 mg TID - [facility] initially  Tylenol 500 mg once daily - SW, more helpful than ibuprofen    Other Medications:   Methotrexate   Prednisone  Trazodone    Previous Pain Relevant Medications: (H--helped; HI--Helped initially; [facility]--Somewhat helpful; [facility]--No help; W--worse; SE--side  effects; ?--Unsure if helpful)   NOTE: This medication information taken from patient's intake form, not medical records.   Opiates: na  NSAIDS: na  Anti-migraine mediations: na  Muscle Relaxants: na  Neuropathics: na  Anti-depressants: na  Anxiolytics: na  Topicals: icy/hot - [facility]  Sleep aids:na  Other medications not covered above: na    PAST MEDICAL HISTORY:   Past Medical History:   Diagnosis Date     Anxiety      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     Depressive disorder      Hyperlipidemia      Insomnia      PONV (postoperative nausea and vomiting)      Rheumatoid arthritis(714.0)      Sjogren's syndrome (H)      ALLERGIES:  Allergies   Allergen Reactions     Mellaril [Thioridazine Hcl] Anaphylaxis     Percocet [Oxycodone-Acetaminophen] Anaphylaxis and Swelling     Tolerates hydrocodone and codeine in cough medicine       MEDICATIONS:  Current Outpatient Medications   Medication Sig Dispense Refill     albuterol (PROAIR HFA/PROVENTIL HFA/VENTOLIN HFA) 108 (90 Base) MCG/ACT inhaler Inhale 2 puffs into the lungs every 4 hours as needed for shortness of breath / dyspnea or wheezing 1 Inhaler 1     aspirin 81 MG EC tablet Take 81 mg by mouth At Bedtime        cevimeline (EVOXAC) 30 MG capsule Take 30 mg by mouth 3 times daily (AM, Noon, Dinner)       clorazepate dipotassium (TRANXENE) 15 MG tablet TAKE 1 TABLET BY MOUTH ONCE DAILY 90 tablet 0     folic acid (FOLVITE) 1 MG tablet Take 1 tablet (1 mg) by mouth daily       gabapentin (NEURONTIN) 400 MG capsule Take 400 mg by mouth 3 times daily (AM, Noon, Dinner)       hydrochlorothiazide (HYDRODIURIL) 50 MG tablet Take 1 tablet (50 mg) by mouth daily 90 tablet 3     ibuprofen (ADVIL/MOTRIN) 400 MG tablet Take 400 mg by mouth 2 times daily (Noon and Dinner)       methotrexate 2.5 MG tablet Take 12.5 mg by mouth every 7 days (Takes on Tuesdays)       pantoprazole (PROTONIX) 40 MG EC tablet Take 1 tablet (40 mg) by mouth daily Take 30-60 minutes before a meal. 90 tablet 3      potassium chloride ER (K-TAB/KLOR-CON) 10 MEQ CR tablet Take 2 tablets (20 mEq) by mouth daily 180 tablet 3     predniSONE (DELTASONE) 5 MG tablet Take 5 mg by mouth daily.       simvastatin (ZOCOR) 40 MG tablet Take 1 tablet (40 mg) by mouth At Bedtime 90 tablet 3     traZODone (DESYREL) 50 MG tablet Take 0.5 tablets (25 mg) by mouth once daily at bedtime 45 tablet 3     REVIEW OF SYSTEMS:   Constitutional:  Weight Gain, fatigue  Eyes/Head: Negative  Ears/Nose/Throat: Negative  Allergy/Immune: Negative  Skin:Negative  Hematologic/Lymphatic/Immunologic:Negative  Respiratory: Negative  Cardiovascular: Negative  Gastrointestinal: Negative  Endocrine: Negative  Musculoskeletal: Arthritis, Back pain, Joint pain and Stiffness  Urinary:  Negative   Any bowel or bladder incontinence: Denies   Neurologic: Negative  Psychiatric: Negative    CURRENT FAMILY/SOCIAL SITUATION:  Living situation: Lives in [place], [place] with partner  Support system: Good  Occupation: not working  Current stressors: none    SUBSTANCE USE:  Any illicit drug use: none  EtOH use: none  Nicotine use: in the past, not currenlty  Any use of prescriptions other than how they were prescribed:none    PHYSICAL EXAM  Appearance:     A&O. Patient is appropriate.   Patient is in NAD.   Patient is well groomed and appears stated age.     HEENT:   Normocephalic, atraumatic, sclera, conjunctiva normal.   Respiratory: Breathing unlabored on room air  Psychiatric:  mentation appears normal., affect and mood normal  Musculoskeletal: Unable to assess. Patient is sitting in a recliner during encounter. States she cannot get up easily.      Gait pattern:  Unable to assess. Patient cannot get up from her recliner easily.     Screening Tools:  DIRE Score for ongoing opioid management is calculated as follows:    Diagnosis = 2 pts (slowly progressive; moderate pain/objective findings)    Intractability = 1 pt (few therapies tried; passive patient role)    Risk         Psych = 3 pts (no significant personality dysfunction/mental illness; good communication with clinic)         Chem Hlth = 3 pts (no history of chemical dependency; not drug-focused)       Reliability = 3 pts (highly reliable with meds, appointments, treatments)       Social = 2 pts (reduction in some relationships/life rolls)       (Psych + Chem hlth + Reliability + Social) = 14    Efficacy = 2 pts (moderate benefit/function; low med dose; too early/not tried meds)    DIRE Score = 16        7-13: likely NOT suitable candidate for long-term opioid analgesia       14-21: may be a suitable candidate for long-term opioid analgesia    Previous Diagnostic Tests:   Imaging Studies:   Lumbar MRI completed on 7/25/20109 which showed:        Other Testing (labs, diagnostics) reviewed:   Labs: creatinine 0.70, GFR 84  EKG: QTc 465 ms    [facility] Data Base Reviewed:    YES; No concern for abuse or misuse of controlled medications based on this report.     Outside records reviewed    ASSESSMENT:   Agnes Saravia is a 75 year old female with a past medical history significant for Sjogren's syndrome, rheumatoid arthritis, hx of PE, interstitial lung disease, HTN, DANA  who presents with the following chronic pain complaints:     1. Chronic back pain: Pain has been ongoing for 2-3 years. Located in low back/buttock primarily below waistline. No radicular symptoms. Pain is constant. Unable to do any physical exam due to difficulty patient has from getting up from recliner. MRI of Lumbar Spine from July 2019 shows multi-level facet arthropathy, no significant central or foraminal stenosis. Differential includes lumbar spondylosis with facet arthropathy, SI joint dysfunction, physical deconditioning.     PLAN:  The following recommendations were given to the patient. Diagnosis, treatment options, risks, benefits, and alternatives were discussed, and all questions were answered. The patient expressed understanding  of the plan for management.     I am recommending a multidisciplinary treatment plan to help this patient better manage her pain.  This includes:      1. Physical Therapy:  Order placed for rehab PT to work on general strength and conditioning and to evaluate gait.   2. Clinical Health Psychologist to address issues of relaxation, behavorial change, coping style, and other factors important to improvement: Therapy can help reduce physical and psychosocial triggers or reinforcers of pain by adapting thoughts, feelings and behaviors to reduce symptoms and increase quality of life.  Evidence indicates that the practice of relaxation, meditation, and mindfulness techniques can significantly affect pain levels and overall well being. Not at this time.   3. Diagnostic Studies:  No new imaging needed.   4. Medication Management:    1. Increase use of tylenol to 1,000 mg TID prn. If this is helpful can reduce amount of NSAIDs taken.   2. Try OTC lidocaine 4% patches  5. Potential procedures: Will need to see her in clinic for a physical exam to determine if injections would be appropriate.      Follow up: I will have her come in for an in person clinic visit to complete a physical exam and discuss injections if appropriate.      Karie Batista MD  [facility]      Video-Visit Details    Type of service:  Video Visit    Video Start Time: 905  Video End Time: 950    Originating Location (pt. Location): Home    Distant Location (provider location):  [place] [facility]     Platform used for Video Visit: [employer]

## 2020-07-15 ENCOUNTER — TELEPHONE (OUTPATIENT)
Dept: PALLIATIVE MEDICINE | Facility: CLINIC | Age: 75
End: 2020-07-15

## 2020-07-15 ENCOUNTER — VIRTUAL VISIT (OUTPATIENT)
Dept: PALLIATIVE MEDICINE | Facility: CLINIC | Age: 75
End: 2020-07-15
Payer: MEDICARE

## 2020-07-15 DIAGNOSIS — R53.81 PHYSICAL DECONDITIONING: ICD-10-CM

## 2020-07-15 DIAGNOSIS — M47.816 SPONDYLOSIS OF LUMBAR REGION WITHOUT MYELOPATHY OR RADICULOPATHY: Primary | ICD-10-CM

## 2020-07-15 PROCEDURE — 99203 OFFICE O/P NEW LOW 30 MIN: CPT | Mod: 95 | Performed by: PHYSICAL MEDICINE & REHABILITATION

## 2020-07-15 ASSESSMENT — PAIN SCALES - GENERAL: PAINLEVEL: EXTREME PAIN (9)

## 2020-07-15 NOTE — PATIENT INSTRUCTIONS
Thank you for coming to [facility] for your care. It is my goal to partner with you to help you reach your optimal state of health.     You were seen today for your back pain. As discussed during your visit these are the recommendations:    1. Medications:  - you can use tylenol up to 1,000 mg at a time three times daily as needed. Do not exceed 3,000 mg per day.  - If tylenol is helpful you can reduce the amount of ibuprofen you are using.   - Try over the counter lidocaine 4% patches.     2. Therapies: order placed for rehab physical therapy to work on general strength and conditioning    3. Procedures: Will be determined based on in clinic physical exam    4. Imaging/Diagnostic Studies: no new imaging needed. Reviewed MRI of lumbar spine from July 2019.    5. Follow up: I will have you come in for an in person visit to do a physical exam to determine what injections could be most helpful for you. You will get a call from our clinic to schedule this.     Karie Batista MD  [facility]   ----------------------------------------------------------------  Clinic Number:  [phone]     Call with any questions about your care and for scheduling assistance.     Calls are returned Monday through Friday between 8 AM and 4:30 PM. We usually get back to you within 2 business days depending on the issue/request.    If we are prescribing your medications:    For opioid medication refills, call the clinic or send a Confide message 7 days in advance.  Please include:    Name of requested medication    Name of the pharmacy.    For non-opioid medications, call your pharmacy directly to request a refill. Please allow 3-4 days to be processed.     Per [place] State Law:    All controlled substance prescriptions must be filled within 30 days of being written.      For those controlled substances allowing refills, pickup must occur within 30 days of last fill.      We believe regular  attendance is key to your success in our program!      Any time you are unable to keep your appointment we ask that you call us at least 24 hours in advance to cancel.This will allow us to offer the appointment time to another patient.     Multiple missed appointments may lead to dismissal from the clinic.

## 2020-07-15 NOTE — TELEPHONE ENCOUNTER
LVM schedule patient for an in person follow up visit to do a physical exam to determine next steps in care.         Anushka Cosme    [facility]

## 2020-07-20 ENCOUNTER — VIRTUAL VISIT (OUTPATIENT)
Dept: FAMILY MEDICINE | Facility: CLINIC | Age: 75
End: 2020-07-20
Payer: MEDICARE

## 2020-07-20 DIAGNOSIS — E66.01 MORBID OBESITY (H): ICD-10-CM

## 2020-07-20 DIAGNOSIS — M35.00 SJOGREN'S SYNDROME, WITH UNSPECIFIED ORGAN INVOLVEMENT (H): ICD-10-CM

## 2020-07-20 DIAGNOSIS — E78.5 HYPERLIPIDEMIA LDL GOAL <130: ICD-10-CM

## 2020-07-20 DIAGNOSIS — Z78.0 POST-MENOPAUSAL: ICD-10-CM

## 2020-07-20 DIAGNOSIS — Z00.00 ENCOUNTER FOR WELLNESS EXAMINATION: Primary | ICD-10-CM

## 2020-07-20 DIAGNOSIS — I10 ESSENTIAL HYPERTENSION: ICD-10-CM

## 2020-07-20 DIAGNOSIS — M51.9 LUMBAR DISC DISEASE: ICD-10-CM

## 2020-07-20 DIAGNOSIS — J84.9 ILD (INTERSTITIAL LUNG DISEASE) (H): ICD-10-CM

## 2020-07-20 PROCEDURE — G0439 PPPS, SUBSEQ VISIT: HCPCS | Mod: 95 | Performed by: INTERNAL MEDICINE

## 2020-07-20 NOTE — PROGRESS NOTES
"Agnes Saravia is a 75 year old female who is being evaluated via a billable video visit.      The patient has been notified of following:     \"This video visit will be conducted via a call between you and your physician/provider. We have found that certain health care needs can be provided without the need for an in-person physical exam.  This service lets us provide the care you need with a video conversation.  If a prescription is necessary we can send it directly to your pharmacy.  If lab work is needed we can place an order for that and you can then stop by our lab to have the test done at a later time.    Video visits are billed at different rates depending on your insurance coverage.  Please reach out to your insurance provider with any questions.    If during the course of the call the physician/provider feels a video visit is not appropriate, you will not be charged for this service.\"    Patient has given verbal consent for Video visit? Yes  How would you like to obtain your AVS? MyChart  If you are dropped from the video visit, the video invite should be resent to: Text to cell phone: [phone]  Will anyone else be joining your video visit? No    Subjective     Agnes Saravia is a 75 year old female who presents today via video visit for the following health issues:    HPI    Annual Wellness Visit    Are you in the first 12 months of your Medicare Part B coverage?  No    Physical Health:    In general, how would you rate your overall physical health? good    Outside of work, how many days during the week do you exercise?none    Outside of work, approximately how many minutes a day do you exercise?not applicable    If you drink alcohol do you typically have >3 drinks per day or >7 drinks per week? No    Do you usually eat at least 4 servings of fruit and vegetables a day, include whole grains & fiber and avoid regularly eating high fat or \"junk\" foods? Yes    Do you have any problems taking " medications regularly? No    Do you have any side effects from medications? none    Needs assistance for the following daily activities: shopping, housework and laundry    Which of the following safety concerns are present in your home?  none identified     Hearing impairment: No    In the past 6 months, have you been bothered by leaking of urine? no    Mental Health:    In general, how would you rate your overall mental or emotional health? excellent  PHQ-2 Score:      Do you feel safe in your environment? Yes    Have you ever done Advance Care Planning? (For example, a Health Directive, POLST, or a discussion with a medical provider or your loved ones about your wishes)? Yes, advance care planning is on file.    Fall risk:  Fallen 2 or more times in the past year?: No  Any fall with injury in the past year?: No    Cognitive Screening: Unable to complete due to virtual visit; need for additional assessment in future face-to-face visit    Do you have sleep apnea, excessive snoring or daytime drowsiness?: no    Current providers sharing in care for this patient include:   Patient Care Team:  José Miguel Wallace MD as PCP - General (Internal Medicine)  José Miguel Wallace MD as Assigned PCP  Bruce Nash MD as Ajay Grigsby MD as MD (Internal Medicine)  Dudley Bernal MD as MD (Neurosurgery)  Harjeet Lynn MD as MD (Otolaryngology)  Juan Carcamo MD as MD (Allergy & Immunology)  Kandi Riley, RN as Registered Nurse (Otolaryngology)               Video Start Time: 1:21PM         Morbid obesity (H)  ILD (interstitial lung disease) (H)  Hyperlipidemia LDL goal <130  Essential hypertension  Sjogren's syndrome, with unspecified organ involvement (H)  Lumbar disc disease   I spoke with Selina over video conference today to review her recent labs.  She did have stable cholesterol kidney function and liver function tests.  She continues on current cholesterol  medications which are working well for her.  She was recently seen in the pain clinic for her lumbar degenerative disc disease which has been managed but causing some pain.  She was referred for physical therapy and is looking to see a physical therapist but would like to see one closer to her home.  She otherwise feels well at this time.  With regards to her annual wellness.  She is doing well and is up-to-date with regards to her screening.  She intends to get a flu shot this fall.  She intends to get a mammogram also this fall and she also will help to get a bone density scan at some point in the near future.    Patient Active Problem List   Diagnosis     Sjogren's syndrome (H)     Post menopausal syndrome     HTN (hypertension)     Hyperlipidemia LDL goal <130     Hypokalemia     Pain     Squamous cell carcinoma     Lumbar disc disease     Health Care Home     IFG (impaired fasting glucose)     Obesity     Advance Care Planning     Pneumonia     Other acute pulmonary embolism without acute cor pulmonale (H)     Long-term (current) use of anticoagulants [Z79.01]     Pulmonary embolism, other     DANA (generalized anxiety disorder)     Pulmonary nodules     Obesity (BMI 35.0-39.9) with comorbidity (H)     ILD (interstitial lung disease) (H)     Primary malignant neoplasm (H)     Viral gastroenteritis     C. difficile enteritis     Past Surgical History:   Procedure Laterality Date     BLEPHAROPLASTY       BRONCHOSCOPY (RIGID OR FLEXIBLE), DIAGNOSTIC N/A 4/11/2018    Procedure: COMBINED BRONCHOSCOPY (RIGID OR FLEXIBLE), LAVAGE;  Bronchoscopy with Lavage;  Surgeon: Manuel Conway MD;  Location:  GI     DISCECTOMY LUMBAR POSTERIOR MICROSCOPIC ONE LEVEL  8/14/2014    Procedure: DISCECTOMY LUMBAR POSTERIOR MICROSCOPIC ONE LEVEL;  Surgeon: Dudley Bernal MD;  Location: [facility]     HYSTERECTOMY TOTAL ABDOMINAL, BILATERAL SALPINGO-OOPHORECTOMY, COMBINED       HYSTERECTOMY, PAP NO LONGER INDICATED        MAMMOPLASTY REDUCTION BILATERAL  2013    Procedure: MAMMOPLASTY REDUCTION BILATERAL;  BILATERAL REDUCTION MAMMOPLASTY;  Surgeon: Bruce Nash MD;  [facility]: [facility]     SHOULDER SURGERY         Social History     Tobacco Use     Smoking status: Former Smoker     Packs/day: 0.25     Years: 10.00     Pack years: 2.50     Types: Cigarettes     Start date: 1971     Last attempt to quit: 1981     Years since quittin.5     Smokeless tobacco: Never Used   Substance Use Topics     Alcohol use: Yes     Alcohol/week: 0.0 standard drinks     Comment: beer in summer when mowing lawn      Family History   Problem Relation Age of Onset     Cerebrovascular Disease Mother      Cerebrovascular Disease Father      Colon Cancer No family hx of          Current Outpatient Medications   Medication Sig Dispense Refill     albuterol (PROAIR HFA/PROVENTIL HFA/VENTOLIN HFA) 108 (90 Base) MCG/ACT inhaler Inhale 2 puffs into the lungs every 4 hours as needed for shortness of breath / dyspnea or wheezing 1 Inhaler 1     aspirin 81 MG EC tablet Take 81 mg by mouth At Bedtime        cevimeline (EVOXAC) 30 MG capsule Take 30 mg by mouth 3 times daily (AM, Noon, Dinner)       clorazepate dipotassium (TRANXENE) 15 MG tablet TAKE 1 TABLET BY MOUTH ONCE DAILY 90 tablet 0     folic acid (FOLVITE) 1 MG tablet Take 1 tablet (1 mg) by mouth daily       gabapentin (NEURONTIN) 400 MG capsule Take 400 mg by mouth 4 times daily (AM, Noon, Dinner) and 100 MG at bedtime       hydrochlorothiazide (HYDRODIURIL) 50 MG tablet Take 1 tablet (50 mg) by mouth daily 90 tablet 3     ibuprofen (ADVIL/MOTRIN) 400 MG tablet Take 400 mg by mouth 2 times daily (Noon and Dinner)       methotrexate 2.5 MG tablet Take 12.5 mg by mouth every 7 days (Takes on )       pantoprazole (PROTONIX) 40 MG EC tablet Take 1 tablet (40 mg) by mouth daily Take 30-60 minutes before a meal. 90 tablet 3     potassium chloride ER (K-TAB/KLOR-CON) 10 MEQ CR tablet  Take 2 tablets (20 mEq) by mouth daily 180 tablet 3     predniSONE (DELTASONE) 5 MG tablet Take 5 mg by mouth daily.       simvastatin (ZOCOR) 40 MG tablet Take 1 tablet (40 mg) by mouth At Bedtime 90 tablet 3     traZODone (DESYREL) 50 MG tablet Take 0.5 tablets (25 mg) by mouth once daily at bedtime 45 tablet 3     Allergies   Allergen Reactions     Mellaril [Thioridazine Hcl] Anaphylaxis     Percocet [Oxycodone-Acetaminophen] Anaphylaxis and Swelling     Tolerates hydrocodone and codeine in cough medicine     Recent Labs   Lab Test 07/03/20  0905 03/26/20  0544  03/25/20  0101 11/11/19  0758 06/04/19  0810  01/30/18  0839  10/23/17  0901  02/23/17  0849   A1C  --   --   --   --  6.0*  --   --  6.1*  --   --   --  5.9   *  --   --   --   --  113*  --   --   --  92  --   --    HDL 46*  --   --   --   --  41*  --   --   --  49*  --   --    TRIG 244*  --   --   --   --  251*  --   --   --  169*  --   --    ALT 32  --   --  19  --  23  --   --   --  22   < >  --    CR 0.70 0.64  --  0.64  --  0.76   < > 0.65  --  0.69   < >  --    GFRESTIMATED 84 86  --  86  --  77   < > 89  --  83   < >  --    GFRESTBLACK >90 >90  --  >90  --  89   < > >90  --  >90   < >  --    POTASSIUM 3.6 4.0   < > 2.8*  --  3.9   < > 3.5   < > 3.2*   < >  --     < > = values in this interval not displayed.      BP Readings from Last 3 Encounters:   03/27/20 (!) 143/54   10/14/19 130/84   10/02/19 126/82    Wt Readings from Last 3 Encounters:   03/27/20 99 kg (218 lb 3.2 oz)   10/14/19 90.7 kg (200 lb)   10/02/19 90.7 kg (200 lb)                    Reviewed and updated as needed this visit by Provider         Review of Systems   Constitutional, HEENT, cardiovascular, pulmonary, GI, , musculoskeletal, neuro, skin, endocrine and psych systems are negative, except as otherwise noted.      Objective             Physical Exam     GENERAL: Healthy, alert and no distress  EYES: Eyes grossly normal to inspection.  No discharge or erythema, or  "obvious scleral/conjunctival abnormalities.  RESP: No audible wheeze, cough, or visible cyanosis.  No visible retractions or increased work of breathing.    SKIN: Visible skin clear. No significant rash, abnormal pigmentation or lesions.  NEURO: Cranial nerves grossly intact.  Mentation and speech appropriate for age.  PSYCH: Mentation appears normal, affect normal/bright, judgement and insight intact, normal speech and appearance well-groomed.      Diagnostic Test Results:  Labs reviewed in Epic        Assessment & Plan     1. Morbid obesity (H)  We discussed that physical therapy and get moving plan would be good for her.  She just hopes that she can continue to do her daily activities and that may include walking with the use of a cane.    2. ILD (interstitial lung disease) (H)  She continues to follow-up with a pulmonologist and breathing has been relatively stable.    3. Hyperlipidemia LDL goal <130  Cholesterol also stable and she will continue on statin    4. Essential hypertension  Blood pressure has been stable and she continues on hydrochlorothiazide to current dose    5. Sjogren's syndrome, with unspecified organ involvement (H)  For Sjogren's syndrome and rheumatoid arthritis she follows with rheumatologist.  She continues on prednisone, methotrexate, and as needed use of ibuprofen    6. Lumbar disc disease  Also recently established with the pain clinic and will look into starting physical therapy after our discussion.    7. Encounter for wellness examination  She is up-to-date with most of her health maintenance.  She does need a shingles shot and a flu shot.  She also will look into getting a mammogram and a bone density scan this fall       BMI:   Estimated body mass index is 38.65 kg/m  as calculated from the following:    Height as of 3/25/20: 1.6 m (5' 3\").    Weight as of 3/27/20: 99 kg (218 lb 3.2 oz).               No follow-ups on file.    José Miguel Wallace MD, MD  [facility] " [facility]      Video-Visit Details    Type of service:  Video Visit    Video End Time:1:46 PM    Originating Location (pt. Location): Home    Distant Location (provider location):  [facility]     Platform used for Video Visit: [facility]    No follow-ups on file.       José Miguel Wallace MD, MD

## 2020-09-05 DIAGNOSIS — F41.1 GAD (GENERALIZED ANXIETY DISORDER): ICD-10-CM

## 2020-09-05 DIAGNOSIS — M35.00 SJOGREN'S SYNDROME, WITH UNSPECIFIED ORGAN INVOLVEMENT (H): ICD-10-CM

## 2020-09-08 RX ORDER — CLORAZEPATE DIPOTASSIUM 15 MG/1
TABLET ORAL
Qty: 90 TABLET | Refills: 0 | Status: SHIPPED | OUTPATIENT
Start: 2020-09-08 | End: 2020-11-30

## 2020-10-05 ENCOUNTER — OFFICE VISIT (OUTPATIENT)
Dept: PALLIATIVE MEDICINE | Facility: CLINIC | Age: 75
End: 2020-10-05
Payer: MEDICARE

## 2020-10-05 ENCOUNTER — ALLIED HEALTH/NURSE VISIT (OUTPATIENT)
Dept: NURSING | Facility: CLINIC | Age: 75
End: 2020-10-05
Payer: MEDICARE

## 2020-10-05 VITALS — HEART RATE: 96 BPM | DIASTOLIC BLOOD PRESSURE: 89 MMHG | OXYGEN SATURATION: 95 % | SYSTOLIC BLOOD PRESSURE: 147 MMHG

## 2020-10-05 DIAGNOSIS — M53.3 SACROILIAC JOINT PAIN: Primary | ICD-10-CM

## 2020-10-05 DIAGNOSIS — M79.18 MYOFASCIAL PAIN: ICD-10-CM

## 2020-10-05 DIAGNOSIS — M47.816 SPONDYLOSIS OF LUMBAR REGION WITHOUT MYELOPATHY OR RADICULOPATHY: ICD-10-CM

## 2020-10-05 DIAGNOSIS — Z23 NEED FOR PROPHYLACTIC VACCINATION AND INOCULATION AGAINST INFLUENZA: Primary | ICD-10-CM

## 2020-10-05 DIAGNOSIS — R53.81 PHYSICAL DECONDITIONING: ICD-10-CM

## 2020-10-05 PROCEDURE — 99214 OFFICE O/P EST MOD 30 MIN: CPT | Performed by: PHYSICAL MEDICINE & REHABILITATION

## 2020-10-05 PROCEDURE — 90662 IIV NO PRSV INCREASED AG IM: CPT

## 2020-10-05 PROCEDURE — G0008 ADMIN INFLUENZA VIRUS VAC: HCPCS

## 2020-10-05 ASSESSMENT — PAIN SCALES - GENERAL: PAINLEVEL: EXTREME PAIN (8)

## 2020-10-05 NOTE — PATIENT INSTRUCTIONS
1. Try increasing use of tylenol to 1,000 mg every 6-8 hours as needed. Do not take more than 3,000 mg per day.   2. You can try using over the counter topical medications including lidocaine 4% patches (aspercreme or salon pas), icy/hot, bengay, arnica gel. voltaren 1% gel..  3. Start rehab physical therapy. Here is the number to [phone].   4. Order placed for sacroiliac joint injections. You will be called to schedule this.   5. I will see you for the injections.    Karie Batista MD  [facility]     ----------------------------------------------------------------  Clinic Number:  [phone]     Call with any questions about your care and for scheduling assistance.     Calls are returned Monday through Friday between 8 AM and 4:30 PM. We usually get back to you within 2 business days depending on the issue/request.    If we are prescribing your medications:    For opioid medication refills, call the clinic or send a Evino message 7 days in advance.  Please include:    Name of requested medication    Name of the pharmacy.    For non-opioid medications, call your pharmacy directly to request a refill. Please allow 3-4 days to be processed.     Per [place] State Law:    All controlled substance prescriptions must be filled within 30 days of being written.      For those controlled substances allowing refills, pickup must occur within 30 days of last fill.      We believe regular attendance is key to your success in our program!      Any time you are unable to keep your appointment we ask that you call us at least 24 hours in advance to cancel.This will allow us to offer the appointment time to another patient.     Multiple missed appointments may lead to dismissal from the clinic.

## 2020-10-07 ENCOUNTER — TELEPHONE (OUTPATIENT)
Dept: PALLIATIVE MEDICINE | Facility: CLINIC | Age: 75
End: 2020-10-07

## 2020-10-12 ENCOUNTER — TELEPHONE (OUTPATIENT)
Dept: PALLIATIVE MEDICINE | Facility: CLINIC | Age: 75
End: 2020-10-12

## 2020-10-12 NOTE — PROGRESS NOTES
[facility] - Procedure Note    Date of Service: 10/13/2020  Procedure performed: Bilateral sacroiliac joint injection  Diagnosis: Sacroiliac joint pain  : Karie Batista MD  Anesthesia: None  Complications: None    Indications: Agnes Saravia is a 75 year old female who is seen by me in clinic presents today for a sacroiliac joint injection. The patient describes pain in bilateral low back/butock with referral into posterolateral hips.  Exam shows full strength and sensation in both lower extremities, tenderness of the sacroiliac (SI) joint, positive jose's finger, pain with sacral compression. The patient reports minimal improvement with conservative treatment, including medications.    Imaging:  MRI completed on 7/25/2019 at [facility] which showed:      Allergies:      Allergies   Allergen Reactions     Mellaril [Thioridazine Hcl] Anaphylaxis     Percocet [Oxycodone-Acetaminophen] Anaphylaxis and Swelling     Tolerates hydrocodone and codeine in cough medicine        Vitals:  /79   Pulse 74   SpO2 94%     Options/alternatives, benefits and risks were discussed with the patient including bleeding, infection, tissue trauma, exposure to radiation, reaction to medications including seizure, nerve injury, weakness, and numbness.  Questions were answered to her satisfaction and she agrees to proceed. Voluntary informed consent was obtained and signed.     Procedure:  After getting informed consent, patient was brought into the procedure suite and was placed in a prone position on the procedure table.   A Pause for the Cause was performed.  Patient was prepped and draped in sterile fashion.     After identifying the bilateral SI joints, the C-arm was rotated to a obliquely to obtain the best view of the inferior angle of the joins.  A total of 6 ml of Lidocaine 1%  was used to anesthetize the skin at a skin entry sites coaxial with the fluoroscopy beam at this  location.  A 22 gauge 3.5 inch needle was advanced under intermittent fluoroscopy until it was felt to enter the SI joints.    A total of 1 ml of Omnipaque-300 was injected, confirming appropriate position, with spread into the intraarticular space, with no intravascular uptake noted.  9 ml of Omnipaque-300 was wasted. Location was verified in lateral view.    4.5 ml of 0.25% bupivacaine with 60 mg of kenalog was injected. The needle was removed. Hemostasis was achieved, the area was cleaned, and bandaids were placed when appropriate.  The patient tolerated the procedure well, and was taken to the recovery room.    Images were saved to PACS.    Pre-procedure pain score: 9/10  Post-procedure pain score: 4/10    Assessment/Plan: Agnes Saravia is a 75 year old female s/p bilateral SIJ injection due to low back/buttock pain.    1. Following today's procedure, the patient was advised to contact the [facility] for any of the following:   Fever, chills, or night sweats   New onset of pain, numbness, or weakness   Any questions/concerns regarding the procedure  If unable to contact the Pain Center, the patient was instructed to go to a local Emergency Room for any complications.   2. The patient will receive a follow-up call in 1 week.  3. The patient should follow-up with me in 3-4 weeks for post-procedure evaluation    MD HARSHAL [facility]

## 2020-10-12 NOTE — TELEPHONE ENCOUNTER
Spoke to patient reminded of date, time and location of appointment.      Reminded patient to reschedule appointment if been in contact with some who has been suspected or confirmed of covid-19. New or worsening symptoms of cough, fever, rash or shortness of breath.     Reminded patient to eat and drink as usual, hold any blood thinners or pain medications that they were asked to hold per nurse.    Reminded patient they will need a  for this appointment and requested that the  stays out of the clinic unless its medically necessary.    Reminded patient that both patient and  must wear a mask during their visit.      Christianne Aguirre MA  [facility]

## 2020-10-13 ENCOUNTER — ANCILLARY PROCEDURE (OUTPATIENT)
Dept: GENERAL RADIOLOGY | Facility: CLINIC | Age: 75
End: 2020-10-13
Attending: PHYSICAL MEDICINE & REHABILITATION
Payer: MEDICARE

## 2020-10-13 ENCOUNTER — RADIOLOGY INJECTION OFFICE VISIT (OUTPATIENT)
Dept: PALLIATIVE MEDICINE | Facility: CLINIC | Age: 75
End: 2020-10-13
Payer: MEDICARE

## 2020-10-13 VITALS — SYSTOLIC BLOOD PRESSURE: 129 MMHG | HEART RATE: 69 BPM | DIASTOLIC BLOOD PRESSURE: 77 MMHG | OXYGEN SATURATION: 95 %

## 2020-10-13 DIAGNOSIS — M53.3 SACROILIAC JOINT PAIN: ICD-10-CM

## 2020-10-13 DIAGNOSIS — M53.3 SACROILIAC JOINT PAIN: Primary | ICD-10-CM

## 2020-10-13 PROCEDURE — 27096 INJECT SACROILIAC JOINT: CPT | Mod: 50 | Performed by: PHYSICAL MEDICINE & REHABILITATION

## 2020-10-13 NOTE — PATIENT INSTRUCTIONS
[facility] Procedure Discharge Instructions    Today you saw:    Dr. Karie Batista       Your procedure: Sacro-iliac joint injection      Medications used:  Lidocaine (anesthetic)  Bupivacaine (anesthetic) Kenalog (steroid)  Omnipaque (contrast)             Be cautious when walking as numbness and/or weakness in the legs may occur up to 6-8 hours after the procedure due to effect of the local anesthetic    Do not drive for 6 hours. The effect of the local anesthetic could slow your reflexes.     Avoid strenuous activity for the first 24 hours. You may resume your regular activities after that.     You may shower, however avoid swimming, tub baths or hot tubs for 24 hours following your procedure    You may have a mild to moderate increase in pain for several days following the injection.      You may use ice packs for 10-15 minutes, 3 to 4 times a day at the injection site for comfort    Do not use heat to painful areas for 6 to 8 hours. This will give the local anesthetic time to wear off and prevent you from accidentally burning your skin.    Unless you have been directed to avoid the use of anti-inflammatory medications (NSAIDS-ibuprofen, Aleve, Motrin), you may use these medications or Tylenol for pain control if needed.     With diabetes, check your blood sugar more frequently than usual as your blood sugar may be higher than normal for 10-14 days following a steroid injection. Contact your doctor who manages your diabetes if your blood sugar is higher than usual    Possible side effects of steroids that you may experience include flushing, elevated blood pressure, increased appetite, mild headaches and restlessness.  All of these symptoms will get better with time.    It may take up to 14 days for the steroid medication to start working although you may feel the effect as early as a few days after the procedure.     Follow up with your referring provider in 2-3 weeks      If you experience  any of the following, call the pain center line during work hours at [phone] or on-call physician after hours at [phone]:  -Fever over 100 degree F  -Swelling, bleeding, redness, drainage, warmth at the injection site  -Progressive weakness or numbness in your legs  -Loss of bowel or bladder function  -Unusual headache that is not relieved by Tylenol or your regular headache medication  -Unusual new onset of pain that is not improving

## 2020-10-13 NOTE — NURSING NOTE
Discharge Information    IV Discontiued Time:  NA    Amount of Fluid Infused:  NA    Discharge Criteria = When patient returns to baseline or as per MD order    Consciousness:  Pt is fully awake    Circulation:  BP +/- 20% of pre-procedure level    Respiration:  Patient is able to breathe deeply    O2 Sat:  Patient is able to maintain O2 Sat >92% on room air    Activity:  Moves 4 extremities on command    Ambulation:  Patient is able to stand and walk or stand and pivot into wheelchair    Dressing:  Clean/dry or No Dressing    Notes:   Discharge instructions and AVS given to patient    Patient meets criteria for discharge?  YES    Admitted to PCU?  No    Responsible adult present to accompany patient home?  Yes    Signature/Title:    Shahrzad Puga RN  RN Care Coordinator  [facility]

## 2020-10-13 NOTE — NURSING NOTE
Pre-procedure Intake    Have you been fasting? No    If yes, for how long?     Are you taking a prescribed blood thinner such as coumadin, Plavix, Xarelto?    No    If yes, when did you take your last dose?     Do you take aspirin?   YES: 81 mg    If cervical procedure, have you held aspirin for 6 days?   NA    Do you have any allergies to contrast dye, iodine, steroid and/or numbing medications?  NO    Are you currently taking antibiotics or have an active infection?  NO    Have you had a fever/elevated temperature within the past week? NO    Are you currently taking oral steroids? NO    Do you have a ? Yes       Are you pregnant or breastfeeding?  NO    Are the vital signs normal?  Yes

## 2020-11-09 NOTE — PROGRESS NOTES
"Agnes Saravia is a 75 year old female who is being evaluated via a billable video visit.      The patient has been notified of following:     \"This video visit will be conducted via a call between you and your physician/provider. We have found that certain health care needs can be provided without the need for an in-person physical exam.  This service lets us provide the care you need with a video conversation.  If a prescription is necessary we can send it directly to your pharmacy.  If lab work is needed we can place an order for that and you can then stop by our lab to have the test done at a later time.    Video visits are billed at different rates depending on your insurance coverage.  Please reach out to your insurance provider with any questions.    If during the course of the call the physician/provider feels a video visit is not appropriate, you will not be charged for this service.\"    Patient has given verbal consent for Video visit? Yes  How would you like to obtain your AVS? MyChart  If you are dropped from the video visit, the video invite should be resent to: Text to cell phone: [phone]  Will anyone else be joining your video visit? No        Christianne Aguirre MA  [facility] [place]    Video-Visit Details    Type of service:  Video Visit    Video Start Time: 8:59 am  Video End Time: 9:20 am    Originating Location (pt. Location): Home    Distant Location (provider location):  [facility]     Platform used for Video Visit: [facility] [place]    Chief Complaint: Back pain    Interval History:  Last seen on 10/5/2020.        Recommendations/plan at the last visit included:  1. Physical Therapy:  Order placed for rehab PT to work on general strength and conditioning and to evaluate gait at the last visit. She has not been able to schedule. Gave her the phone number to call and schedule this. Ok for her to wait to " start until after injections are completed.    2. Clinical Health Psychologist to address issues of relaxation, behavorial change, coping style, and other factors important to improvement: Therapy can help reduce physical and psychosocial triggers or reinforcers of pain by adapting thoughts, feelings and behaviors to reduce symptoms and increase quality of life.  Evidence indicates that the practice of relaxation, meditation, and mindfulness techniques can significantly affect pain levels and overall well being. Not at this time.   3. Diagnostic Studies:  No new imaging needed.   4. Medication Management:    1. At last visit discussed Increasing use of tylenol to 1,000 mg TID prn. If this is helpful can reduce amount of NSAIDs taken. She has not made this increase yet. Discussed again today.   2. Discussed trying OTC topicals over the painful areas on the posterolateral hips. This would include lidocaine 4% patches, icy/hot, Bengay, voltaren 1% gel, arnica gel, etc...  5. Potential procedures: Order placed for bilateral SI joint injections.       Follow up: for injections     Since her last visit, Agnes Saravia reports:  - She had bilateral SI joint injections on 10/13/2020. She had 3.5 weeks of pain relief about 75% benefit. Pain has for the most part returned back to baseline.   - She still is having posterolateral hip pain which the injection did not help with. This affects her walking.  - She has not started PT yet. She is concerned about doing in person sessions due to increase in COVID-19 cases.   - Has not tried increasing tylenol and using it consistently.   - She has a TENS unit at home but is not using it.   - Has not tried any topicals over the posterolateral hips yet. She just got some voltaren 1% gel from a friend but has not tried it yet either.     Pain Information:   Pain quality: Stabbing, aching, shooting, nagging   Pain rating: intensity ranges from 8/10 to 10/10, and averages 8/10 on a 0-10  scale.   Pain today 7/10    Annual Controlled Substance Agreement: NA  UDS: NA    Current Relevant Pain Medications:  Ibuprofen 800 mg BID - H  Gabapentin 400 mg TID - SW initially  Tylenol 1,000 mg once daily prn- SW     Other Medications:  Methotrexate   Prednisone  Trazodone    Previous Medications: (H--helped; HI--Helped initially; [facility]-- somewhat helpful, NH--No help; W--worse; SE--side effects)   Opiates: na  NSAIDS: na  Anti-migraine mediations: na  Muscle Relaxants: na  Neuropathics: na  Anti-depressants: na  Anxiolytics: na  Topicals: icy/hot - NH  Sleep aids:na  Other medications not covered above: na    Past Pain Treatments:  Pain Clinic: No   PT: No, she does walk twice a day but needs assistance of a cane and typically someone to walk with her   Psychologist: No  Relaxation techniques/biofeedback: No  Chiropractor: No - does not believe in them  Acupuncture: No  TENs Unit: Yes - H in past but doesn't last. Does not know where her unit is now.   Injections: Bilateral SI joint injections by myself on 10/13/2020 provided 75% benefit for 3.5 weeks  Self-care:   Heat - [facility] temporary benenefit, ice - NH  Surgeries related to pain: Yes, right L2-3 microdiscectomy in 2016 by Dr. Gabe DEMPSEY for short period of time.     [place] Board of Pharmacy Data Base Reviewed:    YES; As expected, no concern for misuse/abuse of controlled medications based on this report.    Medications:  Current Outpatient Medications   Medication Sig Dispense Refill     albuterol (PROAIR HFA/PROVENTIL HFA/VENTOLIN HFA) 108 (90 Base) MCG/ACT inhaler Inhale 2 puffs into the lungs every 4 hours as needed for shortness of breath / dyspnea or wheezing 1 Inhaler 1     aspirin 81 MG EC tablet Take 81 mg by mouth At Bedtime        cevimeline (EVOXAC) 30 MG capsule Take 30 mg by mouth 3 times daily (AM, Noon, Dinner)       clorazepate dipotassium (TRANXENE) 15 MG tablet TAKE 1 TABLET BY MOUTH ONCE DAILY 90 tablet 0     folic acid (FOLVITE) 1 MG  tablet Take 1 tablet (1 mg) by mouth daily       gabapentin (NEURONTIN) 400 MG capsule Take 400 mg by mouth 4 times daily (AM, Noon, Dinner) and 100 MG at bedtime       hydrochlorothiazide (HYDRODIURIL) 50 MG tablet Take 1 tablet (50 mg) by mouth daily 90 tablet 3     ibuprofen (ADVIL/MOTRIN) 400 MG tablet Take 400 mg by mouth 2 times daily (Noon and Dinner)       methotrexate 2.5 MG tablet Take 12.5 mg by mouth every 7 days (Takes on Tuesdays)       pantoprazole (PROTONIX) 40 MG EC tablet Take 1 tablet (40 mg) by mouth daily Take 30-60 minutes before a meal. 90 tablet 3     potassium chloride ER (K-TAB/KLOR-CON) 10 MEQ CR tablet Take 2 tablets (20 mEq) by mouth daily 180 tablet 3     predniSONE (DELTASONE) 5 MG tablet Take 5 mg by mouth daily.       simvastatin (ZOCOR) 40 MG tablet Take 1 tablet (40 mg) by mouth At Bedtime 90 tablet 3     traZODone (DESYREL) 50 MG tablet Take 0.5 tablets (25 mg) by mouth once daily at bedtime 45 tablet 3       Review of Systems: A 10-point review of systems was negative, with the exception of chronic pain issues.      Social History: Reviewed; unchanged from previous consultation.     Family history: Reviewed; unchanged from previous consultation.     PHYSICAL EXAM:   Exam is limited since this is a virtual visit.     Constitutional: healthy, alert and no distress  HEENT: Head atraumatic, normocephalic. Eyes without conjunctival injection or jaundice. Neck supple. No obvious neck masses.  Psychiatric/mental status: Alert, without lethargy or stupor. Appropriate affect. Mood normal.   Neurologic exam:  CN:  Cranial nerves 2-12 are grossly normal.  Motor:  Moving all extremities equally    DIRE Score for ongoing opioid management is calculated as follows:    Diagnosis = 2 pts (slowly progressive; moderate pain/objective findings)    Intractability = 1 pt (few therapies tried; passive patient role)    Risk        Psych = 3 pts (no significant personality dysfunction/mental illness;  good communication with clinic)         Chem Hlth = 3 pts (no history of chemical dependency; not drug-focused)       Reliability = 3 pts (highly reliable with meds, appointments, treatments)       Social = 2 pts (reduction in some relationships/life rolls)       (Psych + Chem hlth + Reliability + Social) = 14    Efficacy = 2 pts (moderate benefit/function; low med dose; too early/not tried meds)      DIRE Score = 16        7-13: likely NOT suitable candidate for long-term opioid analgesia       14-21: may be a suitable candidate for long-term opioid analgesia       DIAGNOSTIC TESTS:  Imaging Studies:   Imaging Studies:   Lumbar MRI completed on 7/25/20109 which showed:           Assessment:  Agnes Saravia is a 75 year old female with a past medical history significant for Sjogren's syndrome, rheumatoid arthritis, hx of PE, interstitial lung disease, HTN, DANA  who presents with the following chronic pain complaints:      1. Chronic back pain: Pain has been ongoing for 2-3 years. Located in low back/buttock primarily below waistline. No radicular symptoms. Pain is constant. MRI of Lumbar Spine from July 2019 shows multi-level facet arthropathy, no significant central or foraminal stenosis. Exam was positive for bilateral SI joint tenderness, pain with sacral compression. Etiology of pain seems to be SI joint mediated but differential includes lumbar spondylosis with facet arthropathy, myofascial pain and physical deconditioning.     Plan:          1. Physical Therapy:  Order placed to start chronic pain PT virtually and then can progress as tolerated.   2. Clinical Health Psychologist to address issues of relaxation, behavorial change, coping style, and other factors important to improvement: Therapy can help reduce physical and psychosocial triggers or reinforcers of pain by adapting thoughts, feelings and behaviors to reduce symptoms and increase quality of life.  Evidence indicates that the practice of  relaxation, meditation, and mindfulness techniques can significantly affect pain levels and overall well being. Not at this time.   3. Diagnostic Studies:  No new imaging needed.   4. Medication Management:    1. At last visit discussed Increasing use of tylenol to 1,000 mg TID prn. She has not tried this yet. Discussed again increasing to the TID dosing.  2. Discussed again trying OTC topicals over the painful areas on the posterolateral hips. This would include lidocaine 4% patches, icy/hot, Bengay, voltaren 1% gel, arnica gel, etc...  3. Try OTC CBD/Hemp products   5. Potential procedures: Can repeat bilateral SI joint injections. Would like to space them apart at least 3 months.   6. Try using the TENS unit to see if it provides some benefit.       Follow up: 8 weeks for a virtual visit.     Karie Batista MD  [facility]

## 2020-11-11 ENCOUNTER — VIRTUAL VISIT (OUTPATIENT)
Dept: PALLIATIVE MEDICINE | Facility: CLINIC | Age: 75
End: 2020-11-11
Payer: MEDICARE

## 2020-11-11 DIAGNOSIS — R53.81 PHYSICAL DECONDITIONING: ICD-10-CM

## 2020-11-11 DIAGNOSIS — M53.3 SACROILIAC JOINT PAIN: Primary | ICD-10-CM

## 2020-11-11 DIAGNOSIS — M47.816 SPONDYLOSIS OF LUMBAR REGION WITHOUT MYELOPATHY OR RADICULOPATHY: ICD-10-CM

## 2020-11-11 PROCEDURE — 99213 OFFICE O/P EST LOW 20 MIN: CPT | Mod: 95 | Performed by: PHYSICAL MEDICINE & REHABILITATION

## 2020-11-11 ASSESSMENT — PAIN SCALES - GENERAL: PAINLEVEL: SEVERE PAIN (7)

## 2020-11-11 NOTE — PATIENT INSTRUCTIONS
1. Order placed to start chronic pain physical therapy. You can do this virtually for now.     2. Increase use of tylenol to 1,000 mg every 6-8 hours. Do not exceed 3,000 mg per day.     3. Try over the counter topical medications such as lidocaine 4% patches (Aspercreme), Icy/hot, bengay....    4. You can also try CBD/Hemp products. Some places to look at are [employer], [employer], [employer],  [employer].     5.  Try using the TENS unit more consistently when you are up and doing activity to see if that helps a little bit.     6. We can repeat the SI joint injections but I would like to space them at least 3 months apart. Last ones were done on 10/13/2020.     7. Follow up with me in 8 weeks for a virtual visit. You will be called to get this scheduled.     Take care,    Karie Batista MD  [facility]     ----------------------------------------------------------------  Clinic Number:  [phone]     Call with any questions about your care and for scheduling assistance.     Calls are returned Monday through Friday between 8 AM and 4:30 PM. We usually get back to you within 2 business days depending on the issue/request.    If we are prescribing your medications:    For opioid medication refills, call the clinic or send a [employer] message 7 days in advance.  Please include:    Name of requested medication    Name of the pharmacy.    For non-opioid medications, call your pharmacy directly to request a refill. Please allow 3-4 days to be processed.     Per [place] State Law:    All controlled substance prescriptions must be filled within 30 days of being written.      For those controlled substances allowing refills, pickup must occur within 30 days of last fill.      We believe regular attendance is key to your success in our program!      Any time you are unable to keep your appointment we ask that you call us at least 24 hours in advance to cancel.This will allow us to offer the  appointment time to another patient.     Multiple missed appointments may lead to dismissal from the clinic.

## 2020-11-12 ENCOUNTER — HOSPITAL ENCOUNTER (OUTPATIENT)
Dept: BONE DENSITY | Facility: CLINIC | Age: 75
End: 2020-11-12
Attending: INTERNAL MEDICINE
Payer: MEDICARE

## 2020-11-12 ENCOUNTER — HOSPITAL ENCOUNTER (OUTPATIENT)
Dept: MAMMOGRAPHY | Facility: CLINIC | Age: 75
End: 2020-11-12
Attending: INTERNAL MEDICINE
Payer: MEDICARE

## 2020-11-12 DIAGNOSIS — Z78.0 POST-MENOPAUSAL: ICD-10-CM

## 2020-11-12 DIAGNOSIS — Z12.31 SCREENING MAMMOGRAM, ENCOUNTER FOR: ICD-10-CM

## 2020-11-12 PROCEDURE — 77067 SCR MAMMO BI INCL CAD: CPT

## 2020-11-12 PROCEDURE — 77080 DXA BONE DENSITY AXIAL: CPT

## 2020-11-15 NOTE — RESULT ENCOUNTER NOTE
Gurdeep Marks,    I have had the opportunity to review your recent results and an interpretation is as follows:  Your bone density scan does show evidence of osteopenia (thinning of the bones and increased risk of fracture).  At this time it is recommended that you start calcium and vitamin D to 500 mg of calcium twice per day and 200 units of vitamin D twice per day and also that we consider starting a medication such as fosamax.  I would expect you may have questions with regards to medication interactions and side effects of this medication.  Please call to discuss or schedule an appointment and we can order this prescription if you are agreeable to your pharmacy to help prevent future fracture.      Sincerely,  José Miguel Wallace MD

## 2020-11-28 DIAGNOSIS — M35.00 SJOGREN'S SYNDROME, WITH UNSPECIFIED ORGAN INVOLVEMENT (H): ICD-10-CM

## 2020-11-28 DIAGNOSIS — F41.1 GAD (GENERALIZED ANXIETY DISORDER): ICD-10-CM

## 2020-11-30 RX ORDER — CLORAZEPATE DIPOTASSIUM 15 MG/1
TABLET ORAL
Qty: 90 TABLET | Refills: 0 | Status: SHIPPED | OUTPATIENT
Start: 2020-11-30 | End: 2021-02-23

## 2020-12-07 ENCOUNTER — VIRTUAL VISIT (OUTPATIENT)
Dept: PALLIATIVE MEDICINE | Facility: CLINIC | Age: 75
End: 2020-12-07
Payer: MEDICARE

## 2020-12-07 DIAGNOSIS — M53.3 SACROILIAC JOINT PAIN: Primary | ICD-10-CM

## 2020-12-07 DIAGNOSIS — R53.81 PHYSICAL DECONDITIONING: ICD-10-CM

## 2020-12-07 DIAGNOSIS — M47.816 SPONDYLOSIS OF LUMBAR REGION WITHOUT MYELOPATHY OR RADICULOPATHY: ICD-10-CM

## 2020-12-07 DIAGNOSIS — M79.18 MYOFASCIAL PAIN: ICD-10-CM

## 2020-12-07 PROCEDURE — 97110 THERAPEUTIC EXERCISES: CPT | Mod: GP | Performed by: PHYSICAL THERAPIST

## 2020-12-07 PROCEDURE — 97161 PT EVAL LOW COMPLEX 20 MIN: CPT | Mod: GP | Performed by: PHYSICAL THERAPIST

## 2020-12-07 NOTE — PROGRESS NOTES
"PHYSICAL THERAPY INITIAL EVALUATION and PLAN OF CARE    Patient Name: Agnes Saravia     : 1945    MRN: [id number]   Pain Management Provider:  Karie Batista MD  Diagnosis:    Sacroiliac joint pain  Spondylosis of lumbar region without myelopathy or radiculopathy  Physical deconditioning  Myofascial pain  The patient has been notified of the following:  \"This virtual visit will be conducted between you and your provider.  We have found that certain health care needs can be provided without the need for physical presence.  This service lets us provide the care you need with a virtual visit.\"    Due to external, as well as internal [facility] management of the COVID 19 Virus the patient was not seen in our clinic.  As a substitution, we implemented a virtual visit to manage this patient's condition utilizing the [employer] virtual visit platform.  The provider reviewed the patient's chart and spoke with the patient to determine the following telemedicine visit is appropriate and effective for the patient's care.    The following type of visit was completed:  Video Visit:  The [employer] platform uses a synchronous HIPAA compliant video stream for this encounter.  Patient has given verbal consent for video visit? Yes      SUBJECTIVE:    PRESENTATION AND ETIOLOGY    Chief Complaint: Back pain  Per chart review    Agnes Saravia is a 75 year old female with history of Sjogren's syndrome, rheumatoid arthritis, hx of PE, interstitial lung disease, HTN, DANA. She presents today for evaluation of low back pain. She has a hx of right L2-3 microdiscectomy in 2016 by Dr. Bernal which helped with pain for some period of time. She was again seen by Neurosurgery in 2019 for right sided low back pain with radiation into the lateral thigh and knee and evaluation was suggestive of SI joint dysfunction.      Today she reports pain is located in bilateral low back/buttock. Pain is primarily located below the " belt line. No radiation. Pain is fairly constant. Aggravated by bending forward while doing activities within 5 minutes of being up and getting up from laying down. Relieved with sitting in a recliner chair. Pain is stabbing in quality. This has been ongoing for 2-3 years. Insidious in onset.      Pain Information:              Onset/Progression:  Pain started 2-3 years ago.              Pain quality: Stabbing               Pain timing: Constant                Pain rating: intensity ranges from 7/10 to 10/10              Aggravating factors include: activity, bending, getting up from laying down              Relieving factors include: sitting in a recliner      Past Pain Treatments:   Pain Clinic: No   PT: No, she does walk twice a day but needs assistance of a cane and typically someone to walk with her   Psychologist: No  Relaxation techniques/biofeedback: No  Chiropractor: No - does not believe in them  Acupuncture: No  TENs Unit: Yes - H in past but doesn't last. Does not know where her unit is now.   Injections: No  Self-care:   Heat - [facility] temporary benenefit, ice - NH  Surgeries related to pain: Yes, right L2-3 microdiscectomy in 2016 by Dr. Gabe DEMPSEY for short period of time.     LEVEL OF FUNCTION AT START OF CARE  Walking tolerance: 2 blocks or 15'  Housework tolerance: 10-15'    DEMOGRAPHICS  Employment Status: not working  Social Support: lives in [place] with partner  Pertinent Medical  / Surgical History: Epic Snapshot Reviewed, See provider's note    Patient's goals for physical therapy: To learn an exercise program to help improve functional mobility with walking, getting out of bed/chair and ascending stairs; able to iron or lean forward at counter without increased pain    ===============================================================  OBJECTIVE:  POSTURE:  Observation: Patient demonstrates forward head, protracted shoulders and thoracic kyphosis in standing and sitting posture.  Noted mild  trunk right SB in standing    GAIT, LOCOMOTION, and BALANCE:  Gait and Locomotion: antalgic with cane in right hand to support right LE weakness; difficulty with reciprocal use of cane; discussed consider 4WW for increased endurance and safety with walking  Balance: shuffle type gait, R>L    MUSCLE PERFORMANCE:   Strength: demonstrates core instability; functional right hip weakness in walking and single leg weight shift      FUNCTIONAL TESTING/OBSERVATION: fair body mechanics with chair mobility; add bed mobility next  Able to perform seated trunk flexion and return to upright without impairment  Difficulty with seated piriformis on the right due to right knee pain    ===============================================================  Today's Treatment:  Initial evaluation  Therapeutic Procedures/Exercises: for 30 minutes including chair squats, heel raises and TA  Focus next session: rolling bed mobility; progress HEP; consider SLR, SAQ, hip abd, balance  ===============================================================  ASSESSMENT:  Physical Therapy Diagnosis:Impaired Posture and Impaired Muscle Performance    Patient requires PT intervention for the following impairments: Impaired functional mobility, Impaired gait / weight bearing tolerance, Impaired posture / muscle imbalance, Muscle strength and endurance limitations, Pain and Deconditioning    Anticipated Goals and Expected Outcomes:  8 weeks  Patient will report the use of 2 self care practices during their day.  Patient will report the participation in 20 minutes of aerobic activity daily and practicing stretching daily.  Patient will demonstrate the ability to find core strength in neutral posture.  Patient will demonstrate the ability to relax muscle group before stretching.  16 weeks  Patient will be independent with a home exercise program.  Patient will be independent with posture correction.  Patient will report independence with a self care/flare  management program.  Patient will demonstrate improved functional strength and endurance as reports by increased tolerance for IADLs and more consistent participation in daily activity.     Rehab potential for achieving goals: good.    ===============================================================  PLAN:   Patient will benefit from skilled physical therapy consisting of:  neuromuscular reeducation of: kinesthetic sense and posture for sitting and/or standing activities, education in self care / home management training to include instruction in: symptom control techniques, therapeutic activities to achieve improved functional performance in: daily actvities and therapeutic exercise to develop: strength and endurance, flexibility and core stability    Assessment will be ongoing with changes in treatment as indicated.  Benefits/risks/alternatives to treatment have been reviewed and the patient has been instructed to contact this office if they have any questions or concerns.  This plan of care has been discussed with the patient and the patient is in agreement.     Frequency / Duration:  Patient will be seen for a total of 8 visits; 16 weeks    Total Visit Time: 50  minutes            Fadia Carrasco, PT                                     Date:  12/7/2020      =====================================================  **  Referring Provider Certification: Referring Provider reviewing certifies that the above treatment / plan of care is required and authorized, and that the patient's plan will be reviewed every thirty (30) days **.   ======================================================     PRESENT:  NA    MULTIDISCIPLINARY PATIENT / FAMILY EDUCATION RECORD  Department:  Physical Therapy    Readiness to Learn: Ability to understand verbal instructions, Ability to understand written instructions, Knowledge of educational needs / treatment plan  Specific Barriers to Learning: None  Referrals: None  Learning  Needs: Rehabilitation techniques to improve functional independence Pain management education to improve daily activity tolerance.  Who: Patient  How: Demonstration, Verbal instructions, Written instructions  Response: Appropriate verbal response, Asked questions, Demonstrated ability, Verbalized recall / understanding       Video-Visit Details     Type of service:  Video Visit  Video Start Time: 9:30 am  Video End Time: 10:20 am  Originating Location (pt. Location): Home  Distant Location (provider location):  [place] [facility]   Platform used for Video Visit: [employer]

## 2020-12-21 ENCOUNTER — VIRTUAL VISIT (OUTPATIENT)
Dept: PALLIATIVE MEDICINE | Facility: CLINIC | Age: 75
End: 2020-12-21
Payer: MEDICARE

## 2020-12-21 DIAGNOSIS — M79.18 MYOFASCIAL PAIN: ICD-10-CM

## 2020-12-21 DIAGNOSIS — M53.3 SACROILIAC JOINT PAIN: ICD-10-CM

## 2020-12-21 DIAGNOSIS — R53.81 PHYSICAL DECONDITIONING: ICD-10-CM

## 2020-12-21 DIAGNOSIS — M47.816 SPONDYLOSIS OF LUMBAR REGION WITHOUT MYELOPATHY OR RADICULOPATHY: Primary | ICD-10-CM

## 2020-12-21 PROCEDURE — 97112 NEUROMUSCULAR REEDUCATION: CPT | Mod: GP | Performed by: PHYSICAL THERAPIST

## 2020-12-21 NOTE — PROGRESS NOTES
"PAIN PHYSICAL THERAPY PROGRESS NOTE  Patient Name: Agnes Saravia      YOB: 1945     Medical Record Number: [id number]  Diagnosis:    Spondylosis of lumbar region without myelopathy or radiculopathy  Physical deconditioning  Sacroiliac joint pain  Myofascial pain  The patient has been notified of the following:  \"This virtual visit will be conducted between you and your provider.  We have found that certain health care needs can be provided without the need for physical presence.  This service lets us provide the care you need with a virtual visit.\"    Due to external, as well as internal [facility] management of the COVID 19 Virus the patient was not seen in our clinic.  As a substitution, we implemented a virtual visit to manage this patient's condition utilizing the [employer] virtual visit platform.  The provider reviewed the patient's chart and spoke with the patient to determine the following telemedicine visit is appropriate and effective for the patient's care.    The following type of visit was completed:  Video Visit:  The [employer] platform uses a synchronous HIPAA compliant video stream for this encounter.  Patient has given verbal consent for video visit? Yes    Visit: 2/8    Chief Complaint: Back pain  Per chart review    Agnes Saravia is a 75 year old female with history of Sjogren's syndrome, rheumatoid arthritis, hx of PE, interstitial lung disease, HTN, DANA. She presents today for evaluation of low back pain. She has a hx of right L2-3 microdiscectomy in 2016 by Dr. Bernal which helped with pain for some period of time. She was again seen by Neurosurgery in October 2019 for right sided low back pain with radiation into the lateral thigh and knee and evaluation was suggestive of SI joint dysfunction.      Today she reports pain is located in bilateral low back/buttock. Pain is primarily located below the belt line. No radiation. Pain is fairly constant. Aggravated by " bending forward while doing activities within 5 minutes of being up and getting up from laying down. Relieved with sitting in a recliner chair. Pain is stabbing in quality. This has been ongoing for 2-3 years. Insidious in onset.      Pain Information:              Onset/Progression:  Pain started 2-3 years ago.              Pain quality: Stabbing               Pain timing: Constant                Pain rating: intensity ranges from 7/10 to 10/10              Aggravating factors include: activity, bending, getting up from laying down              Relieving factors include: sitting in a recliner      Past Pain Treatments:   Pain Clinic: No   PT: No, she does walk twice a day but needs assistance of a cane and typically someone to walk with her   Psychologist: No  Relaxation techniques/biofeedback: No  Chiropractor: No - does not believe in them  Acupuncture: No  TENs Unit: Yes - H in past but doesn't last. Does not know where her unit is now.   Injections: No  Self-care:   Heat - [facility] temporary benenefit, ice - NH  Surgeries related to pain: Yes, right L2-3 microdiscectomy in 2016 by Dr. Gabe DEMPSEY for short period of time.      LEVEL OF FUNCTION AT START OF CARE  Walking tolerance: 2 blocks or 15'  Housework tolerance: 10-15'     DEMOGRAPHICS  Employment Status: not working  Social Support: lives in [place] with partner  Pertinent Medical  / Surgical History: Epic Snapshot Reviewed, See provider's note     Patient's goals for physical therapy: To learn an exercise program to help improve functional mobility with walking, getting out of bed/chair and ascending stairs; able to iron or lean forward at counter without increased pain     Subjective: Patient reports catching in bilateral SIJ areas; felt better after bilateral SIJ injection; chair squats bothered right knee pain.      Objective Findings:    POSTURE:  Observation: Patient demonstrates forward head, protracted shoulders and thoracic kyphosis in standing  and sitting posture.  Noted mild trunk right SB in standing     GAIT, LOCOMOTION, and BALANCE:  Gait and Locomotion: antalgic with cane in right hand to support right LE weakness; difficulty with reciprocal use of cane; discussed consider 4WW for increased endurance and safety with walking  Balance: shuffle type gait, R>L     MUSCLE PERFORMANCE:   Strength: demonstrates core instability; functional right hip weakness in walking and single leg weight shift       FUNCTIONAL TESTING/OBSERVATION: fair body mechanics with chair mobility; add bed mobility next  Able to perform seated trunk flexion and return to upright without impairment  Difficulty with seated piriformis on the right due to right knee pain    SELF CARE:  Aerobic activity/Walking:next  HEP:   --chair squats modified to elevated seat or try partial counter squat x3  --seated pelvic tilt  --beginning TA/ shorten belly button-pubic line  Relaxation/Breathing: next  Mini-breaks: next  Posture: next  Pacing: next  Body Mechanics: next  Sensory calming / Pain Flare Plan: sitting, heat, ordered Salonpas    Noted increased right SIJ pain during seated pelvic tilt; instructed in importance of pacing and gradual exercise tolerance    Treatment Interventions:  Neuromuscular Reeducation:   For 45 minutes as above.  __________________________________________________________________    Assessment:  Ongoing Functional Limitations Include:  Patient did not tolerate/responde well to treatment    Intensity Level: 4 (1=low intensity; 5=high intensity)  Demonstrates/Verbalizes Technique: 3 (1= poor technique-difficulty performing exercises,significant cues required; 5= good technique-performs exercises without cues)  Body Awareness: 3 (1=low awareness; 5=high awareness)  Posture/Stability: 3 (1= poor posture, stability; 5= good posture, stability)  Motivational Level: Cooperative  Response to Teaching: cooperative  Factors that affect learning:  None    _______________________________________________________________________  Plan of Care  Continue PT to support reactivation and integration of self regulation pain management skills;  Continue with prescribed plan of care - progress as tolerated.  Focus next session will be on: progress HEP--supine SLR, SAQ, clamshell, pelvic tilt, TA ; seated TA with narrowing, glut sets, hip abd with TB, marching, pelvic rock     Present:  NA     Total Visit Time:  45 minutes    Therapist: Fadia Carrasco PT           Date: 12/21/2020       Video-Visit Details     Type of service:  Video Visit  Video Start Time: 8:30 am  Video End Time: 9:15 am  Originating Location (pt. Location): Home  Distant Location (provider location):  [facility]   Platform used for Video Visit: Cole

## 2020-12-28 DIAGNOSIS — K21.9 GASTROESOPHAGEAL REFLUX DISEASE: ICD-10-CM

## 2020-12-28 RX ORDER — PANTOPRAZOLE SODIUM 40 MG/1
TABLET, DELAYED RELEASE ORAL
Qty: 90 TABLET | Refills: 0 | Status: SHIPPED | OUTPATIENT
Start: 2020-12-28 | End: 2021-03-30

## 2021-01-07 ENCOUNTER — TRANSFERRED RECORDS (OUTPATIENT)
Dept: HEALTH INFORMATION MANAGEMENT | Facility: CLINIC | Age: 76
End: 2021-01-07

## 2021-01-13 ENCOUNTER — VIRTUAL VISIT (OUTPATIENT)
Dept: PALLIATIVE MEDICINE | Facility: CLINIC | Age: 76
End: 2021-01-13
Payer: MEDICARE

## 2021-01-13 DIAGNOSIS — M47.816 SPONDYLOSIS OF LUMBAR REGION WITHOUT MYELOPATHY OR RADICULOPATHY: Primary | ICD-10-CM

## 2021-01-13 DIAGNOSIS — M79.18 MYOFASCIAL PAIN: ICD-10-CM

## 2021-01-13 DIAGNOSIS — R53.81 PHYSICAL DECONDITIONING: ICD-10-CM

## 2021-01-13 DIAGNOSIS — M53.3 SACROILIAC JOINT PAIN: ICD-10-CM

## 2021-01-13 PROCEDURE — 99213 OFFICE O/P EST LOW 20 MIN: CPT | Mod: 95 | Performed by: PHYSICAL MEDICINE & REHABILITATION

## 2021-01-13 RX ORDER — DULOXETIN HYDROCHLORIDE 20 MG/1
20 CAPSULE, DELAYED RELEASE ORAL DAILY
Qty: 60 CAPSULE | Refills: 1 | Status: SHIPPED | OUTPATIENT
Start: 2021-01-13 | End: 2021-06-16

## 2021-01-13 ASSESSMENT — PAIN SCALES - GENERAL: PAINLEVEL: SEVERE PAIN (7)

## 2021-01-13 NOTE — PROGRESS NOTES
Selina is a 75 year old who is being evaluated via a billable video visit.      How would you like to obtain your AVS? MyChart  If the video visit is dropped, the invitation should be resent by: Text to cell phone: [phone]  Will anyone else be joining your video visit? No    [facility]   Pain Management Center      [facility]    Chief Complaint: Back pain    Interval History:  Last seen on 11/11/2020.        Recommendations/plan at the last visit included:  1. Physical Therapy:  Order placed to start chronic pain PT virtually and then can progress as tolerated.   2. Clinical Health Psychologist to address issues of relaxation, behavorial change, coping style, and other factors important to improvement: Therapy can help reduce physical and psychosocial triggers or reinforcers of pain by adapting thoughts, feelings and behaviors to reduce symptoms and increase quality of life.  Evidence indicates that the practice of relaxation, meditation, and mindfulness techniques can significantly affect pain levels and overall well being. Not at this time.   3. Diagnostic Studies:  No new imaging needed.   4. Medication Management:    1. At last visit discussed Increasing use of tylenol to 1,000 mg TID prn. She has not tried this yet. Discussed again increasing to the TID dosing.  2. Discussed again trying OTC topicals over the painful areas on the posterolateral hips. This would include lidocaine 4% patches, icy/hot, Bengay, voltaren 1% gel, arnica gel, etc...  3. Try OTC CBD/Hemp products   5. Potential procedures: Can repeat bilateral SI joint injections. Would like to space them apart at least 3 months.   6. Try using the TENS unit to see if it provides some benefit.       Follow up: 8 weeks for a virtual visit.      Since her last visit, Agnes Saravia reports:  - She had bilateral SI joint injections on 10/13/2020. She had 3.5 weeks of pain relief about 75%  benefit. Pain has for the most part returned back to baseline.   - She still is having posterolateral hip pain which the injection did not help with. This affects her walking.  - She started pain PT and has gone to 2 sessions so far. She is not doing PT anymore because the exercises aggravated her knee pain. She has a knee injection done last week which has helped with that.   - She is using the TENS unit with temporary benefit.   - Tried Salon pas with minimal benefit.   - She has not tried OTC CBD/Hemp yet    Pain Information:   Pain quality: Stabbing, aching, shooting, nagging   Pain rating: intensity ranges from 8/10 to 10/10, and averages 8/10 on a 0-10 scale.   Pain today 7/10    Annual Controlled Substance Agreement: NA  UDS: NA    Current Relevant Pain Medications:  Ibuprofen 800 mg BID - H  Gabapentin 400 mg TID - SW initially  Tylenol 1,000 mg once daily prn- SW     Other Medications:  Methotrexate   Prednisone  Trazodone    Previous Medications: (H--helped; HI--Helped initially; [facility]-- somewhat helpful, NH--No help; W--worse; SE--side effects)   Opiates: na  NSAIDS: na  Anti-migraine mediations: na  Muscle Relaxants: na  Neuropathics: na  Anti-depressants: na  Anxiolytics: na  Topicals: icy/hot - NH  Sleep aids:na  Other medications not covered above: na    Past Pain Treatments:  Pain Clinic: No   PT: No, she does walk twice a day but needs assistance of a cane and typically someone to walk with her   Psychologist: No  Relaxation techniques/biofeedback: No  Chiropractor: No - does not believe in them  Acupuncture: No  TENs Unit: Yes - H in past but doesn't last. Does not know where her unit is now.   Injections: Bilateral SI joint injections by myself on 10/13/2020 provided 75% benefit for 3.5 weeks  Self[facility]care:   [facility]  Surgeries related to pain: Yes, right L2-3 microdiscectomy in 2016 by Dr. Gabe DEMPSEY for short period of time.     [place] Board of Pharmacy Data Base  Reviewed:    YES; As expected, no concern for misuse/abuse of controlled medications based on this report.    Medications:  Current Outpatient Medications   Medication Sig Dispense Refill     albuterol (PROAIR HFA/PROVENTIL HFA/VENTOLIN HFA) 108 (90 Base) MCG/ACT inhaler Inhale 2 puffs into the lungs every 4 hours as needed for shortness of breath / dyspnea or wheezing 1 Inhaler 1     aspirin 81 MG EC tablet Take 81 mg by mouth At Bedtime        cevimeline (EVOXAC) 30 MG capsule Take 30 mg by mouth 3 times daily (AM, Noon, Dinner)       clorazepate dipotassium (TRANXENE) 15 MG tablet TAKE 1 TABLET BY MOUTH ONCE DAILY 90 tablet 0     folic acid (FOLVITE) 1 MG tablet Take 1 tablet (1 mg) by mouth daily       gabapentin (NEURONTIN) 400 MG capsule Take 400 mg by mouth 4 times daily (AM, Noon, Dinner) and 100 MG at bedtime       hydrochlorothiazide (HYDRODIURIL) 50 MG tablet Take 1 tablet (50 mg) by mouth daily 90 tablet 3     ibuprofen (ADVIL/MOTRIN) 400 MG tablet Take 400 mg by mouth 2 times daily (Noon and Dinner)       methotrexate 2.5 MG tablet Take 12.5 mg by mouth every 7 days (Takes on Tuesdays)       pantoprazole (PROTONIX) 40 MG EC tablet TAKE 1 TABLET BY MOUTH ONCE DAILY. Take 30-60 minutes before a meal. 90 tablet 0     potassium chloride ER (K-TAB/KLOR-CON) 10 MEQ CR tablet Take 2 tablets (20 mEq) by mouth daily 180 tablet 3     predniSONE (DELTASONE) 5 MG tablet Take 5 mg by mouth daily.       simvastatin (ZOCOR) 40 MG tablet Take 1 tablet (40 mg) by mouth At Bedtime 90 tablet 3     traZODone (DESYREL) 50 MG tablet Take 0.5 tablets (25 mg) by mouth once daily at bedtime 45 tablet 3       Review of Systems: A 10-point review of systems was negative, with the exception of chronic pain issues.      Social History: Reviewed; unchanged from previous consultation.     Family history: Reviewed; unchanged from previous consultation.     PHYSICAL EXAM:   Exam is limited since this is a virtual visit.      Constitutional: healthy, alert and no distress  HEENT: Head atraumatic, normocephalic. Eyes without conjunctival injection or jaundice. Neck supple. No obvious neck masses.  Psychiatric/mental status: Alert, without lethargy or stupor. Appropriate affect. Mood normal.   Neurologic exam:  CN:  Cranial nerves 2-12 are grossly normal.  Motor:  Moving all extremities equally    DIRE Score for ongoing opioid management is calculated as follows:    Diagnosis = 2 pts (slowly progressive; moderate pain/objective findings)    Intractability = 1 pt (few therapies tried; passive patient role)    Risk        Psych = 3 pts (no significant personality dysfunction/mental illness; good communication with clinic)         Chem Hlth = 3 pts (no history of chemical dependency; not drug-focused)       Reliability = 3 pts (highly reliable with meds, appointments, treatments)       Social = 2 pts (reduction in some relationships/life rolls)       (Psych + Chem hlth + Reliability + Social) = 14    Efficacy = 2 pts (moderate benefit/function; low med dose; too early/not tried meds)      DIRE Score = 16        7-13: likely NOT suitable candidate for long-term opioid analgesia       14-21: may be a suitable candidate for long-term opioid analgesia       DIAGNOSTIC TESTS:  Imaging Studies:   Imaging Studies:   Lumbar MRI completed on 7/25/20109 which showed:           Assessment:  Agnes Saravia is a 75 year old female with a past medical history significant for Sjogren's syndrome, rheumatoid arthritis, hx of PE, interstitial lung disease, HTN, DANA  who presents with the following chronic pain complaints:      1. Chronic back pain: Pain has been ongoing for 2-3 years. Located in low back/buttock primarily below waistline. No radicular symptoms. Pain is constant. MRI of Lumbar Spine from July 2019 shows multi-level facet arthropathy, no significant central or foraminal stenosis. Exam was positive for bilateral SI joint tenderness, pain  with sacral compression. Etiology of pain seems to be SI joint mediated but differential includes lumbar spondylosis with facet arthropathy, myofascial pain and physical deconditioning.     Plan:          1. Physical Therapy:  Discussed continuing with PT - either pain PT or pool therapy. She is not interested in continuing at this time.    2. Clinical Health Psychologist to address issues of relaxation, behavorial change, coping style, and other factors important to improvement: Therapy can help reduce physical and psychosocial triggers or reinforcers of pain by adapting thoughts, feelings and behaviors to reduce symptoms and increase quality of life.  Evidence indicates that the practice of relaxation, meditation, and mindfulness techniques can significantly affect pain levels and overall well being. Not at this time.   3. Diagnostic Studies:  No new imaging needed.   4. Medication Management:    1. Start Cymbalta 20 mg daily x 7 days and then increase to 40 mg daily.  2. Continue trying OTC topicals over the painful areas on the posterolateral hips. This would include lidocaine 4% patches, icy/hot, Bengay, voltaren 1% gel, arnica gel, etc...  3. Try OTC CBD/Hemp products   4. Continue tylenol 1,000 mg TID prn  5. Potential procedures: Can repeat bilateral SI joint injections. Would like to space them apart at least 3 months.   6. Other:   1. Continue using the TENS unit to see if it provides some benefit.    2. Consider trial of acupuncture. She will let me know if she wants a referral.     Follow up: 4 weeks for an in person visit      Video Start Time: 10:06 am  Video-Visit Details    Type of service:  Video Visit    Video End Time:10:34 am    Originating Location (pt. Location): Home    Distant Location (provider location):  [facility] [place]     Platform used for Video Visit: [employer]    BILLING TIME DOCUMENTATION:   The total TIME spent on this patient on the date of the  encounter/appointment was 35 minutes.      TOTAL TIME includes:   Time spent preparing to see the patient (reviewing records and tests)  Time spent face to face (or over the phone) with the patient   Time spent ordering tests, medications, procedures and referrals  Time spent documenting clinical information in Epic    Karie Batista MD  [facility] [place]

## 2021-01-13 NOTE — PATIENT INSTRUCTIONS
1. We will be starting duloxetine also known as cymbalta.    We discussed that you would start taking 20mg daily x 7 days and then increase to 40 mg daily if well tolerated.    Common side effects include: Nausea, drowsiness, dry mouth, dizziness, constipation, decreased appetite and decreased libido.    Avoid activities requiring mental alertness or coordination until you know the drugs effects on you as there is a risk for dizziness and sleepiness.    If you are experiencing drowsiness, consider taking the medication at night instead.    There is a risk for worsening depression with this medication. Immediately report worsening mood symptoms, suicidal ideation or changes in your behavior.    Report any new rash that develops as there has been cases of severe and life threatening rashes reported.     Do not suddenly discontinue this medication as you may have withdrawal symptoms, speak with your primary physician or pain provider prior to discontinuing.    Avoid heavy alcohol intake with this medication as it may lead to liver injury.    If you have symptoms of an allergic reaction (swelling, difficulty breathing, hives, itching, rash) immediately stop the medication and contact a health care provider.    2. Let me know when you want to try acupuncture.   3. You can try over the counter CBD/Hemp. Some placed to look at for this are [employer], [employer], [employer], Rejuv.  4. Try using the TENS unit when you are up doing activity that is typically painful.   5. I do recommend a physical therapy program to help with general strength and conditioning. Options are to retry chronic pain PT vs pool therapy. Let me know if you want to do either of these.   6. Follow up with me in 4 weeks for an in person visit.     Take care,  Karie Batista MD  [facility]    ----------------------------------------------------------------  Clinic Number:  [phone]     Call with any questions  about your care and for scheduling assistance.     Calls are returned Monday through Friday between 8 AM and 4:30 PM. We usually get back to you within 2 business days depending on the issue/request.    If we are prescribing your medications:    For opioid medication refills, call the clinic or send a hopTot message 7 days in advance.  Please include:    Name of requested medication    Name of the pharmacy.    For non-opioid medications, call your pharmacy directly to request a refill. Please allow 3-4 days to be processed.     Per [place] State Law:    All controlled substance prescriptions must be filled within 30 days of being written.      For those controlled substances allowing refills, pickup must occur within 30 days of last fill.      We believe regular attendance is key to your success in our program!      Any time you are unable to keep your appointment we ask that you call us at least 24 hours in advance to cancel.This will allow us to offer the appointment time to another patient.     Multiple missed appointments may lead to dismissal from the clinic.

## 2021-01-15 ENCOUNTER — HEALTH MAINTENANCE LETTER (OUTPATIENT)
Age: 76
End: 2021-01-15

## 2021-01-27 ENCOUNTER — DOCUMENTATION ONLY (OUTPATIENT)
Dept: FAMILY MEDICINE | Facility: CLINIC | Age: 76
End: 2021-01-27

## 2021-01-27 DIAGNOSIS — R73.01 IFG (IMPAIRED FASTING GLUCOSE): Primary | ICD-10-CM

## 2021-01-27 NOTE — PROGRESS NOTES
There are no orders for this patient for the upcoming lab visit. Please order labs as needed.     Reason for visit orders per you.    Thank you, lab.

## 2021-01-29 DIAGNOSIS — R73.01 IFG (IMPAIRED FASTING GLUCOSE): ICD-10-CM

## 2021-01-29 LAB
ANION GAP SERPL CALCULATED.3IONS-SCNC: 6 MMOL/L (ref 3–14)
BUN SERPL-MCNC: 12 MG/DL (ref 7–30)
CALCIUM SERPL-MCNC: 9.7 MG/DL (ref 8.5–10.1)
CHLORIDE SERPL-SCNC: 100 MMOL/L (ref 94–109)
CO2 SERPL-SCNC: 32 MMOL/L (ref 20–32)
CREAT SERPL-MCNC: 0.7 MG/DL (ref 0.52–1.04)
GFR SERPL CREATININE-BSD FRML MDRD: 83 ML/MIN/{1.73_M2}
GLUCOSE SERPL-MCNC: 100 MG/DL (ref 70–99)
HBA1C MFR BLD: 6.3 % (ref 0–5.6)
POTASSIUM SERPL-SCNC: 3.7 MMOL/L (ref 3.4–5.3)
SODIUM SERPL-SCNC: 138 MMOL/L (ref 133–144)

## 2021-01-29 PROCEDURE — 83036 HEMOGLOBIN GLYCOSYLATED A1C: CPT | Performed by: INTERNAL MEDICINE

## 2021-01-29 PROCEDURE — 80048 BASIC METABOLIC PNL TOTAL CA: CPT | Performed by: INTERNAL MEDICINE

## 2021-01-29 PROCEDURE — 36415 COLL VENOUS BLD VENIPUNCTURE: CPT | Performed by: INTERNAL MEDICINE

## 2021-01-31 NOTE — RESULT ENCOUNTER NOTE
Gurdeep Alarcon,    I have had the opportunity to review your recent results and an interpretation is as follows:  Your labs returned stable indicating excellent kidney function and blood sugar control    Sincerely,  José Miguel Wallace MD

## 2021-02-01 ENCOUNTER — IMMUNIZATION (OUTPATIENT)
Dept: NURSING | Facility: CLINIC | Age: 76
End: 2021-02-01
Payer: MEDICARE

## 2021-02-01 PROCEDURE — 0001A PR COVID VAC PFIZER DIL RECON 30 MCG/0.3 ML IM: CPT

## 2021-02-01 PROCEDURE — 91300 PR COVID VAC PFIZER DIL RECON 30 MCG/0.3 ML IM: CPT

## 2021-02-20 DIAGNOSIS — F41.1 GAD (GENERALIZED ANXIETY DISORDER): ICD-10-CM

## 2021-02-20 DIAGNOSIS — M35.00 SJOGREN'S SYNDROME, WITH UNSPECIFIED ORGAN INVOLVEMENT (H): ICD-10-CM

## 2021-02-22 ENCOUNTER — IMMUNIZATION (OUTPATIENT)
Dept: NURSING | Facility: CLINIC | Age: 76
End: 2021-02-22
Attending: INTERNAL MEDICINE
Payer: MEDICARE

## 2021-02-22 PROCEDURE — 91300 PR COVID VAC PFIZER DIL RECON 30 MCG/0.3 ML IM: CPT

## 2021-02-22 PROCEDURE — 0002A PR COVID VAC PFIZER DIL RECON 30 MCG/0.3 ML IM: CPT

## 2021-02-22 NOTE — TELEPHONE ENCOUNTER
Controlled Substance Refill Request for clorazepate dipotassium (TRANXENE) 15 MG tablet  Problem List Complete:  No     PROVIDER TO CONSIDER COMPLETION OF PROBLEM LIST AND OVERVIEW/CONTROLLED SUBSTANCE AGREEMENT    Last Written Prescription Date:  11/30/2020  Last Fill Quantity: 90,   # refills: 0    THE MOST RECENT OFFICE VISIT MUST BE WITHIN THE PAST 3 MONTHS. AT LEAST ONE FACE TO FACE VISIT MUST OCCUR EVERY 6 MONTHS. ADDITIONAL VISITS CAN BE VIRTUAL.  (THIS STATEMENT SHOULD BE DELETED.)    [place] [facility] [place] 1/13/2021    Future Office visit:     Controlled substance agreement:   Encounter-Level CSA:    There are no encounter-level csa.     Patient-Level CSA:    There are no patient-level csa.         Last Urine Drug Screen: No results found for: CDAUT, No results found for: COMDAT, No results found for: THC13, PCP13, COC13, MAMP13, OPI13, AMP13, BZO13, TCA13, MTD13, BAR13, OXY13, PPX13, BUP13     Processing:  Rx to be electronically transmitted to pharmacy by provider      [email or web address]

## 2021-02-23 RX ORDER — CLORAZEPATE DIPOTASSIUM 15 MG/1
TABLET ORAL
Qty: 90 TABLET | Refills: 0 | Status: SHIPPED | OUTPATIENT
Start: 2021-02-23 | End: 2021-06-02

## 2021-03-28 DIAGNOSIS — K21.9 GASTROESOPHAGEAL REFLUX DISEASE: ICD-10-CM

## 2021-03-30 RX ORDER — PANTOPRAZOLE SODIUM 40 MG/1
TABLET, DELAYED RELEASE ORAL
Qty: 90 TABLET | Refills: 3 | Status: SHIPPED | OUTPATIENT
Start: 2021-03-30 | End: 2022-06-15

## 2021-05-29 DIAGNOSIS — M35.00 SJOGREN'S SYNDROME, WITH UNSPECIFIED ORGAN INVOLVEMENT (H): ICD-10-CM

## 2021-05-29 DIAGNOSIS — F41.1 GAD (GENERALIZED ANXIETY DISORDER): ICD-10-CM

## 2021-06-02 RX ORDER — CLORAZEPATE DIPOTASSIUM 15 MG/1
TABLET ORAL
Qty: 90 TABLET | Refills: 0 | Status: SHIPPED | OUTPATIENT
Start: 2021-06-02 | End: 2021-08-25

## 2021-06-02 NOTE — TELEPHONE ENCOUNTER
Routing refill request to provider for review/approval because:  Drug not on the [facility] refill protocol     Last Written Prescription Date:  2/23/2021  Last Fill Quantity: 90,  # refills: 0   Last appointment  7/20/20 with prescribing provider:    Dr Wallace    Next 5 appointments (look out 90 days)    Jun 16, 2021 10:00 AM  SHORT with José Miguel Wallace MD  [facility] [address], [address]  [facility] [address]  [phone]            Fadia BARRETT RN,BSN

## 2021-06-08 ENCOUNTER — MYC MEDICAL ADVICE (OUTPATIENT)
Dept: FAMILY MEDICINE | Facility: CLINIC | Age: 76
End: 2021-06-08

## 2021-06-09 ENCOUNTER — NURSE TRIAGE (OUTPATIENT)
Dept: FAMILY MEDICINE | Facility: CLINIC | Age: 76
End: 2021-06-09

## 2021-06-09 NOTE — TELEPHONE ENCOUNTER
Pt was called following my chart message with report of Diarrhea. Had one BM today and is able to eat. Advised she schedule OV if worsening symptoms. Home care advice given. Pt verbalized agreement with plan. Triage also advised she call clinic if any immediate concerns in the future.     Reason for Disposition    MILD diarrhea (e.g., 1-3 or more stools than normal in past 24 hours) diarrhea without known cause and present > 7 days    Additional Information    Negative: Shock suspected (e.g., cold/pale/clammy skin, too weak to stand, low BP, rapid pulse)    Negative: Difficult to awaken or acting confused (e.g., disoriented, slurred speech)    Negative: Sounds like a life-threatening emergency to the triager    Negative: Vomiting also present and worse than the diarrhea    Negative: Blood in stool and without diarrhea    Negative: SEVERE abdominal pain (e.g., excruciating) and present > 1 hour    Negative: SEVERE abdominal pain and age > 60    Negative: Bloody, black, or tarry bowel movements (Exception: chronic-unchanged black-grey bowel movements and is taking iron pills or Pepto-bismol)    Negative: SEVERE diarrhea (e.g., 7 or more times / day more than normal) and age > 60 years    Negative: Constant abdominal pain lasting > 2 hours    Negative: Drinking very little and has signs of dehydration (e.g., no urine > 12 hours, very dry mouth, very lightheaded)    Negative: Patient sounds very sick or weak to the triager    Negative: SEVERE diarrhea (e.g., 7 or more times / day more than normal) and present > 24 hours (1 day)    Negative: MODERATE diarrhea (e.g., 4-6 times / day more than normal) and present > 48 hours (2 days)    Negative: MODERATE diarrhea (e.g., 4-6 times / day more than normal) and age > 70 years    Negative: Abdominal pain  (Exception: pain clears completely with each passage of diarrhea stool)    Negative: Fever > 101 F (38.3 C)    Negative: Blood in the stool    Negative: Mucus or pus in stool  has been present > 2 days and diarrhea is more than mild    Negative: Weak immune system (e.g., HIV positive, cancer chemo, splenectomy, organ transplant, chronic steroids)    Negative: Travel to a foreign country in past month    Negative: Recent antibiotic therapy (i.e., within last 2 months) and diarrhea present > 3 days since antibiotic was stopped    Negative: Recent hospitalization and diarrhea present > 3 days    Negative: Tube feedings (e.g., nasogastric, g-tube, j-tube)    Protocols used: DIARRHEA-A-OH

## 2021-06-16 ENCOUNTER — OFFICE VISIT (OUTPATIENT)
Dept: FAMILY MEDICINE | Facility: CLINIC | Age: 76
End: 2021-06-16
Payer: MEDICARE

## 2021-06-16 ENCOUNTER — ANCILLARY PROCEDURE (OUTPATIENT)
Dept: GENERAL RADIOLOGY | Facility: CLINIC | Age: 76
End: 2021-06-16
Attending: INTERNAL MEDICINE
Payer: MEDICARE

## 2021-06-16 VITALS
DIASTOLIC BLOOD PRESSURE: 79 MMHG | OXYGEN SATURATION: 95 % | SYSTOLIC BLOOD PRESSURE: 139 MMHG | HEART RATE: 73 BPM | TEMPERATURE: 97.3 F

## 2021-06-16 DIAGNOSIS — I10 ESSENTIAL HYPERTENSION: ICD-10-CM

## 2021-06-16 DIAGNOSIS — M35.00 SJOGREN'S SYNDROME, WITH UNSPECIFIED ORGAN INVOLVEMENT (H): ICD-10-CM

## 2021-06-16 DIAGNOSIS — J84.9 ILD (INTERSTITIAL LUNG DISEASE) (H): ICD-10-CM

## 2021-06-16 DIAGNOSIS — M17.0 PRIMARY OSTEOARTHRITIS OF BOTH KNEES: Primary | ICD-10-CM

## 2021-06-16 DIAGNOSIS — E66.01 MORBID OBESITY (H): ICD-10-CM

## 2021-06-16 DIAGNOSIS — G47.00 INSOMNIA, UNSPECIFIED TYPE: ICD-10-CM

## 2021-06-16 DIAGNOSIS — E78.5 HYPERLIPIDEMIA LDL GOAL <130: ICD-10-CM

## 2021-06-16 DIAGNOSIS — R05.9 COUGH: ICD-10-CM

## 2021-06-16 DIAGNOSIS — M17.0 PRIMARY OSTEOARTHRITIS OF BOTH KNEES: ICD-10-CM

## 2021-06-16 DIAGNOSIS — R09.1 PLEURITIS: ICD-10-CM

## 2021-06-16 LAB
ALBUMIN SERPL-MCNC: 3.5 G/DL (ref 3.4–5)
ALP SERPL-CCNC: 74 U/L (ref 40–150)
ALT SERPL W P-5'-P-CCNC: 20 U/L (ref 0–50)
ANION GAP SERPL CALCULATED.3IONS-SCNC: 2 MMOL/L (ref 3–14)
AST SERPL W P-5'-P-CCNC: 18 U/L (ref 0–45)
BILIRUB SERPL-MCNC: 0.7 MG/DL (ref 0.2–1.3)
BUN SERPL-MCNC: 17 MG/DL (ref 7–30)
CALCIUM SERPL-MCNC: 9.2 MG/DL (ref 8.5–10.1)
CHLORIDE SERPL-SCNC: 99 MMOL/L (ref 94–109)
CHOLEST SERPL-MCNC: 198 MG/DL
CO2 SERPL-SCNC: 34 MMOL/L (ref 20–32)
CREAT SERPL-MCNC: 0.72 MG/DL (ref 0.52–1.04)
ERYTHROCYTE [DISTWIDTH] IN BLOOD BY AUTOMATED COUNT: 15.4 % (ref 10–15)
GFR SERPL CREATININE-BSD FRML MDRD: 81 ML/MIN/{1.73_M2}
GLUCOSE SERPL-MCNC: 121 MG/DL (ref 70–99)
HCT VFR BLD AUTO: 40.7 % (ref 35–47)
HDLC SERPL-MCNC: 50 MG/DL
HGB BLD-MCNC: 13.3 G/DL (ref 11.7–15.7)
LDLC SERPL CALC-MCNC: 113 MG/DL
MCH RBC QN AUTO: 29.5 PG (ref 26.5–33)
MCHC RBC AUTO-ENTMCNC: 32.7 G/DL (ref 31.5–36.5)
MCV RBC AUTO: 90 FL (ref 78–100)
NONHDLC SERPL-MCNC: 148 MG/DL
PLATELET # BLD AUTO: 400 10E9/L (ref 150–450)
POTASSIUM SERPL-SCNC: 4.3 MMOL/L (ref 3.4–5.3)
PROT SERPL-MCNC: 7.5 G/DL (ref 6.8–8.8)
RBC # BLD AUTO: 4.51 10E12/L (ref 3.8–5.2)
SODIUM SERPL-SCNC: 135 MMOL/L (ref 133–144)
TRIGL SERPL-MCNC: 175 MG/DL
WBC # BLD AUTO: 11.7 10E9/L (ref 4–11)

## 2021-06-16 PROCEDURE — 36415 COLL VENOUS BLD VENIPUNCTURE: CPT | Performed by: INTERNAL MEDICINE

## 2021-06-16 PROCEDURE — 80053 COMPREHEN METABOLIC PANEL: CPT | Performed by: INTERNAL MEDICINE

## 2021-06-16 PROCEDURE — 73565 X-RAY EXAM OF KNEES: CPT | Performed by: RADIOLOGY

## 2021-06-16 PROCEDURE — 80061 LIPID PANEL: CPT | Performed by: INTERNAL MEDICINE

## 2021-06-16 PROCEDURE — 85027 COMPLETE CBC AUTOMATED: CPT | Performed by: INTERNAL MEDICINE

## 2021-06-16 PROCEDURE — 99214 OFFICE O/P EST MOD 30 MIN: CPT | Performed by: INTERNAL MEDICINE

## 2021-06-16 RX ORDER — ALBUTEROL SULFATE 90 UG/1
2 AEROSOL, METERED RESPIRATORY (INHALATION) EVERY 4 HOURS PRN
Qty: 18 G | Refills: 1 | Status: SHIPPED | OUTPATIENT
Start: 2021-06-16

## 2021-06-16 RX ORDER — SIMVASTATIN 40 MG
40 TABLET ORAL AT BEDTIME
Qty: 90 TABLET | Refills: 3 | Status: SHIPPED | OUTPATIENT
Start: 2021-06-16 | End: 2022-06-15

## 2021-06-16 RX ORDER — TRAZODONE HYDROCHLORIDE 50 MG/1
TABLET, FILM COATED ORAL
Qty: 45 TABLET | Refills: 3 | Status: SHIPPED | OUTPATIENT
Start: 2021-06-16 | End: 2022-06-15

## 2021-06-16 RX ORDER — HYDROCHLOROTHIAZIDE 50 MG/1
50 TABLET ORAL DAILY
Qty: 90 TABLET | Refills: 3 | Status: SHIPPED | OUTPATIENT
Start: 2021-06-16 | End: 2021-10-20

## 2021-06-16 NOTE — RESULT ENCOUNTER NOTE
Agnes would like this mailed as well      Gurdeep Alarcon,    I have had the opportunity to review your recent results and an interpretation is as follows:  X-ray did show evidence Pseudogout as well as mild-moderate osteoarthritis in the left knee     Sincerely,  José Miguel Wallace MD

## 2021-06-16 NOTE — PATIENT INSTRUCTIONS
(M17.0) Primary osteoarthritis of both knees  (primary encounter diagnosis)  Comment: Recommend X-ray today and consult in orthopedics and consider initial trial of physical therapy   Plan: XR Knee AP Standing Bilateral, Orthopedic &         Spine  Referral            (M35.00) Sjogren's syndrome, with unspecified organ involvement (H)  Comment: Continue follow up in rheumatology   Plan:     (E66.01) Morbid obesity (H)  Comment: We did discuss options for assistance with weight loss and will consult in medical therapeutic management to review medication options   Plan: MED THERAPY MANAGE REFERRAL            (J84.9) ILD (interstitial lung disease) (H)  Comment: Continue follow up in pulmonology   Plan:       (E78.5) Hyperlipidemia LDL goal <130  Comment: Check fasting lipid panel and comprehensive metabolic panel   Plan: Lipid panel reflex to direct LDL Fasting            (I10) Essential hypertension  Comment: as above - blood pressure is excellent  Plan: CBC with platelets, Comprehensive metabolic         panel

## 2021-06-16 NOTE — PROGRESS NOTES
Bettina Marks is a 75 year old who presents for the following health issues     HPI     Chief Complaint   Patient presents with     Musculoskeletal Problem     Patient report balance problem and right knee pain         Sjogren's syndrome, with unspecified organ involvement (H)  Morbid obesity (H)  ILD (interstitial lung disease) (H)  Primary malignant neoplasm (H)  Primary osteoarthritis of both knees   Agnes Saravia presents to the clinic for discussion of bilateral knee pain, with right greater than left swelling and significant pain.  She is believed to have underlying osteoarthritis in her knees.  She also has known Sjogren's syndrome as well as interstitial lung disease and obesity.  She is currently following with rheumatology for her Sjogren's and is taking prednisone 5 mg daily in addition to methotrexate and ibuprofen.  Despite her rheumatologic management she is still experiencing significant right knee pain and swelling and this is limiting her activity level.  As a result of decreased activity she has had some persistent weight gain over the years.  She would be interested in management of both knee issue as well as weight management at this time.       Review of Systems   Constitutional, HEENT, cardiovascular, pulmonary, GI, , musculoskeletal, neuro, skin, endocrine and psych systems are negative, except as otherwise noted.      Objective    /79 (BP Location: Right arm, Patient Position: Sitting, Cuff Size: Adult Large)   Pulse 73   Temp 97.3  F (36.3  C) (Temporal)   SpO2 95%   Breastfeeding No   There is no height or weight on file to calculate BMI.  Physical Exam   GENERAL: healthy, alert and no distress  EYES: Eyes grossly normal to inspection,   NECK: no adenopathy, no asymmetry,    RESP: lungs clear to auscultation - no rales, rhonchi or wheezes  CV: regular rate and rhythm, no murmur  ABDOMEN: obese, soft, nontender,    MS: + right knee swelling, with limited range of  motion of the knee flexion or extension of the right side  SKIN: no suspicious lesions or rashes  NEURO: Normal strength and tone, mentation intact and speech normal  PSYCH: mentation appears normal, affect normal/bright      Patient Instructions   (M17.0) Primary osteoarthritis of both knees  (primary encounter diagnosis)  Comment: Recommend X-ray today and consult in orthopedics and consider initial trial of physical therapy   Plan: XR Knee AP Standing Bilateral, Orthopedic &         Spine  Referral            (M35.00) Sjogren's syndrome, with unspecified organ involvement (H)  Comment: Continue follow up in rheumatology   Plan:     (E66.01) Morbid obesity (H)  Comment: We did discuss options for assistance with weight loss and will consult in medical therapeutic management to review medication options   Plan: MED THERAPY MANAGE REFERRAL            (J84.9) ILD (interstitial lung disease) (H)  Comment: Continue follow up in pulmonology   Plan:       (E78.5) Hyperlipidemia LDL goal <130  Comment: Check fasting lipid panel and comprehensive metabolic panel   Plan: Lipid panel reflex to direct LDL Fasting            (I10) Essential hypertension  Comment: as above - blood pressure is excellent  Plan: CBC with platelets, Comprehensive metabolic         panel

## 2021-06-17 NOTE — RESULT ENCOUNTER NOTE
Gurdeep Marks,    I had the opportunity to review your recent labs and a summary of your labs reads as follows:    Your complete blood counts show no sign of anemia, stable elevated white blood cell count and platelets.  Your comprehensive metabolic panel showed normal renal function, normal liver function,   Your fasting lipid panel show  - normal HDL (good) cholesterol -as your goal is greater than 40  - low LDL (bad) cholesterol as your goal is less than 130 - continue simvastatin   - stable triglyceride levels    Congratulaions on your excellent results       Sincerely,  José Miguel Wallace MD

## 2021-06-24 ENCOUNTER — VIRTUAL VISIT (OUTPATIENT)
Dept: PHARMACY | Facility: CLINIC | Age: 76
End: 2021-06-24
Attending: INTERNAL MEDICINE
Payer: COMMERCIAL

## 2021-06-24 DIAGNOSIS — L65.9 HAIR LOSS: Primary | ICD-10-CM

## 2021-06-24 DIAGNOSIS — E66.01 MORBID OBESITY (H): ICD-10-CM

## 2021-06-24 DIAGNOSIS — M51.9 LUMBAR DISC DISEASE: ICD-10-CM

## 2021-06-24 DIAGNOSIS — G47.00 INSOMNIA, UNSPECIFIED TYPE: ICD-10-CM

## 2021-06-24 DIAGNOSIS — J84.9 ILD (INTERSTITIAL LUNG DISEASE) (H): ICD-10-CM

## 2021-06-24 DIAGNOSIS — Z23 NEED FOR VACCINATION: ICD-10-CM

## 2021-06-24 DIAGNOSIS — I10 ESSENTIAL HYPERTENSION: ICD-10-CM

## 2021-06-24 DIAGNOSIS — R60.9 EDEMA, UNSPECIFIED TYPE: ICD-10-CM

## 2021-06-24 DIAGNOSIS — F41.1 GAD (GENERALIZED ANXIETY DISORDER): ICD-10-CM

## 2021-06-24 DIAGNOSIS — Z79.82 ASPIRIN LONG-TERM USE: ICD-10-CM

## 2021-06-24 DIAGNOSIS — E78.5 HYPERLIPIDEMIA LDL GOAL <130: ICD-10-CM

## 2021-06-24 DIAGNOSIS — K21.00 GASTROESOPHAGEAL REFLUX DISEASE WITH ESOPHAGITIS, UNSPECIFIED WHETHER HEMORRHAGE: ICD-10-CM

## 2021-06-24 PROCEDURE — 99607 MTMS BY PHARM ADDL 15 MIN: CPT | Performed by: PHARMACIST

## 2021-06-24 PROCEDURE — 99605 MTMS BY PHARM NP 15 MIN: CPT | Performed by: PHARMACIST

## 2021-06-24 RX ORDER — ACETAMINOPHEN 500 MG
500-1000 TABLET ORAL EVERY 6 HOURS PRN
COMMUNITY

## 2021-06-24 NOTE — PROGRESS NOTES
Medication Therapy Management (MTM) Encounter    ASSESSMENT:                            Medication Adherence/Access: No issues identified    The following drug interactions are identified:  - aspirin + ibuprofen: Reduced antiplatelet effect  - aspirin + hydrochlorothiazide + ibuprofen + methotrexate + pantoprazole: Increased risk of methotrexate toxicity  - aspirin + ibuprofen + prednisone + trazodone: increased risk of bleeding  - clorazepate + gabapentin + trazodone: risk of CNS depression    Hair Loss: Medications she's taking which could contribute include:  hydrochlorothiazide (paticularly at doses >25mg as she's taking), ibuprofen (seen in <1% of patients), methotrexate (seen in 0.5% of patients).  It does not appear she's had a TSH ever checked within our system, and also does not have any iron studies.  May benefit from checking these as well to rule out other cause.  She's on several agents which can increase risk of methotrexate toxicity, so she's likely being exposed to higher levels of methotrexate than expected basd on current dose.  She's planning to talk with rheumatology about alternative options due to uncontrolled pain.    Obesity: She does meet criteria for use of pharmacologic therapy to aid with weight loss (BMI >30).  Will await results of TSH to ensure this isn't contributing to weight gain.  If not, could proceed with starting pharmacologic therapy.  Topiramate could be a nice option for her to also help with pain control; although need to be cautious with this given other CNS depressing agents on board, advanced age, and falls risk.    Chronic Pain:  Plan in place to discuss further with rheumatology.    Hypertension/Edema: Stable. Patient is meeting blood pressure goal of < 140/90mmHg.  Edema is well controlled per her report.    Hyperlipidemia: Patient is on moderate intensity statin which is indicated based on 2019 ACC/AHA guidelines for lipid management; unclear what LDL was prior to  statin initiation or if we've seen >50% reduction in LDL (it appears she was on statin therapy prior to establishing care within our system).    Prevent MI/Stroke:  It appears aspirin is being used for primary prevention - given age and drug interactions noted above, bleeding risk likely exceeds potential CV benefit.  May benefit from stopping (this was not discussed today).    Anxiety/Insomnia:  Patient has been on long term benzodiazepine therapy.  She's open to trying alternatives, which I think would be safest for her.  May benefit from starting selective serotonin reuptake inhibitor therapy, and then beginning benzodiazepine taper in about 6-8 weeks once selective serotonin reuptake inhibitor therapy establishes full therapeutic effect.  It also seems that insomnia is secondary to pain; so with improve pain control may be able to stop trazodone as well.    GERD: Stable.  I think it's also probably wise that she stay on PPI therapy at this time given prednisone use along with other agents that increase risk of GI bleed (as above).    Interstitial Lung Disease:  Stable.    Immunizations:  Due for Shingrix (not discussed today).    PLAN:                            1.  Orders placed for TSH and iron studies to be checked.  Lab appointment scheduled for tomorrow.  2.  Recommended adding sertraline 25mg daily x3 days, then increase to 50mg daily.  Will continue Tranxene at current dosing for 6-8 weeks, and then begin a slow Tranxene taper.  3.  Could consider topiramate for weight loss/pain.  Will wait for lab results.  May wish to defer this until benzodiazepine taper begins to ensure lower risk of CNS depression/falls.  4.  Future considerations - stop aspirin, Shingrix vaccine    Follow-up: Return in 1 day (on 6/25/2021) for Lab Work.    SUBJECTIVE/OBJECTIVE:                          Agnes Saravia is a 76 year old female contacted via secure video for an initial visit. She was referred to me from   "[place]. Patient was accompanied by her partner Lima.     Reason for visit: Obesity; also has some concerns about hair loss    Allergies/ADRs: Reviewed in chart  Tobacco: She reports that she quit smoking about 40 years ago. Her smoking use included cigarettes. She started smoking about 50 years ago. She has a 2.50 pack-year smoking history. She has never used smokeless tobacco.  Alcohol: not currently using  Caffeine: 2 cups/day of coffee  Activity: Limited due to arthritis pain & balance difficulties.  She uses a cane regularly.  She does not want to use a walker.    Past Medical History: Reviewed in chart    Medication Adherence/Access: Patient uses pill box(es).  Patient takes medications 4 time(s) per day.  Per patient, misses medication 0 times per week.     Hair Loss:  She reports gradual hair loss over the last few years.  She wonders if medications could be contributing.    Obesity:   Diet/Eating Habits: Patient reports eating quite healthy, she's frustrated with current weight and knows it's contributing to her pain.    Exercise: Patient reports this is limited due to pain.   Failed medications include: none.   Wt Readings from Last 10 Encounters:   03/27/20 218 lb 3.2 oz (99 kg)   10/14/19 200 lb (90.7 kg)   10/02/19 200 lb (90.7 kg)   06/04/19 200 lb (90.7 kg)   05/13/19 200 lb (90.7 kg)   01/11/19 210 lb (95.3 kg)   11/08/18 210 lb (95.3 kg)   04/11/18 198 lb (89.8 kg)   02/01/18 198 lb (89.8 kg)   01/30/18 198 lb (89.8 kg)     Estimated body mass index is 38.65 kg/m  as calculated from the following:    Height as of 3/25/20: 5' 3\" (1.6 m).    Weight as of 3/27/20: 218 lb 3.2 oz (99 kg).     GFR Estimate If Black   Date Value Ref Range Status   06/16/2021 >90 >60 mL/min/[1.73_m2] Final     Comment:      GFR Calc  Starting 12/18/2018, serum creatinine based estimated GFR (eGFR) will be   calculated using the Chronic Kidney Disease Epidemiology Collaboration   (CKD-EPI) equation.      "     Patient's comorbidities associated with obesity include: Impaired fasting glucose, Hypertension, Hyperlipidemia and Arthritis,     Chronic Pain: Selina reports a diagnosis of RA, osteoarthritis, and Sjogren's.  She reports pain is primarily in her lower back/tailbone.  She's received a few different injections, which were not effective for her.  She does work closely with rheumatology, has seen pain clinic in the past but is no longer working with them.  She has been on duloxetine in the past, but this made her feel weird and also increased anxiety symptoms.  Current medication regimen includes acetaminophen as needed, cevimeline 30mg three times daily, gabapentin 400mg three times daily, methotrexate 7.5mg weekly (and corresponding folic acid 1mg daily), ibuprofen 400mg twice daily and prednisone 5mg daily.  She does not feel pain is well managed at this time.  She has upcoming rheumatology appointment and plans to discuss alternative medication options at that time.      Hypertension/Edema: Current medications include hydrochlorothiazide 50mg daily and corresponding KCl 20mEq daily.  Patient does not self-monitor blood pressure.  Patient reports no current medication side effects.  BP Readings from Last 3 Encounters:   06/16/21 139/79   10/13/20 129/77   10/05/20 (!) 147/89      Potassium   Date Value Ref Range Status   06/16/2021 4.3 3.4 - 5.3 mmol/L Final      Hyperlipidemia: Current therapy includes simvastatin 40mg daily.  Patient reports no significant myalgias or other side effects.  The 10-year ASCVD risk score (Daytongagan GRAJEDA Jr., et al., 2013) is: 24.8%    Values used to calculate the score:      Age: 76 years      Sex: Female      Is Non- : No      Diabetic: No      Tobacco smoker: No      Systolic Blood Pressure: 139 mmHg      Is BP treated: No      HDL Cholesterol: 50 mg/dL      Total Cholesterol: 198 mg/dL  Recent Labs   Lab Test 06/16/21  1101 07/03/20  0905 09/30/15  0842  "09/30/15  0842 08/06/14  0738   CHOL 198 208*   < > 200* 188   HDL 50 46*   < > 47* 64   * 113*   < > 102 85   TRIG 175* 244*   < > 254* 196*   CHOLHDLRATIO  --   --   --  4.3 2.9    < > = values in this interval not displayed.     Liver Function Studies -   Recent Labs   Lab Test 06/16/21  1101   PROTTOTAL 7.5   ALBUMIN 3.5   BILITOTAL 0.7   ALKPHOS 74   AST 18   ALT 20     Prevent MI/Stroke:  Patient is taking aspirin 81mg daily.  She denies signs and symptoms of bruising/bleeding.    Anxiety/Insomnia:  Patient is currently taking clorazepate 15mg daily and trazodone 25mg at bedtime.  She reports she's been on clorazepate for \"many many years\" - since the 1970s.  She finds this effective and denies side effects of therapy.  She's never taken anything else for anxiety in the past.  It sounds like insomnia is mostly secondary to pain, saying \"I go to bed in pain and I wake up in pain.\"    GERD: Current medications include: Protonix (pantoprazole) 40mg once daily. Pt reports no current symptoms, she reports it was felt she had bronchoscopy and diagnosis of silent GERD, which was causing chronic cough.  Patient feels that current regimen is effective.      Interstitial Lung Disease: Selina has an albuterol inhaler available for use, but reports she's not currently needing to use.  She finds it effective when needed.    Immunizations: Patient has received 2 COVID-19 vaccine doses (Pfizer - 2/1/21 and 2/22/21).  She received high dose influenza vaccine fall 2020.  She's received Pneumovax since turning 63 and has also received Prevnar.  Last tetanus was in 2013.  She has not received Shingrix.    Today's Vitals: There were no vitals taken for this visit.  ----------------    I spent 43 minutes with this patient today. All changes were made via collaborative practice agreement with Dr. Wallace. A copy of the visit note was provided to the patient's primary care provider.    The patient was sent via [employer] " summary of these recommendations.     Rachana Huggins, PharmD, [facility]  Medication Therapy Management Provider  Pager: [phone]     Telemedicine Visit Details  Type of service:  Video Conference via [employer]  Start Time: 9:08 AM  End Time: 9:51 AM  Originating Location (patient location): Home  Distant Location (provider location):  [facility]      Medication Therapy Recommendations  DANA (generalized anxiety disorder)    Current Medication: clorazepate dipotassium (TRANXENE) 15 MG tablet   Rationale: Unsafe medication for the patient - Adverse medication event - Safety   Recommendation: Change Medication - sertraline 50 MG tablet   Status: Accepted per CPA         Hair loss    Current Medication: methotrexate 2.5 MG tablet   Rationale: Undesirable effect - Adverse medication event - Safety   Recommendation: Order Lab - Ordered TSH and iron studies   Status: Accepted per CPA

## 2021-06-25 DIAGNOSIS — L65.9 HAIR LOSS: ICD-10-CM

## 2021-06-25 LAB
FERRITIN SERPL-MCNC: 40 NG/ML (ref 8–252)
IRON SATN MFR SERPL: 18 % (ref 15–46)
IRON SERPL-MCNC: 52 UG/DL (ref 35–180)
TIBC SERPL-MCNC: 294 UG/DL (ref 240–430)
TSH SERPL DL<=0.005 MIU/L-ACNC: 2.09 MU/L (ref 0.4–4)

## 2021-06-25 PROCEDURE — 83540 ASSAY OF IRON: CPT | Performed by: INTERNAL MEDICINE

## 2021-06-25 PROCEDURE — 82728 ASSAY OF FERRITIN: CPT | Performed by: INTERNAL MEDICINE

## 2021-06-25 PROCEDURE — 36415 COLL VENOUS BLD VENIPUNCTURE: CPT | Performed by: INTERNAL MEDICINE

## 2021-06-25 PROCEDURE — 84443 ASSAY THYROID STIM HORMONE: CPT | Performed by: INTERNAL MEDICINE

## 2021-06-25 PROCEDURE — 83550 IRON BINDING TEST: CPT | Performed by: INTERNAL MEDICINE

## 2021-06-26 NOTE — PATIENT INSTRUCTIONS
Recommendations from today's MTM visit:                                                    MTM (medication therapy management) is a service provided by a clinical pharmacist designed to help you get the most of out of your medicines.   Today we reviewed what your medicines are for, how to know if they are working, that your medicines are safe and how to make your medicine regimen as easy as possible.      1.  Orders placed for TSH and iron studies to be checked.  Lab appointment scheduled for tomorrow.    2.  Recommended adding sertraline 25mg daily x3 days, then increase to 50mg daily.  Will continue Tranxene at current dosing for 6-8 weeks, and then begin a slow Tranxene taper.    3.  Could consider topiramate for weight loss/pain.  Will wait for lab results.  May wish to defer this until benzodiazepine taper begins to ensure lower risk of CNS depression/falls.    Follow-up: Return in 1 day (on 6/25/2021) for Lab Work.    It was great to speak with you today.  I value your experience and would be very thankful for your time with providing feedback on our clinic survey. You may receive a survey via email or text message in the next few days.     To schedule another MTM appointment, please call the clinic directly or you may call the MTM scheduling line at [phone] or toll-free at [phone].     My Clinical Pharmacist's contact information:                                                      Please feel free to contact me with any questions or concerns you have.      Rachana Huggins PharmD, [facility]  Medication Therapy Management Provider  Pager: [phone]     Selam Quintero PharmD  Medication Therapy Management Resident  Pager: [phone]

## 2021-06-28 ENCOUNTER — TELEPHONE (OUTPATIENT)
Dept: PHARMACY | Facility: CLINIC | Age: 76
End: 2021-06-28

## 2021-06-28 NOTE — TELEPHONE ENCOUNTER
Called Selina to f/up - labs done last week were all WNL.      She has not yet started sertraline, thinks she'll start on Wednesday.      Will hold off on starting topiramate for now given that she's still on Tranxene.      Will plan to f/up in 2 months.    Rachana Huggins, PharmD, [facility]  Medication Therapy Management Provider  Pager: [phone]

## 2021-06-29 ENCOUNTER — TRANSFERRED RECORDS (OUTPATIENT)
Dept: HEALTH INFORMATION MANAGEMENT | Facility: CLINIC | Age: 76
End: 2021-06-29

## 2021-07-20 ENCOUNTER — TRANSFERRED RECORDS (OUTPATIENT)
Dept: HEALTH INFORMATION MANAGEMENT | Facility: CLINIC | Age: 76
End: 2021-07-20

## 2021-07-23 ENCOUNTER — NURSE TRIAGE (OUTPATIENT)
Dept: FAMILY MEDICINE | Facility: CLINIC | Age: 76
End: 2021-07-23

## 2021-07-23 ENCOUNTER — MYC MEDICAL ADVICE (OUTPATIENT)
Dept: FAMILY MEDICINE | Facility: CLINIC | Age: 76
End: 2021-07-23

## 2021-07-23 NOTE — TELEPHONE ENCOUNTER
"    Reason for Disposition    Hard-to-stop nosebleeds are a chronic symptom (recurrent or ongoing AND lasting > 4 weeks)    Additional Information    Negative: Fainted (passed out), or too weak to stand following large blood loss    Negative: Sounds like a life-threatening emergency to the triager    Negative: Nosebleed followed nose injury    Negative: Bleeding now and second call after being instructed in correct technique of direct pressure    Negative: Lightheadedness or dizziness    Negative: Pale skin (pallor) of new onset or worsening    Negative: Has nasal packing (inserted by health care provider to control bleeding) and now has new rash    Negative: Has nasal packing and now has bleeding around the packing (Exception: few drops or ooze)    Negative: Patient sounds very sick or weak to the triager    Negative: Large amount of blood has been lost (e.g., one cup)    Negative: Bleeding recurs 3 or more times in 24 hours despite direct pressure    Negative: Taking Coumadin (warfarin) or other strong blood thinner, or known bleeding disorder (e.g., thrombocytopenia)    Negative: Has skin bruises or bleeding gums that are not caused by an injury    Negative: Has nasal packing and now has fever > 100.4 F (38.0 C)    Negative: Patient wants to be seen    Negative: Has nasal packing (inserted by health care provider to control bleeding)    Negative: Easy bleeding present in other family members    Answer Assessment - Initial Assessment Questions  1. AMOUNT OF BLEEDING: \"How bad is the bleeding?\" \"How much blood was lost?\" \"Has the bleeding stopped?\"    - MILD: needed a couple tissues    - MODERATE: needed many tissues    - SEVERE: large blood clots, soaked many tissues, lasted more than 30 minutes       yes  2. ONSET: \"When did the nosebleed start?\"       Around noon  3. FREQUENCY: \"How many nosebleeds have you had in the last 24 hours?\"       one  4. RECURRENT SYMPTOMS: \"Have there been other recent nosebleeds?\" If " "so, ask: \"How long did it take you to stop the bleeding?\" \"What worked best?\"       It just depends.  I keep blowing or coughing   5. CAUSE: \"What do you think caused this nosebleed?\"      I dont know, I think I have a thin membrane on the left one.  I had that one cauterized because of the nose bleeds  6. LOCAL FACTORS: \"Do you have any cold symptoms?\", \"Have you been rubbing or picking at your nose?\"      no  7. SYSTEMIC FACTORS: \"Do you have high blood pressure or any bleeding problems?\"      no  8. BLOOD THINNERS: \"Do you take any blood thinners?\" (e.g., coumadin, heparin, aspirin, Plavix)      Baby aspirin started taking after a clot in the lung  9. OTHER SYMPTOMS: \"Do you have any other symptoms?\" (e.g., lightheadedness)      none  10. PREGNANCY: \"Is there any chance you are pregnant?\" \"When was your last menstrual period?\"        no    Protocols used: NOSEBLEED-A-OH    Patient has had an ongoing nose bleed issues for years.  Today it lasted a little long and heavier bleeding than usual.  She had her left nostril cauterized years ago because of this.  Advised her to follow up with PCP to discuss her ongoing nose bleeds.  Patient is good with this plan    Lisa Mcdermott RN    "

## 2021-08-17 ENCOUNTER — TRANSFERRED RECORDS (OUTPATIENT)
Dept: HEALTH INFORMATION MANAGEMENT | Facility: CLINIC | Age: 76
End: 2021-08-17

## 2021-08-20 ENCOUNTER — LAB REQUISITION (OUTPATIENT)
Dept: LAB | Facility: CLINIC | Age: 76
End: 2021-08-20
Payer: MEDICARE

## 2021-08-20 DIAGNOSIS — L65.0 TELOGEN EFFLUVIUM: ICD-10-CM

## 2021-08-20 LAB
FERRITIN SERPL-MCNC: 40 NG/ML (ref 8–252)
IRON SATN MFR SERPL: 11 % (ref 15–46)
IRON SERPL-MCNC: 31 UG/DL (ref 35–180)
TIBC SERPL-MCNC: 283 UG/DL (ref 240–430)

## 2021-08-20 PROCEDURE — 82306 VITAMIN D 25 HYDROXY: CPT | Mod: ORL | Performed by: DERMATOLOGY

## 2021-08-20 PROCEDURE — 82728 ASSAY OF FERRITIN: CPT | Mod: ORL | Performed by: DERMATOLOGY

## 2021-08-20 PROCEDURE — 36415 COLL VENOUS BLD VENIPUNCTURE: CPT | Mod: ORL | Performed by: DERMATOLOGY

## 2021-08-20 PROCEDURE — 83550 IRON BINDING TEST: CPT | Mod: ORL | Performed by: DERMATOLOGY

## 2021-08-23 LAB — DEPRECATED CALCIDIOL+CALCIFEROL SERPL-MC: 25 UG/L (ref 20–75)

## 2021-08-25 ENCOUNTER — TELEPHONE (OUTPATIENT)
Dept: PHARMACY | Facility: CLINIC | Age: 76
End: 2021-08-25

## 2021-08-25 DIAGNOSIS — F41.1 GAD (GENERALIZED ANXIETY DISORDER): ICD-10-CM

## 2021-08-25 DIAGNOSIS — M35.00 SJOGREN'S SYNDROME, WITH UNSPECIFIED ORGAN INVOLVEMENT (H): ICD-10-CM

## 2021-08-25 RX ORDER — CLORAZEPATE DIPOTASSIUM 15 MG/1
TABLET ORAL
Qty: 90 TABLET | Refills: 0 | Status: SHIPPED | OUTPATIENT
Start: 2021-08-25 | End: 2021-11-29

## 2021-09-02 ENCOUNTER — MYC MEDICAL ADVICE (OUTPATIENT)
Dept: FAMILY MEDICINE | Facility: CLINIC | Age: 76
End: 2021-09-02

## 2021-09-03 NOTE — TELEPHONE ENCOUNTER
PCP,   Please see patient's MyChart and reply back to patient or advise if triage follow up needed.    Please review lab results from 8/20/21    Thank you,  Fadia BARRETT RN,BSN

## 2021-09-04 ENCOUNTER — HEALTH MAINTENANCE LETTER (OUTPATIENT)
Age: 76
End: 2021-09-04

## 2021-09-30 ENCOUNTER — ALLIED HEALTH/NURSE VISIT (OUTPATIENT)
Dept: FAMILY MEDICINE | Facility: CLINIC | Age: 76
End: 2021-09-30
Payer: MEDICARE

## 2021-09-30 DIAGNOSIS — Z23 NEED FOR PROPHYLACTIC VACCINATION AND INOCULATION AGAINST INFLUENZA: Primary | ICD-10-CM

## 2021-09-30 PROCEDURE — G0008 ADMIN INFLUENZA VIRUS VAC: HCPCS

## 2021-09-30 PROCEDURE — 99207 PR NO CHARGE NURSE ONLY: CPT

## 2021-09-30 PROCEDURE — 90662 IIV NO PRSV INCREASED AG IM: CPT

## 2021-10-20 ENCOUNTER — OFFICE VISIT (OUTPATIENT)
Dept: FAMILY MEDICINE | Facility: CLINIC | Age: 76
End: 2021-10-20
Payer: MEDICARE

## 2021-10-20 VITALS
DIASTOLIC BLOOD PRESSURE: 80 MMHG | SYSTOLIC BLOOD PRESSURE: 117 MMHG | HEART RATE: 87 BPM | OXYGEN SATURATION: 96 % | TEMPERATURE: 96.8 F | RESPIRATION RATE: 20 BRPM

## 2021-10-20 DIAGNOSIS — F41.1 GAD (GENERALIZED ANXIETY DISORDER): ICD-10-CM

## 2021-10-20 DIAGNOSIS — I10 ESSENTIAL HYPERTENSION: ICD-10-CM

## 2021-10-20 DIAGNOSIS — R29.6 FALLS FREQUENTLY: Primary | ICD-10-CM

## 2021-10-20 DIAGNOSIS — M35.00 SJOGREN'S SYNDROME, WITH UNSPECIFIED ORGAN INVOLVEMENT (H): ICD-10-CM

## 2021-10-20 PROCEDURE — 99214 OFFICE O/P EST MOD 30 MIN: CPT | Performed by: INTERNAL MEDICINE

## 2021-10-20 RX ORDER — HYDROCHLOROTHIAZIDE 25 MG/1
25 TABLET ORAL DAILY
Qty: 90 TABLET | Refills: 3 | Status: ON HOLD | OUTPATIENT
Start: 2021-10-20 | End: 2021-10-27

## 2021-10-20 NOTE — PROGRESS NOTES
[facility]  CLINIC PROGRESS NOTE    Subjective:  Balance Concerns   Agnes Saravia has had 4 falls over the past 2 months.  She notes that the falls are caused by loss of balance and difficulty with leg going out.  She also had an episode of vertigo while sitting on the edge of her bed.   Call   Also had an episode in which she dropped a cup of coffee   Sjogren's syndrome, with unspecified organ involvement (H)   Doing well with Cevimeline.    DANA (generalized anxiety disorder)   Did not tolerate sertraline, and is tolerating clorazepate.   Also taking trazodone to help with sleep.  Has been taking gabapentin to help with pain.      Past medical history, medications, allergies, social history, family history reviewed and updated in [facility] as of 10/20/2021 .    ROS  CONSTITUTIONAL: no fatigue,   EYES: no acute vision problems or changes  ENT: no ear problems, no mouth problems, no throat problems  RESP: no significant cough, no shortness of breath  CV: no chest pain, no palpitations,   GI: no nausea, no vomiting, no constipation, no diarrhea  : no frequency, no dysuria, no hematuria  MS: as above   PSYCHIATRIC: no changes in mood or affect      Objective:  Vitals  /80 (BP Location: Right arm, Patient Position: Sitting, Cuff Size: Adult Regular)   Pulse 87   Temp 96.8  F (36  C) (Temporal)   Resp 20   SpO2 96%   Breastfeeding No   GEN: Alert Oriented x3 NAD  HEENT: Atraumatic, normocephalic, neck supple,   CV: RRR no murmurs or rubs  PULM: CTA no wheezes or crackles  ABD: Soft, nontender nondistende   SKIN: No visible skin lesion or ulcerations  EXT: No edema bilateral lower extremities  NEURO: Gait and station stable - using walker  PSYCH: Mood good, affect mood congruent    No images are attached to the encounter.    No results found for this or any previous visit (from the past 24 hour(s)).    Assessment/Plan:  Patient Instructions   (M35.00) Sjogren's syndrome, with unspecified organ  involvement (H)  Comment: Plan to follow up in rheumatology on Monday and consider reducing gabapentin   Plan:     (F41.1) DANA (generalized anxiety disorder)  Comment: We will continue clorazepate and monitor for increased risk of sedation and falls  Plan:     (I10) Essential hypertension  Comment: blood pressure is stable, but given concerns for light-headedness we will reduce your dose of hydrochlorothiazide   Plan: hydrochlorothiazide (HYDRODIURIL) 25 MG tablet          Falls  Comment: Refer to neurology placed    Follow up in 3 months    Disclaimer: This note consists of symbols derived from keyboarding, dictation and/or voice recognition software. As a result, there may be errors in the script that have gone undetected. Please consider this when interpreting information found in this chart.    José Miguel Wallace MD  [phone]

## 2021-10-20 NOTE — PATIENT INSTRUCTIONS
(M35.00) Sjogren's syndrome, with unspecified organ involvement (H)  Comment: Plan to follow up in rheumatology on Monday and consider reducing gabapentin   Plan:     (F41.1) DANA (generalized anxiety disorder)  Comment: We will continue clorazepate and monitor for increased risk of sedation and falls  Plan:     (I10) Essential hypertension  Comment: blood pressure is stable, but given concerns for light-headedness we will reduce your dose of hydrochlorothiazide   Plan: hydrochlorothiazide (HYDRODIURIL) 25 MG tablet          Falls  Comment: Refer to neurology placed

## 2021-10-24 ENCOUNTER — APPOINTMENT (OUTPATIENT)
Dept: MRI IMAGING | Facility: CLINIC | Age: 76
DRG: 149 | End: 2021-10-24
Attending: EMERGENCY MEDICINE
Payer: MEDICARE

## 2021-10-24 ENCOUNTER — HOSPITAL ENCOUNTER (INPATIENT)
Facility: CLINIC | Age: 76
LOS: 3 days | Discharge: HOME HEALTH | DRG: 149 | End: 2021-10-27
Attending: EMERGENCY MEDICINE | Admitting: HOSPITALIST
Payer: MEDICARE

## 2021-10-24 DIAGNOSIS — H81.20 VESTIBULAR NEURONITIS, UNSPECIFIED LATERALITY: ICD-10-CM

## 2021-10-24 DIAGNOSIS — H81.10 BENIGN PAROXYSMAL POSITIONAL VERTIGO, UNSPECIFIED LATERALITY: Primary | ICD-10-CM

## 2021-10-24 DIAGNOSIS — I10 ESSENTIAL HYPERTENSION: ICD-10-CM

## 2021-10-24 DIAGNOSIS — G93.9 BRAIN LESION: ICD-10-CM

## 2021-10-24 DIAGNOSIS — E87.6 HYPOKALEMIA: ICD-10-CM

## 2021-10-24 DIAGNOSIS — R11.2 NON-INTRACTABLE VOMITING WITH NAUSEA, UNSPECIFIED VOMITING TYPE: ICD-10-CM

## 2021-10-24 LAB
ANION GAP SERPL CALCULATED.3IONS-SCNC: 7 MMOL/L (ref 3–14)
BASOPHILS # BLD AUTO: 0.1 10E3/UL (ref 0–0.2)
BASOPHILS NFR BLD AUTO: 1 %
BUN SERPL-MCNC: 18 MG/DL (ref 7–30)
CALCIUM SERPL-MCNC: 8.4 MG/DL (ref 8.5–10.1)
CHLORIDE BLD-SCNC: 101 MMOL/L (ref 94–109)
CO2 SERPL-SCNC: 29 MMOL/L (ref 20–32)
CREAT SERPL-MCNC: 0.68 MG/DL (ref 0.52–1.04)
EOSINOPHIL # BLD AUTO: 0.1 10E3/UL (ref 0–0.7)
EOSINOPHIL NFR BLD AUTO: 1 %
ERYTHROCYTE [DISTWIDTH] IN BLOOD BY AUTOMATED COUNT: 14.6 % (ref 10–15)
GFR SERPL CREATININE-BSD FRML MDRD: 84 ML/MIN/1.73M2
GLUCOSE BLD-MCNC: 134 MG/DL (ref 70–99)
HCT VFR BLD AUTO: 37.8 % (ref 35–47)
HGB BLD-MCNC: 11.9 G/DL (ref 11.7–15.7)
HOLD SPECIMEN: NORMAL
IMM GRANULOCYTES # BLD: 0.1 10E3/UL
IMM GRANULOCYTES NFR BLD: 1 %
LYMPHOCYTES # BLD AUTO: 1.2 10E3/UL (ref 0.8–5.3)
LYMPHOCYTES NFR BLD AUTO: 10 %
MAGNESIUM SERPL-MCNC: 1.9 MG/DL (ref 1.6–2.3)
MCH RBC QN AUTO: 28.5 PG (ref 26.5–33)
MCHC RBC AUTO-ENTMCNC: 31.5 G/DL (ref 31.5–36.5)
MCV RBC AUTO: 91 FL (ref 78–100)
MONOCYTES # BLD AUTO: 0.6 10E3/UL (ref 0–1.3)
MONOCYTES NFR BLD AUTO: 6 %
NEUTROPHILS # BLD AUTO: 9.1 10E3/UL (ref 1.6–8.3)
NEUTROPHILS NFR BLD AUTO: 81 %
NRBC # BLD AUTO: 0 10E3/UL
NRBC BLD AUTO-RTO: 0 /100
PLATELET # BLD AUTO: 356 10E3/UL (ref 150–450)
POTASSIUM BLD-SCNC: 3.1 MMOL/L (ref 3.4–5.3)
RBC # BLD AUTO: 4.17 10E6/UL (ref 3.8–5.2)
SARS-COV-2 RNA RESP QL NAA+PROBE: NEGATIVE
SODIUM SERPL-SCNC: 137 MMOL/L (ref 133–144)
WBC # BLD AUTO: 11.2 10E3/UL (ref 4–11)

## 2021-10-24 PROCEDURE — 258N000003 HC RX IP 258 OP 636: Performed by: EMERGENCY MEDICINE

## 2021-10-24 PROCEDURE — 80048 BASIC METABOLIC PNL TOTAL CA: CPT | Performed by: EMERGENCY MEDICINE

## 2021-10-24 PROCEDURE — 99285 EMERGENCY DEPT VISIT HI MDM: CPT | Mod: 25

## 2021-10-24 PROCEDURE — 36415 COLL VENOUS BLD VENIPUNCTURE: CPT | Performed by: EMERGENCY MEDICINE

## 2021-10-24 PROCEDURE — 70553 MRI BRAIN STEM W/O & W/DYE: CPT | Mod: MG

## 2021-10-24 PROCEDURE — 120N000004 HC R&B MS OVERFLOW

## 2021-10-24 PROCEDURE — 96365 THER/PROPH/DIAG IV INF INIT: CPT

## 2021-10-24 PROCEDURE — 250N000011 HC RX IP 250 OP 636: Performed by: EMERGENCY MEDICINE

## 2021-10-24 PROCEDURE — 99207 PR CDG-CODE CATEGORY CHANGED: CPT | Performed by: HOSPITALIST

## 2021-10-24 PROCEDURE — 250N000013 HC RX MED GY IP 250 OP 250 PS 637: Performed by: EMERGENCY MEDICINE

## 2021-10-24 PROCEDURE — 96375 TX/PRO/DX INJ NEW DRUG ADDON: CPT

## 2021-10-24 PROCEDURE — 87635 SARS-COV-2 COVID-19 AMP PRB: CPT | Performed by: EMERGENCY MEDICINE

## 2021-10-24 PROCEDURE — 96361 HYDRATE IV INFUSION ADD-ON: CPT

## 2021-10-24 PROCEDURE — 96376 TX/PRO/DX INJ SAME DRUG ADON: CPT

## 2021-10-24 PROCEDURE — 83735 ASSAY OF MAGNESIUM: CPT | Performed by: HOSPITALIST

## 2021-10-24 PROCEDURE — 96366 THER/PROPH/DIAG IV INF ADDON: CPT

## 2021-10-24 PROCEDURE — C9803 HOPD COVID-19 SPEC COLLECT: HCPCS

## 2021-10-24 PROCEDURE — 99223 1ST HOSP IP/OBS HIGH 75: CPT | Mod: AI | Performed by: HOSPITALIST

## 2021-10-24 PROCEDURE — 85025 COMPLETE CBC W/AUTO DIFF WBC: CPT | Performed by: EMERGENCY MEDICINE

## 2021-10-24 PROCEDURE — A9585 GADOBUTROL INJECTION: HCPCS | Performed by: EMERGENCY MEDICINE

## 2021-10-24 PROCEDURE — 255N000002 HC RX 255 OP 636: Performed by: EMERGENCY MEDICINE

## 2021-10-24 PROCEDURE — G0378 HOSPITAL OBSERVATION PER HR: HCPCS

## 2021-10-24 PROCEDURE — 250N000011 HC RX IP 250 OP 636: Performed by: HOSPITALIST

## 2021-10-24 RX ORDER — GADOBUTROL 604.72 MG/ML
10 INJECTION INTRAVENOUS ONCE
Status: COMPLETED | OUTPATIENT
Start: 2021-10-24 | End: 2021-10-24

## 2021-10-24 RX ORDER — ACETAMINOPHEN 325 MG/1
975 TABLET ORAL EVERY 8 HOURS PRN
Status: DISCONTINUED | OUTPATIENT
Start: 2021-10-24 | End: 2021-10-27 | Stop reason: HOSPADM

## 2021-10-24 RX ORDER — KETOCONAZOLE 20 MG/ML
SHAMPOO TOPICAL EVERY OTHER DAY
COMMUNITY
End: 2023-10-11

## 2021-10-24 RX ORDER — SODIUM CHLORIDE AND POTASSIUM CHLORIDE 150; 900 MG/100ML; MG/100ML
INJECTION, SOLUTION INTRAVENOUS CONTINUOUS
Status: DISCONTINUED | OUTPATIENT
Start: 2021-10-24 | End: 2021-10-25

## 2021-10-24 RX ORDER — ONDANSETRON 4 MG/1
4 TABLET, ORALLY DISINTEGRATING ORAL EVERY 6 HOURS PRN
Status: DISCONTINUED | OUTPATIENT
Start: 2021-10-24 | End: 2021-10-27 | Stop reason: HOSPADM

## 2021-10-24 RX ORDER — POTASSIUM CHLORIDE 7.45 MG/ML
10 INJECTION INTRAVENOUS ONCE
Status: COMPLETED | OUTPATIENT
Start: 2021-10-24 | End: 2021-10-24

## 2021-10-24 RX ORDER — LANOLIN ALCOHOL/MO/W.PET/CERES
3 CREAM (GRAM) TOPICAL
Status: DISCONTINUED | OUTPATIENT
Start: 2021-10-24 | End: 2021-10-27 | Stop reason: HOSPADM

## 2021-10-24 RX ORDER — ONDANSETRON 2 MG/ML
4 INJECTION INTRAMUSCULAR; INTRAVENOUS ONCE
Status: COMPLETED | OUTPATIENT
Start: 2021-10-24 | End: 2021-10-24

## 2021-10-24 RX ORDER — LORAZEPAM 2 MG/ML
0.5 INJECTION INTRAMUSCULAR ONCE
Status: COMPLETED | OUTPATIENT
Start: 2021-10-24 | End: 2021-10-24

## 2021-10-24 RX ORDER — ONDANSETRON 2 MG/ML
4 INJECTION INTRAMUSCULAR; INTRAVENOUS EVERY 6 HOURS PRN
Status: DISCONTINUED | OUTPATIENT
Start: 2021-10-24 | End: 2021-10-27 | Stop reason: HOSPADM

## 2021-10-24 RX ORDER — GABAPENTIN 100 MG/1
100 CAPSULE ORAL AT BEDTIME
COMMUNITY

## 2021-10-24 RX ORDER — LORAZEPAM 2 MG/ML
1 INJECTION INTRAMUSCULAR ONCE
Status: COMPLETED | OUTPATIENT
Start: 2021-10-24 | End: 2021-10-24

## 2021-10-24 RX ORDER — MECLIZINE HYDROCHLORIDE 25 MG/1
50 TABLET ORAL ONCE
Status: COMPLETED | OUTPATIENT
Start: 2021-10-24 | End: 2021-10-24

## 2021-10-24 RX ORDER — MECLIZINE HYDROCHLORIDE 25 MG/1
25 TABLET ORAL 3 TIMES DAILY PRN
Status: DISCONTINUED | OUTPATIENT
Start: 2021-10-24 | End: 2021-10-27 | Stop reason: HOSPADM

## 2021-10-24 RX ADMIN — ONDANSETRON 4 MG: 2 INJECTION INTRAMUSCULAR; INTRAVENOUS at 10:35

## 2021-10-24 RX ADMIN — LORAZEPAM 1 MG: 2 INJECTION INTRAMUSCULAR; INTRAVENOUS at 13:33

## 2021-10-24 RX ADMIN — POTASSIUM CHLORIDE AND SODIUM CHLORIDE: 900; 150 INJECTION, SOLUTION INTRAVENOUS at 21:11

## 2021-10-24 RX ADMIN — POTASSIUM CHLORIDE 10 MEQ: 7.46 INJECTION, SOLUTION INTRAVENOUS at 13:33

## 2021-10-24 RX ADMIN — GADOBUTROL 10 ML: 604.72 INJECTION INTRAVENOUS at 14:23

## 2021-10-24 RX ADMIN — MECLIZINE HYDROCHLORIDE 50 MG: 25 TABLET ORAL at 10:36

## 2021-10-24 RX ADMIN — SODIUM CHLORIDE 1000 ML: 9 INJECTION, SOLUTION INTRAVENOUS at 10:35

## 2021-10-24 RX ADMIN — LORAZEPAM 0.5 MG: 2 INJECTION INTRAMUSCULAR; INTRAVENOUS at 10:36

## 2021-10-24 ASSESSMENT — ENCOUNTER SYMPTOMS
NAUSEA: 1
DIZZINESS: 1
VOMITING: 1

## 2021-10-24 NOTE — ED NOTES
[facility]  ED Nurse Handoff Report    ED Chief complaint: Dizziness      ED Diagnosis:   Final diagnoses:   Vestibular neuronitis, unspecified laterality   Non-intractable vomiting with nausea, unspecified vomiting type   Hypokalemia       Code Status: Full Code    Allergies:   Allergies   Allergen Reactions     Mellaril [Thioridazine Hcl] Anaphylaxis     Percocet [Oxycodone-Acetaminophen] Anaphylaxis and Swelling     Tolerates hydrocodone and codeine in cough medicine     Duloxetine      Made her feel weird, increased anxiety       Patient Story: getting into shower and suddenly had extreme dizziness   Focused Assessment:  Pt was very dizzy and nauseous on arrival; MRI was done;     Treatments and/or interventions provided: medication   Patient's response to treatments and/or interventions: well; dizziness isn't gone but has improved    To be done/followed up on inpatient unit:  obs    Does this patient have any cognitive concerns?: alert    Activity level - Baseline/Home:  Walker  Activity Level - Current:   Stand with assist x2 and Walker    Patient's Preferred language: English   Needed?: No    Isolation: None  Infection: Not Applicable  Patient tested for COVID 19 prior to admission: YES  Bariatric?: No    Vital Signs:   Vitals:    10/24/21 1008   BP: 139/61   Pulse: 68   Resp: 18   Temp: 97.5  F (36.4  C)   TempSrc: Oral   SpO2: 95%       Cardiac Rhythm:Cardiac Rhythm: Normal sinus rhythm    Was the PSS-3 completed:   Yes  What interventions are required if any?               Family Comments: partner at home and may come to visit  OBS brochure/video discussed/provided to patient/family: Yes              Name of person given brochure if not patient:               Relationship to patient:     For the majority of the shift this patient's behavior was Green.   Behavioral interventions performed were .    ED NURSE PHONE NUMBER: [phone]

## 2021-10-24 NOTE — H&P
[facility]    History and Physical - Hospitalist Service       Date of Admission:  10/24/2021    Assessment & Plan      Agnes Saravia is a 76 year old female with history of Sjogren's syndrome, ILD, DANA, HTN admitted on 10/24/2021 for vertigo with nausea and vomiting. Symptoms came on suddenly while she was in the shower this morning and are persisting. MRI brain was negative for infarct in the posterior circulation. She has had multiple recent falls recently and was just referred to neurology by her PCP for work up of balance deficits.    Vertigo, presumed BPPV  MRI brain w/wo contrast showed some possible frontal lobe abnormalities, but not c/w vertigo. No schwannoma.  - admit to observation  - cardiac telemetry  - meclizine and zofran prn  - supportive cares  - PT and OT  - consider neurology consult if no improvement. Patient was recently referred for outpatient neuro for falls and balance issues.    Frequent falls  Recent PCP note states that patient has had 4 falls over past 2 months related to loss of balance. Has also had a previous episode of vertigo while sitting at the edge of bed.  - PT/OT as above    Hypokalemia  - check magnesium  - replete K    Sjogren's syndrome   - follows with rheumatology. Gabapentin for pain.    Generalized anxiety disorder  - on PTA clozerapate. Trazodone for sleep    Essential Hypertension  - PTA hydrochlorothiazide dose was reduced recently due to concerns for lightheadedness    Interstitial lung disease    Obesity  - followed by PCP     Diet:   Regular or as tolerated  DVT Prophylaxis: Low Risk/Ambulatory with no VTE prophylaxis indicated  Andujar Catheter: Not present  Central Lines: None  Code Status:   Full Code    Clinically Significant Risk Factors Present on Admission              # Platelet Defect: home medication list includes an antiplatelet medication      Disposition Plan   Expected discharge: 1-2 days recommended to prior living  arrangement once ambulatory and tolerating po.     The patient's care was discussed with the Attending Physician, Dr. Amaris Anaya in ED and Patient.    Marilee Phipps MD  [facility]  Securely message with the Vocera Web Console (learn more here)  Text page via [employer] Paging/Directory      ______________________________________________________________________    Chief Complaint   Vertigo    History is obtained from the patient    History of Present Illness   Agnes Saravia is a 76 year old female who had an episode of room spinning vertigo that began earlier today. She had just taken a shower, then sat down and felt sudden onset of dizziness, nausea and vomiting. She has persistent dizziness and lightheadedness in the ED. No further vomiting since arrival here but hasn't eaten since last night. She denies recent illness, no f/c. No CP, SOB, or HA at an point during this episode.       Review of Systems    The 10 point Review of Systems is negative other than noted in the HPI or here.     Past Medical History    I have reviewed this patient's medical history and updated it with pertinent information if needed.   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  Past Surgical History:   Procedure Laterality Date     BLEPHAROPLASTY       BRONCHOSCOPY (RIGID OR FLEXIBLE), DIAGNOSTIC N/A 4/11/2018    Procedure: COMBINED BRONCHOSCOPY (RIGID OR FLEXIBLE), LAVAGE;  Bronchoscopy with Lavage;  Surgeon: Manuel Conway MD;  Location:  GI     DISCECTOMY LUMBAR POSTERIOR MICROSCOPIC ONE LEVEL  8/14/2014    Procedure: DISCECTOMY LUMBAR POSTERIOR MICROSCOPIC ONE LEVEL;   Surgeon: Dudley Bernal MD;  Location:  OR     HYSTERECTOMY TOTAL ABDOMINAL, BILATERAL SALPINGO-OOPHORECTOMY, COMBINED       HYSTERECTOMY, PAP NO LONGER INDICATED       MAMMOPLASTY REDUCTION BILATERAL  2013    Procedure: MAMMOPLASTY REDUCTION BILATERAL;  BILATERAL REDUCTION MAMMOPLASTY;  Surgeon: Bruce Nash MD;  Location: [facility]     SHOULDER SURGERY         Social History   I have reviewed this patient's social history and updated it with pertinent information if needed.  Social History     Tobacco Use     Smoking status: Former Smoker     Packs/day: 0.25     Years: 10.00     Pack years: 2.50     Types: Cigarettes     Start date: 1971     Quit date: 1981     Years since quittin.8     Smokeless tobacco: Never Used   Substance Use Topics     Alcohol use: Yes     Alcohol/week: 0.0 standard drinks     Comment: beer in summer when mowing lawn      Drug use: No       Family History   I have reviewed this patient's family history and updated it with pertinent information if needed.  Family History   Problem Relation Age of Onset     Cerebrovascular Disease Mother      Cerebrovascular Disease Father      Colon Cancer No family hx of        Prior to Admission Medications   Prior to Admission Medications   Prescriptions Last Dose Informant Patient Reported? Taking?   acetaminophen (TYLENOL) 500 MG tablet   Yes No   Sig: Take 500-1,000 mg by mouth every 6 hours as needed for mild pain   albuterol (PROAIR HFA/PROVENTIL HFA/VENTOLIN HFA) 108 (90 Base) MCG/ACT inhaler   No No   Sig: Inhale 2 puffs into the lungs every 4 hours as needed for shortness of breath / dyspnea or wheezing   aspirin 81 MG EC tablet   Yes No   Sig: Take 81 mg by mouth At Bedtime    cevimeline (EVOXAC) 30 MG capsule  Self Yes No   Sig: Take 30 mg by mouth 3 times daily (AM, Noon, Dinner)   clorazepate dipotassium (TRANXENE) 15 MG tablet   No No   Sig: Take 3/4 tablet (11.25mg) to 1 tablet (15mg) by mouth daily   folic  acid (FOLVITE) 1 MG tablet   Yes No   Sig: Take 1 tablet (1 mg) by mouth daily   gabapentin (NEURONTIN) 400 MG capsule   Yes No   Sig: Take 400 mg by mouth 3 times daily (AM, Noon, Dinner) and 100 MG at bedtime   hydrochlorothiazide (HYDRODIURIL) 25 MG tablet   No No   Sig: Take 1 tablet (25 mg) by mouth daily   ibuprofen (ADVIL/MOTRIN) 400 MG tablet   Yes No   Sig: Take 400 mg by mouth 2 times daily (Noon and Dinner)   methotrexate 2.5 MG tablet   Yes No   Sig: Take 7.5 mg by mouth every 7 days (Takes on Tuesdays)   pantoprazole (PROTONIX) 40 MG EC tablet   No No   Sig: TAKE 1 TABLET BY MOUTH ONCE DAILY. Take 30-60 minutes before a meal.   potassium chloride ER (K-TAB/KLOR-CON) 10 MEQ CR tablet   No No   Sig: Take 2 tablets (20 mEq) by mouth daily   predniSONE (DELTASONE) 5 MG tablet   Yes No   Sig: Take 5 mg by mouth daily.   simvastatin (ZOCOR) 40 MG tablet   No No   Sig: Take 1 tablet (40 mg) by mouth At Bedtime   traZODone (DESYREL) 50 MG tablet   No No   Sig: Take 0.5 tablets (25 mg) by mouth once daily at bedtime      Facility-Administered Medications: None     Allergies   Allergies   Allergen Reactions     Mellaril [Thioridazine Hcl] Anaphylaxis     Percocet [Oxycodone-Acetaminophen] Anaphylaxis and Swelling     Tolerates hydrocodone and codeine in cough medicine     Duloxetine      Made her feel weird, increased anxiety       Physical Exam   Vital Signs: Temp: 97.5  F (36.4  C) Temp src: Oral BP: 139/61 Pulse: 68   Resp: 18 SpO2: 95 % O2 Device: None (Room air)    Weight: 0 lbs 0 oz    Constitutional: Awake, alert, cooperative, no apparent distress  HEENT: neck supple, no LAD noted, moist mucous membranes  Respiratory: Clear to auscultation bilaterally, no crackles or wheezing  Cardiovascular: Regular rate and rhythm, normal S1 and S2, and no murmur noted  GI: Normal bowel sounds, soft, non-distended, non-tender  Skin/Integumen: No rashes, no cyanosis, no edema  Ext: no edema, moving all  extremities  Neuro: CN 2-12 intact, non focal exam      Data   Data reviewed today: I reviewed all medications, new labs and imaging results over the last 24 hours.     Recent Labs   Lab 10/24/21  1014   WBC 11.2*   HGB 11.9   MCV 91         POTASSIUM 3.1*   CHLORIDE 101   CO2 29   BUN 18   CR 0.68   ANIONGAP 7   CHRISTOPHER 8.4*   *     Recent Results (from the past 24 hour(s))   MR Brain w/o & w Contrast    Narrative    EXAM: MR BRAIN W/O and W CONTRAST  LOCATION: [facility]  DATE/TIME: 10/24/2021 1:49 PM    INDICATION: Dizziness, dehydration or hypotension. Dizziness, arrhythmia or new vasoactive medication.  COMPARISON: None.  CONTRAST: 10 mL Gadavist  TECHNIQUE: Routine multiplanar multisequence head MRI without and with intravenous contrast.    FINDINGS:  MOTION ARTIFACT: Mild.    INTRACRANIAL CONTENTS: No acute or subacute infarct. No mass, acute hemorrhage, or extra-axial fluid collections. Patchy and confluent nonspecific T2/FLAIR hyperintensities within the cerebral white matter most consistent with moderate to severe chronic   microvascular ischemic change. Moderate generalized cerebral atrophy. No hydrocephalus. Normal position of the cerebellar tonsils. Small (6 to 7 mm) area of apparent enhancement involving the left inferior frontal lobe near the precentral gyrus.    SELLA: No abnormality accounting for technique.    OSSEOUS STRUCTURES/SOFT TISSUES: Normal marrow signal. The major intracranial vascular flow voids are maintained.     ORBITS: Prior bilateral cataract surgery. Visualized portions of the orbits are otherwise unremarkable.     SINUSES/MASTOIDS: No paranasal sinus mucosal disease. Trace opacification of the right mastoid cavity.       Impression    IMPRESSION:  1.  No evidence of acute ischemia or hemorrhage.  2.  Small area of apparent enhancement involving the left inferior frontal lobe. Differential diagnosis includes subacute infarct, metastatic  disease, focal infectious/inflammatory process, incidental venous structure, or artifact. Follow-up MRI could be   considered.  3.  Volume loss and presumed microvascular ischemic changes as detailed above.

## 2021-10-24 NOTE — PHARMACY-ADMISSION MEDICATION HISTORY
Pharmacy Medication History  Admission medication history interview status for the 10/24/2021  admission is complete. See EPIC admission navigator for prior to admission medications     Location of Interview: Patient room  Medication history sources: Patient, Patient's family/friend (daughter) and Surescripts    Significant changes made to the medication list:  Added ketoconazole, systane  Modified clorazepate, ibuprofen, folic acid    In the past week, patient estimated taking medication this percent of the time: greater than 90%    Additional medication history information:   Patient's daughter notes hydrochlorothiazide was just decreased, clorazepate is 15 mg daily (was to taper to 11.25 mg when starting sertraline, but this did not go well per daughter). NOT taking sertraline - did not add to med list.     Medication reconciliation completed by provider prior to medication history? Yes    Time spent in this activity: 15 min    Prior to Admission medications    Medication Sig Last Dose Taking? Auth Provider   acetaminophen (TYLENOL) 500 MG tablet Take 500-1,000 mg by mouth every 6 hours as needed for mild pain  at PRN Yes Reported, Patient   albuterol (PROAIR HFA/PROVENTIL HFA/VENTOLIN HFA) 108 (90 Base) MCG/ACT inhaler Inhale 2 puffs into the lungs every 4 hours as needed for shortness of breath / dyspnea or wheezing  at PRN Yes José Miguel Wallace MD   aspirin 81 MG EC tablet Take 81 mg by mouth At Bedtime  10/23/2021 at hs Yes Reported, Patient   cevimeline (EVOXAC) 30 MG capsule Take 30 mg by mouth 3 times daily (AM, Noon, Dinner) 10/24/2021 at am x1 Yes Reported, Patient   clorazepate dipotassium (TRANXENE) 15 MG tablet Take 3/4 tablet (11.25mg) to 1 tablet (15mg) by mouth daily  Patient taking differently: Take 15 mg by mouth daily Take 3/4 tablet (11.25mg) to 1 tablet (15mg) by mouth daily 10/24/2021 at am Yes José Miguel Wallace MD   folic acid (FOLVITE) 1 MG tablet Take 1 mg by mouth six times  a week Every day except Tuesdays when methotrexate is due 10/24/2021 at am Yes Manuel Conway MD   gabapentin (NEURONTIN) 100 MG capsule Take 100 mg by mouth At Bedtime 10/23/2021 at hs Yes Unknown, Entered By History   gabapentin (NEURONTIN) 400 MG capsule Take 400 mg by mouth 3 times daily (AM, Noon, Dinner) 10/24/2021 at am x1 Yes Unknown, Entered By History   hydrochlorothiazide (HYDRODIURIL) 25 MG tablet Take 1 tablet (25 mg) by mouth daily 10/24/2021 at am Yes José Miguel Wallace MD   ibuprofen (ADVIL/MOTRIN) 400 MG tablet Take 800 mg by mouth 3 times daily  10/24/2021 at am x1 Yes Unknown, Entered By History   ketoconazole (NIZORAL) 2 % external shampoo Apply topically every other day During shower 10/24/2021 at am Yes Unknown, Entered By History   methotrexate 2.5 MG tablet Take 7.5 mg by mouth every 7 days (Takes on Tuesdays) 10/19/2021 at Unknown time Yes Manuel Conway MD   pantoprazole (PROTONIX) 40 MG EC tablet TAKE 1 TABLET BY MOUTH ONCE DAILY. Take 30-60 minutes before a meal. 10/23/2021 at am Yes José Miguel Wallace MD   polyethylene glycol-propylene glycol (SYSTANE ULTRA) 0.4-0.3 % SOLN ophthalmic solution Place 1 drop into both eyes every hour as needed for dry eyes  at prn Yes Unknown, Entered By History   potassium chloride ER (K-TAB/KLOR-CON) 10 MEQ CR tablet Take 2 tablets (20 mEq) by mouth daily 10/24/2021 at am Yes José Miguel Wallace MD   predniSONE (DELTASONE) 5 MG tablet Take 5 mg by mouth daily. 10/24/2021 at am Yes Manuel Conway MD   simvastatin (ZOCOR) 40 MG tablet Take 1 tablet (40 mg) by mouth At Bedtime 10/23/2021 at 1800 Yes José Miguel Wallace MD   traZODone (DESYREL) 50 MG tablet Take 0.5 tablets (25 mg) by mouth once daily at bedtime 10/23/2021 at hs Yes [place], José Miguel Barrera MD       The information provided in this note is only as accurate as the sources available at the time of update(s)

## 2021-10-24 NOTE — ED PROVIDER NOTES
History   Chief Complaint:  Dizziness     The history is provided by the patient.      Agnes Saravia is a 76 year old female with history of cancer, HLD, and HTN who presents by the EMS alone for dizziness. After taking a shower and sitting down to watch TV, the patient felt dizziness with the room spinning, nausea, and vomited. EMS gave her a medication for nausea in route. While at the ER, she endorses persistent dizziness. She has had inner ear vertigo about 30 years ago where she passed out and had severe vertigo and vomiting. She ambulates with a walker at baseline. She denies chest pain and a history of stroke.  No fevers or chills, no visual loss, denies speech difficulty or other stroke type symptoms.    Review of Systems   Cardiovascular: Negative for chest pain.   Gastrointestinal: Positive for nausea and vomiting.   Neurological: Positive for dizziness.   All other systems reviewed and are negative.        Allergies:  Mellaril  Percocet  Duloxetine    Medications:  Albuterol  Aspirin  Cevimeline  Clorazepate dipotassium  Gabapentin  Hydrochlorothiazide  Pantoprazole  Potassium chloride  Dimvastatin  TraZODone    Past Medical History:     Anxiety   Cancer   Depressive disorder   Hyperlipidemia   Insomnia   Rheumatoid arthritis  Sjogren's syndrome   Post menopausal syndrome  HTN (hypertension)  Hypokalemia  Squamous cell carcinoma  Lumbar disc disease  Pneumonia  Pulmonary nodules  Obesity (BMI 35.0-39.9) with comorbidity  ILD (interstitial lung disease)   Primary malignant neoplasm  Viral gastroenteritis  C. difficile enteritis    Past Surgical History:    Blepharoplasty  Bronchoscopy (rigid or flexible), diagnostic  Discectomy lumbar posterior microscopic one level  Hysterectomy total abdominal, bilateral salpingo=oophorectomy, combined  Hysterectomy, pap no longer indicated  Mammoplasty reduction bilateral   Shoulder surgery    Family History:    Cerebrovascular Disease - Mother  Cerebrovascular  Disease - Father     Social History:  The patient went to the ER via ambulance.  The patient was roomed alone.    Physical Exam     Patient Vitals for the past 24 hrs:   BP Temp Temp src Pulse Resp SpO2   10/24/21 1700 -- -- -- -- -- 96 %   10/24/21 1600 -- -- -- -- -- 96 %   10/24/21 1528 -- -- -- -- -- 96 %   10/24/21 1527 (!) 154/74 -- -- 78 -- --   10/24/21 1008 139/61 97.5  F (36.4  C) Oral 68 18 95 %     Physical Exam    Nursing note and vitals reviewed.    Constitutional:  Appears slightly uncomfortable and very guarded in movement.   HENT:    Nose normal.  No discharge.      Oral mucosa is moist.  Eyes:    Conjunctivae are normal without injection.  Pupils are equal.  Horizontal nystagmus noted with extraocular movements, seems a little worse looking to the left.  Cardiovascular:  Normal rate, regular rhythm with normal S1 and S2.      Normal heart sounds and peripheral pulses 2+ and equal.       No murmur or piedad.  Pulmonary:  Effort normal and breath sounds clear to auscultation bilaterally.     No respiratory distress.  No stridor.     No wheezes. No rales.     GI:    Soft. No distension and no mass. No tenderness.      No rebound and no guarding. No flank pain.  No HSM.  Musculoskeletal:  Normal range of motion. No extremity deformity.     No edema and no tenderness.    Neurological:   GCS 15. NIH stroke scale score 0. O X 3.  No sensory deficit. Normal strength in all extremities.   Normal finger to nose. No hand drift.  Difficult to lift legs against gravity and this is normal.  I could not test heel-knee-shin.  Visual fields full. EOMs intact. No diplopia. No facial droop. No slurred speech.   Comprehension and expression of speech is normal. Mental status and memory normal.   Skin:    Skin is warm and dry. No rash noted. No diaphoresis.      No erythema. No pallor.  No lesions.  Psychiatric:   Behavior is normal. Appropriate mood and affect.     Judgment and thought content normal.     Emergency  Department Course     Imaging:  MR Brain w/o & w Contrast   Final Result   IMPRESSION:   1.  No evidence of acute ischemia or hemorrhage.   2.  Small area of apparent enhancement involving the left inferior frontal lobe. Differential diagnosis includes subacute infarct, metastatic disease, focal infectious/inflammatory process, incidental venous structure, or artifact. Follow-up MRI could be    considered.   3.  Volume loss and presumed microvascular ischemic changes as detailed above.                Reading per radiology.    Laboratory:  Labs Ordered and Resulted from Time of ED Arrival Up to the Time of Departure from the ED   BASIC METABOLIC PANEL - Abnormal; Notable for the following components:       Result Value    Potassium 3.1 (*)     Calcium 8.4 (*)     Glucose 134 (*)     All other components within normal limits   CBC WITH PLATELETS AND DIFFERENTIAL - Abnormal; Notable for the following components:    WBC Count 11.2 (*)     Absolute Neutrophils 9.1 (*)     Absolute Immature Granulocytes 0.1 (*)     All other components within normal limits   COVID-19 VIRUS (CORONAVIRUS) BY PCR - Normal    Narrative:     Testing was performed using the derek  SARS-CoV-2 & Influenza A/B Assay on the derek  Ann-Marie  System.  This test should be ordered for the detection of SARS-COV-2 in individuals who meet SARS-CoV-2 clinical and/or epidemiological criteria. Test performance is unknown in asymptomatic patients.  This test is for in vitro diagnostic use under the FDA EUA for laboratories certified under CLIA to perform moderate and/or high complexity testing. This test has not been FDA cleared or approved.  A negative test does not rule out the presence of PCR inhibitors in the specimen or target RNA in concentration below the limit of detection for the assay. The possibility of a false negative should be considered if the patient's recent exposure or clinical presentation suggests COVID-19.  [facility] are  certified under the Clinical Laboratory Improvement Amendments of 1988 (CLIA-88) as qualified to perform moderate and/or high complexity laboratory testing.   MAGNESIUM - Normal   EXTRA BLUE TOP TUBE   CBC WITH PLATELETS & DIFFERENTIAL    Narrative:     The following orders were created for panel order CBC with platelets + differential.  Procedure                               Abnormality         Status                     ---------                               -----------         ------                     CBC with platelets and d...[[id number]]  Abnormal            Final result                 Please view results for these tests on the individual orders.   EXTRA TUBE    Narrative:     The following orders were created for panel order [place] Draw.  Procedure                               Abnormality         Status                     ---------                               -----------         ------                     Extra Blue Top Tube[[id number]]                              Final result                 Please view results for these tests on the individual orders.     Emergency Department Course:  Reviewed:  I reviewed nursing notes, vitals, past medical history, Care Everywhere and [facility]    Consults:  1450 I spoke with Dr. Phipps of the Hospitalist service from [facility] regarding patient's presentation, findings, and plan of care.    Assessments:  1015 I obtained history and examined the patient as noted above.   1110 I rechecked the patient and explained findings.  1443 I rechecked the patient and explained findings, she will be admitted.    Interventions:  1035 ondansetron (ZOFRAN) injection 4 mg, IV  1035 0.9% sodium chloride BOLUS, 1,000 mL, IV  1036 LORazepam (ATIVAN) injection 0.5 mg, IV  1036 meclizine (ANTIVERT) tablet 50 mg, PO  1333 potassium chloride 10 mEq in 100 mL sterile water intermittent infusion (premix), IV  1333 LORazepam (ATIVAN) injection 1 mg, IV    Disposition:  The patient was  admitted to the hospital under the care of Dr. Phipps.     Impression & Plan     Medical Decision Making:  Patient comes in with acute onset of true vertigo and nausea and vomiting.  She has some nystagmus on exam.  She did not have any other neurologic findings.  She had a history of this 30 years ago with similar symptoms but I ordered blood work and have given her Zofran IV a liter of fluids, and Ativan initially.  She was able to tolerate meclizine but continued to have significant vertigo with any movement.  I did not feel the patient was safe to go home at this point with her significant dizziness.  She is not real steady on her feet to begin with and her partner agreed that she would not be safe at home.  She is requiring Ativan to help suppress her vertigo.  Because I was having a difficult time getting it under good control I got an MRI of her brain which did not show any evidence of a stroke or other abnormality.  There was something in the frontal lobe that will need a follow-up MRI in the future but it may have been artifact.  It would certainly not have anything to do with her symptoms today.  Patient's lab work came back with a low potassium of 3.1 but otherwise good renal function.  Sodium was 137.  Her glucose is 134 and her white count up slightly at 11.2, likely bumped because of the vomiting.  Hemoglobin is normal.  Covid is negative.  I have discussed the case with Dr. Phipps who will be admitting the patient.  Patient was given 10 mEq of IV potassium.  She did require another dose of Ativan prior to the MRI.    Diagnosis:    ICD-10-CM    1. 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 Non-intractable vomiting with nausea, unspecified vomiting type  R11.2    3. Hypokalemia  E87.6        Scribe Disclosure:  I, Dash Osuna () and Сергей Chavez (trainee), am serving as a scribe at 10:14 AM on 10/24/2021 to document services personally performed by Amaris Anaya MD  based on my observations and the provider's statements to me.         Amaris Anaya MD  10/24/21 [id number]

## 2021-10-24 NOTE — ED NOTES
Bed: ED26  Expected date:   Expected time:   Means of arrival:   Comments:  427  80 M dizzy/light headed  Here now

## 2021-10-25 ENCOUNTER — APPOINTMENT (OUTPATIENT)
Dept: PHYSICAL THERAPY | Facility: CLINIC | Age: 76
DRG: 149 | End: 2021-10-25
Attending: HOSPITALIST
Payer: MEDICARE

## 2021-10-25 ENCOUNTER — APPOINTMENT (OUTPATIENT)
Dept: CT IMAGING | Facility: CLINIC | Age: 76
DRG: 149 | End: 2021-10-25
Attending: PSYCHIATRY & NEUROLOGY
Payer: MEDICARE

## 2021-10-25 LAB
ANION GAP SERPL CALCULATED.3IONS-SCNC: 5 MMOL/L (ref 3–14)
BUN SERPL-MCNC: 11 MG/DL (ref 7–30)
CALCIUM SERPL-MCNC: 8.1 MG/DL (ref 8.5–10.1)
CHLORIDE BLD-SCNC: 106 MMOL/L (ref 94–109)
CO2 SERPL-SCNC: 30 MMOL/L (ref 20–32)
CREAT SERPL-MCNC: 0.66 MG/DL (ref 0.52–1.04)
ERYTHROCYTE [DISTWIDTH] IN BLOOD BY AUTOMATED COUNT: 14.9 % (ref 10–15)
GFR SERPL CREATININE-BSD FRML MDRD: 85 ML/MIN/1.73M2
GLUCOSE BLD-MCNC: 98 MG/DL (ref 70–99)
HCT VFR BLD AUTO: 34.9 % (ref 35–47)
HGB BLD-MCNC: 11.1 G/DL (ref 11.7–15.7)
MCH RBC QN AUTO: 28.7 PG (ref 26.5–33)
MCHC RBC AUTO-ENTMCNC: 31.8 G/DL (ref 31.5–36.5)
MCV RBC AUTO: 90 FL (ref 78–100)
PLATELET # BLD AUTO: 346 10E3/UL (ref 150–450)
POTASSIUM BLD-SCNC: 3.3 MMOL/L (ref 3.4–5.3)
POTASSIUM BLD-SCNC: 4.2 MMOL/L (ref 3.4–5.3)
RBC # BLD AUTO: 3.87 10E6/UL (ref 3.8–5.2)
SODIUM SERPL-SCNC: 141 MMOL/L (ref 133–144)
WBC # BLD AUTO: 10.4 10E3/UL (ref 4–11)

## 2021-10-25 PROCEDURE — 85027 COMPLETE CBC AUTOMATED: CPT | Performed by: HOSPITALIST

## 2021-10-25 PROCEDURE — 250N000011 HC RX IP 250 OP 636: Performed by: HOSPITALIST

## 2021-10-25 PROCEDURE — 36415 COLL VENOUS BLD VENIPUNCTURE: CPT | Performed by: HOSPITALIST

## 2021-10-25 PROCEDURE — G0378 HOSPITAL OBSERVATION PER HR: HCPCS

## 2021-10-25 PROCEDURE — 97116 GAIT TRAINING THERAPY: CPT | Mod: GP | Performed by: PHYSICAL THERAPIST

## 2021-10-25 PROCEDURE — 80048 BASIC METABOLIC PNL TOTAL CA: CPT | Performed by: HOSPITALIST

## 2021-10-25 PROCEDURE — 250N000012 HC RX MED GY IP 250 OP 636 PS 637: Performed by: PHYSICIAN ASSISTANT

## 2021-10-25 PROCEDURE — 250N000013 HC RX MED GY IP 250 OP 250 PS 637: Performed by: PHYSICIAN ASSISTANT

## 2021-10-25 PROCEDURE — 97112 NEUROMUSCULAR REEDUCATION: CPT | Mod: GP | Performed by: PHYSICAL THERAPIST

## 2021-10-25 PROCEDURE — 999N000127 HC STATISTIC PERIPHERAL IV START W US GUIDANCE

## 2021-10-25 PROCEDURE — 84132 ASSAY OF SERUM POTASSIUM: CPT | Performed by: PHYSICIAN ASSISTANT

## 2021-10-25 PROCEDURE — 36415 COLL VENOUS BLD VENIPUNCTURE: CPT | Performed by: PHYSICIAN ASSISTANT

## 2021-10-25 PROCEDURE — 97530 THERAPEUTIC ACTIVITIES: CPT | Mod: GP | Performed by: PHYSICAL THERAPIST

## 2021-10-25 PROCEDURE — 250N000013 HC RX MED GY IP 250 OP 250 PS 637: Performed by: HOSPITALIST

## 2021-10-25 PROCEDURE — 120N000004 HC R&B MS OVERFLOW

## 2021-10-25 PROCEDURE — 70496 CT ANGIOGRAPHY HEAD: CPT | Mod: MG

## 2021-10-25 PROCEDURE — 97161 PT EVAL LOW COMPLEX 20 MIN: CPT | Mod: GP | Performed by: PHYSICAL THERAPIST

## 2021-10-25 PROCEDURE — 250N000009 HC RX 250: Performed by: HOSPITALIST

## 2021-10-25 PROCEDURE — 99232 SBSQ HOSP IP/OBS MODERATE 35: CPT | Performed by: PHYSICIAN ASSISTANT

## 2021-10-25 RX ORDER — GABAPENTIN 100 MG/1
100 CAPSULE ORAL AT BEDTIME
Status: DISCONTINUED | OUTPATIENT
Start: 2021-10-25 | End: 2021-10-27 | Stop reason: HOSPADM

## 2021-10-25 RX ORDER — ASPIRIN 81 MG/1
81 TABLET ORAL AT BEDTIME
Status: DISCONTINUED | OUTPATIENT
Start: 2021-10-25 | End: 2021-10-27 | Stop reason: HOSPADM

## 2021-10-25 RX ORDER — POTASSIUM CHLORIDE 750 MG/1
20 CAPSULE, EXTENDED RELEASE ORAL DAILY
Status: DISCONTINUED | OUTPATIENT
Start: 2021-10-25 | End: 2021-10-25

## 2021-10-25 RX ORDER — POTASSIUM CHLORIDE 1500 MG/1
20 TABLET, EXTENDED RELEASE ORAL DAILY
Status: DISCONTINUED | OUTPATIENT
Start: 2021-10-25 | End: 2021-10-27 | Stop reason: HOSPADM

## 2021-10-25 RX ORDER — CEVIMELINE HYDROCHLORIDE 30 MG/1
30 CAPSULE ORAL 3 TIMES DAILY
Status: DISCONTINUED | OUTPATIENT
Start: 2021-10-25 | End: 2021-10-27 | Stop reason: HOSPADM

## 2021-10-25 RX ORDER — ALBUTEROL SULFATE 90 UG/1
2 AEROSOL, METERED RESPIRATORY (INHALATION) EVERY 4 HOURS PRN
Status: DISCONTINUED | OUTPATIENT
Start: 2021-10-25 | End: 2021-10-27 | Stop reason: HOSPADM

## 2021-10-25 RX ORDER — FOLIC ACID 1 MG/1
1 TABLET ORAL
Status: DISCONTINUED | OUTPATIENT
Start: 2021-10-25 | End: 2021-10-27 | Stop reason: HOSPADM

## 2021-10-25 RX ORDER — IOPAMIDOL 755 MG/ML
70 INJECTION, SOLUTION INTRAVASCULAR ONCE
Status: COMPLETED | OUTPATIENT
Start: 2021-10-25 | End: 2021-10-25

## 2021-10-25 RX ORDER — PANTOPRAZOLE SODIUM 40 MG/1
40 TABLET, DELAYED RELEASE ORAL
Status: DISCONTINUED | OUTPATIENT
Start: 2021-10-25 | End: 2021-10-27 | Stop reason: HOSPADM

## 2021-10-25 RX ORDER — PREDNISONE 5 MG/1
5 TABLET ORAL DAILY
Status: DISCONTINUED | OUTPATIENT
Start: 2021-10-25 | End: 2021-10-27 | Stop reason: HOSPADM

## 2021-10-25 RX ORDER — CLORAZEPATE DIPOTASSIUM 3.75 MG/1
15 TABLET ORAL DAILY
Status: DISCONTINUED | OUTPATIENT
Start: 2021-10-25 | End: 2021-10-26

## 2021-10-25 RX ADMIN — GABAPENTIN 400 MG: 300 CAPSULE ORAL at 11:59

## 2021-10-25 RX ADMIN — ALBUTEROL SULFATE 2 PUFF: 90 AEROSOL, METERED RESPIRATORY (INHALATION) at 14:41

## 2021-10-25 RX ADMIN — ACETAMINOPHEN 975 MG: 325 TABLET, FILM COATED ORAL at 20:56

## 2021-10-25 RX ADMIN — GABAPENTIN 100 MG: 100 CAPSULE ORAL at 21:18

## 2021-10-25 RX ADMIN — FOLIC ACID 1 MG: 1 TABLET ORAL at 10:14

## 2021-10-25 RX ADMIN — CEVIMELINE 30 MG: 30 CAPSULE ORAL at 10:14

## 2021-10-25 RX ADMIN — GABAPENTIN 400 MG: 300 CAPSULE ORAL at 17:18

## 2021-10-25 RX ADMIN — CEVIMELINE 30 MG: 30 CAPSULE ORAL at 14:41

## 2021-10-25 RX ADMIN — POTASSIUM CHLORIDE 20 MEQ: 1500 TABLET, EXTENDED RELEASE ORAL at 08:48

## 2021-10-25 RX ADMIN — ASPIRIN 81 MG: 81 TABLET, COATED ORAL at 21:18

## 2021-10-25 RX ADMIN — PREDNISONE 5 MG: 5 TABLET ORAL at 08:48

## 2021-10-25 RX ADMIN — PANTOPRAZOLE SODIUM 40 MG: 40 TABLET, DELAYED RELEASE ORAL at 08:47

## 2021-10-25 RX ADMIN — ACETAMINOPHEN 975 MG: 325 TABLET, FILM COATED ORAL at 06:44

## 2021-10-25 RX ADMIN — SODIUM CHLORIDE, PRESERVATIVE FREE 10 ML: 5 INJECTION INTRAVENOUS at 18:44

## 2021-10-25 RX ADMIN — GABAPENTIN 400 MG: 300 CAPSULE ORAL at 08:48

## 2021-10-25 RX ADMIN — IOPAMIDOL 70 ML: 755 INJECTION, SOLUTION INTRAVENOUS at 19:12

## 2021-10-25 RX ADMIN — MELATONIN TAB 3 MG 3 MG: 3 TAB at 00:43

## 2021-10-25 RX ADMIN — ALBUTEROL SULFATE 2 PUFF: 90 AEROSOL, METERED RESPIRATORY (INHALATION) at 21:01

## 2021-10-25 RX ADMIN — ONDANSETRON 4 MG: 2 INJECTION INTRAMUSCULAR; INTRAVENOUS at 05:48

## 2021-10-25 RX ADMIN — CEVIMELINE 30 MG: 30 CAPSULE ORAL at 19:48

## 2021-10-25 ASSESSMENT — ACTIVITIES OF DAILY LIVING (ADL)
ADLS_ACUITY_SCORE: 15

## 2021-10-25 NOTE — PROGRESS NOTES
RECEIVING UNIT ED HANDOFF REVIEW    ED Nurse Handoff Report was reviewed by: John Carl RN on October 24, 2021 at 11:50 PM

## 2021-10-25 NOTE — PROGRESS NOTES
Pt transferred from ED. Resting in room at this time. Denies pain. Complaints of some dizziness.   VSS on RA.   Fluids running.   Snack administered.

## 2021-10-25 NOTE — UTILIZATION REVIEW
Admission Status; Secondary Review Determination     Admission Date: 10/24/2021 10:08 AM       Under the authority of the Utilization Management Committee, the utilization review process indicated a secondary review on the above patient.  The review outcome is based on review of the medical records, discussions with staff, and applying clinical experience noted on the date of the review.        (x)      Inpatient Status Appropriate - This patient's medical care is consistent with medical management for inpatient care and reasonable inpatient medical practice.       RATIONALE FOR DETERMINATION      Brief clinical presentation, information copied from the chart, abbreviated and edited for relevant content:     Agnes Saravia is a 76 year old female presented with Acute vertigo, positional, with history of episodic vertigo in the past 25 years ago with recurrence couple of weeks ago.  With some of them she does experience blackout/loss of consciousness in the fall.  Most likely etiology would be inner ear dysfunction.  However, differential diagnosis would include vertebrobasilar disease. Neurology Recommend CT angiogram of the [place] and posterior circulation. Also with  Gait disorder-multifactorial due to effects of obesity, degenerative joint disease and now vertigo.  Continue with physical therapy      Currently, more than 2 nights hospital complex care was anticipated. Also, there was a risk of adverse outcome if patient was treated outpatient or observation. High intensity of services anticipated. Inpatient admission appropriate based on Medicare guidelines.       The information on this document is developed by the utilization review team in order for the business office to ensure compliance.  This only denotes the appropriateness of proper admission status and does not reflect the quality of care rendered.         The definitions of Inpatient Status and Observation Status used in making the  determination above are those provided in the CMS Coverage Manual, Chapter 1 and Chapter 6, section 70.4.      Sincerely,      Annette Bernstein MD   Utilization Review/ Case Management  [facility].

## 2021-10-25 NOTE — PROGRESS NOTES
10/25/21 1100   Quick Adds   Quick Adds Certification;Vestibular Eval   Type of Visit Initial PT Evaluation   Living Environment   People in home spouse  (partner Lima )   Current Living Arrangements house   Home Accessibility stairs to enter home   Number of Stairs, Main Entrance 2   Stair Railings, Main Entrance railings safe and in good condition   Transportation Anticipated family or friend will provide   Living Environment Comments lives in one story home with bed/bath on main level, 2-3 GO with rails, usually uses 4WW. has had 2 recent falls related to dizziness/vertigo with needing EMS to assist getting up.    Self-Care   Usual Activity Tolerance moderate   Current Activity Tolerance poor   Regular Exercise No   Equipment Currently Used at Home walker, rolling  (4WW)   Activity/Exercise/Self-Care Comment independent with self-cares and mobility however limited by RA and bilateral knee/hip pain, spouse Lima helpful however is not able to help up patient when she falls, both retired    Disability/Function   Fall history within last six months yes   Number of times patient has fallen within last six months 4   General Information   Onset of Illness/Injury or Date of Surgery 10/24/21   Referring Physician Marilee Phipps MD   Patient/Family Therapy Goals Statement (PT) return home, get stronger. Per spouse Lima, wants patient to walk better before returning home, open to TCU    Pertinent History of Current Problem (include personal factors and/or comorbidities that impact the POC) 76 year old female with history of Sjogren's syndrome, ILD, DANA, HTN admitted on 10/24/2021 for vertigo with nausea and vomiting. Symptoms came on suddenly while she was in the shower this morning and are persisting. MRI brain was negative for infarct in the posterior circulation. She has had multiple recent falls recently and was just referred to neurology by her PCP for work up of balance deficits   Existing  Precautions/Restrictions fall   General Observations fatigued, eyes look heavy, difficulty maintaining attention and eye focus    Cognition   Orientation Status (Cognition) oriented x 4   Affect/Mental Status (Cognition) WFL   Follows Commands (Cognition) WFL   Pain Assessment   Patient Currently in Pain Yes, see Vital Sign flowsheet  (chronic pain bilateral hips/knees, R LE more painful than L)   Strength   Strength Comments generalized weakness bilateral LEs, all at least 3/5 MMT    Bed Mobility   Comment (Bed Mobility) Mod A x 1 supine <> sit with HOB elevated and bed rail    Transfers   Transfer Safety Comments Min A x 1 sit <> stand with 4WW   Gait/Stairs (Locomotion)   [place] [employer] Level (Gait) minimum assist (75% patient effort)   Assistive Device (Gait) walker, 4-wheeled   Distance in Feet (Required for LE Total Joints) 120' x 2    Negotiation (Stairs) stairs independence   [place] [employer] Level (Stairs) minimum assist (75% patient effort)   Comment (Gait/Stairs) 3 stairs with bilateral rails, Min A x 1. One near fall in hallway while walking    Balance   Balance Comments poor static and dynamic balance, high fall risk with functional mobility assessment    Sensory Examination   Sensory Perception patient reports no sensory changes   Cervicogenic Screen   Neck ROM Limited cervical rotation bilaterally, has > 45 deg bilaterally    Oculomotor Exam   Smooth Pursuit Normal   VOR Abnormal   Saccades Normal   Infrared Goggle Exam or Frenzel Lense Exam   Spontaneous Nystagmus Negative   Gaze Evoked Nystagmus Horizontal R   [place]-Hallpike (Right) Upbeating R torsional  (very few beats, increased dizziness ulisses lieberman pike R vs. L )   Bell[place]-Hallpike (Left) Negative  (mild dizziness, less than R side, no nystagmus noted )   Ulisses-Hallpike (Right) Comments Pt given meclizine in AM, affecting testing results. most symptoms seen on right side and during ulisses lieberman pike    Clinical Impression   Criteria for Skilled Therapeutic  Intervention yes, treatment indicated   PT Diagnosis (PT) impaired mobility   Influenced by the following impairments vertigo, nausea, impaired balance   Functional limitations due to impairments fall risk, decreased activity tolerance    Clinical Presentation Stable/Uncomplicated   Clinical Presentation Rationale clinical judgement    Clinical Decision Making (Complexity) low complexity   Therapy Frequency (PT) Daily   Predicted Duration of Therapy Intervention (days/wks) 7 days    Planned Therapy Interventions (PT) balance training;bed mobility training;gait training;home exercise program;neuromuscular re-education;strengthening;stair training;transfer training   Anticipated Equipment Needs at Discharge (PT) walker, rolling   Risk & Benefits of therapy have been explained evaluation/treatment results reviewed;care plan/treatment goals reviewed;risks/benefits reviewed;current/potential barriers reviewed;participants voiced agreement with care plan;participants included;patient;spouse/significant other   PT Discharge Planning    PT Discharge Recommendation (DC Rec) Transitional Care Facility;home with home care physical therapy   PT Rationale for DC Rec Patient currently demonstrating signs and symptoms consistent with BPPV, most positive signs for R posterior canalithiasis with 2 repositioning treatments performed. Patient currently a high fall risk d/t ongoing nausea, vertigo and impaired balance which would best be treated at TCU to improve safety and allow patient to return home and to PLOF safely. If patient unable to d/c to TCU will need A x 1 with all mobility, FWW and wheelchair for long distance and HHPT. Will continue to treat and assses safety for home, pt does have supportive partner, Lima and lives in one story house.    PT Brief overview of current status  Mod A bed mob, Min A transfers/gait, high fall risk, +R posterior canalithiasis with 2 repositioning techniques    Therapy Certification   Start of  care date 10/25/21   Certification date from 10/25/21   Certification date to 11/01/21   Medical Diagnosis vertigo, BPPV    Total Evaluation Time   Total Evaluation Time (Minutes) 15

## 2021-10-25 NOTE — PLAN OF CARE
[facility]      OUTPATIENT PHYSICAL THERAPY EVALUATION  PLAN OF TREATMENT FOR OUTPATIENT REHABILITATION  (COMPLETE FOR INITIAL CLAIMS ONLY)  Patient's Last Name, First Name, M.I.  YOB: 1945  ManjitAgnes  PADMINI                        Provider's Name  [facility] Medical Record No.  [id number]                               Onset Date:  10/24/21   Start of Care Date:  10/25/21      Type:     _X_PT   ___OT   ___SLP Medical Diagnosis:  vertigo, BPPV                         PT Diagnosis:  impaired mobility   Visits from SOC:  1   _________________________________________________________________________________  Plan of Treatment/Functional Goals    Planned Interventions: balance training, bed mobility training, gait training, home exercise program, neuromuscular re-education, strengthening, stair training, transfer training     Goals: See Physical Therapy Goals on Care Plan in [employer] electronic health record.    Therapy Frequency: Daily  Predicted Duration of Therapy Intervention: 7 days   _________________________________________________________________________________    I CERTIFY THE NEED FOR THESE SERVICES FURNISHED UNDER        THIS PLAN OF TREATMENT AND WHILE UNDER MY CARE     (Physician co-signature of this document indicates review and certification of the therapy plan).              Certification date from: 10/25/21, Certification date to: 11/01/21    Referring Physician: Marilee Phipps MD            Initial Assessment        See Physical Therapy evaluation dated 10/25/21 in Epic electronic health record.

## 2021-10-25 NOTE — PLAN OF CARE
Observation Goals    -diagnostic tests and consults completed and resulted Not Met  -vital signs normal or at patient baseline Met /77   Pulse 75   Temp 98  F (36.7  C) (Oral)   Resp 18   SpO2 92%     -tolerating oral intake to maintain hydration Met  -returns to baseline functional status Not Met  -safe disposition plan has been identified Not Met  Nurse to notify provider when observation goals have been met and patient is ready for discharge.    Pt is A&Ox4, VSS, and tolerating a regular diet. Pt had a purewick in this AM, but after PT was interested in getting up and going to the bathroom. Pt is up with assist of 1 with a GB and a walker. Utilized PRN albuterol inhaler once for a mild amount of wheezing. Nurse will continue to monitor.

## 2021-10-25 NOTE — PLAN OF CARE
Observation Goals    -diagnostic tests and consults completed and resulted Not Met  -vital signs normal or at patient baseline Met /77   Pulse 75   Temp 98  F (36.7  C) (Oral)   Resp 18   SpO2 92%     -tolerating oral intake to maintain hydration Met  -returns to baseline functional status Not Met  -safe disposition plan has been identified Not Met  Nurse to notify provider when observation goals have been met and patient is ready for discharge.   [facility]  Neurology  Consult Note    Patient Name: Andrea Arenas  MRN: [id number]  Admission Date: 7/21/2019  Hospital Length of Stay: 0 days  Code Status: Full Code   Attending Provider: Talisha Jaramillo MD   Consulting Provider: Nora Orellana NP  Primary Care Physician: Cornel J. Jeansonne, MD  Principal Problem:First time seizure    Consults  Subjective:     Chief Complaint:  Seizures    HPI:   Patient seen and examined    Patient is a 8 y/o Male with a negative PMH except for prematurity.       All medications reviewed    Patient presented with: new onset generalized seizure in the middle of the night while playing video games with his older brother. Mother stated that older son got her out of the shower and said his brother was shaking all over. Mom describes event as eyes rolled in back of his head, generalized shaking all extremities which lasted approx 7 minutes, AMS, urinated on self but did not bite tongue. Post ictal for approx 15 minutes. Denies any similar event in the past. Patient generally healthy. 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 Upon exam, patient was sleeping but awakened easily to verbal stimuli. Stated that he is just tired. Per nursing staff, patient has been sleeping on and off most of the day.     Brain imaging:  MRI brain: The study is mildly motion degraded.  Otherwise, normal imaging of the brain.  There is no acute abnormality. There is no intracranial hemorrhage, mass/mass effect, acute edema or ischemia.    CT head: Negative head CT.  There is no acute intracranial abnormality.  There is no hemorrhage, mass/mass effect, acute edema or ischemia. There is no acute skull fracture.    EEG-  IMPRESSION:  abnormal EEG during wakefulness, drowsiness and sleep.    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  No  seizures were recorded during this study.    The overall burden of the epileptiform discharges during sleep ranged from 25 to   50%.       Past Medical History:   Diagnosis Date    Premature baby        Past Surgical History:   Procedure Laterality Date    CIRCUMCISION      CIRCUMCISION, PRIMARY         Review of patient's allergies indicates:  No Known Allergies    Current Neurological Medications: none    No current facility-administered medications on file prior to encounter.      No current outpatient medications on file prior to encounter.      Family History     Problem Relation (Age of Onset)    Diabetes Mother    Hypertension Mother    No Known Problems Father, Sister, Brother        Tobacco Use    Smoking status: Passive Smoke Exposure - Never Smoker    Smokeless tobacco: Never Used   Substance and Sexual Activity    Alcohol use: No    Drug use: No    Sexual activity: Never     Review of Systems   Constitutional: Positive for fatigue.   All other systems reviewed and are negative.    Objective:     Vital Signs (Most Recent):  Temp: 98.1 °F (36.7 °C) (07/21/19 0730)  Pulse: (!) 55 (07/21/19 1020)  Resp: 16 (07/21/19 0730)  BP: (!) 98/56 (07/21/19 0730)  SpO2: 98 % (07/21/19 1020) Vital Signs (24h Range):  Temp:  [97.7 °F (36.5 °C)-98.8 °F (37.1 °C)] 98.1 °F (36.7 °C)  Pulse:  [45-96] 55  Resp:  [16-22] 16  SpO2:  [97 %-100 %] 98 %  BP: ()/(50-87) 98/56     Weight: 37.6 kg (82 lb 14.3 oz)  Body mass index is 18.58 kg/m².    Physical Exam   Constitutional: He is oriented to person, place, and time. He appears well-developed and well-nourished.   HENT:   Head: Atraumatic.   Mouth/Throat: Mucous membranes are moist. Dentition is normal. Oropharynx is clear.   Eyes: Conjunctivae are normal.   Neck: Normal range of motion. Neck supple.   Cardiovascular: Regular rhythm, S1 normal and S2 normal. Bradycardia present. Pulses are strong.   Pulmonary/Chest: Effort normal and breath sounds normal.    Abdominal: Soft. Bowel sounds are normal.   Musculoskeletal: Normal range of motion.   Neurological: He is alert and oriented to person, place, and time. He has normal strength. He displays normal reflexes. No cranial nerve deficit or sensory deficit. He exhibits normal muscle tone. He has a normal Finger-Nose-Finger Test. Gait normal. Coordination normal.   Reflex Scores:       Bicep reflexes are 2+ on the right side and 2+ on the left side.       Brachioradialis reflexes are 2+ on the right side and 2+ on the left side.       Patellar reflexes are 2+ on the right side and 2+ on the left side.  Skin: Skin is warm and dry. Capillary refill takes less than 2 seconds.   Vitals reviewed.      NEUROLOGICAL EXAMINATION:     MENTAL STATUS   Oriented to person, place, and time.   Attention: normal. Concentration: normal.   Level of consciousness: alert    CRANIAL NERVES   Cranial nerves II through XII intact.     MOTOR EXAM   Muscle bulk: normal  Overall muscle tone: normal    Strength   Strength 5/5 throughout.     REFLEXES     Reflexes   Right brachioradialis: 2+  Left brachioradialis: 2+  Right biceps: 2+  Left biceps: 2+  Right patellar: 2+  Left patellar: 2+  Right plantar: normal  Left plantar: normal    SENSORY EXAM   Light touch normal.     GAIT AND COORDINATION     Gait  Gait: normal     Coordination   Finger to nose coordination: normal      Significant Labs: All pertinent lab results from the past 24 hours have been reviewed.    Significant Imaging: I have reviewed all pertinent imaging results/findings within the past 24 hours.    Assessment and Plan:   Seizures- Rolandic Epilepsy  - no AEDs for now  - will likely grow out of it  -stable, back to baseline. Ok for d/c from a neurological standpoint.   - suggest f/u with pediatric neurology next week at [facility] if possible. If not, F/U with Dr. Miller-  [facility] 7-10 days post d/c.    Active Diagnoses:    Diagnosis Date Noted POA     PRINCIPAL PROBLEM:  First time seizure [R56.9] 07/21/2019 Yes    Convulsions [R56.9] 07/21/2019 Yes      Problems Resolved During this Admission:       VTE Risk Mitigation (From admission, onward)    None          Thank you for your consult. I will sign off. Please contact us if you have any additional questions.    Nora Orellana, NP  [facility]    I, Dr. Wily Valencia, have personally seen and examined the patient with my advanced provider and agree with above. I personally did a focused exam, and reviewed all necessary clinical information. I discussed my management plan with my NP and agree with above. Neurologically intact. One event. Seizure education provided.

## 2021-10-25 NOTE — CONSULTS
[facility]    Neurology Consultation    Agnes Saravia MRN# [id number]   YOB: 1945 Age: 76 year old    Code Status:Full Code   Date of Admission: 10/24/2021  Date of Consult:10/25/2021                                                                                       Assessment and Plan:                                         #.  Acute vertigo, positional, history of episodic vertigo in the past 25 years ago with recurrence couple of weeks ago.  With some of them she does experience blackout/loss of consciousness in the fall.  Most likely etiology would be inner ear dysfunction.  However, differential diagnosis would include vertebrobasilar disease  --Recommend CT angiogram of the [place] and posterior circulation  Agree with plan to continue physical therapy-per physical therapy recommendation either transitional care or home with assistance if the remainder of the work-up is negative.  In the circumstances, meclizine has marginal value and I typically recommend for significant vertigo symptoms that prevent ambulation or cause severe nausea and vomiting.  #.  Gait disorder-multifactorial due to effects of obesity, degenerative joint disease and now vertigo.  --Agree with physical therapy    Will review CTA results when available    #Abnormal area of enhancement in the left frontal lobe on MRI-this is a nonspecific abnormality  Advise follow-up MRI scan in approximately 4 months for surveillance    ----------------------------------------------------------------------------------  ----------------------------------------------------------------------------------  Reason for consult: as above       Chief Complaint:   Blacked out  History is obtained from the patient / chart       History of Present Illness:   This patient is a 76 year old female who presents with recurrent vertigo.  She reports that she had her first attack of vertigo 25 years ago.  She recalls  "having severe episodes with nausea and vomiting.  She recalls that there were 2 and then she went 25 years with no difficulty.  She reports over the last couple of weeks, she has had recurrent vertigo.  Some of them have been associated with \"blackout\".  During these episodes she reports that at least with one of them she passed out completely.  With several of them she had fallen to the ground.  With yesterday's episodes that occurred after shower when she was in her lazy boy.  She realized something it happened and called for help and then her roommate called 911.  She has continued to have gait imbalance since in the hospital.  Physical therapy notes are reviewed  Denies any hearing changes or tinnitus with the episodes.  No double vision or visual changes.  Denies any headache.  She does have more chronic neck pain and feels with significant walking difficulty related to leg issues and joint disease.         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     Depressive disorder      Hyperlipidemia      Insomnia      PONV (postoperative nausea and vomiting)      Rheumatoid arthritis(714.0)      Sjogren's syndrome (H)          Past Surgical History:     Past Surgical History:   Procedure Laterality Date     BLEPHAROPLASTY       BRONCHOSCOPY (RIGID OR FLEXIBLE), DIAGNOSTIC N/A 4/11/2018    Procedure: COMBINED BRONCHOSCOPY (RIGID OR FLEXIBLE), LAVAGE;  Bronchoscopy with Lavage;  Surgeon: Manuel Conway MD;  Location:  GI     DISCECTOMY LUMBAR POSTERIOR MICROSCOPIC ONE LEVEL  8/14/2014    Procedure: DISCECTOMY LUMBAR POSTERIOR MICROSCOPIC ONE LEVEL;  Surgeon: Dudley Bernal MD;  Location: [facility]     HYSTERECTOMY TOTAL ABDOMINAL, BILATERAL SALPINGO-OOPHORECTOMY, COMBINED       HYSTERECTOMY, PAP NO LONGER INDICATED       MAMMOPLASTY REDUCTION BILATERAL  5/14/2013    Procedure: MAMMOPLASTY REDUCTION BILATERAL;  BILATERAL REDUCTION " MAMMOPLASTY;  Surgeon: Bruce Nash MD;  Location: [facility]     SHOULDER SURGERY            Social History:     Social History     Socioeconomic History     Marital status: Single     Spouse name: Not on file     Number of children: Not on file     Years of education: Not on file     Highest education level: Not on file   Occupational History     Not on file   Tobacco Use     Smoking status: Former Smoker     Packs/day: 0.25     Years: 10.00     Pack years: 2.50     Types: Cigarettes     Start date: 1971     Quit date: 1981     Years since quittin.8     Smokeless tobacco: Never Used   Substance and Sexual Activity     Alcohol use: Yes     Alcohol/week: 0.0 standard drinks     Comment: beer in summer when mowing lawn      Drug use: No     Sexual activity: Yes     Partners: Male   Other Topics Concern     Parent/sibling w/ CABG, MI or angioplasty before 65F 55M? 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 [employer]     Social Determinants of Health     Financial Resource Strain:      Difficulty of Paying Living Expenses:    Food Insecurity:      Worried About Running Out of Food in the Last Year:      Ran Out of Food in the Last Year:    Transportation Needs:      Lack of Transportation (Medical):      Lack of Transportation (Non-Medical):    Physical Activity:      Days of Exercise per Week:      Minutes of Exercise per Session:    Stress:      Feeling of Stress :    Social Connections:      Frequency of Communication with Friends and Family:      Frequency of Social Gatherings with Friends and Family:      Attends Oriental orthodox Services:      Active Member of Clubs or Organizations:      Attends Club or Organization Meetings:      Marital Status:    Intimate Partner Violence:      Fear of Current or Ex-Partner:      Emotionally Abused:      Physically Abused:      Sexually Abused:      Patient denies smoking, no significant  alcohol intake, denies illicit drugs use lives with a friend      Family History:     Family History   Problem Relation Age of Onset     Cerebrovascular Disease Mother      Cerebrovascular Disease Father      Colon Cancer No family hx of      Reviewed and not felt to be contributory       Home Medications:     Prior to Admission Medications   Prescriptions Last Dose Informant Patient Reported? Taking?   acetaminophen (TYLENOL) 500 MG tablet  at PRN Daughter Yes Yes   Sig: Take 500-1,000 mg by mouth every 6 hours as needed for mild pain   albuterol (PROAIR HFA/PROVENTIL HFA/VENTOLIN HFA) 108 (90 Base) MCG/ACT inhaler  at PRN Daughter No Yes   Sig: Inhale 2 puffs into the lungs every 4 hours as needed for shortness of breath / dyspnea or wheezing   aspirin 81 MG EC tablet 10/23/2021 at hs Daughter Yes Yes   Sig: Take 81 mg by mouth At Bedtime    cevimeline (EVOXAC) 30 MG capsule 10/24/2021 at am x1 Daughter Yes Yes   Sig: Take 30 mg by mouth 3 times daily (AM, Noon, Dinner)   clorazepate dipotassium (TRANXENE) 15 MG tablet 10/24/2021 at am Daughter No Yes   Sig: Take 3/4 tablet (11.25mg) to 1 tablet (15mg) by mouth daily   Patient taking differently: Take 15 mg by mouth daily Take 3/4 tablet (11.25mg) to 1 tablet (15mg) by mouth daily   folic acid (FOLVITE) 1 MG tablet 10/24/2021 at am Daughter Yes Yes   Sig: Take 1 mg by mouth six times a week Every day except Tuesdays when methotrexate is due   gabapentin (NEURONTIN) 100 MG capsule 10/23/2021 at hs Daughter Yes Yes   Sig: Take 100 mg by mouth At Bedtime   gabapentin (NEURONTIN) 400 MG capsule 10/24/2021 at am x1 Daughter Yes Yes   Sig: Take 400 mg by mouth 3 times daily (AM, Noon, Dinner)   hydrochlorothiazide (HYDRODIURIL) 25 MG tablet 10/24/2021 at am Daughter No Yes   Sig: Take 1 tablet (25 mg) by mouth daily   ibuprofen (ADVIL/MOTRIN) 400 MG tablet 10/24/2021 at am x1 Daughter Yes Yes   Sig: Take 800 mg by mouth 3 times daily    ketoconazole (NIZORAL) 2 %  external shampoo 10/24/2021 at am Daughter Yes Yes   Sig: Apply topically every other day During shower   methotrexate 2.5 MG tablet 10/19/2021 at Unknown time Daughter Yes Yes   Sig: Take 7.5 mg by mouth every 7 days (Takes on Tuesdays)   pantoprazole (PROTONIX) 40 MG EC tablet 10/23/2021 at am Daughter No Yes   Sig: TAKE 1 TABLET BY MOUTH ONCE DAILY. Take 30-60 minutes before a meal.   polyethylene glycol-propylene glycol (SYSTANE ULTRA) 0.4-0.3 % SOLN ophthalmic solution  at prn Daughter Yes Yes   Sig: Place 1 drop into both eyes every hour as needed for dry eyes   potassium chloride ER (K-TAB/KLOR-CON) 10 MEQ CR tablet 10/24/2021 at am Daughter No Yes   Sig: Take 2 tablets (20 mEq) by mouth daily   predniSONE (DELTASONE) 5 MG tablet 10/24/2021 at am Daughter Yes Yes   Sig: Take 5 mg by mouth daily.   simvastatin (ZOCOR) 40 MG tablet 10/23/2021 at 1800 Daughter No Yes   Sig: Take 1 tablet (40 mg) by mouth At Bedtime   traZODone (DESYREL) 50 MG tablet 10/23/2021 at hs Daughter No Yes   Sig: Take 0.5 tablets (25 mg) by mouth once daily at bedtime      Facility-Administered Medications: None          Allergy:     Allergies   Allergen Reactions     Mellaril [Thioridazine Hcl] Anaphylaxis     Percocet [Oxycodone-Acetaminophen] Anaphylaxis and Swelling     Tolerates hydrocodone and codeine in cough medicine     Duloxetine      Made her feel weird, increased anxiety          Inpatient Medications:   Scheduled Meds:    aspirin  81 mg Oral At Bedtime     cevimeline  30 mg Oral TID     [Held by provider] clorazepate  15 mg Oral Daily     folic acid  1 mg Oral Once per day on Mon Tue Wed Thu Fri Sat     gabapentin  100 mg Oral At Bedtime     gabapentin  400 mg Oral TID     pantoprazole  40 mg Oral QAM AC     potassium chloride  20 mEq Oral Daily     predniSONE  5 mg Oral Daily     PRN Meds: acetaminophen, albuterol, [Held by provider] meclizine, melatonin, ondansetron **OR** ondansetron, traZODone        Review of Systems     The Review of Systems is negative other than noted in the HPI  CONSTITUTIONAL: negative for fever, chills, change in weight  INTEGUMENTARY/SKIN: no rash or obvious new lesions  ENT/MOUTH: no sore throat, new sinus pain or nasal drainage, no neck mass noted  RESP: Shortness of breath noted due to respiratory condition required inhaler  CV: negative for chest pain, palpitations or peripheral edema  GI: no nausea, vomiting, change in stools  : no dysuria or hematuria  MUSCULOSKELETAL: Joint disease as described above  ENDOCRINE: no history of polyuria, polydyspsia or symptoms of thyroid dysfunction  PSYCHIATRIC: no change in mood stable  LYMPHATIC: no new lymphadenopathy  HEME: no bleeding or easy bruisability  NEURO: see HPI       Physical Exam:   Physical Exam   Vitals:  Height:Data Unavailable  Weight:0 lbs 0 oz   Temp: 98  F (36.7  C) Temp src: Oral BP: 132/77 Pulse: 75   Resp: 18 SpO2: 92 % O2 Device: None (Room air)    General Appearance:  No acute distress, easily winded.  Overweight body habitus  Neuro:       Mental Status Exam:    Alert and oriented.  Language and speech intact       Cranial Nerves:   2 through 12 grossly intact.  No nystagmus           Motor:   Tone bulk and strength intact x4           Reflexes: Symmetrically intact, reduced at ankles.  Plantar signs normal.  No clonus       Sensory: Vibration and cold sense intact x4                   Coordination:   Consistent with body habitus       Gait: Had much difficulty sitting up on her own required some assistance.  Was able to stand briefly  Neck: no nuchal rigidity, normal thyroid. No carotid bruits.  She did have significant point tenderness in the suboccipital region    Extremities: No clubbing, no cyanosis, no edema       Data:   ROUTINE IP LABS   CBC RESULTS:     Recent Labs   Lab 10/25/21  0644 10/24/21  1014   WBC 10.4 11.2*   RBC 3.87 4.17   HGB 11.1* 11.9   HCT 34.9* 37.8    356     Basic Metabolic Panel:   Recent Labs   Lab  Test 10/25/21  1316 10/25/21  0644 10/24/21  1014 06/16/21  1101   NA  --  141 137 135   POTASSIUM 4.2 3.3* 3.1* 4.3   CHLORIDE  --  106 101 99   CO2  --  30 29 34*   BUN  --  11 18 17   CR  --  0.66 0.68 0.72   GLC  --  98 134* 121*   CHRISTOPHER  --  8.1* 8.4* 9.2     Liver panel:  Recent Labs   Lab Test 06/16/21  1101 07/03/20  0905 03/25/20  0101 06/04/19  0810 10/23/17  0901   PROTTOTAL 7.5 7.7 7.3 7.8 7.0   ALBUMIN 3.5 3.8 3.4 3.8 3.3*   BILITOTAL 0.7 1.0 0.6 0.6 0.7   ALKPHOS 74 82 78 84 84   AST 18 26 15 21 17   ALT 20 32 19 23 22     Lipid Profile:  Recent Labs   Lab Test 06/16/21  1101 07/03/20  0905 06/04/19  0810 10/23/17  0901 10/04/16  0810 09/30/15  0842 09/30/15  0842 08/06/14  0738 08/06/14  0738 04/21/14  0747 04/21/14  0747   CHOL 198 208* 204* 175 197   < > 200*   < > 188   < > 202*   HDL 50 46* 41* 49* 45*   < > 47*   < > 64   < > 57   * 113* 113* 92 100*   < > 102   < > 85   < > 94   TRIG 175* 244* 251* 169* 262*   < > 254*   < > 196*   < > 255*   CHOLHDLRATIO  --   --   --   --   --   --  4.3  --  2.9  --  3.5    < > = values in this interval not displayed.     Thyroid Panel:  Recent Labs   Lab Test 06/25/21  1322   TSH 2.09             IMAGING:   All imaging studies were reviewed personally    MRI brain:   -EXAM: MR BRAIN W/O and W CONTRAST  LOCATION: [facility]  DATE/TIME: 10/24/2021 1:49 PM     INDICATION: Dizziness, dehydration or hypotension. Dizziness, arrhythmia or new vasoactive medication.  COMPARISON: None.  CONTRAST: 10 mL Gadavist  TECHNIQUE: Routine multiplanar multisequence head MRI without and with intravenous contrast.                                                                           IMPRESSION:  1.  No evidence of acute ischemia or hemorrhage.  2.  Small area of apparent enhancement involving the left inferior frontal lobe. Differential diagnosis includes subacute infarct, metastatic disease, focal infectious/inflammatory process, incidental  venous structure, or artifact. Follow-up MRI could be   considered.  3.  Volume loss and presumed microvascular ischemic changes as detailed above      Time:  50minutes evaluation and management.     Anabelle Sandy M.D.  [facility]  Office [phone]

## 2021-10-25 NOTE — PLAN OF CARE
VSS. A&O x4. Denies pain. Up with 1, GB and walker. Plan to have CT after PIV placement. Will continue to monitor.

## 2021-10-25 NOTE — PROGRESS NOTES
Pt admitted for dizziness/vertigo, n/v    VSS on RA    Denies pain (states history of fibro myalgia)    Fluids running at 100.     A/Ox4    Intermittant nausea     1 assist walker+ GB    Purewic used overnight.

## 2021-10-25 NOTE — ED NOTES
Pt moved from [facility] to Hospital bed, repositioned, hover mat removed, Myra Hugger turned on, Purewick continued.

## 2021-10-25 NOTE — PROGRESS NOTES
[facility]    Hospitalist Progress Note      Assessment & Plan   Agnes Saravia is a 76 year old female with history of Sjogren's syndrome, ILD, DANA, HTN admitted on 10/24/2021 for vertigo with nausea and vomiting. Symptoms came on suddenly while she was in the shower this morning and are persisting. She has had multiple recent falls recently and was just referred to neurology by her PCP for work up of balance deficits.     Vertigo, presumed BPPV  Patient presents with significant dizziness and associated nausea/vomiting brought on by position changes.   MRI brain w/wo contrast showed some possible frontal lobe abnormalities, likely unrelated to vertigo.   She continues to have persistent symptoms limiting mobility.   - cardiac telemetry  - meclizine and zofran prn- hold meclizine prior to PT assessment   - PT for vestibular assessment as well as safety assessment. Currently recommending TCU vs home with home PT. Pt has partner at home who does not feel she can manage with pt's current mobility function   - Neurology consulted given abnormal MRI findings. PCP had recently placed Neurology referral in light of increasing falls.      Frequent falls  Recent PCP note states that patient has had 4 falls over past 2 months related to loss of balance. Has also had a previous episode of vertigo while sitting at the edge of bed. Suspect deconditioning is a large contributor here as she has been much less active during COVID.   - PT/OT as above     Hypokalemia  - check magnesium  - replete K     RA  Sjogren's syndrome   Follows with rheumatology.   PTA is on cevimeline, prednisone 5mg daily, methotrexate, gabapentin for pain (PCP recommending taper down)   --continue PTA regimen      Generalized anxiety disorder  - on PTA clozerapate (recently attempted with PCP to taper off but did not tolerate alternative medication). Trazodone for sleep.     Essential Hypertension  - PTA hydrochlorothiazide  dose was reduced recently due to concerns for lightheadedness     Interstitial lung disease  Mild wheezing on exam today. No dyspnea. Will continue prn albuterol      Obesity  - followed by PCP    DVT Prophylaxis: Pneumatic Compression Devices  Code Status: Full Code    Disposition: Expected discharge in 1-2 days home vs TCU pending progress.     Patient was discussed with Dr. Rodríguez who agrees with the above plan     Donna Ceja PA-C    Interval History   Reports persistent symptoms of dizziness with head movements, position changes worsened after therapy. Mild nausea. No visual changes, focal weakness, sensation changes. No dyspnea, cough, CP. No changes in hearing or tinnitus.     -Data reviewed today: I reviewed all new labs and imaging results over the last 24 hours. I personally reviewed no images or EKG's today.    Physical Exam   Temp: 98.2  F (36.8  C) Temp src: Oral BP: (!) 142/68 Pulse: 78   Resp: 18 SpO2: 95 % O2 Device: None (Room air)    There were no vitals filed for this visit.  Vital Signs with Ranges  Temp:  [97.5  F (36.4  C)-98.2  F (36.8  C)] 98.2  F (36.8  C)  Pulse:  [68-78] 78  Resp:  [18] 18  BP: (139-154)/(61-74) 142/68  SpO2:  [93 %-96 %] 95 %  I/O last 3 completed shifts:  In: 1000 [IV Piggyback:1000]  Out: 400 [Urine:400]    Constitutional: Alert and oriented, laying down in bed. Appears comfortable and is appropriately conversant   ENT: moist mucous membranes  Eyes:  Pupils are equal and reactive to light, EOMI. Slight horizontal nystagmus to the right. Eye movements exacerbate symptoms  Respiratory: Lungs with scattered mild expiratory wheeze. No increased work of breathing or crackles.   Cardiovascular: Regular rate and rhythm  GI:  active bowel sounds, abdomen soft, non-tender  MSK: Moves all four extremities. Normal tone.   Neuro:  Speech is clear. Face symmetric. CN 2-12 grossly intact.     Medications     0.9% sodium chloride + KCl 20 mEq/L 100 mL/hr at 10/24/21 5625          Data   Recent Labs   Lab 10/25/21  0644 10/24/21  1014   WBC 10.4 11.2*   HGB 11.1* 11.9   MCV 90 91    356    137   POTASSIUM 3.3* 3.1*   CHLORIDE 106 101   CO2 30 29   BUN 11 18   CR 0.66 0.68   ANIONGAP 5 7   CHRISTOPHER 8.1* 8.4*   GLC 98 134*       Recent Results (from the past 24 hour(s))   MR Brain w/o & w Contrast    Narrative    EXAM: MR BRAIN W/O and W CONTRAST  LOCATION: [facility]  DATE/TIME: 10/24/2021 1:49 PM    INDICATION: Dizziness, dehydration or hypotension. Dizziness, arrhythmia or new vasoactive medication.  COMPARISON: None.  CONTRAST: 10 mL Gadavist  TECHNIQUE: Routine multiplanar multisequence head MRI without and with intravenous contrast.    FINDINGS:  MOTION ARTIFACT: Mild.    INTRACRANIAL CONTENTS: No acute or subacute infarct. No mass, acute hemorrhage, or extra-axial fluid collections. Patchy and confluent nonspecific T2/FLAIR hyperintensities within the cerebral white matter most consistent with moderate to severe chronic   microvascular ischemic change. Moderate generalized cerebral atrophy. No hydrocephalus. Normal position of the cerebellar tonsils. Small (6 to 7 mm) area of apparent enhancement involving the left inferior frontal lobe near the precentral gyrus.    SELLA: No abnormality accounting for technique.    OSSEOUS STRUCTURES/SOFT TISSUES: Normal marrow signal. The major intracranial vascular flow voids are maintained.     ORBITS: Prior bilateral cataract surgery. Visualized portions of the orbits are otherwise unremarkable.     SINUSES/MASTOIDS: No paranasal sinus mucosal disease. Trace opacification of the right mastoid cavity.       Impression    IMPRESSION:  1.  No evidence of acute ischemia or hemorrhage.  2.  Small area of apparent enhancement involving the left inferior frontal lobe. Differential diagnosis includes subacute infarct, metastatic disease, focal infectious/inflammatory process, incidental venous structure, or  artifact. Follow-up MRI could be   considered.  3.  Volume loss and presumed microvascular ischemic changes as detailed above.

## 2021-10-26 ENCOUNTER — APPOINTMENT (OUTPATIENT)
Dept: PHYSICAL THERAPY | Facility: CLINIC | Age: 76
DRG: 149 | End: 2021-10-26
Payer: MEDICARE

## 2021-10-26 PROCEDURE — 120N000004 HC R&B MS OVERFLOW

## 2021-10-26 PROCEDURE — 250N000013 HC RX MED GY IP 250 OP 250 PS 637: Performed by: PHYSICIAN ASSISTANT

## 2021-10-26 PROCEDURE — 250N000012 HC RX MED GY IP 250 OP 636 PS 637: Performed by: PHYSICIAN ASSISTANT

## 2021-10-26 PROCEDURE — 95992 CANALITH REPOSITIONING PROC: CPT | Mod: GP

## 2021-10-26 PROCEDURE — 99232 SBSQ HOSP IP/OBS MODERATE 35: CPT | Performed by: PHYSICIAN ASSISTANT

## 2021-10-26 PROCEDURE — 250N000013 HC RX MED GY IP 250 OP 250 PS 637: Performed by: HOSPITALIST

## 2021-10-26 PROCEDURE — 250N000011 HC RX IP 250 OP 636: Performed by: HOSPITALIST

## 2021-10-26 PROCEDURE — 97112 NEUROMUSCULAR REEDUCATION: CPT | Mod: GP,59

## 2021-10-26 RX ORDER — CLORAZEPATE DIPOTASSIUM 7.5 MG/1
15 TABLET ORAL DAILY
Status: DISCONTINUED | OUTPATIENT
Start: 2021-10-26 | End: 2021-10-27 | Stop reason: HOSPADM

## 2021-10-26 RX ADMIN — FOLIC ACID 1 MG: 1 TABLET ORAL at 09:57

## 2021-10-26 RX ADMIN — POTASSIUM CHLORIDE 20 MEQ: 1500 TABLET, EXTENDED RELEASE ORAL at 09:04

## 2021-10-26 RX ADMIN — GABAPENTIN 400 MG: 300 CAPSULE ORAL at 09:04

## 2021-10-26 RX ADMIN — ALBUTEROL SULFATE 2 PUFF: 90 AEROSOL, METERED RESPIRATORY (INHALATION) at 11:25

## 2021-10-26 RX ADMIN — CEVIMELINE 30 MG: 30 CAPSULE ORAL at 09:04

## 2021-10-26 RX ADMIN — PANTOPRAZOLE SODIUM 40 MG: 40 TABLET, DELAYED RELEASE ORAL at 06:33

## 2021-10-26 RX ADMIN — CLORAZEPATE DIPOTASSIUM 15 MG: 7.5 TABLET ORAL at 12:30

## 2021-10-26 RX ADMIN — ASPIRIN 81 MG: 81 TABLET, COATED ORAL at 21:04

## 2021-10-26 RX ADMIN — GABAPENTIN 400 MG: 300 CAPSULE ORAL at 12:31

## 2021-10-26 RX ADMIN — CEVIMELINE 30 MG: 30 CAPSULE ORAL at 12:34

## 2021-10-26 RX ADMIN — MECLIZINE HYDROCHLORIDE 25 MG: 25 TABLET ORAL at 17:35

## 2021-10-26 RX ADMIN — ALBUTEROL SULFATE 2 PUFF: 90 AEROSOL, METERED RESPIRATORY (INHALATION) at 04:50

## 2021-10-26 RX ADMIN — GABAPENTIN 400 MG: 300 CAPSULE ORAL at 17:06

## 2021-10-26 RX ADMIN — GABAPENTIN 100 MG: 100 CAPSULE ORAL at 21:03

## 2021-10-26 RX ADMIN — ONDANSETRON 4 MG: 2 INJECTION INTRAMUSCULAR; INTRAVENOUS at 09:57

## 2021-10-26 RX ADMIN — TRAZODONE HYDROCHLORIDE 25 MG: 50 TABLET ORAL at 21:34

## 2021-10-26 RX ADMIN — CEVIMELINE 30 MG: 30 CAPSULE ORAL at 21:03

## 2021-10-26 RX ADMIN — PREDNISONE 5 MG: 5 TABLET ORAL at 09:04

## 2021-10-26 ASSESSMENT — ACTIVITIES OF DAILY LIVING (ADL)
ADLS_ACUITY_SCORE: 10
ADLS_ACUITY_SCORE: 10
ADLS_ACUITY_SCORE: 15
WALKING_OR_CLIMBING_STAIRS: STAIR CLIMBING DIFFICULTY, REQUIRES EQUIPMENT
ADLS_ACUITY_SCORE: 15
ADLS_ACUITY_SCORE: 10
ADLS_ACUITY_SCORE: 15
ADLS_ACUITY_SCORE: 15
ADLS_ACUITY_SCORE: 10
ADLS_ACUITY_SCORE: 15
ADLS_ACUITY_SCORE: 15
ADLS_ACUITY_SCORE: 10
WALKING_OR_CLIMBING_STAIRS_DIFFICULTY: YES
ADLS_ACUITY_SCORE: 10
VISION_MANAGEMENT: GLASSES
ADLS_ACUITY_SCORE: 10
DOING_ERRANDS_INDEPENDENTLY_DIFFICULTY: YES
ADLS_ACUITY_SCORE: 10
ADLS_ACUITY_SCORE: 15
ADLS_ACUITY_SCORE: 10
ADLS_ACUITY_SCORE: 15
ADLS_ACUITY_SCORE: 10
ADLS_ACUITY_SCORE: 10
CONCENTRATING,_REMEMBERING_OR_MAKING_DECISIONS_DIFFICULTY: NO
TOILETING_ISSUES: NO
DRESSING/BATHING_DIFFICULTY: NO
WEAR_GLASSES_OR_BLIND: YES
DIFFICULTY_EATING/SWALLOWING: NO
ADLS_ACUITY_SCORE: 10
ADLS_ACUITY_SCORE: 10
ADLS_ACUITY_SCORE: 15
ADLS_ACUITY_SCORE: 10
DIFFICULTY_COMMUNICATING: NO
ADLS_ACUITY_SCORE: 15

## 2021-10-26 NOTE — PROGRESS NOTES
[facility]  [facility] [place]  Neurology Daily Note      Admission Date:10/24/2021   Date of service: 10/26/2021   Hospital Day: 3                                                   Assessment and Plan:   #.  Acute vertigo, positional, history of episodic vertigo in the past 25 years ago with recurrence couple of weeks ago.  With some of them she does experience blackout/loss of consciousness in the fall.  Most likely etiology would be inner ear dysfunction.  Neg CTA excludes vertebrobasilar disease    Agree with plan to continue physical therapy-per physical therapy recommendation either transitional care or home with assistance if the remainder of the work-up is negative.  In the circumstances, meclizine has marginal value and I typically recommend for significant vertigo symptoms that prevent ambulation or cause severe nausea and vomiting.  Agree with supportive, pharmacologic mgmt.  If vertigo and HA sx persistent, consider short course of prednisone, can be helpful for acute labyrinthitis.  Weigh benefits vs risks (other medical comorbidities.   #.  Gait disorder-multifactorial due to effects of obesity, degenerative joint disease and now vertigo.  --Agree with physical therapy          #Abnormal area of enhancement in the left frontal lobe on MRI-this is a nonspecific abnormality  Advise follow-up MRI scan in approximately 4 months for surveillance    Will sign off.  Please contact General Neurology service if questions related to neurologic status or follow up needed during this admission.            Interval History:   Acute worsening of vertigo this am during head movement with PT.     Continued positional vertigo/NV and decreased appetite.           Medications:   Scheduled Meds:    aspirin  81 mg Oral At Bedtime     cevimeline  30 mg Oral TID     clorazepate  15 mg Oral Daily     folic acid  1 mg Oral Once per day on Mon Tue Wed Thu Fri Sat     gabapentin  100 mg Oral At  Bedtime     gabapentin  400 mg Oral TID     pantoprazole  40 mg Oral QAM AC     potassium chloride  20 mEq Oral Daily     predniSONE  5 mg Oral Daily     PRN Meds: acetaminophen, albuterol, meclizine, melatonin, ondansetron **OR** ondansetron, traZODone        Physical Exam:   Vitals: Temp: 98  F (36.7  C) Temp src: Oral BP: 136/83 Pulse: 89   Resp: 18 SpO2: 93 % O2 Device: None (Room air)    Vital Signs with Ranges: Temp:  [96.9  F (36.1  C)-98  F (36.7  C)] 98  F (36.7  C)  Pulse:  [62-89] 89  Resp:  [13-27] 18  BP: (122-142)/(47-83) 136/83  SpO2:  [93 %-96 %] 93 %    General Appearance:  Uncomfortable/nauseated  Neuro:       Mental Status Exam:  Alert and oriented. Language and speech intac       Cranial Nerves:  2-12 intact, brief transient nystagmus           Motor:  Ix4.             CTA images and report reviewed: HEAD CTA:  1.  Minor intracranial atherosclerosis. No vessel stenosis, occlusion or aneurysm.     NECK CTA:  1.  Minor atherosclerosis in the neck. No dissection or hemodynamically significant narrowing in the neck by NASCET criteria.      TIME     25minutes Evaluation/management time       Anabelle Sandy M.D.  Neurologist  [facility] Neurology  Office [phone]

## 2021-10-26 NOTE — PLAN OF CARE
Shift [id number]    Neuro: A&Ox4  Cardiac: WDL, Tele discontinued  Respiratory: CENTENO, utilized PRN albuterol inhaler once for expiratory wheezes  GI/: WDL, voiding spontaneously and without difficulty. Pt was using a purewick overnight but has been walking to the bathroom today  Diet/appetite: On a regular diet. After working with PT pt had significant nausea and was not hungry for most of the morning  Activity: Ax1 w/ walker and a gait belt  Pain: Denies pain  Skin: WDL  Lines: L hand PIV SL    /47   Pulse 72   Temp 97.4  F (36.3  C) (Oral)   Resp 18   SpO2 96%

## 2021-10-26 NOTE — PROGRESS NOTES
[facility]    Hospitalist Progress Note      Assessment & Plan   Agnes Saravia is a 76 year old female with history of Sjogren's syndrome, ILD, DANA, HTN admitted on 10/24/2021 for vertigo with nausea and vomiting. Symptoms came on suddenly while she was in the shower this morning and are persisting. She has had multiple recent falls recently and was just referred to neurology by her PCP for work up of balance deficits.     Vertigo, presumed BPPV  Patient presents with significant dizziness and associated nausea/vomiting brought on by position changes.   MRI brain w/wo contrast showed some possible frontal lobe abnormalities, likely unrelated to vertigo.   She continues to have persistent symptoms limiting mobility.   - can discontinue telemetry   - meclizine and zofran prn- hold meclizine prior to PT assessment   - PT for vestibular assessment as well as safety assessment. Currently recommending TCU vs home with home PT. Will reassess tomorrow, patient and partner hopeful for her to return home   - Neurology consulted given abnormal MRI findings. CTA obtained and largely unremarkable. Recommending MRI in 4 months.      Frequent falls  Recent PCP note states that patient has had 4 falls over past 2 months related to loss of balance. Has also had a previous episode of vertigo while sitting at the edge of bed. Suspect deconditioning is a large contributor here as she has been much less active during COVID.   - PT/OT as above     Hypokalemia  - check magnesium  - replete K     RA  Sjogren's syndrome   Follows with rheumatology.   PTA is on cevimeline, prednisone 5mg daily, methotrexate, gabapentin for pain (PCP recommending taper down)   --continue PTA regimen      Generalized anxiety disorder  - on PTA clozerapate (recently attempted with PCP to taper off but did not tolerate alternative medication). Trazodone for sleep.     Essential Hypertension  - PTA hydrochlorothiazide dose was  reduced recently due to concerns for lightheadedness     Interstitial lung disease  Mild wheezing on exam today. No dyspnea. Will continue prn albuterol      Obesity  - followed by PCP    DVT Prophylaxis: Pneumatic Compression Devices  Code Status: Full Code    Disposition: Expected discharge to home with C vs TCU tomorrow pending clinical progress    Patient was discussed with Dr. Yao who agrees with the above plan    Donna Ceja PA-C    Interval History   Patient seen shortly after PT eval and currently very nauseated and dizzy. Able to ambulate to the bathroom earlier today. She is having arm pain from infiltrated IV.     -Data reviewed today: I reviewed all new labs and imaging results over the last 24 hours. I personally reviewed no images or EKG's today.    Physical Exam   Temp: 97.2  F (36.2  C) Temp src: Axillary BP: 131/69 Pulse: 62   Resp: 13 SpO2: 96 % O2 Device: None (Room air)    There were no vitals filed for this visit.  Vital Signs with Ranges  Temp:  [96.9  F (36.1  C)-98.3  F (36.8  C)] 97.2  F (36.2  C)  Pulse:  [62-83] 62  Resp:  [13-27] 13  BP: (122-142)/(62-77) 131/69  SpO2:  [90 %-96 %] 96 %  I/O last 3 completed shifts:  In: -   Out: 275 [Urine:275]       Constitutional: Alert and oriented, sitting up on bed. Appears uncomfortable secondary to nausea   ENT: moist mucous membranes  Eyes:  Pupils are equal and reactive to light, EOMI.    Respiratory: Lungs clear today without wheezing. No increased WOB.  Cardiovascular: Regular rate and rhythm  GI:  active bowel sounds, abdomen soft, non-tender  MSK:   Moves all four extremities. Normal tone.   Neuro:  Speech is clear. Face symmetric. CN 2-12 grossly intact.        Medications       aspirin  81 mg Oral At Bedtime     cevimeline  30 mg Oral TID     [Held by provider] clorazepate  15 mg Oral Daily     folic acid  1 mg Oral Once per day on Mon Tue Wed Thu Fri Sat     gabapentin  100 mg Oral At Bedtime     gabapentin  400 mg Oral  TID     pantoprazole  40 mg Oral QAM AC     potassium chloride  20 mEq Oral Daily     predniSONE  5 mg Oral Daily       Data   Recent Labs   Lab 10/25/21  1316 10/25/21  0644 10/24/21  1014   WBC  --  10.4 11.2*   HGB  --  11.1* 11.9   MCV  --  90 91   PLT  --  346 356   NA  --  141 137   POTASSIUM 4.2 3.3* 3.1*   CHLORIDE  --  106 101   CO2  --  30 29   BUN  --  11 18   CR  --  0.66 0.68   ANIONGAP  --  5 7   CHRISTOPHER  --  8.1* 8.4*   GLC  --  98 134*       Recent Results (from the past 24 hour(s))   CTA Head Neck with Contrast    Narrative    HEAD AND NECK CT ANGIOGRAM WITH IV CONTRAST  10/25/2021 7:01 PM    INDICATION: Dizziness, non-specific Dizziness, persistent/recurrent, cardiac or vascular cause suspected  TECHNIQUE: Head and neck CT angiogram with IV contrast. CT images of the head and neck vessels obtained during the arterial phase of intravenous contrast administration. Axial helical 2D reconstructed images and multiplanar 3D MIP reconstructed images of   the head and neck vessels were performed by the technologist. Dose reduction techniques were used.  CONTRAST: 70mL Isovue 370.  COMPARISON: Brain MRI 10/24/2021, neck CT 6/19/2006     FINDINGS:  HEAD CTA: Minor intracranial atherosclerosis. The intracranial circulation is patent without evidence for aneurysm, proximal vessel occlusion, high-grade intracranial stenosis or arteriovenous malformation. Patent dural venous sinuses.    NECK CTA: Three vessel arch.  Carotid arteries are patent with minor atherosclerotic change.  No hemodynamically significant stenosis by NASCET criteria in either carotid system.  Patent vertebral arteries. No dissection.      Impression    CONCLUSION:  HEAD CTA:  1.  Minor intracranial atherosclerosis. No vessel stenosis, occlusion or aneurysm.    NECK CTA:  1.  Minor atherosclerosis in the neck. No dissection or hemodynamically significant narrowing in the neck by NASCET criteria.

## 2021-10-26 NOTE — PLAN OF CARE
RN:  Assumed care of patient at 1500.  Patient A/O x4 .  VSS on RA.  States her PT session made her dizziness/vertigo worse.  X1 dose of meclizine given @1730 with relief of some of the nausea symptoms.  Tolerating diet, fair appetite.  No emesis.  Up with SBA/walker to the bathroom to void.  IV saline locked.  Discharge pending progress. Disposition between TCU vs home with home care.

## 2021-10-26 NOTE — PROGRESS NOTES
A&Ox4. A1 gb w, vertigo. Reg diet. Tele NS. Purewick. Albuterol inhaler prn for wheeze. VSS. PT, OT consults today. Continue to monitor.

## 2021-10-27 ENCOUNTER — APPOINTMENT (OUTPATIENT)
Dept: PHYSICAL THERAPY | Facility: CLINIC | Age: 76
DRG: 149 | End: 2021-10-27
Payer: MEDICARE

## 2021-10-27 VITALS
SYSTOLIC BLOOD PRESSURE: 112 MMHG | OXYGEN SATURATION: 93 % | HEART RATE: 74 BPM | DIASTOLIC BLOOD PRESSURE: 44 MMHG | TEMPERATURE: 98.3 F | RESPIRATION RATE: 18 BRPM

## 2021-10-27 PROCEDURE — 250N000013 HC RX MED GY IP 250 OP 250 PS 637: Performed by: HOSPITALIST

## 2021-10-27 PROCEDURE — 250N000012 HC RX MED GY IP 250 OP 636 PS 637: Performed by: PHYSICIAN ASSISTANT

## 2021-10-27 PROCEDURE — 99239 HOSP IP/OBS DSCHRG MGMT >30: CPT | Performed by: INTERNAL MEDICINE

## 2021-10-27 PROCEDURE — 97535 SELF CARE MNGMENT TRAINING: CPT | Mod: GP

## 2021-10-27 PROCEDURE — 250N000013 HC RX MED GY IP 250 OP 250 PS 637: Performed by: PHYSICIAN ASSISTANT

## 2021-10-27 PROCEDURE — 97530 THERAPEUTIC ACTIVITIES: CPT | Mod: GP

## 2021-10-27 RX ORDER — MECLIZINE HYDROCHLORIDE 25 MG/1
25 TABLET ORAL 3 TIMES DAILY PRN
Qty: 30 TABLET | Refills: 0 | Status: SHIPPED | OUTPATIENT
Start: 2021-10-27 | End: 2023-10-11

## 2021-10-27 RX ORDER — HYDROCHLOROTHIAZIDE 25 MG/1
25 TABLET ORAL DAILY
Qty: 90 TABLET | Refills: 3 | Status: SHIPPED | OUTPATIENT
Start: 2021-10-27 | End: 2022-06-15

## 2021-10-27 RX ORDER — ONDANSETRON 4 MG/1
4 TABLET, ORALLY DISINTEGRATING ORAL EVERY 6 HOURS PRN
Qty: 30 TABLET | Refills: 0 | Status: SHIPPED | OUTPATIENT
Start: 2021-10-27 | End: 2023-10-11

## 2021-10-27 RX ADMIN — CEVIMELINE 30 MG: 30 CAPSULE ORAL at 09:40

## 2021-10-27 RX ADMIN — PANTOPRAZOLE SODIUM 40 MG: 40 TABLET, DELAYED RELEASE ORAL at 06:59

## 2021-10-27 RX ADMIN — ALBUTEROL SULFATE 2 PUFF: 90 AEROSOL, METERED RESPIRATORY (INHALATION) at 11:24

## 2021-10-27 RX ADMIN — ALBUTEROL SULFATE 2 PUFF: 90 AEROSOL, METERED RESPIRATORY (INHALATION) at 00:48

## 2021-10-27 RX ADMIN — POTASSIUM CHLORIDE 20 MEQ: 1500 TABLET, EXTENDED RELEASE ORAL at 09:40

## 2021-10-27 RX ADMIN — PREDNISONE 5 MG: 5 TABLET ORAL at 09:40

## 2021-10-27 RX ADMIN — FOLIC ACID 1 MG: 1 TABLET ORAL at 09:40

## 2021-10-27 RX ADMIN — ACETAMINOPHEN 975 MG: 325 TABLET, FILM COATED ORAL at 06:59

## 2021-10-27 RX ADMIN — GABAPENTIN 400 MG: 300 CAPSULE ORAL at 09:40

## 2021-10-27 RX ADMIN — ALBUTEROL SULFATE 2 PUFF: 90 AEROSOL, METERED RESPIRATORY (INHALATION) at 05:03

## 2021-10-27 RX ADMIN — MECLIZINE HYDROCHLORIDE 25 MG: 25 TABLET ORAL at 09:44

## 2021-10-27 RX ADMIN — CLORAZEPATE DIPOTASSIUM 15 MG: 7.5 TABLET ORAL at 09:44

## 2021-10-27 ASSESSMENT — ACTIVITIES OF DAILY LIVING (ADL)
ADLS_ACUITY_SCORE: 11
ADLS_ACUITY_SCORE: 10
ADLS_ACUITY_SCORE: 11

## 2021-10-27 NOTE — PROGRESS NOTES
10/27/21 0921   Signing Clinician's Name / Credentials   Signing clinician's name / credentials Brittany Dressler, DPT, NCS   Quick Adds   Rehab Discipline PT - vestibular   Functional Transfer Training   Minutes of Treatment 10   Symptoms Noted During/After Treatment fatigue   Treatment Detail PT: Safe mobility assessment via pt walking to/from bathroom with her wife providing Ax1, PT providing safe techique training PRN. Supine to/from sitting Val for legs and repo. Superv sitting balance, Val sit-stands to RW with PT training on hand placement for power and stability, pivots at EOB and toilet with PT training to keep walker with, fully square up, pt walked 15' + 15' 2WW (has 4WW at home) - notable UE WB being used, CGA by wife (PT in agreement). Edu on less safe ability to WB into 4WW, if needing the extra support or it the 4WW getting away from her to  a 2WW - wife in agreement. Overall, wife and pt managed well enough for safe DC home. No stairs to access home.    Intervention (Other)   Intervention (Other) Minutes of Treatment 15   Symptoms Noted During/After Treatment none   Intervention (Other) Detail Pt declining BPPV rx today: edu on vestibular rehab to continue to improve system - pt reporting notable nausea and generalized motion sensitivity post-BPPV rx & wife reporting pt moving safer and tolerating more movement the day after each BPPV rx (as expected), but causing pt reluctance to continue vestibular rehab to improve her impairments over time.    PT Discharge Planning    PT Discharge Recommendation (DC Rec) home with outpatient physical therapy;home with assist   PT Rationale for DC Rec Pt with ongoing BPPV, wife reports dizziness bouts shorter today. Patient currently a high fall risk d/t ongoing nausea, vertigo and impaired balance which would best be treated at TCU to improve safety and allow patient to return home and to PLOF safely. If patient unable to d/c to TCU will need A x 1  with all mobility, FWW and wheelchair for long distance and OP vestib PT (not recommending home PT as vestibular rehab is an OP service which is what pt needs).    PT Brief overview of current status  Bed marina Pretty, 15' + 15' 2WW CGA. Pt declining BPPV rx today: edu on vestibular rehab to continue to improve system - pt reporting notable nausea and generalized motion sensitivity post-BPPV rx & wife reporting pt moving safer and tolerating more movement the day after each BPPV rx (as expected), but causing pt reluctance to continue vestibular rehab to improve her impairments over time.    Additional Documentation   Rehab Comments Ensure no meclizine given pre-PT.    PT Plan BPPV re-testing, self-management edu/FU.   Total Session Time   Total Session Time (minutes) 25 minutes

## 2021-10-27 NOTE — DISCHARGE SUMMARY
[facility]    Discharge Summary  Hospitalist    Date of Admission:  10/24/2021  Date of Discharge:  10/27/2021  Discharging Provider: Paola Marte MD    Discharge Diagnoses   Benign paroxysmal positional vertigo    History of Present Illness   Please review admission history and physical.    Hospital Course   Agnes Saravia was admitted on 10/24/2021.  The following problems were addressed during her hospitalization:    Active Problems:    Hypokalemia    Vestibular neuronitis, unspecified laterality    Non-intractable vomiting with nausea, unspecified vomiting type    Agnes Saravia is a 76 year old female with history of Sjogren's syndrome, ILD, DANA, HTN admitted on 10/24/2021 for vertigo with nausea and vomiting. Symptoms came on suddenly while she was in the shower this morning and are persisting. She has had multiple recent falls recently and was just referred to neurology by her PCP for work up of balance deficits.     Vertigo, presumed BPPV  Patient presents with significant dizziness and associated nausea/vomiting brought on by position changes.   MRI brain w/wo contrast showed some possible frontal lobe abnormalities, likely unrelated to vertigo.   She continues to have persistent symptoms limiting mobility.  Plan regarding this includes a repeat MRI in 3 months.  Patient had meclizine and Zofran here and symptoms improved, she received here plan is to do outpatient vestibular therapy, patient was seen by neurology, recommendations include MRI in 4 months for follow-up.       Frequent falls  Recent PCP note states that patient has had 4 falls over past 2 months related to loss of balance. Has also had a previous episode of vertigo while sitting at the edge of bed. Suspect deconditioning is a large contributor here as she has been much less active during COVID.  Plan is to continue home PT upon discharge.       Hypokalemia  Electrolyte imbalances including hypokalemia was  replaced     RA  Sjogren's syndrome   Follows with rheumatology.   PTA is on cevimeline, prednisone 5mg daily, methotrexate, gabapentin for pain (PCP recommending taper down), her PTA regimen was continued during her stay here.       Generalized anxiety disorder  - on PTA clozerapate (recently attempted with PCP to taper off but did not tolerate alternative medication). Trazodone for sleep.     Essential Hypertension  Currently her PTA hydrochlorothiazide was on hold here, will continue to hold until further evaluated by  PCP     Interstitial lung disease  Mild wheezing on exam today. No dyspnea. Will continue prn albuterol      Obesity  - followed by PCP    Paola Marte MD    Significant Results and Procedures       Pending Results     Unresulted Labs Ordered in the Past 30 Days of this Admission     No orders found from 9/24/2021 to 10/25/2021.          Code Status   Full Code       Primary Care Physician   José Miguel Wallace MD    Physical Exam   Temp: 98.3  F (36.8  C) Temp src: Oral BP: 112/44 Pulse: 74   Resp: 18 SpO2: 93 % O2 Device: None (Room air)    There were no vitals filed for this visit.  Vital Signs with Ranges  Temp:  [97.4  F (36.3  C)-98.3  F (36.8  C)] 98.3  F (36.8  C)  Pulse:  [66-89] 74  Resp:  [18] 18  BP: (112-136)/(44-83) 112/44  SpO2:  [93 %-96 %] 93 %  I/O last 3 completed shifts:  In: 180 [P.O.:180]  Out: 350 [Urine:350]    The patient was examined on the day of discharge.    Discharge Disposition   Discharged to home with home physical therapy  Condition at discharge: Stable    Consultations This Hospital Stay   PHYSICAL THERAPY ADULT IP CONSULT  OCCUPATIONAL THERAPY ADULT IP CONSULT  NEUROLOGY IP CONSULT  CARE MANAGEMENT / SOCIAL WORK IP CONSULT    Time Spent on this Encounter   I, Paola Marte MD, personally saw the patient today and spent greater than 30 minutes discharging this patient.    Discharge Orders      Physical Therapy Referral      Home Care PT Referral for  Hospital Discharge      Reason for your hospital stay    Vertigo     Follow-up and recommended labs and tests     Follow up with primary care provider, José Miguel Wallace MD, within 7 days for hospital follow- up.  No follow up labs or test are needed.     Activity    Your activity upon discharge: activity as tolerated     MD face to face encounter    Documentation of Face to Face and Certification for Home Health Services    I certify that patient: Agnes Saravia is under my care and that I, or a nurse practitioner or physician's assistant working with me, had a face-to-face encounter that meets the physician face-to-face encounter requirements with this patient on: 10/27/2021.    This encounter with the patient was in whole, or in part, for the following medical condition, which is the primary reason for home health care: .    I certify that, based on my findings, the following services are medically necessary home health services:  Physical Therapy.    My clinical findings support the need for the above services because: Physical Therapy Services are needed to assess and treat the following functional impairments: needs vestibular therapy and also general physical therapy.    Further, I certify that my clinical findings support that this patient is homebound (i.e. absences from home require considerable and taxing effort and are for medical reasons or [facility] services or infrequently or of short duration when for other reasons) because: Requires assistance of another person or specialized equipment to access medical services because patient: Requires supervision of another for safe transfer...    Based on the above findings. I certify that this patient is confined to the home and needs intermittent skilled nursing care, physical therapy and/or speech therapy.  The patient is under my care, and I have initiated the establishment of the plan of care.  This patient will be followed by a physician who will  periodically review the plan of care.  Physician/Provider to provide follow up care: José Miguel Wallace    Attending hospital physician (the Medicare certified [place] provider): Paola Marte MD  Physician Signature: See electronic signature associated with these discharge orders.  Date: 10/27/2021     Diet    Follow this diet upon discharge: Orders Placed This Encounter      Regular Diet Adult     Discharge Medications   Current Discharge Medication List      START taking these medications    Details   meclizine (ANTIVERT) 25 MG tablet Take 1 tablet (25 mg) by mouth 3 times daily as needed for dizziness  Qty: 30 tablet, Refills: 0    Associated Diagnoses: Benign paroxysmal positional vertigo, unspecified laterality      ondansetron (ZOFRAN-ODT) 4 MG ODT tab Take 1 tablet (4 mg) by mouth every 6 hours as needed for nausea or vomiting  Qty: 30 tablet, Refills: 0    Associated Diagnoses: Benign paroxysmal positional vertigo, unspecified laterality         CONTINUE these medications which have CHANGED    Details   hydrochlorothiazide (HYDRODIURIL) 25 MG tablet Take 1 tablet (25 mg) by mouth daily  Qty: 90 tablet, Refills: 3    Comments: Hold until seen by primary care physician  Associated Diagnoses: Essential hypertension         CONTINUE these medications which have NOT CHANGED    Details   acetaminophen (TYLENOL) 500 MG tablet Take 500-1,000 mg by mouth every 6 hours as needed for mild pain      albuterol (PROAIR HFA/PROVENTIL HFA/VENTOLIN HFA) 108 (90 Base) MCG/ACT inhaler Inhale 2 puffs into the lungs every 4 hours as needed for shortness of breath / dyspnea or wheezing  Qty: 18 g, Refills: 1    Comments: For Profile Only - patient will call to fill Pharmacy may dispense brand covered by insurance (Proair, or proventil or ventolin or generic albuterol inhaler)  Associated Diagnoses: Cough; Pleuritis      aspirin 81 MG EC tablet Take 81 mg by mouth At Bedtime       cevimeline (EVOXAC) 30 MG capsule Take 30 mg  by mouth 3 times daily (AM, Noon, Dinner)      clorazepate dipotassium (TRANXENE) 15 MG tablet Take 3/4 tablet (11.25mg) to 1 tablet (15mg) by mouth daily  Qty: 90 tablet, Refills: 0    Associated Diagnoses: Sjogren's syndrome, with unspecified organ involvement (H); DANA (generalized anxiety disorder)      folic acid (FOLVITE) 1 MG tablet Take 1 mg by mouth six times a week Every day except Tuesdays when methotrexate is due      !! gabapentin (NEURONTIN) 100 MG capsule Take 100 mg by mouth At Bedtime      !! gabapentin (NEURONTIN) 400 MG capsule Take 400 mg by mouth 3 times daily (AM, Noon, Dinner)      ibuprofen (ADVIL/MOTRIN) 400 MG tablet Take 800 mg by mouth 3 times daily       ketoconazole (NIZORAL) 2 % external shampoo Apply topically every other day During shower      methotrexate 2.5 MG tablet Take 7.5 mg by mouth every 7 days (Takes on Tuesdays)      pantoprazole (PROTONIX) 40 MG EC tablet TAKE 1 TABLET BY MOUTH ONCE DAILY. Take 30-60 minutes before a meal.  Qty: 90 tablet, Refills: 3    Associated Diagnoses: Gastroesophageal reflux disease      polyethylene glycol-propylene glycol (SYSTANE ULTRA) 0.4-0.3 % SOLN ophthalmic solution Place 1 drop into both eyes every hour as needed for dry eyes      potassium chloride ER (K-TAB/KLOR-CON) 10 MEQ CR tablet Take 2 tablets (20 mEq) by mouth daily  Qty: 180 tablet, Refills: 2    Associated Diagnoses: Hypokalemia      predniSONE (DELTASONE) 5 MG tablet Take 5 mg by mouth daily.    Comments: Please include the quantity of tablets and (strength) per dose in sig.        simvastatin (ZOCOR) 40 MG tablet Take 1 tablet (40 mg) by mouth At Bedtime  Qty: 90 tablet, Refills: 3    Comments: For Profile Only - patient will call to fill  Associated Diagnoses: Hyperlipidemia LDL goal <130      traZODone (DESYREL) 50 MG tablet Take 0.5 tablets (25 mg) by mouth once daily at bedtime  Qty: 45 tablet, Refills: 3    Comments: For Profile Only - patient will call to fill  Associated  Diagnoses: Insomnia, unspecified type       !! - Potential duplicate medications found. Please discuss with provider.        Allergies   Allergies   Allergen Reactions     Mellaril [Thioridazine Hcl] Anaphylaxis     Percocet [Oxycodone-Acetaminophen] Anaphylaxis and Swelling     Tolerates hydrocodone and codeine in cough medicine     Duloxetine      Made her feel weird, increased anxiety     Data   Most Recent 3 CBC's:Recent Labs   Lab Test 10/25/21  0644 10/24/21  1014 06/16/21  1101   WBC 10.4 11.2* 11.7*   HGB 11.1* 11.9 13.3   MCV 90 91 90    356 400      Most Recent 3 BMP's:  Recent Labs   Lab Test 10/25/21  1316 10/25/21  0644 10/24/21  1014 06/16/21  1101   NA  --  141 137 135   POTASSIUM 4.2 3.3* 3.1* 4.3   CHLORIDE  --  106 101 99   CO2  --  30 29 34*   BUN  --  11 18 17   CR  --  0.66 0.68 0.72   ANIONGAP  --  5 7 2*   CHRISTOPHER  --  8.1* 8.4* 9.2   GLC  --  98 134* 121*     Most Recent 2 LFT's:  Recent Labs   Lab Test 06/16/21  1101 07/03/20  0905   AST 18 26   ALT 20 32   ALKPHOS 74 82   BILITOTAL 0.7 1.0     Most Recent INR's and Anticoagulation Dosing History:  Anticoagulation Dose History     Recent Dosing and Labs Latest Ref Rng & Units 2/6/2017 2/9/2017 2/14/2017 2/23/2017 3/13/2017 4/11/2017 5/8/2017    INR 0.86 - 1.14 1.6(A) 1.9(A) 2.0(A) 2.2(A) 2.5(A) 2.6(A) 2.8(A)        Most Recent 3 Troponin's:  Recent Labs   Lab Test 03/25/20  0101 01/28/17  2045 11/20/16  1921   TROPI <0.015 <0.015  The 99th percentile for upper reference range is 0.045 ug/L.  Troponin values in   the range of 0.045 - 0.120 ug/L may be associated with risks of adverse   clinical events.   <0.015  The 99th percentile for upper reference range is 0.045 ug/L.  Troponin values in   the range of 0.045 - 0.120 ug/L may be associated with risks of adverse   clinical events.       Most Recent Cholesterol Panel:  Recent Labs   Lab Test 06/16/21  1101   CHOL 198   *   HDL 50   TRIG 175*     Most Recent 6 Bacteria Isolates From  Any Culture (See EPIC Reports for Culture Details):  Recent Labs   Lab Test 04/11/18  0924 01/16/18  1618 01/09/17  1900 09/29/16  1258 09/28/16  0728 09/28/16  0712   CULT Culture negative for acid fast bacilli  Assayed at [facility], [address] [place] [address] [phone]  Culture negative after 29 days  No growth Moderate growth  Normal hair   Culture negative after 4 weeks  Moderate growth Normal hair Light growth Normal hair No growth No growth     Most Recent TSH, T4 and A1c Labs:  Recent Labs   Lab Test 06/25/21  1322 01/29/21  0845   TSH 2.09  --    A1C  --  6.3*     Results for orders placed or performed during the hospital encounter of 10/24/21   MR Brain w/o & w Contrast    Narrative    EXAM: MR BRAIN W/O and W CONTRAST  LOCATION: [facility]  DATE/TIME: 10/24/2021 1:49 PM    INDICATION: Dizziness, dehydration or hypotension. Dizziness, arrhythmia or new vasoactive medication.  COMPARISON: None.  CONTRAST: 10 mL Gadavist  TECHNIQUE: Routine multiplanar multisequence head MRI without and with intravenous contrast.    FINDINGS:  MOTION ARTIFACT: Mild.    INTRACRANIAL CONTENTS: No acute or subacute infarct. No mass, acute hemorrhage, or extra-axial fluid collections. Patchy and confluent nonspecific T2/FLAIR hyperintensities within the cerebral white matter most consistent with moderate to severe chronic   microvascular ischemic change. Moderate generalized cerebral atrophy. No hydrocephalus. Normal position of the cerebellar tonsils. Small (6 to 7 mm) area of apparent enhancement involving the left inferior frontal lobe near the precentral gyrus.    SELLA: No abnormality accounting for technique.    OSSEOUS STRUCTURES/SOFT TISSUES: Normal marrow signal. The major intracranial vascular flow voids are maintained.     ORBITS: Prior bilateral cataract surgery. Visualized portions of the orbits are otherwise unremarkable.     SINUSES/MASTOIDS: No paranasal sinus  mucosal disease. Trace opacification of the right mastoid cavity.       Impression    IMPRESSION:  1.  No evidence of acute ischemia or hemorrhage.  2.  Small area of apparent enhancement involving the left inferior frontal lobe. Differential diagnosis includes subacute infarct, metastatic disease, focal infectious/inflammatory process, incidental venous structure, or artifact. Follow-up MRI could be   considered.  3.  Volume loss and presumed microvascular ischemic changes as detailed above.       CTA Head Neck with Contrast    Narrative    HEAD AND NECK CT ANGIOGRAM WITH IV CONTRAST  10/25/2021 7:01 PM    INDICATION: Dizziness, non-specific Dizziness, persistent/recurrent, cardiac or vascular cause suspected  TECHNIQUE: Head and neck CT angiogram with IV contrast. CT images of the head and neck vessels obtained during the arterial phase of intravenous contrast administration. Axial helical 2D reconstructed images and multiplanar 3D MIP reconstructed images of   the head and neck vessels were performed by the technologist. Dose reduction techniques were used.  CONTRAST: 70mL Isovue 370.  COMPARISON: Brain MRI 10/24/2021, neck CT 6/19/2006     FINDINGS:  HEAD CTA: Minor intracranial atherosclerosis. The intracranial circulation is patent without evidence for aneurysm, proximal vessel occlusion, high-grade intracranial stenosis or arteriovenous malformation. Patent dural venous sinuses.    NECK CTA: Three vessel arch.  Carotid arteries are patent with minor atherosclerotic change.  No hemodynamically significant stenosis by NASCET criteria in either carotid system.  Patent vertebral arteries. No dissection.      Impression    CONCLUSION:  HEAD CTA:  1.  Minor intracranial atherosclerosis. No vessel stenosis, occlusion or aneurysm.    NECK CTA:  1.  Minor atherosclerosis in the neck. No dissection or hemodynamically significant narrowing in the neck by NASCET criteria.

## 2021-10-27 NOTE — PLAN OF CARE
Inpatient. Alert and orriented x4. VSS on room air. PIV saline locked. Regular diet. Assist of 1 gb and walker, ambulating to bathroom. Denies pain.  Albuterol inhaler prn for wheezes given. Neuro and PT consult done. Recommend TCU/Home with assist. For CM consult. Continue to monitor.

## 2021-10-27 NOTE — PROGRESS NOTES
Care Management Discharge Note    Discharge Date: 10/27/2021       Discharge Disposition: Home, Home Care    Discharge Services: None    Discharge DME: None    Discharge Transportation: family or friend will provide    Private pay costs discussed: Not applicable    PAS Confirmation Code:    Patient/family educated on Medicare website which has current facility and service quality ratings: no    Education Provided on the Discharge Plan:    Persons Notified of Discharge Plans: patient and SO, primary nurse  Patient/Family in Agreement with the Plan: yes    Handoff Referral Completed: No    Additional Information:  Received info from [facility] intake that they can take the referral and provide specific PT therapy needed.  Orders faxed to Intake.  Patient and significant other aware of pending referral at time of discharge and agreeable-aware that [facility] will be calling to arrange.        Lima Barnard

## 2021-10-27 NOTE — PLAN OF CARE
A&Ox4.  AVSS.  RA.  Denies pain.  Fall risk.  Bed alarm/fall band on. Saline locked. Denies dizziness when ambulated to bathroom.  Voiding.  Assist 1/gait belt/walker.  Trazadone given per request

## 2021-10-27 NOTE — CONSULTS
Care Management Initial Consult    General Information  Assessment completed with: Patient, Spouse or significant other,    Type of CM/SW Visit: Initial Assessment    Primary Care Provider verified and updated as needed: Yes   Readmission within the last 30 days: no previous admission in last 30 days      Reason for Consult: discharge planning  Advance Care Planning: Advance Care Planning Reviewed: verified with patient          Communication Assessment  Patient's communication style: spoken language (English or Bilingual)    Hearing Difficulty or Deaf: no   Wear Glasses or Blind: yes    Cognitive  Cognitive/Neuro/Behavioral: WDL  Level of Consciousness: alert  Arousal Level: opens eyes spontaneously  Orientation: oriented x 4  Mood/Behavior: calm, cooperative  Best Language: 0 - No aphasia  Speech: clear    Living Environment:   People in home: significant other  Lima  Current living Arrangements: house      Able to return to prior arrangements: yes       Family/Social Support:  Care provided by: spouse/significant other, self  Provides care for: no one  Marital Status: Lives with Significant Other  Significant Other       Lima  Description of Support System:           Current Resources:   Patient receiving home care services: No     Community Resources:    Equipment currently used at home: walker, rolling  Supplies currently used at home:      Employment/Financial:  Employment Status: retired        Financial Concerns: No concerns identified           Lifestyle & Psychosocial Needs:  Social Determinants of Health     Tobacco Use: Medium Risk     Smoking Tobacco Use: Former Smoker     Smokeless Tobacco Use: Never Used   Alcohol Use:      Frequency of Alcohol Consumption:      Average Number of Drinks:      Frequency of Binge Drinking:    Financial Resource Strain:      Difficulty of Paying Living Expenses:    Food Insecurity:      Worried About Running Out of Food in the Last Year:      Ran Out of Food in the  Last Year:    Transportation Needs:      Lack of Transportation (Medical):      Lack of Transportation (Non-Medical):    Physical Activity:      Days of Exercise per Week:      Minutes of Exercise per Session:    Stress:      Feeling of Stress :    Social Connections:      Frequency of Communication with Friends and Family:      Frequency of Social Gatherings with Friends and Family:      Attends Bahai Services:      Active Member of Clubs or Organizations:      Attends Club or Organization Meetings:      Marital Status:    Intimate Partner Violence:      Fear of Current or Ex-Partner:      Emotionally Abused:      Physically Abused:      Sexually Abused:    Depression: Not at risk     PHQ-2 Score: 0   Housing Stability:      Unable to Pay for Housing in the Last Year:      Number of Places Lived in the Last Year:      Unstable Housing in the Last Year:        Functional Status:  Prior to admission patient needed assistance:   Dependent ADLs:: Independent  Dependent IADLs:: Transportation, Cooking, Laundry, Shopping, Meal Preparation       Mental Health Status:          Chemical Dependency Status:                Values/Beliefs:  Spiritual, Cultural Beliefs, Bahai Practices, Values that affect care: no               Additional Information:  Patient and SO Lima present for discussion regarding discharge needs.  Both in agreement of plan for discharge home with Lima providing assist with all transfers.  Patient will need outpatient vestibular therapy however Selina does not feel she will be able to tolerate going to the appointments and would feel safer with home health providing this service.  HH choices discussed and patient ok with referral to  [facility]-email sent to intake to determine if this treatment option is available.  Will update patient and family when determination made.      Lima Barnard RN

## 2021-10-27 NOTE — PLAN OF CARE
A&Ox4. VSS on RA. Up with A1. Still experiencing vertigo but pt reports improvement. Denies pain. Meclizine given. AVS reviewed at the bedside with pt and SO; understanding verbalized. Discharge home.

## 2021-10-28 ENCOUNTER — PATIENT OUTREACH (OUTPATIENT)
Dept: FAMILY MEDICINE | Facility: CLINIC | Age: 76
End: 2021-10-28

## 2021-10-28 NOTE — PLAN OF CARE
Physical Therapy Discharge Summary    Reason for therapy discharge:    Discharged to home with home therapy.    Progress towards therapy goal(s). See goals on Care Plan in [facility] electronic health record for goal details.  Goals partially met.  Barriers to achieving goals:   discharge from facility.    Therapy recommendation(s):    Continued therapy is recommended.  Rationale/Recommendations:  Recommend continued vestibular therapy.

## 2021-10-28 NOTE — TELEPHONE ENCOUNTER
"ED/Discharge Protocol    \"Hi, my name is Kristen Burciaga RN, a registered nurse, and I am calling on behalf of Dr. Wallace's office at [facility].  I am calling to follow up and see how things are going for you after your recent visit.\"    \"I see that you were in the (ER/UC/IP) on 10/24/2021-10/ .    How are you doing now that you are home?\"    \"im okay just got out yesterday\"  \"still having dizziness\"  Better than when I went in     Discharge Instructions    \"Let's review your discharge instructions.  What is/are the follow-up recommendations?  Pt. Response:     \"Were you instructed to make a follow-up appointment?\"  Pt. Response: Yes.  Has appointment been made?   No.  \"Can I help you schedule that appointment?\"  Patient declined to schedule at this moment but will call back later to schedule.     \"When you see the provider, I would recommend that you bring your discharge instructions with you.    Medications    \"How many new medications are you on since your hospitalization/ED visit?\"     \"How many of your current medicines changed (dose, timing, name, etc.) while you were in the hospital/ED visit?\"   0-1  \"Do you have questions about your medications?\"   No  \"Were you newly diagnosed with heart failure, COPD, diabetes or did you have a heart attack?\"   No  For patients on insulin: \"Did you start on insulin in the hospital or did you have your insulin dose changed?\"   No  Post Discharge Medication Reconciliation Status: discharge medications reconciled and changed, per note/orders.    Was MTM referral placed (*Make sure to put transitions as reason for referral)?   No    Call Summary    \"Do you have any questions or concerns about your condition or care plan at the moment?\"    No      \"If you have questions or things don't continue to improve, we encourage you contact us through the main clinic number,  ([phone])  .  Even if the clinic is not open, triage nurses are available 24/7 to help you.     We " "would like you to know that our clinic has extended hours (provide information).  We also have urgent care (provide details on closest location and hours/contact info)\"      \"Thank you for your time and take care!\"    Kristen Burciaga RN  [facility]        "

## 2021-10-28 NOTE — TELEPHONE ENCOUNTER
What type of discharge? Inpatient  Risk of Hospital admission or ED visit: 50%  Is a TCM episode required? Yes  When should the patient follow up with PCP? 7 days of discharge.    Kristen Burciaga RN  [facility]

## 2021-10-29 ENCOUNTER — TELEPHONE (OUTPATIENT)
Dept: FAMILY MEDICINE | Facility: CLINIC | Age: 76
End: 2021-10-29

## 2021-10-29 ENCOUNTER — MEDICAL CORRESPONDENCE (OUTPATIENT)
Dept: HEALTH INFORMATION MANAGEMENT | Facility: CLINIC | Age: 76
End: 2021-10-29
Payer: MEDICARE

## 2021-10-29 NOTE — TELEPHONE ENCOUNTER
Rayne from [facility] called requesting approval of PT home care visit 1 x awk for 6 wks for vestibular treatment and fall risk prevention.     Verbal approval given.

## 2021-11-03 ENCOUNTER — VIRTUAL VISIT (OUTPATIENT)
Dept: FAMILY MEDICINE | Facility: CLINIC | Age: 76
End: 2021-11-03
Payer: MEDICARE

## 2021-11-03 DIAGNOSIS — H81.10 BENIGN PAROXYSMAL POSITIONAL VERTIGO, UNSPECIFIED LATERALITY: Primary | ICD-10-CM

## 2021-11-03 PROCEDURE — 99442 PR PHYSICIAN TELEPHONE EVALUATION 11-20 MIN: CPT | Mod: 95 | Performed by: INTERNAL MEDICINE

## 2021-11-03 NOTE — PROGRESS NOTES
Selina is a 76 year old who is being evaluated via a billable telephone visit.      What phone number would you like to be contacted at? [phone]  How would you like to obtain your AVS? Kadeem        Bettina Marks is a 76 year old who presents for the following health issues     HPI       Hospital Follow-up Visit:    Hospital/Nursing Home/IP Rehab Facility: [facility]  Date of Admission: 10/24/2021  Date of Discharge: 10/27/2021  Reason(s) for Admission:     Benign paroxysmal positional vertigo      Was your hospitalization related to COVID-19? No   Problems taking medications regularly:  None  Medication changes since discharge: None  Problems adhering to non-medication therapy:  None    Summary of hospitalization:  [facility] discharge summary reviewed  Diagnostic Tests/Treatments reviewed.  Follow up needed: neurology   Other Healthcare Providers Involved in Patient s Care:         Homecare  Update since discharge: improved.   Post Discharge Medication Reconciliation: discharge medications reconciled, continue medications without change.  Plan of care communicated with patient and significant other          Benign paroxysmal positional vertigo, unspecified laterality   Spoke with Gabriel and her significant other Lima over the phone to discuss her recent hospitalization for vertigo which was felt to be benign paroxysmal positional vertigo.  She did have an MRI performed in the hospital which did show a small frontal lobe abnormality, but no significant findings concerning for acute stroke or mass.  She is recommended to follow-up with neurology in about 3 to 4 months for recheck of MRI which she intends to do.  She has no other concerning findings during her hospitalization.  She did improve with [place]-Hallpike maneuver, and is to continue to do physical therapy within her home through home care.  She has no acute concerns today.    Review of Systems    Constitutional, HEENT, cardiovascular, pulmonary, gi and gu systems are negative, except as otherwise noted.      Objective           Vitals:  No vitals were obtained today due to virtual visit.    Physical Exam   healthy, alert and no distress  PSYCH: Alert and oriented times 3; coherent speech, normal   rate and volume, able to articulate logical thoughts, able   to abstract reason, no tangential thoughts, no hallucinations   or delusions  Her affect is normal  RESP: No cough, no audible wheezing, able to talk in full sentences  Remainder of exam unable to be completed due to telephone visits      (H81.10) Benign paroxysmal positional vertigo, unspecified laterality  (primary encounter diagnosis)  Comment: We discussed her recent hospitalization and she is doing much better.  I encouraged her to keep her neurology follow-up and schedule a follow-up MRI as planned.  Plan:            Phone call duration: 14 minutes

## 2021-11-17 ENCOUNTER — TELEPHONE (OUTPATIENT)
Dept: FAMILY MEDICINE | Facility: CLINIC | Age: 76
End: 2021-11-17
Payer: MEDICARE

## 2021-11-17 NOTE — TELEPHONE ENCOUNTER
[facility]     Call back: [phone] - detailed message okay     Regarding visit on 11/3/21     Dx was: Benign paroxysmal positional vertigo, unspecified laterality    For Medicare needs a laterality - they will accept a verbal on this     Hospital also put it in as an unspecified laterality so she is not sure what dx to use     If they don't specify laterality Medicare will force them to discharge pt     Cassy QUISPE, Triage RN  [facility]

## 2021-11-17 NOTE — TELEPHONE ENCOUNTER
OK to use right side for laterality    Per physical therapy note    Infrared Goggle Exam or Frenzel Lense Exam   Spontaneous Nystagmus Negative   Gaze Evoked Nystagmus Horizontal R   [place]-Hallpike (Right) Upbeating R torsional  (very few beats, increased dizziness talha lieberman pike R vs. L )   [place]-Hallpike (Left) Negative  (mild dizziness, less than R side, no nystagmus noted )   [place]-Hallpike (Right) Comments Pt given meclizine in AM, affecting testing results. most symptoms seen on right side and during talha lieberman pike          José Miguel Wallace MD, MD

## 2021-11-18 NOTE — TELEPHONE ENCOUNTER
"Left detailed VM notifying Bere that can use \"right side for laterality\" and to return call to Triage RN if any questions.    Kristen Burciaga, RN  [facility]      "

## 2021-11-27 DIAGNOSIS — F41.1 GAD (GENERALIZED ANXIETY DISORDER): ICD-10-CM

## 2021-11-27 DIAGNOSIS — M35.00 SJOGREN'S SYNDROME, WITH UNSPECIFIED ORGAN INVOLVEMENT (H): ICD-10-CM

## 2021-11-28 DIAGNOSIS — Z53.9 DIAGNOSIS NOT YET DEFINED: Primary | ICD-10-CM

## 2021-11-28 PROCEDURE — G0180 MD CERTIFICATION HHA PATIENT: HCPCS | Performed by: INTERNAL MEDICINE

## 2021-11-29 RX ORDER — CLORAZEPATE DIPOTASSIUM 15 MG/1
15 TABLET ORAL DAILY
Qty: 90 TABLET | Refills: 0 | Status: SHIPPED | OUTPATIENT
Start: 2021-11-29 | End: 2022-02-22

## 2021-11-29 NOTE — TELEPHONE ENCOUNTER
Routing refill request to provider for review/approval because:  Drug not on the [facility] refill protocol   Tracy Jade RN

## 2021-12-10 ENCOUNTER — TELEPHONE (OUTPATIENT)
Dept: FAMILY MEDICINE | Facility: CLINIC | Age: 76
End: 2021-12-10
Payer: MEDICARE

## 2021-12-10 NOTE — TELEPHONE ENCOUNTER
Ashley  From [facility] called to report pt will be discharged from home care services today. Pt has met goals before last visit.     FYI- no further action needed.

## 2021-12-20 ENCOUNTER — MYC MEDICAL ADVICE (OUTPATIENT)
Dept: FAMILY MEDICINE | Facility: CLINIC | Age: 76
End: 2021-12-20
Payer: MEDICARE

## 2021-12-21 ENCOUNTER — TELEPHONE (OUTPATIENT)
Dept: FAMILY MEDICINE | Facility: CLINIC | Age: 76
End: 2021-12-21

## 2021-12-21 ENCOUNTER — VIRTUAL VISIT (OUTPATIENT)
Dept: FAMILY MEDICINE | Facility: CLINIC | Age: 76
End: 2021-12-21
Payer: MEDICARE

## 2021-12-21 DIAGNOSIS — M54.41 ACUTE MIDLINE LOW BACK PAIN WITH RIGHT-SIDED SCIATICA: ICD-10-CM

## 2021-12-21 DIAGNOSIS — M54.41 ACUTE MIDLINE LOW BACK PAIN WITH RIGHT-SIDED SCIATICA: Primary | ICD-10-CM

## 2021-12-21 DIAGNOSIS — G89.4 CHRONIC PAIN SYNDROME: Primary | ICD-10-CM

## 2021-12-21 PROCEDURE — 99442 PR PHYSICIAN TELEPHONE EVALUATION 11-20 MIN: CPT | Mod: 95 | Performed by: INTERNAL MEDICINE

## 2021-12-21 RX ORDER — HYDROCODONE BITARTRATE AND ACETAMINOPHEN 5; 325 MG/1; MG/1
1 TABLET ORAL EVERY 4 HOURS PRN
Qty: 30 TABLET | Refills: 0 | Status: CANCELLED | OUTPATIENT
Start: 2021-12-21

## 2021-12-21 RX ORDER — HYDROCODONE BITARTRATE AND ACETAMINOPHEN 5; 325 MG/1; MG/1
1 TABLET ORAL EVERY 4 HOURS PRN
Qty: 30 TABLET | Refills: 0 | Status: SHIPPED | OUTPATIENT
Start: 2021-12-21 | End: 2021-12-22

## 2021-12-21 NOTE — PROGRESS NOTES
Jesi is a 76 year old who is being evaluated via a billable telephone visit.      What phone number would you like to be contacted at? [phone]   How would you like to obtain your AVS? Kadeem Marks is a 76 year old who presents for the following health issues     HPI     Chief Complaint   Patient presents with     Back Pain     I spoke to Agnes Saravia about the onset of severe right pain from knee to hip.  Unable to walk without help.  She notes she has known right sided knee pain.  She has no triggers for her low back pain.  She has had a few falls and was hospitalized in October.  No fever, no night sweats.  No unexplained weight loss, but has gained weight.  She has progressive immobility.  Notes difficulty getting in and out of pain that started with onset of low back pain about 1 week ago.  Pain has not improved.      Review of Systems   Constitutional, HEENT, cardiovascular, pulmonary, GI, , musculoskeletal, neuro, skin, endocrine and psych systems are negative, except as otherwise noted.      Objective    Vitals - Patient Reported  Pain Score: Worst Pain (10)  Pain Loc: Hip        Physical Exam   healthy, alert and no distress  PSYCH: Alert and oriented times 3; coherent speech, normal   rate and volume, able to articulate logical thoughts, able   to abstract reason, no tangential thoughts, no hallucinations   or delusions  Her affect is normal  RESP: No cough, no audible wheezing, able to talk in full sentences  Remainder of exam unable to be completed due to telephone visits    (M54.41) Acute midline low back pain with right-sided sciatica  (primary encounter diagnosis)  Comment: We will request a follow-up CT scan to assess her lumbar spine, and also recommend trial of hydrocodone.  I recommend she continue with the recommendations from pain clinic seen in January of this year to use topical over-the-counter medications such as lidocaine and diclofenac, continue to use  Tylenol but not to exceed 4000 g/day.  To use as prescribed for neuropathic pain, and to continue to use her TENS unit.  He can follow-up in the next 1 to 2 weeks to see how she is doing  Plan: CT Lumbar Spine w/o Contrast,         HYDROcodone-acetaminophen (NORCO) 5-325 MG         tablet                     Phone call duration: 17  minutes

## 2021-12-21 NOTE — TELEPHONE ENCOUNTER
MAs have already scheduled and arrived pt to visit for today    Cassy QUISPE Triage RN  [facility]

## 2021-12-21 NOTE — TELEPHONE ENCOUNTER
Pharmacy callingReceived script for NORCO.  Patient not previously prescribed medication,   Per [place] law if new script, can only dispesnse max dispense of 7 day supply or dr approval to dispense  full quantity. Please advise and route back to triage to give verbal ok to pharmacy.     Callback: [phone]- okay to leave detailed VM.    Kristen Burciaga RN  [facility]

## 2021-12-21 NOTE — TELEPHONE ENCOUNTER
Can we schedule her for in office visit this afternoon in approval required slot?  Could be virtual visit if that is preferred

## 2021-12-22 NOTE — TELEPHONE ENCOUNTER
Called pharmacy, patient already filled short Rx yesterday     Filled #14 tablets and the remainder is voided     They would need another script for #16 with dx listed as chronic pain if pt to complete the 30 tab Rx     Elijah Fernandez RN  [facility]

## 2021-12-23 RX ORDER — HYDROCODONE BITARTRATE AND ACETAMINOPHEN 5; 325 MG/1; MG/1
1 TABLET ORAL EVERY 4 HOURS PRN
Qty: 16 TABLET | Refills: 0 | Status: SHIPPED | OUTPATIENT
Start: 2021-12-23 | End: 2022-09-20

## 2022-02-19 ENCOUNTER — HEALTH MAINTENANCE LETTER (OUTPATIENT)
Age: 77
End: 2022-02-19

## 2022-02-19 DIAGNOSIS — F41.1 GAD (GENERALIZED ANXIETY DISORDER): ICD-10-CM

## 2022-02-19 DIAGNOSIS — M35.00 SJOGREN'S SYNDROME, WITH UNSPECIFIED ORGAN INVOLVEMENT (H): ICD-10-CM

## 2022-02-21 NOTE — TELEPHONE ENCOUNTER
Routing refill request to provider for review/approval because:  Drug not on the [facility] refill protocol     Jannette CARVAJAL RN  EP Triage

## 2022-02-22 RX ORDER — CLORAZEPATE DIPOTASSIUM 15 MG/1
TABLET ORAL
Qty: 90 TABLET | Refills: 0 | Status: SHIPPED | OUTPATIENT
Start: 2022-02-22 | End: 2022-05-27

## 2022-02-28 ENCOUNTER — TRANSFERRED RECORDS (OUTPATIENT)
Dept: HEALTH INFORMATION MANAGEMENT | Facility: CLINIC | Age: 77
End: 2022-02-28
Payer: MEDICARE

## 2022-04-26 ENCOUNTER — MYC MEDICAL ADVICE (OUTPATIENT)
Dept: FAMILY MEDICINE | Facility: CLINIC | Age: 77
End: 2022-04-26
Payer: MEDICARE

## 2022-04-27 NOTE — TELEPHONE ENCOUNTER
---can you help trying to find an appointment for patient 1st week in June for annual physical with PCP?      See [facility] message please.   Currently scheduled 7/13/22.       Thank you,  Fadia BLUM, RN      April 27, 2022  12:05 PM

## 2022-05-03 ENCOUNTER — HOSPITAL ENCOUNTER (OUTPATIENT)
Dept: MAMMOGRAPHY | Facility: CLINIC | Age: 77
Discharge: HOME | End: 2022-05-03
Attending: INTERNAL MEDICINE | Admitting: INTERNAL MEDICINE
Payer: MEDICARE

## 2022-05-03 DIAGNOSIS — Z12.31 OTHER SCREENING MAMMOGRAM: ICD-10-CM

## 2022-05-03 PROCEDURE — 77067 SCR MAMMO BI INCL CAD: CPT

## 2022-05-09 ENCOUNTER — HOSPITAL ENCOUNTER (OUTPATIENT)
Dept: MAMMOGRAPHY | Facility: CLINIC | Age: 77
Discharge: HOME | End: 2022-05-09
Attending: INTERNAL MEDICINE
Payer: MEDICARE

## 2022-05-09 DIAGNOSIS — R92.8 ABNORMAL MAMMOGRAM: ICD-10-CM

## 2022-05-09 PROCEDURE — 77061 BREAST TOMOSYNTHESIS UNI: CPT | Mod: RT

## 2022-05-09 PROCEDURE — 76642 ULTRASOUND BREAST LIMITED: CPT | Mod: RT

## 2022-05-26 DIAGNOSIS — M35.00 SJOGREN'S SYNDROME, WITH UNSPECIFIED ORGAN INVOLVEMENT (H): ICD-10-CM

## 2022-05-26 DIAGNOSIS — E87.6 HYPOKALEMIA: ICD-10-CM

## 2022-05-26 DIAGNOSIS — F41.1 GAD (GENERALIZED ANXIETY DISORDER): ICD-10-CM

## 2022-05-26 RX ORDER — POTASSIUM CHLORIDE 750 MG/1
20 TABLET, EXTENDED RELEASE ORAL DAILY
Qty: 180 TABLET | Refills: 1 | Status: SHIPPED | OUTPATIENT
Start: 2022-05-26 | End: 2022-11-29

## 2022-05-26 NOTE — TELEPHONE ENCOUNTER
Prescription approved per [facility] Refill Protocol.  Chanelle Christensen RN   [facility]

## 2022-05-26 NOTE — TELEPHONE ENCOUNTER
Routing refill request to provider for review/approval because:  Drug not on the [facility] refill protocol     Chanelle Christensen RN  [facility]

## 2022-05-27 RX ORDER — CLORAZEPATE DIPOTASSIUM 15 MG/1
TABLET ORAL
Qty: 90 TABLET | Refills: 0 | Status: SHIPPED | OUTPATIENT
Start: 2022-05-27 | End: 2022-08-29

## 2022-06-12 ASSESSMENT — ENCOUNTER SYMPTOMS
HEMATOCHEZIA: 0
CHILLS: 0
HEADACHES: 0
NERVOUS/ANXIOUS: 0
JOINT SWELLING: 1
BREAST MASS: 0
PALPITATIONS: 0
COUGH: 0
FEVER: 0
DIARRHEA: 0
SHORTNESS OF BREATH: 0
FREQUENCY: 1
EYE PAIN: 0
PARESTHESIAS: 0
HEARTBURN: 0
SORE THROAT: 0
DIZZINESS: 1
DYSURIA: 0
ARTHRALGIAS: 1
NAUSEA: 0
MYALGIAS: 0
WEAKNESS: 0
ABDOMINAL PAIN: 0
HEMATURIA: 0
CONSTIPATION: 0

## 2022-06-12 ASSESSMENT — ACTIVITIES OF DAILY LIVING (ADL)
CURRENT_FUNCTION: MEDICATION ADMINISTRATION REQUIRES ASSISTANCE
CURRENT_FUNCTION: LAUNDRY REQUIRES ASSISTANCE
CURRENT_FUNCTION: HOUSEWORK REQUIRES ASSISTANCE
CURRENT_FUNCTION: TRANSPORTATION REQUIRES ASSISTANCE
CURRENT_FUNCTION: PREPARING MEALS REQUIRES ASSISTANCE
CURRENT_FUNCTION: BATHING REQUIRES ASSISTANCE
CURRENT_FUNCTION: SHOPPING REQUIRES ASSISTANCE

## 2022-06-15 ENCOUNTER — OFFICE VISIT (OUTPATIENT)
Dept: FAMILY MEDICINE | Facility: CLINIC | Age: 77
End: 2022-06-15
Payer: MEDICARE

## 2022-06-15 VITALS
DIASTOLIC BLOOD PRESSURE: 87 MMHG | BODY MASS INDEX: 38.65 KG/M2 | HEIGHT: 63 IN | HEART RATE: 79 BPM | OXYGEN SATURATION: 94 % | SYSTOLIC BLOOD PRESSURE: 133 MMHG | TEMPERATURE: 97.3 F

## 2022-06-15 DIAGNOSIS — E78.5 HYPERLIPIDEMIA LDL GOAL <130: ICD-10-CM

## 2022-06-15 DIAGNOSIS — M06.9 RHEUMATOID ARTHRITIS INVOLVING BOTH KNEES, UNSPECIFIED WHETHER RHEUMATOID FACTOR PRESENT (H): ICD-10-CM

## 2022-06-15 DIAGNOSIS — M62.81 GENERALIZED MUSCLE WEAKNESS: ICD-10-CM

## 2022-06-15 DIAGNOSIS — M35.00 SJOGREN'S SYNDROME WITHOUT EXTRAGLANDULAR INVOLVEMENT (H): ICD-10-CM

## 2022-06-15 DIAGNOSIS — Z00.00 ROUTINE GENERAL MEDICAL EXAMINATION AT A HEALTH CARE FACILITY: Primary | ICD-10-CM

## 2022-06-15 DIAGNOSIS — R73.01 IFG (IMPAIRED FASTING GLUCOSE): ICD-10-CM

## 2022-06-15 DIAGNOSIS — K21.9 GASTROESOPHAGEAL REFLUX DISEASE WITHOUT ESOPHAGITIS: ICD-10-CM

## 2022-06-15 DIAGNOSIS — G47.00 INSOMNIA, UNSPECIFIED TYPE: ICD-10-CM

## 2022-06-15 DIAGNOSIS — I10 ESSENTIAL HYPERTENSION: ICD-10-CM

## 2022-06-15 LAB
ERYTHROCYTE [DISTWIDTH] IN BLOOD BY AUTOMATED COUNT: 14.7 % (ref 10–15)
HBA1C MFR BLD: 6.4 % (ref 0–5.6)
HCT VFR BLD AUTO: 41.3 % (ref 35–47)
HGB BLD-MCNC: 12.9 G/DL (ref 11.7–15.7)
MCH RBC QN AUTO: 28.4 PG (ref 26.5–33)
MCHC RBC AUTO-ENTMCNC: 31.2 G/DL (ref 31.5–36.5)
MCV RBC AUTO: 91 FL (ref 78–100)
PLATELET # BLD AUTO: 414 10E3/UL (ref 150–450)
RBC # BLD AUTO: 4.55 10E6/UL (ref 3.8–5.2)
WBC # BLD AUTO: 12.5 10E3/UL (ref 4–11)

## 2022-06-15 PROCEDURE — 36415 COLL VENOUS BLD VENIPUNCTURE: CPT | Performed by: INTERNAL MEDICINE

## 2022-06-15 PROCEDURE — 85027 COMPLETE CBC AUTOMATED: CPT | Performed by: INTERNAL MEDICINE

## 2022-06-15 PROCEDURE — 83036 HEMOGLOBIN GLYCOSYLATED A1C: CPT | Performed by: INTERNAL MEDICINE

## 2022-06-15 PROCEDURE — G0439 PPPS, SUBSEQ VISIT: HCPCS | Performed by: INTERNAL MEDICINE

## 2022-06-15 PROCEDURE — 80061 LIPID PANEL: CPT | Performed by: INTERNAL MEDICINE

## 2022-06-15 PROCEDURE — 99214 OFFICE O/P EST MOD 30 MIN: CPT | Mod: 25 | Performed by: INTERNAL MEDICINE

## 2022-06-15 PROCEDURE — 80053 COMPREHEN METABOLIC PANEL: CPT | Performed by: INTERNAL MEDICINE

## 2022-06-15 RX ORDER — HYDROCHLOROTHIAZIDE 25 MG/1
25 TABLET ORAL DAILY
Qty: 90 TABLET | Refills: 3 | Status: SHIPPED | OUTPATIENT
Start: 2022-06-15 | End: 2023-07-17

## 2022-06-15 RX ORDER — SIMVASTATIN 40 MG
40 TABLET ORAL AT BEDTIME
Qty: 90 TABLET | Refills: 3 | Status: SHIPPED | OUTPATIENT
Start: 2022-06-15 | End: 2023-06-07

## 2022-06-15 RX ORDER — PANTOPRAZOLE SODIUM 40 MG/1
40 TABLET, DELAYED RELEASE ORAL DAILY
Qty: 90 TABLET | Refills: 3 | Status: SHIPPED | OUTPATIENT
Start: 2022-06-15 | End: 2023-10-11

## 2022-06-15 RX ORDER — TRAZODONE HYDROCHLORIDE 50 MG/1
TABLET, FILM COATED ORAL
Qty: 45 TABLET | Refills: 3 | Status: SHIPPED | OUTPATIENT
Start: 2022-06-15 | End: 2023-06-26

## 2022-06-15 ASSESSMENT — ACTIVITIES OF DAILY LIVING (ADL)
CURRENT_FUNCTION: SHOPPING REQUIRES ASSISTANCE
CURRENT_FUNCTION: HOUSEWORK REQUIRES ASSISTANCE
CURRENT_FUNCTION: PREPARING MEALS REQUIRES ASSISTANCE
CURRENT_FUNCTION: TRANSPORTATION REQUIRES ASSISTANCE
CURRENT_FUNCTION: MEDICATION ADMINISTRATION REQUIRES ASSISTANCE
CURRENT_FUNCTION: BATHING REQUIRES ASSISTANCE
CURRENT_FUNCTION: LAUNDRY REQUIRES ASSISTANCE

## 2022-06-15 ASSESSMENT — PAIN SCALES - GENERAL: PAINLEVEL: NO PAIN (0)

## 2022-06-15 NOTE — PROGRESS NOTES
"SUBJECTIVE:   Agnes Saravia is a 76 year old female who presents for Preventive Visit.    Patient has been advised of split billing requirements and indicates understanding: Yes  Are you in the first 12 months of your Medicare coverage?  No    Healthy Habits:     In general, how would you rate your overall health?  Fair    Frequency of exercise:  None    Do you usually eat at least 4 servings of fruit and vegetables a day, include whole grains    & fiber and avoid regularly eating high fat or \"junk\" foods?  No    Taking medications regularly:  Yes    Medication side effects:  None    Ability to successfully perform activities of daily living:  Transportation requires assistance, shopping requires assistance, preparing meals requires assistance, housework requires assistance, bathing requires assistance, laundry requires assistance and medication administration requires assistance    Home Safety:  No safety concerns identified    Hearing Impairment:  No hearing concerns    In the past 6 months, have you been bothered by leaking of urine?  No    In general, how would you rate your overall mental or emotional health?  Good      PHQ-2 Total Score: 0    Additional concerns today:  No    Do you feel safe in your environment? Yes    Have you ever done Advance Care Planning? (For example, a Health Directive, POLST, or a discussion with a medical provider or your loved ones about your wishes): Yes, advance care planning is on file.       Fall risk  Fallen 2 or more times in the past year?: Yes  Any fall with injury in the past year?: No    Cognitive Screening    1) Repeat 3 items (Leader, Season, Table)    2) Clock draw: ABNORMAL time  3) 3 item recall: Recalls 2 objects   Results: ABNORMAL clock, 1-2 items recalled: PROBABLE COGNITIVE IMPAIRMENT, **INFORM PROVIDER**    Mini-CogTM Copyright ISATU Sow. Licensed by the author for use in [facility]; reprinted with permission (kaye@.[email or web address]). All rights reserved. "      Do you have sleep apnea, excessive snoring or daytime drowsiness?: no    Reviewed and updated as needed this visit by clinical staff   Tobacco  Allergies  Meds                Reviewed and updated as needed this visit by Provider                   Social History     Tobacco Use     Smoking status: Former Smoker     Packs/day: 0.50     Years: 10.00     Pack years: 5.00     Types: Cigarettes     Start date: 1970     Quit date: 1975     Years since quittin.0     Smokeless tobacco: Never Used     Tobacco comment: N/A   Substance Use Topics     Alcohol use: Yes     Alcohol/week: 0.0 standard drinks     Comment: beer in summer when mowing lawn      If you drink alcohol do you typically have >3 drinks per day or >7 drinks per week? No    No flowsheet data found.      Current providers sharing in care for this patient include:     Patient Care Team:  José Miguel Wallace MD as PCP - General (Internal Medicine)  José Miguel Wallace MD as Assigned PCP  Bruce Nash MD as Ajay Grigsby MD as MD (Internal Medicine)  Dudley Bernal MD as MD (Neurological Surgery)  Harjeet Lynn MD as MD (Otolaryngology)  Juan Carcamo MD as MD (Allergy & Immunology)  Kandi Riley, RN as Registered Nurse (Otolaryngology)  Rachana Huggins, PharmD as Pharmacist (Pharmacist)  Ana Vasquez KE as Pharmacist (Pharmacist)  Rachana Huggins, PharmD as Assigned MTM Pharmacist    The following health maintenance items are reviewed in Epic and correct as of today:  Health Maintenance Due   Topic Date Due     URINE DRUG SCREEN  Never done     ANNUAL REVIEW OF HM ORDERS  Never done     HEPATITIS C SCREENING  Never done     ZOSTER IMMUNIZATION (1 of 2) Never done     A1C  2022     BMP  2022     LIPID  2022           Essential hypertension  Gastroesophageal reflux disease without esophagitis  Hyperlipidemia LDL goal <130  Insomnia, unspecified  "type  Sjogren's syndrome without extraglandular involvement (H)  Routine general medical examination at a health care facility  IFG (impaired fasting glucose)  Generalized muscle weakness  Rheumatoid arthritis involving both knees, unspecified whether rheumatoid factor present (H)   Agnes Saravia has been struggling with worsening weakness and joint pain.  Has been following with rheumatology, but would like to have a wheelchair available to utilize for moving about outside of her home.      Pertinent mammograms are reviewed under the imaging tab.    Review of Systems  Constitutional, HEENT, cardiovascular, pulmonary, GI, , musculoskeletal, neuro, skin, endocrine and psych systems are negative, except as otherwise noted.    OBJECTIVE:   /87 (BP Location: Left arm, Patient Position: Sitting, Cuff Size: Adult Regular)   Pulse 79   Temp 97.3  F (36.3  C) (Temporal)   Ht 1.6 m (5' 3\")   SpO2 94%   BMI 38.65 kg/m   Estimated body mass index is 38.65 kg/m  as calculated from the following:    Height as of this encounter: 1.6 m (5' 3\").    Weight as of 3/27/20: 99 kg (218 lb 3.2 oz).  Physical Exam  GENERAL: healthy, alert and no distress  EYES: Eyes grossly normal to inspection,   HENT: ear canals right side occluded  NECK: no adenopathy, no asymmetry, masses,   RESP: lungs clear to auscultation - no rales, rhonchi or wheezes  CV: regular rate and rhythm, normal S1 S2, no S3 or S4, no murmur   ABDOMEN: obese, soft, nontender, no hepatosplenomegaly   MS: limited ambulation, able to stand with use of walker  SKIN: no suspicious lesions or rashes  NEURO: Normal strength and tone, mentation intact and speech normal, + balance difficulties when standing  PSYCH: mentation appears normal, affect normal/bright        ASSESSMENT / PLAN:     Patient Instructions   (Z00.00) Routine general medical examination at a health care facility  (primary encounter diagnosis)  Comment: For routine exam, we will draw labs as " "ordered, cholesterol, diabetes mellitus check, liver function, renal function,  We will also update vaccination history.  Plan: Hepatitis C Screen Reflex to HCV RNA Quant and         Genotype, Lipid panel reflex to direct LDL         Fasting, Comprehensive metabolic panel (BMP +         Alb, Alk Phos, ALT, AST, Total. Bili, TP)            (I10) Essential hypertension  Comment: blood pressure is excellent.  Continue diuretic to help with fluid retention  Plan: hydrochlorothiazide (HYDRODIURIL) 25 MG tablet            (K21.9) Gastroesophageal reflux disease without esophagitis  Comment: Continue pantoprazole   Plan: pantoprazole (PROTONIX) 40 MG EC tablet            (E78.5) Hyperlipidemia LDL goal <130  Comment: continue simvastatin   Plan: simvastatin (ZOCOR) 40 MG tablet            (G47.00) Insomnia, unspecified type  Comment:   Plan: traZODone (DESYREL) 50 MG tablet            (M35.00) Sjogren's syndrome without extraglandular involvement (H)  Comment: Follow-up in rheumatology   Plan:     (R73.01) IFG (impaired fasting glucose)  Comment: check hemoglobin A1c for screening   Plan: HEMOGLOBIN A1C            (M62.81) Generalized muscle weakness  Comment: Recommend wheel chair - discussed options for physical therapy and occupational therapy referral and declined  Plan: Wheelchair Order for DME - ONLY FOR DME            (M06.9) Rheumatoid arthritis involving both knees, unspecified whether rheumatoid factor present (H)  Comment: as above   Plan: Wheelchair Order for DME - ONLY FOR DME                       COUNSELING:  Reviewed preventive health counseling, as reflected in patient instructions    Estimated body mass index is 38.65 kg/m  as calculated from the following:    Height as of this encounter: 1.6 m (5' 3\").    Weight as of 3/27/20: 99 kg (218 lb 3.2 oz).        She reports that she quit smoking about 47 years ago. Her smoking use included cigarettes. She started smoking about 52 years ago. She has a 5.00 " pack-year smoking history. She has never used smokeless tobacco.      Appropriate preventive services were discussed with this patient, including applicable screening as appropriate for cardiovascular disease, diabetes, osteopenia/osteoporosis, and glaucoma.  As appropriate for age/gender, discussed screening for colorectal cancer, prostate cancer, breast cancer, and cervical cancer. Checklist reviewing preventive services available has been given to the patient.    Reviewed patients plan of care and provided an AVS. The Basic Care Plan (routine screening as documented in Health Maintenance) for Agnes meets the Care Plan requirement. This Care Plan has been established and reviewed with the Patient.    Counseling Resources:  ATP IV Guidelines  Pooled Cohorts Equation Calculator  Breast Cancer Risk Calculator  Breast Cancer: Medication to Reduce Risk  FRAX Risk Assessment  ICSI Preventive Guidelines  Dietary Guidelines for Americans, 2010  USDA's MyPlate  ASA Prophylaxis  Lung CA Screening    José Miguel Wallace MD, MD  [facility]    Identified Health Risks:

## 2022-06-15 NOTE — PATIENT INSTRUCTIONS
(Z00.00) Routine general medical examination at a health care facility  (primary encounter diagnosis)  Comment: For routine exam, we will draw labs as ordered, cholesterol, diabetes mellitus check, liver function, renal function,  We will also update vaccination history.  Shingrix vaccine is now available.  I would call your insurance to see if a shingles vaccine is covered and get this at your pharmacy   Plan: Hepatitis C Screen Reflex to HCV RNA Quant and         Genotype, Lipid panel reflex to direct LDL         Fasting, Comprehensive metabolic panel (BMP +         Alb, Alk Phos, ALT, AST, Total. Bili, TP)            (I10) Essential hypertension  Comment: blood pressure is excellent.  Continue diuretic to help with fluid retention  Plan: hydrochlorothiazide (HYDRODIURIL) 25 MG tablet            (K21.9) Gastroesophageal reflux disease without esophagitis  Comment: Continue pantoprazole   Plan: pantoprazole (PROTONIX) 40 MG EC tablet            (E78.5) Hyperlipidemia LDL goal <130  Comment: continue simvastatin   Plan: simvastatin (ZOCOR) 40 MG tablet            (G47.00) Insomnia, unspecified type  Comment:   Plan: traZODone (DESYREL) 50 MG tablet            (M35.00) Sjogren's syndrome without extraglandular involvement (H)  Comment: Follow-up in rheumatology   Plan:     (R73.01) IFG (impaired fasting glucose)  Comment: check hemoglobin A1c for screening   Plan: HEMOGLOBIN A1C            (M62.81) Generalized muscle weakness  Comment: Recommend wheel chair - discussed options for physical therapy and occupational therapy referral and declined  Plan: Wheelchair Order for DME - ONLY FOR DME            (M06.9) Rheumatoid arthritis involving both knees, unspecified whether rheumatoid factor present (H)  Comment: as above   Plan: Wheelchair Order for DME - ONLY FOR DME

## 2022-06-16 LAB
ALBUMIN SERPL-MCNC: 3.7 G/DL (ref 3.4–5)
ALP SERPL-CCNC: 77 U/L (ref 40–150)
ALT SERPL W P-5'-P-CCNC: 18 U/L (ref 0–50)
ANION GAP SERPL CALCULATED.3IONS-SCNC: 5 MMOL/L (ref 3–14)
AST SERPL W P-5'-P-CCNC: 17 U/L (ref 0–45)
BILIRUB SERPL-MCNC: 0.6 MG/DL (ref 0.2–1.3)
BUN SERPL-MCNC: 16 MG/DL (ref 7–30)
CALCIUM SERPL-MCNC: 9 MG/DL (ref 8.5–10.1)
CHLORIDE BLD-SCNC: 102 MMOL/L (ref 94–109)
CHOLEST SERPL-MCNC: 199 MG/DL
CO2 SERPL-SCNC: 32 MMOL/L (ref 20–32)
CREAT SERPL-MCNC: 0.73 MG/DL (ref 0.52–1.04)
FASTING STATUS PATIENT QL REPORTED: NO
GFR SERPL CREATININE-BSD FRML MDRD: 84 ML/MIN/1.73M2
GLUCOSE BLD-MCNC: 96 MG/DL (ref 70–99)
HDLC SERPL-MCNC: 45 MG/DL
LDLC SERPL CALC-MCNC: 102 MG/DL
NONHDLC SERPL-MCNC: 154 MG/DL
POTASSIUM BLD-SCNC: 4.1 MMOL/L (ref 3.4–5.3)
PROT SERPL-MCNC: 7.4 G/DL (ref 6.8–8.8)
SODIUM SERPL-SCNC: 139 MMOL/L (ref 133–144)
TRIGL SERPL-MCNC: 261 MG/DL

## 2022-06-23 NOTE — RESULT ENCOUNTER NOTE
Gurdeep Alarcon,    I had the opportunity to review your recent labs and a summary of your labs reads as follows:    Your complete blood counts show no sign of anemia, stable white blood cell count and platelets.  Your comprehensive metabolic panel showed normal renal function, normal liver function, and normal fasting blood glucose indicating no evidence of diabetes mellitus.  Your fasting lipid panel show  - normal HDL (good) cholesterol -as your goal is greater than 40  - low LDL (bad) cholesterol as your goal is less than 130  -Elevated triglyceride levels    As noted, your A1c is now slightly higher than it was previous, and is trending towards diabetes mellitus.  I would recommend we consider addition of metformin, and we can schedule a virtual visit to discuss the risks and benefits of this approach    Sincerely,  José Miguel Wallace MD

## 2022-08-28 DIAGNOSIS — F41.1 GAD (GENERALIZED ANXIETY DISORDER): ICD-10-CM

## 2022-08-28 DIAGNOSIS — M35.00 SJOGREN'S SYNDROME, WITH UNSPECIFIED ORGAN INVOLVEMENT (H): ICD-10-CM

## 2022-08-29 RX ORDER — CLORAZEPATE DIPOTASSIUM 15 MG/1
TABLET ORAL
Qty: 90 TABLET | Refills: 0 | Status: SHIPPED | OUTPATIENT
Start: 2022-08-29 | End: 2022-11-15

## 2022-09-13 ENCOUNTER — TRANSFERRED RECORDS (OUTPATIENT)
Dept: HEALTH INFORMATION MANAGEMENT | Facility: CLINIC | Age: 77
End: 2022-09-13

## 2022-09-20 ENCOUNTER — OFFICE VISIT (OUTPATIENT)
Dept: FAMILY MEDICINE | Facility: CLINIC | Age: 77
End: 2022-09-20
Payer: MEDICARE

## 2022-09-20 VITALS
DIASTOLIC BLOOD PRESSURE: 80 MMHG | HEIGHT: 63 IN | SYSTOLIC BLOOD PRESSURE: 120 MMHG | BODY MASS INDEX: 38.65 KG/M2 | OXYGEN SATURATION: 96 % | HEART RATE: 69 BPM | TEMPERATURE: 98 F

## 2022-09-20 DIAGNOSIS — Z11.59 NEED FOR HEPATITIS C SCREENING TEST: ICD-10-CM

## 2022-09-20 DIAGNOSIS — G89.4 CHRONIC PAIN SYNDROME: ICD-10-CM

## 2022-09-20 DIAGNOSIS — K21.9 GASTROESOPHAGEAL REFLUX DISEASE WITHOUT ESOPHAGITIS: ICD-10-CM

## 2022-09-20 DIAGNOSIS — Z79.899 ENCOUNTER FOR LONG-TERM (CURRENT) USE OF MEDICATIONS: ICD-10-CM

## 2022-09-20 DIAGNOSIS — M54.41 ACUTE MIDLINE LOW BACK PAIN WITH RIGHT-SIDED SCIATICA: ICD-10-CM

## 2022-09-20 DIAGNOSIS — F41.1 GAD (GENERALIZED ANXIETY DISORDER): ICD-10-CM

## 2022-09-20 DIAGNOSIS — Z23 HIGH PRIORITY FOR 2019-NCOV VACCINE: ICD-10-CM

## 2022-09-20 DIAGNOSIS — R73.01 IFG (IMPAIRED FASTING GLUCOSE): Primary | ICD-10-CM

## 2022-09-20 DIAGNOSIS — M35.00 SJOGREN'S SYNDROME, WITH UNSPECIFIED ORGAN INVOLVEMENT (H): ICD-10-CM

## 2022-09-20 DIAGNOSIS — I10 ESSENTIAL HYPERTENSION: ICD-10-CM

## 2022-09-20 LAB — HBA1C MFR BLD: 6.1 % (ref 0–5.6)

## 2022-09-20 PROCEDURE — 36415 COLL VENOUS BLD VENIPUNCTURE: CPT | Performed by: INTERNAL MEDICINE

## 2022-09-20 PROCEDURE — 83036 HEMOGLOBIN GLYCOSYLATED A1C: CPT | Performed by: INTERNAL MEDICINE

## 2022-09-20 PROCEDURE — 99214 OFFICE O/P EST MOD 30 MIN: CPT | Performed by: INTERNAL MEDICINE

## 2022-09-20 PROCEDURE — 0124A COVID-19,PF,PFIZER BOOSTER BIVALENT: CPT | Performed by: INTERNAL MEDICINE

## 2022-09-20 PROCEDURE — 91312 COVID-19,PF,PFIZER BOOSTER BIVALENT: CPT | Performed by: INTERNAL MEDICINE

## 2022-09-20 ASSESSMENT — PAIN SCALES - GENERAL: PAINLEVEL: NO PAIN (0)

## 2022-09-20 NOTE — PROGRESS NOTES
"Bettina Marks is a 77 year old, presenting for the following health issues:  No chief complaint on file.      History of Present Illness       Reason for visit:  Follow up on pre diabetes    She eats 2-3 servings of fruits and vegetables daily.She consumes 0 sweetened beverage(s) daily.She exercises with enough effort to increase her heart rate 9 or less minutes per day.  She exercises with enough effort to increase her heart rate 3 or less days per week.   She is taking medications regularly.       DANA (generalized anxiety disorder)   She has continued on clorazepate which has been very helpful with anixety   Essential hypertension   Has tolerated hydrochlorothiazide 25 mg daily and doing well.  Acute midline low back pain with right-sided sciatica   Just taking ibuprofen.  She is no longer taking hydrocodone.   Gastroesophageal reflux disease without esophagitis   No issues with pantoprazole.  IFG (impaired fasting glucose)   Agnes Saravia has had 15 pound weight loss over the past 3 months from 218 down to 203.  She is eating better.  She is watching her sugar intake.         Review of Systems   Constitutional, HEENT, cardiovascular, pulmonary, GI, , musculoskeletal - follow up in rheumatolgoy, neuro, skin, endocrine and psych systems are negative, except as otherwise noted.      Objective    /80 (BP Location: Left arm, Patient Position: Sitting, Cuff Size: Adult Large)   Pulse 69   Temp 98  F (36.7  C) (Tympanic)   Ht 1.6 m (5' 3\")   SpO2 96%   BMI 38.65 kg/m    There is no height or weight on file to calculate BMI.  Physical Exam   GENERAL: healthy, alert and no distress  EYES: Eyes grossly normal to inspection   NECK: no adenopathy, no asymmetry   RESP: lungs clear to auscultation - no rales, rhonchi or wheezes  CV: regular rate and rhythm, normal S1 S2, no S3 or S4, no murmur, click or rub, no peripheral edema  ABDOMEN: soft, nontender, no hepatosplenomegaly, no masses and bowel " sounds normal  MS: walking with use of wlker no edema  SKIN: no suspicious lesions or rashes  NEURO: Normal strength and tone, mentation intact and speech normal  PSYCH: mentation appears normal, affect normal/bright      Patient Instructions   (R73.01) IFG (impaired fasting glucose)  (primary encounter diagnosis)  Comment: Last A1c was checked and was 6.4.  There has been a steady increase over the past year, and I would recommend rechecking your A1c today.  Pending the results we can consider adding a medication such as metformin to help lower the blood sugar and prevent complications from diabetes  Plan: Hemoglobin A1c            (Z79.899) Encounter for long-term (current) use of medications  Comment: Also urine test was requested for controlled substances  Plan: [id number] - Urine Drug Confirmation Panel         (Comprehensive)          (M35.00) Sjogren's syndrome, with unspecified organ involvement (H)  Comment: Continue follow-up with rheumatology.  Continue methotrexate and prednisone  Plan:     (F41.1) DANA (generalized anxiety disorder)  Comment: Continue as needed clorazepate  Plan:     (I10) Essential hypertension  Comment: Blood pressure is excellent with current dose of hydrochlorothiazide  Plan:     (M54.41) Acute midline low back pain with right-sided sciatica  Comment: Okay to continue ibuprofen as needed  Plan:     (G89.4) Chronic pain syndrome  Comment: as above   Plan:     (K21.9) Gastroesophageal reflux disease without esophagitis  Comment: Continue on pantoprazole  Plan:     Abnormal finding on brain MRI 10/2021  Comment follow-up in neurology was recommended for this past spring with repeat MRI.  We could order the MRI through our office.  Given 1 year since finding and no symptoms, and in fact improved balance, we can defer this for now.

## 2022-09-20 NOTE — PATIENT INSTRUCTIONS
(R73.01) IFG (impaired fasting glucose)  (primary encounter diagnosis)  Comment: Last A1c was checked and was 6.4.  There has been a steady increase over the past year, and I would recommend rechecking your A1c today.  Pending the results we can consider adding a medication such as metformin to help lower the blood sugar and prevent complications from diabetes  Plan: Hemoglobin A1c            (Z79.899) Encounter for long-term (current) use of medications  Comment: Also urine test was requested for controlled substances  Plan: [id number] - Urine Drug Confirmation Panel         (Comprehensive)          (M35.00) Sjogren's syndrome, with unspecified organ involvement (H)  Comment: Continue follow-up with rheumatology.  Continue methotrexate and prednisone  Plan:     (F41.1) DANA (generalized anxiety disorder)  Comment: Continue as needed clorazepate  Plan:     (I10) Essential hypertension  Comment: Blood pressure is excellent with current dose of hydrochlorothiazide  Plan:     (M54.41) Acute midline low back pain with right-sided sciatica  Comment: Okay to continue ibuprofen as needed  Plan:     (G89.4) Chronic pain syndrome  Comment: as above   Plan:     (K21.9) Gastroesophageal reflux disease without esophagitis  Comment: Continue on pantoprazole  Plan:     Abnormal finding on brain MRI 10/2021  Comment follow-up in neurology was recommended for this past spring with repeat MRI.  We could order the MRI through our office.  Given 1 year since finding and no symptoms, and in fact improved balance, we can defer this for now.

## 2022-10-04 PROBLEM — R11.2 NON-INTRACTABLE VOMITING WITH NAUSEA: Status: ACTIVE | Noted: 2021-10-24

## 2022-10-18 ENCOUNTER — TRANSFERRED RECORDS (OUTPATIENT)
Dept: HEALTH INFORMATION MANAGEMENT | Facility: CLINIC | Age: 77
End: 2022-10-18

## 2022-10-22 ENCOUNTER — HEALTH MAINTENANCE LETTER (OUTPATIENT)
Age: 77
End: 2022-10-22

## 2022-11-15 ENCOUNTER — VIRTUAL VISIT (OUTPATIENT)
Dept: FAMILY MEDICINE | Facility: CLINIC | Age: 77
End: 2022-11-15
Payer: MEDICARE

## 2022-11-15 ENCOUNTER — TELEPHONE (OUTPATIENT)
Dept: FAMILY MEDICINE | Facility: CLINIC | Age: 77
End: 2022-11-15

## 2022-11-15 DIAGNOSIS — Z11.59 NEED FOR HEPATITIS C SCREENING TEST: ICD-10-CM

## 2022-11-15 DIAGNOSIS — M35.00 SJOGREN'S SYNDROME, WITH UNSPECIFIED ORGAN INVOLVEMENT (H): ICD-10-CM

## 2022-11-15 DIAGNOSIS — I10 PRIMARY HYPERTENSION: ICD-10-CM

## 2022-11-15 DIAGNOSIS — F41.1 GAD (GENERALIZED ANXIETY DISORDER): Primary | ICD-10-CM

## 2022-11-15 PROCEDURE — 99213 OFFICE O/P EST LOW 20 MIN: CPT | Mod: 95 | Performed by: INTERNAL MEDICINE

## 2022-11-15 RX ORDER — CLORAZEPATE DIPOTASSIUM 15 MG/1
15 TABLET ORAL AT BEDTIME
Qty: 90 TABLET | Refills: 0 | Status: SHIPPED | OUTPATIENT
Start: 2022-11-15 | End: 2023-02-28

## 2022-11-15 NOTE — PROGRESS NOTES
Selina is a 77 year old who is being evaluated via a billable video visit.      How would you like to obtain your AVS? MyChart  If the video visit is dropped, the invitation should be resent by: Text to cell phone: [phone]  Will anyone else be joining your video visit? No    Subjective   Selina is a 77 year old, presenting for the following health issues:  Recheck Medication      History of Present Illness       Reason for visit:  Review on a medication for insurance coverage.    She eats 2-3 servings of fruits and vegetables daily.She consumes 0 sweetened beverage(s) daily.She exercises with enough effort to increase her heart rate 9 or less minutes per day.  She exercises with enough effort to increase her heart rate 3 or less days per week.   She is taking medications regularly.        Need for hepatitis C screening test  HTN (hypertension)  Sjogren's syndrome, with unspecified organ involvement (H)  DANA (generalized anxiety disorder)   Agnes Saravia has been doing very well with generalized anxiety disorder and taking clorazepate 15 mg daily in addition to trazodone 25 mg at bedtime.  She has been taking her medications for >50 yeas.  She has had no side effects, and no complications in this medication.  She has been informed that her new formulary will not cover clorazepate, and she wishes for an exemption.  She has tried sertraline and duloxetine in the past with complications of nausea and dizziness with trial of SSRI medications and wishes to remain on the once daily clorazepate dosing    Review of Systems   Constitutional, HEENT, cardiovascular, pulmonary, gi and gu systems are negative, except as otherwise noted.      Objective           Vitals:  No vitals were obtained today due to virtual visit.    Physical Exam   GENERAL: Healthy, alert and no distress  EYES: Eyes grossly normal to inspection.  No discharge or erythema, or obvious scleral/conjunctival abnormalities.  RESP: No audible wheeze,  cough, or visible cyanosis.  No visible retractions or increased work of breathing.    SKIN: Visible skin clear. No significant rash, abnormal pigmentation or lesions.  NEURO: Cranial nerves grossly intact.  Mentation and speech appropriate for age.  PSYCH: Mentation appears normal, affect normal/bright, judgement and insight intact, normal speech and appearance well-groomed.      (I10) HTN (hypertension)  Comment: blood pressure is doing well  Plan:     (M35.00) Sjogren's syndrome, with unspecified organ involvement (H)  Comment: will follow up in rheumatology  Plan: clorazepate dipotassium (TRANXENE) 15 MG tablet            (F41.1) DANA (generalized anxiety disorder)  Comment: OK to refill clorazepate and will fill out form for exemption  Plan: clorazepate dipotassium (TRANXENE) 15 MG tablet                   Video-Visit Details    Video Start Time: 5:02 PM    Type of service:  Video Visit    Video End Time:5:27 PM    Originating Location (pt. Location): Home        Distant Location (provider location):  On-site    Platform used for Video Visit: Cole

## 2022-11-15 NOTE — TELEPHONE ENCOUNTER
Completed Medicare D Prescription Drug Determination Form mailed to Pt per Pt request.  Copy sent to [facility] and placed in [facility] accordion file

## 2022-11-17 NOTE — MR AVS SNAPSHOT
After Visit Summary   10/4/2017    Agnes Saravia    MRN: [id number]           Patient Information     Date Of Birth          1945        Visit Information        Provider Department      10/4/2017 1:45 PM Yolette Worthington MD [facility]        Today's Diagnoses     Chronic maxillary sinusitis    -  1      Care Instructions    1.  You were seen in the ENT Clinic today by Dr. Worthington.  If you have any questions or concerns after your appointment, please call [phone].  Press option #1 for scheduling related needs.  Press option #3 for Nurse advice.    Starla BLUM, RN  [facility]   Head & Neck Surgery                 Follow-ups after your visit        Who to contact     Please call your clinic at [phone] to:    Ask questions about your health    Make or cancel appointments    Discuss your medicines    Learn about your test results    Speak to your doctor   If you have compliments or concerns about an experience at your clinic, or if you wish to file a complaint, please contact [facility] Patient Relations at [phone] or email us at [email or web address]         Additional Information About Your Visit        MyChart Information     [employer] gives you secure access to your electronic health record. If you see a primary care provider, you can also send messages to your care team and make appointments. If you have questions, please call your primary care clinic.  If you do not have a primary care provider, please call [phone] and they will assist you.      [employer] is an electronic gateway that provides easy, online access to your medical records. With [employer], you can request a clinic appointment, read your test results, renew a prescription or communicate with your care team.     To access your existing account, please contact your [facility] or call [phone] for  assistance.        Care EveryWhere ID     This is your Care EveryWhere ID. This could be used by other organizations to access your [facility] medical records  [id number]         Blood Pressure from Last 3 Encounters:   09/27/17 118/73   07/31/17 118/71   05/08/17 120/69    Weight from Last 3 Encounters:   09/27/17 89.8 kg (198 lb)   07/31/17 89.8 kg (198 lb)   05/08/17 93 kg (205 lb)              Today, you had the following     No orders found for display         Today's Medication Changes          These changes are accurate as of: 10/4/17 11:59 PM.  If you have any questions, ask your nurse or doctor.               These medicines have changed or have updated prescriptions.        Dose/Directions    gabapentin 100 MG capsule   Commonly known as:  NEURONTIN   This may have changed:  See the new instructions.   Used for:  Pain        TAKE 1 CAPSULE BY MOUTH 3 TIMES DAILY   Quantity:  90 capsule   Refills:  3       * predniSONE 5 MG tablet   Commonly known as:  DELTASONE   This may have changed:  Another medication with the same name was added. Make sure you understand how and when to take each.   Changed by:  José Miguel Wallace MD        Dose:  5 mg   Take 5 mg by mouth daily   Refills:  0       * predniSONE 20 MG tablet   Commonly known as:  DELTASONE   This may have changed:  Another medication with the same name was added. Make sure you understand how and when to take each.   Used for:  Chronic pansinusitis   Changed by:  Yolette Worthington MD        40mg/day for 5 days then 20mg/day for 5 days   Quantity:  15 tablet   Refills:  0       * predniSONE 20 MG tablet   Commonly known as:  DELTASONE   This may have changed:  You were already taking a medication with the same name, and this prescription was added. Make sure you understand how and when to take each.   Used for:  Chronic maxillary sinusitis   Changed by:  Yolette Worthington MD        Dose:  20 mg   Take 1 tablet (20 mg) by mouth daily for 7 days    Quantity:  7 tablet   Refills:  0       * Notice:  This list has 3 medication(s) that are the same as other medications prescribed for you. Read the directions carefully, and ask your doctor or other care provider to review them with you.         Where to get your medicines      These medications were sent to [id number] - [place], [place] - [address]  [address], [place] [address]     Phone:  [phone]     predniSONE 20 MG tablet                Primary Care Provider Office Phone # Fax #    José Miguel Wallace [phone] [address] [facility] [address]  [place] [address]        Equal Access to Services     [facility] Center[facility]ISHA : Hadii tucker Ronquillo, waaxkwame jon, qaybta kaalmada evelyn, michelle wu . So [facility] [phone].    ATENCIÓN: Si habla español, tiene a pedraza disposición servicios gratuitos de asistencia lingüística. Llame al [phone].    We comply with applicable federal civil rights laws and [place] laws. We do not discriminate on the basis of race, color, national origin, age, disability, sex, sexual orientation, or gender identity.            Thank you!     Thank you for choosing [facility]  for your care. Our goal is always to provide you with excellent care. Hearing back from our patients is one way we can continue to improve our services. Please take a few minutes to complete the written survey that you may receive in the mail after your visit with us. Thank you!             Your Updated Medication List - Protect others around you: Learn how to safely use, store and throw away your medicines at www.disposemymeds.org.          This list is accurate as of: 10/4/17 11:59 PM.  Always use your most recent med list.                   Brand Name Dispense Instructions for use Diagnosis    aspirin 81 MG EC tablet      Take 81 mg by mouth daily        AUGMENTIN ES-600 PO           cevimeline 30 MG capsule     EVOXAC     Take 30 mg by mouth 3 times daily.        CHERATUSSIN -10 MG/5ML Soln solution   Generic drug:  guaiFENesin-codeine     180 mL    TAKE 10 MILLILITERS BY MOUTH EVERY 6 HOUURS AS NEEDED    Cough       Clorazepate Dipotassium 15 MG Tabs     90 tablet    TAKE 1 TABLET BY MOUTH ONCE DAILY    Sjogren's syndrome (H), DANA (generalized anxiety disorder)       FOLIC ACID PO      Take 2 mg by mouth daily.        gabapentin 100 MG capsule    NEURONTIN    90 capsule    TAKE 1 CAPSULE BY MOUTH 3 TIMES DAILY    Pain       hydrochlorothiazide 50 MG tablet    HYDRODIURIL    90 tablet    Take 1 tablet (50 mg) by mouth daily Please call to schedule a follow up visit    Essential hypertension       methotrexate (Anti-Rheumatic) 2.5 MG Tabs      Take 5 tablets by mouth once a week On Tuesdays        potassium chloride 10 MEQ tablet    K-TAB,KLOR-CON    180 tablet    Take 2 tablets (20 mEq) by mouth daily    Hypokalemia       * predniSONE 5 MG tablet    DELTASONE     Take 5 mg by mouth daily        * predniSONE 20 MG tablet    DELTASONE    15 tablet    40mg/day for 5 days then 20mg/day for 5 days    Chronic pansinusitis       * predniSONE 20 MG tablet    DELTASONE    7 tablet    Take 1 tablet (20 mg) by mouth daily for 7 days    Chronic maxillary sinusitis       RITUXAN 10 MG/ML injection   Generic drug:  riTUXimab      Reported on 3/29/2017 every 5 months        simvastatin 40 MG tablet    ZOCOR    90 tablet    Take 1 tablet (40 mg) by mouth At Bedtime    Hyperlipidemia LDL goal <130       traZODone 50 MG tablet    DESYREL    45 tablet    Take 0.5 tablets (25 mg) by mouth At Bedtime    Insomnia       * Notice:  This list has 3 medication(s) that are the same as other medications prescribed for you. Read the directions carefully, and ask your doctor or other care provider to review them with you.       [Clear Rhinorrhea] : clear rhinorrhea [Mucoid Discharge] : mucoid discharge [NL] : warm, clear

## 2022-11-26 DIAGNOSIS — E87.6 HYPOKALEMIA: ICD-10-CM

## 2022-11-29 RX ORDER — POTASSIUM CHLORIDE 750 MG/1
20 TABLET, EXTENDED RELEASE ORAL DAILY
Qty: 180 TABLET | Refills: 2 | Status: SHIPPED | OUTPATIENT
Start: 2022-11-29 | End: 2023-08-15

## 2022-11-29 NOTE — TELEPHONE ENCOUNTER
Prescription approved per [facility] Refill Protocol.  Tracy Jade, RN  [facility] RN Triage Team

## 2023-02-26 DIAGNOSIS — F41.1 GAD (GENERALIZED ANXIETY DISORDER): ICD-10-CM

## 2023-02-28 RX ORDER — CLORAZEPATE DIPOTASSIUM 15 MG/1
15 TABLET ORAL AT BEDTIME
Qty: 90 TABLET | Refills: 0 | Status: SHIPPED | OUTPATIENT
Start: 2023-02-28 | End: 2023-05-21

## 2023-05-09 ENCOUNTER — TRANSFERRED RECORDS (OUTPATIENT)
Dept: HEALTH INFORMATION MANAGEMENT | Facility: CLINIC | Age: 78
End: 2023-05-09
Payer: MEDICARE

## 2023-05-16 ENCOUNTER — PATIENT OUTREACH (OUTPATIENT)
Dept: CARE COORDINATION | Facility: CLINIC | Age: 78
End: 2023-05-16
Payer: MEDICARE

## 2023-05-21 DIAGNOSIS — F41.1 GAD (GENERALIZED ANXIETY DISORDER): ICD-10-CM

## 2023-05-21 RX ORDER — CLORAZEPATE DIPOTASSIUM 15 MG/1
15 TABLET ORAL AT BEDTIME
Qty: 90 TABLET | Refills: 0 | Status: SHIPPED | OUTPATIENT
Start: 2023-05-21 | End: 2023-08-27

## 2023-05-23 ENCOUNTER — PATIENT OUTREACH (OUTPATIENT)
Dept: CARE COORDINATION | Facility: CLINIC | Age: 78
End: 2023-05-23
Payer: MEDICARE

## 2023-05-30 ENCOUNTER — TRANSFERRED RECORDS (OUTPATIENT)
Dept: HEALTH INFORMATION MANAGEMENT | Facility: CLINIC | Age: 78
End: 2023-05-30
Payer: MEDICARE

## 2023-06-12 DIAGNOSIS — E78.5 HYPERLIPIDEMIA LDL GOAL <130: ICD-10-CM

## 2023-06-12 RX ORDER — SIMVASTATIN 40 MG
40 TABLET ORAL AT BEDTIME
Qty: 90 TABLET | Refills: 0 | OUTPATIENT
Start: 2023-06-12

## 2023-06-12 NOTE — TELEPHONE ENCOUNTER
Pharmacy requested duplicate. Medication refused.     Script sent to the pharmacy 6/7/23 for 90 day supply.    Rach Yoon RN

## 2023-06-25 DIAGNOSIS — G47.00 INSOMNIA, UNSPECIFIED TYPE: ICD-10-CM

## 2023-06-26 RX ORDER — TRAZODONE HYDROCHLORIDE 50 MG/1
TABLET, FILM COATED ORAL
Qty: 45 TABLET | Refills: 0 | Status: SHIPPED | OUTPATIENT
Start: 2023-06-26 | End: 2023-09-25

## 2023-08-15 ENCOUNTER — TRANSFERRED RECORDS (OUTPATIENT)
Dept: HEALTH INFORMATION MANAGEMENT | Facility: CLINIC | Age: 78
End: 2023-08-15
Payer: MEDICARE

## 2023-08-15 DIAGNOSIS — E87.6 HYPOKALEMIA: ICD-10-CM

## 2023-08-15 RX ORDER — POTASSIUM CHLORIDE 750 MG/1
20 TABLET, EXTENDED RELEASE ORAL DAILY
Qty: 180 TABLET | Refills: 3 | Status: SHIPPED | OUTPATIENT
Start: 2023-08-15 | End: 2024-08-12

## 2023-08-26 DIAGNOSIS — F41.1 GAD (GENERALIZED ANXIETY DISORDER): ICD-10-CM

## 2023-08-27 ENCOUNTER — HEALTH MAINTENANCE LETTER (OUTPATIENT)
Age: 78
End: 2023-08-27

## 2023-08-27 RX ORDER — CLORAZEPATE DIPOTASSIUM 15 MG/1
15 TABLET ORAL AT BEDTIME
Qty: 90 TABLET | Refills: 0 | Status: SHIPPED | OUTPATIENT
Start: 2023-08-27 | End: 2023-11-26

## 2023-09-06 DIAGNOSIS — E78.5 HYPERLIPIDEMIA LDL GOAL <130: ICD-10-CM

## 2023-09-06 RX ORDER — SIMVASTATIN 40 MG
40 TABLET ORAL AT BEDTIME
Qty: 90 TABLET | Refills: 0 | Status: SHIPPED | OUTPATIENT
Start: 2023-09-06 | End: 2023-10-11

## 2023-09-24 DIAGNOSIS — G47.00 INSOMNIA, UNSPECIFIED TYPE: ICD-10-CM

## 2023-09-25 RX ORDER — TRAZODONE HYDROCHLORIDE 50 MG/1
TABLET, FILM COATED ORAL
Qty: 45 TABLET | Refills: 0 | Status: SHIPPED | OUTPATIENT
Start: 2023-09-25 | End: 2023-10-11

## 2023-10-04 ASSESSMENT — ENCOUNTER SYMPTOMS
FREQUENCY: 0
DIARRHEA: 0
WEAKNESS: 0
HEADACHES: 0
BREAST MASS: 0
CHILLS: 0
NERVOUS/ANXIOUS: 0
MYALGIAS: 0
ARTHRALGIAS: 1
SHORTNESS OF BREATH: 0
HEARTBURN: 1
PARESTHESIAS: 0
DIZZINESS: 0
ABDOMINAL PAIN: 0
DYSURIA: 0
CONSTIPATION: 0
FEVER: 0
HEMATOCHEZIA: 0
EYE PAIN: 0
NAUSEA: 0
COUGH: 0
SORE THROAT: 0
HEMATURIA: 0

## 2023-10-04 ASSESSMENT — ACTIVITIES OF DAILY LIVING (ADL)
CURRENT_FUNCTION: MEDICATION ADMINISTRATION REQUIRES ASSISTANCE
CURRENT_FUNCTION: SHOPPING REQUIRES ASSISTANCE
CURRENT_FUNCTION: HOUSEWORK REQUIRES ASSISTANCE
CURRENT_FUNCTION: BATHING REQUIRES ASSISTANCE
CURRENT_FUNCTION: TRANSPORTATION REQUIRES ASSISTANCE
CURRENT_FUNCTION: PREPARING MEALS REQUIRES ASSISTANCE
CURRENT_FUNCTION: LAUNDRY REQUIRES ASSISTANCE

## 2023-10-11 ENCOUNTER — OFFICE VISIT (OUTPATIENT)
Dept: FAMILY MEDICINE | Facility: CLINIC | Age: 78
End: 2023-10-11
Payer: MEDICARE

## 2023-10-11 VITALS
BODY MASS INDEX: 31.89 KG/M2 | SYSTOLIC BLOOD PRESSURE: 161 MMHG | HEIGHT: 63 IN | WEIGHT: 180 LBS | TEMPERATURE: 96.9 F | HEART RATE: 66 BPM | DIASTOLIC BLOOD PRESSURE: 86 MMHG | OXYGEN SATURATION: 97 % | RESPIRATION RATE: 14 BRPM

## 2023-10-11 DIAGNOSIS — K21.9 GASTROESOPHAGEAL REFLUX DISEASE WITHOUT ESOPHAGITIS: ICD-10-CM

## 2023-10-11 DIAGNOSIS — Z78.0 POST-MENOPAUSAL: ICD-10-CM

## 2023-10-11 DIAGNOSIS — R73.01 IFG (IMPAIRED FASTING GLUCOSE): ICD-10-CM

## 2023-10-11 DIAGNOSIS — Z00.00 ROUTINE GENERAL MEDICAL EXAMINATION AT A HEALTH CARE FACILITY: Primary | ICD-10-CM

## 2023-10-11 DIAGNOSIS — G47.00 INSOMNIA, UNSPECIFIED TYPE: ICD-10-CM

## 2023-10-11 DIAGNOSIS — E78.5 HYPERLIPIDEMIA LDL GOAL <130: ICD-10-CM

## 2023-10-11 DIAGNOSIS — I10 ESSENTIAL HYPERTENSION: ICD-10-CM

## 2023-10-11 DIAGNOSIS — Z12.31 ENCOUNTER FOR SCREENING MAMMOGRAM FOR MALIGNANT NEOPLASM OF BREAST: ICD-10-CM

## 2023-10-11 LAB
ALBUMIN SERPL BCG-MCNC: 4.4 G/DL (ref 3.5–5.2)
ALP SERPL-CCNC: 69 U/L (ref 35–104)
ALT SERPL W P-5'-P-CCNC: 17 U/L (ref 0–50)
ANION GAP SERPL CALCULATED.3IONS-SCNC: 12 MMOL/L (ref 7–15)
AST SERPL W P-5'-P-CCNC: 25 U/L (ref 0–45)
BILIRUB SERPL-MCNC: 0.6 MG/DL
BUN SERPL-MCNC: 14.1 MG/DL (ref 8–23)
CALCIUM SERPL-MCNC: 9.6 MG/DL (ref 8.8–10.2)
CHLORIDE SERPL-SCNC: 95 MMOL/L (ref 98–107)
CHOLEST SERPL-MCNC: 199 MG/DL
CREAT SERPL-MCNC: 0.7 MG/DL (ref 0.51–0.95)
DEPRECATED HCO3 PLAS-SCNC: 29 MMOL/L (ref 22–29)
EGFRCR SERPLBLD CKD-EPI 2021: 87 ML/MIN/1.73M2
ERYTHROCYTE [DISTWIDTH] IN BLOOD BY AUTOMATED COUNT: 14.2 % (ref 10–15)
GLUCOSE SERPL-MCNC: 124 MG/DL (ref 70–99)
HBA1C MFR BLD: 6 % (ref 0–5.6)
HCT VFR BLD AUTO: 43.6 % (ref 35–47)
HDLC SERPL-MCNC: 49 MG/DL
HGB BLD-MCNC: 13.7 G/DL (ref 11.7–15.7)
LDLC SERPL CALC-MCNC: 111 MG/DL
MCH RBC QN AUTO: 29.3 PG (ref 26.5–33)
MCHC RBC AUTO-ENTMCNC: 31.4 G/DL (ref 31.5–36.5)
MCV RBC AUTO: 93 FL (ref 78–100)
NONHDLC SERPL-MCNC: 150 MG/DL
PLATELET # BLD AUTO: 376 10E3/UL (ref 150–450)
POTASSIUM SERPL-SCNC: 5 MMOL/L (ref 3.4–5.3)
PROT SERPL-MCNC: 7.2 G/DL (ref 6.4–8.3)
RBC # BLD AUTO: 4.67 10E6/UL (ref 3.8–5.2)
SODIUM SERPL-SCNC: 136 MMOL/L (ref 135–145)
TRIGL SERPL-MCNC: 196 MG/DL
WBC # BLD AUTO: 13 10E3/UL (ref 4–11)

## 2023-10-11 PROCEDURE — 80053 COMPREHEN METABOLIC PANEL: CPT | Performed by: INTERNAL MEDICINE

## 2023-10-11 PROCEDURE — 99212 OFFICE O/P EST SF 10 MIN: CPT | Mod: 25 | Performed by: INTERNAL MEDICINE

## 2023-10-11 PROCEDURE — 36415 COLL VENOUS BLD VENIPUNCTURE: CPT | Performed by: INTERNAL MEDICINE

## 2023-10-11 PROCEDURE — 80061 LIPID PANEL: CPT | Performed by: INTERNAL MEDICINE

## 2023-10-11 PROCEDURE — 90662 IIV NO PRSV INCREASED AG IM: CPT | Performed by: INTERNAL MEDICINE

## 2023-10-11 PROCEDURE — 83036 HEMOGLOBIN GLYCOSYLATED A1C: CPT | Performed by: INTERNAL MEDICINE

## 2023-10-11 PROCEDURE — G0439 PPPS, SUBSEQ VISIT: HCPCS | Performed by: INTERNAL MEDICINE

## 2023-10-11 PROCEDURE — 85027 COMPLETE CBC AUTOMATED: CPT | Performed by: INTERNAL MEDICINE

## 2023-10-11 PROCEDURE — 91320 SARSCV2 VAC 30MCG TRS-SUC IM: CPT | Performed by: INTERNAL MEDICINE

## 2023-10-11 PROCEDURE — G0008 ADMIN INFLUENZA VIRUS VAC: HCPCS | Performed by: INTERNAL MEDICINE

## 2023-10-11 PROCEDURE — 90480 ADMN SARSCOV2 VAC 1/ONLY CMP: CPT | Performed by: INTERNAL MEDICINE

## 2023-10-11 RX ORDER — PANTOPRAZOLE SODIUM 40 MG/1
40 TABLET, DELAYED RELEASE ORAL DAILY
Qty: 90 TABLET | Refills: 3 | Status: SHIPPED | OUTPATIENT
Start: 2023-10-11

## 2023-10-11 RX ORDER — SIMVASTATIN 40 MG
40 TABLET ORAL AT BEDTIME
Qty: 90 TABLET | Refills: 3 | Status: SHIPPED | OUTPATIENT
Start: 2023-10-11

## 2023-10-11 RX ORDER — TRAZODONE HYDROCHLORIDE 50 MG/1
TABLET, FILM COATED ORAL
Qty: 45 TABLET | Refills: 3 | Status: SHIPPED | OUTPATIENT
Start: 2023-10-11

## 2023-10-11 ASSESSMENT — ENCOUNTER SYMPTOMS
WEAKNESS: 0
DYSURIA: 0
FEVER: 0
DIZZINESS: 0
ARTHRALGIAS: 1
BREAST MASS: 0
CONSTIPATION: 0
NERVOUS/ANXIOUS: 0
CHILLS: 0
NAUSEA: 0
PARESTHESIAS: 0
MYALGIAS: 0
COUGH: 0
FREQUENCY: 0
HEMATOCHEZIA: 0
EYE PAIN: 0
SORE THROAT: 0
ABDOMINAL PAIN: 0
HEARTBURN: 1
SHORTNESS OF BREATH: 0
DIARRHEA: 0
HEADACHES: 0
HEMATURIA: 0

## 2023-10-11 ASSESSMENT — ACTIVITIES OF DAILY LIVING (ADL)
CURRENT_FUNCTION: TRANSPORTATION REQUIRES ASSISTANCE
CURRENT_FUNCTION: SHOPPING REQUIRES ASSISTANCE
CURRENT_FUNCTION: MEDICATION ADMINISTRATION REQUIRES ASSISTANCE
CURRENT_FUNCTION: PREPARING MEALS REQUIRES ASSISTANCE
CURRENT_FUNCTION: HOUSEWORK REQUIRES ASSISTANCE
CURRENT_FUNCTION: BATHING REQUIRES ASSISTANCE
CURRENT_FUNCTION: LAUNDRY REQUIRES ASSISTANCE

## 2023-10-11 ASSESSMENT — PAIN SCALES - GENERAL: PAINLEVEL: NO PAIN (0)

## 2023-10-11 NOTE — PATIENT INSTRUCTIONS
(Z00.00) Routine general medical examination at a health care facility  (primary encounter diagnosis)  Comment: For routine exam, we will draw labs as ordered, cholesterol, diabetes mellitus check, liver function, renal function, and refer for mammogram and bone density scan  [facility] (also performs diagnostic mammogram, ultrasound and biopsy) [phone].  we will also update vaccination history.   Plan: DEXA HIP/PELVIS/SPINE - Future, Lipid panel         reflex to direct LDL Non-fasting, CBC with         Platelets, HEMOGLOBIN A1C, Comprehensive         metabolic panel (BMP + Alb, Alk Phos, ALT, AST,        Total. Bili, TP), MA Screen Bilateral w/Skyler            (I10) HTN (hypertension)  Comment: Blood pressure is a bit elevated.  Continue hydrochlorothiazide and we will recheck before you leave  Plan:     (E78.5) Hyperlipidemia LDL goal <130  Comment: Continue on simvastatin   Plan: Lipid panel reflex to direct LDL Non-fasting            (R73.01) IFG (impaired fasting glucose)  Comment: Check A1c today  Plan: HEMOGLOBIN A1C            (Z78.0) Post-menopausal  Comment: Recommend bone density scan  [facility] (also performs diagnostic mammogram, ultrasound and biopsy) [phone].   Plan: DEXA HIP/PELVIS/SPINE - Future            (I10) Essential hypertension  Comment: As above  Plan:     (G47.00) Insomnia, unspecified type  Comment: Continue trazodone  Plan: traZODone (DESYREL) 50 MG tablet            (K21.9) Gastroesophageal reflux disease without esophagitis  Comment: Continue on Protonix - OK to take 40 mg twice per day x 2 weeks and then reduce back to daily  Plan: pantoprazole (PROTONIX) 40 MG EC tablet            (Z12.31) Encounter for screening mammogram for malignant neoplasm of breast  Comment:   Plan: MA Screen Bilateral w/Skyler

## 2023-10-11 NOTE — Clinical Note
Can we please resubmit billing as CMP was not covered initially.  I have since reassigned diagnosis to appropriate billing diagnosis

## 2023-10-11 NOTE — PROGRESS NOTES
"SUBJECTIVE:   Selina is a 78 year old who presents for Preventive Visit.    Please abstract the following data from this visit with this patient into the appropriate field in Epic:    Are you in the first 12 months of your Medicare coverage?  No    Healthy Habits:     In general, how would you rate your overall health?  Fair    Frequency of exercise:  None    Do you usually eat at least 4 servings of fruit and vegetables a day, include whole grains    & fiber and avoid regularly eating high fat or \"junk\" foods?  No    Taking medications regularly:  Yes    Medication side effects:  None    Ability to successfully perform activities of daily living:  Transportation requires assistance, shopping requires assistance, preparing meals requires assistance, housework requires assistance, bathing requires assistance, laundry requires assistance and medication administration requires assistance    Home Safety:  No safety concerns identified    Hearing Impairment:  No hearing concerns    In the past 6 months, have you been bothered by leaking of urine?  No    In general, how would you rate your overall mental or emotional health?  Good    Additional concerns today:  Yes      Have you ever done Advance Care Planning? (For example, a Health Directive, POLST, or a discussion with a medical provider or your loved ones about your wishes): Yes, advance care planning is on file.       Fall risk  Fallen 2 or more times in the past year?: Yes  Any fall with injury in the past year?: Yes    Cognitive Screening   1) Repeat 3 items (Leader, Season, Table)    2) Clock draw: NORMAL  3) 3 item recall: Recalls 3 objects  Results: 3 items recalled: COGNITIVE IMPAIRMENT LESS LIKELY    Mini-CogTM Copyright ISATU Sow. Licensed by the author for use in [facility]; reprinted with permission (kaye@.[email or web address]). All rights reserved.      Do you have sleep apnea, excessive snoring or daytime drowsiness? : no    Reviewed and updated as needed " this visit by clinical staff                  Reviewed and updated as needed this visit by Provider                 Social History     Tobacco Use    Smoking status: Former     Packs/day: 0.50     Years: 10.00     Additional pack years: 0.00     Total pack years: 5.00     Types: Cigarettes     Start date: 1970     Quit date: 1975     Years since quittin.3    Smokeless tobacco: Never    Tobacco comments:     N/A   Substance Use Topics    Alcohol use: Yes     Alcohol/week: 0.0 standard drinks of alcohol     Comment: beer in summer when mowing lawn              10/4/2023     8:55 AM   Alcohol Use   Prescreen: >3 drinks/day or >7 drinks/week? No          No data to display              Do you have a current opioid prescription? No  Do you use any other controlled substances or medications that are not prescribed by a provider? None      Current providers sharing in care for this patient include:   Patient Care Team:  José Miguel Wallace MD as PCP - General (Internal Medicine)  José Miguel Wallace MD as Assigned PCP  Bruce Nash MD as Ajay Grigsby MD as MD (Internal Medicine)  Dudley Bernal MD as MD (Neurological Surgery)  Harjeet Lynn MD as MD (Otolaryngology)  Juan Carcamo MD as MD (Allergy & Immunology)  Kandi Riley RN as Registered Nurse (Otolaryngology)  Rachana Huggins, PharmD as Pharmacist (Pharmacist)    The following health maintenance items are reviewed in Epic and correct as of today:  Health Maintenance   Topic Date Due    URINE DRUG SCREEN  Never done    ZOSTER IMMUNIZATION (1 of 2) Never done    RSV VACCINE 60+ (1 - 1-dose 60+ series) Never done    DEXA  2022    COVID-19 Vaccine (6 - Pfizer risk series) 11/15/2022    BMP  12/15/2022    PHQ-2 (once per calendar year)  2023    DTAP/TDAP/TD IMMUNIZATION (3 - Td or Tdap) 2023    MAMMO SCREENING  2023    MEDICARE ANNUAL WELLNESS VISIT  06/15/2023    LIPID   06/15/2023    ANNUAL REVIEW OF HM ORDERS  06/15/2023    CBC  06/15/2023    INFLUENZA VACCINE (1) 09/01/2023    A1C  09/20/2023    FALL RISK ASSESSMENT  10/11/2024    ADVANCE CARE PLANNING  06/19/2027    Pneumococcal Vaccine: 65+ Years  Completed    IPV IMMUNIZATION  Aged Out    HPV IMMUNIZATION  Aged Out    MENINGITIS IMMUNIZATION  Aged Out    COLORECTAL CANCER SCREENING  Discontinued     Lab work is in process  Labs reviewed in EPIC    Mammogram Screening - Mammography discussed and declined  Pertinent mammograms are reviewed under the imaging tab.    Review of Systems   Constitutional:  Negative for chills and fever.   HENT:  Negative for congestion, ear pain, hearing loss and sore throat.    Eyes:  Negative for pain and visual disturbance.   Respiratory:  Negative for cough and shortness of breath.    Cardiovascular:  Positive for peripheral edema. Negative for chest pain.   Gastrointestinal:  Positive for heartburn. Negative for abdominal pain, constipation, diarrhea, hematochezia and nausea.   Breasts:  Negative for tenderness, breast mass and discharge.   Genitourinary:  Negative for dysuria, frequency, genital sores, hematuria, pelvic pain, urgency, vaginal bleeding and vaginal discharge.   Musculoskeletal:  Positive for arthralgias. Negative for myalgias.   Skin:  Negative for rash.   Neurological:  Negative for dizziness, weakness, headaches and paresthesias.   Psychiatric/Behavioral:  Negative for mood changes. The patient is not nervous/anxious.         HTN (hypertension)   Blood pressure has bene stable- no checked at home.   Hyperlipidemia LDL goal <130   Continues on simvastatin and no side effects  IFG (impaired fasting glucose)   Continues to work on diet  Insomnia, unspecified type   Continues on trazodone - and no issues  Gastroesophageal reflux disease without esophagitis   Has had one month of worsening gastroesophageal reflux symptoms and taking pantoprazole 40 mg daily     OBJECTIVE:   BP (!)  "161/86 (BP Location: Left arm, Patient Position: Sitting, Cuff Size: Adult Small)   Pulse 66   Temp 96.9  F (36.1  C) (Tympanic)   Resp 14   Ht 1.6 m (5' 3\")   Wt 81.6 kg (180 lb)   SpO2 97%   BMI 31.89 kg/m   Estimated body mass index is 31.89 kg/m  as calculated from the following:    Height as of this encounter: 1.6 m (5' 3\").    Weight as of this encounter: 81.6 kg (180 lb).  Physical Exam  GENERAL: healthy, alert and no distress  EYES: Eyes grossly normal to inspection,   HENT: ear canals and TM's normal, nose and mouth without ulcers or lesions  NECK: no adenopathy, no asymmetry, masses, or scars and thyroid normal to palpation  RESP: lungs clear to auscultation - no rales, rhonchi or wheezes  CV: regular rate and rhythm, normal S1 S2, no S3 or S4, no murmur, click or rub,   ABDOMEN: soft, nontender, no hepatosplenomegaly, no masses and bowel sounds normal  MS: no gross musculoskeletal defects noted, using walker  SKIN: no suspicious lesions or rashes  NEURO: Normal strength and tone, mentation intact and speech normal  PSYCH: mentation appears normal, affect normal/bright    Diagnostic Test Results:  Labs reviewed in Epic    ASSESSMENT / PLAN:     Patient Instructions   (Z00.00) Routine general medical examination at a health care facility  (primary encounter diagnosis)  Comment: For routine exam, we will draw labs as ordered, cholesterol, diabetes mellitus check, liver function, renal function, and refer for mammogram and bone density scan  [facility] (also performs diagnostic mammogram, ultrasound and biopsy) [phone].  we will also update vaccination history.   Plan: DEXA HIP/PELVIS/SPINE - Future, Lipid panel         reflex to direct LDL Non-fasting, CBC with         Platelets, HEMOGLOBIN A1C, Comprehensive         metabolic panel (BMP + Alb, Alk Phos, ALT, AST,        Total. Bili, TP), MA Screen Bilateral w/Skyler            (I10) HTN (hypertension)  Comment: Blood pressure " is a bit elevated.  Continue hydrochlorothiazide and we will recheck before you leave  Plan:     (E78.5) Hyperlipidemia LDL goal <130  Comment: Continue on simvastatin   Plan: Lipid panel reflex to direct LDL Non-fasting            (R73.01) IFG (impaired fasting glucose)  Comment: Check A1c today  Plan: HEMOGLOBIN A1C            (Z78.0) Post-menopausal  Comment: Recommend bone density scan  [facility] (also performs diagnostic mammogram, ultrasound and biopsy) [phone].   Plan: DEXA HIP/PELVIS/SPINE - Future            (I10) Essential hypertension  Comment: As above  Plan:     (G47.00) Insomnia, unspecified type  Comment: Continue trazodone  Plan: traZODone (DESYREL) 50 MG tablet            (K21.9) Gastroesophageal reflux disease without esophagitis  Comment: Continue on Protonix - OK to take 40 mg twice per day x 2 weeks and then reduce back to daily  Plan: pantoprazole (PROTONIX) 40 MG EC tablet            (Z12.31) Encounter for screening mammogram for malignant neoplasm of breast  Comment:   Plan: MA Screen Bilateral w/Skyler                Patient has been advised of split billing requirements and indicates understanding: Yes      COUNSELING:  Reviewed preventive health counseling, as reflected in patient instructions        She reports that she quit smoking about 48 years ago. Her smoking use included cigarettes. She started smoking about 53 years ago. She has a 5.00 pack-year smoking history. She has never used smokeless tobacco.      Appropriate preventive services were discussed with this patient, including applicable screening as appropriate for fall prevention, nutrition, physical activity, Tobacco-use cessation, weight loss and cognition.  Checklist reviewing preventive services available has been given to the patient.    Reviewed patients plan of care and provided an AVS. The Basic Care Plan (routine screening as documented in Health Maintenance) for Agnes meets the Care Plan  requirement. This Care Plan has been established and reviewed with the Patient.          José Miguel Wallace MD, MD  [facility]    Identified Health Risks:  I have reviewed Opioid Use Disorder and Substance Use Disorder risk factors and made any needed referrals.

## 2023-10-13 NOTE — RESULT ENCOUNTER NOTE
Gurdeep Marks,    I had the opportunity to review your recent labs and a summary of your labs reads as follows:    Your complete blood counts show no sign of anemia, nonspecific elevation of your white blood cell count which I believe is related to prednisone, and normal platelets.  Your comprehensive metabolic panel showed normal renal function, normal liver function,   And your A1c shows excellent average blood glucose  Your fasting lipid panel show  - normal HDL (good) cholesterol -as your goal is greater than 40  - stable LDL (bad) cholesterol as your goal is less than 130  - stable triglyceride levels    Overall your labs look very good, and I recommend rechecking again next year    Sincerely,  José Miguel Wallace MD

## 2023-10-23 ENCOUNTER — MYC MEDICAL ADVICE (OUTPATIENT)
Dept: FAMILY MEDICINE | Facility: CLINIC | Age: 78
End: 2023-10-23
Payer: MEDICARE

## 2023-10-23 NOTE — TELEPHONE ENCOUNTER
See MyChart from patient needing provider review.   Please write back directly to pt or advise if clinic follow up needed.     Iris LUNSFORD, RN  [facility] RN Triage Team

## 2023-11-08 NOTE — PROGRESS NOTES
[facility]    Chief Complaint: Back pain    Interval History:  Last seen on 7/15/2020.        Recommendations/plan at the last visit included:  1. Physical Therapy:  Order placed for rehab PT to work on general strength and conditioning and to evaluate gait.   2. Clinical Health Psychologist to address issues of relaxation, behavorial change, coping style, and other factors important to improvement: Therapy can help reduce physical and psychosocial triggers or reinforcers of pain by adapting thoughts, feelings and behaviors to reduce symptoms and increase quality of life.  Evidence indicates that the practice of relaxation, meditation, and mindfulness techniques can significantly affect pain levels and overall well being. Not at this time.   3. Diagnostic Studies:  No new imaging needed.   4. Medication Management:    1. Increase use of tylenol to 1,000 mg TID prn. If this is helpful can reduce amount of NSAIDs taken.   2. Try OTC lidocaine 4% patches  5. Potential procedures: Will need to see her in clinic for a physical exam to determine if injections would be appropriate.       Follow up: I will have her come in for an in person clinic visit to complete a physical exam and discuss injections if appropriate.       Since her last visit, Agnes PADMINI Saravia reports:  - No change in pain since the last visit.   - She has not started rehab PT yet.   - Pain continues to be in bilateral low back/buttock with referral into the posterolateral hips.     Pain Information:   Pain quality: Stabbing, aching, shooting, nagging   Pain rating: intensity ranges from 8/10 to 10/10, and averages 8/10 on a 0-10 scale.   Pain today 8/10    Annual Controlled Substance Agreement: NA  UDS: NA    Current Relevant Pain Medications:  Ibuprofen 800 mg BID - H  Gabapentin 400 mg TID - [facility] initially  Tylenol 1,000 mg once daily - [facility]     Other Medications:  Methotrexate   Prednisone  Trazodone    Previous  Medications: (H--helped; [facility]--Helped initially; [facility]-- somewhat helpful, [facility]--No help; W--worse; SE--side effects)   Opiates: na  NSAIDS: na  Anti-migraine mediations: na  Muscle Relaxants: na  Neuropathics: na  Anti-depressants: na  Anxiolytics: na  Topicals: icy/hot - [facility]  Sleep aids:na  Other medications not covered above: na    Past Pain Treatments:  Pain Clinic: No   PT: No, she does walk twice a day but needs assistance of a cane and typically someone to walk with her   Psychologist: No  Relaxation techniques/biofeedback: No  Chiropractor: No - does not believe in them  Acupuncture: No  TENs Unit: Yes - H in past but doesn't last. Does not know where her unit is now.   Injections: No  Self-care:   Heat - [facility] temporary benenefit, ice - [facility]  Surgeries related to pain: Yes, right L2-3 microdiscectomy in 2016 by Dr. Gabe DEMPSEY for short period of time.     [place] Board of Pharmacy Data Base Reviewed:    YES; As expected, no concern for misuse/abuse of controlled medications based on this report.    Medications:  Current Outpatient Medications   Medication Sig Dispense Refill     albuterol (PROAIR HFA/PROVENTIL HFA/VENTOLIN HFA) 108 (90 Base) MCG/ACT inhaler Inhale 2 puffs into the lungs every 4 hours as needed for shortness of breath / dyspnea or wheezing 1 Inhaler 1     aspirin 81 MG EC tablet Take 81 mg by mouth At Bedtime        cevimeline (EVOXAC) 30 MG capsule Take 30 mg by mouth 3 times daily (AM, Noon, Dinner)       clorazepate dipotassium (TRANXENE) 15 MG tablet TAKE 1 TABLET BY MOUTH ONCE DAILY 90 tablet 0     folic acid (FOLVITE) 1 MG tablet Take 1 tablet (1 mg) by mouth daily       gabapentin (NEURONTIN) 400 MG capsule Take 400 mg by mouth 4 times daily (AM, Noon, Dinner) and 100 MG at bedtime       hydrochlorothiazide (HYDRODIURIL) 50 MG tablet Take 1 tablet (50 mg) by mouth daily 90 tablet 3     ibuprofen (ADVIL/MOTRIN) 400 MG tablet Take 400 mg by mouth 2 times daily (Noon and Dinner)        methotrexate 2.5 MG tablet Take 12.5 mg by mouth every 7 days (Takes on Tuesdays)       pantoprazole (PROTONIX) 40 MG EC tablet Take 1 tablet (40 mg) by mouth daily Take 30-60 minutes before a meal. 90 tablet 3     potassium chloride ER (K-TAB/KLOR-CON) 10 MEQ CR tablet Take 2 tablets (20 mEq) by mouth daily 180 tablet 3     predniSONE (DELTASONE) 5 MG tablet Take 5 mg by mouth daily.       simvastatin (ZOCOR) 40 MG tablet Take 1 tablet (40 mg) by mouth At Bedtime 90 tablet 3     traZODone (DESYREL) 50 MG tablet Take 0.5 tablets (25 mg) by mouth once daily at bedtime 45 tablet 3       Review of Systems: A 10-point review of systems was negative, with the exception of chronic pain issues.      Social History: Reviewed; unchanged from previous consultation.     Family history: Reviewed; unchanged from previous consultation.     PHYSICAL EXAM:     Vitals: BP (!) 147/89 (BP Location: Left arm, Patient Position: Sitting, Cuff Size: Adult Large)   Pulse 96   SpO2 95%     Constitutional: healthy, alert and no distress  HEENT: Head atraumatic, normocephalic. Eyes without conjunctival injection or jaundice. Neck supple. No obvious neck masses.  Cardiovascular: No edema or JVD appreciated. No edema on bilateral lower extremities  Skin: No rash, lesions, or petechiae of exposed skin.   Extremities: No clubbing, cyanosis, or edema to exposed extremities  Psychiatric/mental status: Alert, without lethargy or stupor. Appropriate affect. Mood normal.   Neurologic exam:  CN:  Cranial nerves 2-12 are grossly normal.  Motor:  5/5 LE strength    Musculoskeletal exam:  Thoracic spine:    Tenderness in the thoracic spine at midline:  No    Tenderness in the thoracic paraspinal muscles:  No  Lumbar/Sacral spine:   Forward Flexion:  Minimal restriction   Ext: no restriction   Rotation/ext to right: pain free   Rotation/ext to left:: pain free   Tenderness in the lumbar spine at midline:  No   Tenderness in the lumbar paraspinal muscles:   No   Straight leg exam:negative   Angelo's finger positive bilaterally, pain with sacral compression and with palpation of bilateral SI joints.     Tenderness over Trochanteric Bursa:     Right: positive      Left:  positive     Sensory exam:   Light touch: normal bilateral lower extremities    No allodynia, dysesthesia, or hyperalgesia.    DIRE Score for ongoing opioid management is calculated as follows:    Diagnosis = 2 pts (slowly progressive; moderate pain/objective findings)    Intractability = 1 pt (few therapies tried; passive patient role)    Risk        Psych = 3 pts (no significant personality dysfunction/mental illness; good communication with clinic)         Chem Hlth = 3 pts (no history of chemical dependency; not drug-focused)       Reliability = 3 pts (highly reliable with meds, appointments, treatments)       Social = 2 pts (reduction in some relationships/life rolls)       (Psych + Chem hlth + Reliability + Social) = 14    Efficacy = 2 pts (moderate benefit/function; low med dose; too early/not tried meds)      DIRE Score = 16        7-13: likely NOT suitable candidate for long-term opioid analgesia       14-21: may be a suitable candidate for long-term opioid analgesia       DIAGNOSTIC TESTS:  Imaging Studies:   Imaging Studies:   Lumbar MRI completed on 7/25/20109 which showed:           Assessment:  Agnes Saravia is a 75 year old female with a past medical history significant for Sjogren's syndrome, rheumatoid arthritis, hx of PE, interstitial lung disease, HTN, DANA  who presents with the following chronic pain complaints:      1. Chronic back pain: Pain has been ongoing for 2-3 years. Located in low back/buttock primarily below waistline. No radicular symptoms. Pain is constant. MRI of Lumbar Spine from July 2019 shows multi-level facet arthropathy, no significant central or foraminal stenosis. Exam is positive for bilateral SI joint tenderness, pain with sacral compression. Etiology of  pain seems to be SI joint mediated but differential includes lumbar spondylosis with facet arthropathy, myofascial pain and physical deconditioning.     Plan:          1. Physical Therapy:  Order placed for rehab PT to work on general strength and conditioning and to evaluate gait at the last visit. She has not been able to schedule. Gave her the phone number to call and schedule this. Ok for her to wait to start until after injections are completed.    2. Clinical Health Psychologist to address issues of relaxation, behavorial change, coping style, and other factors important to improvement: Therapy can help reduce physical and psychosocial triggers or reinforcers of pain by adapting thoughts, feelings and behaviors to reduce symptoms and increase quality of life.  Evidence indicates that the practice of relaxation, meditation, and mindfulness techniques can significantly affect pain levels and overall well being. Not at this time.   3. Diagnostic Studies:  No new imaging needed.   4. Medication Management:    1. At last visit discussed Increasing use of tylenol to 1,000 mg TID prn. If this is helpful can reduce amount of NSAIDs taken. She has not made this increase yet. Discussed again today.   2. Discussed trying OTC topicals over the painful areas on the posterolateral hips. This would include lidocaine 4% patches, icy/hot, Bengay, voltaren 1% gel, arnica gel, etc...  5. Potential procedures: Order placed for bilateral SI joint injections.       Follow up: for injections    Total time spent face to face was 25 minutes and more than 50% of face to face time was spent in counseling and/or coordination of care regarding the diagnosis and recommendations above.    Karie Batista MD  [facility]                    36.7

## 2023-11-25 DIAGNOSIS — F41.1 GAD (GENERALIZED ANXIETY DISORDER): ICD-10-CM

## 2023-11-26 RX ORDER — CLORAZEPATE DIPOTASSIUM 15 MG/1
15 TABLET ORAL AT BEDTIME
Qty: 90 TABLET | Refills: 0 | Status: SHIPPED | OUTPATIENT
Start: 2023-11-26 | End: 2024-02-29

## 2024-01-22 ENCOUNTER — TRANSFERRED RECORDS (OUTPATIENT)
Dept: HEALTH INFORMATION MANAGEMENT | Facility: CLINIC | Age: 79
End: 2024-01-22
Payer: MEDICARE

## 2024-01-26 ENCOUNTER — TRANSFERRED RECORDS (OUTPATIENT)
Dept: HEALTH INFORMATION MANAGEMENT | Facility: CLINIC | Age: 79
End: 2024-01-26
Payer: MEDICARE

## 2024-02-29 ENCOUNTER — TELEPHONE (OUTPATIENT)
Dept: FAMILY MEDICINE | Facility: CLINIC | Age: 79
End: 2024-02-29
Payer: MEDICARE

## 2024-02-29 DIAGNOSIS — F41.1 GAD (GENERALIZED ANXIETY DISORDER): ICD-10-CM

## 2024-02-29 NOTE — TELEPHONE ENCOUNTER
Prescription must be signed and printed for [facility].  Unable to e-prescribe and I am not in the clinic today.    OK for provide who is in the office to sign or wait until tomorrow     José Miguel Wallace MD

## 2024-02-29 NOTE — TELEPHONE ENCOUNTER
Patient's friend Lima calling on behalf of the patient. No C2C on file for Lima, patient also on the line and gave writer verbal approval to speak with Lima.     Lima calling to follow up on refill request. Notified Lima that refill request was sent to PCP to review and team would fax rx once signed (confirmed that     Patient would like a callback once rx has been faxed to [facility] - please route back to triage fax order and to call patient - thank you!    Callback [phone] - ok to leave detailed VM     Althea Pfeiffer RN  [facility]

## 2024-03-01 RX ORDER — CLORAZEPATE DIPOTASSIUM 15 MG/1
15 TABLET ORAL AT BEDTIME
Qty: 90 TABLET | Refills: 0 | Status: SHIPPED | OUTPATIENT
Start: 2024-03-01 | End: 2024-03-01

## 2024-03-01 RX ORDER — CLORAZEPATE DIPOTASSIUM 15 MG/1
15 TABLET ORAL AT BEDTIME
Qty: 90 TABLET | Refills: 0 | Status: SHIPPED | OUTPATIENT
Start: 2024-03-01 | End: 2024-05-29

## 2024-03-01 NOTE — TELEPHONE ENCOUNTER
"Patient and friend Lima calling clinic to check on status of physical script being faxed to pharmacy. Per chart review, script for Tranxene was locally printed today 3/1/2024.     RN contacted pharmacy to verify if script was received, pharmacy staff stated script had not been received but their fax machine may \"take a while\" to catch up on faxed prescriptions.     Routing to team for review. If this was already sent over please disregard but can we verify prescription for Tranxene was faxed to pharmacy? Thank you!  "

## 2024-03-04 DIAGNOSIS — F41.1 GAD (GENERALIZED ANXIETY DISORDER): ICD-10-CM

## 2024-03-04 RX ORDER — CLORAZEPATE DIPOTASSIUM 15 MG/1
15 TABLET ORAL AT BEDTIME
Qty: 90 TABLET | Refills: 0 | Status: CANCELLED | OUTPATIENT
Start: 2024-03-04

## 2024-03-04 NOTE — TELEPHONE ENCOUNTER
Patient states that she will be out of medication tomorrow. States that she had a friend who pick it up. Marisabel Kedar will  for the patient at the clinic .    Please place rx for clorazepate at clinic  and call patient when she can send her friend to pick it up.  Teressa Johnson RN

## 2024-03-04 NOTE — TELEPHONE ENCOUNTER
Spoke with patient, said that Marisabel Thacker can  script, sent her to the nurse line to speak with them

## 2024-03-04 NOTE — TELEPHONE ENCOUNTER
Patient would like to have rx printed so that she can take to pharmacy as pharmacy told her that they cannot accept faxes at this time, their system is down.     Please send back to triage to call patient when ready.

## 2024-03-05 NOTE — TELEPHONE ENCOUNTER
There are 3 encounters all for the same Rx. Signed copy placed in [facility] [place]. Connected with Pt that said her friend already picked up that paper copy on 03.04.24.     Connected with Willi-ELVA that agreed that writer could shred current paper copy of Rx.

## 2024-05-24 DIAGNOSIS — F41.1 GAD (GENERALIZED ANXIETY DISORDER): ICD-10-CM

## 2024-05-29 RX ORDER — CLORAZEPATE DIPOTASSIUM 15 MG/1
15 TABLET ORAL
Qty: 90 TABLET | Refills: 0 | Status: SHIPPED | OUTPATIENT
Start: 2024-05-29 | End: 2024-08-19

## 2024-05-29 NOTE — TELEPHONE ENCOUNTER
CC: Patient calling regarding this refill.    Per chart review, rx last filled for 90 day supply on 3/1/24. Patient confirms she has enough medication to last her until Monday 6/4 but wants to make sure pharmacy receives new rx in time.     Routing to PCP to please address refill request - thank you!     Evelyn Pfeiffer RN  [facility]

## 2024-05-30 ENCOUNTER — TRANSFERRED RECORDS (OUTPATIENT)
Dept: HEALTH INFORMATION MANAGEMENT | Facility: CLINIC | Age: 79
End: 2024-05-30
Payer: MEDICARE

## 2024-07-14 DIAGNOSIS — I10 ESSENTIAL HYPERTENSION: ICD-10-CM

## 2024-07-15 RX ORDER — HYDROCHLOROTHIAZIDE 25 MG/1
25 TABLET ORAL DAILY
Qty: 90 TABLET | Refills: 0 | Status: SHIPPED | OUTPATIENT
Start: 2024-07-15 | End: 2024-10-07

## 2024-08-12 DIAGNOSIS — E87.6 HYPOKALEMIA: ICD-10-CM

## 2024-08-12 RX ORDER — POTASSIUM CHLORIDE 750 MG/1
20 TABLET, EXTENDED RELEASE ORAL DAILY
Qty: 180 TABLET | Refills: 0 | Status: SHIPPED | OUTPATIENT
Start: 2024-08-12

## 2024-08-19 DIAGNOSIS — F41.1 GAD (GENERALIZED ANXIETY DISORDER): ICD-10-CM

## 2024-08-19 RX ORDER — CLORAZEPATE DIPOTASSIUM 15 MG/1
15 TABLET ORAL
Qty: 90 TABLET | Refills: 0 | Status: SHIPPED | OUTPATIENT
Start: 2024-08-19

## 2024-10-05 DIAGNOSIS — I10 ESSENTIAL HYPERTENSION: ICD-10-CM

## 2024-10-07 RX ORDER — HYDROCHLOROTHIAZIDE 25 MG/1
25 TABLET ORAL DAILY
Qty: 90 TABLET | Refills: 0 | Status: ON HOLD | OUTPATIENT
Start: 2024-10-07

## 2024-10-07 NOTE — TELEPHONE ENCOUNTER
Prescription approved per [facility] Refill Protocol.  Tracy Jade RN  [facility]

## 2024-10-20 ENCOUNTER — HEALTH MAINTENANCE LETTER (OUTPATIENT)
Age: 79
End: 2024-10-20

## 2024-10-21 ENCOUNTER — HOSPITAL ENCOUNTER (INPATIENT)
Facility: CLINIC | Age: 79
LOS: 4 days | Discharge: NURSING, REHAB OR LONG-TERM CARE | DRG: 177 | End: 2024-10-25
Attending: EMERGENCY MEDICINE | Admitting: STUDENT IN AN ORGANIZED HEALTH CARE EDUCATION/TRAINING PROGRAM
Payer: MEDICARE

## 2024-10-21 ENCOUNTER — APPOINTMENT (OUTPATIENT)
Dept: CT IMAGING | Facility: CLINIC | Age: 79
DRG: 177 | End: 2024-10-21
Attending: EMERGENCY MEDICINE
Payer: MEDICARE

## 2024-10-21 ENCOUNTER — NURSE TRIAGE (OUTPATIENT)
Dept: FAMILY MEDICINE | Facility: CLINIC | Age: 79
End: 2024-10-21

## 2024-10-21 DIAGNOSIS — F41.1 GAD (GENERALIZED ANXIETY DISORDER): ICD-10-CM

## 2024-10-21 DIAGNOSIS — M06.9 RHEUMATOID ARTHRITIS INVOLVING BOTH KNEES, UNSPECIFIED WHETHER RHEUMATOID FACTOR PRESENT (H): Primary | ICD-10-CM

## 2024-10-21 DIAGNOSIS — E87.6 HYPOKALEMIA: ICD-10-CM

## 2024-10-21 DIAGNOSIS — U07.1 COVID-19: ICD-10-CM

## 2024-10-21 DIAGNOSIS — J96.01 ACUTE RESPIRATORY FAILURE WITH HYPOXIA (H): ICD-10-CM

## 2024-10-21 DIAGNOSIS — R91.1 PULMONARY NODULE: ICD-10-CM

## 2024-10-21 DIAGNOSIS — I10 ESSENTIAL HYPERTENSION: ICD-10-CM

## 2024-10-21 LAB
ALBUMIN SERPL BCG-MCNC: 3.8 G/DL (ref 3.5–5.2)
ALBUMIN SERPL BCG-MCNC: 3.8 G/DL (ref 3.5–5.2)
ALP SERPL-CCNC: 69 U/L (ref 40–150)
ALP SERPL-CCNC: 69 U/L (ref 40–150)
ALT SERPL W P-5'-P-CCNC: 19 U/L (ref 0–50)
ALT SERPL W P-5'-P-CCNC: 19 U/L (ref 0–50)
ANION GAP SERPL CALCULATED.3IONS-SCNC: 9 MMOL/L (ref 7–15)
AST SERPL W P-5'-P-CCNC: 25 U/L (ref 0–45)
AST SERPL W P-5'-P-CCNC: 30 U/L (ref 0–45)
BASE EXCESS BLDV CALC-SCNC: 5 MMOL/L (ref -3–3)
BASE EXCESS BLDV CALC-SCNC: 6 MMOL/L (ref -3–3)
BASOPHILS # BLD AUTO: 0.1 10E3/UL (ref 0–0.2)
BASOPHILS NFR BLD AUTO: 1 %
BILIRUB DIRECT SERPL-MCNC: <0.2 MG/DL (ref 0–0.3)
BILIRUB SERPL-MCNC: 0.5 MG/DL
BILIRUB SERPL-MCNC: 0.5 MG/DL
BUN SERPL-MCNC: 11 MG/DL (ref 8–23)
CALCIUM SERPL-MCNC: 8.4 MG/DL (ref 8.8–10.4)
CHLORIDE SERPL-SCNC: 94 MMOL/L (ref 98–107)
CREAT SERPL-MCNC: 0.64 MG/DL (ref 0.51–0.95)
EGFRCR SERPLBLD CKD-EPI 2021: 88 ML/MIN/1.73M2
EOSINOPHIL # BLD AUTO: 0 10E3/UL (ref 0–0.7)
EOSINOPHIL NFR BLD AUTO: 0 %
ERYTHROCYTE [DISTWIDTH] IN BLOOD BY AUTOMATED COUNT: 14.2 % (ref 10–15)
FLUAV RNA SPEC QL NAA+PROBE: NEGATIVE
FLUBV RNA RESP QL NAA+PROBE: NEGATIVE
GLUCOSE BLDC GLUCOMTR-MCNC: 125 MG/DL (ref 70–99)
GLUCOSE SERPL-MCNC: 117 MG/DL (ref 70–99)
HCO3 BLDV-SCNC: 32 MMOL/L (ref 21–28)
HCO3 BLDV-SCNC: 32 MMOL/L (ref 21–28)
HCO3 SERPL-SCNC: 29 MMOL/L (ref 22–29)
HCT VFR BLD AUTO: 37.5 % (ref 35–47)
HGB BLD-MCNC: 12.5 G/DL (ref 11.7–15.7)
HOLD SPECIMEN: NORMAL
IMM GRANULOCYTES # BLD: 0.1 10E3/UL
IMM GRANULOCYTES NFR BLD: 1 %
LACTATE BLD-SCNC: 1 MMOL/L
LACTATE BLD-SCNC: 1.1 MMOL/L
LYMPHOCYTES # BLD AUTO: 0.3 10E3/UL (ref 0.8–5.3)
LYMPHOCYTES NFR BLD AUTO: 3 %
MCH RBC QN AUTO: 30.1 PG (ref 26.5–33)
MCHC RBC AUTO-ENTMCNC: 33.3 G/DL (ref 31.5–36.5)
MCV RBC AUTO: 90 FL (ref 78–100)
MONOCYTES # BLD AUTO: 0.8 10E3/UL (ref 0–1.3)
MONOCYTES NFR BLD AUTO: 8 %
NEUTROPHILS # BLD AUTO: 8.6 10E3/UL (ref 1.6–8.3)
NEUTROPHILS NFR BLD AUTO: 88 %
NRBC # BLD AUTO: 0 10E3/UL
NRBC BLD AUTO-RTO: 0 /100
PCO2 BLDV: 52 MM HG (ref 40–50)
PCO2 BLDV: 54 MM HG (ref 40–50)
PH BLDV: 7.38 [PH] (ref 7.32–7.43)
PH BLDV: 7.4 [PH] (ref 7.32–7.43)
PLATELET # BLD AUTO: 262 10E3/UL (ref 150–450)
PO2 BLDV: 22 MM HG (ref 25–47)
PO2 BLDV: 22 MM HG (ref 25–47)
POTASSIUM SERPL-SCNC: 3.2 MMOL/L (ref 3.4–5.3)
PROT SERPL-MCNC: 6.4 G/DL (ref 6.4–8.3)
PROT SERPL-MCNC: 6.5 G/DL (ref 6.4–8.3)
RADIOLOGIST FLAGS: NORMAL
RBC # BLD AUTO: 4.15 10E6/UL (ref 3.8–5.2)
RSV RNA SPEC NAA+PROBE: NEGATIVE
SAO2 % BLDV: 36 % (ref 70–75)
SAO2 % BLDV: 37 % (ref 70–75)
SARS-COV-2 RNA RESP QL NAA+PROBE: POSITIVE
SODIUM SERPL-SCNC: 132 MMOL/L (ref 135–145)
WBC # BLD AUTO: 9.8 10E3/UL (ref 4–11)

## 2024-10-21 PROCEDURE — 83605 ASSAY OF LACTIC ACID: CPT

## 2024-10-21 PROCEDURE — 96375 TX/PRO/DX INJ NEW DRUG ADDON: CPT | Mod: 59

## 2024-10-21 PROCEDURE — 80053 COMPREHEN METABOLIC PANEL: CPT | Performed by: EMERGENCY MEDICINE

## 2024-10-21 PROCEDURE — 250N000011 HC RX IP 250 OP 636: Performed by: EMERGENCY MEDICINE

## 2024-10-21 PROCEDURE — 250N000009 HC RX 250: Performed by: EMERGENCY MEDICINE

## 2024-10-21 PROCEDURE — 96376 TX/PRO/DX INJ SAME DRUG ADON: CPT | Mod: 59

## 2024-10-21 PROCEDURE — 99291 CRITICAL CARE FIRST HOUR: CPT | Mod: 25

## 2024-10-21 PROCEDURE — 82803 BLOOD GASES ANY COMBINATION: CPT

## 2024-10-21 PROCEDURE — 250N000011 HC RX IP 250 OP 636: Performed by: STUDENT IN AN ORGANIZED HEALTH CARE EDUCATION/TRAINING PROGRAM

## 2024-10-21 PROCEDURE — G1010 CDSM STANSON: HCPCS

## 2024-10-21 PROCEDURE — 120N000001 HC R&B MED SURG/OB

## 2024-10-21 PROCEDURE — 96361 HYDRATE IV INFUSION ADD-ON: CPT | Mod: 59

## 2024-10-21 PROCEDURE — 82248 BILIRUBIN DIRECT: CPT | Performed by: EMERGENCY MEDICINE

## 2024-10-21 PROCEDURE — 258N000003 HC RX IP 258 OP 636: Performed by: EMERGENCY MEDICINE

## 2024-10-21 PROCEDURE — 36556 INSERT NON-TUNNEL CV CATH: CPT

## 2024-10-21 PROCEDURE — 85025 COMPLETE CBC W/AUTO DIFF WBC: CPT | Performed by: EMERGENCY MEDICINE

## 2024-10-21 PROCEDURE — 93005 ELECTROCARDIOGRAM TRACING: CPT | Mod: 59

## 2024-10-21 PROCEDURE — 70450 CT HEAD/BRAIN W/O DYE: CPT | Mod: MA

## 2024-10-21 PROCEDURE — 36415 COLL VENOUS BLD VENIPUNCTURE: CPT | Performed by: EMERGENCY MEDICINE

## 2024-10-21 PROCEDURE — 82962 GLUCOSE BLOOD TEST: CPT

## 2024-10-21 PROCEDURE — 87637 SARSCOV2&INF A&B&RSV AMP PRB: CPT | Performed by: EMERGENCY MEDICINE

## 2024-10-21 PROCEDURE — XW033E5 INTRODUCTION OF REMDESIVIR ANTI-INFECTIVE INTO PERIPHERAL VEIN, PERCUTANEOUS APPROACH, NEW TECHNOLOGY GROUP 5: ICD-10-PCS | Performed by: STUDENT IN AN ORGANIZED HEALTH CARE EDUCATION/TRAINING PROGRAM

## 2024-10-21 PROCEDURE — 99223 1ST HOSP IP/OBS HIGH 75: CPT | Mod: AI | Performed by: STUDENT IN AN ORGANIZED HEALTH CARE EDUCATION/TRAINING PROGRAM

## 2024-10-21 PROCEDURE — 96374 THER/PROPH/DIAG INJ IV PUSH: CPT | Mod: 59

## 2024-10-21 RX ORDER — DEXAMETHASONE SODIUM PHOSPHATE 10 MG/ML
6 INJECTION, SOLUTION INTRAMUSCULAR; INTRAVENOUS ONCE
Status: COMPLETED | OUTPATIENT
Start: 2024-10-21 | End: 2024-10-21

## 2024-10-21 RX ORDER — POTASSIUM CHLORIDE 1500 MG/1
20 TABLET, EXTENDED RELEASE ORAL ONCE
Status: DISCONTINUED | OUTPATIENT
Start: 2024-10-21 | End: 2024-10-22

## 2024-10-21 RX ORDER — IOPAMIDOL 755 MG/ML
68 INJECTION, SOLUTION INTRAVASCULAR ONCE
Status: COMPLETED | OUTPATIENT
Start: 2024-10-21 | End: 2024-10-21

## 2024-10-21 RX ORDER — ONDANSETRON 2 MG/ML
4 INJECTION INTRAMUSCULAR; INTRAVENOUS EVERY 6 HOURS PRN
Status: DISCONTINUED | OUTPATIENT
Start: 2024-10-21 | End: 2024-10-25 | Stop reason: HOSPADM

## 2024-10-21 RX ORDER — ONDANSETRON 2 MG/ML
4 INJECTION INTRAMUSCULAR; INTRAVENOUS ONCE
Status: COMPLETED | OUTPATIENT
Start: 2024-10-21 | End: 2024-10-21

## 2024-10-21 RX ADMIN — SODIUM CHLORIDE 1000 ML: 9 INJECTION, SOLUTION INTRAVENOUS at 15:25

## 2024-10-21 RX ADMIN — SODIUM CHLORIDE 92 ML: 9 INJECTION, SOLUTION INTRAVENOUS at 19:58

## 2024-10-21 RX ADMIN — ONDANSETRON 4 MG: 2 INJECTION INTRAMUSCULAR; INTRAVENOUS at 18:57

## 2024-10-21 RX ADMIN — SODIUM CHLORIDE 92 ML: 9 INJECTION, SOLUTION INTRAVENOUS at 16:16

## 2024-10-21 RX ADMIN — IOPAMIDOL 68 ML: 755 INJECTION, SOLUTION INTRAVENOUS at 19:56

## 2024-10-21 RX ADMIN — IOPAMIDOL 68 ML: 755 INJECTION, SOLUTION INTRAVENOUS at 16:16

## 2024-10-21 RX ADMIN — DEXAMETHASONE SODIUM PHOSPHATE 6 MG: 10 INJECTION, SOLUTION INTRAMUSCULAR; INTRAVENOUS at 15:27

## 2024-10-21 RX ADMIN — ONDANSETRON 4 MG: 2 INJECTION INTRAMUSCULAR; INTRAVENOUS at 22:08

## 2024-10-21 ASSESSMENT — ACTIVITIES OF DAILY LIVING (ADL)
ADLS_ACUITY_SCORE: 38

## 2024-10-21 NOTE — H&P
[facility]    History and Physical - Hospitalist Service       Date of Admission:  10/21/2024    Assessment & Plan      Agnes Saravia is a 79 year old female with history of RA, Sjogren's, DANA, HTN, ILD who was admitted on 10/21/2024 with +COVID and acute encephalopathy.    Acute toxic vs metabolic encephalopathy, improving  Noted to be lethargic and obtunded upon arrival to the ED.  No significant metabolic derangements, some mild hypoxia as below, no focal neurologic deficits, and no other signs or symptoms of infection aside from COVID-positive.  CT head negative and no story of trauma.  Patient's mental status much improved on reassessment and suspect underlying infection is driving toxic metabolic encephalopathy.  -Treat underlying infection as below  -Delirium precautions  -Daily BMP, Mag   +Replete electrolytes as needed    COVID positive  Acute hypoxic respiratory insufficiency  ILD, unclear etiology  Patient presented with severe weakness, altered mental status, hypoxia requiring supplemental oxygen and was found to be COVID-positive.  Does have close contact in her home with a recent COVID illness as well.  Respiratory status remained stable while out to treat for mild COVID infection given need for oxygen.  -Dexamethasone 6mg x10d total  -Remdesivir x3d total  -Wean supplemental O2 as able  -Encourage incentive spirometry  -Consider gentle diuresis pending respiratory status trend    Pulmonary nodule (6mm), new  Noted on admission CT warrants outpatient follow-up.  -Repeat CT chest in 6-12 months    Hyponatremia, mild  Hypokalemia, mild, chronic  Suspect acute decrease in setting of poor PO intake. Appears patient has been on chronic K supplementation in the past.  -Encourage PO intake  -Daily BMP  -Defer urine studies pending Na trend    Rheumatoid arthritis  Sjogren's syndrome  Follows with outpatient rheumatology, unable to access records. Previously on methotrexate and  prednisone.  -Continue methotrexate pending pharmD review, holding prednisone    HTN - Hold hydrochlorothiazide for now  HLD - Continue PTA statin  Chronic insomnia - Hold trazodone  GERD - Continue PTA PPI  DANA        Diet:  Regular  DVT Prophylaxis: Enoxaparin (Lovenox) SQ  Andujar Catheter: Not present  Lines: None     Cardiac Monitoring: None  Code Status:      Clinically Significant Risk Factors Present on Admission        # Hypokalemia: Lowest K = 3.2 mmol/L in last 2 days, will replace as needed  # Hyponatremia: Lowest Na = 132 mmol/L in last 2 days, will monitor as appropriate  # Hypochloremia: Lowest Cl = 94 mmol/L in last 2 days, will monitor as appropriate  # Hypocalcemia: Lowest Ca = 8.4 mg/dL in last 2 days, will monitor and replace as appropriate       # Drug Induced Platelet Defect: home medication list includes an antiplatelet medication   # Hypertension: Noted on problem list                  Disposition Plan     Medically Ready for Discharge: Anticipated in 2-4 Days     Golden Fraga MD  Hospitalist Service  [facility]  Securely message with [email or web address] (more info)  Text page via [employer] Paging/Directory     ______________________________________________________________________    Chief Complaint   Weakness    History is obtained from the patient    History of Present Illness   Agnes Saravia is a 79 year old female with history of RA, Sjogren's, DANA, HTN, ILD who presented to the ED from home with significant weakness, cough, fall without loss of consciousness.  Patient states that she currently lives with her caretaker who was recently diagnosed with symptomatic COVID.  She states that her symptoms started within the past day or so and denies diarrhea, vomiting, abdominal pain, shortness of breath, chest pain.  The patient can typically perform ADLs but does receive some help from her caretaker, no baseline cognitive deficits.  The patient was placed on supplemental  "oxygen by EMS due to mild hypoxia and since arriving to the ED, the patient's cognitive status continued to decline.  At the time of my initial interaction with the patient, she would intermittently respond to commands but appeared severely weak and was unable to form complete sentences.    Initial ED workup notable for sodium 132, potassium 3.2, normal creatinine and normal glucose.  Lactate normal.  CBC largely within normal limits and patient was positive for COVID.  CT head with evidence of chronic changes and CT PE negative for PE but did find a new 6 mm right upper lobe nodule without other significant findings.    In my follow-up conversation with the patient, she appeared much more awake, alert, but did still have some confusion as to how she got to the hospital.  She denies any significant pain or focal concerns, however, states she just feels \"sick\".  HPI is limited by patient's mental status.    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    Depressive disorder     Hyperlipidemia     Insomnia     PONV (postoperative nausea and vomiting)     Rheumatoid arthritis(714.0)     Sjogren's syndrome (H)      Past Surgical History   Past Surgical History:   Procedure Laterality Date    ABDOMEN SURGERY      APPENDECTOMY      BIOPSY  2014    Lip lower    BLEPHAROPLASTY      BRONCHOSCOPY (RIGID OR FLEXIBLE), DIAGNOSTIC N/A 04/11/2018    Procedure: COMBINED BRONCHOSCOPY (RIGID OR FLEXIBLE), LAVAGE;  Bronchoscopy with Lavage;  Surgeon: Manuel Conway MD;  Location: [facility]    COLONOSCOPY      DISCECTOMY LUMBAR POSTERIOR MICROSCOPIC ONE LEVEL  08/14/2014    Procedure: DISCECTOMY LUMBAR POSTERIOR MICROSCOPIC ONE LEVEL;  Surgeon: Dudley Bernal MD;  Location:  OR    ENT SURGERY  5/11/2017    HYSTERECTOMY TOTAL ABDOMINAL, BILATERAL SALPINGO-OOPHORECTOMY, COMBINED      HYSTERECTOMY, PAP NO LONGER INDICATED      MAMMOPLASTY REDUCTION " BILATERAL  05/14/2013    Procedure: MAMMOPLASTY REDUCTION BILATERAL;  BILATERAL REDUCTION MAMMOPLASTY;  Surgeon: Bruce Nash MD;  Location: [facility]    SHOULDER SURGERY       Prior to Admission Medications   Prior to Admission Medications   Prescriptions Last Dose Informant Patient Reported? Taking?   acetaminophen (TYLENOL) 500 MG tablet  Daughter Yes No   Sig: Take 500-1,000 mg by mouth every 6 hours as needed for mild pain   albuterol (PROAIR HFA/PROVENTIL HFA/VENTOLIN HFA) 108 (90 Base) MCG/ACT inhaler  Daughter No No   Sig: Inhale 2 puffs into the lungs every 4 hours as needed for shortness of breath / dyspnea or wheezing   aspirin 81 MG EC tablet  Daughter Yes No   Sig: Take 81 mg by mouth At Bedtime    cevimeline (EVOXAC) 30 MG capsule  Daughter Yes No   Sig: Take 30 mg by mouth 3 times daily (AM, Noon, Dinner)   clorazepate dipotassium (TRANXENE) 15 MG tablet   No No   Sig: TAKE ONE TABLET BY MOUTH AT BEDTIME   folic acid (FOLVITE) 1 MG tablet  Daughter Yes No   Sig: Take 1 mg by mouth six times a week Every day except Tuesdays when methotrexate is due   gabapentin (NEURONTIN) 100 MG capsule  Daughter Yes No   Sig: Take 100 mg by mouth At Bedtime   gabapentin (NEURONTIN) 400 MG capsule  Daughter Yes No   Sig: Take 400 mg by mouth 3 times daily (AM, Noon, Dinner)   hydrochlorothiazide (HYDRODIURIL) 25 MG tablet   No No   Sig: Take 1 tablet (25 mg) by mouth daily   ibuprofen (ADVIL/MOTRIN) 400 MG tablet  Daughter Yes No   Sig: Take 800 mg by mouth 3 times daily    methotrexate 2.5 MG tablet  Daughter Yes No   Sig: Take 10 mg by mouth every 7 days (Takes on Tuesdays)   pantoprazole (PROTONIX) 40 MG EC tablet   No No   Sig: Take 1 tablet (40 mg) by mouth daily   polyethylene glycol-propylene glycol (SYSTANE ULTRA) 0.4-0.3 % SOLN ophthalmic solution  Daughter Yes No   Sig: Place 1 drop into both eyes every hour as needed for dry eyes   potassium chloride ER (K-TAB/KLOR-CON) 10 MEQ CR tablet   No No   Sig:  Take 2 tablets (20 mEq) by mouth daily.   predniSONE (DELTASONE) 5 MG tablet  Daughter Yes No   Sig: Take 5 mg by mouth daily.   simvastatin (ZOCOR) 40 MG tablet   No No   Sig: Take 1 tablet (40 mg) by mouth at bedtime   traZODone (DESYREL) 50 MG tablet   No No   Sig: TAKE 0.5 (ONE-HALF) TABLET BY MOUTH ONCE DAILY AT BEDTIME      Facility-Administered Medications: None      Review of Systems    The 10 point Review of Systems is negative other than noted in the HPI or here.     Physical Exam   Vital Signs: Temp: 99.4  F (37.4  C) Temp src: Temporal BP: (!) 147/71 Pulse: 100   Resp: 18 SpO2: 97 % O2 Device: Nasal cannula Oxygen Delivery: 2 LPM  Weight: 0 lbs 0 oz    General: Awake, alert, NAD, laying flat in bed, intermittent confusion  HEENT: Atraumatic, normocephalic, EOMI, no scleral icterus  CV: RRR, no murmurs, no LEO, distal pulses intact, no JVD  Pulm: CTAB, breathing comfortably on 3L NC, no wheezes, no crackles  Abd: Soft, non-tender, non-distended, no overlying skin changes, no palpable hepatosplenomegaly  Skin: No rashes, lesions, or wounds visualized  Neuro: AOx3, CN II-XII grossly intact, no focal deficits, moving all extremities spontaneously, speech normal, forgetful at times requiring redirection    Medical Decision Making       80 MINUTES SPENT BY ME on the date of service doing chart review, history, exam, documentation & further activities per the note.      Data     I have personally reviewed the following data over the past 24 hrs:    9.8  \   12.5   / 262     132 (L) 94 (L) 11.0 /  117 (H)   3.2 (L) 29 0.64 \     ALT: 19 AST: 25 AP: 69 TBILI: 0.5   ALB: 3.8 TOT PROTEIN: 6.4 LIPASE: N/A     Procal: N/A CRP: N/A Lactic Acid: 1.1         Imaging results reviewed over the past 24 hrs:   No results found for this or any previous visit (from the past 24 hour(s)).

## 2024-10-21 NOTE — ED PROVIDER NOTES
Emergency Department Note      History of Present Illness     Chief Complaint   Generalized Weakness and Nausea      HPI   Agnes Saravia is a 79 year old female with a history of hyperlipidemia, arthritis, Sjogren syndrome presents to the emergency department with a complaint of generalized weakness.  Patient reports that yesterday she started to get generally weak, she has had a cough.  She was exposed to somebody with COVID.  She has not had any vomiting, diarrhea.  She did call EMS, and when she was trying to get onto the gurney, she collapsed because she was too weak.    Independent Historian   None    Review of External Notes       Past Medical History     Medical History and Problem List   Hypertension  Seropositive Rheumatoid Arthritis  Hyperlipidemia  Sjgren's Syndrome  Benign Paroxysmal Positional Vertigo, unspecified laterality  DANA  Hypokalemia  C. Difficile enteritis      Medications   Cevimeline HCl  Clorazepate Dipotassium  Gabapentin  Hydrochlorothiazide  Methotrexate Sodium  Pantoprazole Sodium  Prednisone  Simvastatin  Trazodone HCl  Aspirin (81 mg)    Surgical History   Blepharoplasty  Bilateral Salpingo Oophorectomy and Hysterectomy Combined  Mammoplasty Reduction, bilateral  Discectomy lumbar posterior microscopic level one  Bronchoscopy (rigid or flexible), diagnostic  Appendectomy  Lip Lower Biopsy  Colonoscopy  Shoulder Surgery  Abdomen Surgery  ENT Surgery    Physical Exam     No data found.    Physical Exam  General: resting comfortably when I enter the room  Eyes: Pupils equal, conjunctivae pink no scleral icterus or conjunctival injection  ENT: Dry mucus membranes  Respiratory:  Lungs clear to auscultation bilaterally, no crackles/rubs/wheezes.  Good air movement  CV: Normal rhythm, no murmurs.  Tachycardia.  GI:  Abdomen soft and non-distended.  No tenderness, guarding or rebound  Skin: Warm, dry.  No rashes or petechiae  Musculoskeletal: No peripheral edema or calf  tenderness  Neuro: Alert and oriented to person/place/time.  Opens eyes to voice.  Answers questions appropriately.  Moves all extremities.      Diagnostics     Lab Results   Labs Ordered and Resulted from Time of ED Arrival to Time of ED Departure   COMPREHENSIVE METABOLIC PANEL - Abnormal       Result Value    Sodium 132 (*)     Potassium 3.2 (*)     Carbon Dioxide (CO2) 29      Anion Gap 9      Urea Nitrogen 11.0      Creatinine 0.64      GFR Estimate 88      Calcium 8.4 (*)     Chloride 94 (*)     Glucose 117 (*)     Alkaline Phosphatase 69      AST 25      ALT 19      Protein Total 6.4      Albumin 3.8      Bilirubin Total 0.5     INFLUENZA A/B, RSV, & SARS-COV2 PCR - Abnormal    Influenza A PCR Negative      Influenza B PCR Negative      RSV PCR Negative      SARS CoV2 PCR Positive (*)    CBC WITH PLATELETS AND DIFFERENTIAL - Abnormal    WBC Count 9.8      RBC Count 4.15      Hemoglobin 12.5      Hematocrit 37.5      MCV 90      MCH 30.1      MCHC 33.3      RDW 14.2      Platelet Count 262      % Neutrophils 88      % Lymphocytes 3      % Monocytes 8      % Eosinophils 0      % Basophils 1      % Immature Granulocytes 1      NRBCs per 100 WBC 0      Absolute Neutrophils 8.6 (*)     Absolute Lymphocytes 0.3 (*)     Absolute Monocytes 0.8      Absolute Eosinophils 0.0      Absolute Basophils 0.1      Absolute Immature Granulocytes 0.1      Absolute NRBCs 0.0     ISTAT GASES LACTATE VENOUS POCT - Abnormal    Lactic Acid POCT 1.0      Bicarbonate Venous POCT 32 (*)     O2 Sat, Venous POCT 36 (*)     pCO2 Venous POCT 54 (*)     pH Venous POCT 7.38      pO2 Venous POCT 22 (*)     Base Excess/Deficit (+/-) POCT 5.0 (*)    ISTAT GASES LACTATE VENOUS POCT - Abnormal    Lactic Acid POCT 1.1      Bicarbonate Venous POCT 32 (*)     O2 Sat, Venous POCT 37 (*)     pCO2 Venous POCT 52 (*)     pH Venous POCT 7.40      pO2 Venous POCT 22 (*)     Base Excess/Deficit (+/-) POCT 6.0 (*)    GLUCOSE BY METER - Abnormal    GLUCOSE  BY METER POCT 125 (*)    HEPATIC FUNCTION PANEL - Normal    Protein Total 6.5      Albumin 3.8      Bilirubin Total 0.5      Alkaline Phosphatase 69      AST 30      ALT 19      Bilirubin Direct <0.20         Imaging   CT Chest Pulmonary Embolism w Contrast   Final Result   IMPRESSION:   1.  No acute pulmonary embolism.   2.  New 6 mm right upper lobe pulmonary nodule. Follow-up is recommended according to Fleischner criteria, as detailed below.         2017 Fleischner Society Recommendations for Solid Pulmonary Nodules      Nodule size greater than 6 mm up to 8 mm:      Single nodule:      Low risk: CT at 6-12 months, then consider CT at 18-24 months   High risk: CT at 6-12 months, then CT at 18-24 months      Multiple nodules:      Low risk: CT at 3-6 months, then consider CT at 18-24 months   High risk: CT at 3-6 months, then CT at 18-24 months            [Recommend Follow Up: Lung nodule]      This report will be copied to the [facility] to ensure a provider acknowledges the finding.          Head CT w/o contrast   Final Result   IMPRESSION:       1. 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          EKG     ECG results from 10/21/24   EKG 12-lead, tracing only     Value    Systolic Blood Pressure     Diastolic Blood Pressure     Ventricular Rate 66    Atrial Rate 66    MN Interval 238    QRS Duration 102        QTc 417    P Axis 77    R AXIS -35    T Axis 56    Interpretation ECG      Sinus rhythm with 1st degree A-V block  Left axis deviation  Low voltage QRS  Abnormal ECG  When compared with ECG of 25-Mar-2020 00:07,  No significant change was found  Confirmed by MD FRANCOIS, ZAYRA ([id number]) on 10/23/2024 6:21:11 PM           Independent Interpretation   None    ED Course      Medications Administered   Medications   iopamidol (ISOVUE-370) solution 68 mL (68 mLs Intravenous $Given 10/21/24 1616)   SalineFlush (92 mLs Intravenous $Given  10/21/24 1616)   dexAMETHasone PF (DECADRON) injection 6 mg (6 mg Intravenous $Given 10/21/24 1527)   sodium chloride 0.9% BOLUS 1,000 mL (0 mLs Intravenous Stopped 10/21/24 1834)   Saline (92 mLs As instructed $Given by Other Clinician 10/21/24 1958)   iopamidol (ISOVUE-370) solution 68 mL (68 mLs Intravenous $Given by Other Clinician 10/21/24 1956)   ondansetron (ZOFRAN) injection 4 mg (4 mg Intravenous $Given 10/21/24 1857)   remdesivir 200 mg in sodium chloride 0.9 % 100 mL intermittent infusion (200 mg Intravenous $New Bag 10/22/24 0236)     Followed by   sodium chloride 0.9% BOLUS 50 mL (50 mLs Intravenous $New Bag 10/22/24 0357)   remdesivir 100 mg in sodium chloride 0.9 % 100 mL intermittent infusion (100 mg Intravenous $New Bag 10/24/24 0027)     And   sodium chloride 0.9% BOLUS 50 mL (50 mLs Intravenous $New Bag 10/24/24 0028)       Procedures   Procedures       Central Line Placement     Procedure:  Central Venous Catheter Insertion     Indication: Vascular access    Consent:  Verbal from Patient  Risk Discussed: Arterial puncture & stroke, incorrect placement, failure to place, bleeding or infection, nerve damage, and alternative treatment with no central line    Universal Protocol: Universal protocol was followed and time out conducted just prior to starting procedure, confirming patient identity, site/side, procedure, patient position, and availability of correct equipment and implants.     Anesthesia/Sedation: Lidocaine - 1%    Procedure Detail:    Central line bundle elements were complete including preparatory hand leansing, donning full barrier precautions, use of a full body drape and chlorhxidine gluconate skin prep.   The Left femoral vein was selected as the optimal location for patient s condition.   Ultrasound was utilized for guidance: Yes,   A finder needle was used to enter the vein, through which a guidewire was inserted. The finder needle was then removed and the tract was dilated.   A  triple lumen central venous catheter was inserted over the guidewire without difficulty. The guidewire was removed and the catheter was sutured in place and covered with an appropriate dressing.   A post-procedure chest radiograph was performed.     Patient Status:  The patient tolerated the procedure well: Yes. There were no complications.     Discussion of Management   Spoke with Dr. Fraga for admission    ED Course   ED Course as of 10/31/24 1615   Mon Oct 21, 2024   1203 I obtained the history and evaluated the patient as noted above.    1920 I rechecked the patient and explained findings.         Additional Documentation  None    Medical Decision Making / Diagnosis     CMS Diagnoses: None    MIPS     CT for PE was ordered because the patient is high risk for pulmonary embolism.    JIN Saravia is a 79 year old female presents to the emergency department with a complaint of generalized weakness.  On exam patient is lethargic, but she is awake and answering questions. Moves all extremities.  States that she has had a cough.  Unsure if she has had fevers.  She is COVID-positive.  Potassium is a little bit low.  Patient does look dehydrated.  Patient is hypoxic on room air, and is placed on oxygen with an improvement of her saturations to 97%.  Fluids are ordered.  Lactic acid is not elevated, no elevation in WBCs.  Low suspicion for sepsis.  No LAZ.  IV access is delayed secondary to difficulty placing IV.  Several attempts have been made.  Ultrasound has been used.  Vascular access team has been notified, but they have a delay of about 3 to 4 hours.  Went in and evaluated the patient, talked to her about central line placement.  She is agreeable.  She is still very sleepy, although opens her eyes and nods yes and no.  I will put in a central line for IV access.  On repeat exam, the patient is becoming more lethargic.  She is more difficult to wake up.  I-STAT VBG is done, she is not retaining CO2  and she is not acidotic.  She is still able to wake up and answer questions.  Blood sugar is checked and it is 125.  I did add on a head CT and a CT PE scan.  Patient is now getting IV fluids now that she has a central line as well as a dose of steroids.  CT's are still pending. I will handoff the patient to Dr. Shepherd.  I do not think that the patient needs any pressors or airway intervention at this point.  On reevaluation, the patient is a little bit more awake and alert after the IV fluids.  I spoke with Dr. Fraga with the hospitalist service, and he will admit the patient.    Critical Care time was 30 minutes for this patient excluding procedures.    Disposition   The patient was admitted to the hospital.     Diagnosis     ICD-10-CM    1. Rheumatoid arthritis involving both knees, unspecified whether rheumatoid factor present (H)  M06.9 predniSONE (DELTASONE) 5 MG tablet      2. Acute respiratory failure with hypoxia (H)  J96.01       3. COVID-19  U07.1 dexAMETHasone (DECADRON) 6 MG tablet      4. Hypokalemia  E87.6       5. Pulmonary nodule  R91.1       6. Essential hypertension  I10       7. DANA (generalized anxiety disorder)  F41.1 clorazepate (TRANXENE) 7.5 MG tablet           Discharge Medications   Discharge Medication List as of 10/25/2024  2:33 PM        START taking these medications    Details   dexAMETHasone (DECADRON) 6 MG tablet Take 1 tablet (6 mg) by mouth daily for 5 days., Transitional               Scribe Disclosure:  I, Yonatan Angeles, am serving as a scribe at 12:02 PM on 10/21/2024 to document services personally performed by Mica Bolaños MD based on my observations and the provider's statements to me.       Mica Bolaños MD  10/31/24 1932

## 2024-10-21 NOTE — ED NOTES
[facility]  ED Nurse Handoff Report    ED Chief complaint: Generalized Weakness and Nausea      ED Diagnosis:   Final diagnoses:   Acute respiratory failure with hypoxia (H)   COVID-19   Hypokalemia       Code Status: Full Code    Allergies:   Allergies   Allergen Reactions    Mellaril [Thioridazine Hcl] Anaphylaxis    Percocet [Oxycodone-Acetaminophen] Anaphylaxis and Swelling     Tolerates hydrocodone and codeine in cough medicine    Duloxetine      Made her feel weird, increased anxiety    No Clinical Screening - See Comments      JUAN-FOR ANXIETY       Patient Story:  presents to the emergency department with a complaint of generalized weakness.  Patient reports that yesterday she started to get generally weak, she has had a cough.  She was exposed to somebody with COVID.  She has not had any vomiting, diarrhea.  She did call EMS, and when she was trying to get onto the gurney, she collapsed because she was too weak   Focused Assessment:  weakness    Treatments and/or interventions provided:   Labs Ordered and Resulted from Time of ED Arrival to Time of ED Departure   COMPREHENSIVE METABOLIC PANEL - Abnormal       Result Value    Sodium 132 (*)     Potassium 3.2 (*)     Carbon Dioxide (CO2) 29      Anion Gap 9      Urea Nitrogen 11.0      Creatinine 0.64      GFR Estimate 88      Calcium 8.4 (*)     Chloride 94 (*)     Glucose 117 (*)     Alkaline Phosphatase 69      AST 25      ALT 19      Protein Total 6.4      Albumin 3.8      Bilirubin Total 0.5     INFLUENZA A/B, RSV, & SARS-COV2 PCR - Abnormal    Influenza A PCR Negative      Influenza B PCR Negative      RSV PCR Negative      SARS CoV2 PCR Positive (*)    CBC WITH PLATELETS AND DIFFERENTIAL - Abnormal    WBC Count 9.8      RBC Count 4.15      Hemoglobin 12.5      Hematocrit 37.5      MCV 90      MCH 30.1      MCHC 33.3      RDW 14.2      Platelet Count 262      % Neutrophils 88      % Lymphocytes 3      % Monocytes 8      % Eosinophils  0      % Basophils 1      % Immature Granulocytes 1      NRBCs per 100 WBC 0      Absolute Neutrophils 8.6 (*)     Absolute Lymphocytes 0.3 (*)     Absolute Monocytes 0.8      Absolute Eosinophils 0.0      Absolute Basophils 0.1      Absolute Immature Granulocytes 0.1      Absolute NRBCs 0.0     ISTAT GASES LACTATE VENOUS POCT - Abnormal    Lactic Acid POCT 1.0      Bicarbonate Venous POCT 32 (*)     O2 Sat, Venous POCT 36 (*)     pCO2 Venous POCT 54 (*)     pH Venous POCT 7.38      pO2 Venous POCT 22 (*)     Base Excess/Deficit (+/-) POCT 5.0 (*)    ISTAT GASES LACTATE VENOUS POCT - Abnormal    Lactic Acid POCT 1.1      Bicarbonate Venous POCT 32 (*)     O2 Sat, Venous POCT 37 (*)     pCO2 Venous POCT 52 (*)     pH Venous POCT 7.40      pO2 Venous POCT 22 (*)     Base Excess/Deficit (+/-) POCT 6.0 (*)    GLUCOSE BY METER - Abnormal    GLUCOSE BY METER POCT 125 (*)       Head CT w/o contrast   Final Result   IMPRESSION:       1. 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      CT Chest Pulmonary Embolism w Contrast    (Results Pending)     Medications   potassium chloride kang ER (KLOR-CON M20) CR tablet 20 mEq (has no administration in time range)   Saline (has no administration in time range)   iopamidol (ISOVUE-370) solution 68 mL (has no administration in time range)   iopamidol (ISOVUE-370) solution 68 mL (68 mLs Intravenous $Given 10/21/24 1616)   SalineFlush (92 mLs Intravenous $Given 10/21/24 1616)   dexAMETHasone PF (DECADRON) injection 6 mg (6 mg Intravenous $Given 10/21/24 1527)   sodium chloride 0.9% BOLUS 1,000 mL (1,000 mLs Intravenous $New Bag 10/21/24 1525)       Patient's response to treatments and/or interventions: tolerated     To be done/followed up on inpatient unit:  na    Does this patient have any cognitive concerns?:  na    Activity level - Baseline/Home:  Unknown  Activity Level - Current:   Unknown    Patient's Preferred language:  English   Needed?: No    Isolation: None  Infection: Not Applicable  Patient tested for COVID 19 prior to admission: YES  Bariatric?: No    Vital Signs:   Vitals:    10/21/24 1139 10/21/24 1440 10/21/24 1514 10/21/24 1515   BP:  (!) 151/80 135/75 135/75   Pulse:  109 105 105   Resp:       Temp:       TempSrc:       SpO2: 97% (!) 88% 99% 99%       Cardiac Rhythm:     Was the PSS-3 completed:   Yes  What interventions are required if any?               Family Comments:   OBS brochure/video discussed/provided to patient/family: Yes              Name of person given brochure if not patient:               Relationship to patient:     For the majority of the shift this patient's behavior was Green.   Behavioral interventions performed were .    ED NURSE PHONE NUMBER: [phone]

## 2024-10-21 NOTE — ED NOTES
[place] updated, with patient's permission, and would like to be called when there are more results.

## 2024-10-21 NOTE — ED NOTES
Multiple attempts by multiple RN's in attempt to obtain US IV. MD made aware. Eventually able to obtain 18g in left AC and CT alerted.

## 2024-10-21 NOTE — ED NOTES
[facility]    ED Nurse Handoff Report    ED Chief complaint: Generalized Weakness and Nausea      ED Diagnosis:   Final diagnoses:   None       Code Status: Full Code    Allergies:   Allergies   Allergen Reactions    Mellaril [Thioridazine Hcl] Anaphylaxis    Percocet [Oxycodone-Acetaminophen] Anaphylaxis and Swelling     Tolerates hydrocodone and codeine in cough medicine    Duloxetine      Made her feel weird, increased anxiety    No Clinical Screening - See Comments      JUAN-FOR ANXIETY       Patient Story:  BIBA from home where she has had significant weakness, and an episode today where she collapsed on the floor from being too weak. Pt has known exposure to COVID and tests positive today. She denies SOB or cough, endorses nausea without vomiting. She is requiring 2 lpm O2, as oxygen level drops while sleeping. Pt is extremely lethargic, difficult to keep eyes open.     Focused Assessment:    Pt at baseline is A&Ox4, today she is A&O however difficult to stay awake. CT PE pending. Pt unable to reposition self in bed, instructed on using call light, sleeps between cares.     Labs Ordered and Resulted from Time of ED Arrival to Time of ED Departure   COMPREHENSIVE METABOLIC PANEL - Abnormal       Result Value    Sodium 132 (*)     Potassium 3.2 (*)     Carbon Dioxide (CO2) 29      Anion Gap 9      Urea Nitrogen 11.0      Creatinine 0.64      GFR Estimate 88      Calcium 8.4 (*)     Chloride 94 (*)     Glucose 117 (*)     Alkaline Phosphatase 69      AST 25      ALT 19      Protein Total 6.4      Albumin 3.8      Bilirubin Total 0.5     INFLUENZA A/B, RSV, & SARS-COV2 PCR - Abnormal    Influenza A PCR Negative      Influenza B PCR Negative      RSV PCR Negative      SARS CoV2 PCR Positive (*)    CBC WITH PLATELETS AND DIFFERENTIAL - Abnormal    WBC Count 9.8      RBC Count 4.15      Hemoglobin 12.5      Hematocrit 37.5      MCV 90      MCH 30.1      MCHC 33.3      RDW 14.2      Platelet Count  262      % Neutrophils 88      % Lymphocytes 3      % Monocytes 8      % Eosinophils 0      % Basophils 1      % Immature Granulocytes 1      NRBCs per 100 WBC 0      Absolute Neutrophils 8.6 (*)     Absolute Lymphocytes 0.3 (*)     Absolute Monocytes 0.8      Absolute Eosinophils 0.0      Absolute Basophils 0.1      Absolute Immature Granulocytes 0.1      Absolute NRBCs 0.0     ISTAT GASES LACTATE VENOUS POCT - Abnormal    Lactic Acid POCT 1.0      Bicarbonate Venous POCT 32 (*)     O2 Sat, Venous POCT 36 (*)     pCO2 Venous POCT 54 (*)     pH Venous POCT 7.38      pO2 Venous POCT 22 (*)     Base Excess/Deficit (+/-) POCT 5.0 (*)        CT Chest Pulmonary Embolism w Contrast    (Results Pending)         Treatments and/or interventions provided:      Medications   iopamidol (ISOVUE-370) solution 68 mL (has no administration in time range)   SalineFlush (has no administration in time range)       Patient's response to treatments and/or interventions:    Pt sleeping    To be done/followed up on inpatient unit:   See any in-patient orders    Does this patient have any cognitive concerns?: none    Activity level - Baseline/Home:    Independent    Activity Level - Current:    Total Care and Unknown    Patient's Preferred language: English     Needed?: No    Isolation: Special Precautions  Infection: COVID r/o and special precautions  Patient tested for COVID 19 prior to admission: YES - positive!!    Bariatric?: No    Vital Signs:   Vitals:    10/21/24 1104 10/21/24 1115 10/21/24 1124 10/21/24 1139   BP:   (!) 147/71    Pulse:       Resp:       Temp:       TempSrc:       SpO2: (!) 89% 97% 97% 97%       Cardiac Rhythm:     Was the PSS-3 completed:   Yes  What interventions are required if any?               Family Comments: [place] woman Selina lives with, is reachable by phone [phone] and would like updates  OBS brochure/video discussed/provided to patient/family: N/A              Name of person given  brochure if not patient:               Relationship to patient:     For the majority of the shift this patient's behavior was Green.  Behavioral interventions performed were .    ED NURSE PHONE NUMBER: *[phone]

## 2024-10-21 NOTE — TELEPHONE ENCOUNTER
RN COVID TREATMENT VISIT  10/21/24      The patient has been triaged and does not require a higher level of care.    Agnes Saravia  79 year old  Current weight? 190 lbs    Has the patient been seen by a primary care provider at an [facility] or [facility] within the past two years? Yes.   Have you been in close proximity to/do you have a known exposure to a person with a confirmed case of influenza? No.     General treatment eligibility:  Date of positive COVID test (PCR or at home)?  This morning    Are you or have you been hospitalized for this COVID-19 infection? No.   Have you received monoclonal antibodies or antiviral treatment for COVID-19 since this positive test? No.   Do you have any of the following conditions that place you at risk of being very sick from COVID-19?   - Age 50 years or older  - Chronic lung diseases such as asthma, bronchiectasis, COPD, interstitial lung disease, pulmonary embolism, pulmonary hypertension   - Overweight or Obesity (BMI >85th percentile or BMI 25 or higher)  - Patient reports sedentary lifestyle (physically inactive)  - Current or former smoker  Yes, patient has at least one high risk condition as noted above.     Current COVID symptoms:   - fever or chills  - cough  - sore throat  - congestion or runny nose  - nausea or vomiting  - diarrhea  Yes. Patient has at least one symptom as selected.     How many days since symptoms started? 5 days or less. Established patient, 12 years or older weighing at least 88.2 lbs, who has symptoms that started in the past 5 days, has not been hospitalized nor received treatment already, and is at risk for being very sick from COVID-19.     Treatment eligibility by RN:  Are you currently pregnant or nursing? No  Do you have a clinically significant hypersensitivity to nirmatrelvir or ritonavir, or toxic epidermal necrolysis (TEN) or Hagen-Honorio Syndrome? No  Do you have a history of hepatitis, any  hepatic impairment on the Problem List (such as Child-Smart Class C, cirrhosis, fatty liver disease, alcoholic liver disease), or was the last liver lab (hepatic panel, ALT, AST, ALK Phos, bilirubin) elevated in the past 6 months? No  Do you have any history of severe renal impairment (eGFR < 30mL/min)? No    Is patient eligible to continue? Yes, patient meets all eligibility requirements for the RN COVID treatment (as denoted by all no responses above).     Current Outpatient Medications   Medication Sig Dispense Refill    acetaminophen (TYLENOL) 500 MG tablet Take 500-1,000 mg by mouth every 6 hours as needed for mild pain      albuterol (PROAIR HFA/PROVENTIL HFA/VENTOLIN HFA) 108 (90 Base) MCG/ACT inhaler Inhale 2 puffs into the lungs every 4 hours as needed for shortness of breath / dyspnea or wheezing 18 g 1    aspirin 81 MG EC tablet Take 81 mg by mouth At Bedtime       cevimeline (EVOXAC) 30 MG capsule Take 30 mg by mouth 3 times daily (AM, Noon, Dinner)      clorazepate dipotassium (TRANXENE) 15 MG tablet TAKE ONE TABLET BY MOUTH AT BEDTIME 90 tablet 0    folic acid (FOLVITE) 1 MG tablet Take 1 mg by mouth six times a week Every day except Tuesdays when methotrexate is due      gabapentin (NEURONTIN) 100 MG capsule Take 100 mg by mouth At Bedtime      gabapentin (NEURONTIN) 400 MG capsule Take 400 mg by mouth 3 times daily (AM, Noon, Dinner)      hydrochlorothiazide (HYDRODIURIL) 25 MG tablet Take 1 tablet (25 mg) by mouth daily 90 tablet 0    ibuprofen (ADVIL/MOTRIN) 400 MG tablet Take 800 mg by mouth 3 times daily       methotrexate 2.5 MG tablet Take 10 mg by mouth every 7 days (Takes on Tuesdays)      pantoprazole (PROTONIX) 40 MG EC tablet Take 1 tablet (40 mg) by mouth daily 90 tablet 3    polyethylene glycol-propylene glycol (SYSTANE ULTRA) 0.4-0.3 % SOLN ophthalmic solution Place 1 drop into both eyes every hour as needed for dry eyes      potassium chloride ER (K-TAB/KLOR-CON) 10 MEQ CR tablet Take 2  "tablets (20 mEq) by mouth daily. 180 tablet 0    predniSONE (DELTASONE) 5 MG tablet Take 5 mg by mouth daily.      simvastatin (ZOCOR) 40 MG tablet Take 1 tablet (40 mg) by mouth at bedtime 90 tablet 3    traZODone (DESYREL) 50 MG tablet TAKE 0.5 (ONE-HALF) TABLET BY MOUTH ONCE DAILY AT BEDTIME 45 tablet 3       Medications from List 1 of the standing order (on medications that exclude the use of Paxlovid) that patient is taking: NONE. Is patient taking Gladys's Wort? No  Is patient taking Gladys's Wort or any meds from List 1? No.   Medications from List 2 of the standing order (on meds that provider needs to adjust) that patient is taking: NONE. Is patient on any of the meds from List 2? No.   Medications from List 3 of standing order (on meds that a RN needs to adjust) that patient is taking:  patient is taking clorazepate dipotassium and provider visit is needed. Patient is also taking simvastatin and trazodone. Patient is scheduled for virtual visit already today with PCP.      Information obtained from caregiver, Marisabel. Verbal approval provided by patient to speak with caregiver.   Answer Assessment - Initial Assessment Questions  1. SYMPTOMS: \"What is your main symptom or concern?\" (e.g., cough, fever, shortness of breath, muscle aches)      Chills, nausea, dry heaving, sore throat, nasal congestion  2. ONSET: \"When did the symptoms start?\"       yesterday  3. COUGH: \"Do you have a cough?\" If Yes, ask: \"How bad is the cough?\"        Slight dry, hacking cough; not constant  4. FEVER: \"Do you have a fever?\" If Yes, ask: \"What is your temperature, how was it measured, and when did it start?\"      No. Temp this AM 98.7   5. BREATHING DIFFICULTY: \"Are you having any difficulty breathing?\" (e.g., normal; shortness of breath, wheezing, unable to speak)       normal  6. BETTER-SAME-WORSE: \"Are you getting better, staying the same or getting worse compared to yesterday?\"  If getting worse, ask, \"In what way?\"    " "  worse  7. OTHER SYMPTOMS: \"Do you have any other symptoms?\"  (e.g., chills, fatigue, headache, loss of smell or taste, muscle pain, sore throat)       nausea, dry heaving, sore throat, nasal congestion    8. COVID-19 DIAGNOSIS: \"How do you know that you have COVID?\" (e.g., positive lab test or self-test, diagnosed by doctor or NP/PA, symptoms after exposure).      Positive home test  9. COVID-19 EXPOSURE: \"Was there any known exposure to COVID before the symptoms began?\"       Yes; caregiver tested positive last Wednesday  10. COVID-19 VACCINE: \"Have you had the COVID-19 vaccine?\" If Yes, ask: \"When did you last get it?\"        no  11. HIGH RISK DISEASE: \"Do you have any chronic medical problems?\" (e.g., asthma, heart or lung disease, weak immune system, obesity, etc.)        HTN, obesity, pulmonary embolism, intersitital lung disease, Sjogrens, rheumatoid arthritis  12. PREGNANCY: \"Is there any chance you are pregnant?\" \"When was your last menstrual period?\"        NA  13. O2 SATURATION MONITOR:  \"Do you use an oxygen saturation monitor (pulse oximeter) at home?\" If Yes, ask \"What is your reading (oxygen level) today?\" \"What is your usual oxygen saturation reading?\" (e.g., 95%)        none    Protocols used: COVID-19 - Diagnosed or [id number] Thompson RN    "

## 2024-10-21 NOTE — ED TRIAGE NOTES
BIBA from home where the person she lives with is COVID positive. Pt has been weak, collapsed before EMS arrival onto chair, no injuries no LOC. Normally independent, A&Ox4. Also complaining of nausea. EMS started on 2 lpm O2 as O2 dropped to 90%, not normally on O2

## 2024-10-22 ENCOUNTER — APPOINTMENT (OUTPATIENT)
Dept: PHYSICAL THERAPY | Facility: CLINIC | Age: 79
DRG: 177 | End: 2024-10-22
Attending: STUDENT IN AN ORGANIZED HEALTH CARE EDUCATION/TRAINING PROGRAM
Payer: MEDICARE

## 2024-10-22 LAB
ANION GAP SERPL CALCULATED.3IONS-SCNC: 12 MMOL/L (ref 7–15)
BASOPHILS # BLD AUTO: 0 10E3/UL (ref 0–0.2)
BASOPHILS NFR BLD AUTO: 0 %
BUN SERPL-MCNC: 13.7 MG/DL (ref 8–23)
CALCIUM SERPL-MCNC: 8.4 MG/DL (ref 8.8–10.4)
CHLORIDE SERPL-SCNC: 89 MMOL/L (ref 98–107)
CREAT SERPL-MCNC: 0.53 MG/DL (ref 0.51–0.95)
CREAT SERPL-MCNC: 0.54 MG/DL (ref 0.51–0.95)
EGFRCR SERPLBLD CKD-EPI 2021: >90 ML/MIN/1.73M2
EGFRCR SERPLBLD CKD-EPI 2021: >90 ML/MIN/1.73M2
EOSINOPHIL # BLD AUTO: 0 10E3/UL (ref 0–0.7)
EOSINOPHIL NFR BLD AUTO: 0 %
ERYTHROCYTE [DISTWIDTH] IN BLOOD BY AUTOMATED COUNT: 13.9 % (ref 10–15)
GLUCOSE SERPL-MCNC: 134 MG/DL (ref 70–99)
HCO3 SERPL-SCNC: 29 MMOL/L (ref 22–29)
HCT VFR BLD AUTO: 33.5 % (ref 35–47)
HGB BLD-MCNC: 11.2 G/DL (ref 11.7–15.7)
IMM GRANULOCYTES # BLD: 0 10E3/UL
IMM GRANULOCYTES NFR BLD: 1 %
LYMPHOCYTES # BLD AUTO: 0.5 10E3/UL (ref 0.8–5.3)
LYMPHOCYTES NFR BLD AUTO: 7 %
MAGNESIUM SERPL-MCNC: 1.7 MG/DL (ref 1.7–2.3)
MCH RBC QN AUTO: 29.6 PG (ref 26.5–33)
MCHC RBC AUTO-ENTMCNC: 33.4 G/DL (ref 31.5–36.5)
MCV RBC AUTO: 89 FL (ref 78–100)
MONOCYTES # BLD AUTO: 0.4 10E3/UL (ref 0–1.3)
MONOCYTES NFR BLD AUTO: 6 %
NEUTROPHILS # BLD AUTO: 5.9 10E3/UL (ref 1.6–8.3)
NEUTROPHILS NFR BLD AUTO: 86 %
NRBC # BLD AUTO: 0 10E3/UL
NRBC BLD AUTO-RTO: 0 /100
PLATELET # BLD AUTO: 253 10E3/UL (ref 150–450)
POTASSIUM SERPL-SCNC: 3.4 MMOL/L (ref 3.4–5.3)
RBC # BLD AUTO: 3.78 10E6/UL (ref 3.8–5.2)
SODIUM SERPL-SCNC: 130 MMOL/L (ref 135–145)
WBC # BLD AUTO: 6.9 10E3/UL (ref 4–11)

## 2024-10-22 PROCEDURE — 83735 ASSAY OF MAGNESIUM: CPT | Performed by: STUDENT IN AN ORGANIZED HEALTH CARE EDUCATION/TRAINING PROGRAM

## 2024-10-22 PROCEDURE — 82310 ASSAY OF CALCIUM: CPT | Performed by: STUDENT IN AN ORGANIZED HEALTH CARE EDUCATION/TRAINING PROGRAM

## 2024-10-22 PROCEDURE — 85004 AUTOMATED DIFF WBC COUNT: CPT | Performed by: STUDENT IN AN ORGANIZED HEALTH CARE EDUCATION/TRAINING PROGRAM

## 2024-10-22 PROCEDURE — 97161 PT EVAL LOW COMPLEX 20 MIN: CPT | Mod: GP

## 2024-10-22 PROCEDURE — 250N000013 HC RX MED GY IP 250 OP 250 PS 637: Performed by: STUDENT IN AN ORGANIZED HEALTH CARE EDUCATION/TRAINING PROGRAM

## 2024-10-22 PROCEDURE — 120N000001 HC R&B MED SURG/OB

## 2024-10-22 PROCEDURE — 250N000012 HC RX MED GY IP 250 OP 636 PS 637: Performed by: STUDENT IN AN ORGANIZED HEALTH CARE EDUCATION/TRAINING PROGRAM

## 2024-10-22 PROCEDURE — 82565 ASSAY OF CREATININE: CPT | Performed by: STUDENT IN AN ORGANIZED HEALTH CARE EDUCATION/TRAINING PROGRAM

## 2024-10-22 PROCEDURE — 999N000128 HC STATISTIC PERIPHERAL IV START W/O US GUIDANCE

## 2024-10-22 PROCEDURE — 99232 SBSQ HOSP IP/OBS MODERATE 35: CPT | Performed by: HOSPITALIST

## 2024-10-22 PROCEDURE — 97530 THERAPEUTIC ACTIVITIES: CPT | Mod: GP

## 2024-10-22 PROCEDURE — 80048 BASIC METABOLIC PNL TOTAL CA: CPT | Performed by: STUDENT IN AN ORGANIZED HEALTH CARE EDUCATION/TRAINING PROGRAM

## 2024-10-22 PROCEDURE — 36415 COLL VENOUS BLD VENIPUNCTURE: CPT | Performed by: STUDENT IN AN ORGANIZED HEALTH CARE EDUCATION/TRAINING PROGRAM

## 2024-10-22 PROCEDURE — 250N000011 HC RX IP 250 OP 636: Performed by: STUDENT IN AN ORGANIZED HEALTH CARE EDUCATION/TRAINING PROGRAM

## 2024-10-22 PROCEDURE — 250N000013 HC RX MED GY IP 250 OP 250 PS 637: Performed by: HOSPITALIST

## 2024-10-22 PROCEDURE — 258N000003 HC RX IP 258 OP 636: Performed by: STUDENT IN AN ORGANIZED HEALTH CARE EDUCATION/TRAINING PROGRAM

## 2024-10-22 RX ORDER — ALBUTEROL SULFATE 90 UG/1
2 INHALANT RESPIRATORY (INHALATION) EVERY 4 HOURS PRN
Status: DISCONTINUED | OUTPATIENT
Start: 2024-10-22 | End: 2024-10-25 | Stop reason: HOSPADM

## 2024-10-22 RX ORDER — CALCIUM CARBONATE 500 MG/1
1000 TABLET, CHEWABLE ORAL 4 TIMES DAILY PRN
Status: DISCONTINUED | OUTPATIENT
Start: 2024-10-22 | End: 2024-10-25 | Stop reason: HOSPADM

## 2024-10-22 RX ORDER — IBUPROFEN 400 MG/1
800 TABLET, FILM COATED ORAL 2 TIMES DAILY WITH MEALS
Status: DISCONTINUED | OUTPATIENT
Start: 2024-10-22 | End: 2024-10-25 | Stop reason: HOSPADM

## 2024-10-22 RX ORDER — ASPIRIN 81 MG/1
81 TABLET ORAL AT BEDTIME
Status: DISCONTINUED | OUTPATIENT
Start: 2024-10-22 | End: 2024-10-25 | Stop reason: HOSPADM

## 2024-10-22 RX ORDER — FOLIC ACID 1 MG/1
1 TABLET ORAL
Status: DISCONTINUED | OUTPATIENT
Start: 2024-10-22 | End: 2024-10-25 | Stop reason: HOSPADM

## 2024-10-22 RX ORDER — GABAPENTIN 100 MG/1
100 CAPSULE ORAL AT BEDTIME
Status: DISCONTINUED | OUTPATIENT
Start: 2024-10-22 | End: 2024-10-25 | Stop reason: HOSPADM

## 2024-10-22 RX ORDER — PANTOPRAZOLE SODIUM 40 MG/1
40 TABLET, DELAYED RELEASE ORAL DAILY
Status: DISCONTINUED | OUTPATIENT
Start: 2024-10-22 | End: 2024-10-25 | Stop reason: HOSPADM

## 2024-10-22 RX ORDER — CEVIMELINE HYDROCHLORIDE 30 MG/1
30 CAPSULE ORAL 3 TIMES DAILY
Status: DISCONTINUED | OUTPATIENT
Start: 2024-10-22 | End: 2024-10-25 | Stop reason: HOSPADM

## 2024-10-22 RX ORDER — ENOXAPARIN SODIUM 100 MG/ML
40 INJECTION SUBCUTANEOUS EVERY 24 HOURS
Status: DISCONTINUED | OUTPATIENT
Start: 2024-10-22 | End: 2024-10-25 | Stop reason: HOSPADM

## 2024-10-22 RX ORDER — AMOXICILLIN 250 MG
1 CAPSULE ORAL 2 TIMES DAILY PRN
Status: DISCONTINUED | OUTPATIENT
Start: 2024-10-22 | End: 2024-10-25 | Stop reason: HOSPADM

## 2024-10-22 RX ORDER — LIDOCAINE 40 MG/G
CREAM TOPICAL
Status: DISCONTINUED | OUTPATIENT
Start: 2024-10-22 | End: 2024-10-25 | Stop reason: HOSPADM

## 2024-10-22 RX ORDER — AMOXICILLIN 250 MG
2 CAPSULE ORAL 2 TIMES DAILY PRN
Status: DISCONTINUED | OUTPATIENT
Start: 2024-10-22 | End: 2024-10-25 | Stop reason: HOSPADM

## 2024-10-22 RX ORDER — SIMVASTATIN 40 MG
40 TABLET ORAL AT BEDTIME
Status: DISCONTINUED | OUTPATIENT
Start: 2024-10-22 | End: 2024-10-25 | Stop reason: HOSPADM

## 2024-10-22 RX ORDER — ACETAMINOPHEN 500 MG
1000 TABLET ORAL EVERY 6 HOURS PRN
Status: DISCONTINUED | OUTPATIENT
Start: 2024-10-22 | End: 2024-10-25 | Stop reason: HOSPADM

## 2024-10-22 RX ORDER — CLORAZEPATE DIPOTASSIUM 3.75 MG/1
7.5 TABLET ORAL DAILY
Status: DISCONTINUED | OUTPATIENT
Start: 2024-10-22 | End: 2024-10-25 | Stop reason: HOSPADM

## 2024-10-22 RX ADMIN — SIMVASTATIN 40 MG: 40 TABLET, FILM COATED ORAL at 00:58

## 2024-10-22 RX ADMIN — ONDANSETRON 4 MG: 2 INJECTION INTRAMUSCULAR; INTRAVENOUS at 11:41

## 2024-10-22 RX ADMIN — CLORAZEPATE DIPOTASSIUM 7.5 MG: 3.75 TABLET ORAL at 11:14

## 2024-10-22 RX ADMIN — ASPIRIN 81 MG: 81 TABLET, COATED ORAL at 00:58

## 2024-10-22 RX ADMIN — TRAZODONE HYDROCHLORIDE 25 MG: 50 TABLET ORAL at 21:41

## 2024-10-22 RX ADMIN — GABAPENTIN 100 MG: 100 CAPSULE ORAL at 21:42

## 2024-10-22 RX ADMIN — REMDESIVIR 200 MG: 100 INJECTION, POWDER, LYOPHILIZED, FOR SOLUTION INTRAVENOUS at 02:36

## 2024-10-22 RX ADMIN — ENOXAPARIN SODIUM 40 MG: 40 INJECTION SUBCUTANEOUS at 05:41

## 2024-10-22 RX ADMIN — CEVIMELINE HYDROCHLORIDE 30 MG: 30 CAPSULE ORAL at 08:45

## 2024-10-22 RX ADMIN — CEVIMELINE HYDROCHLORIDE 30 MG: 30 CAPSULE ORAL at 21:42

## 2024-10-22 RX ADMIN — DEXAMETHASONE 6 MG: 2 TABLET ORAL at 08:45

## 2024-10-22 RX ADMIN — IBUPROFEN 800 MG: 400 TABLET, FILM COATED ORAL at 11:41

## 2024-10-22 RX ADMIN — SIMVASTATIN 40 MG: 40 TABLET, FILM COATED ORAL at 21:41

## 2024-10-22 RX ADMIN — ASPIRIN 81 MG: 81 TABLET, COATED ORAL at 21:41

## 2024-10-22 RX ADMIN — CEVIMELINE HYDROCHLORIDE 30 MG: 30 CAPSULE ORAL at 17:02

## 2024-10-22 RX ADMIN — IBUPROFEN 800 MG: 400 TABLET, FILM COATED ORAL at 17:02

## 2024-10-22 RX ADMIN — ONDANSETRON 4 MG: 2 INJECTION INTRAMUSCULAR; INTRAVENOUS at 05:41

## 2024-10-22 RX ADMIN — SODIUM CHLORIDE 50 ML: 9 INJECTION, SOLUTION INTRAVENOUS at 03:57

## 2024-10-22 RX ADMIN — GABAPENTIN 400 MG: 300 CAPSULE ORAL at 17:02

## 2024-10-22 RX ADMIN — GABAPENTIN 400 MG: 300 CAPSULE ORAL at 11:41

## 2024-10-22 RX ADMIN — FOLIC ACID 1 MG: 1 TABLET ORAL at 08:45

## 2024-10-22 ASSESSMENT — ACTIVITIES OF DAILY LIVING (ADL)
ADLS_ACUITY_SCORE: 49
ADLS_ACUITY_SCORE: 49
ADLS_ACUITY_SCORE: 42
ADLS_ACUITY_SCORE: 51
ADLS_ACUITY_SCORE: 49
ADLS_ACUITY_SCORE: 49
ADLS_ACUITY_SCORE: 42
ADLS_ACUITY_SCORE: 49
ADLS_ACUITY_SCORE: 42
ADLS_ACUITY_SCORE: 45
ADLS_ACUITY_SCORE: 49
ADLS_ACUITY_SCORE: 45
ADLS_ACUITY_SCORE: 49
ADLS_ACUITY_SCORE: 42
ADLS_ACUITY_SCORE: 46
ADLS_ACUITY_SCORE: 42
ADLS_ACUITY_SCORE: 51
ADLS_ACUITY_SCORE: 49
ADLS_ACUITY_SCORE: 49
ADLS_ACUITY_SCORE: 45
ADLS_ACUITY_SCORE: 42
ADLS_ACUITY_SCORE: 49
ADLS_ACUITY_SCORE: 44

## 2024-10-22 NOTE — PLAN OF CARE
Goal Outcome Evaluation:  DATE & TIME:10/22/24, [id number]  Cognitive Concerns/ Orientation:A/O x4  BEHAVIOR & AGGRESSION TOOL COLOR:Green  ABNL VS/O2:VSS on 2L nc  MOBILITY:Not oob-Ax2  PAIN MANAGMENT:Denies pain, given nausea med x1  DIET:Regular  BOWEL/BLADDER:Continent of B/B-pure wick in place, bladder scanned x1  DRAIN/DEVICES:Central line, PIV  TELEMETRY RHYTHM:n/a  TESTS/PROCEDURES:none  D/C DATE: Pending  OTHER IMPORTANT INFO: Pt is Covid positive.

## 2024-10-22 NOTE — ED PROVIDER NOTES
I assumed care of the patient from Dr. Bolaños pending CTs of the chest and head.  Please see these results below.  The patient was informed of the pulmonary nodule which will require outpatient follow-up.  Patient otherwise remained hemodynamically stable and was admitted to the hospitalist service.    CT Chest Pulmonary Embolism w Contrast   Final Result   IMPRESSION:   1.  No acute pulmonary embolism.   2.  New 6 mm right upper lobe pulmonary nodule. Follow-up is recommended according to Fleischner criteria, as detailed below.         2017 Fleischner Society Recommendations for Solid Pulmonary Nodules      Nodule size greater than 6 mm up to 8 mm:      Single nodule:      Low risk: CT at 6-12 months, then consider CT at 18-24 months   High risk: CT at 6-12 months, then CT at 18-24 months      Multiple nodules:      Low risk: CT at 3-6 months, then consider CT at 18-24 months   High risk: CT at 3-6 months, then CT at 18-24 months            [Recommend Follow Up: Lung nodule]      This report will be copied to the [facility] to ensure a provider acknowledges the finding.          Head CT w/o contrast   Final Result   IMPRESSION:       1. 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            Trigger, Rubin Marte MD  10/21/24 1913

## 2024-10-22 NOTE — PROGRESS NOTES
RECEIVING UNIT ED HANDOFF REVIEW    ED Nurse Handoff Report was reviewed by: Melony Chapman RN on October 21, 2024 at 11:49 PM

## 2024-10-22 NOTE — PROGRESS NOTES
[facility]    Medicine Progress Note - Hospitalist Service    Date of Admission:  10/21/2024    Assessment & Plan   Acute toxic vs metabolic encephalopathy, improving  Noted to be lethargic and obtunded upon arrival to the ED.  No significant metabolic derangements, some mild hypoxia as below, no focal neurologic deficits, and no other signs or symptoms of infection aside from COVID-positive.  CT head negative and no story of trauma.  Patient's mental status much improved on reassessment and suspect underlying infection is driving toxic metabolic encephalopathy.  improving  -Treat underlying infection as below  - does not appear confused on 10/22, but family reports she is still somewhat confused and off baseline  -Delirium precautions  -Daily BMP, Mag              +Replete electrolytes as needed     COVID positive  Acute hypoxic respiratory insufficiency  ILD, unclear etiology  Patient presented with severe weakness, altered mental status, hypoxia requiring supplemental oxygen and was found to be COVID-positive.  Does have close contact in her home with a recent COVID illness as well.  Respiratory status remained stable while out to treat for mild COVID infection given need for oxygen.  -Dexamethasone 6mg x10d total  -Remdesivir x3d total  -Wean supplemental O2 as able  -Encourage incentive spirometry  - pt and ot consult     Pulmonary nodule (6mm), new  Noted on admission CT warrants outpatient follow-up.  -Repeat CT chest in 6-12 months     Hyponatremia, mild  Hypokalemia, mild, chronic  Suspect acute decrease in setting of poor PO intake. Appears patient has been on chronic K supplementation in the past.  -Encourage PO intake  -Daily BMP     Rheumatoid arthritis  Sjogren's syndrome  Follows with outpatient rheumatology, unable to access records. Previously on methotrexate and prednisone.  -Continue methotrexate pending pharmD review, holding prednisone     HTN - Hold hydrochlorothiazide  for now  HLD - Continue PTA statin  Chronic insomnia - Hold trazodone  GERD - Continue PTA PPI  DANA: resume home tranxene at 1/2 pta dose. Lima reports she goes into withdrawal when held. If anxiety a major issue on 1/2 dose will given another 1/2 but trying to skirt too much confusion now given the metabolic encephalopathy          Diet: Combination Diet Regular Diet Adult  Room Service    DVT Prophylaxis: Pneumatic Compression Devices  Andujar Catheter: Not present  Lines: None     Cardiac Monitoring: None  Code Status: Full Code      Clinically Significant Risk Factors Present on Admission        # Hypokalemia: Lowest K = 3.2 mmol/L in last 2 days, will replace as needed  # Hyponatremia: Lowest Na = 130 mmol/L in last 2 days, will monitor as appropriate  # Hypochloremia: Lowest Cl = 89 mmol/L in last 2 days, will monitor as appropriate  # Hypocalcemia: Lowest Ca = 8.4 mg/dL in last 2 days, will monitor and replace as appropriate       # Drug Induced Platelet Defect: home medication list includes an antiplatelet medication   # Hypertension: Noted on problem list                    Disposition Plan     Medically Ready for Discharge: Anticipated in 2-4 Days             Ron Romo DO  Hospitalist Service  [facility]  Securely message with [employer] (more info)  Text page via [employer] Paging/Directory   ______________________________________________________________________    Interval History   Doing decently today, feels sob with some pain in her throat. No chest pain      Physical Exam   Vital Signs: Temp: 98  F (36.7  C) Temp src: Oral BP: (!) 145/62 Pulse: 78   Resp: 18 SpO2: 94 % O2 Device: None (Room air) Oxygen Delivery: 2 LPM  Weight: 0 lbs 0 oz    General: Awake, alert, NAD, laying flat in bed, intermittent confusion  HEENT: Atraumatic, normocephalic, EOMI, no scleral icterus  CV: RRR, no murmurs, no LEO, distal pulses intact, no JVD  Pulm: CTAB, breathing comfortably on 2 L NC,  no wheezes, no crackles  Abd: Soft, non-tender, non-distended, no overlying skin changes, no palpable hepatosplenomegaly  Skin: No rashes, lesions, or wounds visualized  Neuro: AOx4, CN II-XII grossly intact, no focal deficits, moving all extremities spontaneously, speech normal, forgetful at times requiring redirection       Medical Decision Making       45 MINUTES SPENT BY ME on the date of service doing chart review, history, exam, documentation & further activities per the note.      Data     I have personally reviewed the following data over the past 24 hrs:    6.9  \   11.2 (L)   / 253     130 (L) 89 (L) 13.7 /  134 (H)   3.4 29 0.53 \     ALT: N/A AST: N/A AP: N/A TBILI: N/A   ALB: N/A TOT PROTEIN: N/A LIPASE: N/A     Procal: N/A CRP: N/A Lactic Acid: 1.1         Imaging results reviewed over the past 24 hrs:   Recent Results (from the past 24 hour(s))   Head CT w/o contrast    Narrative    EXAM: CT HEAD W/O CONTRAST  LOCATION: [facility]  DATE/TIME: 10/21/2024 4:36 PM CDT    INDICATION: Confusion  COMPARISON: None available at time dictation  TECHNIQUE: Routine CT Head without IV contrast. Multiplanar reformats. Dose reduction techniques were used.    FINDINGS:   INTRACRANIAL CONTENTS: No acute intracranial hemorrhage. Basal ganglia mineralization No CT evidence of acute infarct. Sequelae of mild to moderate chronic microangiopathy. Moderate cerebral volume loss without hydrocephalus. No extra-axial fluid   collections.  Patent basal cisterns.     VISUALIZED ORBITS/SINUSES/MASTOIDS: Postoperative changes of bilateral lenses, otherwise the orbits are unremarkable. The visualized paranasal sinuses and temporal bone structures are well-aerated.     BONES/SOFT TISSUES: The calvarium and skull base are unremarkable.       Impression    IMPRESSION:     1. 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   CT Chest Pulmonary Embolism w  Contrast   Result Value    Radiologist flags Lung nodule    Narrative    EXAM: CT CHEST PULMONARY EMBOLISM W CONTRAST  LOCATION: [facility]  DATE: 10/21/2024    INDICATION: Syncope and cough; COVID positive.  COMPARISON: CT chest 5/1/2019.  TECHNIQUE: CT chest pulmonary angiogram during arterial phase injection of IV contrast. Multiplanar reformats and MIP reconstructions were performed. Dose reduction techniques were used.   CONTRAST: 68mL Isovue 370    FINDINGS: Patient was imaged with arm(s) at side, limiting study quality. Study is also degraded by motion.    ANGIOGRAM CHEST: No acute pulmonary embolism.    Thoracic aorta is normal in course and caliber. Moderate atheromatous disease.    LUNGS AND PLEURA: Mild diffuse bronchial wall thickening. Mild to moderate dependent atelectatic change. No focal airspace consolidation. Mild mosaic attenuation of the pulmonary parenchyma, suggesting superimposed small vessels or small airways disease.     There is new 6 mm nodule within the central right upper lobe, series 8 image 113. A 7 mm left upper lobe pulmonary nodule, series 8 image 74, is unchanged from 2019 and likely benign.    No pneumothorax.     No pleural effusion.     MEDIASTINUM/AXILLAE: Mildly enlarged and heterogeneous right lobe of the thyroid gland. No mediastinal/hilar adenopathy.     Thoracic esophagus is unremarkable.      No axillary lymphadenopathy.    Chest wall is unremarkable.    Heart is normal in size. Trace pericardial fluid.    CORONARY ARTERY CALCIFICATION: Mild.    UPPER ABDOMEN: No suspicious abnormality.    MUSCULOSKELETAL: No suspicious abnormality.    OTHER: No additionally suspicious abnormality.      Impression    IMPRESSION:  1.  No acute pulmonary embolism.  2.  New 6 mm right upper lobe pulmonary nodule. Follow-up is recommended according to Fleischner criteria, as detailed below.      2017 Fleischner Society Recommendations for Solid Pulmonary  Nodules    Nodule size greater than 6 mm up to 8 mm:    Single nodule:    Low risk: CT at 6-12 months, then consider CT at 18-24 months  High risk: CT at 6-12 months, then CT at 18-24 months    Multiple nodules:    Low risk: CT at 3-6 months, then consider CT at 18-24 months  High risk: CT at 3-6 months, then CT at 18-24 months        [Recommend Follow Up: Lung nodule]    This report will be copied to the [facility] to ensure a provider acknowledges the finding.

## 2024-10-22 NOTE — PHARMACY-ADMISSION MEDICATION HISTORY
Pharmacist Admission Medication History    Admission medication history is complete. The information provided in this note is only as accurate as the sources available at the time of the update.    Information Source(s): Patient, CareEverywhere/SureScripts, and roommate Lima  via in-person and phone    Pertinent Information:   -initially attempted interview with patient but ended up confirming full list, per patient request, with patient's roommate Lima who was very familiar with medications  -folic acid: per Surescripts, direction = 1 tab daily but patient and Lima confirm this is 6 days per week.  -pantoprazole: pt did not think she was taking this (but wasn't totally sure). Lima confirms patient is still taking this    Changes made to PTA medication list:  Added: None  Deleted: None  Changed: acetaminophen (500-1000 mg q6h PRN --> 1000 mg at bedtime & additional PRN), clorazepate (at bedtime --> QAM per Lima), gabapentin 100 mg at bedtime (added note = 1 hour before trazodone), ibuprofen (800 mg TID --> 800 mg BID with lunch and dinner)    Allergies reviewed with patient and updates made in EHR: no    Medication History Completed By: Charla Antunez RPH 10/21/2024 10:51 PM    PTA Med List   Medication Sig Last Dose    acetaminophen (TYLENOL) 500 MG tablet Take 1,000 mg by mouth at bedtime. Plus additional PRN     albuterol (PROAIR HFA/PROVENTIL HFA/VENTOLIN HFA) 108 (90 Base) MCG/ACT inhaler Inhale 2 puffs into the lungs every 4 hours as needed for shortness of breath / dyspnea or wheezing  at PRN    aspirin 81 MG EC tablet Take 81 mg by mouth At Bedtime  10/20/2024    cevimeline (EVOXAC) 30 MG capsule Take 30 mg by mouth 3 times daily (AM, Noon, Dinner) 10/21/2024 at AM    clorazepate dipotassium (TRANXENE) 15 MG tablet TAKE ONE TABLET BY MOUTH AT BEDTIME (Patient taking differently: Take 15 mg by mouth every morning.) 10/21/2024    folic acid (FOLVITE) 1 MG tablet Take 1 mg by mouth six times a week Every  day except Tuesdays when methotrexate is due 10/21/2024    gabapentin (NEURONTIN) 100 MG capsule Take 100 mg by mouth at bedtime. ~1 hour before trazodone 10/20/2024    gabapentin (NEURONTIN) 400 MG capsule Take 400 mg by mouth 3 times daily (AM, Noon, Dinner) 10/21/2024 at AM    hydrochlorothiazide (HYDRODIURIL) 25 MG tablet Take 1 tablet (25 mg) by mouth daily 10/21/2024    ibuprofen (ADVIL/MOTRIN) 400 MG tablet Take 800 mg by mouth 2 times daily (with meals). Noon and dinner     methotrexate 2.5 MG tablet Take 10 mg by mouth every 7 days (Takes on Tuesdays) Past Week    pantoprazole (PROTONIX) 40 MG EC tablet Take 1 tablet (40 mg) by mouth daily 10/21/2024    polyethylene glycol-propylene glycol (SYSTANE ULTRA) 0.4-0.3 % SOLN ophthalmic solution Place 1 drop into both eyes every hour as needed for dry eyes  at PRN    potassium chloride ER (K-TAB/KLOR-CON) 10 MEQ CR tablet Take 2 tablets (20 mEq) by mouth daily. 10/21/2024    predniSONE (DELTASONE) 5 MG tablet Take 5 mg by mouth daily. 10/21/2024    simvastatin (ZOCOR) 40 MG tablet Take 1 tablet (40 mg) by mouth at bedtime 10/20/2024    traZODone (DESYREL) 50 MG tablet TAKE 0.5 (ONE-HALF) TABLET BY MOUTH ONCE DAILY AT BEDTIME 10/20/2024

## 2024-10-22 NOTE — ED NOTES
Patient incontinent of urine. Linens changed and alan care performed by EDT and RN. Patient is nauseated and Hospitalist paged. New orders obtained and completed.

## 2024-10-23 ENCOUNTER — APPOINTMENT (OUTPATIENT)
Dept: PHYSICAL THERAPY | Facility: CLINIC | Age: 79
DRG: 177 | End: 2024-10-23
Payer: MEDICARE

## 2024-10-23 LAB
ANION GAP SERPL CALCULATED.3IONS-SCNC: 8 MMOL/L (ref 7–15)
ATRIAL RATE - MUSE: 66 BPM
BUN SERPL-MCNC: 18.7 MG/DL (ref 8–23)
CALCIUM SERPL-MCNC: 8.7 MG/DL (ref 8.8–10.4)
CHLORIDE SERPL-SCNC: 94 MMOL/L (ref 98–107)
CREAT SERPL-MCNC: 0.59 MG/DL (ref 0.51–0.95)
DIASTOLIC BLOOD PRESSURE - MUSE: NORMAL MMHG
EGFRCR SERPLBLD CKD-EPI 2021: 90 ML/MIN/1.73M2
ERYTHROCYTE [DISTWIDTH] IN BLOOD BY AUTOMATED COUNT: 13.8 % (ref 10–15)
GLUCOSE SERPL-MCNC: 129 MG/DL (ref 70–99)
HCO3 SERPL-SCNC: 31 MMOL/L (ref 22–29)
HCT VFR BLD AUTO: 35.7 % (ref 35–47)
HGB BLD-MCNC: 12.1 G/DL (ref 11.7–15.7)
INTERPRETATION ECG - MUSE: NORMAL
MCH RBC QN AUTO: 29.9 PG (ref 26.5–33)
MCHC RBC AUTO-ENTMCNC: 33.9 G/DL (ref 31.5–36.5)
MCV RBC AUTO: 88 FL (ref 78–100)
P AXIS - MUSE: 77 DEGREES
PLATELET # BLD AUTO: 330 10E3/UL (ref 150–450)
POTASSIUM SERPL-SCNC: 3.7 MMOL/L (ref 3.4–5.3)
PR INTERVAL - MUSE: 238 MS
QRS DURATION - MUSE: 102 MS
QT - MUSE: 398 MS
QTC - MUSE: 417 MS
R AXIS - MUSE: -35 DEGREES
RBC # BLD AUTO: 4.05 10E6/UL (ref 3.8–5.2)
SODIUM SERPL-SCNC: 133 MMOL/L (ref 135–145)
SYSTOLIC BLOOD PRESSURE - MUSE: NORMAL MMHG
T AXIS - MUSE: 56 DEGREES
VENTRICULAR RATE- MUSE: 66 BPM
WBC # BLD AUTO: 10.6 10E3/UL (ref 4–11)

## 2024-10-23 PROCEDURE — 36415 COLL VENOUS BLD VENIPUNCTURE: CPT | Performed by: STUDENT IN AN ORGANIZED HEALTH CARE EDUCATION/TRAINING PROGRAM

## 2024-10-23 PROCEDURE — 93005 ELECTROCARDIOGRAM TRACING: CPT

## 2024-10-23 PROCEDURE — 258N000003 HC RX IP 258 OP 636: Performed by: STUDENT IN AN ORGANIZED HEALTH CARE EDUCATION/TRAINING PROGRAM

## 2024-10-23 PROCEDURE — 97530 THERAPEUTIC ACTIVITIES: CPT | Mod: GP

## 2024-10-23 PROCEDURE — 85027 COMPLETE CBC AUTOMATED: CPT | Performed by: STUDENT IN AN ORGANIZED HEALTH CARE EDUCATION/TRAINING PROGRAM

## 2024-10-23 PROCEDURE — 99232 SBSQ HOSP IP/OBS MODERATE 35: CPT | Performed by: STUDENT IN AN ORGANIZED HEALTH CARE EDUCATION/TRAINING PROGRAM

## 2024-10-23 PROCEDURE — 250N000013 HC RX MED GY IP 250 OP 250 PS 637: Performed by: HOSPITALIST

## 2024-10-23 PROCEDURE — 250N000013 HC RX MED GY IP 250 OP 250 PS 637: Performed by: STUDENT IN AN ORGANIZED HEALTH CARE EDUCATION/TRAINING PROGRAM

## 2024-10-23 PROCEDURE — 120N000001 HC R&B MED SURG/OB

## 2024-10-23 PROCEDURE — 250N000012 HC RX MED GY IP 250 OP 636 PS 637: Performed by: STUDENT IN AN ORGANIZED HEALTH CARE EDUCATION/TRAINING PROGRAM

## 2024-10-23 PROCEDURE — 80048 BASIC METABOLIC PNL TOTAL CA: CPT | Performed by: STUDENT IN AN ORGANIZED HEALTH CARE EDUCATION/TRAINING PROGRAM

## 2024-10-23 PROCEDURE — 250N000011 HC RX IP 250 OP 636: Performed by: STUDENT IN AN ORGANIZED HEALTH CARE EDUCATION/TRAINING PROGRAM

## 2024-10-23 RX ADMIN — CLORAZEPATE DIPOTASSIUM 7.5 MG: 3.75 TABLET ORAL at 09:03

## 2024-10-23 RX ADMIN — DEXAMETHASONE 6 MG: 2 TABLET ORAL at 09:05

## 2024-10-23 RX ADMIN — GABAPENTIN 100 MG: 100 CAPSULE ORAL at 21:54

## 2024-10-23 RX ADMIN — PANTOPRAZOLE SODIUM 40 MG: 40 TABLET, DELAYED RELEASE ORAL at 09:05

## 2024-10-23 RX ADMIN — GABAPENTIN 400 MG: 300 CAPSULE ORAL at 09:02

## 2024-10-23 RX ADMIN — CEVIMELINE HYDROCHLORIDE 30 MG: 30 CAPSULE ORAL at 09:04

## 2024-10-23 RX ADMIN — SODIUM CHLORIDE 50 ML: 9 INJECTION, SOLUTION INTRAVENOUS at 01:05

## 2024-10-23 RX ADMIN — IBUPROFEN 800 MG: 400 TABLET, FILM COATED ORAL at 11:47

## 2024-10-23 RX ADMIN — ENOXAPARIN SODIUM 40 MG: 40 INJECTION SUBCUTANEOUS at 06:02

## 2024-10-23 RX ADMIN — REMDESIVIR 100 MG: 100 INJECTION, POWDER, LYOPHILIZED, FOR SOLUTION INTRAVENOUS at 00:08

## 2024-10-23 RX ADMIN — CEVIMELINE HYDROCHLORIDE 30 MG: 30 CAPSULE ORAL at 21:54

## 2024-10-23 RX ADMIN — ASPIRIN 81 MG: 81 TABLET, COATED ORAL at 21:54

## 2024-10-23 RX ADMIN — SIMVASTATIN 40 MG: 40 TABLET, FILM COATED ORAL at 21:54

## 2024-10-23 RX ADMIN — CEVIMELINE HYDROCHLORIDE 30 MG: 30 CAPSULE ORAL at 15:14

## 2024-10-23 RX ADMIN — GABAPENTIN 400 MG: 300 CAPSULE ORAL at 11:47

## 2024-10-23 RX ADMIN — FOLIC ACID 1 MG: 1 TABLET ORAL at 09:04

## 2024-10-23 RX ADMIN — ACETAMINOPHEN 1000 MG: 500 TABLET ORAL at 09:04

## 2024-10-23 RX ADMIN — IBUPROFEN 800 MG: 400 TABLET, FILM COATED ORAL at 18:24

## 2024-10-23 RX ADMIN — GABAPENTIN 400 MG: 300 CAPSULE ORAL at 18:24

## 2024-10-23 RX ADMIN — TRAZODONE HYDROCHLORIDE 25 MG: 50 TABLET ORAL at 21:54

## 2024-10-23 ASSESSMENT — ACTIVITIES OF DAILY LIVING (ADL)
ADLS_ACUITY_SCORE: 0
ADLS_ACUITY_SCORE: 0
ADLS_ACUITY_SCORE: 51
ADLS_ACUITY_SCORE: 0
DEPENDENT_IADLS:: CLEANING;COOKING;LAUNDRY;SHOPPING;MEAL PREPARATION;MEDICATION MANAGEMENT;TRANSPORTATION
ADLS_ACUITY_SCORE: 0

## 2024-10-23 NOTE — PROGRESS NOTES
[facility]    Medicine Progress Note - Hospitalist Service    Date of Admission:  10/21/2024    Assessment & Plan   Agnes Saravia is a 79 year old female with history of RA, Sjogren's, DANA, HTN, ILD who was admitted on 10/21/2024 with +COVID and acute encephalopathy.     Acute toxic vs metabolic encephalopathy, mostly resolved  Noted to be lethargic and obtunded upon arrival to the ED.  No significant metabolic derangements, some mild hypoxia as below, no focal neurologic deficits, and no other signs or symptoms of infection aside from COVID-positive.  CT head negative and no story of trauma.  Patient's mental status much improved on initial reassessment by hospitalist team and suspect underlying infection is driving toxic metabolic encephalopathy. On 10/23, patient's mental status appears to be back to baseline but she does sometimes lose her train of thought which is abnormal for her. Overall, feel given rapid improvement this is likely related to her infection and should continue to improve.  -Treat underlying infection as below  -Delirium precautions  -Daily BMP, Mag              +Replete electrolytes as needed     COVID positive  Acute hypoxic respiratory insufficiency, resolved  ILD, unclear etiology  Patient presented with severe weakness, altered mental status, hypoxia requiring supplemental oxygen and was found to be COVID-positive.  Does have close contact in her home with a recent COVID illness as well.  Respiratory status remained stable while out to treat for mild COVID infection given need for oxygen.  -Dexamethasone 6mg x10d total  -Remdesivir x3d total (last dose midnight 10/24)  -Wean supplemental O2 as able  -Encourage incentive spirometry  -PT and OT consult    Pulmonary nodule (6mm), new  Noted on admission CT warrants outpatient follow-up.  -Repeat CT chest in 6-12 months     Hyponatremia, mild  Hypokalemia, mild, chronic  Suspect acute decrease in setting of poor  PO intake. Appears patient has been on chronic K supplementation in the past.  -Encourage PO intake  -Daily BMP     Rheumatoid arthritis  Sjogren's syndrome  Follows with outpatient rheumatology, unable to access records. Previously on methotrexate and prednisone.  -hold PTA methotrexate during acute infection; restart at next scheduled dose (10/29/2024) assuming resolution of infection by then  -hold PTA prednisone while on dexamethasone    Discharge planning  Assuming she does not need supplemental oxygen again, I anticipate patient will be medically ready for discharge on 10/24 after she receives her third/last dose of Remdesivir. One complicating factor to getting TCU placement may be that she is Covid positive depending on the facility's isolation guidelines.  - care management consult to assist with TCU placement     HTN - Hold hydrochlorothiazide for now  HLD - Continue PTA statin  Chronic insomnia - Hold trazodone  GERD - Continue PTA PPI  DANA: resume home tranxene at 1/2 pta dose. Lima reports she goes into withdrawal when held. If anxiety a major issue on 1/2 dose will given another 1/2 but trying to skirt too much confusion now given the metabolic encephalopathy          Diet: Combination Diet Regular Diet Adult  Room Service    DVT Prophylaxis: Pneumatic Compression Devices  Andujar Catheter: Not present  Lines: None     Cardiac Monitoring: None  Code Status: Full Code      Clinically Significant Risk Factors         # Hyponatremia: Lowest Na = 130 mmol/L in last 2 days, will monitor as appropriate  # Hypochloremia: Lowest Cl = 89 mmol/L in last 2 days, will monitor as appropriate          # Hypertension: Noted on problem list                      Disposition Plan     Medically Ready for Discharge: Anticipated Tomorrow             Rai Shi MD  Hospitalist Service  [facility]  Securely message with [employer] (more info)  Text page via [employer] Paging/Directory    ______________________________________________________________________    Interval History   No acute events overnight. Patient feeling ok today and reports her shortness of breath is resolved.  Discussed plan for TCU placement.  Patient would prefer to not go to a TCU, but understands that she is too weak to go home and is agreeable to TCU.    Physical Exam   Vital Signs: Temp: 98.7  F (37.1  C) Temp src: Oral BP: 127/71 Pulse: 71   Resp: 16 SpO2: 91 % O2 Device: None (Room air)    Weight: 0 lbs 0 oz    General: Awake, alert, NAD, laying flat in bed  HEENT: Atraumatic, normocephalic, EOMI, no scleral icterus  CV: RRR, no murmurs, no LEO, distal pulses intact, no JVD  Pulm: CTAB, breathing comfortably on room air, no wheezes, no crackles  Abd: Soft, non-tender, non-distended, no overlying skin changes  Skin: No rashes, lesions, or wounds visualized  Neuro: AOx4, no focal deficits, moving all extremities spontaneously, speech normal.  Able to carry a full conversation without any confusion; rarely loses her train of thought but is able to pick it back up fairly easily.       Medical Decision Making       30 MINUTES SPENT BY ME on the date of service doing chart review, history, exam, documentation & further activities per the note.      Data     I have personally reviewed the following data over the past 24 hrs:    10.6  \   12.1   / 330     133 (L) 94 (L) 18.7 /  129 (H)   3.7 31 (H) 0.59 \       Imaging results reviewed over the past 24 hrs:   No results found for this or any previous visit (from the past 24 hours).

## 2024-10-23 NOTE — CONSULTS
Care Management Initial Consult    General Information  Assessment completed with: Caregiver, Lima  Type of CM/SW Visit: Initial Assessment    Primary Care Provider verified and updated as needed: Yes   Readmission within the last 30 days:        Reason for Consult: discharge planning  Advance Care Planning:            Communication Assessment  Patient's communication style: spoken language (English or Bilingual)             Cognitive  Cognitive/Neuro/Behavioral: .WDL except, orientation  Level of Consciousness: alert, intermittent confusion  Arousal Level: opens eyes spontaneously  Orientation: disoriented to, time  Mood/Behavior: calm, cooperative  Best Language: 0 - No aphasia  Speech: clear, spontaneous    Living Environment:   People in home: significant other     Current living Arrangements: house      Able to return to prior arrangements: yes       Family/Social Support:  Care provided by: spouse/significant other  Provides care for: no one  Marital Status: Lives with Significant Other  Support system:            Description of Support System:           Current Resources:   Patient receiving home care services: No        Community Resources:    Equipment currently used at home: walker, rolling  Supplies currently used at home:      Employment/Financial:  Employment Status: retired        Financial Concerns:             Does the patient's insurance plan have a 3 day qualifying hospital stay waiver?  No    Lifestyle & Psychosocial Needs:  Social Drivers of Health     Food Insecurity: Low Risk  (10/16/2024)    Food Insecurity     Within the past 12 months, did you worry that your food would run out before you got money to buy more?: No     Within the past 12 months, did the food you bought just not last and you didn t have money to get more?: No   Depression: Not at risk (10/11/2023)    PHQ-2     PHQ-2 Score: 0   Housing Stability: Low Risk  (10/16/2024)    Housing Stability     Do you have housing? : Yes      Are you worried about losing your housing?: No   Tobacco Use: Medium Risk (2/21/2024)    Patient History     Smoking Tobacco Use: Former     Smokeless Tobacco Use: Never     Passive Exposure: Not on file   Financial Resource Strain: High Risk (10/16/2024)    Financial Resource Strain     Within the past 12 months, have you or your family members you live with been unable to get utilities (heat, electricity) when it was really needed?: Yes   Alcohol Use: Not on file   Transportation Needs: Low Risk  (10/16/2024)    Transportation Needs     Within the past 12 months, has lack of transportation kept you from medical appointments, getting your medicines, non-medical meetings or appointments, work, or from getting things that you need?: No   Physical Activity: Inactive (10/16/2024)    Exercise Vital Sign     Days of Exercise per Week: 0 days     Minutes of Exercise per Session: 0 min   Interpersonal Safety: Not on file   Stress: No Stress Concern Present (10/16/2024)    [employer] [employer] - Occupational Stress Questionnaire     Feeling of Stress : Not at all   Social Connections: Unknown (10/16/2024)    Social Connection and Isolation Panel [NHANES]     Frequency of Communication with Friends and Family: Not on file     Frequency of Social Gatherings with Friends and Family: Once a week     Attends [employer] Services: Not on file     Active Member of Clubs or Organizations: Not on file     Attends Club or Organization Meetings: Not on file     Marital Status: Not on file   Health Literacy: Not on file       Functional Status:  Prior to admission patient needed assistance:   Dependent ADLs:: Ambulation-walker, Bathing, Dressing  Dependent IADLs:: Cleaning, Cooking, Laundry, Shopping, Meal Preparation, Medication Management, Transportation       Mental Health Status:          Chemical Dependency Status:                Values/Beliefs:  Spiritual, Cultural Beliefs, Moravian Practices, Values that  "affect care:                 Discussed  Partnership in Safe Discharge Planning  document with patient/family: Yes: verbally    Additional Information:  Received call from Pt's caregiver Lima to discuss discharge planning.  Lima notes she is Pt's \"partner\" and they live together in home.  Lima notes she helps Pt as needed but is currently recovering from COVID herself.  At baseline Pt walks with walker.  Lima helps with minimal dressing assist and is SBA in shower.  Lima set up medications and does all cooking, cleaning, and driving.  Only equipment in home is a walker.  They have no outside services  Reviewed recommendation for TCU.  Lima is in agreement  Pt/family was provided with the Medicare Compare list for SNF.  Discussed associated Medicare star ratings to assist with choice for referrals/discharge planning Yes    Education was given to pt/family that star ratings are updated/maintained by Medicare and can be reviewed by visiting www.medicare.gov Yes     Lima would like referrals sent to  [facility]  [place]    Discussed that due to covid we may need to go outside of desired area/star rating. When Pt medically ready we need to move forward on accepting facility.    Lima would like transport arranged.  Aware of cost and in agreement.     Referrals sent via [employer]  SW updated.          Next Steps: Await medically ready and TCU acceptance.     Kathrin Jenkins RN     "

## 2024-10-23 NOTE — PLAN OF CARE
Goal Outcome Evaluation:      Plan of Care Reviewed With: patient    Overall Patient Progress: no change    Shift: [id number] am  Summary: Acute Toxic vs Metabolic Encephalopathy; Covid(+)    Orientation: AO x2; D/O to time and occasionally situation; Loses train of thought  BEHAVIOR & AGGRESSION TOOL COLOR: Green  Vitals/Tele: VSS RA; Was on 2L NC overnight but is satting low to mid 90s on RA  CIWA SCORE: N/A   IV Access/drains: PIV SL   TELEMETRY RHYTHM: N/A  Diet: Regular; Minimal Appetite   Mobility: A2 GBW  GI/: Incontinent; Purewick in place   Wound/Skin: Scattered bruising; blanchable redness on Sacrum/Coccyx   Consults: CM/SW  Discharge Plan: PT recommending TCU      See Flow sheets for assessment

## 2024-10-23 NOTE — PROGRESS NOTES
"   10/22/24 1700   Appointment Info   Signing Clinician's Name / Credentials (PT) Colby Bower DPT   Living Environment   People in Home other (see comments)   Current Living Arrangements house   Home Accessibility no concerns   Living Environment Comments Pt confused and foggy during session may have given some inaccurate information, reports living in a house with no stairs, has a \"live in caretaker\" who assists pt with daily activities and ADL's, help with bathing, toileting, meals   Self-Care   Usual Activity Tolerance fair   Current Activity Tolerance poor   Equipment Currently Used at Home walker, rolling   Fall history within last six months yes   Number of times patient has fallen within last six months   (fall prompted hospital admission, reports lots of falls recently but cannot remember how many)   Activity/Exercise/Self-Care Comment At baseline pt uses 4WW to walk about \"20 feet inside her home\", has assist with ADL's, bathing, toileting when needed   General Information   Onset of Illness/Injury or Date of Surgery 10/21/24   Referring Physician Golden Fraga MD   Pertinent History of Current Problem (include personal factors and/or comorbidities that impact the POC) Per chart: Agnes Saravia is a 79 year old female with history of RA, Sjogren's, DANA, HTN, ILD who was admitted on 10/21/2024 with +COVID and acute encephalopathy.   Existing Precautions/Restrictions other (see comments);fall  (COVID+)   Cognition   Affect/Mental Status (Cognition) confused   Orientation Status (Cognition) disoriented to;time;oriented x 3   Follows Commands (Cognition) WFL;delayed response/completion   Cognitive Status Comments pt foggy and mildly confused at times, could not remember some personal information and struggled to answer questions   Pain Assessment   Patient Currently in Pain No   Integumentary/Edema   Integumentary/Edema Comments bruising on bilat UE's on inner elbows where pt states firemen had to " lift her up from her fall   Posture    Posture Forward head position   Range of Motion (ROM)   Range of Motion ROM is WFL   Strength (Manual Muscle Testing)   Strength (Manual Muscle Testing) Deficits observed during functional mobility   Strength Comments gross deficits during functional mobility   Bed Mobility   Comment, (Bed Mobility) CGA   Transfers   Comment, (Transfers) Val x 2   Gait/Stairs (Locomotion)   Tuttle Level (Gait) minimum assist (75% patient effort);2 person assist   Assistive Device (Gait) walker, front-wheeled   Distance in Feet (Gait) 5'   Pattern (Gait) step-through   Deviations/Abnormal Patterns (Gait) german decreased;festinating/shuffling;gait speed decreased;stride length decreased;weight shifting decreased   Balance   Balance Comments sitting balance pt needing UE's for support, standing balance fair with FWW needing CGA/Val, impaired dynamic balance   Sensory Examination   Sensory Perception patient reports no sensory changes   Clinical Impression   Criteria for Skilled Therapeutic Intervention Yes, treatment indicated   PT Diagnosis (PT) impaired gait   Influenced by the following impairments deficits with functional strength, balance, activity tolerance   Functional limitations due to impairments ambulation, transfers, ADL's, falls risk   Clinical Presentation (PT Evaluation Complexity) stable   Clinical Presentation Rationale PMH and clinical judgement   Clinical Decision Making (Complexity) low complexity   Planned Therapy Interventions (PT) balance training;bed mobility training;cryotherapy;gait training;home exercise program;patient/family education;ROM (range of motion);stair training;stretching;strengthening;transfer training;progressive activity/exercise   Risk & Benefits of therapy have been explained evaluation/treatment results reviewed;care plan/treatment goals reviewed;risks/benefits reviewed;current/potential barriers reviewed;participants included;participants  voiced agreement with care plan;patient   PT Total Evaluation Time   PT Eval, Low Complexity Minutes (85064) 10   Physical Therapy Goals   PT Frequency 5x/week   PT Predicted Duration/Target Date for Goal Attainment 10/29/24   PT Goals Bed Mobility;Transfers;Gait   PT: Bed Mobility Independent;Supine to/from sit   PT: Transfers Modified independent;Sit to/from stand;Assistive device   PT: Gait Modified independent;Rolling walker;25 feet   Interventions   Interventions Quick Adds Gait Training;Therapeutic Activity;Therapeutic Procedure   Therapeutic Activity   Therapeutic Activities: dynamic activities to improve functional performance Minutes (65254) 24   Symptoms Noted During/After Treatment Fatigue;Shortness of breath;Dizziness   Treatment Detail/Skilled Intervention Pt greeted supine in bed, agreeable to PT. Pt mildly confused throughout session, foggy, difficulty answering questions as she struggled with recall at times. Eval completed. VSS on RA, pt sating at 91% after activity. Pt performed supine>sit CGA, very slow to executed, cues provided for sequencing and use of bedrail. Upon sitting pt feeling nauseous and mild dizziness, dizziness improved with rest but nausea remained. Attempted STS several times but each time pt felt more sick, continued sitting rest at EOB, pt able to sit unsupported. After 5+ more min of sitting rest, pt eventually able to perform STS with Val x 2, cues for hand placement, unsteady upon standing. Pt was able to amb ~5' from bed>recliner with Val x 2, very slow shuffled gait, unsteady. Pt sat in recliner with FWW and Val. Feet elevated and chair alarm turned on. Direct handoff to NA who was in room to assist with mobility during session   Gait Training   Treatment Detail/Skilled Intervention gait initiated see TA   PT Discharge Planning   PT Plan progress gait with FWW, activity tolerance, LE strengthening   PT Discharge Recommendation (DC Rec) Transitional Care Facility   PT  Rationale for DC Rec Pt is well below baseline with weakness, low activity tolerance, balance deficits and high falls risk. Pt lives in a house with a caretaker who also now has covid. Pt will need to progress independence and safety with all mobility at TCU before being able to return home   PT Brief overview of current status Val 1-2 with FWW - Goals of therapy will be to address safe mobility and make recs for d/c to next level of care. Pt and RN will continue to follow all falls risk precautions as documented by RN staff while hospitalized   Total Session Time   Timed Code Treatment Minutes 24   Total Session Time (sum of timed and untimed services) 34

## 2024-10-23 NOTE — PLAN OF CARE
Goal Outcome Evaluation:      Plan of Care Reviewed With: caregiver          Outcome Evaluation: TCU

## 2024-10-23 NOTE — PLAN OF CARE
Goal Outcome Evaluation:       DATE & TIME: 10/22/24 to 10/23/24 9978-2621    Cognitive Concerns/ Orientation : A0x2  BEHAVIOR & AGGRESSION TOOL COLOR: Green  CIWA SCORE: None   ABNL VS/O2: VSS   MOBILITY: A2 with GB/W  PAIN MANAGMENT: denies  DIET: regular  BOWEL/BLADDER: incontinent, purewick in place  ABNL LAB/BG: Na 130  DRAIN/DEVICES: PIV SL  TELEMETRY RHYTHM: none  SKIN: Preventive mepi to sacrum in place  TESTS/PROCEDURES: none  D/C DATE: pending    OTHER IMPORTANT INFO: Pt is Covid positive

## 2024-10-24 LAB
HOLD SPECIMEN: NORMAL
PLATELET # BLD AUTO: 350 10E3/UL (ref 150–450)

## 2024-10-24 PROCEDURE — 258N000003 HC RX IP 258 OP 636: Performed by: STUDENT IN AN ORGANIZED HEALTH CARE EDUCATION/TRAINING PROGRAM

## 2024-10-24 PROCEDURE — 120N000001 HC R&B MED SURG/OB

## 2024-10-24 PROCEDURE — 250N000012 HC RX MED GY IP 250 OP 636 PS 637: Performed by: STUDENT IN AN ORGANIZED HEALTH CARE EDUCATION/TRAINING PROGRAM

## 2024-10-24 PROCEDURE — 250N000011 HC RX IP 250 OP 636: Performed by: STUDENT IN AN ORGANIZED HEALTH CARE EDUCATION/TRAINING PROGRAM

## 2024-10-24 PROCEDURE — 36415 COLL VENOUS BLD VENIPUNCTURE: CPT | Performed by: STUDENT IN AN ORGANIZED HEALTH CARE EDUCATION/TRAINING PROGRAM

## 2024-10-24 PROCEDURE — 250N000013 HC RX MED GY IP 250 OP 250 PS 637: Performed by: HOSPITALIST

## 2024-10-24 PROCEDURE — 99232 SBSQ HOSP IP/OBS MODERATE 35: CPT | Performed by: STUDENT IN AN ORGANIZED HEALTH CARE EDUCATION/TRAINING PROGRAM

## 2024-10-24 PROCEDURE — 250N000013 HC RX MED GY IP 250 OP 250 PS 637: Performed by: STUDENT IN AN ORGANIZED HEALTH CARE EDUCATION/TRAINING PROGRAM

## 2024-10-24 PROCEDURE — 85049 AUTOMATED PLATELET COUNT: CPT | Performed by: STUDENT IN AN ORGANIZED HEALTH CARE EDUCATION/TRAINING PROGRAM

## 2024-10-24 RX ADMIN — GABAPENTIN 400 MG: 300 CAPSULE ORAL at 17:19

## 2024-10-24 RX ADMIN — CLORAZEPATE DIPOTASSIUM 7.5 MG: 3.75 TABLET ORAL at 11:56

## 2024-10-24 RX ADMIN — GABAPENTIN 100 MG: 100 CAPSULE ORAL at 22:20

## 2024-10-24 RX ADMIN — PANTOPRAZOLE SODIUM 40 MG: 40 TABLET, DELAYED RELEASE ORAL at 09:06

## 2024-10-24 RX ADMIN — IBUPROFEN 800 MG: 400 TABLET, FILM COATED ORAL at 17:19

## 2024-10-24 RX ADMIN — SIMVASTATIN 40 MG: 40 TABLET, FILM COATED ORAL at 22:20

## 2024-10-24 RX ADMIN — ASPIRIN 81 MG: 81 TABLET, COATED ORAL at 22:20

## 2024-10-24 RX ADMIN — CEVIMELINE HYDROCHLORIDE 30 MG: 30 CAPSULE ORAL at 09:07

## 2024-10-24 RX ADMIN — CEVIMELINE HYDROCHLORIDE 30 MG: 30 CAPSULE ORAL at 22:20

## 2024-10-24 RX ADMIN — TRAZODONE HYDROCHLORIDE 25 MG: 50 TABLET ORAL at 22:20

## 2024-10-24 RX ADMIN — GABAPENTIN 400 MG: 300 CAPSULE ORAL at 09:06

## 2024-10-24 RX ADMIN — REMDESIVIR 100 MG: 100 INJECTION, POWDER, LYOPHILIZED, FOR SOLUTION INTRAVENOUS at 00:27

## 2024-10-24 RX ADMIN — CEVIMELINE HYDROCHLORIDE 30 MG: 30 CAPSULE ORAL at 15:11

## 2024-10-24 RX ADMIN — IBUPROFEN 800 MG: 400 TABLET, FILM COATED ORAL at 11:56

## 2024-10-24 RX ADMIN — DEXAMETHASONE 6 MG: 2 TABLET ORAL at 09:06

## 2024-10-24 RX ADMIN — SODIUM CHLORIDE 50 ML: 9 INJECTION, SOLUTION INTRAVENOUS at 00:28

## 2024-10-24 RX ADMIN — GABAPENTIN 400 MG: 300 CAPSULE ORAL at 11:56

## 2024-10-24 RX ADMIN — FOLIC ACID 1 MG: 1 TABLET ORAL at 09:06

## 2024-10-24 RX ADMIN — ENOXAPARIN SODIUM 40 MG: 40 INJECTION SUBCUTANEOUS at 06:36

## 2024-10-24 NOTE — PLAN OF CARE
Goal Outcome Evaluation:  DATE & TIME: COVID, encephalopathy (resolved)    Cognitive Concerns/ Orientation : A&Ox4   BEHAVIOR & AGGRESSION TOOL COLOR: Green  CIWA SCORE: NA   ABNL VS/O2: VSS on RA ex HTN  MOBILITY: A1 GB/W. Pt ambulated to bathroom and chair  PAIN MANAGMENT: Denies  DIET: Regular  BOWEL/BLADDER: Continent  ABNL LAB/BG:   DRAIN/DEVICES: R PIV SL  TELEMETRY RHYTHM: NA  SKIN: Scattered bruising, scabs. Trace edema to BLE. Sacral mepilex in place.  TESTS/PROCEDURES: No new  D/C DATE: Pending placement. PT rec TCU  OTHER IMPORTANT INFO: Attempted to perform 6 min walk test with patient but she was unable to keep her balance for more than few steps. Patient ambulated to bathroom and to chair without desatting on room air. May need to be performed again.

## 2024-10-24 NOTE — PLAN OF CARE
Goal Outcome Evaluation:    Shift: [id number]  Summary: Acute Toxic vs Metabolic Encephalopathy; Covid(+)    Orientation: A&Ox3-4; disoriented to time and occasionally situation; Loses train of thought  BEHAVIOR & AGGRESSION TOOL COLOR: Green  Vitals/Tele: VSS RA  CIWA SCORE: N/A   IV Access/drains: PIV (R) SL   TELEMETRY RHYTHM: N/A  Diet: Regular  Mobility: A-2 GBW  GI/: Incontinent; Purewick in place with adequate output  Wound/Skin: Scattered bruising; blanchable redness on Sacrum/Coccyx- mepilex in place.  Consults: CM/SW  Discharge Plan: PT recommending TCU

## 2024-10-24 NOTE — PROGRESS NOTES
[facility]    Medicine Progress Note - Hospitalist Service    Date of Admission:  10/21/2024    Assessment & Plan   Agnes Saravia is a 79 year old female with history of RA, Sjogren's, DANA, HTN, ILD who was admitted on 10/21/2024 with +COVID and acute encephalopathy.     Acute toxic vs metabolic encephalopathy, almost resolved  Noted to be lethargic and obtunded upon arrival to the ED.  No significant metabolic derangements, some mild hypoxia as below, no focal neurologic deficits, and no other signs or symptoms of infection aside from COVID-positive.  CT head negative and no story of trauma.  Patient's mental status much improved on initial reassessment by hospitalist team and suspect underlying infection is driving toxic metabolic encephalopathy. On 10/23, patient's mental status appears to be back to baseline but she does sometimes lose her train of thought which is abnormal for her. Overall, feel given rapid improvement this is likely related to her infection and her mental status should continue to improve back to her baseline.  -Treat underlying infection as below  -Delirium precautions while hospitalized  -Daily BMP, Mag              +Replete electrolytes as needed     COVID positive  Acute hypoxic respiratory insufficiency, resolved  ILD, unclear etiology  Patient presented with severe weakness, altered mental status, hypoxia requiring supplemental oxygen and was found to be COVID-positive.  Does have close contact in her home with a recent COVID illness as well.  Patient initially needed a small amount of supplemental oxygen but is now satting well on room air.  -Dexamethasone 6mg x10d total  -completed course of Remdesivir  -Encourage incentive spirometry  -PT and OT consult: needs TCU placement (per care management, referrals sent; waiting to hear back)    Pulmonary nodule (6mm), new  Noted on admission CT warrants outpatient follow-up.  -PCP will need to order repeat CT  chest in 6-12 months     Hyponatremia, mild  Hypokalemia, mild, chronic  Suspect acute decrease in setting of poor PO intake. Appears patient has been on chronic K supplementation in the past.  -Encourage PO intake     Rheumatoid arthritis  Sjogren's syndrome  Follows with outpatient rheumatology, unable to access records. Previously on methotrexate and prednisone.  -hold PTA methotrexate (takes on Tuesdays) during acute infection; can restart at next scheduled dose (10/29/2024) assuming resolution of infection by then  -hold PTA prednisone while on dexamethasone    Discharge planning  Patient is medically ready for discharge on 10/24. One complicating factor to getting TCU placement may be that she is Covid positive depending on the facility's isolation guidelines.  - care management assisting with TCU placement     HTN - Hold hydrochlorothiazide for now; can restart at discharge if BP is elevated (currently well controlled on its own)  HLD - Continue PTA statin  Chronic insomnia - Hold trazodone given recent encephalopathy; restart after mental status back to baseline  GERD - Continue PTA PPI  DANA: resume home tranxene at 1/2 pta dose. [place] reports she goes into withdrawal when held. If anxiety a major issue on 1/2 dose will given another 1/2 but trying to skirt too much confusion now given the metabolic encephalopathy          Diet: Combination Diet Regular Diet Adult  Room Service    DVT Prophylaxis: Pneumatic Compression Devices  Andujar Catheter: Not present  Lines: None     Cardiac Monitoring: None  Code Status: Full Code      Clinically Significant Risk Factors         # Hyponatremia: Lowest Na = 133 mmol/L in last 2 days, will monitor as appropriate  # Hypochloremia: Lowest Cl = 94 mmol/L in last 2 days, will monitor as appropriate          # Hypertension: Noted on problem list                      Disposition Plan     Medically Ready for Discharge: Medically ready now             Rai Shi  MD  Hospitalist Service  [facility]  Securely message with [employer] (more info)  Text page via [employer] Paging/Directory   ______________________________________________________________________    Interval History   No acute events overnight. Patient feeling ok today and reports her shortness of breath is resolved.  Discussed that we are waiting for TCU placement and patient understands.    Physical Exam   Vital Signs: Temp: 98.4  F (36.9  C) Temp src: Oral BP: (!) 142/75 Pulse: 60   Resp: 18 SpO2: 93 % O2 Device: None (Room air)    Weight: 0 lbs 0 oz    General: Awake, alert, NAD, laying flat in bed  HEENT: Atraumatic, normocephalic, EOMI, no scleral icterus  CV: RRR, no murmurs, no LEO, distal pulses intact, no JVD  Pulm: CTAB, breathing comfortably on room air, no wheezes, no crackles  Abd: Soft, non-tender, non-distended, no overlying skin changes  Skin: No rashes, lesions, or wounds visualized  Neuro: AOx4, no focal deficits, moving all extremities spontaneously, speech normal.  Able to carry a full conversation without any confusion; rarely loses her train of thought but is able to pick it back up fairly easily.       Medical Decision Making       30 MINUTES SPENT BY ME on the date of service doing chart review, history, exam, documentation & further activities per the note.      Data     I have personally reviewed the following data over the past 24 hrs:    N/A  \   N/A   / 350     N/A N/A N/A /  N/A   N/A N/A N/A \       Imaging results reviewed over the past 24 hrs:   No results found for this or any previous visit (from the past 24 hours).

## 2024-10-24 NOTE — PLAN OF CARE
Goal Outcome Evaluation:      Plan of Care Reviewed With: patient    Overall Patient Progress: no changeOverall Patient Progress: no change    Shift: [id number]  Summary: Acute Toxic vs Metabolic Encephalopathy; Covid(+)    Orientation: AO x2; disoriented to time and occasionally situation; Loses train of thought  BEHAVIOR & AGGRESSION TOOL COLOR: Green  Vitals/Tele: VSS RA  CIWA SCORE: N/A   IV Access/drains: PIV (R) SL   TELEMETRY RHYTHM: N/A  Diet: Regular; Minimal Appetite   Mobility: A2 GBW  GI/: Incontinent; Purewick in place   Wound/Skin: Scattered bruising; blanchable redness on Sacrum/Coccyx- mepilex in place.  Consults: CM/SW  Discharge Plan: PT recommending TCU

## 2024-10-25 VITALS
SYSTOLIC BLOOD PRESSURE: 153 MMHG | OXYGEN SATURATION: 93 % | DIASTOLIC BLOOD PRESSURE: 78 MMHG | TEMPERATURE: 98.6 F | HEART RATE: 70 BPM | RESPIRATION RATE: 18 BRPM

## 2024-10-25 PROCEDURE — 250N000013 HC RX MED GY IP 250 OP 250 PS 637: Performed by: HOSPITALIST

## 2024-10-25 PROCEDURE — 250N000013 HC RX MED GY IP 250 OP 250 PS 637: Performed by: STUDENT IN AN ORGANIZED HEALTH CARE EDUCATION/TRAINING PROGRAM

## 2024-10-25 PROCEDURE — 99239 HOSP IP/OBS DSCHRG MGMT >30: CPT | Performed by: STUDENT IN AN ORGANIZED HEALTH CARE EDUCATION/TRAINING PROGRAM

## 2024-10-25 PROCEDURE — 999N000111 HC STATISTIC OT IP EVAL DEFER: Performed by: OCCUPATIONAL THERAPIST

## 2024-10-25 PROCEDURE — 250N000011 HC RX IP 250 OP 636: Performed by: STUDENT IN AN ORGANIZED HEALTH CARE EDUCATION/TRAINING PROGRAM

## 2024-10-25 PROCEDURE — 250N000012 HC RX MED GY IP 250 OP 636 PS 637: Performed by: STUDENT IN AN ORGANIZED HEALTH CARE EDUCATION/TRAINING PROGRAM

## 2024-10-25 RX ORDER — DEXAMETHASONE 6 MG/1
6 TABLET ORAL DAILY
DISCHARGE
Start: 2024-10-26 | End: 2024-11-06

## 2024-10-25 RX ORDER — CLORAZEPATE DIPOTASSIUM 7.5 MG/1
7.5-15 TABLET ORAL AT BEDTIME
Qty: 6 TABLET | Refills: 0 | Status: SHIPPED | OUTPATIENT
Start: 2024-10-25 | End: 2024-11-06

## 2024-10-25 RX ORDER — PREDNISONE 5 MG/1
5 TABLET ORAL DAILY
DISCHARGE
Start: 2024-10-25

## 2024-10-25 RX ADMIN — DEXAMETHASONE 6 MG: 2 TABLET ORAL at 08:36

## 2024-10-25 RX ADMIN — ENOXAPARIN SODIUM 40 MG: 40 INJECTION SUBCUTANEOUS at 06:02

## 2024-10-25 RX ADMIN — CLORAZEPATE DIPOTASSIUM 7.5 MG: 3.75 TABLET ORAL at 09:43

## 2024-10-25 RX ADMIN — CEVIMELINE HYDROCHLORIDE 30 MG: 30 CAPSULE ORAL at 08:35

## 2024-10-25 RX ADMIN — GABAPENTIN 400 MG: 300 CAPSULE ORAL at 08:35

## 2024-10-25 RX ADMIN — CEVIMELINE HYDROCHLORIDE 30 MG: 30 CAPSULE ORAL at 15:01

## 2024-10-25 RX ADMIN — GABAPENTIN 400 MG: 300 CAPSULE ORAL at 12:08

## 2024-10-25 RX ADMIN — PANTOPRAZOLE SODIUM 40 MG: 40 TABLET, DELAYED RELEASE ORAL at 08:36

## 2024-10-25 RX ADMIN — IBUPROFEN 800 MG: 400 TABLET, FILM COATED ORAL at 12:08

## 2024-10-25 RX ADMIN — FOLIC ACID 1 MG: 1 TABLET ORAL at 08:36

## 2024-10-25 ASSESSMENT — ACTIVITIES OF DAILY LIVING (ADL)
ADLS_ACUITY_SCORE: 0

## 2024-10-25 NOTE — DISCHARGE SUMMARY
[facility]  Hospitalist Discharge Summary     Admit Date:  10/21/2024  Discharge Date:     10/25/2024  Discharging Provider: Carlene Tijerina MD    PRIMARY CARE PROVIDER:    José Miguel Wallace     DISCHARGE DIAGNOSES:     Acute Toxic vs Metabolic Encephalopathy: Resolved   COVID positive  Acute hypoxic respiratory insufficiency, resolved  ILD, unclear etiology  Pulmonary nodule (6mm), new   Hyponatremia, mild  Hypokalemia, mild, chronic  Rheumatoid arthritis  Sjogren's syndrome  HTN   HLD   Chronic insomnia   GERD   DANA    BRIEF HISTORY OF PRESENT ILLNESS/HOSPITAL COURSE:   Agnes Saravia is a 79 year old female with history of RA, Sjogren's, DANA, HTN, ILD who was admitted on 10/21/2024 with +COVID and acute encephalopathy.      Acute Toxic vs Metabolic Encephalopathy: Resolved  Noted to be lethargic and obtunded upon arrival to the ED.  No significant metabolic derangements, some mild hypoxia as below, no focal neurologic deficits, and no other signs or symptoms of infection aside from COVID-positive.  CT head negative and no story of trauma.  Patient's mental status much improved on initial reassessment by hospitalist team and suspect underlying infection is driving toxic metabolic encephalopathy. On 10/23, patient's mental status appears to be back to baseline but she does sometimes lose her train of thought which is abnormal for her. Overall, feel given rapid improvement this is likely related to her infection and her mental status should continue to improve back to her baseline.  -Treat underlying infection as below  -Delirium precautions while hospitalized     COVID positive  Acute hypoxic respiratory insufficiency, resolved  ILD, unclear etiology  Patient presented with severe weakness, altered mental status, hypoxia requiring supplemental oxygen and was found to be COVID-positive.  Does have close contact in her home with a recent COVID illness as well.  Patient  initially needed a small amount of supplemental oxygen but is now satting well on room air.  - Dexamethasone 6mg x10d total  - Completed course of Remdesivir  - Encourage incentive spirometry  - PT and OT consult: needs TCU placement     Pulmonary nodule (6mm), new  Noted on admission CT warrants outpatient follow-up.  -PCP will need to order repeat CT chest in 6-12 months     Hyponatremia, mild  Hypokalemia, mild, chronic  Suspect acute decrease in setting of poor PO intake. Appears patient has been on chronic K supplementation in the past.  -Encourage PO intake     Rheumatoid arthritis  Sjogren's syndrome  Follows with outpatient rheumatology, unable to access records. Previously on methotrexate and prednisone.  - Hold PTA methotrexate (takes on Tuesdays) during acute infection  - Can restart at next scheduled dose (10/29/2024) assuming resolution of infection by then  - Hold PTA prednisone while on dexamethasone   - Resume when dexamethasone is done     HTN   - Hold hydrochlorothiazide 25mg    - Resume at discharge     HLD   - Continue PTA statin    Chronic insomnia   - Continue home Trazodone     GERD   - Continue PTA PPI    DANA  - Will resume Clorazepate 7.5-15mg at bedtime        Clinically Significant Risk Factors                   # Hypertension: Noted on problem list                           TOTAL DISCHARGE TIME:  Carlene FORTE MD, personally saw the patient today and spent greater than 30 minutes discharging this patient.    Carlene Tijerina MD  [facility]  Hospitalist Service   Securely message with the Vocera Web Console (learn more here)  Text page via [employer] Paging/Directory        Significant Results and Procedures   N/a    Pending Results   These results will be followed up by n/a  Unresulted Labs Ordered in the Past 30 Days of this Admission       No orders found from 9/21/2024 to 10/22/2024.            Code Status   Full Code       Primary Care Physician    José Miguel Wallace MD    Physical Exam   Temp: 98.6  F (37  C) Temp src: Oral BP: (!) 153/78 Pulse: 70   Resp: 18 SpO2: 93 % O2 Device: None (Room air)    There were no vitals filed for this visit.  Vital Signs with Ranges  Temp:  [98.1  F (36.7  C)-98.6  F (37  C)] 98.6  F (37  C)  Pulse:  [60-70] 70  Resp:  [16-18] 18  BP: (130-153)/(70-78) 153/78  SpO2:  [93 %-94 %] 93 %  I/O last 3 completed shifts:  In: -   Out: 200 [Urine:200]    Constitutional: Awake, alert, cooperative, no apparent distress.    ENT: Normocephalic, without obvious abnormality, atraumatic, oral pharynx with moist mucus membranes  Pulmonary: No increased work of breathing, good air exchange, clear to auscultation bilaterally, no crackles or wheezing.  Cardiovascular: Regular rate and rhythm, normal S1 and S2  GI: Normal bowel sounds, soft, non-distended, non-tender.  Obese.  Skin/Integumen: Visualized skin appeared clear.  Neuro:Patient seen moving all 4 extremities without difficulty.    Psych:  Alert and oriented x 3.   Extremities: No lower extremity edema noted    Discharge Disposition   Discharged to short-term care facility  Condition at discharge: Stable    Consultations This Hospital Stay   PHYSICAL THERAPY ADULT IP CONSULT  OCCUPATIONAL THERAPY ADULT IP CONSULT  VASCULAR ACCESS ADULT IP CONSULT  CARE MANAGEMENT / SOCIAL WORK IP CONSULT  PHYSICAL THERAPY ADULT IP CONSULT  OCCUPATIONAL THERAPY ADULT IP CONSULT      Discharge Orders      General info for SNF    Length of Stay Estimate: Short Term Care: Estimated # of Days <30  Condition at Discharge: Stable  Level of care:skilled   Rehabilitation Potential: Fair  Admission H&P remains valid and up-to-date: Yes  Recent Chemotherapy: N/A  Use Nursing Home Standing Orders: Yes     Mantoux instructions    Give two-step Mantoux (PPD) Per Facility Policy Yes     Follow Up and recommended labs and tests    Follow up with Nursing home physician in 3 days.  The following labs/tests are  recommended: BMP.     Reason for your hospital stay    You were admitted to the hospital for acute respiratory insufficiency and metabolic encephalopathy likely due to COVID 19.     Activity - Up with nursing assistance     Additional Discharge Instructions    Please resume patient's home Prednisone 5mg when the Decadron is completed.     Full Code     Physical Therapy Adult Consult    Evaluate and treat as clinically indicated.    Reason:  Generalized Weakness     Occupational Therapy Adult Consult    Evaluate and treat as clinically indicated.    Reason:  Generalized Weakness     Contact and Droplet Isolation     Diet    Follow this diet upon discharge: Regular Diet Adult     Discharge Medications   Current Discharge Medication List        START taking these medications    Details   dexAMETHasone (DECADRON) 6 MG tablet Take 1 tablet (6 mg) by mouth daily for 5 days.    Associated Diagnoses: COVID-19           CONTINUE these medications which have CHANGED    Details   clorazepate (TRANXENE) 7.5 MG tablet Take 1-2 tablets (7.5-15 mg) by mouth at bedtime.  Qty: 6 tablet, Refills: 0    Associated Diagnoses: DANA (generalized anxiety disorder)      predniSONE (DELTASONE) 5 MG tablet Take 1 tablet (5 mg) by mouth daily. (Hold until Decadron is done then restart)    Associated Diagnoses: Rheumatoid arthritis involving both knees, unspecified whether rheumatoid factor present (H)           CONTINUE these medications which have NOT CHANGED    Details   acetaminophen (TYLENOL) 500 MG tablet Take 1,000 mg by mouth at bedtime. Plus additional PRN      albuterol (PROAIR HFA/PROVENTIL HFA/VENTOLIN HFA) 108 (90 Base) MCG/ACT inhaler Inhale 2 puffs into the lungs every 4 hours as needed for shortness of breath / dyspnea or wheezing  Qty: 18 g, Refills: 1    Comments: For Profile Only - patient will call to fill Pharmacy may dispense brand covered by insurance (Proair, or proventil or ventolin or generic albuterol  inhaler)  Associated Diagnoses: Cough; Pleuritis      aspirin 81 MG EC tablet Take 81 mg by mouth At Bedtime       cevimeline (EVOXAC) 30 MG capsule Take 30 mg by mouth 3 times daily (AM, Noon, Dinner)      folic acid (FOLVITE) 1 MG tablet Take 1 mg by mouth six times a week Every day except Tuesdays when methotrexate is due      !! gabapentin (NEURONTIN) 100 MG capsule Take 100 mg by mouth at bedtime. ~1 hour before trazodone      !! gabapentin (NEURONTIN) 400 MG capsule Take 400 mg by mouth 3 times daily (AM, Noon, Dinner)      hydrochlorothiazide (HYDRODIURIL) 25 MG tablet Take 1 tablet (25 mg) by mouth daily  Qty: 90 tablet, Refills: 0    Associated Diagnoses: Essential hypertension      ibuprofen (ADVIL/MOTRIN) 400 MG tablet Take 800 mg by mouth 2 times daily (with meals). Noon and dinner      methotrexate 2.5 MG tablet Take 10 mg by mouth every 7 days (Takes on Tuesdays)      pantoprazole (PROTONIX) 40 MG EC tablet Take 1 tablet (40 mg) by mouth daily  Qty: 90 tablet, Refills: 3    Associated Diagnoses: Gastroesophageal reflux disease without esophagitis      polyethylene glycol-propylene glycol (SYSTANE ULTRA) 0.4-0.3 % SOLN ophthalmic solution Place 1 drop into both eyes every hour as needed for dry eyes      potassium chloride ER (K-TAB/KLOR-CON) 10 MEQ CR tablet Take 2 tablets (20 mEq) by mouth daily.  Qty: 180 tablet, Refills: 0    Associated Diagnoses: Hypokalemia      simvastatin (ZOCOR) 40 MG tablet Take 1 tablet (40 mg) by mouth at bedtime  Qty: 90 tablet, Refills: 3    Comments: For Profile Only - patient will call to fill  Associated Diagnoses: Hyperlipidemia LDL goal <130      traZODone (DESYREL) 50 MG tablet TAKE 0.5 (ONE-HALF) TABLET BY MOUTH ONCE DAILY AT BEDTIME  Qty: 45 tablet, Refills: 3    Comments: For Profile Only - patient will call to fill  Associated Diagnoses: Insomnia, unspecified type       !! - Potential duplicate medications found. Please discuss with provider.        Allergies    Allergies   Allergen Reactions    Mellaril [Thioridazine Hcl] Anaphylaxis    Percocet [Oxycodone-Acetaminophen] Anaphylaxis and Swelling     Tolerates hydrocodone and codeine in cough medicine. tolerates    Duloxetine      Made her feel weird, increased anxiety    No Clinical Screening - See Comments      JUAN-FOR ANXIETY     Data   Most Recent 3 CBC's:  Recent Labs   Lab Test 10/24/24  0752 10/23/24  1224 10/22/24  0827 10/21/24  1059   WBC  --  10.6 6.9 9.8   HGB  --  12.1 11.2* 12.5   MCV  --  88 89 90    330 253 262      Most Recent 3 BMP's:  Recent Labs   Lab Test 10/23/24  1224 10/22/24  0827 10/22/24  0053 10/21/24  1516 10/21/24  1059   * 130*  --   --  132*   POTASSIUM 3.7 3.4  --   --  3.2*   CHLORIDE 94* 89*  --   --  94*   CO2 31* 29  --   --  29   BUN 18.7 13.7  --   --  11.0   CR 0.59 0.53 0.54  --  0.64   ANIONGAP 8 12  --   --  9   CHRISTOPHER 8.7* 8.4*  --   --  8.4*   * 134*  --  125* 117*     Most Recent 2 LFT's:  Recent Labs   Lab Test 10/21/24  1059 10/11/23  1117   AST 30  25 25   ALT 19  19 17   ALKPHOS 69  69 69   BILITOTAL 0.5  0.5 0.6     Most Recent INR's and Anticoagulation Dosing History:  Anticoagulation Dose History  More data exists         Latest Ref Rng & Units 2/6/2017 2/9/2017 2/14/2017 2/23/2017 3/13/2017 4/11/2017 5/8/2017   Recent Dosing and Labs   INR 0.86 - 1.14 1.6  1.9  2.0  2.2  2.5  2.6  2.8       Details                 Most Recent 3 Troponin's:  Recent Labs   Lab Test 03/25/20  0101 01/28/17  2045 11/20/16  1921   TROPI <0.015 <0.015  The 99th percentile for upper reference range is 0.045 ug/L.  Troponin values in   the range of 0.045 - 0.120 ug/L may be associated with risks of adverse   clinical events.   <0.015  The 99th percentile for upper reference range is 0.045 ug/L.  Troponin values in   the range of 0.045 - 0.120 ug/L may be associated with risks of adverse   clinical events.       Most Recent Cholesterol Panel:  Recent Labs   Lab Test  10/11/23  1117   CHOL 199   *   HDL 49*   TRIG 196*     Most Recent 6 Bacteria Isolates From Any Culture (See EPIC Reports for Culture Details):  Recent Labs   Lab Test 04/11/18  0924 01/16/18  1618 01/09/17  1900 09/29/16  1258 09/28/16  0728 09/28/16  0712   CULT Culture negative for acid fast bacilli  Assayed at [facility] [address] [place] [address] [phone]  Culture negative after 29 days  No growth Moderate growth  Normal hair   Culture negative after 4 weeks  Moderate growth Normal hair Light growth Normal hair No growth No growth     Most Recent TSH, T4 and A1c Labs:  Recent Labs   Lab Test 10/11/23  1117 06/15/22  1557 06/25/21  1322   TSH  --   --  2.09   A1C 6.0*   < >  --     < > = values in this interval not displayed.     Results for orders placed or performed during the hospital encounter of 10/21/24   CT Chest Pulmonary Embolism w Contrast     Value    Radiologist flags Lung nodule    Narrative    EXAM: CT CHEST PULMONARY EMBOLISM W CONTRAST  LOCATION: [facility]  DATE: 10/21/2024    INDICATION: Syncope and cough; COVID positive.  COMPARISON: CT chest 5/1/2019.  TECHNIQUE: CT chest pulmonary angiogram during arterial phase injection of IV contrast. Multiplanar reformats and MIP reconstructions were performed. Dose reduction techniques were used.   CONTRAST: 68mL Isovue 370    FINDINGS: Patient was imaged with arm(s) at side, limiting study quality. Study is also degraded by motion.    ANGIOGRAM CHEST: No acute pulmonary embolism.    Thoracic aorta is normal in course and caliber. Moderate atheromatous disease.    LUNGS AND PLEURA: Mild diffuse bronchial wall thickening. Mild to moderate dependent atelectatic change. No focal airspace consolidation. Mild mosaic attenuation of the pulmonary parenchyma, suggesting superimposed small vessels or small airways disease.     There is new 6 mm nodule within the central right upper lobe, series 8  image 113. A 7 mm left upper lobe pulmonary nodule, series 8 image 74, is unchanged from 2019 and likely benign.    No pneumothorax.     No pleural effusion.     MEDIASTINUM/AXILLAE: Mildly enlarged and heterogeneous right lobe of the thyroid gland. No mediastinal/hilar adenopathy.     Thoracic esophagus is unremarkable.      No axillary lymphadenopathy.    Chest wall is unremarkable.    Heart is normal in size. Trace pericardial fluid.    CORONARY ARTERY CALCIFICATION: Mild.    UPPER ABDOMEN: No suspicious abnormality.    MUSCULOSKELETAL: No suspicious abnormality.    OTHER: No additionally suspicious abnormality.      Impression    IMPRESSION:  1.  No acute pulmonary embolism.  2.  New 6 mm right upper lobe pulmonary nodule. Follow-up is recommended according to Fleischner criteria, as detailed below.      2017 Fleischner Society Recommendations for Solid Pulmonary Nodules    Nodule size greater than 6 mm up to 8 mm:    Single nodule:    Low risk: CT at 6-12 months, then consider CT at 18-24 months  High risk: CT at 6-12 months, then CT at 18-24 months    Multiple nodules:    Low risk: CT at 3-6 months, then consider CT at 18-24 months  High risk: CT at 3-6 months, then CT at 18-24 months        [Recommend Follow Up: Lung nodule]    This report will be copied to the [facility] to ensure a provider acknowledges the finding.      Head CT w/o contrast    Narrative    EXAM: CT HEAD W/O CONTRAST  LOCATION: [facility]  DATE/TIME: 10/21/2024 4:36 PM CDT    INDICATION: Confusion  COMPARISON: None available at time dictation  TECHNIQUE: Routine CT Head without IV contrast. Multiplanar reformats. Dose reduction techniques were used.    FINDINGS:   INTRACRANIAL CONTENTS: No acute intracranial hemorrhage. Basal ganglia mineralization No CT evidence of acute infarct. Sequelae of mild to moderate chronic microangiopathy. Moderate cerebral volume loss without hydrocephalus. No extra-axial  fluid   collections.  Patent basal cisterns.     VISUALIZED ORBITS/SINUSES/MASTOIDS: Postoperative changes of bilateral lenses, otherwise the orbits are unremarkable. The visualized paranasal sinuses and temporal bone structures are well-aerated.     BONES/SOFT TISSUES: The calvarium and skull base are unremarkable.       Impression    IMPRESSION:     1. 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

## 2024-10-25 NOTE — PROGRESS NOTES
Orientation: Aox4, calm and cooperative    Pain management: No c/o pain    Vitals/Tele: VSS, on RA    IV Access/drains: IV removed    Diet: Regular diet    Mobility: Ax1-2 with GB + Walker    GI/: Cont.    Wound/Skin: scattered bruises and scabs    Discharge Plan: discharging to TCU by stretcher @ 3:10 pm      See Flow sheets for assessment

## 2024-10-25 NOTE — PROGRESS NOTES
September 25, 2023      Jhonny Ponce  [address] [address]          Dear Mr. Ponce:    Colton Charles MD has ordered a EGD for you and we would like to schedule that procedure. Our attempts to reach you by phone have been unsuccessful.    Please call Nay SHAFFER [phone] at your earliest convenience. Let the  know that your paperwork is started, and that you are calling to arrange a date and time for the procedure.      If you have any questions or concerns, we would be more than happy to discuss them with you. We look forward to assisting you with your health care needs.      Sincerely,  Nay SHAFFER [phone]  Surgery Scheduler for Colton Charles MD  [facility] [place] Pre-Admit Department     Care Management Discharge Note    Discharge Date: 10/25/2024       Discharge Disposition:  ([facility])    Discharge Services:      Discharge DME:      Discharge Transportation:  (BLS, stretcher due to COVID Precautions)    Private pay costs discussed: transportation costs    Does the patient's insurance plan have a 3 day qualifying hospital stay waiver?  No    PAS Confirmation Code: [id number]  Patient/family educated on Medicare website which has current facility and service quality ratings:      Education Provided on the Discharge Plan:    Persons Notified of Discharge Plans: S.O. Lima  Patient/Family in Agreement with the Plan: yes    Handoff Referral Completed: Yes, [facility] PCP: Internal handoff referral completed    Additional Information:  Significant Other Lima accepted a private room at [facility], no extra cost for the private room. We discussed transportation- writer explained the need to arrange stretcher due to patient's Covid Precaution status and notified her of the cost should medicare deny the claim.  Lima expressed understanding and approves the transport.  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  [facility] is scheduled to arrive between 1410 and 1450.  Bedside RN aware  Orders have been sent via [employer]  PAS faxed to admissions.  PCS electronically completed.    KEN WashingtonSW

## 2024-10-25 NOTE — PROGRESS NOTES
Care Management Follow Up    Length of Stay (days): 4    Expected Discharge Date: 10/25/2024     Concerns to be Addressed: adjustment to diagnosis/illness     Patient plan of care discussed at interdisciplinary rounds:     Anticipated Discharge Disposition: Transitional Care        Anticipated Discharge Services:    Anticipated Discharge DME:      Patient/family educated on Medicare website which has current facility and service quality ratings:    Education Provided on the Discharge Plan:    Patient/Family in Agreement with the Plan: yes    Referrals Placed by CM/SW: Transportation, Post Acute Facilities  Private pay costs discussed:     Discussed  Partnership in Safe Discharge Planning  document with patient/family:      Handoff Completed:     Additional Information:  Referrals were made to 4 facilities of the S.O. preference and of those which have private room for patient's COVID status.    Of the four, two referrals are still pending.  They are [facility] and [facility].  Writer sent an email to [facility] to ask if they have any vacancy and spoke with Elke at [facility] who will assess patient for a possible vacancy.      Next Steps: Follow up with the two pending referrals and make additional referrals if needed  Patient will require BLS transport due to her COVID status. Will make tentative arrangement for later today.    KEN WashingtonSW

## 2024-10-25 NOTE — PLAN OF CARE
OT: Order received, chart reviewed and discussed with care team. Per chart review and PT screen, pt will require TCU at discharge with PT initiating transfer and mobility inpatient. To avoid duplication of inpatient services, OT will defer at this time. Defer discharge recommendations to PT. Will complete orders.

## 2024-10-25 NOTE — PROGRESS NOTES
Care Management Follow Up    Length of Stay (days): 4    Expected Discharge Date: 10/25/2024     Concerns to be Addressed: adjustment to diagnosis/illness     Patient plan of care discussed at interdisciplinary rounds: Yes    Anticipated Discharge Disposition:  ([facility])              Anticipated Discharge Services:    Anticipated Discharge DME:      Patient/family educated on Medicare website which has current facility and service quality ratings:    Education Provided on the Discharge Plan:    Patient/Family in Agreement with the Plan: yes    Referrals Placed by CM/SW: Senior Linkage Line, Post Acute Facilities, Transportation  Private pay costs discussed: Not applicable    Discussed  Partnership in Safe Discharge Planning  document with patient/family: No     Handoff Completed: No, handoff not indicated or clinically appropriate    Additional Information:  PAS completed for anticipated discharge. Document sent to Floor  to print and put on chart.    PAS-RR    D: Per DHS regulation, SW completed and submitted PAS-RR to [place] [employer] Direct Connect via the Senior LinkAge Line.  PAS-RR confirmation # is : [id number]      P: Further questions may be directed to [facility] LinkAge Line at #[phone], option #4 for PAS-RR staff.     Next Steps: Discharge       MARTHA Rendon, LICSW  Social Work- Inpatient Care Coordination  [facility]

## 2024-10-25 NOTE — PLAN OF CARE
Cognitive Concerns/ Orientation: A/Ox4   BEHAVIOR & AGGRESSION TOOL COLOR: Green  ABNL VS/O2: VSS on RA   MOBILITY: A1 GBW.   PAIN MANAGMENT: Denies  DIET: Regular  BOWEL/BLADDER: Continent.   ABNL LAB/BG:   DRAIN/DEVICES: R PIV SL  SKIN: Scattered bruising, scabs. Sacral mepilex in place.  TESTS/PROCEDURES:   D/C DATE: Pending placement. PT rec TCU  OTHER IMPORTANT INFO: Day shift attempted to perform 6 min walk with patient but she was unable to keep her balance for more than few steps. Patient ambulated to bathroom and to chair without desatting on room air. May need to be performed again.

## 2024-10-27 DIAGNOSIS — R09.1 PLEURITIS: ICD-10-CM

## 2024-10-27 DIAGNOSIS — R05.9 COUGH: ICD-10-CM

## 2024-10-28 RX ORDER — ALBUTEROL SULFATE 90 UG/1
2 INHALANT RESPIRATORY (INHALATION) EVERY 4 HOURS PRN
Qty: 8.5 G | Refills: 0 | Status: ON HOLD | OUTPATIENT
Start: 2024-10-28

## 2024-10-28 NOTE — PLAN OF CARE
Physical Therapy Discharge Summary    Reason for therapy discharge:    Discharged to transitional care facility.    Progress towards therapy goal(s). See goals on Care Plan in [facility] electronic health record for goal details.  Goals partially met.  Barriers to achieving goals:   discharge from facility.    Therapy recommendation(s):    Continued therapy is recommended.  Rationale/Recommendations:  To improve IND w/ mobility as pt below baseline at this time.

## 2024-10-30 ENCOUNTER — TELEPHONE (OUTPATIENT)
Dept: FAMILY MEDICINE | Facility: CLINIC | Age: 79
End: 2024-10-30
Payer: MEDICARE

## 2024-10-30 NOTE — TELEPHONE ENCOUNTER
Agnes discharged TCU without any home care orders       Home Care is calling regarding an established patient with [facility].       Requesting orders from: José Miguel Wallace  Provider is following patient: Yes  Is this a 60-day recertification request?  No    Orders Requested    Skilled Nursing  Request for initial evaluation and treatment (one time)     Physical Therapy  Request for initial evaluation and treatment (one time)     Occupational Therapy  Request for initial evaluation and treatment (one time)     HHA (Home Health Aide)  Request for initial evaluation and treatment (one time)     Provider needs to Put home care orders in epic       Confirmed ok to leave a detailed message with call back.  Contact information confirmed and updated as needed.    Iris Islas RN

## 2024-11-03 DIAGNOSIS — K21.9 GASTROESOPHAGEAL REFLUX DISEASE WITHOUT ESOPHAGITIS: ICD-10-CM

## 2024-11-04 RX ORDER — PANTOPRAZOLE SODIUM 40 MG/1
40 TABLET, DELAYED RELEASE ORAL DAILY
Qty: 90 TABLET | Refills: 0 | Status: SHIPPED | OUTPATIENT
Start: 2024-11-04

## 2024-11-06 ENCOUNTER — APPOINTMENT (OUTPATIENT)
Dept: GENERAL RADIOLOGY | Facility: CLINIC | Age: 79
DRG: 519 | End: 2024-11-06
Attending: BEHAVIOR TECHNICIAN
Payer: MEDICARE

## 2024-11-06 ENCOUNTER — HOSPITAL ENCOUNTER (INPATIENT)
Facility: CLINIC | Age: 79
LOS: 7 days | Discharge: NURSING, REHAB OR LONG-TERM CARE | End: 2024-11-15
Attending: EMERGENCY MEDICINE | Admitting: STUDENT IN AN ORGANIZED HEALTH CARE EDUCATION/TRAINING PROGRAM
Payer: MEDICARE

## 2024-11-06 ENCOUNTER — NURSE TRIAGE (OUTPATIENT)
Dept: FAMILY MEDICINE | Facility: CLINIC | Age: 79
End: 2024-11-06
Payer: MEDICARE

## 2024-11-06 DIAGNOSIS — R11.0 NAUSEA: ICD-10-CM

## 2024-11-06 DIAGNOSIS — K59.03 DRUG-INDUCED CONSTIPATION: ICD-10-CM

## 2024-11-06 DIAGNOSIS — M51.9 LUMBAR DISC DISEASE: ICD-10-CM

## 2024-11-06 DIAGNOSIS — N95.1 POST MENOPAUSAL SYNDROME: ICD-10-CM

## 2024-11-06 DIAGNOSIS — J96.01 ACUTE RESPIRATORY FAILURE WITH HYPOXIA (H): ICD-10-CM

## 2024-11-06 DIAGNOSIS — R73.01 IFG (IMPAIRED FASTING GLUCOSE): ICD-10-CM

## 2024-11-06 DIAGNOSIS — M54.41 ACUTE RIGHT-SIDED LOW BACK PAIN WITH RIGHT-SIDED SCIATICA: ICD-10-CM

## 2024-11-06 DIAGNOSIS — E87.6 HYPOKALEMIA: ICD-10-CM

## 2024-11-06 DIAGNOSIS — E78.5 HYPERLIPIDEMIA LDL GOAL <130: ICD-10-CM

## 2024-11-06 DIAGNOSIS — M06.9 RHEUMATOID ARTHRITIS INVOLVING BOTH KNEES, UNSPECIFIED WHETHER RHEUMATOID FACTOR PRESENT (H): ICD-10-CM

## 2024-11-06 DIAGNOSIS — R52 PAIN: ICD-10-CM

## 2024-11-06 DIAGNOSIS — Z79.631 METHOTREXATE, LONG TERM, CURRENT USE: ICD-10-CM

## 2024-11-06 DIAGNOSIS — Z79.01 LONG TERM CURRENT USE OF ANTICOAGULANT THERAPY: ICD-10-CM

## 2024-11-06 DIAGNOSIS — I26.99 OTHER ACUTE PULMONARY EMBOLISM WITHOUT ACUTE COR PULMONALE (H): ICD-10-CM

## 2024-11-06 DIAGNOSIS — U07.1 COVID-19: ICD-10-CM

## 2024-11-06 DIAGNOSIS — E66.01 MORBID OBESITY (H): ICD-10-CM

## 2024-11-06 DIAGNOSIS — A08.4 VIRAL GASTROENTERITIS: ICD-10-CM

## 2024-11-06 DIAGNOSIS — R11.2 NON-INTRACTABLE VOMITING WITH NAUSEA: ICD-10-CM

## 2024-11-06 DIAGNOSIS — M62.81 GENERALIZED MUSCLE WEAKNESS: ICD-10-CM

## 2024-11-06 DIAGNOSIS — G89.29 ACUTE ON CHRONIC LOW BACK PAIN: ICD-10-CM

## 2024-11-06 DIAGNOSIS — M25.551 RIGHT HIP PAIN: ICD-10-CM

## 2024-11-06 DIAGNOSIS — H81.20 VESTIBULAR NEURONITIS, UNSPECIFIED LATERALITY: ICD-10-CM

## 2024-11-06 DIAGNOSIS — C80.1 PRIMARY MALIGNANT NEOPLASM (H): ICD-10-CM

## 2024-11-06 DIAGNOSIS — R91.8 PULMONARY NODULES: ICD-10-CM

## 2024-11-06 DIAGNOSIS — I10 PRIMARY HYPERTENSION: ICD-10-CM

## 2024-11-06 DIAGNOSIS — A04.72 C. DIFFICILE ENTERITIS: ICD-10-CM

## 2024-11-06 DIAGNOSIS — M54.41 ACUTE BILATERAL LOW BACK PAIN WITH RIGHT-SIDED SCIATICA: Primary | ICD-10-CM

## 2024-11-06 DIAGNOSIS — E86.0 MILD DEHYDRATION: ICD-10-CM

## 2024-11-06 DIAGNOSIS — C44.92 SQUAMOUS CELL CARCINOMA OF SKIN, UNSPECIFIED: ICD-10-CM

## 2024-11-06 DIAGNOSIS — F41.1 GAD (GENERALIZED ANXIETY DISORDER): ICD-10-CM

## 2024-11-06 DIAGNOSIS — J84.9 ILD (INTERSTITIAL LUNG DISEASE) (H): ICD-10-CM

## 2024-11-06 DIAGNOSIS — Z79.52 ON PREDNISONE THERAPY: ICD-10-CM

## 2024-11-06 DIAGNOSIS — Z98.890 STATUS POST LUMBAR SURGERY: ICD-10-CM

## 2024-11-06 DIAGNOSIS — G47.00 INSOMNIA, UNSPECIFIED TYPE: ICD-10-CM

## 2024-11-06 DIAGNOSIS — H81.10 BENIGN PAROXYSMAL POSITIONAL VERTIGO, UNSPECIFIED LATERALITY: ICD-10-CM

## 2024-11-06 DIAGNOSIS — M54.50 ACUTE ON CHRONIC LOW BACK PAIN: ICD-10-CM

## 2024-11-06 LAB
ALBUMIN UR-MCNC: NEGATIVE MG/DL
APPEARANCE UR: CLEAR
BILIRUB UR QL STRIP: NEGATIVE
COLOR UR AUTO: ABNORMAL
GLUCOSE BLDC GLUCOMTR-MCNC: 136 MG/DL (ref 70–99)
GLUCOSE UR STRIP-MCNC: NEGATIVE MG/DL
HGB UR QL STRIP: ABNORMAL
KETONES UR STRIP-MCNC: ABNORMAL MG/DL
LEUKOCYTE ESTERASE UR QL STRIP: NEGATIVE
NITRATE UR QL: NEGATIVE
PH UR STRIP: 8 [PH] (ref 5–7)
RBC URINE: 1 /HPF
SP GR UR STRIP: 1.01 (ref 1–1.03)
SQUAMOUS EPITHELIAL: <1 /HPF
UROBILINOGEN UR STRIP-MCNC: NORMAL MG/DL
WBC URINE: 1 /HPF

## 2024-11-06 PROCEDURE — 73502 X-RAY EXAM HIP UNI 2-3 VIEWS: CPT

## 2024-11-06 PROCEDURE — 250N000013 HC RX MED GY IP 250 OP 250 PS 637: Performed by: BEHAVIOR TECHNICIAN

## 2024-11-06 PROCEDURE — 99285 EMERGENCY DEPT VISIT HI MDM: CPT | Mod: 25

## 2024-11-06 PROCEDURE — 82962 GLUCOSE BLOOD TEST: CPT

## 2024-11-06 PROCEDURE — 258N000003 HC RX IP 258 OP 636: Performed by: INTERNAL MEDICINE

## 2024-11-06 PROCEDURE — G0378 HOSPITAL OBSERVATION PER HR: HCPCS

## 2024-11-06 PROCEDURE — 250N000013 HC RX MED GY IP 250 OP 250 PS 637: Performed by: EMERGENCY MEDICINE

## 2024-11-06 PROCEDURE — 72100 X-RAY EXAM L-S SPINE 2/3 VWS: CPT

## 2024-11-06 PROCEDURE — 96361 HYDRATE IV INFUSION ADD-ON: CPT

## 2024-11-06 PROCEDURE — 96360 HYDRATION IV INFUSION INIT: CPT

## 2024-11-06 PROCEDURE — 250N000012 HC RX MED GY IP 250 OP 636 PS 637: Performed by: INTERNAL MEDICINE

## 2024-11-06 PROCEDURE — 250N000013 HC RX MED GY IP 250 OP 250 PS 637: Performed by: INTERNAL MEDICINE

## 2024-11-06 PROCEDURE — 99223 1ST HOSP IP/OBS HIGH 75: CPT | Mod: AI | Performed by: INTERNAL MEDICINE

## 2024-11-06 PROCEDURE — 81001 URINALYSIS AUTO W/SCOPE: CPT | Performed by: BEHAVIOR TECHNICIAN

## 2024-11-06 RX ORDER — METHOTREXATE 2.5 MG/1
10 TABLET ORAL
Status: DISCONTINUED | OUTPATIENT
Start: 2024-11-12 | End: 2024-11-15 | Stop reason: HOSPADM

## 2024-11-06 RX ORDER — ONDANSETRON 2 MG/ML
4 INJECTION INTRAMUSCULAR; INTRAVENOUS EVERY 6 HOURS PRN
Status: DISCONTINUED | OUTPATIENT
Start: 2024-11-06 | End: 2024-11-15 | Stop reason: HOSPADM

## 2024-11-06 RX ORDER — CLORAZEPATE DIPOTASSIUM 3.75 MG/1
15 TABLET ORAL AT BEDTIME
Status: DISCONTINUED | OUTPATIENT
Start: 2024-11-06 | End: 2024-11-06

## 2024-11-06 RX ORDER — CLORAZEPATE DIPOTASSIUM 3.75 MG/1
15 TABLET ORAL EVERY MORNING
Status: DISCONTINUED | OUTPATIENT
Start: 2024-11-07 | End: 2024-11-15 | Stop reason: HOSPADM

## 2024-11-06 RX ORDER — CEVIMELINE HYDROCHLORIDE 30 MG/1
30 CAPSULE ORAL 3 TIMES DAILY
Status: DISCONTINUED | OUTPATIENT
Start: 2024-11-06 | End: 2024-11-15 | Stop reason: HOSPADM

## 2024-11-06 RX ORDER — HYDRALAZINE HYDROCHLORIDE 10 MG/1
10 TABLET, FILM COATED ORAL EVERY 4 HOURS PRN
Status: DISCONTINUED | OUTPATIENT
Start: 2024-11-06 | End: 2024-11-15 | Stop reason: HOSPADM

## 2024-11-06 RX ORDER — DIAZEPAM 5 MG/1
10 TABLET ORAL
Status: COMPLETED | OUTPATIENT
Start: 2024-11-06 | End: 2024-11-06

## 2024-11-06 RX ORDER — NALOXONE HYDROCHLORIDE 0.4 MG/ML
0.2 INJECTION, SOLUTION INTRAMUSCULAR; INTRAVENOUS; SUBCUTANEOUS
Status: DISCONTINUED | OUTPATIENT
Start: 2024-11-06 | End: 2024-11-15 | Stop reason: HOSPADM

## 2024-11-06 RX ORDER — SODIUM CHLORIDE 9 MG/ML
INJECTION, SOLUTION INTRAVENOUS CONTINUOUS
Status: ACTIVE | OUTPATIENT
Start: 2024-11-06 | End: 2024-11-07

## 2024-11-06 RX ORDER — DEXTROSE MONOHYDRATE 25 G/50ML
25-50 INJECTION, SOLUTION INTRAVENOUS
Status: DISCONTINUED | OUTPATIENT
Start: 2024-11-06 | End: 2024-11-15 | Stop reason: HOSPADM

## 2024-11-06 RX ORDER — IBUPROFEN 400 MG/1
800 TABLET, FILM COATED ORAL 2 TIMES DAILY WITH MEALS
Status: DISCONTINUED | OUTPATIENT
Start: 2024-11-07 | End: 2024-11-08

## 2024-11-06 RX ORDER — LIDOCAINE 4 G/G
1 PATCH TOPICAL ONCE
Status: COMPLETED | OUTPATIENT
Start: 2024-11-06 | End: 2024-11-07

## 2024-11-06 RX ORDER — NALOXONE HYDROCHLORIDE 0.4 MG/ML
0.4 INJECTION, SOLUTION INTRAMUSCULAR; INTRAVENOUS; SUBCUTANEOUS
Status: DISCONTINUED | OUTPATIENT
Start: 2024-11-06 | End: 2024-11-15 | Stop reason: HOSPADM

## 2024-11-06 RX ORDER — PANTOPRAZOLE SODIUM 40 MG/1
40 TABLET, DELAYED RELEASE ORAL DAILY
Status: DISCONTINUED | OUTPATIENT
Start: 2024-11-07 | End: 2024-11-15 | Stop reason: HOSPADM

## 2024-11-06 RX ORDER — HYDROCODONE BITARTRATE AND ACETAMINOPHEN 5; 325 MG/1; MG/1
2 TABLET ORAL ONCE
Status: COMPLETED | OUTPATIENT
Start: 2024-11-06 | End: 2024-11-06

## 2024-11-06 RX ORDER — SIMVASTATIN 40 MG
40 TABLET ORAL AT BEDTIME
Status: DISCONTINUED | OUTPATIENT
Start: 2024-11-06 | End: 2024-11-15 | Stop reason: HOSPADM

## 2024-11-06 RX ORDER — PREDNISONE 5 MG/1
5 TABLET ORAL DAILY
Status: DISCONTINUED | OUTPATIENT
Start: 2024-11-07 | End: 2024-11-15 | Stop reason: HOSPADM

## 2024-11-06 RX ORDER — CLORAZEPATE DIPOTASSIUM 15 MG/1
15 TABLET ORAL EVERY MORNING
COMMUNITY

## 2024-11-06 RX ORDER — METHOCARBAMOL 500 MG/1
500 TABLET, FILM COATED ORAL ONCE
Status: COMPLETED | OUTPATIENT
Start: 2024-11-06 | End: 2024-11-06

## 2024-11-06 RX ORDER — FOLIC ACID 1 MG/1
1 TABLET ORAL
Status: DISCONTINUED | OUTPATIENT
Start: 2024-11-07 | End: 2024-11-15 | Stop reason: HOSPADM

## 2024-11-06 RX ORDER — LIDOCAINE 4 G/G
1 PATCH TOPICAL EVERY 24 HOURS
Qty: 30 PATCH | Refills: 0 | Status: SHIPPED | OUTPATIENT
Start: 2024-11-06

## 2024-11-06 RX ORDER — GABAPENTIN 100 MG/1
100 CAPSULE ORAL AT BEDTIME
Status: DISCONTINUED | OUTPATIENT
Start: 2024-11-06 | End: 2024-11-15 | Stop reason: HOSPADM

## 2024-11-06 RX ORDER — NICOTINE POLACRILEX 4 MG
15-30 LOZENGE BUCCAL
Status: DISCONTINUED | OUTPATIENT
Start: 2024-11-06 | End: 2024-11-15 | Stop reason: HOSPADM

## 2024-11-06 RX ORDER — AMOXICILLIN 250 MG
2 CAPSULE ORAL 2 TIMES DAILY
Status: DISCONTINUED | OUTPATIENT
Start: 2024-11-06 | End: 2024-11-12

## 2024-11-06 RX ORDER — ASPIRIN 81 MG/1
81 TABLET ORAL AT BEDTIME
Status: DISCONTINUED | OUTPATIENT
Start: 2024-11-06 | End: 2024-11-14

## 2024-11-06 RX ORDER — ACETAMINOPHEN 500 MG
1000 TABLET ORAL 3 TIMES DAILY
Status: DISCONTINUED | OUTPATIENT
Start: 2024-11-06 | End: 2024-11-08

## 2024-11-06 RX ORDER — AMOXICILLIN 250 MG
2 CAPSULE ORAL 2 TIMES DAILY PRN
Status: DISCONTINUED | OUTPATIENT
Start: 2024-11-06 | End: 2024-11-15 | Stop reason: HOSPADM

## 2024-11-06 RX ORDER — PREDNISONE 20 MG/1
60 TABLET ORAL DAILY
Status: DISCONTINUED | OUTPATIENT
Start: 2024-11-06 | End: 2024-11-08

## 2024-11-06 RX ORDER — GABAPENTIN 100 MG/1
100 CAPSULE ORAL DAILY
Status: DISCONTINUED | OUTPATIENT
Start: 2024-11-06 | End: 2024-11-06

## 2024-11-06 RX ORDER — ALBUTEROL SULFATE 90 UG/1
2 INHALANT RESPIRATORY (INHALATION) EVERY 4 HOURS PRN
Status: DISCONTINUED | OUTPATIENT
Start: 2024-11-06 | End: 2024-11-15 | Stop reason: HOSPADM

## 2024-11-06 RX ORDER — AMOXICILLIN 250 MG
1 CAPSULE ORAL 2 TIMES DAILY PRN
Status: DISCONTINUED | OUTPATIENT
Start: 2024-11-06 | End: 2024-11-15 | Stop reason: HOSPADM

## 2024-11-06 RX ORDER — POTASSIUM CHLORIDE 750 MG/1
20 TABLET, EXTENDED RELEASE ORAL DAILY
Status: DISCONTINUED | OUTPATIENT
Start: 2024-11-07 | End: 2024-11-15 | Stop reason: HOSPADM

## 2024-11-06 RX ORDER — HYDRALAZINE HYDROCHLORIDE 20 MG/ML
10 INJECTION INTRAMUSCULAR; INTRAVENOUS EVERY 4 HOURS PRN
Status: DISCONTINUED | OUTPATIENT
Start: 2024-11-06 | End: 2024-11-15 | Stop reason: HOSPADM

## 2024-11-06 RX ORDER — HYDROCHLOROTHIAZIDE 25 MG/1
25 TABLET ORAL DAILY
Status: DISCONTINUED | OUTPATIENT
Start: 2024-11-07 | End: 2024-11-15 | Stop reason: HOSPADM

## 2024-11-06 RX ORDER — AMOXICILLIN 250 MG
1 CAPSULE ORAL 2 TIMES DAILY
Status: DISCONTINUED | OUTPATIENT
Start: 2024-11-06 | End: 2024-11-12

## 2024-11-06 RX ORDER — ONDANSETRON 4 MG/1
4 TABLET, ORALLY DISINTEGRATING ORAL EVERY 6 HOURS PRN
Status: DISCONTINUED | OUTPATIENT
Start: 2024-11-06 | End: 2024-11-15 | Stop reason: HOSPADM

## 2024-11-06 RX ORDER — METHOCARBAMOL 500 MG/1
500 TABLET, FILM COATED ORAL 4 TIMES DAILY PRN
Qty: 30 TABLET | Refills: 0 | Status: SHIPPED | OUTPATIENT
Start: 2024-11-06

## 2024-11-06 RX ORDER — HYDROMORPHONE HYDROCHLORIDE 2 MG/1
2 TABLET ORAL EVERY 4 HOURS PRN
Status: DISCONTINUED | OUTPATIENT
Start: 2024-11-06 | End: 2024-11-08

## 2024-11-06 RX ADMIN — PREDNISONE 60 MG: 20 TABLET ORAL at 20:55

## 2024-11-06 RX ADMIN — ACETAMINOPHEN 1000 MG: 500 TABLET, FILM COATED ORAL at 20:49

## 2024-11-06 RX ADMIN — LIDOCAINE 1 PATCH: 4 PATCH TOPICAL at 14:58

## 2024-11-06 RX ADMIN — TRAZODONE HYDROCHLORIDE 25 MG: 50 TABLET ORAL at 21:02

## 2024-11-06 RX ADMIN — SENNOSIDES AND DOCUSATE SODIUM 1 TABLET: 50; 8.6 TABLET ORAL at 20:50

## 2024-11-06 RX ADMIN — SODIUM CHLORIDE: 9 INJECTION, SOLUTION INTRAVENOUS at 20:47

## 2024-11-06 RX ADMIN — HYDROCODONE BITARTRATE AND ACETAMINOPHEN 2 TABLET: 5; 325 TABLET ORAL at 13:35

## 2024-11-06 RX ADMIN — HYDROMORPHONE HYDROCHLORIDE 1 MG: 2 TABLET ORAL at 20:50

## 2024-11-06 RX ADMIN — METHOCARBAMOL 500 MG: 500 TABLET ORAL at 14:56

## 2024-11-06 RX ADMIN — GABAPENTIN 100 MG: 100 CAPSULE ORAL at 20:52

## 2024-11-06 ASSESSMENT — ACTIVITIES OF DAILY LIVING (ADL)
ADLS_ACUITY_SCORE: 0

## 2024-11-06 NOTE — PHARMACY-ADMISSION MEDICATION HISTORY
Pharmacist Admission Medication History    Admission medication history is complete. The information provided in this note is only as accurate as the sources available at the time of the update.    Information Source(s): Patient and Friend/Roommate Lima  via in-person and phone    Pertinent Information:   - Patient took her morning medications today but didn't take her noon/evening meds. Called her friend Lima to confirm which meds are in the morning as patient was unsure of timing  - Patient confirmed no changes since discharge    Changes made to PTA medication list:  Added: None  Deleted: None  Changed: Clorazepate --> changed back to 15mg tablets, patient never picked up 7.5mg tabs    Allergies reviewed with patient and updates made in EHR:  no - abbreviated review since patient was just discharged     Medication History Completed By: Mare Meyer [facility] 11/6/2024 5:43 PM    PTA Med List   Medication Sig Last Dose/Taking    acetaminophen (TYLENOL) 500 MG tablet Take 1,000 mg by mouth at bedtime. Plus additional PRN 11/5/2024 Bedtime    albuterol (PROAIR HFA/PROVENTIL HFA/VENTOLIN HFA) 108 (90 Base) MCG/ACT inhaler Inhale 2 puffs into the lungs every 4 hours as needed for shortness of breath / dyspnea or wheezing Taking As Needed    aspirin 81 MG EC tablet Take 81 mg by mouth At Bedtime  11/5/2024 Bedtime    cevimeline (EVOXAC) 30 MG capsule Take 30 mg by mouth 3 times daily (AM, Noon, Dinner) 11/6/2024 Morning    clorazepate dipotassium (TRANXENE) 15 MG tablet Take 15 mg by mouth every morning. 11/6/2024 Morning    folic acid (FOLVITE) 1 MG tablet Take 1 mg by mouth six times a week Every day except Tuesdays when methotrexate is due 11/6/2024 Morning    gabapentin (NEURONTIN) 100 MG capsule Take 100 mg by mouth at bedtime. ~1 hour before trazodone 11/5/2024 Bedtime    gabapentin (NEURONTIN) 400 MG capsule Take 400 mg by mouth 3 times daily (AM, Noon, Dinner) 11/6/2024 Morning    hydrochlorothiazide (HYDRODIURIL)  25 MG tablet Take 1 tablet (25 mg) by mouth daily 11/6/2024 Morning    ibuprofen (ADVIL/MOTRIN) 400 MG tablet Take 800 mg by mouth 2 times daily (with meals). Noon and dinner 11/5/2024 Evening    methotrexate 2.5 MG tablet Take 10 mg by mouth every 7 days (Takes on Tuesdays) 11/5/2024 Morning    pantoprazole (PROTONIX) 40 MG EC tablet Take 1 tablet (40 mg) by mouth daily 11/6/2024 Morning    polyethylene glycol-propylene glycol (SYSTANE ULTRA) 0.4-0.3 % SOLN ophthalmic solution Place 1 drop into both eyes every hour as needed for dry eyes Taking As Needed    potassium chloride ER (K-TAB/KLOR-CON) 10 MEQ CR tablet Take 2 tablets (20 mEq) by mouth daily. 11/6/2024 Morning    predniSONE (DELTASONE) 5 MG tablet Take 1 tablet (5 mg) by mouth daily. (Hold until Decadron is done then restart) 11/6/2024 Morning    simvastatin (ZOCOR) 40 MG tablet Take 1 tablet (40 mg) by mouth at bedtime 11/5/2024 Bedtime    traZODone (DESYREL) 50 MG tablet TAKE 0.5 (ONE-HALF) TABLET BY MOUTH ONCE DAILY AT BEDTIME 11/5/2024 Bedtime

## 2024-11-06 NOTE — H&P
"[facility]    History and Physical - Hospitalist Service       Date of Admission:  11/6/2024    Assessment & Plan      Agnes Saravia is a 79 year old female admitted on 11/6/2024. She has h/o RA (methotrexate&prednisone), Sjogren's, ILD, HTN,     She presents with acute R sciatica, unable to care for self at home.      Recently hospitalized 10-21 - 10/25 for Covid Infection associated with encephalopathy.    Treated with Remdesiver and Dexamethasone.  She then went to TCU 10/25 - 10/30.       Since she has been home, she has gradually developed low back pain with activity, akin to a right sided sciatica, escalating.   On 11/6/24 compelled to present to ED because unable to get out of chair    In ED, xray R hip and lumbar negative for fracture, UA negative.  Given robaxin, norco, lidocaine patch, no improvement.            R sciatica  Mechanical fall (10/21/24)   - patient reports mechanical fall due to weakness the day she presented for COVID (10/21/24, \"I collapsed\")  - Lumbar MRI  - pain therapy with heat/cold/tylenol/hydrocodone (oxycodone allergy)   - Steroid therapy: Prednisone 60 mg daily (on top of her baseline 5 mg daily therapy)   - SW/PT/OT      History of \"disc surgery\"   - per patient verbal report.   > 20 years ago. No issues since    - noted.       Rheumatoid Arthritis   On prednisone therapy   On Methotrexate therapy   - continue daily prednisone (5mg) and weekly methotrexate (10 mg)  - no new meds for several years, generally controlled.     Anxiety     Patient has taken clorazepate nightly for many years, has symptoms if does not get this      Recent Covid Infection   - endorsed complete improvement of respiratory symptoms  - dexamethosone therapy and her initial poor mobility due to Covid, may have masked sciatica       Elevated Glucose   - Nonfasting Glucose about 130  - 10/2023 HgbA1C 6.0 -- Recheck Hgb A1C  - ISS for now, low intensity.  Re evaluated (while on " "prednisone)          Diet:  regular  DVT Prophylaxis: Pneumatic Compression Devices  Andujar Catheter: Not present  Lines: None     Cardiac Monitoring: None  Code Status:  full    Clinically Significant Risk Factors Present on Admission                 # Drug Induced Platelet Defect: home medication list includes an antiplatelet medication   # Hypertension: Noted on problem list           # Obesity: Estimated body mass index is 32.77 kg/m  as calculated from the following:    Height as of this encounter: 1.6 m (5' 3\").    Weight as of this encounter: 83.9 kg (185 lb).              Disposition Plan     Medically Ready for Discharge: Anticipated Tomorrow if pain improves and no pathology on MRI.   May need TCU, patient does not want to go back to [facility].            Mara Matos MD  Hospitalist Service  [facility]  Securely message with [employer] (more info)  Text page via [employer] Paging/Directory     ______________________________________________________________________    Chief Complaint   Weakness and low back pain, unable to care for self.     History is obtained from the patient and her supportive partner/caregiver (Lima)    History of Present Illness   Agnes Saravia is a 79 year old female who  had been convalescing from COVID infection (hospital 10/21  -->  TCU 10/25   -->   home since 10/30) and noted more and more pain in her low back, and then radiating to her R leg, below the knee.  The symptoms worsened daily and she was unable to mobilized today so presented to ER.  No saddle numbness.  No dysuria.    No recent falls but she did have a fall on 10/21 - the day she presented with COVID.   At that time she was walking to BR using walker but became so overwhelmed with weakness she fell onto floor. .  Did not note pain at the time, nor after.   However, she had been quite immobile during the recent COVID illness and TCU convalescence.   She has been noting more and " more pain//weakness as she has been working on regaining her mobility....         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    Depressive disorder     Hyperlipidemia     Insomnia     PONV (postoperative nausea and vomiting)     Rheumatoid arthritis(714.0)     Sjogren's syndrome (H)        Past Surgical History   Past Surgical History:   Procedure Laterality Date    ABDOMEN SURGERY      APPENDECTOMY      BIOPSY  2014    Lip lower    BLEPHAROPLASTY      BRONCHOSCOPY (RIGID OR FLEXIBLE), DIAGNOSTIC N/A 04/11/2018    Procedure: COMBINED BRONCHOSCOPY (RIGID OR FLEXIBLE), LAVAGE;  Bronchoscopy with Lavage;  Surgeon: Manuel Conway MD;  Location:  GI    COLONOSCOPY      DISCECTOMY LUMBAR POSTERIOR MICROSCOPIC ONE LEVEL  08/14/2014    Procedure: DISCECTOMY LUMBAR POSTERIOR MICROSCOPIC ONE LEVEL;  Surgeon: Dudley Bernal MD;  Location:  OR    ENT SURGERY  5/11/2017    HYSTERECTOMY TOTAL ABDOMINAL, BILATERAL SALPINGO-OOPHORECTOMY, COMBINED      HYSTERECTOMY, PAP NO LONGER INDICATED      MAMMOPLASTY REDUCTION BILATERAL  05/14/2013    Procedure: MAMMOPLASTY REDUCTION BILATERAL;  BILATERAL REDUCTION MAMMOPLASTY;  Surgeon: Bruce Nash MD;  Location: [facility]    SHOULDER SURGERY         Prior to Admission Medications   Prior to Admission Medications   Prescriptions Last Dose Informant Patient Reported? Taking?   acetaminophen (TYLENOL) 500 MG tablet  Daughter Yes No   Sig: Take 1,000 mg by mouth at bedtime. Plus additional PRN   albuterol (PROAIR HFA/PROVENTIL HFA/VENTOLIN HFA) 108 (90 Base) MCG/ACT inhaler   No No   Sig: Inhale 2 puffs into the lungs every 4 hours as needed for shortness of breath / dyspnea or wheezing   aspirin 81 MG EC tablet  Daughter Yes No   Sig: Take 81 mg by mouth At Bedtime    cevimeline (EVOXAC) 30 MG capsule  Daughter Yes No   Sig: Take 30 mg by mouth 3 times daily (AM, Noon, Dinner)   clorazepate  (TRANXENE) 7.5 MG tablet   No No   Sig: Take 1-2 tablets (7.5-15 mg) by mouth at bedtime.   dexAMETHasone (DECADRON) 6 MG tablet   No No   Sig: Take 1 tablet (6 mg) by mouth daily for 5 days.   folic acid (FOLVITE) 1 MG tablet  Daughter Yes No   Sig: Take 1 mg by mouth six times a week Every day except Tuesdays when methotrexate is due   gabapentin (NEURONTIN) 100 MG capsule  Daughter Yes No   Sig: Take 100 mg by mouth at bedtime. ~1 hour before trazodone   gabapentin (NEURONTIN) 400 MG capsule  Daughter Yes No   Sig: Take 400 mg by mouth 3 times daily (AM, Noon, Dinner)   hydrochlorothiazide (HYDRODIURIL) 25 MG tablet   No No   Sig: Take 1 tablet (25 mg) by mouth daily   ibuprofen (ADVIL/MOTRIN) 400 MG tablet  Daughter Yes No   Sig: Take 800 mg by mouth 2 times daily (with meals). Noon and dinner   methotrexate 2.5 MG tablet  Daughter Yes No   Sig: Take 10 mg by mouth every 7 days (Takes on Tuesdays)   pantoprazole (PROTONIX) 40 MG EC tablet   No No   Sig: Take 1 tablet (40 mg) by mouth daily   polyethylene glycol-propylene glycol (SYSTANE ULTRA) 0.4-0.3 % SOLN ophthalmic solution  Daughter Yes No   Sig: Place 1 drop into both eyes every hour as needed for dry eyes   potassium chloride ER (K-TAB/KLOR-CON) 10 MEQ CR tablet   No No   Sig: Take 2 tablets (20 mEq) by mouth daily.   predniSONE (DELTASONE) 5 MG tablet   No No   Sig: Take 1 tablet (5 mg) by mouth daily. (Hold until Decadron is done then restart)   simvastatin (ZOCOR) 40 MG tablet   No No   Sig: Take 1 tablet (40 mg) by mouth at bedtime   traZODone (DESYREL) 50 MG tablet   No No   Sig: TAKE 0.5 (ONE-HALF) TABLET BY MOUTH ONCE DAILY AT BEDTIME      Facility-Administered Medications: None        Review of Systems    The 10 point Review of Systems is negative other than noted in the HPI or here.      Social History   I have reviewed this patient's social history and updated it with pertinent information if needed.  Social History     Tobacco Use    Smoking  status: Former     Current packs/day: 0.00     Average packs/day: 0.5 packs/day for 10.0 years (5.0 ttl pk-yrs)     Types: Cigarettes     Start date: 1970     Quit date: 1975     Years since quittin.4    Smokeless tobacco: Never    Tobacco comments:     N/A   Vaping Use    Vaping status: Never Used   Substance Use Topics    Alcohol use: Yes     Alcohol/week: 0.0 standard drinks of alcohol     Comment: beer in summer when mowing lawn     Drug use: No        Physical Exam   Vital Signs: Temp: 97.1  F (36.2  C) Temp src: Oral BP: 135/72 Pulse: 72   Resp: 20 SpO2: 94 % O2 Device: None (Room air)    Weight: 185 lbs 0 oz    Seen with supportive partner (Lima) at bedside.     Constitutional: awake, alert, cooperative, no apparent distress - uncomfortable at times, and appears stated age  Eyes: Lids and lashes normal, pupils equal, round and reactive to light, extra ocular muscles intact, sclera clear, conjunctiva normal  Respiratory: No increased work of breathing, good air exchange, clear to auscultation bilaterally, no crackles or wheezing  Cardiovascular: Normal apical impulse, regular rate and rhythm, normal S1 and S2, no S3 or S4, and no murmur noted  GI: No scars, normal bowel sounds, soft, non-distended, non-tender, no masses palpated, no hepatosplenomegally  Musculoskeletal: no lower extremity pitting edema present.    Neurologic:    mild pain with straight leg raise on R, minimal on L.   She has pain with active leg raise, radiating to her foot, only able to raise the right leg for a few seconds .   Both legs are antigravity (weak).    Subjective parasthesias on R foot.   Neuropsychiatric: General: normal, calm, and normal eye contact  Affect: normal and pleasant  Memory and insight: normal, memory for past and recent events intact, and thought process normal    Medical Decision Making       75  MINUTES SPENT BY ME on the date of service doing chart review, history, exam, documentation & further  activities per the note.      Data         Imaging results reviewed over the past 24 hrs:   Recent Results (from the past 24 hours)   Lumbar spine XR, 2-3 views    Narrative    XR LUMBAR SPINE 2/3 VIEWS  11/6/2024 2:25 PM     HISTORY: low back pain    COMPARISON: Lumbar spine MRI from 2/12/2016..       Impression    IMPRESSION: Mild retrolisthesis at L5-S1 No loss of vertebral body  height. Multilevel disc height loss, osteophyte formation and facet  arthropathy. Vacuum disc phenomenon at L2-3. Aortic calcified  atherosclerotic plaques.    SUSU FLOOD MD         SYSTEM ID:  [id number]   XR Pelvis w Hip Right 1 View    Narrative    XR PELVIS AND HIP RIGHT 1 VIEW 11/6/2024 2:27 PM     HISTORY: right hip pain    COMPARISON: None.       Impression    IMPRESSION:  No fracture or malalignment. Mild joint space narrowing in both hips.     VIRGILIO KRAFT MD         SYSTEM ID:  [id number]

## 2024-11-06 NOTE — ED NOTES
4:12 PM - Patient unable to ambulate due to ongoing pain despite ED treatment. Patient will be admitted for pain control, PT/OT. Case discussed with hospitalist Dr. Matos who accepts the patient for admission.    Disposition:  Admitted to observation    Clinical Impression:    ICD-10-CM    1. Acute on chronic low back pain  M54.50     G89.29       2. Right hip pain  M25.551              Jamarcus Vigil MD  11/06/24 8473

## 2024-11-06 NOTE — ED TRIAGE NOTES
Agnes Saravia is a 79 year old female with a history significant for fibromyalia who presents via EMS for evaluation of Back gigi  .    Per EMS:  Pt presents with lower back pain which radiates down her back for the last 3 days. Pt notes feet became numb today which has not resolved. No falls. Prior to this pt was at TCU for recovery from Covid.     Access: 20 G RAC  Medications: 100 fentanyl with mild relief, 4 zofran  Blood Glucose: 148  Vitals:        BP: 125/78        VSS    Assessment:  Neuro: A&Ox4

## 2024-11-06 NOTE — TELEPHONE ENCOUNTER
Asking for pain medication related to pt's sciatica being worse after being in the hospital and TCU. Hoping that this can help keep pt moving.     Asking for hydrocodone, if appropriate.     Please call back [phone] ok for detailed VM    Nga Vigil RN

## 2024-11-06 NOTE — ED PROVIDER NOTES
ED APC SUPERVISION NOTE:   I evaluated this patient in conjunction with John Noland PA-C  I have participated in the care of the patient and personally performed key elements of the history, exam, and medical decision making.      HPI:   Agnes Saravia is a 79 year old female with a history of fibromyalgia, lumbar disc disease, and back pain who presents for evaluation of back pain. The patient reports that she has had low back pain for three days radiating to her right hip that was much worse than usual today. States that the pain was so severe that she had trouble ambulating. Adds that her feet became numb today.     Independent Historian:   None    Review of External Notes: ***      EXAM:   ***    Independent Interpretation (X-rays, CTs, rhythm strip):  None    Consultations/Discussion of Management or Tests:  None     MEDICAL DECISION MAKING/ASSESSMENT AND PLAN:   ***      DIAGNOSIS:     ICD-10-CM    1. Acute on chronic low back pain  M54.50     G89.29       2. Right hip pain  M25.551             Scribe Disclosure:  I, Domonique Sol, am serving as a scribe at 1:41 PM on 11/6/2024 to document services personally performed by José Miguel Blanco MD based on my observations and the provider's statements to me.     11/6/2024  [facility] EMERGENCY DEPT

## 2024-11-06 NOTE — ED NOTES
[facility]  ED Nurse Handoff Report    ED Chief complaint: Back Pain      ED Diagnosis:   Final diagnoses:   Acute on chronic low back pain   Right hip pain       Code Status: Per provider order    Allergies:   Allergies   Allergen Reactions    Mellaril [Thioridazine Hcl] Anaphylaxis    Percocet [Oxycodone-Acetaminophen] Anaphylaxis and Swelling     Tolerates hydrocodone and codeine in cough medicine. tolerates    Duloxetine      Made her feel weird, increased anxiety    No Clinical Screening - See Comments      JUAN-FOR ANXIETY       Patient Story: Agnes Saravia is a 79 year old female with a history significant for fibromyalgia. Pt presents with lower back pain which radiates to bilateral legs. Pt notes feet went numb today and this has not resolved. Pt denies bowl or bladder dysfunction. Pt has no recent falls. Of note patient recently in TCU for recovery for covid.    Focused Assessment:   Constitutional: Pleasant female  Respiratory: Regular rate and depth. No shortness of breath noted on exam   Cardiac: NSR   Neurologic: A&Ox. Numbness noted to bilateral lower extremities. No bowel or bladder disturbance.  MSK: Pain to thoracic and cervical spine. Non tender to palpation.     Treatments and/or interventions provided:   Labs Ordered and Resulted from Time of ED Arrival to Time of ED Departure   ROUTINE UA WITH MICROSCOPIC REFLEX TO CULTURE - Abnormal       Result Value    Color Urine Straw      Appearance Urine Clear      Glucose Urine Negative      Bilirubin Urine Negative      Ketones Urine Trace (*)     Specific Gravity Urine 1.011      Blood Urine Trace (*)     pH Urine 8.0 (*)     Protein Albumin Urine Negative      Urobilinogen Urine Normal      Nitrite Urine Negative      Leukocyte Esterase Urine Negative      RBC Urine 1      WBC Urine 1      Squamous Epithelials Urine <1         XR Pelvis w Hip Right 1 View   Final Result   IMPRESSION:   No fracture or malalignment. Mild joint  "space narrowing in both hips.       VIRGILIO KRAFT MD            SYSTEM ID:  [id number]      Lumbar spine XR, 2-3 views   Final Result   IMPRESSION: Mild retrolisthesis at L5-S1 No loss of vertebral body   height. Multilevel disc height loss, osteophyte formation and facet   arthropathy. Vacuum disc phenomenon at L2-3. Aortic calcified   atherosclerotic plaques.      SUSU FOLOD MD            SYSTEM ID:  [id number]          Medications   Lidocaine (LIDOCARE) 4 % Patch 1 patch (1 patch Transdermal $Patch/Med Applied 11/6/24 1458)   HYDROcodone-acetaminophen (NORCO) 5-325 MG per tablet 2 tablet (2 tablets Oral $Given 11/6/24 1335)   methocarbamol (ROBAXIN) tablet 500 mg (500 mg Oral $Given 11/6/24 1456)       Patient's response to treatments and/or interventions: Tolerated well.    To be done/followed up on inpatient unit:  Per Provider order    Does this patient have any cognitive concerns?:  N/A    Activity level - Baseline/Home:  Walker  Activity Level - Current:   Stand with assist x2    Patient's Preferred language: English   Needed?: No    Isolation: None  Infection: Not Applicable  Patient tested for COVID 19 prior to admission: NO  Bariatric?: No    Vital Signs:   Vitals:    11/06/24 1301 11/06/24 1302 11/06/24 1334 11/06/24 1435   BP:    (!) 143/68   Pulse:       Resp:       Temp:       TempSrc:       SpO2: 91% 93%  96%   Weight:   83.9 kg (185 lb)    Height:   1.6 m (5' 3\")        Cardiac Rhythm:        Family Comments: Family at bedside. Calm and cooperative. Participates in cars.  OBS brochure/video discussed/provided to patient/family: Yes         For the majority of the shift this patient's behavior was Green.   Behavioral interventions performed were N/A.    ED NURSE PHONE NUMBER: [phone]         "

## 2024-11-06 NOTE — ED NOTES
3190 Patient counseled to not drive while taking medication administered per MAR. Patient is in agreement and states I have a ride home.

## 2024-11-06 NOTE — TELEPHONE ENCOUNTER
FYI-    Nurse Triage SBAR    Is this a 2nd Level Triage? Yes    Situation: Pt's caregiver called with pt on the phone call requesting prescription for hydrocodone for severe back pain.     Background: Pt is recovering from COVID-19 and has been laying on her back and has some difficulty walking. Rates pain 10/10.     Assessment: Pt needs to be evaluated at ED and if unable to safety walk on her own call 911.     Protocol Recommended Disposition:   Go to ED Now    Recommendation:See ED. Caregiver agreed to take pt to ED if pt approves.       Routed to provider No further action is needed from PCP unless other recommendations from provider.     Does the patient meet one of the following criteria for ADS visit consideration? No - pt unable to transfer and safety walk on her own       Reason for Disposition   SEVERE back pain of sudden onset and age > 60 years    Additional Information   Negative: Passed out (i.e., fainted, collapsed and was not responding)   Negative: Shock suspected (e.g., cold/pale/clammy skin, too weak to stand, low BP, rapid pulse)   Negative: Sounds like a life-threatening emergency to the triager   Negative: Major injury to the back (e.g., MVA, fall > 10 feet or 3 meters, penetrating injury, etc.)   Negative: Pain in the upper back over the ribs (rib cage) that radiates (travels) into the chest   Negative: Pain in the upper back over the ribs (rib cage) and worsened by coughing (or clearly increases with breathing)   Negative: Back pain during pregnancy    Protocols used: Back Pain-A-[place]

## 2024-11-06 NOTE — ED PROVIDER NOTES
Emergency Department Note      History of Present Illness     Chief Complaint   Back Pain      HPI   Agnes Saravia is a 79 year old female with history of rheumatoid arthritis, fibromyalgia, depression, arthritis, chronic back pain who presents to the ED for evaluation of back pain.  Patient reports that 3 days ago she developed low back pain that radiates to her right side.  She states that the pain is worse today and shoots down both of her legs.  She also endorses paresthesias of bilateral legs. She normally ambulates with her walker but has not been able to ambulate due to pain. She denies bladder/bowel incontinence. She states that she had a syncopal episode a few weeks ago after having COVID.  She fell but did not injure her back or hip at the time.  She denies any falls or injuries after that.  She was just discharge from TCU and thinks her back pain resulted from sleeping on poor bedding.  She denies previous back surgeries.  No chest pain, shortness of breath, fever, chills, abdominal pain, dysuria, hematuria.  She has been taking and 800mg of ibuprofen without much relief.    Independent Historian   None    Review of External Notes   Reviewed hospital admission note from 10/21/2024.  Patient was admitted for acute toxic versus metabolic encephalopathy and acute hypoxic respiratory insufficiency secondary to COVID.  CT head negative.    Past Medical History     Medical History and Problem List   Past Medical History:   Diagnosis Date     Anxiety      Arthritis      Cancer (H) 2013     Depressive disorder      Hyperlipidemia      Insomnia      PONV (postoperative nausea and vomiting)      Rheumatoid arthritis(714.0)      Sjogren's syndrome (H)        Medications   acetaminophen (TYLENOL) 500 MG tablet  albuterol (PROAIR HFA/PROVENTIL HFA/VENTOLIN HFA) 108 (90 Base) MCG/ACT inhaler  aspirin 81 MG EC tablet  cevimeline (EVOXAC) 30 MG capsule  clorazepate (TRANXENE) 7.5 MG tablet  dexAMETHasone  "(DECADRON) 6 MG tablet  folic acid (FOLVITE) 1 MG tablet  gabapentin (NEURONTIN) 100 MG capsule  gabapentin (NEURONTIN) 400 MG capsule  hydrochlorothiazide (HYDRODIURIL) 25 MG tablet  ibuprofen (ADVIL/MOTRIN) 400 MG tablet  methotrexate 2.5 MG tablet  pantoprazole (PROTONIX) 40 MG EC tablet  polyethylene glycol-propylene glycol (SYSTANE ULTRA) 0.4-0.3 % SOLN ophthalmic solution  potassium chloride ER (K-TAB/KLOR-CON) 10 MEQ CR tablet  predniSONE (DELTASONE) 5 MG tablet  simvastatin (ZOCOR) 40 MG tablet  traZODone (DESYREL) 50 MG tablet        Surgical History   Past Surgical History:   Procedure Laterality Date     ABDOMEN SURGERY       APPENDECTOMY       BIOPSY  2014    Lip lower     BLEPHAROPLASTY       BRONCHOSCOPY (RIGID OR FLEXIBLE), DIAGNOSTIC N/A 04/11/2018    Procedure: COMBINED BRONCHOSCOPY (RIGID OR FLEXIBLE), LAVAGE;  Bronchoscopy with Lavage;  Surgeon: Manuel Conway MD;  Location:  GI     COLONOSCOPY       DISCECTOMY LUMBAR POSTERIOR MICROSCOPIC ONE LEVEL  08/14/2014    Procedure: DISCECTOMY LUMBAR POSTERIOR MICROSCOPIC ONE LEVEL;  Surgeon: Dudley Bernal MD;  Location:  OR     ENT SURGERY  5/11/2017     HYSTERECTOMY TOTAL ABDOMINAL, BILATERAL SALPINGO-OOPHORECTOMY, COMBINED       HYSTERECTOMY, PAP NO LONGER INDICATED       MAMMOPLASTY REDUCTION BILATERAL  05/14/2013    Procedure: MAMMOPLASTY REDUCTION BILATERAL;  BILATERAL REDUCTION MAMMOPLASTY;  Surgeon: Bruce Nash MD;  Location: [facility]     SHOULDER SURGERY         Physical Exam     Patient Vitals for the past 24 hrs:   BP Temp Temp src Pulse Resp SpO2 Height Weight   11/06/24 1334 -- -- -- -- -- -- 1.6 m (5' 3\") 83.9 kg (185 lb)   11/06/24 1302 -- -- -- -- -- 93 % -- --   11/06/24 1301 -- -- -- -- -- 91 % -- --   11/06/24 1252 -- 97.1  F (36.2  C) Oral -- 20 96 % -- --   11/06/24 1245 (!) 157/73 -- -- 75 -- -- -- --     Physical Exam  Physical Exam:  General: lying comfortably on hospital bed  Head: normocephalic, " atraumatic  Eyes: PERRLA, EOMI  Ears: External ears appear normal.   Nose: no signs of bleeding   Throat: moist mucous membranes  Neck: No JVD  CV: regular rate and rhythm  Pulm: lungs clear to ausculation bilaterally, normal respiratory effort, normal chest expansion with breathing   Abdomen: soft, non-tender, non-distended  MSK: Tenderness to palpation of the lumbar spine.  Tenderness to palpation of the right lumbosacral region.  Tenderness to palpation of the right hip.  No evidence deformities.  No ecchymosis.   Ext: normal range of motion of all extremities. No gross deformities.  Normal sensation to light touch throughout bilateral lower extremities.  Pedal pulses are intact.  Skin: warm, dry, no rashes  Neuro: alert and oriented  Psych: Appropriate mood. Cooperative      Diagnostics     Lab Results   Labs Ordered and Resulted from Time of ED Arrival to Time of ED Departure   ROUTINE UA WITH MICROSCOPIC REFLEX TO CULTURE - Abnormal       Result Value    Color Urine Straw      Appearance Urine Clear      Glucose Urine Negative      Bilirubin Urine Negative      Ketones Urine Trace (*)     Specific Gravity Urine 1.011      Blood Urine Trace (*)     pH Urine 8.0 (*)     Protein Albumin Urine Negative      Urobilinogen Urine Normal      Nitrite Urine Negative      Leukocyte Esterase Urine Negative      RBC Urine 1      WBC Urine 1      Squamous Epithelials Urine <1         Imaging   Lumbar spine XR, 2-3 views    (Results Pending)   XR Pelvis w Hip Right 1 View    (Results Pending)       EKG   None    Independent Interpretation   None    ED Course      Medications Administered   Medications   Lidocaine (LIDOCARE) 4 % Patch 1 patch (has no administration in time range)   methocarbamol (ROBAXIN) tablet 500 mg (has no administration in time range)   HYDROcodone-acetaminophen (NORCO) 5-325 MG per tablet 2 tablet (2 tablets Oral $Given 11/6/24 5284)       Procedures   Procedures     Discussion of Management    None    ED Course   ED Course as of 11/06/24 1508   Wed Nov 06, 2024   1320 I evaluated patient and obtained history.   1450 Reassessed patient.        Additional Documentation  None    Medical Decision Making / Diagnosis     CMS Diagnoses: None    MIPS       None    MDM   Agnes Saravia is a 79 year old female who presents to the ED for evaluation of low back pain.  She has a history of chronic back pain, fibromyalgia, and rheumatoid arthritis.  She states that over the last 3 days she has had progressively worsening low back pain that radiates to her right side.  She states that the pain is worse today and has not been able to ambulate with her walker due to the pain.  She has been taking 800 mg of ibuprofen without much relief.  She denies any new injuries or falls.  She denies any bladder/bowel incontinence.  She states that the pain now shoots down both of her legs with associated numbness and tingling.  She denies any chest pain or shortness of breath.  See further HPI details above.  The above workup was undertaken.  Broad differential was considered including musculoskeletal back pain, mass, cauda equina syndrome, epidural abscess, epidural hematoma, sciatic nerve pain, arthritis pain.  On exam, patient is well-appearing.  Her vitals are reassuring.  Exam is notable for tenderness to the lumbar spine and right lumbosacral region.  Distal neurovascular is intact.  No saddle anesthesia.  X-rays of the lumbar spine and right hip are negative for acute fracture or dislocation.  She does have multilevel disc height loss and degenerative changes.  Suspect pain is due to arthritis.  Low suspicion for more concerning etiology including the ones noted above.  Patient was given pain medications and lidocaine patch.  She will be signed out to oncoming colleague pending road test.  If she is not able to ambulate due to pain, will consider observation admission for pain control.  If pain is better managed, she can  discharge home with orthopedics follow-up.  Plan was discussed with patient.     Disposition   Care of the patient was transferred to my colleague Dr. Vigil pending reassessment and ambulation trial.     Diagnosis     ICD-10-CM    1. Acute on chronic low back pain  M54.50     G89.29       2. Right hip pain  M25.551            Discharge Medications   New Prescriptions    No medications on file         KULWANT Garcia Kausar, PA-C  11/06/24 1500

## 2024-11-07 ENCOUNTER — PATIENT OUTREACH (OUTPATIENT)
Dept: CARE COORDINATION | Facility: CLINIC | Age: 79
End: 2024-11-07
Payer: MEDICARE

## 2024-11-07 ENCOUNTER — APPOINTMENT (OUTPATIENT)
Dept: MRI IMAGING | Facility: CLINIC | Age: 79
DRG: 519 | End: 2024-11-07
Attending: INTERNAL MEDICINE
Payer: MEDICARE

## 2024-11-07 LAB
ANION GAP SERPL CALCULATED.3IONS-SCNC: 11 MMOL/L (ref 7–15)
BUN SERPL-MCNC: 12.2 MG/DL (ref 8–23)
CALCIUM SERPL-MCNC: 9 MG/DL (ref 8.8–10.4)
CHLORIDE SERPL-SCNC: 99 MMOL/L (ref 98–107)
CREAT SERPL-MCNC: 0.5 MG/DL (ref 0.51–0.95)
EGFRCR SERPLBLD CKD-EPI 2021: >90 ML/MIN/1.73M2
ERYTHROCYTE [DISTWIDTH] IN BLOOD BY AUTOMATED COUNT: 14.1 % (ref 10–15)
EST. AVERAGE GLUCOSE BLD GHB EST-MCNC: 128 MG/DL
GLUCOSE BLDC GLUCOMTR-MCNC: 141 MG/DL (ref 70–99)
GLUCOSE BLDC GLUCOMTR-MCNC: 153 MG/DL (ref 70–99)
GLUCOSE BLDC GLUCOMTR-MCNC: 155 MG/DL (ref 70–99)
GLUCOSE BLDC GLUCOMTR-MCNC: 166 MG/DL (ref 70–99)
GLUCOSE BLDC GLUCOMTR-MCNC: 237 MG/DL (ref 70–99)
GLUCOSE SERPL-MCNC: 162 MG/DL (ref 70–99)
HBA1C MFR BLD: 6.1 %
HCO3 SERPL-SCNC: 26 MMOL/L (ref 22–29)
HCT VFR BLD AUTO: 37.4 % (ref 35–47)
HGB BLD-MCNC: 12.3 G/DL (ref 11.7–15.7)
MCH RBC QN AUTO: 29.7 PG (ref 26.5–33)
MCHC RBC AUTO-ENTMCNC: 32.9 G/DL (ref 31.5–36.5)
MCV RBC AUTO: 90 FL (ref 78–100)
PLATELET # BLD AUTO: 315 10E3/UL (ref 150–450)
POTASSIUM SERPL-SCNC: 3.7 MMOL/L (ref 3.4–5.3)
RBC # BLD AUTO: 4.14 10E6/UL (ref 3.8–5.2)
SODIUM SERPL-SCNC: 136 MMOL/L (ref 135–145)
WBC # BLD AUTO: 9.8 10E3/UL (ref 4–11)

## 2024-11-07 PROCEDURE — 250N000011 HC RX IP 250 OP 636: Performed by: STUDENT IN AN ORGANIZED HEALTH CARE EDUCATION/TRAINING PROGRAM

## 2024-11-07 PROCEDURE — 80048 BASIC METABOLIC PNL TOTAL CA: CPT | Performed by: INTERNAL MEDICINE

## 2024-11-07 PROCEDURE — 36415 COLL VENOUS BLD VENIPUNCTURE: CPT | Performed by: INTERNAL MEDICINE

## 2024-11-07 PROCEDURE — G0378 HOSPITAL OBSERVATION PER HR: HCPCS

## 2024-11-07 PROCEDURE — 96361 HYDRATE IV INFUSION ADD-ON: CPT

## 2024-11-07 PROCEDURE — 250N000013 HC RX MED GY IP 250 OP 250 PS 637: Performed by: INTERNAL MEDICINE

## 2024-11-07 PROCEDURE — 96372 THER/PROPH/DIAG INJ SC/IM: CPT | Performed by: STUDENT IN AN ORGANIZED HEALTH CARE EDUCATION/TRAINING PROGRAM

## 2024-11-07 PROCEDURE — 250N000012 HC RX MED GY IP 250 OP 636 PS 637: Performed by: INTERNAL MEDICINE

## 2024-11-07 PROCEDURE — 83036 HEMOGLOBIN GLYCOSYLATED A1C: CPT | Performed by: INTERNAL MEDICINE

## 2024-11-07 PROCEDURE — 82962 GLUCOSE BLOOD TEST: CPT

## 2024-11-07 PROCEDURE — G1010 CDSM STANSON: HCPCS

## 2024-11-07 PROCEDURE — 99232 SBSQ HOSP IP/OBS MODERATE 35: CPT | Performed by: STUDENT IN AN ORGANIZED HEALTH CARE EDUCATION/TRAINING PROGRAM

## 2024-11-07 PROCEDURE — 250N000013 HC RX MED GY IP 250 OP 250 PS 637: Performed by: STUDENT IN AN ORGANIZED HEALTH CARE EDUCATION/TRAINING PROGRAM

## 2024-11-07 PROCEDURE — 85014 HEMATOCRIT: CPT | Performed by: INTERNAL MEDICINE

## 2024-11-07 RX ORDER — ENOXAPARIN SODIUM 100 MG/ML
40 INJECTION SUBCUTANEOUS EVERY 24 HOURS
Status: DISCONTINUED | OUTPATIENT
Start: 2024-11-07 | End: 2024-11-14

## 2024-11-07 RX ORDER — DIAZEPAM 5 MG/1
5 TABLET ORAL
Status: COMPLETED | OUTPATIENT
Start: 2024-11-07 | End: 2024-11-07

## 2024-11-07 RX ADMIN — HYDROCHLOROTHIAZIDE 25 MG: 25 TABLET ORAL at 08:27

## 2024-11-07 RX ADMIN — ACETAMINOPHEN 1000 MG: 500 TABLET, FILM COATED ORAL at 20:18

## 2024-11-07 RX ADMIN — IBUPROFEN 800 MG: 400 TABLET, FILM COATED ORAL at 17:34

## 2024-11-07 RX ADMIN — ASPIRIN 81 MG: 81 TABLET, COATED ORAL at 21:43

## 2024-11-07 RX ADMIN — HYDROMORPHONE HYDROCHLORIDE 1 MG: 2 TABLET ORAL at 20:44

## 2024-11-07 RX ADMIN — SIMVASTATIN 40 MG: 40 TABLET, FILM COATED ORAL at 21:43

## 2024-11-07 RX ADMIN — PREDNISONE 5 MG: 5 TABLET ORAL at 08:43

## 2024-11-07 RX ADMIN — HYDROMORPHONE HYDROCHLORIDE 1 MG: 2 TABLET ORAL at 16:04

## 2024-11-07 RX ADMIN — SENNOSIDES AND DOCUSATE SODIUM 2 TABLET: 50; 8.6 TABLET ORAL at 20:18

## 2024-11-07 RX ADMIN — SENNOSIDES AND DOCUSATE SODIUM 1 TABLET: 50; 8.6 TABLET ORAL at 08:28

## 2024-11-07 RX ADMIN — CLORAZEPATE DIPOTASSIUM 15 MG: 3.75 TABLET ORAL at 08:29

## 2024-11-07 RX ADMIN — FOLIC ACID 1 MG: 1 TABLET ORAL at 08:27

## 2024-11-07 RX ADMIN — DIAZEPAM 5 MG: 5 TABLET ORAL at 13:47

## 2024-11-07 RX ADMIN — POTASSIUM CHLORIDE 20 MEQ: 750 TABLET, EXTENDED RELEASE ORAL at 08:27

## 2024-11-07 RX ADMIN — ACETAMINOPHEN 1000 MG: 500 TABLET, FILM COATED ORAL at 08:27

## 2024-11-07 RX ADMIN — CEVIMELINE 30 MG: 30 CAPSULE ORAL at 08:30

## 2024-11-07 RX ADMIN — ACETAMINOPHEN 1000 MG: 500 TABLET, FILM COATED ORAL at 13:47

## 2024-11-07 RX ADMIN — GABAPENTIN 100 MG: 100 CAPSULE ORAL at 20:20

## 2024-11-07 RX ADMIN — PREDNISONE 60 MG: 20 TABLET ORAL at 08:43

## 2024-11-07 RX ADMIN — TRAZODONE HYDROCHLORIDE 25 MG: 50 TABLET ORAL at 21:43

## 2024-11-07 RX ADMIN — ENOXAPARIN SODIUM 40 MG: 40 INJECTION SUBCUTANEOUS at 17:34

## 2024-11-07 RX ADMIN — GABAPENTIN 400 MG: 300 CAPSULE ORAL at 16:04

## 2024-11-07 RX ADMIN — PANTOPRAZOLE SODIUM 40 MG: 40 TABLET, DELAYED RELEASE ORAL at 08:27

## 2024-11-07 RX ADMIN — GABAPENTIN 400 MG: 300 CAPSULE ORAL at 10:07

## 2024-11-07 RX ADMIN — HYDROMORPHONE HYDROCHLORIDE 1 MG: 2 TABLET ORAL at 05:29

## 2024-11-07 NOTE — PROGRESS NOTES
"[facility]    Medicine Progress Note - Hospitalist Service    Date of Admission:  11/6/2024    Assessment & Plan      Agnes Saravia is a 79 year old female admitted on 11/6/2024. She has h/o RA (methotrexate&prednisone), Sjogren's, ILD, HTN,     She presents with acute R sciatica, unable to care for self at home.      Recently hospitalized 10-21 - 10/25 for Covid Infection associated with encephalopathy.    Treated with Remdesiver and Dexamethasone.  She then went to TCU 10/25 - 10/30.       Since she has been home, she has gradually developed low back pain with activity, akin to a right sided sciatica, escalating.   On 11/6/24 compelled to present to ED because unable to get out of chair    In ED, xray R hip and lumbar negative for fracture, UA negative.  Given robaxin, norco, lidocaine patch, no improvement.            R L3 Nerve root impingement   Mechanical fall (10/21/24)   Patient reports she had \"disc surgery\" >20 years ago but not much more information than that. Reports mechanical fall due to weakness the day she presented for Covid (10/21/24). Possible steroids she received from Covid therapy masked her symptoms from nerve root impingement.   *L-spine MRI on 11/7 shows multilevel degenerative changes with moderate canal stenotic findings, compression deformity of the L5 vertebral level, and central disc extrusion at L2-L3 with mass effect on descending R L3 nerve root.  - Neurosurgery consult pending  - pain therapy with heat/cold/tylenol/hydrocodone (oxycodone allergy)   - Steroid therapy: Prednisone 60 mg daily (on top of her baseline 5 mg daily therapy)   - SW/PT/OT      Rheumatoid Arthritis   On prednisone therapy   On Methotrexate therapy   - continue daily prednisone (5mg) and weekly methotrexate (10 mg)  - no new meds for several years, generally controlled.     Anxiety     Patient has taken clorazepate nightly for many years, has symptoms if does not get " "this    Elevated Glucose   - Nonfasting Glucose about 130  - 10/2023 HgbA1C 6.0 -- Recheck Hgb A1C  - ISS for now, low intensity.  Re evaluated (while on prednisone)       Observation Goals: -diagnostic tests and consults completed and resulted, -vital signs normal or at patient baseline, -adequate pain control on oral analgesics, -returns to baseline functional status, -safe disposition plan has been identified, Nurse to notify provider when observation goals have been met and patient is ready for discharge.  Diet: Regular Diet Adult    DVT Prophylaxis: Lovenox 40 mg q24H  Andujar Catheter: Not present  Lines: None     Cardiac Monitoring: None  Code Status: Full Code      Clinically Significant Risk Factors Present on Admission                 # Drug Induced Platelet Defect: home medication list includes an antiplatelet medication   # Hypertension: Noted on problem list           # Obesity: Estimated body mass index is 34.37 kg/m  as calculated from the following:    Height as of this encounter: 1.6 m (5' 3\").    Weight as of this encounter: 88 kg (194 lb 0.1 oz).              Disposition Plan     Medically Ready for Discharge: Anticipated in 2-4 Days             Rai Shi MD  Hospitalist Service  [facility]  Securely message with [employer] (more info)  Text page via [employer] Paging/Directory   ______________________________________________________________________    Interval History   Patient readmitted yesterday for severe back pain. Patient still having back pain at this time. States she needs diazepam to be able to get MRI. No other concerns or questions.    Physical Exam   Vital Signs: Temp: 97.7  F (36.5  C) Temp src: Oral BP: 130/55 Pulse: 85   Resp: 18 SpO2: 94 % O2 Device: None (Room air)    Weight: 194 lbs .08 oz  Constitutional: awake, alert, cooperative, no apparent distress -  Respiratory: No increased work of breathing, good air exchange, clear to auscultation bilaterally, no " "crackles or wheezing  Cardiovascular: Normal apical impulse, regular rate and rhythm, normal S1 and S2, no S3 or S4, and no murmur noted  GI: No scars, normal bowel sounds, soft, non-distended, non-tender, no masses palpated, no hepatosplenomegally  Musculoskeletal: no lower extremity pitting edema present.    Neurologic:    mild pain with straight leg raise on R, minimal on L.   She has pain with active leg raise, radiating to her foot, only able to raise the right leg for a few seconds .   Both legs are antigravity (weak).    Subjective parasthesias on R foot.   Neuropsychiatric: A&Ox3. Conversant. Responds appropriately in conversation.      Medical Decision Making       45 MINUTES SPENT BY ME on the date of service doing chart review, history, exam, documentation & further activities per the note.      Data   ------------------------- PAST 24 HR DATA REVIEWED -----------------------------------------------    I have personally reviewed the following data over the past 24 hrs:    9.8  \   12.3   / 315     136 99 12.2 /  153 (H)   3.7 26 0.50 (L) \     TSH: N/A T4: N/A A1C: 6.1 (H)       Imaging results reviewed over the past 24 hrs:   Recent Results (from the past 24 hours)   MR Lumbar Spine w/o Contrast    Narrative    MRI LUMBAR SPINE WITHOUT CONTRAST   11/7/2024 3:09 PM     HISTORY: fall 10/21    since, not very mobile due to covid.   \"disc  surgery\">20Y ago.   Now having R sciatica sx since trying to be mobile  again.  RA, on daily prednisone     TECHNIQUE: Multiplanar multisequence MRI of the lumbar spine without  contrast.     COMPARISON: 11/6/2024     FINDINGS: Compared to the 3/25/2020 CT abdomen and pelvis, there is  compression deformity of the L5 vertebral level. There is a superior  endplate marrow edema with 25% loss of vertebral body height. This  could reflect an acute Schmorl's node versus acute compression  fracture.    Multilevel loss of disc height most notably at L2-3.    T12-L1: " Negative.    L1-2: Negative.    L2-3: Broad-based disc bulge. Moderate canal stenosis. Facet disease.  Mild to moderate bilateral foraminal narrowing. Right central  extrusion extending caudally at this level contributes to right  lateral recess narrowing and mass effect upon the descending right L3  nerve root. Postsurgical changes of right hemilaminectomy.    L3-4: Broad-based disc bulge with moderate canal stenosis. Facet  disease. Right foraminal protrusion with extruded component extending  cranially contributing to severe right foraminal narrowing.    L4-5: Broad-based disc bulge with moderate canal stenosis. Facet  disease and ligamentum flavum hypertrophy. Moderate canal stenosis.    L5-S1: Broad-based disc bulge with moderate canal stenosis. Facet  disease. Mild bilateral foraminal narrowing.      Impression    IMPRESSION:  Multilevel degenerative changes with moderate canal  stenotic findings at L2-3 and L3-4 as well as L4-5 and L5-S1. A right  central disc extrusion at L2-3 contributes to mass effect upon the  descending right L3 nerve root. Postsurgical changes of right  hemilaminectomy at L2-3.     AYDIN DINH MD         SYSTEM ID:  [id number]

## 2024-11-07 NOTE — PROGRESS NOTES
RECEIVING UNIT ED HANDOFF REVIEW    ED Nurse Handoff Report was reviewed by: Abby Marquis RN on November 6, 2024 at 6:40 PM

## 2024-11-07 NOTE — PROGRESS NOTES
Observation goals:    -diagnostic tests and consults completed and resulted-Not met   -vital signs normal or at patient baseline-Partially met   -adequate pain control on oral analgesics- Not met  -returns to baseline functional status- Not met  -safe disposition plan has been identified- Not met    Nurse to notify provider when observation goals have been met and patient is ready for discharge.

## 2024-11-07 NOTE — PROGRESS NOTES
Observation goals:    -diagnostic tests and consults completed and resulted-Not met   -vital signs normal or at patient baseline-Met   -adequate pain control on oral analgesics- Not met  -returns to baseline functional status- Not met  -safe disposition plan has been identified- Not met    Nurse to notify provider when observation goals have been met and patient is ready for discharge.

## 2024-11-07 NOTE — PROGRESS NOTES
PRIMARY Concern: Lower back and R hip pain  SAFETY RISK Concerns (fall risk, behaviors, etc.): Fall      Aggression Tool Color: Green   Isolation/Type: NA   Tests/Procedures for NEXT shift: AM labs   Consults? (Pending/following, signed-off?) Neurosurgery/PT/OT/SW consulted   Where is patient from? (Home, TCU, etc.): Home, recently discharged from TCU   Other Important info for NEXT shift: Pt started on Lovenox injections this evening   Anticipated DC date & active delays: TBD  _____________________________________________________________________________  SUMMARY NOTE:  Orientation/Cognitive: AOx4   Observation Goals (Met/ Not Met): Not met   Mobility Level/Assist Equipment: A2/GB/W  Antibiotics & Plan (IV/po, length of tx left): NA   Pain Management: C/o 7/10 back and hip pain, PO Dilaudid given. On scheduled Tylenol and Ibuprofen   Tele/VS/O2: VSS on RA   ABNL Lab/BG: Cr 0.50; A1C 6.1; , 141, 153; MRI resulted, MD paged   Diet: Regular   Bowel/Bladder: Incontinent of urine, purewick in place   Skin Concerns: Bruise to L abdomen and scattered, blanchable sacrum   Drains/Devices: PIV SL   Patient Stated Goal for Today: Get MRI done and rest

## 2024-11-07 NOTE — PLAN OF CARE
Goal Outcome Evaluation:      Plan of Care Reviewed With: patient               SUMMARY NOTE:  Orientation/Cognitive: A&O X4  Observation Goals (Met/ Not Met): not met  Mobility Level/Assist Equipment: A2 lift   Antibiotics & Plan (IV/po, length of tx left):   Pain Management:Rate pain 7/10,PRN PO dilaudid given x1  Tele/VS/O2: VSS on RA  ABNL Lab/BG:   Diet: regular  Bowel/Bladder: Incontinent ,purewrick in place overnight  Skin Concerns: brusing on left lower abdomen   Drains/Devices: L PIV/SL  Patient Stated Goal for Today: pain management

## 2024-11-07 NOTE — PROGRESS NOTES
MD Notification    Notified Person: MD    Notified Person Name: Jose Guadalupe     Notification Date/Time: 11/7 at 4:45 pm     Notification Interaction: Arturo     Purpose of Notification: MRI resulted, please advise, thank you     Orders Received: Orders for neurosurgery consult placed     Comments:

## 2024-11-07 NOTE — PROGRESS NOTES
Top portion must ALWAYS be completed  PRIMARY Concern: R hip pain/ lower back pain  SAFETY RISK Concerns (fall risk, behaviors, etc.): fall risk     Aggression Tool Color: Green  Isolation/Type: none  Tests/Procedures for NEXT shift: MRI Pt refused MRI   Consults? (Pending/following, signed-off?) CM/SW consults pending   Where is patient from? (Home, TCU, etc.): Home, recently discharged from a TCU  Other Important info for NEXT shift:CA requested MRI be done in the morning had an order for valium returned med need new order to grab another one.    Anticipated DC date & active delays: TBD  _____________________________________________________________________________  SUMMARY NOTE:  Orientation/Cognitive: A&O X4  Observation Goals (Met/ Not Met): not met  Mobility Level/Assist Equipment: A2 lift   Antibiotics & Plan (IV/po, length of tx left):   Pain Management: PRN PO dilaudid X1   Tele/VS/O2: VSS on RA  ABNL Lab/BG:   Diet: reg  Bowel/Bladder: Incontinent   Skin Concerns: brusing on abd   Drains/Devices: IVF  mL/hr  Patient Stated Goal for Today: pain management       Observation goals  PRIOR TO DISCHARGE       Comments:   -diagnostic tests and consults completed and resulted  Not met  -vital signs normal or at patient baseline  Not met  -adequate pain control on oral analgesics  Met  -returns to baseline functional status  Not met  -safe disposition plan has been identified  Not met  Nurse to notify provider when observation goals have been met and patient is ready for discharge.

## 2024-11-08 ENCOUNTER — APPOINTMENT (OUTPATIENT)
Dept: PHYSICAL THERAPY | Facility: CLINIC | Age: 79
DRG: 519 | End: 2024-11-08
Attending: STUDENT IN AN ORGANIZED HEALTH CARE EDUCATION/TRAINING PROGRAM
Payer: MEDICARE

## 2024-11-08 PROBLEM — R11.0 NAUSEA: Status: ACTIVE | Noted: 2024-11-08

## 2024-11-08 PROBLEM — E86.0 MILD DEHYDRATION: Status: ACTIVE | Noted: 2024-11-08

## 2024-11-08 PROBLEM — I10 PRIMARY HYPERTENSION: Status: ACTIVE | Noted: 2024-11-08

## 2024-11-08 PROBLEM — K59.03 DRUG-INDUCED CONSTIPATION: Status: ACTIVE | Noted: 2024-11-08

## 2024-11-08 PROBLEM — M54.41 ACUTE BILATERAL LOW BACK PAIN WITH RIGHT-SIDED SCIATICA: Status: ACTIVE | Noted: 2024-11-08

## 2024-11-08 PROBLEM — G47.00 INSOMNIA, UNSPECIFIED TYPE: Status: ACTIVE | Noted: 2024-11-08

## 2024-11-08 LAB
ANION GAP SERPL CALCULATED.3IONS-SCNC: 10 MMOL/L (ref 7–15)
BUN SERPL-MCNC: 19 MG/DL (ref 8–23)
CALCIUM SERPL-MCNC: 9 MG/DL (ref 8.8–10.4)
CHLORIDE SERPL-SCNC: 98 MMOL/L (ref 98–107)
CREAT SERPL-MCNC: 0.67 MG/DL (ref 0.51–0.95)
EGFRCR SERPLBLD CKD-EPI 2021: 87 ML/MIN/1.73M2
ERYTHROCYTE [DISTWIDTH] IN BLOOD BY AUTOMATED COUNT: 14.2 % (ref 10–15)
GLUCOSE BLDC GLUCOMTR-MCNC: 132 MG/DL (ref 70–99)
GLUCOSE BLDC GLUCOMTR-MCNC: 136 MG/DL (ref 70–99)
GLUCOSE BLDC GLUCOMTR-MCNC: 163 MG/DL (ref 70–99)
GLUCOSE BLDC GLUCOMTR-MCNC: 180 MG/DL (ref 70–99)
GLUCOSE SERPL-MCNC: 103 MG/DL (ref 70–99)
HCO3 SERPL-SCNC: 30 MMOL/L (ref 22–29)
HCT VFR BLD AUTO: 37 % (ref 35–47)
HGB BLD-MCNC: 11.8 G/DL (ref 11.7–15.7)
MCH RBC QN AUTO: 29.4 PG (ref 26.5–33)
MCHC RBC AUTO-ENTMCNC: 31.9 G/DL (ref 31.5–36.5)
MCV RBC AUTO: 92 FL (ref 78–100)
PLATELET # BLD AUTO: 316 10E3/UL (ref 150–450)
POTASSIUM SERPL-SCNC: 3.2 MMOL/L (ref 3.4–5.3)
POTASSIUM SERPL-SCNC: 4.4 MMOL/L (ref 3.4–5.3)
RBC # BLD AUTO: 4.01 10E6/UL (ref 3.8–5.2)
SODIUM SERPL-SCNC: 138 MMOL/L (ref 135–145)
WBC # BLD AUTO: 13.1 10E3/UL (ref 4–11)

## 2024-11-08 PROCEDURE — 99232 SBSQ HOSP IP/OBS MODERATE 35: CPT | Performed by: STUDENT IN AN ORGANIZED HEALTH CARE EDUCATION/TRAINING PROGRAM

## 2024-11-08 PROCEDURE — 85027 COMPLETE CBC AUTOMATED: CPT | Performed by: STUDENT IN AN ORGANIZED HEALTH CARE EDUCATION/TRAINING PROGRAM

## 2024-11-08 PROCEDURE — 250N000011 HC RX IP 250 OP 636: Performed by: STUDENT IN AN ORGANIZED HEALTH CARE EDUCATION/TRAINING PROGRAM

## 2024-11-08 PROCEDURE — 99221 1ST HOSP IP/OBS SF/LOW 40: CPT | Performed by: PHYSICIAN ASSISTANT

## 2024-11-08 PROCEDURE — 250N000012 HC RX MED GY IP 250 OP 636 PS 637: Performed by: PHYSICIAN ASSISTANT

## 2024-11-08 PROCEDURE — 82962 GLUCOSE BLOOD TEST: CPT

## 2024-11-08 PROCEDURE — 36415 COLL VENOUS BLD VENIPUNCTURE: CPT | Performed by: STUDENT IN AN ORGANIZED HEALTH CARE EDUCATION/TRAINING PROGRAM

## 2024-11-08 PROCEDURE — G0378 HOSPITAL OBSERVATION PER HR: HCPCS

## 2024-11-08 PROCEDURE — 250N000013 HC RX MED GY IP 250 OP 250 PS 637: Performed by: STUDENT IN AN ORGANIZED HEALTH CARE EDUCATION/TRAINING PROGRAM

## 2024-11-08 PROCEDURE — 84132 ASSAY OF SERUM POTASSIUM: CPT | Performed by: STUDENT IN AN ORGANIZED HEALTH CARE EDUCATION/TRAINING PROGRAM

## 2024-11-08 PROCEDURE — 120N000001 HC R&B MED SURG/OB

## 2024-11-08 PROCEDURE — 97161 PT EVAL LOW COMPLEX 20 MIN: CPT | Mod: GP

## 2024-11-08 PROCEDURE — 80048 BASIC METABOLIC PNL TOTAL CA: CPT | Performed by: STUDENT IN AN ORGANIZED HEALTH CARE EDUCATION/TRAINING PROGRAM

## 2024-11-08 PROCEDURE — 250N000013 HC RX MED GY IP 250 OP 250 PS 637: Performed by: INTERNAL MEDICINE

## 2024-11-08 PROCEDURE — 97530 THERAPEUTIC ACTIVITIES: CPT | Mod: GP

## 2024-11-08 PROCEDURE — 250N000012 HC RX MED GY IP 250 OP 636 PS 637: Performed by: INTERNAL MEDICINE

## 2024-11-08 RX ORDER — POTASSIUM CHLORIDE 1500 MG/1
40 TABLET, EXTENDED RELEASE ORAL ONCE
Status: COMPLETED | OUTPATIENT
Start: 2024-11-08 | End: 2024-11-08

## 2024-11-08 RX ORDER — HYDROMORPHONE HCL IN WATER/PF 6 MG/30 ML
0.2 PATIENT CONTROLLED ANALGESIA SYRINGE INTRAVENOUS
Status: DISCONTINUED | OUTPATIENT
Start: 2024-11-08 | End: 2024-11-11

## 2024-11-08 RX ORDER — KETOROLAC TROMETHAMINE 15 MG/ML
15 INJECTION, SOLUTION INTRAMUSCULAR; INTRAVENOUS EVERY 6 HOURS PRN
Status: DISCONTINUED | OUTPATIENT
Start: 2024-11-09 | End: 2024-11-14

## 2024-11-08 RX ORDER — HYDROCODONE BITARTRATE AND ACETAMINOPHEN 5; 325 MG/1; MG/1
1 TABLET ORAL EVERY 6 HOURS PRN
Status: DISCONTINUED | OUTPATIENT
Start: 2024-11-08 | End: 2024-11-10

## 2024-11-08 RX ORDER — KETOROLAC TROMETHAMINE 15 MG/ML
15 INJECTION, SOLUTION INTRAMUSCULAR; INTRAVENOUS EVERY 6 HOURS PRN
Status: DISCONTINUED | OUTPATIENT
Start: 2024-11-08 | End: 2024-11-08

## 2024-11-08 RX ORDER — DEXAMETHASONE 2 MG/1
4 TABLET ORAL EVERY 12 HOURS SCHEDULED
Status: DISCONTINUED | OUTPATIENT
Start: 2024-11-08 | End: 2024-11-13

## 2024-11-08 RX ORDER — ACETAMINOPHEN 500 MG
500 TABLET ORAL 4 TIMES DAILY
Status: DISCONTINUED | OUTPATIENT
Start: 2024-11-08 | End: 2024-11-12 | Stop reason: ALTCHOICE

## 2024-11-08 RX ORDER — IBUPROFEN 400 MG/1
800 TABLET, FILM COATED ORAL ONCE
Status: COMPLETED | OUTPATIENT
Start: 2024-11-08 | End: 2024-11-08

## 2024-11-08 RX ADMIN — DEXAMETHASONE 4 MG: 2 TABLET ORAL at 20:18

## 2024-11-08 RX ADMIN — ACETAMINOPHEN 1000 MG: 500 TABLET, FILM COATED ORAL at 08:21

## 2024-11-08 RX ADMIN — SENNOSIDES AND DOCUSATE SODIUM 2 TABLET: 50; 8.6 TABLET ORAL at 08:18

## 2024-11-08 RX ADMIN — POTASSIUM CHLORIDE 40 MEQ: 1500 TABLET, EXTENDED RELEASE ORAL at 12:41

## 2024-11-08 RX ADMIN — PREDNISONE 5 MG: 5 TABLET ORAL at 08:24

## 2024-11-08 RX ADMIN — GABAPENTIN 400 MG: 300 CAPSULE ORAL at 12:43

## 2024-11-08 RX ADMIN — FOLIC ACID 1 MG: 1 TABLET ORAL at 08:19

## 2024-11-08 RX ADMIN — SENNOSIDES AND DOCUSATE SODIUM 2 TABLET: 50; 8.6 TABLET ORAL at 20:18

## 2024-11-08 RX ADMIN — GABAPENTIN 400 MG: 300 CAPSULE ORAL at 08:18

## 2024-11-08 RX ADMIN — HYDROMORPHONE HYDROCHLORIDE 1 MG: 2 TABLET ORAL at 08:18

## 2024-11-08 RX ADMIN — CEVIMELINE 30 MG: 30 CAPSULE ORAL at 08:20

## 2024-11-08 RX ADMIN — PREDNISONE 60 MG: 20 TABLET ORAL at 08:20

## 2024-11-08 RX ADMIN — IBUPROFEN 800 MG: 400 TABLET, FILM COATED ORAL at 12:43

## 2024-11-08 RX ADMIN — POTASSIUM CHLORIDE 20 MEQ: 750 TABLET, EXTENDED RELEASE ORAL at 08:16

## 2024-11-08 RX ADMIN — HYDROMORPHONE HYDROCHLORIDE 2 MG: 2 TABLET ORAL at 12:43

## 2024-11-08 RX ADMIN — HYDROCHLOROTHIAZIDE 25 MG: 25 TABLET ORAL at 08:20

## 2024-11-08 RX ADMIN — GABAPENTIN 100 MG: 100 CAPSULE ORAL at 21:30

## 2024-11-08 RX ADMIN — GABAPENTIN 400 MG: 300 CAPSULE ORAL at 18:11

## 2024-11-08 RX ADMIN — ENOXAPARIN SODIUM 40 MG: 40 INJECTION SUBCUTANEOUS at 18:11

## 2024-11-08 RX ADMIN — ACETAMINOPHEN 500 MG: 500 TABLET, FILM COATED ORAL at 20:19

## 2024-11-08 RX ADMIN — IBUPROFEN 800 MG: 400 TABLET, FILM COATED ORAL at 21:30

## 2024-11-08 RX ADMIN — CLORAZEPATE DIPOTASSIUM 15 MG: 3.75 TABLET ORAL at 08:21

## 2024-11-08 RX ADMIN — ACETAMINOPHEN 500 MG: 500 TABLET, FILM COATED ORAL at 16:12

## 2024-11-08 RX ADMIN — CEVIMELINE 30 MG: 30 CAPSULE ORAL at 14:50

## 2024-11-08 RX ADMIN — PANTOPRAZOLE SODIUM 40 MG: 40 TABLET, DELAYED RELEASE ORAL at 08:20

## 2024-11-08 RX ADMIN — SIMVASTATIN 40 MG: 40 TABLET, FILM COATED ORAL at 21:30

## 2024-11-08 RX ADMIN — CEVIMELINE 30 MG: 30 CAPSULE ORAL at 20:18

## 2024-11-08 RX ADMIN — TRAZODONE HYDROCHLORIDE 25 MG: 50 TABLET ORAL at 21:30

## 2024-11-08 ASSESSMENT — ACTIVITIES OF DAILY LIVING (ADL)
ADLS_ACUITY_SCORE: 0
DEPENDENT_IADLS:: CLEANING;COOKING;LAUNDRY;SHOPPING;MEAL PREPARATION;MEDICATION MANAGEMENT;TRANSPORTATION
ADLS_ACUITY_SCORE: 0

## 2024-11-08 NOTE — CONSULTS
Care Management Initial Consult    General Information  Assessment completed with: Patient,    Type of CM/SW Visit: Initial Assessment    Primary Care Provider verified and updated as needed:     Readmission within the last 30 days:        Reason for Consult: discharge planning  Advance Care Planning: Advance Care Planning Reviewed: present on chart          Communication Assessment  Patient's communication style: spoken language (English or Bilingual)    Hearing Difficulty or Deaf: no        Cognitive  Cognitive/Neuro/Behavioral: WDL  Level of Consciousness: alert  Arousal Level: opens eyes spontaneously  Orientation: oriented x 4  Mood/Behavior: calm, cooperative  Best Language: 0 - No aphasia  Speech: logical    Living Environment:   People in home: significant other     Current living Arrangements: house      Able to return to prior arrangements: yes       Family/Social Support:  Care provided by: spouse/significant other  Provides care for: no one  Marital Status: Lives with Significant Other  Support system: Significant Other          Description of Support System:           Current Resources:   Patient receiving home care services: No        Community Resources:    Equipment currently used at home: walker, rolling  Supplies currently used at home:      Employment/Financial:  Employment Status: retired        Financial Concerns:             Does the patient's insurance plan have a 3 day qualifying hospital stay waiver?  Yes     Which insurance plan 3 day waiver is available? [employer]    Will the waiver be used for post-acute placement? Undetermined at this time    Lifestyle & Psychosocial Needs:  Social Drivers of Health     Food Insecurity: Low Risk  (11/6/2024)    Food Insecurity     Within the past 12 months, did you worry that your food would run out before you got money to buy more?: No     Within the past 12 months, did the food you bought just not last and you didn t have money to get more?: No    Depression: Not at risk (10/11/2023)    PHQ-2     PHQ-2 Score: 0   Housing Stability: Low Risk  (11/6/2024)    Housing Stability     Do you have housing? : Yes     Are you worried about losing your housing?: No   Tobacco Use: Medium Risk (2/21/2024)    Patient History     Smoking Tobacco Use: Former     Smokeless Tobacco Use: Never     Passive Exposure: Not on file   Financial Resource Strain: Low Risk  (11/6/2024)    Financial Resource Strain     Within the past 12 months, have you or your family members you live with been unable to get utilities (heat, electricity) when it was really needed?: No   Recent Concern: Financial Resource Strain - High Risk (10/16/2024)    Financial Resource Strain     Within the past 12 months, have you or your family members you live with been unable to get utilities (heat, electricity) when it was really needed?: Yes   Alcohol Use: Not on file   Transportation Needs: Low Risk  (11/6/2024)    Transportation Needs     Within the past 12 months, has lack of transportation kept you from medical appointments, getting your medicines, non-medical meetings or appointments, work, or from getting things that you need?: No   Physical Activity: Inactive (10/16/2024)    Exercise Vital Sign     Days of Exercise per Week: 0 days     Minutes of Exercise per Session: 0 min   Interpersonal Safety: Low Risk  (10/24/2024)    Interpersonal Safety     Do you feel physically and emotionally safe where you currently live?: Yes     Within the past 12 months, have you been hit, slapped, kicked or otherwise physically hurt by someone?: No     Within the past 12 months, have you been humiliated or emotionally abused in other ways by your partner or ex-partner?: No   Stress: No Stress Concern Present (10/16/2024)    [employer] - Occupational Stress Questionnaire     Feeling of Stress : Not at all   Social Connections: Unknown (10/16/2024)    Social Connection and Isolation Panel  [NHANES]     Frequency of Communication with Friends and Family: Not on file     Frequency of Social Gatherings with Friends and Family: Once a week     Attends Confucianism Services: Not on file     Active Member of Clubs or Organizations: Not on file     Attends Club or Organization Meetings: Not on file     Marital Status: Not on file   Health Literacy: Not on file       Functional Status:  Prior to admission patient needed assistance:   Dependent ADLs:: Ambulation-walker, Bathing, Dressing  Dependent IADLs:: Cleaning, Cooking, Laundry, Shopping, Meal Preparation, Medication Management, Transportation       Mental Health Status:          Chemical Dependency Status:                Values/Beliefs:  Spiritual, Cultural Beliefs, Confucianism Practices, Values that affect care:                 Discussed  Partnership in Safe Discharge Planning  document with patient/family: No    Additional Information:  Patient was admitted on 11/6/24 due to R sciatica, per H&P. Patient lives in a house with partner, Lima. At baseline patient walks with walker. Lima helps with med management, and does all cooking, cleaning, and driving. PT recommending TCU at discharge. Patient is in agreement, states they aren't wanting to go back to [facility] due to feeling isolated, couldn't go out of room. Pt reports it was due to having covid isolation. SW explained this isolation would have been the same regardless of facility, as it is policy to isolate. Pt is hoping to try to get into [facility]CU, as it is close to home. Provided with Medicare.gov list for facilities close to [place] and ratings. Pt plans to discuss list with Lima and wants SW to follow up on Saturday for additional choices.    FRED Arnett  Social Work  [facility]

## 2024-11-08 NOTE — PROGRESS NOTES
"[facility]    Medicine Progress Note - Hospitalist Service    Date of Admission:  11/6/2024    Assessment & Plan      Agnes Saravia is a 79 year old female admitted on 11/6/2024. She has h/o RA (methotrexate&prednisone), Sjogren's, ILD, HTN,     She presents with acute R sciatica, unable to care for self at home.      Recently hospitalized 10-21 - 10/25 for Covid Infection associated with encephalopathy.    Treated with Remdesiver and Dexamethasone.  She then went to TCU 10/25 - 10/30.       Since she has been home, she has gradually developed low back pain with activity, akin to a right sided sciatica, escalating.   On 11/6/24 compelled to present to ED because unable to get out of chair    In ED, xray R hip and lumbar negative for fracture, UA negative.  Given robaxin, norco, lidocaine patch, no improvement.       R L3 Nerve root impingement   Mechanical fall (10/21/24)   Patient reports she had \"disc surgery\" >20 years ago but not much more information than that. Reports mechanical fall due to weakness the day she presented for Covid (10/21/24). Possible steroids she received from Covid therapy masked her symptoms from nerve root impingement.   *L-spine MRI on 11/7 shows multilevel degenerative changes with moderate canal stenotic findings, compression deformity of the L5 vertebral level, and central disc extrusion at L2-L3 with mass effect on descending R L3 nerve root.  - Neurosurgery consult:   - Decadron 4 mg BID in the hospital. Will discharge on MDP to make dosing easier for her   -Will need upright XR to include AP and lateral views prior to discharge.  Patient currently has trouble standing so this will have to be done later.   -If patient is not able to get adequate pain control from pain medicine, steroids, and physical therapy, then surgical intervention could be considered next week.   -No lifting anything greater than 5 to 10 pounds and avoid excessive bending, " "twisting, turning.  - pain therapy with heat/cold/tylenol/hydrocodone (oxycodone allergy)/Toradol  - Steroid therapy: Dexamethasone 4 mg twice daily  - SW/PT/OT      Rheumatoid Arthritis   On prednisone therapy   On Methotrexate therapy   - continue daily prednisone (5mg) and weekly methotrexate (10 mg)  -Will not discontinue prednisone as this is her basal steroid dose that she takes.  - no new meds for several years, generally controlled.     Anxiety     Patient has taken clorazepate nightly for many years, has symptoms if does not get this    Elevated Glucose   - Nonfasting Glucose about 130  - 10/2023 HgbA1C 6.0 -- Recheck Hgb A1C  - ISS for now, low intensity.  Re evaluated (while on prednisone)          Diet: Regular Diet Adult    DVT Prophylaxis: Lovenox 40 mg q24H  Andujar Catheter: Not present  Lines: None     Cardiac Monitoring: None  Code Status: Full Code      Clinically Significant Risk Factors Present on Admission        # Hypokalemia: Lowest K = 3.2 mmol/L in last 2 days, will replace as needed          # Drug Induced Platelet Defect: home medication list includes an antiplatelet medication   # Hypertension: Noted on problem list           # Obesity: Estimated body mass index is 34.37 kg/m  as calculated from the following:    Height as of this encounter: 1.6 m (5' 3\").    Weight as of this encounter: 88 kg (194 lb 0.1 oz).              Disposition Plan     Medically Ready for Discharge: Anticipated in 2-4 Days             Rai Shi MD  Hospitalist Service  [facility]  Securely message with [employer] (more info)  Text page via [employer] Paging/Directory   ______________________________________________________________________    Interval History   Patient continues to have significant back pain.  Somewhat controlled when she gets Dilaudid, but comes back fairly quickly.  She saw neurosurgery earlier today and understands plan for pain control, steroids, physical therapy.  Will " likely need surgical intervention if her pain does not get under control with these modalities.    No other concerns or questions.    Physical Exam   Vital Signs: Temp: 98.2  F (36.8  C) Temp src: Oral BP: (!) 143/73 Pulse: 78   Resp: 18 SpO2: 95 % O2 Device: None (Room air)    Weight: 194 lbs .08 oz  Constitutional: awake, alert, cooperative, no apparent distress -  Respiratory: No increased work of breathing, good air exchange, clear to auscultation bilaterally, no crackles or wheezing  Cardiovascular: Normal apical impulse, regular rate and rhythm, normal S1 and S2, no S3 or S4, and no murmur noted  GI: No scars, normal bowel sounds, soft, non-distended, non-tender, no masses palpated, no hepatosplenomegally  Musculoskeletal: no lower extremity pitting edema present.    Neurologic:    mild pain with straight leg raise on R, minimal on L.   She has pain with active leg raise, radiating to her foot, only able to raise the right leg for a few seconds .   Both legs are antigravity (weak).    Subjective parasthesias on R foot.   Neuropsychiatric: A&Ox3. Conversant. Responds appropriately in conversation.      Medical Decision Making       40 MINUTES SPENT BY ME on the date of service doing chart review, history, exam, documentation & further activities per the note.      Data   ------------------------- PAST 24 HR DATA REVIEWED -----------------------------------------------    I have personally reviewed the following data over the past 24 hrs:    13.1 (H)  \   11.8   / 316     138 98 19.0 /  136 (H)   3.2 (L) 30 (H) 0.67 \       Imaging results reviewed over the past 24 hrs:   No results found for this or any previous visit (from the past 24 hours).

## 2024-11-08 NOTE — PROGRESS NOTES
Observation goals  PRIOR TO DISCHARGE        Comments: -diagnostic tests and consults completed and resulted Not Met  -vital signs normal or at patient baseline Met  -adequate pain control on oral analgesics Not Met  -returns to baseline functional status Not Met  -safe disposition plan has been identified Not Met  Nurse to notify provider when observation goals have been met and patient is ready for discharge.

## 2024-11-08 NOTE — CONSULTS
NEUROSURGERY CONSULT  [facility]   [facility]         PLAN:    Ms. Saravia's most recent imaging exhibits a right central disc extrusion at L2-3 contributes to mass effect upon the descending right L3 nerve root as well as a L5 compression fracture which certainly correlate with her subjective concerns and objective findings on her physical exam. However, we do not recommend surgical intervention at this time.     Based on her physical exam, imaging review, and past treatments, we feel that it would be in her best interest to try a conservative approach by participating in a physical therapy program to include lumbar traction. We also discussed obtaining an epidural steroid injection at the right L2-3 level which she would like to proceed with, unfortunately this is not offered at this facility as an inpatient. We could consider oral steroids. We could initiate a MDP or decadron 4 mg BID. The L5 fracture does not require bracing. We will order upright x-rays to include ap and lateral views to be obtained prior to discharge. Should she not obtain adequate alleviation from therapy and steroids, surgical intervention could be considered next week.      Our team will also facilitate a follow-up evaluation in approximately one month with updated imaging.     In the interim, we feel that it would be in her best interest to proceed with a conservative approach by pain management set forth by the Medical team here at [facility]. We do suggest that she not lift anything greater than 5-10 pounds and avoid excessive bending, twisting, and turning.     We did review the images together and we explained her condition and the recommended treatment. We also discussed signs of a worsening problem that she should seek being evaluated.     Please page our on-call service for any questions or concerns.     It has been a pleasure meeting Agnes Saravia. Thank you for having us  be involved in her care.    ______________________________________________________________________    HPI:    Agnes Saravia is a pleasant 79 year old female who presented to the [facility]. She describes the symptoms as a constant, sharp, burning pain that initiates in the midline low lumbar region and radiates distally in the right L3 distribution. This pain is not accompanied with paresthesia or numbness but she certainly appreciates weakness in the same distribution. The  symptoms have been present for one week approximately. Neither a traumatic or provocative  mechanism can be appreciated. She has not participated in conservative therapies.  There are no bowel or bladder changes. No other concerns are voiced.    Past Medical History:   Diagnosis Date    Anxiety     Arthritis     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    Depressive disorder     Hyperlipidemia     Insomnia     PONV (postoperative nausea and vomiting)     Rheumatoid arthritis(714.0)     Sjogren's syndrome (H)        Past Surgical History:   Procedure Laterality Date    ABDOMEN SURGERY      APPENDECTOMY      BIOPSY  2014    Lip lower    BLEPHAROPLASTY      BRONCHOSCOPY (RIGID OR FLEXIBLE), DIAGNOSTIC N/A 04/11/2018    Procedure: COMBINED BRONCHOSCOPY (RIGID OR FLEXIBLE), LAVAGE;  Bronchoscopy with Lavage;  Surgeon: Manuel Conway MD;  Location:  GI    COLONOSCOPY      DISCECTOMY LUMBAR POSTERIOR MICROSCOPIC ONE LEVEL  08/14/2014    Procedure: DISCECTOMY LUMBAR POSTERIOR MICROSCOPIC ONE LEVEL;  Surgeon: Dudley Bernal MD;  Location:  OR    ENT SURGERY  5/11/2017    HYSTERECTOMY TOTAL ABDOMINAL, BILATERAL SALPINGO-OOPHORECTOMY, COMBINED      HYSTERECTOMY, PAP NO LONGER INDICATED      MAMMOPLASTY REDUCTION BILATERAL  05/14/2013    Procedure: MAMMOPLASTY REDUCTION BILATERAL;  BILATERAL REDUCTION MAMMOPLASTY;  Surgeon: Bruce Nash MD;  Location: [facility]    SHOULDER SURGERY         Allergies   Allergen Reactions    Mellaril  [Thioridazine Hcl] Anaphylaxis    Percocet [Oxycodone-Acetaminophen] Anaphylaxis and Swelling     Tolerates hydrocodone and codeine in cough medicine. tolerates    Duloxetine Anxiety     Made her feel weird, increased anxiety       Social History     Tobacco Use    Smoking status: Former     Current packs/day: 0.00     Average packs/day: 0.5 packs/day for 10.0 years (5.0 ttl pk-yrs)     Types: Cigarettes     Start date: 1970     Quit date: 1975     Years since quittin.4    Smokeless tobacco: Never    Tobacco comments:     N/A   Substance Use Topics    Alcohol use: Yes     Alcohol/week: 0.0 standard drinks of alcohol     Comment: beer in summer when mowing lawn        Family History   Problem Relation Age of Onset    Cerebrovascular Disease Mother     Cerebrovascular Disease Father     Colon Cancer No family hx of        Scheduled Medications    Current Facility-Administered Medications   Medication Dose Route Frequency Provider Last Rate Last Admin    acetaminophen (TYLENOL) tablet 1,000 mg  1,000 mg Oral TID Mara Matos MD   1,000 mg at 24 08    aspirin EC tablet 81 mg  81 mg Oral At Bedtime Mara Matos MD   81 mg at 24 2143    cevimeline (EVOXAC) capsule 30 mg  30 mg Oral TID Mara Matos MD   30 mg at 24 0820    clorazepate (TRANXENE) tablet 15 mg  15 mg Oral QAM Mara Matos MD   15 mg at 24 0821    enoxaparin ANTICOAGULANT (LOVENOX) injection 40 mg  40 mg Subcutaneous Q24H Rai Shi MD   40 mg at 24 1734    folic acid (FOLVITE) tablet 1 mg  1 mg Oral Once per day on  Mara Matos MD   1 mg at 24 0819    gabapentin (NEURONTIN) capsule 100 mg  100 mg Oral At Bedtime Mara Matos MD   100 mg at 24    gabapentin (NEURONTIN) capsule 400 mg  400 mg Oral TID Mara Matos MD   400 mg at 24 0818    hydrochlorothiazide (HYDRODIURIL) tablet  25 mg  25 mg Oral Daily Mara Matos MD   25 mg at 11/08/24 0820    ibuprofen (ADVIL/MOTRIN) tablet 800 mg  800 mg Oral BID w/meals Mara Matos MD   800 mg at 11/07/24 1734    insulin aspart (NovoLOG) injection (RAPID ACTING)  1-3 Units Subcutaneous TID AC Mara Matos MD   1 Units at 11/07/24 1612    insulin aspart (NovoLOG) injection (RAPID ACTING)  1-3 Units Subcutaneous At Bedtime Mara Matos MD        [START ON 11/12/2024] methotrexate tablet 10 mg  10 mg Oral Q7 Days Mara Matos MD        pantoprazole (PROTONIX) EC tablet 40 mg  40 mg Oral Daily Mara Matos MD   40 mg at 11/08/24 0820    potassium chloride kang ER (KLOR-CON M10) CR tablet 20 mEq  20 mEq Oral Daily Mara Matos MD   20 mEq at 11/08/24 0816    predniSONE (DELTASONE) tablet 5 mg  5 mg Oral Daily Mara Matos MD   5 mg at 11/08/24 0824    senna-docusate (SENOKOT-S/PERICOLACE) 8.6-50 MG per tablet 1 tablet  1 tablet Oral BID Mara Matos MD   1 tablet at 11/07/24 0828    Or    senna-docusate (SENOKOT-S/PERICOLACE) 8.6-50 MG per tablet 2 tablet  2 tablet Oral BID Mara Matos MD   2 tablet at 11/08/24 0818    simvastatin (ZOCOR) tablet 40 mg  40 mg Oral At Bedtime Mara Matos MD   40 mg at 11/07/24 2143    traZODone (DESYREL) half-tab 25 mg  25 mg Oral At Bedtime Mara Matos MD   25 mg at 11/07/24 2143       Home Medications    acetaminophen (TYLENOL) 500 MG tablet     albuterol (PROAIR HFA/PROVENTIL HFA/VENTOLIN HFA) 108 (90 Base) MCG/ACT inhaler  aspirin 81 MG EC tablet  cevimeline (EVOXAC) 30 MG capsule  clorazepate (TRANXENE) 7.5 MG tablet  dexAMETHasone (DECADRON) 6 MG tablet  folic acid (FOLVITE) 1 MG tablet  gabapentin (NEURONTIN) 100 MG capsule  gabapentin (NEURONTIN) 400 MG capsule  hydrochlorothiazide (HYDRODIURIL) 25 MG tablet  ibuprofen (ADVIL/MOTRIN) 400 MG tablet  methotrexate 2.5 MG tablet  pantoprazole (PROTONIX) 40 MG EC  tablet  polyethylene glycol-propylene glycol (SYSTANE ULTRA) 0.4-0.3 % SOLN ophthalmic solution  potassium chloride ER (K-TAB/KLOR-CON) 10 MEQ CR tablet  predniSONE (DELTASONE) 5 MG tablet  simvastatin (ZOCOR) 40 MG tablet  traZODone (DESYREL) 50 MG tablet       PRN Medications    Current Facility-Administered Medications   Medication Dose Route Frequency Provider Last Rate Last Admin    albuterol (PROVENTIL HFA/VENTOLIN HFA) inhaler  2 puff Inhalation Q4H PRN Mara Matos MD        glucose gel 15-30 g  15-30 g Oral Q15 Min PRN Mara Matos MD        Or    dextrose 50 % injection 25-50 mL  25-50 mL Intravenous Q15 Min PRN Mara Matos MD        Or    glucagon injection 1 mg  1 mg Subcutaneous Q15 Min PRN Mara Matos MD        hydrALAZINE (APRESOLINE) tablet 10 mg  10 mg Oral Q4H PRN Mara Matos MD        Or    hydrALAZINE (APRESOLINE) injection 10 mg  10 mg Intravenous Q4H PRN Mara Matos MD        HYDROmorphone (DILAUDID) half-tab 1 mg  1 mg Oral Q4H PRN Mara Matos MD   1 mg at 11/08/24 0818    HYDROmorphone (DILAUDID) tablet 2 mg  2 mg Oral Q4H PRN Mara Matos MD        ketorolac (TORADOL) injection 15 mg  15 mg Intravenous Q6H PRN Rai Shi MD        melatonin tablet 1 mg  1 mg Oral At Bedtime PRN Mara Matos MD        naloxone (NARCAN) injection 0.2 mg  0.2 mg Intravenous Q2 Min PRN Mara Matos MD        Or    naloxone (NARCAN) injection 0.4 mg  0.4 mg Intravenous Q2 Min PRMara Rodgers MD        Or    naloxone (NARCAN) injection 0.2 mg  0.2 mg Intramuscular Q2 Min PRMara Rodgers MD        Or    naloxone (NARCAN) injection 0.4 mg  0.4 mg Intramuscular Q2 Min PRMara Rodgers MD        ondansetron (ZOFRAN ODT) ODT tab 4 mg  4 mg Oral Q6H PRN Mara Matos MD        Or    ondansetron (ZOFRAN) injection 4 mg  4 mg Intravenous Q6H PRN Mara Matos MD        polyethylene  "glycol-propylene glycol PF (SYSTANE ULTRA PF) opthalmic solution 1 drop  1 drop Both Eyes Q1H PRN Mara Matos MD        senna-docusate (SENOKOT-S/PERICOLACE) 8.6-50 MG per tablet 1 tablet  1 tablet Oral BID PRN Mara Matos MD        Or    senna-docusate (SENOKOT-S/PERICOLACE) 8.6-50 MG per tablet 2 tablet  2 tablet Oral BID PRN Mraa Matos MD           ROS: 10 point ROS neg other than the symptoms noted above in the HPI.    Vitals:    /59   Pulse 89   Temp 98  F (36.7  C) (Oral)   Resp 18   Ht 1.6 m (5' 3\")   Wt 88 kg (194 lb 0.1 oz)   SpO2 97%   BMI 34.37 kg/m    Body mass index is 34.37 kg/m .  I/O last 3 completed shifts:  In: 180 [P.O.:180]  Out: 300 [Urine:300]    Exam:    Patient appears comfortable, conversational, and in no apparent distress.   Head: Normocephalic, without obvious abnormality, atraumatic, no facial asymmetry.   Eyes: conjunctivae/corneas clear. PERRL, EOM's intact.   Throat: lips, mucosa, and tongue normal; teeth and gums normal.   Neck: supple, symmetrical, trachea midline, no adenopathy and thyroid: not enlarged, symmetric, no tenderness/mass/nodules.   Lungs: clear to auscultation bilaterally.   Heart: regular rate and rhythm.   Abdomen: soft, non-tender; bowel sounds normal; no masses, no organomegaly.   Pulses: 2+ and symmetric.   Skin: Skin color, texture, turgor normal. No rashes or lesions.     CN II-XII grossly intact, alert and appropriate with conversation and following commands.   Cervical spine is non tender to palpation. Appropriate range of motion of neck, not concerning for lhermitte's phenomenon.   Bilateral bicep 2/4 and tricep reflexes 1/4. Sensation intact throughout upper extremities.     UE muscle strength  Right  Left    Deltoid  5/5  5/5    Biceps  5/5  5/5    Triceps  5/5  5/5    Hand intrinsics  5/5  5/5    Hand grasp  5/5  5/5    Kraus signs  neg  neg      Lumbar spine is non tender to palpation.   Intact but diminished " "sensation throughout right lower extremity.   Bilateral patellar 2/4 and achilles reflex 1/4.     LE muscle strength  Right  Left    Iliopsoas (hip flexion)  4-/5  5/5    Quad (knee extension)  4-/5  5/5    Hamstring (knee flexion)  5/5  5/5    Gastrocnemius (PF)  5/5  5/5    Tibialis Ant. (DF)  5/5  5/5    EHL  5/5  5/5      Negative Babinski bilaterally. Negative for clonus.   Calves are soft and non-tender bilaterally.     Imaging:   Impression per radiology read - I have personally reviewed the images with the patient.    MRI LUMBAR SPINE WITHOUT CONTRAST   11/7/2024 3:09 PM      HISTORY: fall 10/21    since, not very mobile due to covid.   \"disc  surgery\">20Y ago.   Now having R sciatica sx since trying to be mobile  again.  RA, on daily prednisone      TECHNIQUE: Multiplanar multisequence MRI of the lumbar spine without  contrast.      COMPARISON: 11/6/2024      FINDINGS: Compared to the 3/25/2020 CT abdomen and pelvis, there is  compression deformity of the L5 vertebral level. There is a superior  endplate marrow edema with 25% loss of vertebral body height. This  could reflect an acute Schmorl's node versus acute compression  fracture.     Multilevel loss of disc height most notably at L2-3.     T12-L1: Negative.     L1-2: Negative.     L2-3: Broad-based disc bulge. Moderate canal stenosis. Facet disease.  Mild to moderate bilateral foraminal narrowing. Right central  extrusion extending caudally at this level contributes to right  lateral recess narrowing and mass effect upon the descending right L3  nerve root. Postsurgical changes of right hemilaminectomy.     L3-4: Broad-based disc bulge with moderate canal stenosis. Facet  disease. Right foraminal protrusion with extruded component extending  cranially contributing to severe right foraminal narrowing.     L4-5: Broad-based disc bulge with moderate canal stenosis. Facet  disease and ligamentum flavum hypertrophy. Moderate canal stenosis.     L5-S1: " "Broad-based disc bulge with moderate canal stenosis. Facet  disease. Mild bilateral foraminal narrowing.                                                                   IMPRESSION:  Multilevel degenerative changes with moderate canal  stenotic findings at L2-3 and L3-4 as well as L4-5 and L5-S1. A right  central disc extrusion at L2-3 contributes to mass effect upon the  descending right L3 nerve root. Postsurgical changes of right  hemilaminectomy at L2-3.      AYDIN DINH MD     Available labs at time of consult:       Recent Labs   Lab 11/08/24  0708 11/07/24  0640   WBC 13.1* 9.8   HGB 11.8 12.3   HCT 37.0 37.4   MCV 92 90    315     Recent Labs   Lab 11/08/24  0708 11/07/24  0640   WBC 13.1* 9.8   HGB 11.8 12.3   HCT 37.0 37.4   MCV 92 90    315     No results for input(s): \"SED\", \"CRP\" in the last 168 hours.  Recent Labs   Lab 11/08/24  0708 11/07/24  0640   HGB 11.8 12.3     No results for input(s): \"INR\" in the last 168 hours.  Recent Labs   Lab 11/08/24  0708 11/07/24  0640    315     Recent Labs   Lab 11/08/24  0708 11/07/24  0640   WBC 13.1* 9.8         Respectfully,    MAE Bowman, KULWANT  [facility] Neurosurgery  [facility]     Tel: [phone]      All imaging, physical findings, and the above plan have been reviewed with Dr. Kenny.    "

## 2024-11-08 NOTE — PLAN OF CARE
"PRIMARY Concern: Lower back and R hip pain  SAFETY RISK Concerns (fall risk, behaviors, etc.): Fall risk   Aggression Tool Color: Green   Isolation/Type: No   Tests/Procedures for NEXT shift: AM labs   Consults? (Pending/following, signed-off?) Neurosurgery/PT/OT/SW consulted   Where is patient from? (Home, TCU, etc.): Home, recently discharged from TCU   Other Important info for NEXT shift:   Anticipated DC date & active delays: TBD  _____________________________________________________________________________  SUMMARY NOTE:  Orientation/Cognitive: Alert and Oriented x4 forgetful at times  Observation Goals (Met/ Not Met): Not met   Mobility Level/Assist Equipment: A2/GB/W  Antibiotics & Plan (IV/po, length of tx left): No  Pain Management: back , PO Dilaudid given. On scheduled Tylenol and Tele/VS/O2: VSS on RA   ABNL Lab/BG: See lab flowsheet  Diet: Regular diet  Bowel/Bladder: purewick in place   Skin Concerns: Bruise to L abdomen and scattered, blanchable sacrum   Drains/Devices: PIV Saline locked  Patient Stated Goal for Today:  \"get some sleep\"                   Observation goals:     -diagnostic tests and consults completed and resulted-Not met  PT/OT  -vital signs normal or at patient baseline-Met   -adequate pain control on oral analgesics- Not met  Dilaudid po, gabapentin  -returns to baseline functional status- Not met  Assist 2/belt/walker  -safe disposition plan has been identified- Not met  pending TCU rec              "

## 2024-11-08 NOTE — PROGRESS NOTES
Observation goals  PRIOR TO DISCHARGE        Comments: -diagnostic tests and consults completed and resulted Not Met  -vital signs normal or at patient baseline Met  -adequate pain control on oral analgesics Met  -returns to baseline functional status Not Met  -safe disposition plan has been identified Not Met  Nurse to notify provider when observation goals have been met and patient is ready for discharge.

## 2024-11-08 NOTE — PLAN OF CARE
PRIMARY Concern: Lower back and R hip pain  SAFETY RISK Concerns (fall risk, behaviors, etc.): Fall      Aggression Tool Color: Green   Isolation/Type: NA   Tests/Procedures for NEXT shift: AM labs   Consults? (Pending/following, signed-off?) Neurosurgery/PT/OT/SW consulted   Where is patient from? (Home, TCU, etc.): Home, recently discharged from TCU   Other Important info for NEXT shift: Pt started on Lovenox injections this evening   Anticipated DC date & active delays: TBD  _____________________________________________________________________________  SUMMARY NOTE:  Orientation/Cognitive: AOx4   Observation Goals (Met/ Not Met): Not met   Mobility Level/Assist Equipment: A2/GB/W  Antibiotics & Plan (IV/po, length of tx left): NA   Pain Management: C/o 7/10 back and hip pain, PO Dilaudid given. On scheduled Tylenol and Ibuprofen   Tele/VS/O2: VSS on RA   ABNL Lab/BG: See Chart  Diet: Regular   Bowel/Bladder: Incontinent of urine, purewick in place   Skin Concerns: Bruise to L abdomen and scattered, blanchable sacrum   Drains/Devices: PIV SL   Patient Stated Goal for Today:                     Observation goals:     -diagnostic tests and consults completed and resulted-Not met   -vital signs normal or at patient baseline-Met   -adequate pain control on oral analgesics- Not met  -returns to baseline functional status- Not met  -safe disposition plan has been identified- Not met     Nurse to notify provider when observation goals have been met and patient is ready for discharge.

## 2024-11-08 NOTE — PLAN OF CARE
PRIMARY Concern: R hip pain, lower back pain  SAFETY RISK Concerns (fall risk, behaviors, etc.): fall risk      Aggression Tool Color: green  Isolation/Type: n/a  Tests/Procedures for NEXT shift: K+ recheck  Consults? (Pending/following, signed-off?) Neurosurgery following  Where is patient from? (Home, TCU, etc.): home, was recently in a TCU  Other Important info for NEXT shift: none  Anticipated DC date & active delays: TBD  _____________________________________________________________________________  SUMMARY NOTE:  Orientation/Cognitive: A&Ox4  Observation Goals (Met/ Not Met): Not Met  Mobility Level/Assist Equipment: Ax1, gb, walker  Antibiotics & Plan (IV/po, length of tx left): n/a  Pain Management: c/o back pain, managed with PO dilauded this morning, changed to PO norco and IV dilauded available  Tele/VS/O2: VSS on RA  ABNL Lab/BG: K+ 3.2, replaced and recheck is at 1720  Diet: tolerating a regular diet  Bowel/Bladder: incontinent of urine, purewick in place  Skin Concerns: bruising to abd  Drains/Devices: PIV SL  Patient Stated Goal for Today: pain control

## 2024-11-09 LAB
GLUCOSE BLDC GLUCOMTR-MCNC: 152 MG/DL (ref 70–99)
GLUCOSE BLDC GLUCOMTR-MCNC: 153 MG/DL (ref 70–99)
GLUCOSE BLDC GLUCOMTR-MCNC: 159 MG/DL (ref 70–99)
GLUCOSE BLDC GLUCOMTR-MCNC: 160 MG/DL (ref 70–99)
GLUCOSE BLDC GLUCOMTR-MCNC: 185 MG/DL (ref 70–99)

## 2024-11-09 PROCEDURE — 250N000013 HC RX MED GY IP 250 OP 250 PS 637: Performed by: INTERNAL MEDICINE

## 2024-11-09 PROCEDURE — 250N000012 HC RX MED GY IP 250 OP 636 PS 637: Performed by: PHYSICIAN ASSISTANT

## 2024-11-09 PROCEDURE — 99232 SBSQ HOSP IP/OBS MODERATE 35: CPT | Performed by: HOSPITALIST

## 2024-11-09 PROCEDURE — 250N000012 HC RX MED GY IP 250 OP 636 PS 637: Performed by: INTERNAL MEDICINE

## 2024-11-09 PROCEDURE — 250N000011 HC RX IP 250 OP 636: Performed by: STUDENT IN AN ORGANIZED HEALTH CARE EDUCATION/TRAINING PROGRAM

## 2024-11-09 PROCEDURE — 120N000001 HC R&B MED SURG/OB

## 2024-11-09 PROCEDURE — 250N000013 HC RX MED GY IP 250 OP 250 PS 637: Performed by: STUDENT IN AN ORGANIZED HEALTH CARE EDUCATION/TRAINING PROGRAM

## 2024-11-09 PROCEDURE — 99231 SBSQ HOSP IP/OBS SF/LOW 25: CPT | Performed by: PHYSICIAN ASSISTANT

## 2024-11-09 RX ORDER — LIDOCAINE 4 G/G
1 PATCH TOPICAL
Status: DISCONTINUED | OUTPATIENT
Start: 2024-11-10 | End: 2024-11-09

## 2024-11-09 RX ADMIN — HYDROCHLOROTHIAZIDE 25 MG: 25 TABLET ORAL at 07:52

## 2024-11-09 RX ADMIN — SENNOSIDES AND DOCUSATE SODIUM 2 TABLET: 50; 8.6 TABLET ORAL at 21:22

## 2024-11-09 RX ADMIN — CEVIMELINE 30 MG: 30 CAPSULE ORAL at 07:59

## 2024-11-09 RX ADMIN — ACETAMINOPHEN 500 MG: 500 TABLET, FILM COATED ORAL at 13:05

## 2024-11-09 RX ADMIN — ACETAMINOPHEN 500 MG: 500 TABLET, FILM COATED ORAL at 07:52

## 2024-11-09 RX ADMIN — HYDROMORPHONE HYDROCHLORIDE 0.2 MG: 0.2 INJECTION, SOLUTION INTRAMUSCULAR; INTRAVENOUS; SUBCUTANEOUS at 13:05

## 2024-11-09 RX ADMIN — KETOROLAC TROMETHAMINE 15 MG: 15 INJECTION, SOLUTION INTRAMUSCULAR; INTRAVENOUS at 10:27

## 2024-11-09 RX ADMIN — HYDROCODONE BITARTRATE AND ACETAMINOPHEN 1 TABLET: 5; 325 TABLET ORAL at 18:25

## 2024-11-09 RX ADMIN — ACETAMINOPHEN 500 MG: 500 TABLET, FILM COATED ORAL at 21:22

## 2024-11-09 RX ADMIN — SIMVASTATIN 40 MG: 40 TABLET, FILM COATED ORAL at 21:22

## 2024-11-09 RX ADMIN — GABAPENTIN 400 MG: 300 CAPSULE ORAL at 13:05

## 2024-11-09 RX ADMIN — GABAPENTIN 400 MG: 300 CAPSULE ORAL at 18:25

## 2024-11-09 RX ADMIN — PREDNISONE 5 MG: 5 TABLET ORAL at 07:52

## 2024-11-09 RX ADMIN — CLORAZEPATE DIPOTASSIUM 15 MG: 3.75 TABLET ORAL at 08:38

## 2024-11-09 RX ADMIN — DEXAMETHASONE 4 MG: 2 TABLET ORAL at 21:21

## 2024-11-09 RX ADMIN — GABAPENTIN 400 MG: 300 CAPSULE ORAL at 07:52

## 2024-11-09 RX ADMIN — FOLIC ACID 1 MG: 1 TABLET ORAL at 08:38

## 2024-11-09 RX ADMIN — ACETAMINOPHEN 500 MG: 500 TABLET, FILM COATED ORAL at 15:07

## 2024-11-09 RX ADMIN — GABAPENTIN 100 MG: 100 CAPSULE ORAL at 21:22

## 2024-11-09 RX ADMIN — CEVIMELINE 30 MG: 30 CAPSULE ORAL at 15:53

## 2024-11-09 RX ADMIN — POTASSIUM CHLORIDE 20 MEQ: 750 TABLET, EXTENDED RELEASE ORAL at 07:53

## 2024-11-09 RX ADMIN — PANTOPRAZOLE SODIUM 40 MG: 40 TABLET, DELAYED RELEASE ORAL at 07:52

## 2024-11-09 RX ADMIN — SENNOSIDES AND DOCUSATE SODIUM 1 TABLET: 50; 8.6 TABLET ORAL at 07:53

## 2024-11-09 RX ADMIN — TRAZODONE HYDROCHLORIDE 25 MG: 50 TABLET ORAL at 21:21

## 2024-11-09 RX ADMIN — DEXAMETHASONE 4 MG: 2 TABLET ORAL at 07:51

## 2024-11-09 RX ADMIN — HYDROMORPHONE HYDROCHLORIDE 0.2 MG: 0.2 INJECTION, SOLUTION INTRAMUSCULAR; INTRAVENOUS; SUBCUTANEOUS at 21:21

## 2024-11-09 RX ADMIN — ENOXAPARIN SODIUM 40 MG: 40 INJECTION SUBCUTANEOUS at 18:26

## 2024-11-09 NOTE — PROGRESS NOTES
PRIMARY Concern: Lower back and R hip pain  SAFETY RISK Concerns (fall risk, behaviors, etc.): Fall risk  Aggression Tool Color: Green   Isolation/Type: N/A   Tests/Procedures for NEXT shift: labs AM  Consults? (Pending/following, signed-off?) Neurosurgery following. PT recommending TCU. SW following for TCU placement.  Where is patient from? (Home, TCU, etc.): Home, recently discharged from TCU   Other Important info for NEXT shift:  No lifting anything > 5-10 pounds, No excessive bending, twisting, turning. On Lovenox injections.  Anticipated DC date & active delays: TBD  ______________________________________________________________________  SUMMARY NOTE:  Orientation/Cognitive: AOx4   Observation Goals (Met/ Not Met): Inpt  Mobility Level/Assist Equipment: A1/GB/W  Antibiotics & Plan (IV/po, length of tx left): NA   Pain Management: C/o 7/10 back and hip pain, on scheduled Tylenol and Ibuprofen. Heat pack. Prn Norco available.   Tele/VS/O2: VSS on RA   ABNL Lab/BG: K+ 3.2, replaced and rechecked 4.4. WBC- 13.1  Diet: Regular   Bowel/Bladder: Incontinent of urine, purewick in place. No BM this shift.  Skin Concerns: Bruise to abdomen and scattered  Drains/Devices: PIV SL   Patient Stated Goal for Today:  pain control

## 2024-11-09 NOTE — PLAN OF CARE
Goal Outcome Evaluation:      Plan of Care Reviewed With: patient, sibling    Overall Patient Progress: no changeOverall Patient Progress: no change         PRIMARY Concern: Lower back & R hip pain  SAFETY RISK Concerns (fall risk, behaviors, etc.): Fall risk  Aggression Tool Color: Green   Isolation/Type: N/A   Tests/Procedures for NEXT shift:   Consults? (Pending/following, signed-off?) Neurosurgery following. PT recommending TCU. SW following for TCU placement.  Where is patient from? (Home, TCU, etc.): Home, recently discharged from TCU   Other Important info for NEXT shift:  No lifting anything > 5-10 pounds, No excessive bending, twisting, turning. On Lovenox injections.  Anticipated DC date & active delays: TBD  ______________________________________________________________________  SUMMARY NOTE:  Orientation/Cognitive: A&Ox4   Observation Goals (Met/ Not Met): Inpt  Mobility Level/Assist Equipment: A1/GB/W  Antibiotics & Plan (IV/po, length of tx left): NA   Pain Management: Toradol, Dilaudid  Tele/VS/O2: VSS on RA   ABNL Lab/BG: see results  Diet: Regular   Bowel/Bladder: Incontinent of urine, purewick in place-  Skin Concerns:  Scattered bruising  Drains/Devices: PIV SL   Patient Stated Goal for Today:  pain control

## 2024-11-09 NOTE — PLAN OF CARE
Goal Outcome Evaluation:         PRIMARY Concern: Lower back and R hip pain  SAFETY RISK Concerns (fall risk, behaviors, etc.): Fall risk  Aggression Tool Color: Green   Isolation/Type: N/A   Tests/Procedures for NEXT shift: Bld glucoses  Consults? (Pending/following, signed-off?) Neurosurgery following. PT recommending TCU. SW following for TCU placement.  Where is patient from? (Home, TCU, etc.): Home, recently discharged from TCU   Other Important info for NEXT shift:  No lifting anything > 5-10 pounds, No excessive bending, twisting, turning. On Lovenox injections.  Anticipated DC date & active delays: TBD  ______________________________________________________________________  SUMMARY NOTE:  Orientation/Cognitive: AOx4   Observation Goals (Met/ Not Met): Inpt  Mobility Level/Assist Equipment: A1/GB/W  Antibiotics & Plan (IV/po, length of tx left): NA   Pain Management: Denied pain overnight   Tele/VS/O2: VSS on RA   ABNL Lab/BG: WBC- 13.1  Diet: Regular   Bowel/Bladder: Incontinent of urine, purewick in place overnight; d/c'd this am; had a BM overnight  Skin Concerns: Bruise to abdomen and scattered  Drains/Devices: PIV SL   Patient Stated Goal for Today:  pain control

## 2024-11-09 NOTE — PROGRESS NOTES
Care Management Follow Up    Length of Stay (days): 1    Expected Discharge Date: 11/11/2024     Concerns to be Addressed:       Patient plan of care discussed at interdisciplinary rounds: Yes    Anticipated Discharge Disposition: Transitional Care              Anticipated Discharge Services: Transportation Services  Anticipated Discharge DME:      Patient/family educated on Medicare website which has current facility and service quality ratings: yes  Education Provided on the Discharge Plan:    Patient/Family in Agreement with the Plan: yes    Referrals Placed by CM/SW:    Private pay costs discussed: Not applicable    Discussed  Partnership in Safe Discharge Planning  document with patient/family: No     Handoff Completed: No, handoff not indicated or clinically appropriate    Additional Information:  SW met with patient and partner to get more TCU choices. They stated [facility] is their first choice and then they are also ok with [facility] and [facility]. SW sent referrals    Next Steps: confirm bed when medically ready    RADHA White

## 2024-11-09 NOTE — PROGRESS NOTES
"[facility]    Medicine Progress Note - Hospitalist Service    Date of Admission:  11/6/2024    Assessment & Plan   Agnes Saravia is a 79 year old female admitted on 11/6/2024.   Past history of RA (methotrexate&prednisone), Sjogren's, ILD, HTN.  She presents with acute R sciatica, unable to care for self at home.       Recently hospitalized 10/21 - 10/25 for Covid Infection associated with encephalopathy.    Treated with Remdesiver and Dexamethasone.  She then went to TCU 10/25 - 10/30.        Since she has been home, she has gradually developed low back pain with activity, akin to a right sided sciatica, escalating.   On 11/6/24 compelled to present to ED because unable to get out of chair     In ED, xray R hip and lumbar negative for fracture, UA negative.  Given robaxin, norco, lidocaine patch, no improvement.          R L3 Nerve root impingement   Mechanical fall (10/21/24)   *Patient reports she had \"disc surgery\" >20 years ago but not much more information than that. Reports mechanical fall due to weakness the day she presented for Covid (10/21/24). Possible steroids she received from Covid therapy masked her symptoms from nerve root impingement.   *L-spine MRI on 11/7 shows multilevel degenerative changes with moderate canal stenotic findings, compression deformity of the L5 vertebral level, and central disc extrusion at L2-L3 with mass effect on descending R L3 nerve root.  - Neurosurgery consult:              - Decadron 4 mg BID in the hospital. Will discharge on MDP to make dosing easier for her              -Will need upright XR to include AP and lateral views prior to discharge.  Patient currently has trouble standing so this will have to be done later.              -If patient is not able to get adequate pain control from pain medicine, steroids, and physical therapy, then surgical intervention could be considered next week.              -No lifting anything greater than 5 " "to 10 pounds and avoid excessive bending, twisting, turning.  - pain therapy with heat/cold/tylenol/hydrocodone (oxycodone allergy)/Toradol  - still having significant pain - reports lido patch was ineffective and is not interested increasing gabapentin at this time as would prefer to undergo surgery  - Steroid therapy: Dexamethasone 4 mg twice daily  - SW/PT/OT    - follow up with NSG in about 1 month; NSG to facilitate scheduling     Rheumatoid Arthritis   On prednisone therapy   On Methotrexate therapy   - continue daily prednisone (5mg) and weekly methotrexate (10 mg)  -Will not discontinue prednisone as this is her basal steroid dose that she takes.  - no new meds for several years, generally controlled.      Anxiety     Patient has taken clorazepate nightly for many years, has symptoms if does not get this, continue     Elevated Glucose, prediabetes  *Nonfasting Glucose about 130  *HgbA1C 6.1 this sta  - ISS for now, low intensity.     Hypokalemia  - replace per protocol            Diet: Regular Diet Adult    DVT Prophylaxis: Enoxaparin (Lovenox) SQ  Andujar Catheter: Not present  Lines: None     Cardiac Monitoring: None  Code Status: Full Code      Clinically Significant Risk Factors        # Hypokalemia: Lowest K = 3.2 mmol/L in last 2 days, will replace as needed            # Hypertension: Noted on problem list            # Obesity: Estimated body mass index is 34.37 kg/m  as calculated from the following:    Height as of this encounter: 1.6 m (5' 3\").    Weight as of this encounter: 88 kg (194 lb 0.1 oz)., PRESENT ON ADMISSION            Disposition Plan     Medically Ready for Discharge: Anticipated in 2-4 Days             Ajay Harrison MD  Hospitalist Service  [facility]  Securely message with Mg[employer] (more info)  Text page via [employer] Paging/Directory   ______________________________________________________________________    Interval History   Reports ongoing pain without " improvement since arrival.  States she wants to undergo surgery.  Not interested in additional pain medication adjustments.     Physical Exam   Vital Signs: Temp: 97.2  F (36.2  C) Temp src: Oral BP: (!) 145/78 Pulse: 70   Resp: 16 SpO2: 96 % O2 Device: None (Room air)    Weight: 194 lbs .08 oz    General Appearance: Well nourished female in NAD  Respiratory: lungs CTAB  Cardiovascular: RRR, normal s1/s2 without murmur  GI: normal bowel sounds  Skin:   Other: Awake and alert, CN grossly intact      Medical Decision Making       25 MINUTES SPENT BY ME on the date of service doing chart review, history, exam, documentation & further activities per the note.  MANAGEMENT DISCUSSED with the following over the past 24 hours: bedside RN   Tests ORDERED & REVIEWED in the past 24 hours:  - serial glucose       Data     I have personally reviewed the following data over the past 24 hrs:    N/A  \   N/A   / N/A     N/A N/A N/A /  160 (H)   4.4 N/A N/A \       Imaging results reviewed over the past 24 hrs:   No results found for this or any previous visit (from the past 24 hours).

## 2024-11-09 NOTE — PROGRESS NOTES
"Neurosurgery Progress     ADDENDUM:    Received a page at [phone] stating that she is now interested in surgical intervention.  We will allow more time for her to attempt conservative management. Should we proceed with surgical intervention it will not occur until next week.     Dx:     Right central disc extrusion at L2-3 contributes to mass effect upon the descending right L3 nerve root as well as a L5 compression fracture.     Neuro stable.     TODAY'S PLAN:     -She is not interested in surgical intervention at this time.   -Currently on decadron 4 mg Q12.  -Our team will facilitate a follow-up evaluation in approximately one month.   -Advance activity as tolerated.  -We do suggest that she not lift anything greater than 5-10 pounds and avoid excessive bending, twisting, and turning.  -Continue supportive and symptomatic treatment.  -Continue physical therapy.  -Pain control measures.  -Advance diet as tolerated.     In the event that patient's symptoms worsen or change we would appreciate being contacted. We did discuss signs of a worsening problem that she should seek being evaluated.     We did review the above information with the patient whom agrees with the plan and did verbalize understanding.   ________________________________________________________________     BP (!) 145/78 (BP Location: Left arm)   Pulse 70   Temp 97.2  F (36.2  C) (Oral)   Resp 16   Ht 1.6 m (5' 3\")   Wt 88 kg (194 lb 0.1 oz)   SpO2 96%   BMI 34.37 kg/m       Pt in bed. Appears comfortable and in no apparent distress, moving all extremities.   CN II-XII intact, alert and appropriate with conversation and following commands.   Bilateral upper and lower extremities with appropriate strength. DTR's WNL. Spine is non tender to palpation throughout. Kraus's and Babinski sign neg. Sensation intact throughout. Acceptable ROM.   Calves soft and non-tender bilaterally.     All pertinent labs and updated imaging reviewed in EPIC. "     Jimmy Soliman PA-C   Neurosurgery

## 2024-11-10 ENCOUNTER — APPOINTMENT (OUTPATIENT)
Dept: OCCUPATIONAL THERAPY | Facility: CLINIC | Age: 79
DRG: 519 | End: 2024-11-10
Attending: STUDENT IN AN ORGANIZED HEALTH CARE EDUCATION/TRAINING PROGRAM
Payer: MEDICARE

## 2024-11-10 ENCOUNTER — APPOINTMENT (OUTPATIENT)
Dept: GENERAL RADIOLOGY | Facility: CLINIC | Age: 79
DRG: 519 | End: 2024-11-10
Attending: PHYSICIAN ASSISTANT
Payer: MEDICARE

## 2024-11-10 ENCOUNTER — PREP FOR PROCEDURE (OUTPATIENT)
Dept: NEUROLOGY | Facility: CLINIC | Age: 79
End: 2024-11-10
Payer: MEDICARE

## 2024-11-10 DIAGNOSIS — M51.16 HERNIATION OF LUMBAR INTERVERTEBRAL DISC WITH RADICULOPATHY: Primary | ICD-10-CM

## 2024-11-10 LAB
CREAT SERPL-MCNC: 0.52 MG/DL (ref 0.51–0.95)
EGFRCR SERPLBLD CKD-EPI 2021: >90 ML/MIN/1.73M2
GLUCOSE BLDC GLUCOMTR-MCNC: 127 MG/DL (ref 70–99)
GLUCOSE BLDC GLUCOMTR-MCNC: 139 MG/DL (ref 70–99)
GLUCOSE BLDC GLUCOMTR-MCNC: 142 MG/DL (ref 70–99)
GLUCOSE BLDC GLUCOMTR-MCNC: 211 MG/DL (ref 70–99)
GLUCOSE BLDC GLUCOMTR-MCNC: 217 MG/DL (ref 70–99)
INR PPP: 1.02 (ref 0.85–1.15)
PLATELET # BLD AUTO: 298 10E3/UL (ref 150–450)
POTASSIUM SERPL-SCNC: 4.1 MMOL/L (ref 3.4–5.3)

## 2024-11-10 PROCEDURE — 250N000012 HC RX MED GY IP 250 OP 636 PS 637: Performed by: INTERNAL MEDICINE

## 2024-11-10 PROCEDURE — 250N000011 HC RX IP 250 OP 636: Performed by: STUDENT IN AN ORGANIZED HEALTH CARE EDUCATION/TRAINING PROGRAM

## 2024-11-10 PROCEDURE — 99232 SBSQ HOSP IP/OBS MODERATE 35: CPT | Performed by: HOSPITALIST

## 2024-11-10 PROCEDURE — 85610 PROTHROMBIN TIME: CPT | Performed by: HOSPITALIST

## 2024-11-10 PROCEDURE — 97165 OT EVAL LOW COMPLEX 30 MIN: CPT | Mod: GO

## 2024-11-10 PROCEDURE — 250N000013 HC RX MED GY IP 250 OP 250 PS 637: Performed by: STUDENT IN AN ORGANIZED HEALTH CARE EDUCATION/TRAINING PROGRAM

## 2024-11-10 PROCEDURE — 82565 ASSAY OF CREATININE: CPT | Performed by: STUDENT IN AN ORGANIZED HEALTH CARE EDUCATION/TRAINING PROGRAM

## 2024-11-10 PROCEDURE — 97535 SELF CARE MNGMENT TRAINING: CPT | Mod: GO

## 2024-11-10 PROCEDURE — 250N000013 HC RX MED GY IP 250 OP 250 PS 637: Performed by: INTERNAL MEDICINE

## 2024-11-10 PROCEDURE — 250N000013 HC RX MED GY IP 250 OP 250 PS 637: Performed by: HOSPITALIST

## 2024-11-10 PROCEDURE — 36415 COLL VENOUS BLD VENIPUNCTURE: CPT | Performed by: STUDENT IN AN ORGANIZED HEALTH CARE EDUCATION/TRAINING PROGRAM

## 2024-11-10 PROCEDURE — 250N000012 HC RX MED GY IP 250 OP 636 PS 637: Performed by: PHYSICIAN ASSISTANT

## 2024-11-10 PROCEDURE — 72100 X-RAY EXAM L-S SPINE 2/3 VWS: CPT

## 2024-11-10 PROCEDURE — 85049 AUTOMATED PLATELET COUNT: CPT | Performed by: STUDENT IN AN ORGANIZED HEALTH CARE EDUCATION/TRAINING PROGRAM

## 2024-11-10 PROCEDURE — 120N000001 HC R&B MED SURG/OB

## 2024-11-10 PROCEDURE — 99232 SBSQ HOSP IP/OBS MODERATE 35: CPT | Performed by: PHYSICIAN ASSISTANT

## 2024-11-10 PROCEDURE — 36415 COLL VENOUS BLD VENIPUNCTURE: CPT | Performed by: HOSPITALIST

## 2024-11-10 PROCEDURE — 84132 ASSAY OF SERUM POTASSIUM: CPT | Performed by: INTERNAL MEDICINE

## 2024-11-10 RX ORDER — OXYCODONE HYDROCHLORIDE 5 MG/1
10 TABLET ORAL EVERY 4 HOURS PRN
Status: DISCONTINUED | OUTPATIENT
Start: 2024-11-10 | End: 2024-11-12

## 2024-11-10 RX ORDER — OXYCODONE HYDROCHLORIDE 5 MG/1
5 TABLET ORAL EVERY 4 HOURS PRN
Status: DISCONTINUED | OUTPATIENT
Start: 2024-11-10 | End: 2024-11-12

## 2024-11-10 RX ADMIN — FOLIC ACID 1 MG: 1 TABLET ORAL at 08:04

## 2024-11-10 RX ADMIN — HYDROCODONE BITARTRATE AND ACETAMINOPHEN 1 TABLET: 5; 325 TABLET ORAL at 08:04

## 2024-11-10 RX ADMIN — DEXAMETHASONE 4 MG: 2 TABLET ORAL at 21:10

## 2024-11-10 RX ADMIN — OXYCODONE HYDROCHLORIDE 5 MG: 5 TABLET ORAL at 16:29

## 2024-11-10 RX ADMIN — INSULIN ASPART 1 UNITS: 100 INJECTION, SOLUTION INTRAVENOUS; SUBCUTANEOUS at 21:14

## 2024-11-10 RX ADMIN — SENNOSIDES AND DOCUSATE SODIUM 2 TABLET: 50; 8.6 TABLET ORAL at 08:03

## 2024-11-10 RX ADMIN — ACETAMINOPHEN 500 MG: 500 TABLET, FILM COATED ORAL at 11:54

## 2024-11-10 RX ADMIN — HYDROMORPHONE HYDROCHLORIDE 0.2 MG: 0.2 INJECTION, SOLUTION INTRAMUSCULAR; INTRAVENOUS; SUBCUTANEOUS at 11:54

## 2024-11-10 RX ADMIN — GABAPENTIN 400 MG: 300 CAPSULE ORAL at 17:34

## 2024-11-10 RX ADMIN — PREDNISONE 5 MG: 5 TABLET ORAL at 08:04

## 2024-11-10 RX ADMIN — CEVIMELINE 30 MG: 30 CAPSULE ORAL at 08:01

## 2024-11-10 RX ADMIN — ACETAMINOPHEN 500 MG: 500 TABLET, FILM COATED ORAL at 16:29

## 2024-11-10 RX ADMIN — GABAPENTIN 400 MG: 300 CAPSULE ORAL at 11:54

## 2024-11-10 RX ADMIN — ACETAMINOPHEN 500 MG: 500 TABLET, FILM COATED ORAL at 21:10

## 2024-11-10 RX ADMIN — PANTOPRAZOLE SODIUM 40 MG: 40 TABLET, DELAYED RELEASE ORAL at 08:04

## 2024-11-10 RX ADMIN — POTASSIUM CHLORIDE 20 MEQ: 750 TABLET, EXTENDED RELEASE ORAL at 08:04

## 2024-11-10 RX ADMIN — KETOROLAC TROMETHAMINE 15 MG: 15 INJECTION, SOLUTION INTRAMUSCULAR; INTRAVENOUS at 08:03

## 2024-11-10 RX ADMIN — HYDROCHLOROTHIAZIDE 25 MG: 25 TABLET ORAL at 08:04

## 2024-11-10 RX ADMIN — ENOXAPARIN SODIUM 40 MG: 40 INJECTION SUBCUTANEOUS at 17:34

## 2024-11-10 RX ADMIN — GABAPENTIN 400 MG: 300 CAPSULE ORAL at 08:03

## 2024-11-10 RX ADMIN — SENNOSIDES AND DOCUSATE SODIUM 1 TABLET: 50; 8.6 TABLET ORAL at 21:10

## 2024-11-10 RX ADMIN — CLORAZEPATE DIPOTASSIUM 15 MG: 3.75 TABLET ORAL at 09:29

## 2024-11-10 RX ADMIN — SIMVASTATIN 40 MG: 40 TABLET, FILM COATED ORAL at 21:10

## 2024-11-10 RX ADMIN — CEVIMELINE 30 MG: 30 CAPSULE ORAL at 14:50

## 2024-11-10 RX ADMIN — HYDROMORPHONE HYDROCHLORIDE 0.2 MG: 0.2 INJECTION, SOLUTION INTRAMUSCULAR; INTRAVENOUS; SUBCUTANEOUS at 09:35

## 2024-11-10 RX ADMIN — ACETAMINOPHEN 500 MG: 500 TABLET, FILM COATED ORAL at 08:03

## 2024-11-10 RX ADMIN — GABAPENTIN 100 MG: 100 CAPSULE ORAL at 21:10

## 2024-11-10 RX ADMIN — TRAZODONE HYDROCHLORIDE 25 MG: 50 TABLET ORAL at 21:10

## 2024-11-10 RX ADMIN — DEXAMETHASONE 4 MG: 2 TABLET ORAL at 08:04

## 2024-11-10 ASSESSMENT — ACTIVITIES OF DAILY LIVING (ADL)
ADLS_ACUITY_SCORE: 0
PREVIOUS_RESPONSIBILITIES: LAUNDRY
ADLS_ACUITY_SCORE: 0

## 2024-11-10 NOTE — PROGRESS NOTES
"[facility]    Medicine Progress Note - Hospitalist Service    Date of Admission:  11/6/2024    Assessment & Plan   Agnes Saravia is a 79 year old female admitted on 11/6/2024.   Past history of RA (methotrexate&prednisone), Sjogren's, ILD, HTN.  She presents with acute R sciatica, unable to care for self at home.       Recently hospitalized 10/21 - 10/25 for Covid Infection associated with encephalopathy.    Treated with Remdesiver and Dexamethasone.  She then went to TCU 10/25 - 10/30.        Since she has been home, she has gradually developed low back pain with activity, akin to a right sided sciatica, escalating.   On 11/6/24 compelled to present to ED because unable to get out of chair     In ED, xray R hip and lumbar negative for fracture, UA negative.  Given robaxin, norco, lidocaine patch, no improvement.          R L3 Nerve root impingement   Mechanical fall (10/21/24)   *Patient reports she had \"disc surgery\" >20 years ago but not much more information than that. Reports mechanical fall due to weakness the day she presented for Covid (10/21/24). Possible steroids she received from Covid therapy masked her symptoms from nerve root impingement.   *L-spine MRI on 11/7 shows multilevel degenerative changes with moderate canal stenotic findings, compression deformity of the L5 vertebral level, and central disc extrusion at L2-L3 with mass effect on descending R L3 nerve root.  - Neurosurgery consult:              - Decadron 4 mg BID; likely wean post-op              -Will need upright XR to include AP and lateral views prior to discharge.                -No lifting anything greater than 5 to 10 pounds and avoid excessive bending, twisting, turning.   - NSG recommending microdiscectomy later this week  - pain therapy with heat/cold/tylenol/Toradol  - feels hydrocodone ineffective - reports reaction to oxycodone was leg swelling; denies any tongue/lip/throat swelling or hives and " "believes she has had oxycodone since that time with no adverse effects   - trial oxycodone 5-10 mg q4h prn; monitor closely for any reaction  - reports lido patch was ineffective and is not interested increasing gabapentin at this time   - Steroid therapy: Dexamethasone 4 mg twice daily  - SW/PT/OT    - EKG 2-3 weeks ago showing SR with 1st degree block, no ischemic changes. Limited exertional capacity (ambulates with walker) but denies any exertional cardiorespiratory symptoms and has no prior cardiac history. Is low risk per revised cardiac risk index and is cleared to proceed with surgery.  Note reports significant nausea with anesthesia in the past.   - check INR  - no need for stress dose steroids  - therapies recommending TCU; SW consulted     Rheumatoid Arthritis on chronic prednisone and Methotrexate therapy   - continue daily prednisone (5mg) and weekly methotrexate (10 mg)  -Will not discontinue prednisone as this is her basal steroid dose that she takes.  - no new meds for several years, generally controlled.      Anxiety     Patient has taken clorazepate nightly for many years, has symptoms if does not get this, continue     Elevated Glucose, prediabetes  *Nonfasting Glucose about 130  *HgbA1C 6.1 this sta  - ISS for now, low intensity.     Hypokalemia  - replace per protocol            Diet: Regular Diet Adult    DVT Prophylaxis: Enoxaparin (Lovenox) SQ  Andujar Catheter: Not present  Lines: None     Cardiac Monitoring: None  Code Status: Full Code      Clinically Significant Risk Factors                   # Hypertension: Noted on problem list            # Obesity: Estimated body mass index is 34.37 kg/m  as calculated from the following:    Height as of this encounter: 1.6 m (5' 3\").    Weight as of this encounter: 88 kg (194 lb 0.1 oz)., PRESENT ON ADMISSION            Disposition Plan     Medically Ready for Discharge: Anticipated in 2-4 Days             Ajay Harrison MD  Hospitalist Service  [facility] " [facility]  Securely message with [employer] (more info)  Text page via [employer] Paging/Directory   ______________________________________________________________________    Interval History   Reports no change in pain, but was able to work with therapy.  Pre-op discussion as above.   Moving bowels.  Offers no other complaints.     Physical Exam   Vital Signs: Temp: 97.1  F (36.2  C) Temp src: Oral BP: (!) 141/74 Pulse: 74   Resp: 17 SpO2: 95 % O2 Device: None (Room air)    Weight: 194 lbs .08 oz    General Appearance: Well nourished female in NAD, lying in bed  Respiratory: lungs CTAB  Cardiovascular: RRR, normal s1/s2 without murmur  GI: normal bowel sounds  Skin:   Other: Awake and alert, CN grossly intact      Medical Decision Making       30 MINUTES SPENT BY ME on the date of service doing chart review, history, exam, documentation & further activities per the note.  MANAGEMENT DISCUSSED with the following over the past 24 hours: bedside nurse, patient's friend   Tests ORDERED & REVIEWED in the past 24 hours:  - potassium, creatinine, platelet  Medical complexity over the past 24 hours:  - Prescription DRUG MANAGEMENT performed      Data     I have personally reviewed the following data over the past 24 hrs:    N/A  \   N/A   / 298     N/A N/A N/A /  211 (H)   4.1 N/A 0.52 \       Imaging results reviewed over the past 24 hrs:   No results found for this or any previous visit (from the past 24 hours).

## 2024-11-10 NOTE — PROGRESS NOTES
11/10/24 0907   Appointment Info   Signing Clinician's Name / Credentials (OT) Adamaris Restrepoby OTR/L   Rehab Comments (OT) Longstanding vertigo; pt gets symptomatic when sit to supine. Pt reports she is leaning towards having surgery intervention on this date 11/10/24   Living Environment   People in Home friend(s)  (caretaker)   Current Living Arrangements house   Home Accessibility stairs to enter home   Number of Stairs, Main Entrance 2   Stair Railings, Main Entrance railings safe and in good condition;railings on both sides of stairs   Transportation Anticipated agency;family or friend will provide   Living Environment Comments Pt has friend living with her and able to assist with all IADLs, as well as supervision and set up with ADLs. Pt has walk in shower with stool and handles, comfort toilet seat with sink nearby   Self-Care   Usual Activity Tolerance fair   Current Activity Tolerance poor   Regular Exercise No   Equipment Currently Used at Home walker, rolling   Fall history within last six months yes   Number of times patient has fallen within last six months 1   Activity/Exercise/Self-Care Comment Caregiver helps with set up and supervisoin for shower transfer/functional transfer with getting dressed, pt IND with showering, dressing, g/h, toilet transfer/toileting; pt uses rolling walker and ambulates 10-15 ft at baseline   Instrumental Activities of Daily Living (IADL)   Previous Responsibilities laundry   IADL Comments pt does some light housekeeping from seated position but has help from her friend/caregiver with all IADLs as needed   General Information   Onset of Illness/Injury or Date of Surgery 11/06/24   Referring Physician Rai Shi MD   Patient/Family Therapy Goal Statement (OT) To control and have less back pain with movement   Additional Occupational Profile Info/Pertinent History of Current Problem Per chart: Patient is 78 YO F who presented to hospital with acute R sciatica and  inability to care for self at home. She was recently admitted 10/21 to 10/25/24 for COVID-19 infection with encephalopathy, then discharged to TCU before returning home on 10/30/24. PMH includes RA, anxiety and hypertension.   Existing Precautions/Restrictions fall;spinal   Limitations/Impairments visual   Cognitive Status Examination   Orientation Status orientation to person, place and time   Affect/Mental Status (Cognitive) anxious   Visual Perception   Visual Impairment/Limitations WFL;corrective lenses full-time   Sensory   Sensory Quick Adds sensation intact   Pain Assessment   Patient Currently in Pain Yes, see Vital Sign flowsheet  (8/10; RN present to give pain meds during session)   Posture   Posture forward head position   Range of Motion Comprehensive   General Range of Motion bilateral upper extremity ROM WFL   Strength Comprehensive (MMT)   General Manual Muscle Testing (MMT) Assessment other (see comments)   Comment, General Manual Muscle Testing (MMT) Assessment Did not test d/t pain but WFL during functoinal transfers/ADLs   Muscle Tone Assessment   Muscle Tone Quick Adds No deficits were identified   Coordination   Upper Extremity Coordination No deficits were identified   Bed Mobility   Bed Mobility rolling right;supine-sit;sit-supine   Rolling Right [place] (Bed Mobility) minimum assist (75% patient effort);verbal cues   Supine-Sit [place] (Bed Mobility) verbal cues;moderate assist (50% patient effort)   Sit-Supine [place] (Bed Mobility) verbal cues;moderate assist (50% patient effort)   Assistive Device (Bed Mobility) bed rails;draw sheet   Transfers   Transfers sit-stand transfer;toilet transfer;shower transfer   Sit-Stand Transfer   Sit-Stand [place] (Transfers) minimum assist (75% patient effort)   Assistive Device (Sit-Stand Transfers) walker, front-wheeled   Shower Transfer   Type (Shower Transfer) sit-stand;stand-sit   [place] Level (Shower Transfer) minimum assist  "(75% patient effort)   Assistive Device (Shower Transfer) shower chair;walker, front-wheeled   Toilet Transfer   Type (Toilet Transfer) sit-stand;stand-sit   [place] Level (Toilet Transfer) minimum assist (75% patient effort)   Assistive Device (Toilet Transfer) commode chair;grab bars/safety frame;walker, front-wheeled   Balance   Balance Assessment sitting dynamic balance   Balance Comments Pt leaned forward for ADLs and reported \"I'm falling\" and needed min A to correct   Activities of Daily Living   BADL Assessment/Intervention bathing;upper body dressing;lower body dressing;toileting;grooming   Bathing Assessment/Intervention   [place] Level (Bathing) bathing skills;lower body;upper body;moderate assist (50% patient effort)   Comment, (Bathing) clinician judgement   Assistive Devices (Bathing) shower chair   Upper Body Dressing Assessment/Training   Position (Upper Body Dressing) unsupported sitting   Comment, (Upper Body Dressing) clinician judgement; pt only able to lift one arm at a time edge of bed during assessment d/t pain   [place] Level (Upper Body Dressing) upper body dressing skills;doff;don;moderate assist (50% patient effort)   Lower Body Dressing Assessment/Training   Position (Lower Body Dressing) unsupported sitting   Assistive Devices (Lower Body Dressing) reacher;sock-aid   [place] Level (Lower Body Dressing) lower body dressing skills;doff;don;socks;moderate assist (50% patient effort)   Grooming Assessment/Training   Position (Grooming) edge of bed sitting   [place] Level (Grooming) grooming skills;set up;minimum assist (75% patient effort)   Comment, (Grooming) Pt unable to lift more than one hand off bed or bed rail d/t pain in back for g/h   Toileting   Assistive Devices (Toileting) commode chair   Comment, (Toileting) clinican judgement   [place] Level (Toileting) toileting skills;adjust/manage clothing;perform perineal hygiene;moderate assist (50% patient " effort)   Clinical Impression   Criteria for Skilled Therapeutic Interventions Met (OT) Yes, treatment indicated   OT Diagnosis decreased IND with ADLs   OT Problem List-Impairments impacting ADL problems related to;activity tolerance impaired;balance;mobility;pain;other (see comments)  (spinal precautions)   Assessment of Occupational Performance 3-5 Performance Deficits   Identified Performance Deficits dressing, bathing, shower transfer, toilet transfer, toileting   Planned Therapy Interventions (OT) ADL retraining   Clinical Decision Making Complexity (OT) problem focused assessment/low complexity   Risk & Benefits of therapy have been explained evaluation/treatment results reviewed;care plan/treatment goals reviewed;risks/benefits reviewed;current/potential barriers reviewed;participants voiced agreement with care plan;participants included;patient;caregiver   Clinical Impression Comments Pt motivated to participate in therapy and progress towards baseline   OT Total Evaluation Time   OT Eval, Low Complexity Minutes (78041) 8   OT Goals   Therapy Frequency (OT) 5 times/week   OT Predicted Duration/Target Date for Goal Attainment 11/17/24   OT Goals Hygiene/Grooming;Upper Body Dressing;Lower Body Dressing;Toilet Transfer/Toileting;Transfers   OT: Hygiene/Grooming supervision/stand-by assist;within precautions  (from unsupported sitting)   OT: Upper Body Dressing Supervision/stand-by assist;within precautions  (from sitting with set up)   OT: Lower Body Dressing Minimal assist;using adaptive equipment;within precautions   OT: Transfer Supervision/stand-by assist;with assistive device;within precautions  (walk in shower with shower chair transfer)   OT: Toilet Transfer/Toileting Supervision/stand-by assist;toilet transfer;cleaning and garment management;using adaptive equipment;within precautions   Interventions   Interventions Quick Adds Self-Care/Home Management   Self-Care/Home Management   Self-Care/Home  Mgmt/ADL, Compensatory, Meal Prep Minutes (48880) 33   Symptoms Noted During/After Treatment (Meal Preparation/Planning Training) increased pain;dizziness   Treatment Detail/Skilled Intervention Pt greeted in supine and willing to work with OT. Pt needed VC for body mechanics with transfer and hand placement. Pt progressed from min A with sit to stand at end of bed to CGA after VC, including looking ahead vs. down at feet and hand placement. to increase safety and IND with functional transfers for ADLs, such as dressing and shower transfer. Pt needed CGA for step/pivot transfer for BSC and progressed from min A for sit>stand and stand>sit to CGA after verbal cueing for body mechanics and hand placement. On first attempt standing she needed min A while moving hand from commode seat to FWW and needed tactile cueing. On second attempt pt was CGA with VC. Pt reported pain intermittently during functional transfers as 8/10 and RN present to give medicine. Pt needed CGA with step and pivot transfer back to bed and sat with CGA. In bed end of session with bed alarm on, call light within reach and all needs met.   OT Discharge Planning   OT Plan BSC transfer and progress to toilet transfer as marc, EOB g/h and dressing with AE and spinal precautions   OT Discharge Recommendation (DC Rec) Transitional Care Facility   OT Rationale for DC Rec Pt below baseline for IND in ADLs and significantly limited by pain. Pt lives with friend/caregiver who is able to help with supervision/set up of ADLs and all IADLs. Recommend inpatient OT and TCU for safe discharge and to progress pt for IND and safety in ADLs towards baseline. Pt reported on 11/10/24 that she likely wants surgical intervention.   OT Brief overview of current status Goals of therapy will be to address safe mobility and make recs for d/c to next level of care. Pt and RN will continue to follow all falls risk precautions as documented by RN staff while hospitalized. Min  A-CGA with functional transfers with FWW.   OT Equipment Needed at Discharge   (Pt has reacher, shower chair/grab bars and comfort height toilet in place)   Total Session Time   Timed Code Treatment Minutes 33   Total Session Time (sum of timed and untimed services) 41

## 2024-11-10 NOTE — PROGRESS NOTES
Patient with worsening back and right lower extremity symptoms particularly into the groin and thigh.  Has a history of previous discectomy with Dr. Bernal in 2014.    Given her persistent symptoms and difficulty managing her pain I have recommended a redo right L2-3 minimally invasive microdiscectomy along with a right L3-4 far lateral minimally invasive microdiscectomy.  We will work on scheduling this later this week.

## 2024-11-10 NOTE — PROGRESS NOTES
"Neurosurgery Progress     Dx:     Right central disc extrusion at L2-3 contributes to mass effect upon the descending right L3 nerve root as well as a L5 compression fracture.     Previous right L2-3 hemilaminectomy, medial facetectomy with microdiscectomy by Dr. Bernal in 2014.    Neuro stable.     TODAY'S PLAN:     Ms. Saravia is now interested in surgical intervention. We discussed surgical options that included a right L2-3 microdiscectomy. We also discussed continuing with non-surgical options, although the risk of permanent nerve damage is greater with continued delay of relieving the pressure from the nerve due to the stenosis.     We reviewed the surgical risks including nerve damage, infection, bleeding, residual or recurrent disk / symptoms and spinal fluid leak. This will also be performed utilizing general anesthesia which can pose both cardiovascular and respiratory risks as well.     We will obtain upright flexion / extension x-rays today. Timing of surgery has not been determined yet.     We did review the images together and explained with an anatomical model her condition and the recommended treatment. We also discussed signs of a worsening problem that she should seek being evaluated.     She appeared to have a good understanding of the situation, asked appropriate questions which we answered, and wished to proceed as we had previously recommended.   ________________________________________________________________     /68 (BP Location: Left arm)   Pulse 68   Temp 97.5  F (36.4  C) (Oral)   Resp 17   Ht 1.6 m (5' 3\")   Wt 88 kg (194 lb 0.1 oz)   SpO2 95%   BMI 34.37 kg/m       Pt in bed. Appears comfortable and in no apparent distress, moving all extremities.   CN II-XII intact, alert and appropriate with conversation and following commands.   Bilateral upper and lower extremities with appropriate strength. Sensation intact throughout. Acceptable ROM.   Calves soft and non-tender " bilaterally.     All pertinent labs and updated imaging reviewed in EPIC.     Jimmy Soliman PA-C   Neurosurgery

## 2024-11-10 NOTE — PROGRESS NOTES
Goal Outcome Evaluation:    Plan of Care Reviewed With: patient    Overall Patient Progress: no changeOverall Patient Progress: no change    PRIMARY Concern: Lower back & R hip pain  SAFETY RISK Concerns (fall risk, behaviors, etc.): Fall risk  Aggression Tool Color: Green   Isolation/Type: N/A   Tests/Procedures for NEXT shift:   Consults? (Pending/following, signed-off?) Neurosurgery following- Pt now interested in surgical intervention, it will not occur until next week.   PT recommending TCU. SW following for TCU placement.  Where is patient from? (Home, TCU, etc.): Home, recently discharged from TCU   Other Important info for NEXT shift:  No lifting anything > 5-10 pounds, No excessive bending, twisting, turning. On Lovenox injections.   Anticipated DC date & active delays: pending pain management, surgical intervention.  ___________________________________________________________________  SUMMARY NOTE:  Orientation/Cognitive: A&Ox4   Observation Goals (Met/ Not Met): Inpt  Mobility Level/Assist Equipment: A1/GB/W. Refused to get out of bed.  Antibiotics & Plan (IV/po, length of tx left): NA   Pain Management: Scheduled tylenol. Prn Norco & iv dilaudid given X1.   Tele/VS/O2: VSS on RA   ABNL Lab/BG: see results  Diet: Regular   Bowel/Bladder: Incontinent of urine, purewick in place  Skin Concerns:  Scattered bruising  Drains/Devices: PIV SL   Patient Stated Goal for Today:  pain control

## 2024-11-10 NOTE — PLAN OF CARE
Goal Outcome Evaluation:      Plan of Care Reviewed With: patient, caregiver    Overall Patient Progress: no changeOverall Patient Progress: no change         PRIMARY Concern: Lower back & R hip pain  SAFETY RISK Concerns (fall risk, behaviors, etc.): Fall risk  Aggression Tool Color: Green   Isolation/Type: N/A   Tests/Procedures for NEXT shift: n/a  Consults? (Pending/following, signed-off?) Neurosurgery following. PT recommending TCU. SW following for TCU placement.  Where is patient from? (Home, TCU, etc.): Home, recently discharged from TCU   Other Important info for NEXT shift:  No lifting anything > 5-10 pounds, No excessive bending, twisting, turning. On Lovenox injections.  Anticipated DC date & active delays: TBD  ______________________________________________________________________  SUMMARY NOTE:  Orientation/Cognitive: A&Ox4   Observation Goals (Met/ Not Met): Inpt  Mobility Level/Assist Equipment: A1/GB/W  Antibiotics & Plan (IV/po, length of tx left): NA   Pain Management: Toradol, Dilaudid  Tele/VS/O2: VSS on RA   ABNL Lab/BG: see results  Diet: Regular   Bowel/Bladder: Incontinent of urine, purewick in place  Skin Concerns:  Scattered bruising  Drains/Devices: PIV SL   Patient Stated Goal for Today:  pain control

## 2024-11-10 NOTE — PLAN OF CARE
Goal Outcome Evaluation:         PRIMARY Concern: Lower back & R hip pain  SAFETY RISK Concerns (fall risk, behaviors, etc.): Fall risk  Aggression Tool Color: Green   Isolation/Type: N/A   Tests/Procedures for NEXT shift:   Consults? (Pending/following, signed-off?) Neurosurgery following- Pt now interested in surgical intervention, it will not occur until next week.   PT recommending TCU. SW following for TCU placement.  Where is patient from? (Home, TCU, etc.): Home, recently discharged from TCU   Other Important info for NEXT shift:  No lifting anything > 5-10 pounds, No excessive bending, twisting, turning. On Lovenox injections.   Anticipated DC date & active delays: pending pain management, surgical intervention.  ___________________________________________________________________  SUMMARY NOTE:  Orientation/Cognitive: A&Ox4   Observation Goals (Met/ Not Met): Inpt  Mobility Level/Assist Equipment: A1/GB/W. Refused to get out of bed; turn/repo'd  Antibiotics & Plan (IV/po, length of tx left): NA   Pain Management: Scheduled tylenol. Pt denied pain overnight  Tele/VS/O2: VSS on RA   ABNL Lab/BG: see results in epic  Diet: Regular   Bowel/Bladder: Occasionally incontinent of urine, purewick in place  Skin Concerns:  Scattered bruising  Drains/Devices: PIV SL   Patient Stated Goal for Today:  pain control

## 2024-11-11 ENCOUNTER — TELEPHONE (OUTPATIENT)
Dept: NEUROSURGERY | Facility: CLINIC | Age: 79
End: 2024-11-11
Payer: MEDICARE

## 2024-11-11 LAB
GLUCOSE BLDC GLUCOMTR-MCNC: 141 MG/DL (ref 70–99)
GLUCOSE BLDC GLUCOMTR-MCNC: 143 MG/DL (ref 70–99)
GLUCOSE BLDC GLUCOMTR-MCNC: 143 MG/DL (ref 70–99)
GLUCOSE BLDC GLUCOMTR-MCNC: 184 MG/DL (ref 70–99)
GLUCOSE BLDC GLUCOMTR-MCNC: 251 MG/DL (ref 70–99)
POTASSIUM SERPL-SCNC: 4.1 MMOL/L (ref 3.4–5.3)

## 2024-11-11 PROCEDURE — 250N000012 HC RX MED GY IP 250 OP 636 PS 637: Performed by: INTERNAL MEDICINE

## 2024-11-11 PROCEDURE — 250N000013 HC RX MED GY IP 250 OP 250 PS 637: Performed by: HOSPITALIST

## 2024-11-11 PROCEDURE — 250N000013 HC RX MED GY IP 250 OP 250 PS 637: Performed by: STUDENT IN AN ORGANIZED HEALTH CARE EDUCATION/TRAINING PROGRAM

## 2024-11-11 PROCEDURE — 250N000011 HC RX IP 250 OP 636: Performed by: STUDENT IN AN ORGANIZED HEALTH CARE EDUCATION/TRAINING PROGRAM

## 2024-11-11 PROCEDURE — 99232 SBSQ HOSP IP/OBS MODERATE 35: CPT | Performed by: HOSPITALIST

## 2024-11-11 PROCEDURE — 250N000013 HC RX MED GY IP 250 OP 250 PS 637: Performed by: INTERNAL MEDICINE

## 2024-11-11 PROCEDURE — 36415 COLL VENOUS BLD VENIPUNCTURE: CPT | Performed by: INTERNAL MEDICINE

## 2024-11-11 PROCEDURE — 84132 ASSAY OF SERUM POTASSIUM: CPT | Performed by: INTERNAL MEDICINE

## 2024-11-11 PROCEDURE — 120N000001 HC R&B MED SURG/OB

## 2024-11-11 PROCEDURE — 250N000012 HC RX MED GY IP 250 OP 636 PS 637: Performed by: PHYSICIAN ASSISTANT

## 2024-11-11 RX ORDER — HYDROMORPHONE HCL IN WATER/PF 6 MG/30 ML
0.2 PATIENT CONTROLLED ANALGESIA SYRINGE INTRAVENOUS
Status: DISCONTINUED | OUTPATIENT
Start: 2024-11-11 | End: 2024-11-12

## 2024-11-11 RX ADMIN — SIMVASTATIN 40 MG: 40 TABLET, FILM COATED ORAL at 21:49

## 2024-11-11 RX ADMIN — GABAPENTIN 100 MG: 100 CAPSULE ORAL at 21:48

## 2024-11-11 RX ADMIN — DEXAMETHASONE 4 MG: 2 TABLET ORAL at 21:50

## 2024-11-11 RX ADMIN — HYDROMORPHONE HYDROCHLORIDE 0.2 MG: 0.2 INJECTION, SOLUTION INTRAMUSCULAR; INTRAVENOUS; SUBCUTANEOUS at 05:09

## 2024-11-11 RX ADMIN — CEVIMELINE 30 MG: 30 CAPSULE ORAL at 09:15

## 2024-11-11 RX ADMIN — OXYCODONE HYDROCHLORIDE 5 MG: 5 TABLET ORAL at 11:29

## 2024-11-11 RX ADMIN — DEXAMETHASONE 4 MG: 2 TABLET ORAL at 09:11

## 2024-11-11 RX ADMIN — HYDROCHLOROTHIAZIDE 25 MG: 25 TABLET ORAL at 09:11

## 2024-11-11 RX ADMIN — GABAPENTIN 400 MG: 300 CAPSULE ORAL at 17:33

## 2024-11-11 RX ADMIN — POTASSIUM CHLORIDE 20 MEQ: 750 TABLET, EXTENDED RELEASE ORAL at 09:11

## 2024-11-11 RX ADMIN — SENNOSIDES AND DOCUSATE SODIUM 1 TABLET: 50; 8.6 TABLET ORAL at 09:11

## 2024-11-11 RX ADMIN — OXYCODONE HYDROCHLORIDE 5 MG: 5 TABLET ORAL at 16:59

## 2024-11-11 RX ADMIN — ACETAMINOPHEN 500 MG: 500 TABLET, FILM COATED ORAL at 09:11

## 2024-11-11 RX ADMIN — CLORAZEPATE DIPOTASSIUM 15 MG: 3.75 TABLET ORAL at 10:13

## 2024-11-11 RX ADMIN — INSULIN ASPART 1 UNITS: 100 INJECTION, SOLUTION INTRAVENOUS; SUBCUTANEOUS at 21:51

## 2024-11-11 RX ADMIN — ACETAMINOPHEN 500 MG: 500 TABLET, FILM COATED ORAL at 12:47

## 2024-11-11 RX ADMIN — ACETAMINOPHEN 500 MG: 500 TABLET, FILM COATED ORAL at 16:59

## 2024-11-11 RX ADMIN — TRAZODONE HYDROCHLORIDE 25 MG: 50 TABLET ORAL at 21:49

## 2024-11-11 RX ADMIN — GABAPENTIN 400 MG: 300 CAPSULE ORAL at 12:48

## 2024-11-11 RX ADMIN — FOLIC ACID 1 MG: 1 TABLET ORAL at 09:11

## 2024-11-11 RX ADMIN — GABAPENTIN 400 MG: 300 CAPSULE ORAL at 09:10

## 2024-11-11 RX ADMIN — PREDNISONE 5 MG: 5 TABLET ORAL at 09:11

## 2024-11-11 RX ADMIN — ACETAMINOPHEN 500 MG: 500 TABLET, FILM COATED ORAL at 21:46

## 2024-11-11 RX ADMIN — CEVIMELINE 30 MG: 30 CAPSULE ORAL at 14:14

## 2024-11-11 RX ADMIN — PANTOPRAZOLE SODIUM 40 MG: 40 TABLET, DELAYED RELEASE ORAL at 09:13

## 2024-11-11 RX ADMIN — HYDROMORPHONE HYDROCHLORIDE 0.2 MG: 0.2 INJECTION, SOLUTION INTRAMUSCULAR; INTRAVENOUS; SUBCUTANEOUS at 09:16

## 2024-11-11 NOTE — PLAN OF CARE
Goal Outcome Evaluation:      Plan of Care Reviewed With: patient    Overall Patient Progress: improvingOverall Patient Progress: improving     PRIMARY Concern: Lower back & R hip pain  SAFETY RISK Concerns (fall risk, behaviors, etc.): Fall risk  Aggression Tool Color: Green   Isolation/Type: N/A   Tests/Procedures for NEXT shift: x-ray lumbar spine resulted.  Consults? (Pending/following, signed-off?) Neurosurgery following. PT recommending TCU. SW following for TCU placement.  Where is patient from? (Home, TCU, etc.): Home, recently discharged from TCU   Other Important info for NEXT shift:  No lifting anything > 5-10 pounds, No excessive bending, twisting, turning. On Lovenox injections. NSG recommending R L2-3 microdiscectomy later this week.  Anticipated DC date & active delays: Pending surgical intervention  ____________________________________________________________________  SUMMARY NOTE: 11/10-11/2024; [id number]  Orientation/Cognitive: A&Ox4   Observation Goals (Met/ Not Met): Inpt  Mobility Level/Assist Equipment: A1/GB/W. Refused up in chair.  Antibiotics & Plan (IV/po, length of tx left): NA   Pain Management: Scheduled tylenol, gabapentin. Iv dilaudid given. PT refuses  oxy when offered   Tele/VS/O2: VSS on RA; ex HTN  ABNL Lab/BG: BG- 142, 217.  Diet: Regular   Bowel/Bladder: Incontinent of urine, purewick in place  Skin Concerns:  Scattered bruising  Drains/Devices: PIV SL   Patient Stated Goal for Today:  pain control  Up to bathroom once. New PIV placed on Lt wrist

## 2024-11-11 NOTE — PROGRESS NOTES
[facility]    Neurosurgery  Daily Note    Assessment & Plan   79 year old female presented to ED for evaluation of midline low lumbar pain radiating in right L3 distribution with imaging significant for right central disc extrusion at L2-3 contributes to mass effect upon the descending right L3 nerve root as well as a L5 compression fracture.    24 hour events: No acute events overnight,     AM Rounds:  Ongoing low back pain radiating to RLE. No new or worsening radicular pain, paresthesias, weakness. Exam stable. Plan for surgery tomorrow with Dr. Kenny at 5pm.      Plan:  - Redo right L2-3 minimally invasive microdiscectomy along with a right L3-4 far lateral minimally invasive microdiscectomy with Dr. Kenny tomorrow at 5pm    - case request and pre-op orders placed   - NPO MIDNIGHT   - HOLD all anticoagulation including ASA and NSAIDs   - Lovenox Held   -Pre-op clearance per hospitalist   - Symptomatic cares  - Continue to monitor neuro exam     Khadra Yarbrough PA-C  [facility]surgery  [address] [facility]  [address] [place] [address]      Physical Exam   Temp: 97.7  F (36.5  C) Temp src: Oral BP: (!) 165/74 Pulse: 61   Resp: 18 SpO2: 97 % O2 Device: None (Room air)    Vitals:    11/06/24 1334 11/06/24 1917   Weight: 83.9 kg (185 lb) 88 kg (194 lb 0.1 oz)     Vital Signs with Ranges  Temp:  [97.4  F (36.3  C)-97.8  F (36.6  C)] 97.7  F (36.5  C)  Pulse:  [57-63] 61  Resp:  [17-18] 18  BP: (134-165)/(64-76) 165/74  SpO2:  [95 %-97 %] 97 %  I/O last 3 completed shifts:  In: 240 [P.O.:240]  Out: 650 [Urine:650]    Awake, alert, and appropriate. NAD  BLE 5/5  Sensation intact BLE  Calves soft and nontender bilaterally     Medications   Current Facility-Administered Medications   Medication Dose Route Frequency Provider Last Rate Last Admin      Current Facility-Administered Medications   Medication Dose Route Frequency Provider Last Rate Last Admin    acetaminophen (TYLENOL)  tablet 500 mg  500 mg Oral 4x Daily Rai Shi MD   500 mg at 11/11/24 0911    [Held by provider] aspirin EC tablet 81 mg  81 mg Oral At Bedtime Mara Matos MD   81 mg at 11/07/24 2143    cevimeline (EVOXAC) capsule 30 mg  30 mg Oral TID Mara Matos MD   30 mg at 11/11/24 0915    clorazepate (TRANXENE) tablet 15 mg  15 mg Oral QAM Mara Matos MD   15 mg at 11/11/24 1013    dexAMETHasone (DECADRON) tablet 4 mg  4 mg Oral Q12H [facility] (08/20) Thierno Soliman PA-C   4 mg at 11/11/24 0911    [Held by provider] enoxaparin ANTICOAGULANT (LOVENOX) injection 40 mg  40 mg Subcutaneous Q24H Rai Shi MD   40 mg at 11/10/24 1734    folic acid (FOLVITE) tablet 1 mg  1 mg Oral Once per day on Sunday Monday Wednesday Thursday Friday Saturday Mara Matos MD   1 mg at 11/11/24 0911    gabapentin (NEURONTIN) capsule 100 mg  100 mg Oral At Bedtime Mara Matos MD   100 mg at 11/10/24 2110    gabapentin (NEURONTIN) capsule 400 mg  400 mg Oral TID Mara Matos MD   400 mg at 11/11/24 0910    hydrochlorothiazide (HYDRODIURIL) tablet 25 mg  25 mg Oral Daily Mara Matos MD   25 mg at 11/11/24 0911    insulin aspart (NovoLOG) injection (RAPID ACTING)  1-3 Units Subcutaneous TID AC Mara Matos MD   1 Units at 11/11/24 0913    insulin aspart (NovoLOG) injection (RAPID ACTING)  1-3 Units Subcutaneous At Bedtime Mara Matos MD   1 Units at 11/10/24 2114    [START ON 11/12/2024] methotrexate tablet 10 mg  10 mg Oral Q7 Days Mara Matos MD        pantoprazole (PROTONIX) EC tablet 40 mg  40 mg Oral Daily Mara Matos MD   40 mg at 11/11/24 0913    potassium chloride kang ER (KLOR-CON M10) CR tablet 20 mEq  20 mEq Oral Daily Mara Matos MD   20 mEq at 11/11/24 0911    predniSONE (DELTASONE) tablet 5 mg  5 mg Oral Daily Mara Matos MD   5 mg at 11/11/24 0911    senna-docusate (SENOKOT-S/PERICOLACE) 8.6-50 MG per  tablet 1 tablet  1 tablet Oral BID Mara Matos MD   1 tablet at 11/11/24 0911    Or    senna-docusate (SENOKOT-S/PERICOLACE) 8.6-50 MG per tablet 2 tablet  2 tablet Oral BID Mara Matos MD   2 tablet at 11/10/24 0803    simvastatin (ZOCOR) tablet 40 mg  40 mg Oral At Bedtime Mara Matos MD   40 mg at 11/10/24 2110    traZODone (DESYREL) half-tab 25 mg  25 mg Oral At Bedtime Mara Matos MD   25 mg at 11/10/24 2110

## 2024-11-11 NOTE — PROGRESS NOTES
"[facility]    Medicine Progress Note - Hospitalist Service    Date of Admission:  11/6/2024    Assessment & Plan   Agnes Saravia is a 79 year old female admitted on 11/6/2024.   Past history of RA (methotrexate&prednisone), Sjogren's, ILD, HTN.  She presents with acute R sciatica, unable to care for self at home.       Recently hospitalized 10/21 - 10/25 for Covid Infection associated with encephalopathy.    Treated with Remdesiver and Dexamethasone.  She then went to TCU 10/25 - 10/30.        Since she has been home, she has gradually developed low back pain with activity, akin to a right sided sciatica, escalating.   On 11/6/24 compelled to present to ED because unable to get out of chair.       R L3 Nerve root impingement   Mechanical fall (10/21/24)   *Patient reports she had \"disc surgery\" >20 years ago but not much more information than that. Reports mechanical fall due to weakness the day she presented for Covid (10/21/24). Possible steroids she received from Covid therapy masked her symptoms from nerve root impingement.   *xray R hip and lumbar negative for fracture, UA negative  *L-spine MRI on 11/7 shows multilevel degenerative changes with moderate canal stenotic findings, compression deformity of the L5 vertebral level, and central disc extrusion at L2-L3 with mass effect on descending R L3 nerve root.  - Neurosurgery consult:              - Decadron 4 mg BID; likely wean post-op              -No lifting anything greater than 5 to 10 pounds and avoid excessive bending, twisting, turning.   - NSG recommending microdiscectomy later this week  - pain therapy with heat/cold/tylenol/Toradol  - hydrocodone lido patch and lido patch ineffective, not interested in increasing gabapentin  - tolerated trial of oxycodone 11/10 and reported improvement in pain, will continue (switched IV dilaudid to breakthrough 11/11)  - medically cleared to proceed with surgery, no need for stress " "dose steroids  - therapies recommending TCU; SW consulted     Rheumatoid Arthritis on chronic prednisone and Methotrexate therapy   - continue daily prednisone (5mg) and weekly methotrexate (10 mg)  -Will not discontinue prednisone as this is her basal steroid dose that she takes.  - no new meds for several years, generally controlled.      Anxiety     Patient has taken clorazepate nightly for many years, has symptoms if does not get this, continue     Prediabetes with steroid induced hyperglycemia  *HgbA1C 6.1 this stay  - ISS for now, low intensity.     Hypokalemia  - replace per protocol            Diet: Regular Diet Adult    DVT Prophylaxis: Enoxaparin (Lovenox) SQ  Andujar Catheter: Not present  Lines: None     Cardiac Monitoring: None  Code Status: Full Code      Clinically Significant Risk Factors                   # Hypertension: Noted on problem list            # Obesity: Estimated body mass index is 34.37 kg/m  as calculated from the following:    Height as of this encounter: 1.6 m (5' 3\").    Weight as of this encounter: 88 kg (194 lb 0.1 oz)., PRESENT ON ADMISSION            Disposition Plan     Medically Ready for Discharge: Anticipated in 2-4 Days             Ajay Harrison MD  Hospitalist Service  [facility]  Securely message with [employer] (more info)  Text page via [employer] Paging/Directory   ______________________________________________________________________    Interval History   Continues with pain, but otherwise no complaints.  Moving bowels.  Tolerated oxycodone yesterday and reported improvement in pain.  Again discussed need to transition to oral medications and avoid potent narcotics as able.     Physical Exam   Vital Signs: Temp: 97.7  F (36.5  C) Temp src: Oral BP: (!) 165/74 Pulse: 61   Resp: 18 SpO2: 97 % O2 Device: None (Room air)    Weight: 194 lbs .08 oz    General Appearance: Well nourished female in NAD  Respiratory: few bibasilar crackles, no wheezing or " tachypnea  Cardiovascular: RRR, normal s1/s2 without murmur  GI: normal bowel sounds  Skin:   Other: Awake and alert, CN grossly intact      Medical Decision Making       25 MINUTES SPENT BY ME on the date of service doing chart review, history, exam, documentation & further activities per the note.  MANAGEMENT DISCUSSED with the following over the past 24 hours: bedside RN, patient's friend   Tests ORDERED & REVIEWED in the past 24 hours:  - Coags/INR  - potassium  Medical complexity over the past 24 hours:  - Prescription DRUG MANAGEMENT performed      Data     I have personally reviewed the following data over the past 24 hrs:    N/A  \   N/A   / N/A     N/A N/A N/A /  143 (H)   4.1 N/A N/A \     INR:  1.02 PTT:  N/A   D-dimer:  N/A Fibrinogen:  N/A       Imaging results reviewed over the past 24 hrs:   Recent Results (from the past 24 hours)   XR Lumbar Spine 2/3 Views    Narrative    EXAM: XR LUMBAR SPINE 2/3 VIEWS  LOCATION: [facility]  DATE/TIME: 11/10/2024 12:39 PM CST    INDICATION: L2 3 surgical planning.   TECHNIQUE: Radiographs of lumbar spine in lateral including flexion/extension projections.    FINDINGS: Nomenclature based on 5 lumbar type vertebral bodies. Grade 1 anterolisthesis at L4-5. Mild loss of height at L5. Normal height of other vertebral bodies. Advanced degenerative disc disease at L2-3. Advanced multi level facet arthropathy.   Aortic calcification.    Anterolisthesis at L4-5 measures 4.4 mm on neutral, 6.7 mm on flexion and 3.3 mm on extension.

## 2024-11-11 NOTE — PROGRESS NOTES
PRIMARY Concern: Lower back & R hip pain  SAFETY RISK Concerns (fall risk, behaviors, etc.): Fall risk  Aggression Tool Color: Green   Isolation/Type: N/A   Tests/Procedures for NEXT shift: n/a  Consults? (Pending/following, signed-off?) Neurosurgery following. PT recommending TCU. SW following for TCU placement.  Where is patient from? (Home, TCU, etc.): Home, recently discharged from TCU   Other Important info for NEXT shift:  No lifting anything > 5-10 pounds, No excessive bending, twisting, turning. On Lovenox injections. NSG recommending R L2-3 microdiscectomy later this week.  Anticipated DC date & active delays: Pending surgical intervention  ______________________________________________________________________  SUMMARY NOTE: Patient had breakfast and lunch, declined 1 unit insulin at lunch.   Orientation/Cognitive: A&Ox4   Observation Goals (Met/ Not Met): Inpt  Mobility Level/Assist Equipment: A1/GB/W.   Antibiotics & Plan (IV/po, length of tx left): NA   Pain Management: Scheduled tylenol, gabapentin. Oxy given X1, IV dilaudid 0.2 mg given once.   Tele/VS/O2: VSS on RA   ABNL Lab/BG: BG- 142, 217.  Diet: Regular   Bowel/Bladder: Incontinent of urine, purewick in place  Skin Concerns:  Scattered bruising  Drains/Devices: PIV SL   Patient Stated Goal for Today:  pain control

## 2024-11-11 NOTE — CONSULTS
"SPIRITUAL HEALTH SERVICES - Consult Note  [facility] Short Stay    Referral Source/Reason for Visit: Assessment of spiritual and emotional needs because patient responded \"Yes\" to the question in the admission assessment, \"Do you have any spiritual or Orthodoxy beliefs that will affect your care?\"     On this visit with pt Selina, her friend and caregiver was present at bedside.  Selina named anticipating back surgery tomorrow and reflected on the pain she's been experiencing.  Humor is part of her coping.  She named appreciation for prayer which will be provided.    Plan: Spiritual Health remains available. Please contact as needs arise.    Starla Lowry  Associate     [facility] available 24/7 for emergent requests/referrals, either by paging the on-call  or by entering an ASAP/STAT consult in Epic (this will also page the on-call ).   "

## 2024-11-11 NOTE — PLAN OF CARE
Goal Outcome Evaluation:      Plan of Care Reviewed With: patient    Overall Patient Progress: no changeOverall Patient Progress: no change    PRIMARY Concern: Lower back & R hip pain  SAFETY RISK Concerns (fall risk, behaviors, etc.): Fall risk  Aggression Tool Color: Green   Isolation/Type: N/A   Tests/Procedures for NEXT shift: x-ray lumbar spine resulted.  Consults? (Pending/following, signed-off?) Neurosurgery following. PT recommending TCU. SW following for TCU placement.  Where is patient from? (Home, TCU, etc.): Home, recently discharged from TCU   Other Important info for NEXT shift:  No lifting anything > 5-10 pounds, No excessive bending, twisting, turning. On Lovenox injections. NSG recommending R L2-3 microdiscectomy later this week.  Anticipated DC date & active delays: Pending surgical intervention  ____________________________________________________________________  SUMMARY NOTE:  Orientation/Cognitive: A&Ox4   Observation Goals (Met/ Not Met): Inpt  Mobility Level/Assist Equipment: A1/GB/W. Refused up in chair.  Antibiotics & Plan (IV/po, length of tx left): NA   Pain Management: Scheduled tylenol, gabapentin. Oxy given X1. Po iv Dilaudid available.   Tele/VS/O2: VSS on RA   ABNL Lab/BG: BG- 142, 217.  Diet: Regular   Bowel/Bladder: Incontinent of urine, purewick in place  Skin Concerns:  Scattered bruising  Drains/Devices: PIV SL   Patient Stated Goal for Today:  pain control

## 2024-11-12 ENCOUNTER — APPOINTMENT (OUTPATIENT)
Dept: GENERAL RADIOLOGY | Facility: CLINIC | Age: 79
DRG: 519 | End: 2024-11-12
Attending: NEUROLOGICAL SURGERY
Payer: MEDICARE

## 2024-11-12 ENCOUNTER — ANESTHESIA (OUTPATIENT)
Dept: SURGERY | Facility: CLINIC | Age: 79
End: 2024-11-12
Payer: MEDICARE

## 2024-11-12 ENCOUNTER — ANESTHESIA EVENT (OUTPATIENT)
Dept: SURGERY | Facility: CLINIC | Age: 79
End: 2024-11-12
Payer: MEDICARE

## 2024-11-12 LAB
GLUCOSE BLDC GLUCOMTR-MCNC: 131 MG/DL (ref 70–99)
GLUCOSE BLDC GLUCOMTR-MCNC: 136 MG/DL (ref 70–99)
GLUCOSE BLDC GLUCOMTR-MCNC: 143 MG/DL (ref 70–99)
GLUCOSE BLDC GLUCOMTR-MCNC: 171 MG/DL (ref 70–99)

## 2024-11-12 PROCEDURE — 250N000012 HC RX MED GY IP 250 OP 636 PS 637: Performed by: INTERNAL MEDICINE

## 2024-11-12 PROCEDURE — 999N000179 XR SURGERY CARM FLUORO LESS THAN 5 MIN W STILLS

## 2024-11-12 PROCEDURE — 250N000011 HC RX IP 250 OP 636

## 2024-11-12 PROCEDURE — 258N000003 HC RX IP 258 OP 636: Performed by: STUDENT IN AN ORGANIZED HEALTH CARE EDUCATION/TRAINING PROGRAM

## 2024-11-12 PROCEDURE — 250N000011 HC RX IP 250 OP 636: Performed by: HOSPITALIST

## 2024-11-12 PROCEDURE — 250N000009 HC RX 250: Performed by: NURSE ANESTHETIST, CERTIFIED REGISTERED

## 2024-11-12 PROCEDURE — 272N000001 HC OR GENERAL SUPPLY STERILE: Performed by: NEUROLOGICAL SURGERY

## 2024-11-12 PROCEDURE — 999N000141 HC STATISTIC PRE-PROCEDURE NURSING ASSESSMENT: Performed by: NEUROLOGICAL SURGERY

## 2024-11-12 PROCEDURE — 258N000003 HC RX IP 258 OP 636: Performed by: NURSE ANESTHETIST, CERTIFIED REGISTERED

## 2024-11-12 PROCEDURE — 250N000013 HC RX MED GY IP 250 OP 250 PS 637: Performed by: PHYSICIAN ASSISTANT

## 2024-11-12 PROCEDURE — 250N000011 HC RX IP 250 OP 636: Performed by: NEUROLOGICAL SURGERY

## 2024-11-12 PROCEDURE — 360N000084 HC SURGERY LEVEL 4 W/ FLUORO, PER MIN: Performed by: NEUROLOGICAL SURGERY

## 2024-11-12 PROCEDURE — 63042 LAMINOTOMY SINGLE LUMBAR: CPT | Mod: 51 | Performed by: NEUROLOGICAL SURGERY

## 2024-11-12 PROCEDURE — 258N000003 HC RX IP 258 OP 636: Performed by: PHYSICIAN ASSISTANT

## 2024-11-12 PROCEDURE — 710N000009 HC RECOVERY PHASE 1, LEVEL 1, PER MIN: Performed by: NEUROLOGICAL SURGERY

## 2024-11-12 PROCEDURE — 250N000013 HC RX MED GY IP 250 OP 250 PS 637: Performed by: STUDENT IN AN ORGANIZED HEALTH CARE EDUCATION/TRAINING PROGRAM

## 2024-11-12 PROCEDURE — 250N000013 HC RX MED GY IP 250 OP 250 PS 637: Performed by: INTERNAL MEDICINE

## 2024-11-12 PROCEDURE — 370N000017 HC ANESTHESIA TECHNICAL FEE, PER MIN: Performed by: NEUROLOGICAL SURGERY

## 2024-11-12 PROCEDURE — 99232 SBSQ HOSP IP/OBS MODERATE 35: CPT | Performed by: STUDENT IN AN ORGANIZED HEALTH CARE EDUCATION/TRAINING PROGRAM

## 2024-11-12 PROCEDURE — 250N000011 HC RX IP 250 OP 636: Performed by: NURSE ANESTHETIST, CERTIFIED REGISTERED

## 2024-11-12 PROCEDURE — 250N000009 HC RX 250: Performed by: NEUROLOGICAL SURGERY

## 2024-11-12 PROCEDURE — 63056 DECOMPRESS SPINAL CORD LMBR: CPT | Mod: 59 | Performed by: NEUROLOGICAL SURGERY

## 2024-11-12 PROCEDURE — 120N000001 HC R&B MED SURG/OB

## 2024-11-12 PROCEDURE — 250N000013 HC RX MED GY IP 250 OP 250 PS 637: Performed by: HOSPITALIST

## 2024-11-12 PROCEDURE — 250N000025 HC SEVOFLURANE, PER MIN: Performed by: NEUROLOGICAL SURGERY

## 2024-11-12 PROCEDURE — 250N000012 HC RX MED GY IP 250 OP 636 PS 637: Performed by: PHYSICIAN ASSISTANT

## 2024-11-12 RX ORDER — METHOCARBAMOL 500 MG/1
500 TABLET, FILM COATED ORAL EVERY 6 HOURS PRN
Status: DISCONTINUED | OUTPATIENT
Start: 2024-11-12 | End: 2024-11-13

## 2024-11-12 RX ORDER — DEXAMETHASONE SODIUM PHOSPHATE 4 MG/ML
INJECTION, SOLUTION INTRA-ARTICULAR; INTRALESIONAL; INTRAMUSCULAR; INTRAVENOUS; SOFT TISSUE PRN
Status: DISCONTINUED | OUTPATIENT
Start: 2024-11-12 | End: 2024-11-12

## 2024-11-12 RX ORDER — PROPOFOL 10 MG/ML
INJECTION, EMULSION INTRAVENOUS CONTINUOUS PRN
Status: DISCONTINUED | OUTPATIENT
Start: 2024-11-12 | End: 2024-11-12

## 2024-11-12 RX ORDER — BUPIVACAINE HYDROCHLORIDE AND EPINEPHRINE 2.5; 5 MG/ML; UG/ML
INJECTION, SOLUTION EPIDURAL; INFILTRATION; INTRACAUDAL; PERINEURAL PRN
Status: DISCONTINUED | OUTPATIENT
Start: 2024-11-12 | End: 2024-11-12 | Stop reason: HOSPADM

## 2024-11-12 RX ORDER — ONDANSETRON 2 MG/ML
4 INJECTION INTRAMUSCULAR; INTRAVENOUS EVERY 6 HOURS PRN
Status: DISCONTINUED | OUTPATIENT
Start: 2024-11-12 | End: 2024-11-12

## 2024-11-12 RX ORDER — LIDOCAINE HYDROCHLORIDE 20 MG/ML
INJECTION, SOLUTION INFILTRATION; PERINEURAL PRN
Status: DISCONTINUED | OUTPATIENT
Start: 2024-11-12 | End: 2024-11-12

## 2024-11-12 RX ORDER — GLYCOPYRROLATE 0.2 MG/ML
INJECTION, SOLUTION INTRAMUSCULAR; INTRAVENOUS PRN
Status: DISCONTINUED | OUTPATIENT
Start: 2024-11-12 | End: 2024-11-12

## 2024-11-12 RX ORDER — BISACODYL 10 MG
10 SUPPOSITORY, RECTAL RECTAL DAILY PRN
Status: DISCONTINUED | OUTPATIENT
Start: 2024-11-15 | End: 2024-11-15 | Stop reason: HOSPADM

## 2024-11-12 RX ORDER — HYDROMORPHONE HYDROCHLORIDE 2 MG/1
4 TABLET ORAL EVERY 4 HOURS PRN
Status: DISCONTINUED | OUTPATIENT
Start: 2024-11-12 | End: 2024-11-15 | Stop reason: HOSPADM

## 2024-11-12 RX ORDER — HALOPERIDOL 5 MG/ML
1 INJECTION INTRAMUSCULAR
Status: DISCONTINUED | OUTPATIENT
Start: 2024-11-12 | End: 2024-11-12 | Stop reason: HOSPADM

## 2024-11-12 RX ORDER — ACETAMINOPHEN 325 MG/1
975 TABLET ORAL EVERY 8 HOURS
Status: COMPLETED | OUTPATIENT
Start: 2024-11-12 | End: 2024-11-15

## 2024-11-12 RX ORDER — GABAPENTIN 100 MG/1
100 CAPSULE ORAL
Status: COMPLETED | OUTPATIENT
Start: 2024-11-12 | End: 2024-11-12

## 2024-11-12 RX ORDER — ONDANSETRON 2 MG/ML
4 INJECTION INTRAMUSCULAR; INTRAVENOUS EVERY 30 MIN PRN
Status: DISCONTINUED | OUTPATIENT
Start: 2024-11-12 | End: 2024-11-12 | Stop reason: HOSPADM

## 2024-11-12 RX ORDER — ACETAMINOPHEN 325 MG/1
650 TABLET ORAL EVERY 4 HOURS PRN
Status: DISCONTINUED | OUTPATIENT
Start: 2024-11-15 | End: 2024-11-15 | Stop reason: HOSPADM

## 2024-11-12 RX ORDER — NALOXONE HYDROCHLORIDE 0.4 MG/ML
0.1 INJECTION, SOLUTION INTRAMUSCULAR; INTRAVENOUS; SUBCUTANEOUS
Status: DISCONTINUED | OUTPATIENT
Start: 2024-11-12 | End: 2024-11-12 | Stop reason: HOSPADM

## 2024-11-12 RX ORDER — CEFAZOLIN SODIUM/WATER 2 G/20 ML
2 SYRINGE (ML) INTRAVENOUS
Status: COMPLETED | OUTPATIENT
Start: 2024-11-12 | End: 2024-11-12

## 2024-11-12 RX ORDER — FENTANYL CITRATE 50 UG/ML
50 INJECTION, SOLUTION INTRAMUSCULAR; INTRAVENOUS EVERY 5 MIN PRN
Status: DISCONTINUED | OUTPATIENT
Start: 2024-11-12 | End: 2024-11-12 | Stop reason: HOSPADM

## 2024-11-12 RX ORDER — FENTANYL CITRATE 50 UG/ML
25 INJECTION, SOLUTION INTRAMUSCULAR; INTRAVENOUS EVERY 5 MIN PRN
Status: DISCONTINUED | OUTPATIENT
Start: 2024-11-12 | End: 2024-11-12 | Stop reason: HOSPADM

## 2024-11-12 RX ORDER — POLYETHYLENE GLYCOL 3350 17 G/17G
17 POWDER, FOR SOLUTION ORAL DAILY
Status: DISCONTINUED | OUTPATIENT
Start: 2024-11-13 | End: 2024-11-15 | Stop reason: HOSPADM

## 2024-11-12 RX ORDER — CEFAZOLIN SODIUM/WATER 2 G/20 ML
2 SYRINGE (ML) INTRAVENOUS SEE ADMIN INSTRUCTIONS
Status: DISCONTINUED | OUTPATIENT
Start: 2024-11-12 | End: 2024-11-12 | Stop reason: HOSPADM

## 2024-11-12 RX ORDER — ONDANSETRON 4 MG/1
4 TABLET, ORALLY DISINTEGRATING ORAL EVERY 30 MIN PRN
Status: DISCONTINUED | OUTPATIENT
Start: 2024-11-12 | End: 2024-11-12 | Stop reason: HOSPADM

## 2024-11-12 RX ORDER — SODIUM CHLORIDE 9 MG/ML
INJECTION, SOLUTION INTRAVENOUS CONTINUOUS
Status: ACTIVE | OUTPATIENT
Start: 2024-11-12 | End: 2024-11-13

## 2024-11-12 RX ORDER — PROPOFOL 10 MG/ML
INJECTION, EMULSION INTRAVENOUS PRN
Status: DISCONTINUED | OUTPATIENT
Start: 2024-11-12 | End: 2024-11-12

## 2024-11-12 RX ORDER — DEXAMETHASONE SODIUM PHOSPHATE 4 MG/ML
4 INJECTION, SOLUTION INTRA-ARTICULAR; INTRALESIONAL; INTRAMUSCULAR; INTRAVENOUS; SOFT TISSUE
Status: DISCONTINUED | OUTPATIENT
Start: 2024-11-12 | End: 2024-11-12 | Stop reason: HOSPADM

## 2024-11-12 RX ORDER — SODIUM CHLORIDE, SODIUM LACTATE, POTASSIUM CHLORIDE, CALCIUM CHLORIDE 600; 310; 30; 20 MG/100ML; MG/100ML; MG/100ML; MG/100ML
INJECTION, SOLUTION INTRAVENOUS CONTINUOUS
Status: DISCONTINUED | OUTPATIENT
Start: 2024-11-12 | End: 2024-11-12

## 2024-11-12 RX ORDER — ONDANSETRON 2 MG/ML
INJECTION INTRAMUSCULAR; INTRAVENOUS PRN
Status: DISCONTINUED | OUTPATIENT
Start: 2024-11-12 | End: 2024-11-12

## 2024-11-12 RX ORDER — HYDROMORPHONE HCL IN WATER/PF 6 MG/30 ML
0.4 PATIENT CONTROLLED ANALGESIA SYRINGE INTRAVENOUS EVERY 5 MIN PRN
Status: DISCONTINUED | OUTPATIENT
Start: 2024-11-12 | End: 2024-11-12 | Stop reason: HOSPADM

## 2024-11-12 RX ORDER — ONDANSETRON 4 MG/1
4 TABLET, ORALLY DISINTEGRATING ORAL EVERY 6 HOURS PRN
Status: DISCONTINUED | OUTPATIENT
Start: 2024-11-12 | End: 2024-11-12

## 2024-11-12 RX ORDER — HYDROMORPHONE HCL IN WATER/PF 6 MG/30 ML
0.4 PATIENT CONTROLLED ANALGESIA SYRINGE INTRAVENOUS
Status: DISCONTINUED | OUTPATIENT
Start: 2024-11-12 | End: 2024-11-15 | Stop reason: HOSPADM

## 2024-11-12 RX ORDER — HYDROMORPHONE HCL IN WATER/PF 6 MG/30 ML
0.2 PATIENT CONTROLLED ANALGESIA SYRINGE INTRAVENOUS EVERY 5 MIN PRN
Status: DISCONTINUED | OUTPATIENT
Start: 2024-11-12 | End: 2024-11-12 | Stop reason: HOSPADM

## 2024-11-12 RX ORDER — AMOXICILLIN 250 MG
1 CAPSULE ORAL 2 TIMES DAILY
Status: DISCONTINUED | OUTPATIENT
Start: 2024-11-12 | End: 2024-11-15 | Stop reason: HOSPADM

## 2024-11-12 RX ORDER — LIDOCAINE 40 MG/G
CREAM TOPICAL
Status: DISCONTINUED | OUTPATIENT
Start: 2024-11-12 | End: 2024-11-15 | Stop reason: HOSPADM

## 2024-11-12 RX ORDER — DEXMEDETOMIDINE HYDROCHLORIDE 4 UG/ML
INJECTION, SOLUTION INTRAVENOUS PRN
Status: DISCONTINUED | OUTPATIENT
Start: 2024-11-12 | End: 2024-11-12

## 2024-11-12 RX ORDER — HYDROMORPHONE HYDROCHLORIDE 2 MG/1
2 TABLET ORAL EVERY 4 HOURS PRN
Status: DISCONTINUED | OUTPATIENT
Start: 2024-11-12 | End: 2024-11-15 | Stop reason: HOSPADM

## 2024-11-12 RX ORDER — FENTANYL CITRATE 50 UG/ML
INJECTION, SOLUTION INTRAMUSCULAR; INTRAVENOUS PRN
Status: DISCONTINUED | OUTPATIENT
Start: 2024-11-12 | End: 2024-11-12

## 2024-11-12 RX ORDER — HYDROMORPHONE HCL IN WATER/PF 6 MG/30 ML
0.2 PATIENT CONTROLLED ANALGESIA SYRINGE INTRAVENOUS
Status: DISCONTINUED | OUTPATIENT
Start: 2024-11-12 | End: 2024-11-15 | Stop reason: HOSPADM

## 2024-11-12 RX ADMIN — LIDOCAINE HYDROCHLORIDE 80 MG: 20 INJECTION, SOLUTION INFILTRATION; PERINEURAL at 17:33

## 2024-11-12 RX ADMIN — SODIUM CHLORIDE: 9 INJECTION, SOLUTION INTRAVENOUS at 22:24

## 2024-11-12 RX ADMIN — Medication 200 MG: at 19:36

## 2024-11-12 RX ADMIN — ROCURONIUM BROMIDE 10 MG: 50 INJECTION, SOLUTION INTRAVENOUS at 19:09

## 2024-11-12 RX ADMIN — FENTANYL CITRATE 50 MCG: 50 INJECTION INTRAMUSCULAR; INTRAVENOUS at 17:33

## 2024-11-12 RX ADMIN — DEXMEDETOMIDINE HYDROCHLORIDE 12 MCG: 200 INJECTION INTRAVENOUS at 18:26

## 2024-11-12 RX ADMIN — HYDROMORPHONE HYDROCHLORIDE 0.5 MG: 0.2 INJECTION, SOLUTION INTRAMUSCULAR; INTRAVENOUS; SUBCUTANEOUS at 18:33

## 2024-11-12 RX ADMIN — SENNOSIDES AND DOCUSATE SODIUM 1 TABLET: 8.6; 5 TABLET ORAL at 22:22

## 2024-11-12 RX ADMIN — SODIUM CHLORIDE, POTASSIUM CHLORIDE, SODIUM LACTATE AND CALCIUM CHLORIDE: 600; 310; 30; 20 INJECTION, SOLUTION INTRAVENOUS at 10:26

## 2024-11-12 RX ADMIN — GLYCOPYRROLATE 0.2 MCG: 0.2 INJECTION, SOLUTION INTRAMUSCULAR; INTRAVENOUS at 18:29

## 2024-11-12 RX ADMIN — SIMVASTATIN 40 MG: 40 TABLET, FILM COATED ORAL at 22:23

## 2024-11-12 RX ADMIN — METHOTREXATE 10 MG: 2.5 TABLET ORAL at 07:58

## 2024-11-12 RX ADMIN — DEXAMETHASONE SODIUM PHOSPHATE 10 MG: 4 INJECTION, SOLUTION INTRA-ARTICULAR; INTRALESIONAL; INTRAMUSCULAR; INTRAVENOUS; SOFT TISSUE at 17:33

## 2024-11-12 RX ADMIN — PHENYLEPHRINE HYDROCHLORIDE 0.2 MCG/KG/MIN: 10 INJECTION INTRAVENOUS at 18:07

## 2024-11-12 RX ADMIN — ACETAMINOPHEN 500 MG: 500 TABLET, FILM COATED ORAL at 11:48

## 2024-11-12 RX ADMIN — ONDANSETRON 4 MG: 2 INJECTION INTRAMUSCULAR; INTRAVENOUS at 19:22

## 2024-11-12 RX ADMIN — PANTOPRAZOLE SODIUM 40 MG: 40 TABLET, DELAYED RELEASE ORAL at 07:51

## 2024-11-12 RX ADMIN — POTASSIUM CHLORIDE 20 MEQ: 750 TABLET, EXTENDED RELEASE ORAL at 07:50

## 2024-11-12 RX ADMIN — FENTANYL CITRATE 50 MCG: 50 INJECTION INTRAMUSCULAR; INTRAVENOUS at 18:03

## 2024-11-12 RX ADMIN — Medication 2 G: at 17:40

## 2024-11-12 RX ADMIN — OXYCODONE HYDROCHLORIDE 5 MG: 5 TABLET ORAL at 11:52

## 2024-11-12 RX ADMIN — HYDROCHLOROTHIAZIDE 25 MG: 25 TABLET ORAL at 07:51

## 2024-11-12 RX ADMIN — ACETAMINOPHEN 500 MG: 500 TABLET, FILM COATED ORAL at 07:51

## 2024-11-12 RX ADMIN — PROPOFOL 30 MCG/KG/MIN: 10 INJECTION, EMULSION INTRAVENOUS at 17:40

## 2024-11-12 RX ADMIN — ROCURONIUM BROMIDE 40 MG: 50 INJECTION, SOLUTION INTRAVENOUS at 17:33

## 2024-11-12 RX ADMIN — ROCURONIUM BROMIDE 20 MG: 50 INJECTION, SOLUTION INTRAVENOUS at 18:11

## 2024-11-12 RX ADMIN — PROPOFOL 150 MG: 10 INJECTION, EMULSION INTRAVENOUS at 17:33

## 2024-11-12 RX ADMIN — GABAPENTIN 400 MG: 300 CAPSULE ORAL at 07:50

## 2024-11-12 RX ADMIN — ACETAMINOPHEN 500 MG: 500 TABLET, FILM COATED ORAL at 16:01

## 2024-11-12 RX ADMIN — PREDNISONE 5 MG: 5 TABLET ORAL at 07:51

## 2024-11-12 RX ADMIN — DEXAMETHASONE 4 MG: 2 TABLET ORAL at 07:51

## 2024-11-12 RX ADMIN — ACETAMINOPHEN 975 MG: 325 TABLET, FILM COATED ORAL at 22:23

## 2024-11-12 RX ADMIN — TRAZODONE HYDROCHLORIDE 25 MG: 50 TABLET ORAL at 22:23

## 2024-11-12 RX ADMIN — GABAPENTIN 400 MG: 300 CAPSULE ORAL at 11:48

## 2024-11-12 RX ADMIN — CLORAZEPATE DIPOTASSIUM 15 MG: 3.75 TABLET ORAL at 08:42

## 2024-11-12 RX ADMIN — SODIUM CHLORIDE, POTASSIUM CHLORIDE, SODIUM LACTATE AND CALCIUM CHLORIDE: 600; 310; 30; 20 INJECTION, SOLUTION INTRAVENOUS at 18:52

## 2024-11-12 RX ADMIN — CEVIMELINE 30 MG: 30 CAPSULE ORAL at 07:58

## 2024-11-12 ASSESSMENT — ENCOUNTER SYMPTOMS
DYSRHYTHMIAS: 0
SEIZURES: 0

## 2024-11-12 ASSESSMENT — LIFESTYLE VARIABLES: TOBACCO_USE: 1

## 2024-11-12 NOTE — ANESTHESIA PREPROCEDURE EVALUATION
Anesthesia Pre-Procedure Evaluation    Patient: Agnes Saravia   MRN: [id number] : 1945        Procedure : Procedure(s):  Right Lumbar 2 to Lumbar 3 minimally invasive redo discectomy and Lumbar 3 to Lumbar 4 far lateral discectomy          Past Medical History:   Diagnosis Date    Anxiety     Arthritis     Cancer (H)     Skin    Depressive disorder     Hyperlipidemia     Insomnia     PONV (postoperative nausea and vomiting)     Rheumatoid arthritis(714.0)     Sjogren's syndrome (H)       Past Surgical History:   Procedure Laterality Date    ABDOMEN SURGERY      APPENDECTOMY      BIOPSY  2014    Lip lower    BLEPHAROPLASTY      BRONCHOSCOPY (RIGID OR FLEXIBLE), DIAGNOSTIC N/A 2018    Procedure: COMBINED BRONCHOSCOPY (RIGID OR FLEXIBLE), LAVAGE;  Bronchoscopy with Lavage;  Surgeon: Manuel Conway MD;  Location:  GI    COLONOSCOPY      DISCECTOMY LUMBAR POSTERIOR MICROSCOPIC ONE LEVEL  2014    Procedure: DISCECTOMY LUMBAR POSTERIOR MICROSCOPIC ONE LEVEL;  Surgeon: Dudley Bernal MD;  Location:  OR    ENT SURGERY  2017    HYSTERECTOMY TOTAL ABDOMINAL, BILATERAL SALPINGO-OOPHORECTOMY, COMBINED      HYSTERECTOMY, PAP NO LONGER INDICATED      MAMMOPLASTY REDUCTION BILATERAL  2013    Procedure: MAMMOPLASTY REDUCTION BILATERAL;  BILATERAL REDUCTION MAMMOPLASTY;  Surgeon: Bruce Nash MD;  Location: [facility]    SHOULDER SURGERY        Allergies   Allergen Reactions    Mellaril [Thioridazine Hcl] Anaphylaxis    Percocet [Oxycodone-Acetaminophen]      Tolerates hydrocodone and codeine in cough medicine. Tolerates  Believes reaction to oxycodone was leg swelling. No tongue/lip/throat swelling or hives.    Duloxetine Anxiety     Made her feel weird, increased anxiety      Social History     Tobacco Use    Smoking status: Former     Current packs/day: 0.00     Average packs/day: 0.5 packs/day for 10.0 years (5.0 ttl pk-yrs)     Types: Cigarettes     Start date:  1970     Quit date: 1975     Years since quittin.4    Smokeless tobacco: Never    Tobacco comments:     N/A   Substance Use Topics    Alcohol use: Yes     Alcohol/week: 0.0 standard drinks of alcohol     Comment: beer in summer when mowing lawn       Wt Readings from Last 1 Encounters:   24 88 kg (194 lb 0.1 oz)        Anesthesia Evaluation   Pt has had prior anesthetic.     History of anesthetic complications  - PONV.      ROS/MED HX  ENT/Pulmonary: Comment: Interstitial Lung Disease    (+)                tobacco use, Past use,     asthma  Treatment: Inhaler prn,       recent URI,          Neurologic: Comment: Presented with acute R sciatica, unable to care for self at home  R L3 Nerve root impingement   Mechanical fall (10/21/24)      (-) no seizures, no CVA and no TIA   Cardiovascular:     (+)  hypertension- -   -  - -                                   (-) arrhythmias   METS/Exercise Tolerance: 1 - Eating, dressing    Hematologic:    (-) history of blood clots   Musculoskeletal:   (+)  arthritis (Rheumatoid, methotrexate and prednisone),             GI/Hepatic:     (+) GERD, Asymptomatic on medication,               (-) liver disease   Renal/Genitourinary:    (-) renal disease   Endo:       Psychiatric/Substance Use:     (+) psychiatric history anxiety       Infectious Disease: Comment: Recently hospitalized 10/21 - 10/25 for Covid Infection associated with encephalopathy   (-) Recent Fever   Malignancy:       Other:            Physical Exam    Airway        Mallampati: II   TM distance: > 3 FB   Neck ROM: full   Mouth opening: > 3 cm    Respiratory Devices and Support         Dental       (+) Minor Abnormalities - some fillings, tiny chips      Cardiovascular          Rhythm and rate: regular and normal     Pulmonary           breath sounds clear to auscultation           OUTSIDE LABS:  CBC:   Lab Results   Component Value Date    WBC 13.1 (H) 2024    WBC 9.8 2024    HGB 11.8  11/08/2024    HGB 12.3 11/07/2024    HCT 37.0 11/08/2024    HCT 37.4 11/07/2024     11/10/2024     11/08/2024     BMP:   Lab Results   Component Value Date     11/08/2024     11/07/2024    POTASSIUM 4.1 11/11/2024    POTASSIUM 4.1 11/10/2024    CHLORIDE 98 11/08/2024    CHLORIDE 99 11/07/2024    CO2 30 (H) 11/08/2024    CO2 26 11/07/2024    BUN 19.0 11/08/2024    BUN 12.2 11/07/2024    CR 0.52 11/10/2024    CR 0.67 11/08/2024     (H) 11/12/2024     (H) 11/12/2024     COAGS:   Lab Results   Component Value Date    INR 1.02 11/10/2024     POC:   Lab Results   Component Value Date     (H) 04/23/2018     HEPATIC:   Lab Results   Component Value Date    ALBUMIN 3.8 10/21/2024    ALBUMIN 3.8 10/21/2024    PROTTOTAL 6.4 10/21/2024    PROTTOTAL 6.5 10/21/2024    ALT 19 10/21/2024    ALT 19 10/21/2024    AST 25 10/21/2024    AST 30 10/21/2024    ALKPHOS 69 10/21/2024    ALKPHOS 69 10/21/2024    BILITOTAL 0.5 10/21/2024    BILITOTAL 0.5 10/21/2024     OTHER:   Lab Results   Component Value Date    LACT 1.1 10/21/2024    A1C 6.1 (H) 11/07/2024    CHRISTOPHER 9.0 11/08/2024    MAG 1.7 10/22/2024    LIPASE 211 01/28/2017    TSH 2.09 06/25/2021    CRP 20.0 (H) 04/11/2018    SED 20 04/11/2018       Anesthesia Plan    ASA Status:  3    NPO Status:  NPO Appropriate    Anesthesia Type: General.     - Airway: ETT   Induction: Intravenous, Propofol.   Maintenance: Balanced.   Techniques and Equipment:     - Lines/Monitors: 2nd IV     Consents    Anesthesia Plan(s) and associated risks, benefits, and realistic alternatives discussed. Questions answered and patient/representative(s) expressed understanding.     - Discussed: Risks, Benefits and Alternatives for the PROCEDURE were discussed     - Discussed with:  Patient            Postoperative Care    Pain management: IV analgesics, Oral pain medications, Multi-modal analgesia.   PONV prophylaxis: Ondansetron (or other 5HT-3), Dexamethasone or  "Solumedrol, Background Propofol Infusion     Comments:               Leanne Green MD    I have reviewed the pertinent notes and labs in the chart from the past 30 days and (re)examined the patient.  Any updates or changes from those notes are reflected in this note.               # Hypertension: Noted on problem list            # Obesity: Estimated body mass index is 34.37 kg/m  as calculated from the following:    Height as of this encounter: 1.6 m (5' 3\").    Weight as of this encounter: 88 kg (194 lb 0.1 oz).            "

## 2024-11-12 NOTE — PLAN OF CARE
Goal Outcome Evaluation:         PRIMARY Concern: Lower back & R hip pain  SAFETY RISK Concerns (fall risk, behaviors, etc.): Fall risk  Aggression Tool Color: Green   Isolation/Type: N/A   Tests/Procedures for NEXT shift: n/a  Consults? (Pending/following, signed-off?) Neurosurgery following. PT recommending TCU. SW following for TCU placement.  Where is patient from? (Home, TCU, etc.): Home, recently discharged from TCU   Other Important info for NEXT shift:  No lifting anything > 5-10 pounds, No excessive bending, twisting, turning. On Lovenox injections. NSG recommending R L2-3 microdiscectomy later this week.  Anticipated DC date & active delays: Pending surgical intervention  ______________________________________________________________________  SUMMARY NOTE: Patient had breakfast and lunch, declined 1 unit insulin at lunch.   Orientation/Cognitive: A&Ox4   Observation Goals (Met/ Not Met): Inpt  Mobility Level/Assist Equipment: A1/GB/W.   Antibiotics & Plan (IV/po, length of tx left): NA   Pain Management: Scheduled tylenol, gabapentin. Oxy given X1,   Tele/VS/O2: VSS on RA   ABNL Lab/BG: BG- 142,   Diet: NPO   Bowel/Bladder: Incontinent of urine, purewick in place  Skin Concerns:  Scattered bruising  Drains/Devices: PIV SL   Patient Stated Goal for Today:  pain control Plan for surgery at 5 pm

## 2024-11-12 NOTE — ANESTHESIA PROCEDURE NOTES
Airway       Patient location during procedure: OR       Procedure Start/Stop Times: 11/12/2024 5:37 PM  Staff -        Anesthesiologist:  Kenroy Christensen MD       CRNA: Claribel Zeng APRN CRNA       Other Anesthesia Staff: Yolette Becerra       Performed By: SRNA and with CRNAs       Procedure performed by resident/fellow/CRNA in presence of a teaching physician.    Consent for Airway        Urgency: elective  Indications and Patient Condition       Indications for airway management: alan-procedural       Induction type:intravenous       Mask difficulty assessment: 2 - vent by mask + OA or adjuvant +/- NMBA    Final Airway Details       Final airway type: endotracheal airway       Successful airway: ETT - single  Endotracheal Airway Details        ETT size (mm): 7.0       Cuffed: yes       Successful intubation technique: video laryngoscopy       VL Blade Size: Glidescope 3 (Lopro 3)       Grade View of Cords: 1       Adjucts: stylet       Position: Right       Measured from: lips       Secured at (cm): 22       Bite block used: None    Post intubation assessment        Placement verified by: capnometry, equal breath sounds and chest rise        Number of attempts at approach: 1       Number of other approaches attempted: 0       Secured with: tape       Ease of procedure: easy       Dentition: Intact and Unchanged    Medication(s) Administered   Medication Administration Time: 11/12/2024 5:37 PM

## 2024-11-12 NOTE — PROGRESS NOTES
[facility]    Neurosurgery  Daily Note    Assessment & Plan   79 year old female presented to ED for evaluation of midline low lumbar pain radiating in right L3 distribution with imaging significant for right central disc extrusion at L2-3 contributes to mass effect upon the descending right L3 nerve root as well as a L5 compression fracture.     AM Rounds: Patient noting ongoing low back pain rating to right lower extremity.  Scheduled for surgery with Dr. Kenny today at 5 PM.  Answered all patient and family questions regarding surgery and postoperative cares.  Patient family know they do not have any further questions at this time.      Plan:  - Redo right L2-3 minimally invasive microdiscectomy along with a right L3-4 far lateral minimally invasive microdiscectomy with Dr. Kenny TODAY at 5pm              - case request and pre-op orders placed              - NPO               - HOLD all anticoagulation including ASA and NSAIDs              - Lovenox Held              -Pre-op clearance per hospitalist   - Symptomatic cares  - Continue to monitor neuro exam     Khadra Yarbrough PA-C  [facility]  [address]  [address]  [place] [address]      Physical Exam   Temp: 98.2  F (36.8  C) Temp src: Oral BP: 138/68 Pulse: 63   Resp: 18 SpO2: 94 % O2 Device: None (Room air)    Vitals:    11/06/24 1334 11/06/24 1917   Weight: 83.9 kg (185 lb) 88 kg (194 lb 0.1 oz)     Vital Signs with Ranges  Temp:  [97.4  F (36.3  C)-98.2  F (36.8  C)] 98.2  F (36.8  C)  Pulse:  [63-79] 63  Resp:  [15-18] 18  BP: (138-148)/(66-78) 138/68  SpO2:  [94 %-95 %] 94 %  No intake/output data recorded.      Medications   Current Facility-Administered Medications   Medication Dose Route Frequency Provider Last Rate Last Admin    lactated ringers infusion   Intravenous Continuous Rai Shi  mL/hr at 11/12/24 1026 New Bag at 11/12/24 1026      Current Facility-Administered  Medications   Medication Dose Route Frequency Provider Last Rate Last Admin    acetaminophen (TYLENOL) tablet 500 mg  500 mg Oral 4x Daily Rai Shi MD   500 mg at 11/12/24 0751    [Held by provider] aspirin EC tablet 81 mg  81 mg Oral At Bedtime Mara Matos MD   81 mg at 11/07/24 2143    cevimeline (EVOXAC) capsule 30 mg  30 mg Oral TID Mara Matos MD   30 mg at 11/12/24 0758    clorazepate (TRANXENE) tablet 15 mg  15 mg Oral QAM Mara Matos MD   15 mg at 11/12/24 0842    dexAMETHasone (DECADRON) tablet 4 mg  4 mg Oral Q12H [facility] (08/20) Thierno Soliman PA-C   4 mg at 11/12/24 0751    [Held by provider] enoxaparin ANTICOAGULANT (LOVENOX) injection 40 mg  40 mg Subcutaneous Q24H Rai Shi MD   40 mg at 11/10/24 1734    folic acid (FOLVITE) tablet 1 mg  1 mg Oral Once per day on Sunday Monday Wednesday Thursday Friday Saturday Mara Matos MD   1 mg at 11/11/24 0911    gabapentin (NEURONTIN) capsule 100 mg  100 mg Oral At Bedtime Mara Matos MD   100 mg at 11/11/24 2148    gabapentin (NEURONTIN) capsule 400 mg  400 mg Oral TID Mara Matos MD   400 mg at 11/12/24 0750    hydrochlorothiazide (HYDRODIURIL) tablet 25 mg  25 mg Oral Daily Mara Matos MD   25 mg at 11/12/24 0751    insulin aspart (NovoLOG) injection (RAPID ACTING)  1-3 Units Subcutaneous TID AC Mara Matos MD   1 Units at 11/11/24 1757    insulin aspart (NovoLOG) injection (RAPID ACTING)  1-3 Units Subcutaneous At Bedtime Mara Matos MD   1 Units at 11/11/24 2151    methotrexate tablet 10 mg  10 mg Oral Q7 Days Mara Matos MD   10 mg at 11/12/24 0758    pantoprazole (PROTONIX) EC tablet 40 mg  40 mg Oral Daily Mara Matos MD   40 mg at 11/12/24 0751    potassium chloride kang ER (KLOR-CON M10) CR tablet 20 mEq  20 mEq Oral Daily Mara Maots MD   20 mEq at 11/12/24 0750    predniSONE (DELTASONE) tablet 5 mg  5 mg Oral Daily  Mara Matos MD   5 mg at 11/12/24 0751    senna-docusate (SENOKOT-S/PERICOLACE) 8.6-50 MG per tablet 1 tablet  1 tablet Oral BID Mara Matos MD   1 tablet at 11/11/24 0911    Or    senna-docusate (SENOKOT-S/PERICOLACE) 8.6-50 MG per tablet 2 tablet  2 tablet Oral BID Mara Matos MD   2 tablet at 11/10/24 0803    simvastatin (ZOCOR) tablet 40 mg  40 mg Oral At Bedtime Mara Matos MD   40 mg at 11/11/24 2149    traZODone (DESYREL) half-tab 25 mg  25 mg Oral At Bedtime Mara Matos MD   25 mg at 11/11/24 2263

## 2024-11-12 NOTE — CONSULTS
SPIRITUAL HEALTH SERVICES - Consult Note  [facility]    Referral Source/Reason for Visit: follow-up spiritual care    On this visit with pt Selina, her friend (Lima) was present at bedside.    Selina named feeling anxious about surgery later today. She welcomes continued prayer.    Selina and Lima reflected on Selina's transfer to her new room last night - and both the highs and lows of Selina's hospitalization. Normalized and validated her emotions and experience. They both named continued appreciation for the care Selina is receiving.    At this moment, Selina voiced her preference to discharge home rather than TCU following her surgery. Lima providing support and affirming that they will continue to consider options with the focus on Selina's safekeeping and healing.    Provided Selina with a prayer blanket. They are aware of continued  availability.    Plan: Please contact Spiritual Health as needs arise.    Starla Lowry  Associate     [facility] available 24/7 for emergent requests/referrals, either by paging the on-call  or by entering an ASAP/STAT consult in Epic (this will also page the on-call ).

## 2024-11-12 NOTE — TELEPHONE ENCOUNTER
Spoke to home care RN informing them of the provider's approval of home care orders.     Harshad Adams RN  [facility]

## 2024-11-12 NOTE — OR NURSING
Spoke with Dr Green at bedside, pt had 400mg Gabapentin at 1200, has another scheduled 400mg due at 1800, and a bedtime dose. Would like like 100mg pre-op dose given now? Per Dr Green ok to hold this dose and take scheduled doses later tonight after surgery.

## 2024-11-12 NOTE — PLAN OF CARE
Goal Outcome Evaluation:  Shift SUMMARY 11/11-11/2024; [id number]  Orientation/Cognitive: A&Ox4   Observation Goals (Met/ Not Met): Inpt  Mobility Level/Assist Equipment: assist of 1 with walker and gait belt.   IV SL  Pain Management: Scheduled tylenol, gabapentin.   Diet: NPO after midnight.  Bowel/Bladder: Incontinent of urine, purewick in place  Skin Concerns:  Scattered bruising  Drains/Devices: PIV SL

## 2024-11-12 NOTE — PROGRESS NOTES
"[facility]    Medicine Progress Note - Hospitalist Service    Date of Admission:  11/6/2024    Assessment & Plan   Agnes Saravia is a 79 year old female admitted on 11/6/2024.   Past history of RA (methotrexate&prednisone), Sjogren's, ILD, HTN.  She presents with acute R sciatica, unable to care for self at home.       Recently hospitalized 10/21 - 10/25 for Covid Infection associated with encephalopathy.    Treated with Remdesiver and Dexamethasone.  She then went to TCU 10/25 - 10/30.        Since she has been home, she has gradually developed low back pain with activity, akin to a right sided sciatica, escalating.   On 11/6/24 compelled to present to ED because unable to get out of chair.       R L3 Nerve root impingement   Mechanical fall (10/21/24)   *Patient reports she had \"disc surgery\" >20 years ago but not much more information than that. Reports mechanical fall due to weakness the day she presented for Covid (10/21/24). Possible steroids she received from Covid therapy masked her symptoms from nerve root impingement.   *xray R hip and lumbar negative for fracture, UA negative  *L-spine MRI on 11/7 shows multilevel degenerative changes with moderate canal stenotic findings, compression deformity of the L5 vertebral level, and central disc extrusion at L2-L3 with mass effect on descending R L3 nerve root.  - Neurosurgery consult:              -Decadron 4 mg BID; likely wean post-op              -No lifting anything greater than 5 to 10 pounds and avoid excessive bending, twisting, turning.   -NSG recommending microdiscectomy today at 5pm  - pain therapy with heat/cold/tylenol/Toradol  - hydrocodone lido patch and lido patch ineffective, not interested in increasing gabapentin  - tolerated trial of oxycodone 11/10 and reported improvement in pain, will continue (switched IV dilaudid to breakthrough 11/11)  - medically cleared to proceed with surgery, no need for stress dose " "steroids  - therapies recommending TCU; SW consulted  - mIVF while NPO     Rheumatoid Arthritis on chronic prednisone and Methotrexate therapy   - continue daily prednisone (5mg) and weekly methotrexate (10 mg)  -Will not discontinue prednisone as this is her basal steroid dose that she takes.  - no new meds for several years, generally controlled.      Anxiety     Patient has taken clorazepate nightly for many years, has symptoms if does not get this, continue     Prediabetes with steroid induced hyperglycemia  *HgbA1C 6.1 this stay  - ISS for now, low intensity.     Hypokalemia  - replace per protocol            Diet: NPO per Anesthesia Guidelines for Procedure/Surgery Except for: Meds    DVT Prophylaxis: Enoxaparin (Lovenox) SQ  Andujar Catheter: Not present  Lines: None     Cardiac Monitoring: None  Code Status: Full Code      Clinically Significant Risk Factors                   # Hypertension: Noted on problem list            # Obesity: Estimated body mass index is 34.37 kg/m  as calculated from the following:    Height as of this encounter: 1.6 m (5' 3\").    Weight as of this encounter: 88 kg (194 lb 0.1 oz)., PRESENT ON ADMISSION            Disposition Plan     Medically Ready for Discharge: Anticipated in 2-4 Days             Rai Shi MD  Hospitalist Service  [facility]  Securely message with Mango (more info)  Text page via [employer] Paging/Directory   ______________________________________________________________________    Interval History   Continues with pain, but otherwise no complaints.  Moving bowels.    Physical Exam   Vital Signs: Temp: 98.2  F (36.8  C) Temp src: Oral BP: 138/68 Pulse: 63   Resp: 18 SpO2: 94 % O2 Device: None (Room air)    Weight: 194 lbs .08 oz    General Appearance: Well nourished female in NAD  Respiratory: few bibasilar crackles, no wheezing or tachypnea  Cardiovascular: RRR, normal s1/s2 without murmur  GI: normal bowel sounds  Skin: "   Other: Awake and alert, CN grossly intact      Medical Decision Making       30 MINUTES SPENT BY ME on the date of service doing chart review, history, exam, documentation & further activities per the note.  MANAGEMENT DISCUSSED with the following over the past 24 hours: bedside RN, patient's friend   Tests ORDERED & REVIEWED in the past 24 hours:  - Coags/INR  - potassium  Medical complexity over the past 24 hours:  - Prescription DRUG MANAGEMENT performed      Data         Imaging results reviewed over the past 24 hrs:   No results found for this or any previous visit (from the past 24 hours).

## 2024-11-13 ENCOUNTER — APPOINTMENT (OUTPATIENT)
Dept: PHYSICAL THERAPY | Facility: CLINIC | Age: 79
DRG: 519 | End: 2024-11-13
Payer: MEDICARE

## 2024-11-13 LAB
ANION GAP SERPL CALCULATED.3IONS-SCNC: 7 MMOL/L (ref 7–15)
BUN SERPL-MCNC: 22.5 MG/DL (ref 8–23)
CALCIUM SERPL-MCNC: 8.5 MG/DL (ref 8.8–10.4)
CHLORIDE SERPL-SCNC: 98 MMOL/L (ref 98–107)
CREAT SERPL-MCNC: 0.53 MG/DL (ref 0.51–0.95)
EGFRCR SERPLBLD CKD-EPI 2021: >90 ML/MIN/1.73M2
ERYTHROCYTE [DISTWIDTH] IN BLOOD BY AUTOMATED COUNT: 14.4 % (ref 10–15)
GLUCOSE BLDC GLUCOMTR-MCNC: 143 MG/DL (ref 70–99)
GLUCOSE BLDC GLUCOMTR-MCNC: 164 MG/DL (ref 70–99)
GLUCOSE BLDC GLUCOMTR-MCNC: 176 MG/DL (ref 70–99)
GLUCOSE BLDC GLUCOMTR-MCNC: 177 MG/DL (ref 70–99)
GLUCOSE BLDC GLUCOMTR-MCNC: 213 MG/DL (ref 70–99)
GLUCOSE SERPL-MCNC: 133 MG/DL (ref 70–99)
HCO3 SERPL-SCNC: 27 MMOL/L (ref 22–29)
HCT VFR BLD AUTO: 36 % (ref 35–47)
HGB BLD-MCNC: 12.2 G/DL (ref 11.7–15.7)
MCH RBC QN AUTO: 30.4 PG (ref 26.5–33)
MCHC RBC AUTO-ENTMCNC: 33.9 G/DL (ref 31.5–36.5)
MCV RBC AUTO: 90 FL (ref 78–100)
PLATELET # BLD AUTO: 318 10E3/UL (ref 150–450)
POTASSIUM SERPL-SCNC: 4.1 MMOL/L (ref 3.4–5.3)
RBC # BLD AUTO: 4.01 10E6/UL (ref 3.8–5.2)
SODIUM SERPL-SCNC: 132 MMOL/L (ref 135–145)
WBC # BLD AUTO: 19.2 10E3/UL (ref 4–11)

## 2024-11-13 PROCEDURE — 250N000012 HC RX MED GY IP 250 OP 636 PS 637: Performed by: PHYSICIAN ASSISTANT

## 2024-11-13 PROCEDURE — 36415 COLL VENOUS BLD VENIPUNCTURE: CPT | Performed by: PHYSICIAN ASSISTANT

## 2024-11-13 PROCEDURE — 99232 SBSQ HOSP IP/OBS MODERATE 35: CPT | Performed by: STUDENT IN AN ORGANIZED HEALTH CARE EDUCATION/TRAINING PROGRAM

## 2024-11-13 PROCEDURE — 97530 THERAPEUTIC ACTIVITIES: CPT | Mod: GP | Performed by: PHYSICAL THERAPIST

## 2024-11-13 PROCEDURE — 80048 BASIC METABOLIC PNL TOTAL CA: CPT | Performed by: PHYSICIAN ASSISTANT

## 2024-11-13 PROCEDURE — 250N000013 HC RX MED GY IP 250 OP 250 PS 637: Performed by: PHYSICIAN ASSISTANT

## 2024-11-13 PROCEDURE — 250N000013 HC RX MED GY IP 250 OP 250 PS 637

## 2024-11-13 PROCEDURE — 85027 COMPLETE CBC AUTOMATED: CPT | Performed by: PHYSICIAN ASSISTANT

## 2024-11-13 PROCEDURE — 120N000001 HC R&B MED SURG/OB

## 2024-11-13 RX ORDER — METHOCARBAMOL 500 MG/1
500 TABLET, FILM COATED ORAL EVERY 6 HOURS
Status: DISCONTINUED | OUTPATIENT
Start: 2024-11-13 | End: 2024-11-15 | Stop reason: HOSPADM

## 2024-11-13 RX ADMIN — SIMVASTATIN 40 MG: 40 TABLET, FILM COATED ORAL at 21:34

## 2024-11-13 RX ADMIN — HYDROCHLOROTHIAZIDE 25 MG: 25 TABLET ORAL at 09:18

## 2024-11-13 RX ADMIN — GABAPENTIN 400 MG: 300 CAPSULE ORAL at 11:27

## 2024-11-13 RX ADMIN — POTASSIUM CHLORIDE 20 MEQ: 750 TABLET, EXTENDED RELEASE ORAL at 09:18

## 2024-11-13 RX ADMIN — GABAPENTIN 400 MG: 300 CAPSULE ORAL at 09:17

## 2024-11-13 RX ADMIN — DEXAMETHASONE 4 MG: 2 TABLET ORAL at 09:17

## 2024-11-13 RX ADMIN — HYDROMORPHONE HYDROCHLORIDE 4 MG: 2 TABLET ORAL at 14:47

## 2024-11-13 RX ADMIN — CEVIMELINE 30 MG: 30 CAPSULE ORAL at 09:24

## 2024-11-13 RX ADMIN — CEVIMELINE 30 MG: 30 CAPSULE ORAL at 14:00

## 2024-11-13 RX ADMIN — POLYETHYLENE GLYCOL 3350 17 G: 17 POWDER, FOR SOLUTION ORAL at 09:18

## 2024-11-13 RX ADMIN — METHOCARBAMOL 500 MG: 500 TABLET ORAL at 11:27

## 2024-11-13 RX ADMIN — METHOCARBAMOL 500 MG: 500 TABLET ORAL at 21:34

## 2024-11-13 RX ADMIN — METHOCARBAMOL 500 MG: 500 TABLET ORAL at 16:26

## 2024-11-13 RX ADMIN — SENNOSIDES AND DOCUSATE SODIUM 1 TABLET: 8.6; 5 TABLET ORAL at 09:18

## 2024-11-13 RX ADMIN — METHOCARBAMOL 500 MG: 500 TABLET ORAL at 04:30

## 2024-11-13 RX ADMIN — ACETAMINOPHEN 975 MG: 325 TABLET, FILM COATED ORAL at 20:39

## 2024-11-13 RX ADMIN — HYDROMORPHONE HYDROCHLORIDE 2 MG: 2 TABLET ORAL at 01:29

## 2024-11-13 RX ADMIN — FOLIC ACID 1 MG: 1 TABLET ORAL at 09:18

## 2024-11-13 RX ADMIN — SENNOSIDES AND DOCUSATE SODIUM 1 TABLET: 8.6; 5 TABLET ORAL at 20:39

## 2024-11-13 RX ADMIN — PREDNISONE 5 MG: 5 TABLET ORAL at 09:18

## 2024-11-13 RX ADMIN — ACETAMINOPHEN 975 MG: 325 TABLET, FILM COATED ORAL at 04:30

## 2024-11-13 RX ADMIN — GABAPENTIN 400 MG: 300 CAPSULE ORAL at 18:22

## 2024-11-13 RX ADMIN — ACETAMINOPHEN 975 MG: 325 TABLET, FILM COATED ORAL at 14:00

## 2024-11-13 RX ADMIN — TRAZODONE HYDROCHLORIDE 25 MG: 50 TABLET ORAL at 21:34

## 2024-11-13 RX ADMIN — HYDROMORPHONE HYDROCHLORIDE 4 MG: 2 TABLET ORAL at 10:35

## 2024-11-13 RX ADMIN — CLORAZEPATE DIPOTASSIUM 15 MG: 3.75 TABLET ORAL at 09:24

## 2024-11-13 RX ADMIN — GABAPENTIN 100 MG: 100 CAPSULE ORAL at 20:39

## 2024-11-13 RX ADMIN — PANTOPRAZOLE SODIUM 40 MG: 40 TABLET, DELAYED RELEASE ORAL at 09:18

## 2024-11-13 NOTE — PROGRESS NOTES
Patient vital signs are at baseline: Yes  Patient able to ambulate as they were prior to admission or with assist devices provided by therapies during their stay:  No,  Reason:  Pt was only able to take a few steps towards the HOB with assistance of 2  Patient MUST void prior to discharge:  Yes  Patient able to tolerate oral intake:  Yes  Pain has adequate pain control using Oral analgesics:  Yes  Does patient have an identified :  Yes  Has goal D/C date and time been discussed with patient:  No, TBD    A&O x4, VSS on RA,  CMS intact, Back dressing CDI, Voiding with Pure wick in place, PIV/ Saline lock, drinking fluids well, tolerated crackers, Denies nausea, chest pain or SOB, Pain managed with Oral Dilaudid, Methocarbamol and Tylenol

## 2024-11-13 NOTE — ANESTHESIA POSTPROCEDURE EVALUATION
Patient: Agnes Saravia    Procedure: Procedure(s):  Right Lumbar 2 to Lumbar 3 minimally invasive redo discectomy and Lumbar 3 to Lumbar 4 far lateral discectomy       Anesthesia Type:  General    Note:  Disposition: Inpatient   Postop Pain Control: Uneventful            Sign Out: Well controlled pain   PONV: No   Neuro/Psych: Uneventful            Sign Out: Acceptable/Baseline neuro status   Airway/Respiratory: Uneventful            Sign Out: Acceptable/Baseline resp. status   CV/Hemodynamics: Uneventful            Sign Out: Acceptable CV status   Other NRE: NONE   DID A NON-ROUTINE EVENT OCCUR? No           Last vitals:  Vitals Value Taken Time   /72 11/12/24 2030   Temp 36.3  C (97.4  F) 11/12/24 2030   Pulse 88 11/12/24 2037   Resp 11 11/12/24 2037   SpO2 97 % 11/12/24 2049   Vitals shown include unfiled device data.    Electronically Signed By: Kenroy Christensen MD  November 12, 2024  10:43 PM

## 2024-11-13 NOTE — ANESTHESIA CARE TRANSFER NOTE
Patient: Agnes Saravia    Procedure: Procedure(s):  Right Lumbar 2 to Lumbar 3 minimally invasive redo discectomy and Lumbar 3 to Lumbar 4 far lateral discectomy       Diagnosis: Herniation of lumbar intervertebral disc with radiculopathy [M51.16]  Diagnosis Additional Information: No value filed.    Anesthesia Type:   General     Note:    Oropharynx: oral airway in place and spontaneously breathing  Level of Consciousness: awake  Oxygen Supplementation: face mask  Level of Supplemental Oxygen (L/min / FiO2): 6  Independent Airway: airway patency satisfactory and stable  Dentition: dentition unchanged  Vital Signs Stable: post-procedure vital signs reviewed and stable  Report to RN Given: handoff report given  Patient transferred to: PACU  Comments:     Neuromuscular blockade reversed with sugammadex, spontaneous respirations, adequate tidal volumes, followed commands to voice, oropharynx suctioned with soft flexible catheter, extubated atraumatically, extubated with suction, airway patent after extubation.  Oxygen via facemask at 6 liters per minute to PACU. Oxygen tubing connected to wall O2 in PACU, SpO2, NiBP, and EKG monitors and alarms on and functioning, report on patient's clinical status given to PACU RN, RN questions answered.             Handoff Report: Identifed the Patient, Identified the Reponsible Provider, Reviewed the pertinent medical history, Discussed the surgical course, Reviewed Intra-OP anesthesia mangement and issues during anesthesia, Set expectations for post-procedure period and Allowed opportunity for questions and acknowledgement of understanding      Vitals:  Vitals Value Taken Time   /69 11/12/24 1954   Temp     Pulse 94 11/12/24 1957   Resp 13 11/12/24 1957   SpO2 99 % 11/12/24 1957   Vitals shown include unfiled device data.    Electronically Signed By: RYAN Baldwin CRNA  November 12, 2024  7:58 PM

## 2024-11-13 NOTE — PROGRESS NOTES
"[facility]    Medicine Progress Note - Hospitalist Service    Date of Admission:  11/6/2024    Assessment & Plan   Agnes Saravia is a 79 year old female admitted on 11/6/2024.   Past history of RA (methotrexate&prednisone), Sjogren's, ILD, HTN.  She presents with acute R sciatica, unable to care for self at home.       Recently hospitalized 10/21 - 10/25 for Covid Infection associated with encephalopathy.    Treated with Remdesiver and Dexamethasone.  She then went to TCU 10/25 - 10/30.        Since she has been home, she has gradually developed low back pain with activity, akin to a right sided sciatica, escalating.   On 11/6/24 compelled to present to ED because unable to get out of chair.       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 Reports mechanical fall due to weakness the day she presented for Covid (10/21/24). Possible steroids she received from Covid therapy masked her symptoms from nerve root impingement.   *xray R hip and lumbar negative for fracture, UA negative  *L-spine MRI on 11/7 shows multilevel degenerative changes with moderate canal stenotic findings, compression deformity of the L5 vertebral level, and central disc extrusion at L2-L3 with mass effect on descending R L3 nerve root.  - Neurosurgery consult:              -No lifting anything greater than 5 to 10 pounds and avoid excessive bending, twisting, turning.  - pain therapy with heat/cold/tylenol/Toradol  - hydrocodone lido patch and lido patch ineffective, not interested in increasing gabapentin  - tolerated trial of oxycodone 11/10 and reported improvement in pain, will continue (switched IV dilaudid to breakthrough 11/11)  - medically cleared to proceed with surgery, no need for stress dose steroids  - therapies recommending TCU; SW " "consulted     Rheumatoid Arthritis on chronic prednisone and Methotrexate therapy   - continue daily prednisone (5mg) and weekly methotrexate (10 mg)  - Will not discontinue prednisone as this is her basal steroid dose that she takes.  - no new meds for several years, generally controlled.      Anxiety     Patient has taken clorazepate nightly for many years, has symptoms if does not get this, continue     Prediabetes with steroid induced hyperglycemia  *HgbA1C 6.1 this stay  - ISS for now, low intensity.     Hypokalemia  - replace per protocol            Diet: Advance Diet as Tolerated: Regular Diet Adult    DVT Prophylaxis: Enoxaparin (Lovenox) subcutaneous (held for now given recent surgery)  Andujar Catheter: Not present  Lines: None     Cardiac Monitoring: None  Code Status: Full Code      Clinically Significant Risk Factors         # Hyponatremia: Lowest Na = 132 mmol/L in last 2 days, will monitor as appropriate           # Hypertension: Noted on problem list            # Obesity: Estimated body mass index is 34.37 kg/m  as calculated from the following:    Height as of this encounter: 1.6 m (5' 3\").    Weight as of this encounter: 88 kg (194 lb 0.1 oz).             Disposition Plan     Medically Ready for Discharge: Anticipated in 2-4 Days             Rai Shi MD  Hospitalist Service  [employer]  Securely message with [employer] (more info)  Text page via [employer] Paging/Directory   ______________________________________________________________________    Interval History   Pain improved. No BM since surgery yet. No concerns at this time.    Physical Exam   Vital Signs: Temp: 98.1  F (36.7  C) Temp src: Oral BP: 135/70 Pulse: 68   Resp: 14 SpO2: 96 % O2 Device: Nasal cannula Oxygen Delivery: 2 LPM  Weight: 194 lbs .08 oz    General Appearance: Well nourished female in NAD  Respiratory: few bibasilar crackles, no wheezing or tachypnea  Cardiovascular: RRR, normal s1/s2 without " murmur  GI: normal bowel sounds  Skin:   Other: Awake and alert, CN grossly intact      Medical Decision Making       30 MINUTES SPENT BY ME on the date of service doing chart review, history, exam, documentation & further activities per the note.  MANAGEMENT DISCUSSED with the following over the past 24 hours: bedside RN, patient's friend   Tests ORDERED & REVIEWED in the past 24 hours:  - Coags/INR  - potassium  Medical complexity over the past 24 hours:  - Prescription DRUG MANAGEMENT performed      Data     I have personally reviewed the following data over the past 24 hrs:    19.2 (H)  \   12.2   / 318     132 (L) 98 22.5 /  213 (H)   4.1 27 0.53 \       Imaging results reviewed over the past 24 hrs:   Recent Results (from the past 24 hours)   XR Surgery WILL Fluoro Less Than 5 Min w Stills    Narrative    SURGERY C-ARM FLUOROSCOPY LESS THAN FIVE MINUTES WITH STILLS   11/12/2024 7:40 PM     COMPARISON: None.    HISTORY: L2-L3 and L3-L4 redo discectomy.    NUMBER OF IMAGES ACQUIRED: 2    VIEWS: 1    FLUOROSCOPY TIME: 0.2 minute(s)      Impression    IMPRESSION: Multiple intraoperative fluoroscopic views of the lumbar  spine during microdiscectomy.    WILLIAN LANTIGUA MD         SYSTEM ID:  [id number]

## 2024-11-13 NOTE — PLAN OF CARE
Goal Outcome Evaluation:    PT down for surgery this shift and back to the floor around 8pm. VSS on 2L O2. Pt Lethargic after surgery and denying pain. When RN brought pt in sleeping medication Trazodone, patient also requesting pain medication. Passed on to night RN. CMS intact. Pt denies SOB or chest pain. Dressing on back incision CDI. Ice given. When doing skin checks with charge A.M. excoriation noted on pt's R groin. Pt not OOB this shift and did not eat dinner yet. Pure wic in place putting out good output. Will continue to monitor pt.

## 2024-11-13 NOTE — OP NOTE
November 12, 2024    [facility]   Operative Note    Pre-operative diagnosis: Herniation of lumbar intervertebral disc with radiculopathy [M51.16]   Post-operative diagnosis Same   Procedure: Procedure(s):  Right Lumbar 2 to Lumbar 3 minimally invasive redo discectomy and Lumbar 3 to Lumbar 4 far lateral discectomy    Surgeon(s): Surgeons and Role:     * Harjeet Kenny MD - Primary   Estimated blood loss:  25ml   Specimens: * No specimens in log *   Findings: Inflammatory arthropathy and scar tissue and herniated disc noted at L2-3 with herniated disc noted at L3-4.  Incidental durotomy noted which was repaired primarily without further CSF leakage.         Indications: Patient is an 79-year-old female with back and right lower extremity symptoms who is brought for the above-mentioned procedure.    Description of procedure: Patient was brought the operating room.  General endotracheal anesthesia was induced the patient was then rolled into the prone position on the Shola frame.  The back was prepped and draped in sterile fashion.  C-arm fluoroscopy was used to localize the appropriate level and a midline incision was made and then a right-sided approach performed with the Metrix tubular dilating system at L2-3.  The level was again confirmed with fluoroscopy and the microscope was sterilely draped and brought to the field.  The high-speed drill was then used to perform a laminotomy and medial facetectomy.  Significant scar tissue and adherent facet arthropathy was noted.  An incidental durotomy was also noted which was eventually repaired with a single 6-0 [place]-Kal suture.  The medial facet and root ligamentum flavum were elevated and carefully dissected off the underlying thecal sac.  The disc base was identified and opened and the disc material debrided.  The nerve root was then felt to be well decompressed.  At this point the Metrix tubular dilating system was moved down to the  L3-4 and docked on the lateral aspect of the facet.  The level was also concerned with fluoroscopy.  The high-speed drill was used to dissect and remove the lateral aspect of the facet down into the foramen.  Eventually the foramen was identified and eventually the exiting nerve root identified.  Several free fragments were identified underneath the exiting nerve root and were removed.  Once the area was felt to be well decompressed and hemostasis was achieved, the fascia was closed with 0 Vicryl.  2-0 Vicryl was used for the subcutaneous layer.  4-0 Monocryl was used for the skin with a superglue dressing.

## 2024-11-13 NOTE — PROGRESS NOTES
[facility]    Neurosurgery  Daily Note    Assessment & Plan   Procedure(s):  Right Lumbar 2 to Lumbar 3 minimally invasive redo discectomy and Lumbar 3 to Lumbar 4 far lateral discectomy   1 Day Post-Op    AM Rounds:  Incisional discomfort, attempting to manage with current pain regimen. Denies new or worsening radicular pain, paresthesias, weakness, positional headaches, nausea/vomiting. Exam stable. Incision CDI. Voiding without difficulty. Worked with PT earlier today.      Plan:  - PT/OT recs pending  - Encourage IS   - Routine wound care  - Advance diet and activity as tolerated  - Hospitalist onboard  - Okay to resume Methotrexate post-op   - Okay to resume ASA 81mg POD 3 (11/15/24)     Plans discussed with Dr. Efraín Yarbrough PA-C  [facility]  [address] [place] [id number]        Physical Exam   Temp: 98.1  F (36.7  C) Temp src: Oral BP: 135/70 Pulse: 68   Resp: 14 SpO2: 96 % O2 Device: Nasal cannula Oxygen Delivery: 2 LPM  Vitals:    11/06/24 1334 11/06/24 1917   Weight: 83.9 kg (185 lb) 88 kg (194 lb 0.1 oz)     Vital Signs with Ranges  Temp:  [97.2  F (36.2  C)-98.1  F (36.7  C)] 98.1  F (36.7  C)  Pulse:  [] 68  Resp:  [11-18] 14  BP: (135-160)/(61-85) 135/70  SpO2:  [92 %-99 %] 96 %  I/O last 3 completed shifts:  In: 1400 [P.O.:200; I.V.:1200]  Out: 1700 [Urine:1700]    Patient in hospital bed with family at bedside   Awake, alert, and appropriate. NAD  BLE 5/5  BLE sensation intact   Incision Clean, dry, intact without surrounding erythema, edema, drainage  SCDs in place    Medications   Current Facility-Administered Medications   Medication Dose Route Frequency Provider Last Rate Last Admin    sodium chloride 0.9 % infusion   Intravenous Continuous Cedric Castañeda PA-C   Stopped at 11/13/24 0414      Current Facility-Administered Medications   Medication Dose Route Frequency Provider Last Rate Last Admin     acetaminophen (TYLENOL) tablet 975 mg  975 mg Oral Q8H Cedric Castañeda PA-C   975 mg at 11/13/24 0430    [Held by provider] aspirin EC tablet 81 mg  81 mg Oral At Bedtime Cedric Castañeda PA-C   81 mg at 11/07/24 2143    cevimeline (EVOXAC) capsule 30 mg  30 mg Oral TID Cedric Castañeda PA-C   30 mg at 11/13/24 0924    clorazepate (TRANXENE) tablet 15 mg  15 mg Oral QAM Cedric Castañeda PA-C   15 mg at 11/13/24 0924    dexAMETHasone (DECADRON) tablet 4 mg  4 mg Oral Q12H [facility] (08/20) Cedric Castañeda PA-C   4 mg at 11/13/24 0917    [Held by provider] enoxaparin ANTICOAGULANT (LOVENOX) injection 40 mg  40 mg Subcutaneous Q24H Cedric Castañeda PA-C   40 mg at 11/10/24 1734    folic acid (FOLVITE) tablet 1 mg  1 mg Oral Once per day on Sunday Monday Wednesday Thursday Friday Saturday Cedric Castañeda PA-C   1 mg at 11/13/24 0918    gabapentin (NEURONTIN) capsule 100 mg  100 mg Oral At Bedtime Cedric Castañeda PA-C   100 mg at 11/11/24 2148    gabapentin (NEURONTIN) capsule 400 mg  400 mg Oral TID Cedric Castañeda PA-C   400 mg at 11/13/24 0917    hydrochlorothiazide (HYDRODIURIL) tablet 25 mg  25 mg Oral Daily Cedric Castañeda PA-C   25 mg at 11/13/24 0918    insulin aspart (NovoLOG) injection (RAPID ACTING)  1-3 Units Subcutaneous TID AC Cedric Castañeda PA-C   1 Units at 11/13/24 0925    insulin aspart (NovoLOG) injection (RAPID ACTING)  1-3 Units Subcutaneous At Bedtime Cedric Castañeda PA-C   1 Units at 11/11/24 2151    methotrexate tablet 10 mg  10 mg Oral Q7 Days Cedric Castañeda PA-C   10 mg at 11/12/24 0758    pantoprazole (PROTONIX) EC tablet 40 mg  40 mg Oral Daily Cedric Castañeda PA-C   40 mg at 11/13/24 0918    polyethylene glycol (MIRALAX) Packet 17 g  17 g Oral Daily Cedric Castañeda PA-C   17 g at 11/13/24 0918    potassium chloride kang ER (KLOR-CON M10) CR tablet 20 mEq  20 mEq Oral Daily Cedric Castañeda PA-C   20 mEq at 11/13/24 0918     predniSONE (DELTASONE) tablet 5 mg  5 mg Oral Daily Cedric Castañeda PA-C   5 mg at 11/13/24 0918    senna-docusate (SENOKOT-S/PERICOLACE) 8.6-50 MG per tablet 1 tablet  1 tablet Oral BID Cedric Castañeda PA-C   1 tablet at 11/13/24 0918    simvastatin (ZOCOR) tablet 40 mg  40 mg Oral At Bedtime Cedric Castañeda PA-C   40 mg at 11/12/24 2223    sodium chloride (PF) 0.9% PF flush 3 mL  3 mL Intracatheter Q8H Cedric Castañeda PA-C   3 mL at 11/13/24 0431    traZODone (DESYREL) half-tab 25 mg  25 mg Oral At Bedtime Cedric Castañeda PA-C   25 mg at 11/12/24 2223

## 2024-11-14 ENCOUNTER — APPOINTMENT (OUTPATIENT)
Dept: OCCUPATIONAL THERAPY | Facility: CLINIC | Age: 79
DRG: 519 | End: 2024-11-14
Payer: MEDICARE

## 2024-11-14 LAB
ANION GAP SERPL CALCULATED.3IONS-SCNC: 7 MMOL/L (ref 7–15)
BUN SERPL-MCNC: 18.7 MG/DL (ref 8–23)
CALCIUM SERPL-MCNC: 8.6 MG/DL (ref 8.8–10.4)
CHLORIDE SERPL-SCNC: 98 MMOL/L (ref 98–107)
CREAT SERPL-MCNC: 0.51 MG/DL (ref 0.51–0.95)
EGFRCR SERPLBLD CKD-EPI 2021: >90 ML/MIN/1.73M2
ERYTHROCYTE [DISTWIDTH] IN BLOOD BY AUTOMATED COUNT: 14.6 % (ref 10–15)
GLUCOSE BLDC GLUCOMTR-MCNC: 100 MG/DL (ref 70–99)
GLUCOSE BLDC GLUCOMTR-MCNC: 112 MG/DL (ref 70–99)
GLUCOSE BLDC GLUCOMTR-MCNC: 140 MG/DL (ref 70–99)
GLUCOSE BLDC GLUCOMTR-MCNC: 147 MG/DL (ref 70–99)
GLUCOSE SERPL-MCNC: 110 MG/DL (ref 70–99)
HCO3 SERPL-SCNC: 30 MMOL/L (ref 22–29)
HCT VFR BLD AUTO: 38.1 % (ref 35–47)
HGB BLD-MCNC: 12.3 G/DL (ref 11.7–15.7)
MCH RBC QN AUTO: 29.6 PG (ref 26.5–33)
MCHC RBC AUTO-ENTMCNC: 32.3 G/DL (ref 31.5–36.5)
MCV RBC AUTO: 92 FL (ref 78–100)
PLATELET # BLD AUTO: 325 10E3/UL (ref 150–450)
POTASSIUM SERPL-SCNC: 3.5 MMOL/L (ref 3.4–5.3)
RBC # BLD AUTO: 4.16 10E6/UL (ref 3.8–5.2)
SODIUM SERPL-SCNC: 135 MMOL/L (ref 135–145)
WBC # BLD AUTO: 15.2 10E3/UL (ref 4–11)

## 2024-11-14 PROCEDURE — 250N000012 HC RX MED GY IP 250 OP 636 PS 637: Performed by: PHYSICIAN ASSISTANT

## 2024-11-14 PROCEDURE — 85014 HEMATOCRIT: CPT | Performed by: STUDENT IN AN ORGANIZED HEALTH CARE EDUCATION/TRAINING PROGRAM

## 2024-11-14 PROCEDURE — 80048 BASIC METABOLIC PNL TOTAL CA: CPT | Performed by: STUDENT IN AN ORGANIZED HEALTH CARE EDUCATION/TRAINING PROGRAM

## 2024-11-14 PROCEDURE — 250N000013 HC RX MED GY IP 250 OP 250 PS 637: Performed by: PHYSICIAN ASSISTANT

## 2024-11-14 PROCEDURE — 250N000013 HC RX MED GY IP 250 OP 250 PS 637

## 2024-11-14 PROCEDURE — 99232 SBSQ HOSP IP/OBS MODERATE 35: CPT | Performed by: STUDENT IN AN ORGANIZED HEALTH CARE EDUCATION/TRAINING PROGRAM

## 2024-11-14 PROCEDURE — 120N000001 HC R&B MED SURG/OB

## 2024-11-14 PROCEDURE — 36415 COLL VENOUS BLD VENIPUNCTURE: CPT | Performed by: STUDENT IN AN ORGANIZED HEALTH CARE EDUCATION/TRAINING PROGRAM

## 2024-11-14 PROCEDURE — 97530 THERAPEUTIC ACTIVITIES: CPT | Mod: GO

## 2024-11-14 RX ORDER — ASPIRIN 81 MG/1
81 TABLET ORAL AT BEDTIME
Status: DISCONTINUED | OUTPATIENT
Start: 2024-11-15 | End: 2024-11-15 | Stop reason: HOSPADM

## 2024-11-14 RX ADMIN — SENNOSIDES AND DOCUSATE SODIUM 1 TABLET: 8.6; 5 TABLET ORAL at 08:22

## 2024-11-14 RX ADMIN — CLORAZEPATE DIPOTASSIUM 15 MG: 3.75 TABLET ORAL at 10:58

## 2024-11-14 RX ADMIN — PREDNISONE 5 MG: 5 TABLET ORAL at 08:22

## 2024-11-14 RX ADMIN — HYDROMORPHONE HYDROCHLORIDE 2 MG: 2 TABLET ORAL at 21:39

## 2024-11-14 RX ADMIN — HYDROCHLOROTHIAZIDE 25 MG: 25 TABLET ORAL at 08:21

## 2024-11-14 RX ADMIN — ACETAMINOPHEN 975 MG: 325 TABLET, FILM COATED ORAL at 20:42

## 2024-11-14 RX ADMIN — GABAPENTIN 400 MG: 300 CAPSULE ORAL at 17:14

## 2024-11-14 RX ADMIN — GABAPENTIN 100 MG: 100 CAPSULE ORAL at 20:42

## 2024-11-14 RX ADMIN — PANTOPRAZOLE SODIUM 40 MG: 40 TABLET, DELAYED RELEASE ORAL at 08:21

## 2024-11-14 RX ADMIN — FOLIC ACID 1 MG: 1 TABLET ORAL at 08:21

## 2024-11-14 RX ADMIN — HYDROMORPHONE HYDROCHLORIDE 4 MG: 2 TABLET ORAL at 12:26

## 2024-11-14 RX ADMIN — POLYETHYLENE GLYCOL 3350 17 G: 17 POWDER, FOR SOLUTION ORAL at 08:20

## 2024-11-14 RX ADMIN — TRAZODONE HYDROCHLORIDE 25 MG: 50 TABLET ORAL at 21:29

## 2024-11-14 RX ADMIN — GABAPENTIN 400 MG: 300 CAPSULE ORAL at 12:26

## 2024-11-14 RX ADMIN — CEVIMELINE 30 MG: 30 CAPSULE ORAL at 20:47

## 2024-11-14 RX ADMIN — ACETAMINOPHEN 975 MG: 325 TABLET, FILM COATED ORAL at 04:37

## 2024-11-14 RX ADMIN — METHOCARBAMOL 500 MG: 500 TABLET ORAL at 10:58

## 2024-11-14 RX ADMIN — CEVIMELINE 30 MG: 30 CAPSULE ORAL at 08:22

## 2024-11-14 RX ADMIN — SENNOSIDES AND DOCUSATE SODIUM 1 TABLET: 8.6; 5 TABLET ORAL at 21:29

## 2024-11-14 RX ADMIN — METHOCARBAMOL 500 MG: 500 TABLET ORAL at 04:33

## 2024-11-14 RX ADMIN — GABAPENTIN 400 MG: 300 CAPSULE ORAL at 08:20

## 2024-11-14 RX ADMIN — POTASSIUM CHLORIDE 20 MEQ: 750 TABLET, EXTENDED RELEASE ORAL at 08:21

## 2024-11-14 RX ADMIN — METHOCARBAMOL 500 MG: 500 TABLET ORAL at 21:33

## 2024-11-14 RX ADMIN — ACETAMINOPHEN 975 MG: 325 TABLET, FILM COATED ORAL at 13:08

## 2024-11-14 RX ADMIN — METHOCARBAMOL 500 MG: 500 TABLET ORAL at 17:14

## 2024-11-14 RX ADMIN — CEVIMELINE 30 MG: 30 CAPSULE ORAL at 13:08

## 2024-11-14 NOTE — PROGRESS NOTES
Care Management Follow Up    Length of Stay (days): 6    Expected Discharge Date: 11/15/2024     Concerns to be Addressed:       Patient plan of care discussed at interdisciplinary rounds: Yes    Anticipated Discharge Disposition: Transitional Care              Anticipated Discharge Services: Transportation Services  Anticipated Discharge DME:      Patient/family educated on Medicare website which has current facility and service quality ratings: yes  Education Provided on the Discharge Plan:    Patient/Family in Agreement with the Plan: yes    Referrals Placed by CM/SW:      Private pay costs discussed: Not applicable    Discussed  Partnership in Safe Discharge Planning  document with patient/family: No     Handoff Completed: No, handoff not indicated or clinically appropriate    Additional Information:  Writer was aware of patient being accepted to [facility] and [facility] early this week.  However, writer resent referrals to ensure that the patient would still be accepted there.  Both [facility] and [facility] confirmed.  Writer received a call from [facility] and was updated that they were able to accept as well but their only concern was the medication EVOXAC, otherwise, they would be able to take the patient tomorrow.    Writer spoke with the bedside nurse who stated that they will reach out to the provider regarding that medication.    Writer reached out to the provider and updated for the plan for tomorrow.  Provider confirmed.    Next Steps: Discharge plan tomorrow to [facility].  Writer will follow-up with the patient tomorrow    FRED Muñoz  [facility]  Social Work

## 2024-11-14 NOTE — PLAN OF CARE
Goal Outcome Evaluation:    Patient ambulation status: Jeanine steady  Patient able to stand for X-ray:Yes  Standing/upright x-ray complete: Yes  Patient using oral analgesics:yes  Voiding spontaneously:yes  Drains discontinued:yes  Incision clean and dry:yes  Bowel status:BS active. No BM. Passing gas. On BM regimen    Pt A&O x4, VSS.RA. Pain managed with oral Dilaudid, Tyl, and Robaxin. CMS intact. BG check, uses insulin. Back incision site dressing dry. May discharge to TCU tomorrow.

## 2024-11-14 NOTE — PROGRESS NOTES
"Patient vital signs are at baseline: Yes  Patient able to ambulate as they were prior to admission or with assist devices provided by therapies during their stay:  Yes  Patient MUST void prior to discharge:  Yes  Patient able to tolerate oral intake:  Yes  Pain has adequate pain control using Oral analgesics:  Yes  Does patient have an identified :  Yes  Has goal D/C date and time been discussed with patient:  Yes    A&O x4, VSS on RA. Back incision CDI. CMS intact.  Pain managed with scheduled Gabapentin, Tylenol and Methocarbamol. Pt refused Evoxac, states it causes too much secretions during the night. 150 ml of urine emptied from external catheter. Pt bladder scanned for 428 ml. Attempted to have pt pivot to the bedside commode with assistance of 2, unable to Pivot, Attempted to have Pt use the Jeanine steady, unable to have pt stand, Pt then refused to finish using Jeanine steady and refused to have straight cath done at that time. Pure wick put back in place, Pt educated regarding the high Bladder scans and straight cath Protocol. Pt refused straight cath. Pt later Bladder scanned for 631, pt again refused to get up to the commode, or be straight cathed, pt again re-educated regarding the Voiding Protocol, Pure wick in place with no voiding. Pt states \"I don't urinate at night, I have never had a urinary infection.\" Pt later voided 375 ml's of urine.   "

## 2024-11-14 NOTE — PROGRESS NOTES
"[facility]    Medicine Progress Note - Hospitalist Service    Date of Admission:  11/6/2024    Assessment & Plan   Agnes Saravia is a 79 year old female admitted on 11/6/2024.   Past history of RA (methotrexate&prednisone), Sjogren's, ILD, HTN.  She presents with acute R sciatica, unable to care for self at home.       Recently hospitalized 10/21 - 10/25 for Covid Infection associated with encephalopathy.    Treated with Remdesiver and Dexamethasone.  She then went to TCU 10/25 - 10/30.        Since she has been home, she has gradually developed low back pain with activity, akin to a right sided sciatica, escalating.   On 11/6/24 compelled to present to ED because unable to get out of chair.       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 Reports mechanical fall due to weakness the day she presented for Covid (10/21/24). Possible steroids she received from Covid therapy masked her symptoms from nerve root impingement.   *xray R hip and lumbar negative for fracture, UA negative  *L-spine MRI on 11/7 shows multilevel degenerative changes with moderate canal stenotic findings, compression deformity of the L5 vertebral level, and central disc extrusion at L2-L3 with mass effect on descending R L3 nerve root.  *Patient had L2 to L3 discectomy and L3 to L4 discectomy (11/12/2024)  - Neurosurgery consult:              -No lifting anything greater than 5 to 10 pounds and avoid excessive bending, twisting, turning.  - pain therapy with heat/cold/tylenol/Toradol  - hydrocodone lido patch and lido patch ineffective, not interested in increasing gabapentin  - tolerated trial of oxycodone 11/10 and reported improvement in pain, will continue (switched IV dilaudid to breakthrough 11/11)  - therapies recommending TCU; per report, " "patient will have a bed at [facility] tomorrow and NSG ok with discharge to [facility] tomorrow as well     Rheumatoid Arthritis on chronic prednisone and Methotrexate therapy   - continue daily prednisone (5mg) and weekly methotrexate (10 mg)  - no new meds for several years, generally controlled.      Anxiety     Patient has taken clorazepate nightly for many years, has symptoms if does not get this, continue     Prediabetes with steroid induced hyperglycemia  *HgbA1C 6.1 this stay  - ISS for now, low intensity.     Hypokalemia  - replace per protocol            Diet: Advance Diet as Tolerated: Regular Diet Adult    DVT Prophylaxis: Enoxaparin (Lovenox) subcutaneous (held for now given recent surgery)  Andujar Catheter: Not present  Lines: None     Cardiac Monitoring: None  Code Status: Full Code      Clinically Significant Risk Factors         # Hyponatremia: Lowest Na = 132 mmol/L in last 2 days, will monitor as appropriate           # Hypertension: Noted on problem list            # Obesity: Estimated body mass index is 36.52 kg/m  as calculated from the following:    Height as of this encounter: 1.6 m (5' 2.99\").    Weight as of this encounter: 93.5 kg (206 lb 2.1 oz).             Disposition Plan     Medically Ready for Discharge: Anticipated Tomorrow             Rai Shi MD  Hospitalist Service  [facility]  Securely message with [employer] (more info)  Text page via [employer] Paging/Directory   ______________________________________________________________________    Interval History   Pain improved. Last BM 2 days ago. No concerns at this time.    Physical Exam   Vital Signs: Temp: 97.6  F (36.4  C) Temp src: Oral BP: 128/76 Pulse: 85   Resp: 16 SpO2: 96 % O2 Device: None (Room air)    Weight: 206 lbs 2.08 oz    General Appearance: Well nourished female in NAD  Respiratory: few bibasilar crackles, no wheezing or tachypnea  Cardiovascular: RRR, normal s1/s2 without murmur  GI: normal bowel " sounds  Skin:   Other: Awake and alert, CN grossly intact      Medical Decision Making       30 MINUTES SPENT BY ME on the date of service doing chart review, history, exam, documentation & further activities per the note.  MANAGEMENT DISCUSSED with the following over the past 24 hours: bedside RN, patient's friend   Tests ORDERED & REVIEWED in the past 24 hours:  - Coags/INR  - potassium  Medical complexity over the past 24 hours:  - Prescription DRUG MANAGEMENT performed      Data     I have personally reviewed the following data over the past 24 hrs:    15.2 (H)  \   12.3   / 325     135 98 18.7 /  140 (H)   3.5 30 (H) 0.51 \       Imaging results reviewed over the past 24 hrs:   No results found for this or any previous visit (from the past 24 hours).

## 2024-11-14 NOTE — PROGRESS NOTES
Neurosurgery Progress Note:     POD# 2 s/p right L2-3 minimally invasive redo discectomy and L3-4 lateral discectomy with Dr. Kenny.    24 hours events: No events overnight.  Waiting for TCU placement.     On exam: Patient is alert and oriented sitting in bed.  She is hopeful to be discharged from the hospital.  She states her preoperative right lower extremity radicular symptoms that went to her knee are now gone.  She has some minor incisional pain and discomfort.    Incision: Clean dry and intact.     PLAN:  -Patient ready for discharge to TCU when bed becomes available  -PT/OT  -Pain control  -Okay to resume methotrexate  -Okay to resume aspirin 81 mg postop day 3  -Appreciate hospitalist consult  -Routine wound care     José Miguel Martinez [facility] Neurosurgery  [facility]  Vocera messaging strongly preferred  Please always look up on-call provider before messaging/paging            Dragon Disclosure   This was created at least in part with a voice recognition software. Mistakes/typos may be present.

## 2024-11-14 NOTE — PLAN OF CARE
Goal Outcome Evaluation:        Patient ambulation status: A x2 with walker and GB.  Patient able to stand for X-ray:Yes  Standing/upright x-ray complete: Yes  Patient using oral analgesics:yes  Voiding spontaneously:yes  Drains discontinued:yes  Incision clean and dry:yes  Bowel status: active, no BM. On bowel regimen    Pt A&O x4, pain managed with oral Dilaudid x2. Schedule Tyl and Robaxin. Back incision site dry and intact. CMS intact. Discharge pending.

## 2024-11-14 NOTE — PROGRESS NOTES
MD Notification    Notified Person: Fernandez Avalos    Notified Person Name:    Notification Date/Time: 11/14/24 , 0500    Notification Interaction: Vocera    Purpose of Notification: FYI , Pt has Bladder scans of 428 and 631, Has refused to have straight cath done, We have attempted to get the pt up to pivot to use the bedside commode and also with the dash steady. Pt is too weak to use dash steady and unable to Pivot transfer.     Orders Received:    Comments:

## 2024-11-15 ENCOUNTER — MEDICAL CORRESPONDENCE (OUTPATIENT)
Dept: HEALTH INFORMATION MANAGEMENT | Facility: CLINIC | Age: 79
End: 2024-11-15

## 2024-11-15 VITALS
WEIGHT: 206.13 LBS | BODY MASS INDEX: 36.52 KG/M2 | TEMPERATURE: 97.8 F | HEART RATE: 69 BPM | HEIGHT: 63 IN | SYSTOLIC BLOOD PRESSURE: 134 MMHG | DIASTOLIC BLOOD PRESSURE: 65 MMHG | RESPIRATION RATE: 18 BRPM | OXYGEN SATURATION: 96 %

## 2024-11-15 LAB
GLUCOSE BLDC GLUCOMTR-MCNC: 105 MG/DL (ref 70–99)
GLUCOSE BLDC GLUCOMTR-MCNC: 109 MG/DL (ref 70–99)
GLUCOSE BLDC GLUCOMTR-MCNC: 81 MG/DL (ref 70–99)
POTASSIUM SERPL-SCNC: 3.5 MMOL/L (ref 3.4–5.3)

## 2024-11-15 PROCEDURE — 250N000013 HC RX MED GY IP 250 OP 250 PS 637: Performed by: PHYSICIAN ASSISTANT

## 2024-11-15 PROCEDURE — G0008 ADMIN INFLUENZA VIRUS VAC: HCPCS | Performed by: STUDENT IN AN ORGANIZED HEALTH CARE EDUCATION/TRAINING PROGRAM

## 2024-11-15 PROCEDURE — 250N000012 HC RX MED GY IP 250 OP 636 PS 637: Performed by: PHYSICIAN ASSISTANT

## 2024-11-15 PROCEDURE — 84132 ASSAY OF SERUM POTASSIUM: CPT | Performed by: INTERNAL MEDICINE

## 2024-11-15 PROCEDURE — 250N000011 HC RX IP 250 OP 636: Performed by: STUDENT IN AN ORGANIZED HEALTH CARE EDUCATION/TRAINING PROGRAM

## 2024-11-15 PROCEDURE — 250N000013 HC RX MED GY IP 250 OP 250 PS 637

## 2024-11-15 PROCEDURE — 36415 COLL VENOUS BLD VENIPUNCTURE: CPT | Performed by: INTERNAL MEDICINE

## 2024-11-15 PROCEDURE — 90662 IIV NO PRSV INCREASED AG IM: CPT | Performed by: STUDENT IN AN ORGANIZED HEALTH CARE EDUCATION/TRAINING PROGRAM

## 2024-11-15 RX ORDER — HYDROMORPHONE HYDROCHLORIDE 2 MG/1
2 TABLET ORAL EVERY 6 HOURS PRN
Qty: 40 TABLET | Refills: 0 | Status: SHIPPED | OUTPATIENT
Start: 2024-11-15

## 2024-11-15 RX ORDER — AMOXICILLIN 250 MG
1 CAPSULE ORAL 2 TIMES DAILY PRN
Qty: 40 TABLET | Refills: 0 | Status: SHIPPED | OUTPATIENT
Start: 2024-11-15

## 2024-11-15 RX ORDER — POLYETHYLENE GLYCOL 3350 17 G/17G
17 POWDER, FOR SOLUTION ORAL DAILY
DISCHARGE
Start: 2024-11-15

## 2024-11-15 RX ADMIN — PANTOPRAZOLE SODIUM 40 MG: 40 TABLET, DELAYED RELEASE ORAL at 10:28

## 2024-11-15 RX ADMIN — METHOCARBAMOL 500 MG: 500 TABLET ORAL at 10:28

## 2024-11-15 RX ADMIN — HYDROCHLOROTHIAZIDE 25 MG: 25 TABLET ORAL at 10:28

## 2024-11-15 RX ADMIN — INFLUENZA A VIRUS A/VICTORIA/4897/2022 IVR-238 (H1N1) ANTIGEN (FORMALDEHYDE INACTIVATED), INFLUENZA A VIRUS A/CALIFORNIA/122/2022 SAN-022 (H3N2) ANTIGEN (FORMALDEHYDE INACTIVATED), AND INFLUENZA B VIRUS B/MICHIGAN/01/2021 ANTIGEN (FORMALDEHYDE INACTIVATED) 0.5 ML: 60; 60; 60 INJECTION, SUSPENSION INTRAMUSCULAR at 12:00

## 2024-11-15 RX ADMIN — ACETAMINOPHEN 975 MG: 325 TABLET, FILM COATED ORAL at 12:36

## 2024-11-15 RX ADMIN — CLORAZEPATE DIPOTASSIUM 15 MG: 3.75 TABLET ORAL at 12:36

## 2024-11-15 RX ADMIN — PREDNISONE 5 MG: 5 TABLET ORAL at 10:28

## 2024-11-15 RX ADMIN — CEVIMELINE 30 MG: 30 CAPSULE ORAL at 15:04

## 2024-11-15 RX ADMIN — FOLIC ACID 1 MG: 1 TABLET ORAL at 10:28

## 2024-11-15 RX ADMIN — SENNOSIDES AND DOCUSATE SODIUM 1 TABLET: 8.6; 5 TABLET ORAL at 10:26

## 2024-11-15 RX ADMIN — CEVIMELINE 30 MG: 30 CAPSULE ORAL at 10:36

## 2024-11-15 RX ADMIN — GABAPENTIN 400 MG: 300 CAPSULE ORAL at 14:26

## 2024-11-15 RX ADMIN — GABAPENTIN 400 MG: 300 CAPSULE ORAL at 10:28

## 2024-11-15 RX ADMIN — HYDROMORPHONE HYDROCHLORIDE 4 MG: 2 TABLET ORAL at 14:25

## 2024-11-15 RX ADMIN — POTASSIUM CHLORIDE 20 MEQ: 750 TABLET, EXTENDED RELEASE ORAL at 10:29

## 2024-11-15 RX ADMIN — ACETAMINOPHEN 975 MG: 325 TABLET, FILM COATED ORAL at 04:58

## 2024-11-15 RX ADMIN — METHOCARBAMOL 500 MG: 500 TABLET ORAL at 04:58

## 2024-11-15 ASSESSMENT — ACTIVITIES OF DAILY LIVING (ADL)
ADLS_ACUITY_SCORE: 0

## 2024-11-15 NOTE — PROGRESS NOTES
Neurosurgery Progress Note:     POD# 3 s/p right L2-3 minimally invasive redo discectomy and L3-4 lateral discectomy with Dr. Kenny.    24 hours events: No events overnight.  Discharging to TCU today.     On exam: Patient is alert and oriented sitting in bed.  She is hopeful to be discharged from the hospital.  She states her preoperative right lower extremity radicular symptoms that went to her knee are now gone.  She has some minor incisional pain and discomfort.    Incision: Clean dry and intact.     PLAN:  -Okay to discharge to TCU today  -Okay to resume methotrexate  -Okay to resume aspirin 81 mg postop day 3  -Appreciate hospitalist consult  -Follow-up scheduled and Carmelo placed in discharge navigator.     [facility] Neurosurgery  [facility]  Vocera messaging strongly preferred  Please always look up on-call provider before messaging/paging            Dragon Disclosure   This was created at least in part with a voice recognition software. Mistakes/typos may be present.

## 2024-11-15 NOTE — PROGRESS NOTES
Patient ambulation status:  A2 dash steady  Patient able to stand for X-ray:Yes  Standing/upright x-ray complete: Yes  Patient using oral analgesics:yes  Voiding spontaneously:yes  Drains discontinued:yes  Incision clean and dry:yes  Bowel status:No BM this shift       Pt A/OX4, Vitally stable on RA, CMS intact, Back CDI, PIV SL, Denies SOB,CP., N/V,   Voiding with Purewick, pain managed with current Regimen.

## 2024-11-15 NOTE — PROGRESS NOTES
Care Management Discharge Note    Discharge Date: 11/15/2024       Discharge Disposition: Transitional Care    Discharge Services: Transportation Services    Discharge DME:      Discharge Transportation: agency, family or friend will provide    Private pay costs discussed: transportation costs    Does the patient's insurance plan have a 3 day qualifying hospital stay waiver?  Yes     Which insurance plan 3 day waiver is available? [employer] REACH    Will the waiver be used for post-acute placement? No    PAS Confirmation Code: [id number]  Patient/family educated on Medicare website which has current facility and service quality ratings: yes    Education Provided on the Discharge Plan:    Persons Notified of Discharge Plans: Patient, family, TCU, and [facility] staff  Patient/Family in Agreement with the Plan: yes    Handoff Referral Completed: No, handoff not indicated or clinically appropriate    Additional Information:  Writer reached out to the provider who confirmed the patient was medically ready to discharge.     Writer reached out to Kandi Cortés, Admissions coordinator for [facility], who stated they would still be able to admit the patient between [id number]. Kandi reminded the writer to inquire about the patient's medication, EVOXAC.    Writer followed up with the patient regarding the discharge plan. Patient was in agreement and would like to move forward as soon as possible. Patient stated they would like to have transportation set up for them. Writer informed them of the potential out of pocket cost. Patient was in agreement. Lastly, patient stated they would be able to bring their own supply of the EVOXAC.    Writer contacted [employer] transport and set up a W/C ride between [phone].  Writer updated patient, TCU, and [facility] staff.     Writer received discharge orders and sent them via [employer] to [facility]. Scripts were sent via fax ([phone]). PAS completed.  PAS-RR    D: Per DHS regulation, SW completed and submitted PAS-RR  to [place] Board on Aging Direct Connect via the Senior LinkAge Line.  PAS-RR confirmation # is : [id number]    I: SW spoke with patient and they are aware a PAS-RR has been submitted.  SW reviewed with patient that they may be contacted for a follow up appointment within 10 days of hospital discharge if their SNF stay is < 30 days.  Contact information for Senior LinkAge Line was also provided.    A: patient verbalized understanding.    P: Further questions may be directed to Senior LinkAge Line at #[phone], option #4 for PAS-RR staff.  Patient will discharge from [facility] to [facility] with [facility]:  Wheelchair at [phone].  Please refer to CURLY progress notes for further details.No further care management intervention anticipated at this time.  Please re-consult if further needs arise.  Care management signing off.      Addendum 1615:  Writer passed by the patient's room and noticed patient was still present. Writer contacted [facility] Transport and was informed the team was delayed due to having to drive from a futher location, but they should be picking up the patient in 30 mins.     Writer updated the patient and Kandi at [facility].       Clem Sanchez, FRED  [facility]  Social Work

## 2024-11-15 NOTE — PLAN OF CARE
Occupational Therapy Discharge Summary    Reason for therapy discharge:    Discharged to transitional care facility.    Progress towards therapy goal(s). See goals on Care Plan in [facility] electronic health record for goal details.  Goals partially met.  Barriers to achieving goals:   discharge from facility.    Therapy recommendation(s):    Continued therapy is recommended.  Rationale/Recommendations:  Patient is well below her baseline, limited by pain and post surgical precautions.

## 2024-11-16 NOTE — DISCHARGE SUMMARY
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    Weight as of this encounter: 93.5 kg (206 lb 2.1 oz).       Follow-ups Needed After Discharge   Follow-up Appointments       Follow Up and recommended labs and tests      - Follow up with [facility] physician.  The following labs/tests are recommended: CBC. BMP.  - Follow up with neurosurgery in clinic. They will call you to schedule this appointment.        Follow-up and recommended labs and tests       Your post-operative follow up appointments have been/will be made for two weeks with a nurse for a well being and wound check visit as well as in six weeks with a provider.  If you are unable to attend your 2-week visit TCU staff may remove your sutures.              Unresulted Labs Ordered in the Past 30 Days of this Admission       No orders found from 10/7/2024 to 11/7/2024.            Discharge Disposition   Discharged to short-term care facility  Condition at discharge: Stable    Hospital Course   Agnes Saravia is a 79 year old female admitted on 11/6/2024.   Past history of RA (methotrexate&prednisone), Sjogren's, ILD, HTN.  She presents with acute R sciatica, unable to care for self at home.       Recently hospitalized 10/21 - 10/25 for Covid Infection associated with encephalopathy.    Treated with Remdesiver and " "Dexamethasone.  She then went to TCU 10/25 - 10/30.        Since she has been home, she has gradually developed low back pain with activity, akin to a right sided sciatica, escalating.   On 11/6/24 compelled to present to ED because unable to get out of chair.       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 Reports mechanical fall due to weakness the day she presented for Covid (10/21/24). Possible steroids she received from Covid therapy masked her symptoms from nerve root impingement.   *xray R hip and lumbar negative for fracture, UA negative  *L-spine MRI on 11/7 shows multilevel degenerative changes with moderate canal stenotic findings, compression deformity of the L5 vertebral level, and central disc extrusion at L2-L3 with mass effect on descending R L3 nerve root.  *Patient had L2 to L3 discectomy and L3 to L4 discectomy (11/12/2024) and has continued to work with PT/OT afterwards  -No lifting anything greater than 5 to 10 pounds and avoid excessive bending, twisting, turning.  - pain therapy with heat/cold/tylenol/Dilaudid (Neurosurgery filled script for PO Dilaudid to go to TCU)  - therapies recommending TCU and patient is going to TCU today (11/15)  - Patient should followup with neurosurgery in clinic. They will call her to arrange the followup.     Rheumatoid Arthritis on chronic prednisone and Methotrexate therapy   - continue daily prednisone (5mg) and weekly methotrexate (10 mg)  - no new meds for several years, generally controlled.     Anxiety     Patient has taken clorazepate nightly for many years, has symptoms if does not get this, continue     Prediabetes with steroid induced hyperglycemia  *HgbA1C 6.1 this stay. Needed sliding scale insulin, but likely due to dexamethasone she was on prior to her surgery. Insulin needs have " been going down.  - Monitor blood sugar at facility. Anticipate blood sugar is normalizing and she will not need insulin going forward given no longer on dexamethasone, pain from surgery/nerve impingement improving, and patient does not have diabetes at baseline    Hypokalemia  - replace per protocol      Consultations This Hospital Stay   CARE MANAGEMENT / SOCIAL WORK IP CONSULT  PHYSICAL THERAPY ADULT IP CONSULT  OCCUPATIONAL THERAPY ADULT IP CONSULT  NEUROSURGERY IP CONSULT  CARE MANAGEMENT / SOCIAL WORK IP CONSULT  SPIRITUAL HEALTH SERVICES IP CONSULT  SPIRITUAL HEALTH SERVICES IP CONSULT  OCCUPATIONAL THERAPY ADULT IP CONSULT  PHYSICAL THERAPY ADULT IP CONSULT  PHYSICAL THERAPY ADULT IP CONSULT  OCCUPATIONAL THERAPY ADULT IP CONSULT    Code Status   Prior    Time Spent on this Encounter   I, Rai Shi MD, personally saw the patient today and spent less than or equal to 30 minutes discharging this patient.       Rai Shi MD  [facility] ORTHOPEDICS SPINE  [address] [address]  Phone: [phone]  Fax: [phone]  ______________________________________________________________________    Physical Exam   Vital Signs: Temp: 97.8  F (36.6  C) Temp src: Oral BP: 134/65 Pulse: 69   Resp: 18 SpO2: 96 % O2 Device: None (Room air)    Weight: 206 lbs 2.08 oz  General Appearance:  Well nourished female in NAD  Respiratory: few bibasilar crackles, no wheezing or tachypnea  Cardiovascular: RRR, normal s1/s2 without murmur  GI: normal bowel sounds  Other:  Awake and alert, CN grossly intact. Able to lift both legs slightly off the bed (improved from exams prior to surgery)       Primary Care Physician   José Miguel Wallace MD    Discharge Orders      Primary Care - Care Coordination Referral      Follow-up and recommended labs and tests     Your post-operative follow up appointments have been/will be made for two weeks with a nurse for a well being and wound check visit  as well as in six weeks with a provider.  If you are unable to attend your 2-week visit TCU staff may remove your sutures.     Activity    Please limit your lifting to no more that ten pounds and avoid excessive bending, twisting and turning at the lumbar spine. You should also avoid excessive jostling and jarring activities.     Call my office at [phone] for increasing redness, swelling or pus draining from the incision, increased pain or any other questions and concerns.    Go to ER with any seizure activity, mental status change (increasing confusion), difficulty with speech or increasing or acute weakness     Wound care and dressings    Surgical dressing may be open to air.  Sutures to be removed postop day 14.     General info for SNF    Length of Stay Estimate: Short Term Care: Estimated # of Days <30  Condition at Discharge: Improving  Level of care:skilled   Rehabilitation Potential: Excellent  Admission H&P remains valid and up-to-date: Yes  Recent Chemotherapy: N/A  Use Nursing Home Standing Orders: Yes     Follow Up and recommended labs and tests    - Follow up with custodial physician.  The following labs/tests are recommended: CBC. BMP.  - Follow up with neurosurgery in clinic. They will call you to schedule this appointment.     Reason for your hospital stay    Back pain due to lumbar nerve impingement. You had a discectomy with neurosurgery.     Glucose monitor nursing POCT    Before meals and at bedtime     Wound care    Site:   back incision site  Instructions:  change dressing routinely     Activity - Up with nursing assistance     Physical Therapy Adult Consult    Evaluate and treat as clinically indicated.    Reason:  deconditioning     Occupational Therapy Adult Consult    Evaluate and treat as clinically indicated.    Reason:  deconditioning     Fall precautions     Diet    Follow this diet upon discharge: Current Diet:Orders Placed This Encounter      Advance Diet as Tolerated: Regular  Diet Adult       Significant Results and Procedures   Most Recent 3 CBC's:  Recent Labs   Lab Test 11/14/24  0847 11/13/24  0808 11/10/24  0724 11/08/24  0708   WBC 15.2* 19.2*  --  13.1*   HGB 12.3 12.2  --  11.8   MCV 92 90  --  92    318 298 316     Most Recent 3 BMP's:  Recent Labs   Lab Test 11/15/24  1213 11/15/24  1019 11/15/24  0752 11/15/24  0332 11/14/24  1442 11/14/24  0847 11/13/24  1139 11/13/24  0808 11/10/24  0839 11/10/24  0724 11/08/24  1107 11/08/24  0708   NA  --   --   --   --   --  135  --  132*  --   --   --  138   POTASSIUM  --  3.5  --   --   --  3.5  --  4.1   < > 4.1   < > 3.2*   CHLORIDE  --   --   --   --   --  98  --  98  --   --   --  98   CO2  --   --   --   --   --  30*  --  27  --   --   --  30*   BUN  --   --   --   --   --  18.7  --  22.5  --   --   --  19.0   CR  --   --   --   --   --  0.51  --  0.53  --  0.52  --  0.67   ANIONGAP  --   --   --   --   --  7  --  7  --   --   --  10   CHRISTOPHER  --   --   --   --   --  8.6*  --  8.5*  --   --   --  9.0   *  --  81 109*   < > 110*   < > 133*   < >  --    < > 103*    < > = values in this interval not displayed.   ,   Results for orders placed or performed during the hospital encounter of 11/06/24   Lumbar spine XR, 2-3 views    Narrative    XR LUMBAR SPINE 2/3 VIEWS  11/6/2024 2:25 PM     HISTORY: low back pain    COMPARISON: Lumbar spine MRI from 2/12/2016..       Impression    IMPRESSION: Mild retrolisthesis at L5-S1 No loss of vertebral body  height. Multilevel disc height loss, osteophyte formation and facet  arthropathy. Vacuum disc phenomenon at L2-3. Aortic calcified  atherosclerotic plaques.    SUSU FLOOD MD         SYSTEM ID:  [id number]   XR Pelvis w Hip Right 1 View    Narrative    XR PELVIS AND HIP RIGHT 1 VIEW 11/6/2024 2:27 PM     HISTORY: right hip pain    COMPARISON: None.       Impression    IMPRESSION:  No fracture or malalignment. Mild joint space narrowing in both hips.     VIRGILIO KRAFT MD        "  SYSTEM ID:  [id number]   MR Lumbar Spine w/o Contrast    Narrative    MRI LUMBAR SPINE WITHOUT CONTRAST   11/7/2024 3:09 PM     HISTORY: fall 10/21    since, not very mobile due to covid.   \"disc  surgery\">20Y ago.   Now having R sciatica sx since trying to be mobile  again.  RA, on daily prednisone     TECHNIQUE: Multiplanar multisequence MRI of the lumbar spine without  contrast.     COMPARISON: 11/6/2024     FINDINGS: Compared to the 3/25/2020 CT abdomen and pelvis, there is  compression deformity of the L5 vertebral level. There is a superior  endplate marrow edema with 25% loss of vertebral body height. This  could reflect an acute Schmorl's node versus acute compression  fracture.    Multilevel loss of disc height most notably at L2-3.    T12-L1: Negative.    L1-2: Negative.    L2-3: Broad-based disc bulge. Moderate canal stenosis. Facet disease.  Mild to moderate bilateral foraminal narrowing. Right central  extrusion extending caudally at this level contributes to right  lateral recess narrowing and mass effect upon the descending right L3  nerve root. Postsurgical changes of right hemilaminectomy.    L3-4: Broad-based disc bulge with moderate canal stenosis. Facet  disease. Right foraminal protrusion with extruded component extending  cranially contributing to severe right foraminal narrowing.    L4-5: Broad-based disc bulge with moderate canal stenosis. Facet  disease and ligamentum flavum hypertrophy. Moderate canal stenosis.    L5-S1: Broad-based disc bulge with moderate canal stenosis. Facet  disease. Mild bilateral foraminal narrowing.      Impression    IMPRESSION:  Multilevel degenerative changes with moderate canal  stenotic findings at L2-3 and L3-4 as well as L4-5 and L5-S1. A right  central disc extrusion at L2-3 contributes to mass effect upon the  descending right L3 nerve root. Postsurgical changes of right  hemilaminectomy at L2-3.     AYDIN DINH MD         SYSTEM ID:  [id number]   XR " Lumbar Spine 2/3 Views    Narrative    EXAM: XR LUMBAR SPINE 2/3 VIEWS  LOCATION: [facility]  DATE/TIME: 11/10/2024 12:39 PM CST    INDICATION: L2 3 surgical planning.   TECHNIQUE: Radiographs of lumbar spine in lateral including flexion/extension projections.    FINDINGS: Nomenclature based on 5 lumbar type vertebral bodies. Grade 1 anterolisthesis at L4-5. Mild loss of height at L5. Normal height of other vertebral bodies. Advanced degenerative disc disease at L2-3. Advanced multi level facet arthropathy.   Aortic calcification.    Anterolisthesis at L4-5 measures 4.4 mm on neutral, 6.7 mm on flexion and 3.3 mm on extension.     XR Surgery WILL Fluoro Less Than 5 Min w Stills    Narrative    SURGERY C-ARM FLUOROSCOPY LESS THAN FIVE MINUTES WITH STILLS   11/12/2024 7:40 PM     COMPARISON: None.    HISTORY: L2-L3 and L3-L4 redo discectomy.    NUMBER OF IMAGES ACQUIRED: 2    VIEWS: 1    FLUOROSCOPY TIME: 0.2 minute(s)      Impression    IMPRESSION: Multiple intraoperative fluoroscopic views of the lumbar  spine during microdiscectomy.    WILLIAN LANTIGUA MD         SYSTEM ID:  [id number]       Discharge Medications   Discharge Medication List as of 11/15/2024  4:11 PM        START taking these medications    Details   HYDROmorphone (DILAUDID) 2 MG tablet Take 1 tablet (2 mg) by mouth every 6 hours as needed for moderate pain., Disp-40 tablet, R-0, Local Print      Lidocaine (LIDOCARE) 4 % Patch Place 1 patch over 12 hours onto the skin every 24 hours. To prevent lidocaine toxicity, patient should be patch free for 12 hrs daily.Disp-30 patch, [id number]      methocarbamol (ROBAXIN) 500 MG tablet Take 1 tablet (500 mg) by mouth 4 times daily as needed for muscle spasms., Disp-30 tablet, R-0, E-Prescribe      polyethylene glycol (MIRALAX) 17 GM/Dose powder Take 17 g by mouth daily., Transitional      senna-docusate (SENOKOT-S/PERICOLACE) 8.6-50 MG tablet Take 1 tablet by mouth 2 times daily as  needed for constipation., Disp-40 tablet, R-0, Local Print           CONTINUE these medications which have NOT CHANGED    Details   acetaminophen (TYLENOL) 500 MG tablet Take 1,000 mg by mouth at bedtime. Plus additional PRN, Historical      albuterol (PROAIR HFA/PROVENTIL HFA/VENTOLIN HFA) 108 (90 Base) MCG/ACT inhaler Inhale 2 puffs into the lungs every 4 hours as needed for shortness of breath / dyspnea or wheezing, Disp-8.5 g, R-0, E-PrescribePharmacy may dispense brand covered by insurance (Proair, or proventil or ventolin or generic albuterol inhaler)      aspirin 81 MG EC tablet Take 81 mg by mouth At Bedtime , Historical      cevimeline (EVOXAC) 30 MG capsule Take 30 mg by mouth 3 times daily (AM, Noon, Dinner), Historical      clorazepate dipotassium (TRANXENE) 15 MG tablet Take 15 mg by mouth every morning., Historical      folic acid (FOLVITE) 1 MG tablet Take 1 mg by mouth six times a week Every day except Tuesdays when methotrexate is due, Historical      !! gabapentin (NEURONTIN) 100 MG capsule Take 100 mg by mouth at bedtime. ~1 hour before trazodone, Historical      !! gabapentin (NEURONTIN) 400 MG capsule Take 400 mg by mouth 3 times daily (AM, Noon, Dinner), Historical      hydrochlorothiazide (HYDRODIURIL) 25 MG tablet Take 1 tablet (25 mg) by mouth daily, Disp-90 tablet, R-0, E-Prescribe      methotrexate 2.5 MG tablet Take 10 mg by mouth every 7 days (Takes on Tuesdays), Historical      pantoprazole (PROTONIX) 40 MG EC tablet Take 1 tablet (40 mg) by mouth daily, Disp-90 tablet, R-0, E-Prescribe      polyethylene glycol-propylene glycol (SYSTANE ULTRA) 0.4-0.3 % SOLN ophthalmic solution Place 1 drop into both eyes every hour as needed for dry eyes, Historical      potassium chloride ER (K-TAB/KLOR-CON) 10 MEQ CR tablet Take 2 tablets (20 mEq) by mouth daily., Disp-180 tablet, R-0, E-Prescribe      predniSONE (DELTASONE) 5 MG tablet Take 1 tablet (5 mg) by mouth daily. (Hold until Decadron is  done then restart), Transitional      simvastatin (ZOCOR) 40 MG tablet Take 1 tablet (40 mg) by mouth at bedtime, Disp-90 tablet, R-3, E-PrescribeFor Profile Only - patient will call to fill      traZODone (DESYREL) 50 MG tablet TAKE 0.5 (ONE-HALF) TABLET BY MOUTH ONCE DAILY AT BEDTIME, Disp-45 tablet, R-3, E-PrescribeFor Profile Only - patient will call to fill       !! - Potential duplicate medications found. Please discuss with provider.        STOP taking these medications       ibuprofen (ADVIL/MOTRIN) 400 MG tablet Comments:   Reason for Stopping:             Allergies   Allergies   Allergen Reactions    Mellaril [Thioridazine Hcl] Anaphylaxis    Percocet [Oxycodone-Acetaminophen]      Tolerates hydrocodone and codeine in cough medicine. Tolerates  Believes reaction to oxycodone was leg swelling. No tongue/lip/throat swelling or hives.    Duloxetine Anxiety     Made her feel weird, increased anxiety

## 2024-11-18 ENCOUNTER — PATIENT OUTREACH (OUTPATIENT)
Dept: CARE COORDINATION | Facility: CLINIC | Age: 79
End: 2024-11-18
Payer: MEDICARE

## 2024-11-18 NOTE — LETTER
[facility] [address]   To:             Please give to facility    From:   Claribel Hall  RN  Care Coordinator   [facility] [address]   P: [phone]  [email or web address]   Patient Name:  Agnes Saravia YOB: 1945   Admit date: 11/15/24      *Information Needed:  Please contact me when the patient will discharge (or if they will move to long term care)- include the discharge date, disposition, and main diagnosis   If the patient is discharged with home care services, please provide the name of the agency    Also- Please inform me if a care conference is being held.   Phone or Email with information

## 2024-11-18 NOTE — PROGRESS NOTES
Clinic Care Coordination Contact  Care Coordination Transition Communication    Clinical Data: Patient was hospitalized at [facility] from 11/6/24 to 11/15/24 with diagnosis of    R L3 Nerve root impingement s/p L2 to L3 discectomy and L3 to L4 discectomy (11/12/2024)  Mechanical fall (10/21/24)   Rheumatoid Arthritis on chronic prednisone and Methotrexate therapy   Anxiety     Prediabetes with steroid induced hyperglycemia   Hypokalemia     Assessment: Patient has transitioned to TCU/ARU for short term rehabilitation:    Facility Name: [facility]  Transition Communication:  Notified facility of Primary Care- Care Coordination support via Epic fax.    Plan: Care Coordinator will await notification from facility staff informing of patient's discharge plans/needs. Care Coordinator will review chart and outreach to facility staff every 4 weeks and as needed.     Claribel Hall, RN Clinic Care Coordinator  [facility] Clinics: [place], [place] (on-site Wednesdays), [facility] (on-site Thursdays) & [facility].  [email or web address]  Phone: [phone]

## 2024-11-26 DIAGNOSIS — F41.1 GAD (GENERALIZED ANXIETY DISORDER): ICD-10-CM

## 2024-11-26 DIAGNOSIS — M35.00 SJOGREN'S SYNDROME (H): ICD-10-CM

## 2024-11-26 RX ORDER — CLORAZEPATE DIPOTASSIUM 15 MG/1
15 TABLET ORAL
Qty: 90 TABLET | Refills: 0 | Status: SHIPPED | OUTPATIENT
Start: 2024-11-26

## 2024-12-22 ENCOUNTER — HEALTH MAINTENANCE LETTER (OUTPATIENT)
Age: 79
End: 2024-12-22

## 2024-12-31 ENCOUNTER — APPOINTMENT (OUTPATIENT)
Dept: MRI IMAGING | Facility: CLINIC | Age: 79
DRG: 544 | End: 2024-12-31
Attending: EMERGENCY MEDICINE
Payer: MEDICARE

## 2024-12-31 ENCOUNTER — HOSPITAL ENCOUNTER (INPATIENT)
Facility: CLINIC | Age: 79
DRG: 544 | End: 2024-12-31
Attending: EMERGENCY MEDICINE | Admitting: INTERNAL MEDICINE
Payer: MEDICARE

## 2024-12-31 DIAGNOSIS — M54.50 ACUTE ON CHRONIC LOW BACK PAIN: ICD-10-CM

## 2024-12-31 DIAGNOSIS — I10 ESSENTIAL HYPERTENSION: ICD-10-CM

## 2024-12-31 DIAGNOSIS — E78.5 HYPERLIPIDEMIA LDL GOAL <130: ICD-10-CM

## 2024-12-31 DIAGNOSIS — M54.9 INTRACTABLE BACK PAIN: ICD-10-CM

## 2024-12-31 DIAGNOSIS — J84.9 ILD (INTERSTITIAL LUNG DISEASE) (H): ICD-10-CM

## 2024-12-31 DIAGNOSIS — R05.9 COUGH: ICD-10-CM

## 2024-12-31 DIAGNOSIS — R09.1 PLEURITIS: ICD-10-CM

## 2024-12-31 DIAGNOSIS — S32.010A COMPRESSION FRACTURE OF L1 VERTEBRA, INITIAL ENCOUNTER (H): Primary | ICD-10-CM

## 2024-12-31 DIAGNOSIS — G47.00 INSOMNIA, UNSPECIFIED TYPE: ICD-10-CM

## 2024-12-31 DIAGNOSIS — Z98.890 STATUS POST LUMBAR SURGERY: ICD-10-CM

## 2024-12-31 DIAGNOSIS — Z98.890 HISTORY OF BACK SURGERY: ICD-10-CM

## 2024-12-31 DIAGNOSIS — G89.29 ACUTE ON CHRONIC LOW BACK PAIN: ICD-10-CM

## 2024-12-31 DIAGNOSIS — F41.1 GAD (GENERALIZED ANXIETY DISORDER): ICD-10-CM

## 2024-12-31 DIAGNOSIS — M06.9 RHEUMATOID ARTHRITIS INVOLVING BOTH KNEES, UNSPECIFIED WHETHER RHEUMATOID FACTOR PRESENT (H): ICD-10-CM

## 2024-12-31 DIAGNOSIS — E87.6 HYPOKALEMIA: ICD-10-CM

## 2024-12-31 DIAGNOSIS — M35.00 SJOGREN'S SYNDROME, WITH UNSPECIFIED ORGAN INVOLVEMENT: ICD-10-CM

## 2024-12-31 DIAGNOSIS — K59.03 DRUG-INDUCED CONSTIPATION: ICD-10-CM

## 2024-12-31 DIAGNOSIS — M54.41 ACUTE BILATERAL LOW BACK PAIN WITH RIGHT-SIDED SCIATICA: ICD-10-CM

## 2024-12-31 DIAGNOSIS — K21.9 GASTROESOPHAGEAL REFLUX DISEASE WITHOUT ESOPHAGITIS: ICD-10-CM

## 2024-12-31 LAB
ALBUMIN SERPL BCG-MCNC: 3.5 G/DL (ref 3.5–5.2)
ALP SERPL-CCNC: 94 U/L (ref 40–150)
ALT SERPL W P-5'-P-CCNC: 32 U/L (ref 0–50)
ANION GAP SERPL CALCULATED.3IONS-SCNC: 11 MMOL/L (ref 7–15)
AST SERPL W P-5'-P-CCNC: 51 U/L (ref 0–45)
BASOPHILS # BLD AUTO: 0.1 10E3/UL (ref 0–0.2)
BASOPHILS NFR BLD AUTO: 1 %
BILIRUB SERPL-MCNC: 0.8 MG/DL
BUN SERPL-MCNC: 14.1 MG/DL (ref 8–23)
CALCIUM SERPL-MCNC: 8.8 MG/DL (ref 8.8–10.4)
CHLORIDE SERPL-SCNC: 98 MMOL/L (ref 98–107)
CREAT SERPL-MCNC: 0.7 MG/DL (ref 0.51–0.95)
CRP SERPL-MCNC: 43.94 MG/L
EGFRCR SERPLBLD CKD-EPI 2021: 86 ML/MIN/1.73M2
EOSINOPHIL # BLD AUTO: 0 10E3/UL (ref 0–0.7)
EOSINOPHIL NFR BLD AUTO: 0 %
ERYTHROCYTE [DISTWIDTH] IN BLOOD BY AUTOMATED COUNT: 14.6 % (ref 10–15)
ERYTHROCYTE [SEDIMENTATION RATE] IN BLOOD BY WESTERGREN METHOD: 26 MM/HR (ref 0–30)
GLUCOSE SERPL-MCNC: 120 MG/DL (ref 70–99)
HCO3 SERPL-SCNC: 29 MMOL/L (ref 22–29)
HCT VFR BLD AUTO: 34.3 % (ref 35–47)
HGB BLD-MCNC: 11.1 G/DL (ref 11.7–15.7)
IMM GRANULOCYTES # BLD: 0.1 10E3/UL
IMM GRANULOCYTES NFR BLD: 1 %
LACTATE SERPL-SCNC: 0.8 MMOL/L (ref 0.7–2)
LYMPHOCYTES # BLD AUTO: 0.9 10E3/UL (ref 0.8–5.3)
LYMPHOCYTES NFR BLD AUTO: 7 %
MCH RBC QN AUTO: 28.9 PG (ref 26.5–33)
MCHC RBC AUTO-ENTMCNC: 32.4 G/DL (ref 31.5–36.5)
MCV RBC AUTO: 89 FL (ref 78–100)
MONOCYTES # BLD AUTO: 0.8 10E3/UL (ref 0–1.3)
MONOCYTES NFR BLD AUTO: 6 %
NEUTROPHILS # BLD AUTO: 10.8 10E3/UL (ref 1.6–8.3)
NEUTROPHILS NFR BLD AUTO: 86 %
NRBC # BLD AUTO: 0 10E3/UL
NRBC BLD AUTO-RTO: 0 /100
PLATELET # BLD AUTO: 340 10E3/UL (ref 150–450)
POTASSIUM SERPL-SCNC: 3.6 MMOL/L (ref 3.4–5.3)
PROCALCITONIN SERPL IA-MCNC: 0.11 NG/ML
PROT SERPL-MCNC: 6.1 G/DL (ref 6.4–8.3)
RBC # BLD AUTO: 3.84 10E6/UL (ref 3.8–5.2)
SODIUM SERPL-SCNC: 138 MMOL/L (ref 135–145)
WBC # BLD AUTO: 12.6 10E3/UL (ref 4–11)

## 2024-12-31 PROCEDURE — 82306 VITAMIN D 25 HYDROXY: CPT | Performed by: PHYSICIAN ASSISTANT

## 2024-12-31 PROCEDURE — 85025 COMPLETE CBC W/AUTO DIFF WBC: CPT | Performed by: EMERGENCY MEDICINE

## 2024-12-31 PROCEDURE — 96374 THER/PROPH/DIAG INJ IV PUSH: CPT | Mod: 59

## 2024-12-31 PROCEDURE — 250N000013 HC RX MED GY IP 250 OP 250 PS 637: Performed by: PHYSICIAN ASSISTANT

## 2024-12-31 PROCEDURE — 250N000013 HC RX MED GY IP 250 OP 250 PS 637: Performed by: EMERGENCY MEDICINE

## 2024-12-31 PROCEDURE — G0378 HOSPITAL OBSERVATION PER HR: HCPCS

## 2024-12-31 PROCEDURE — 99285 EMERGENCY DEPT VISIT HI MDM: CPT | Mod: 25

## 2024-12-31 PROCEDURE — 250N000011 HC RX IP 250 OP 636: Performed by: EMERGENCY MEDICINE

## 2024-12-31 PROCEDURE — 80053 COMPREHEN METABOLIC PANEL: CPT | Performed by: EMERGENCY MEDICINE

## 2024-12-31 PROCEDURE — A9585 GADOBUTROL INJECTION: HCPCS | Performed by: EMERGENCY MEDICINE

## 2024-12-31 PROCEDURE — 86140 C-REACTIVE PROTEIN: CPT | Performed by: EMERGENCY MEDICINE

## 2024-12-31 PROCEDURE — 96375 TX/PRO/DX INJ NEW DRUG ADDON: CPT

## 2024-12-31 PROCEDURE — 99223 1ST HOSP IP/OBS HIGH 75: CPT | Performed by: PHYSICIAN ASSISTANT

## 2024-12-31 PROCEDURE — 72158 MRI LUMBAR SPINE W/O & W/DYE: CPT | Mod: MA

## 2024-12-31 PROCEDURE — 83605 ASSAY OF LACTIC ACID: CPT | Performed by: EMERGENCY MEDICINE

## 2024-12-31 PROCEDURE — 36415 COLL VENOUS BLD VENIPUNCTURE: CPT | Performed by: EMERGENCY MEDICINE

## 2024-12-31 PROCEDURE — 85652 RBC SED RATE AUTOMATED: CPT | Performed by: EMERGENCY MEDICINE

## 2024-12-31 PROCEDURE — 255N000002 HC RX 255 OP 636: Performed by: EMERGENCY MEDICINE

## 2024-12-31 PROCEDURE — 84145 PROCALCITONIN (PCT): CPT | Performed by: PHYSICIAN ASSISTANT

## 2024-12-31 PROCEDURE — 96376 TX/PRO/DX INJ SAME DRUG ADON: CPT

## 2024-12-31 PROCEDURE — 85014 HEMATOCRIT: CPT | Performed by: EMERGENCY MEDICINE

## 2024-12-31 RX ORDER — HYDROMORPHONE HYDROCHLORIDE 2 MG/1
2 TABLET ORAL EVERY 4 HOURS PRN
Status: DISCONTINUED | OUTPATIENT
Start: 2024-12-31 | End: 2025-01-03

## 2024-12-31 RX ORDER — GADOBUTROL 604.72 MG/ML
9 INJECTION INTRAVENOUS ONCE
Status: COMPLETED | OUTPATIENT
Start: 2024-12-31 | End: 2024-12-31

## 2024-12-31 RX ORDER — LIDOCAINE 4 G/G
1 PATCH TOPICAL EVERY 24 HOURS
Status: DISCONTINUED | OUTPATIENT
Start: 2025-01-01 | End: 2025-01-06 | Stop reason: HOSPADM

## 2024-12-31 RX ORDER — GABAPENTIN 100 MG/1
100 CAPSULE ORAL AT BEDTIME
Status: DISCONTINUED | OUTPATIENT
Start: 2024-12-31 | End: 2025-01-06 | Stop reason: HOSPADM

## 2024-12-31 RX ORDER — POTASSIUM CHLORIDE 1500 MG/1
20 TABLET, EXTENDED RELEASE ORAL 2 TIMES DAILY
Status: ON HOLD | COMMUNITY
End: 2025-01-06

## 2024-12-31 RX ORDER — PREDNISONE 5 MG/1
5 TABLET ORAL DAILY
Status: DISCONTINUED | OUTPATIENT
Start: 2025-01-01 | End: 2025-01-06 | Stop reason: HOSPADM

## 2024-12-31 RX ORDER — LORAZEPAM 1 MG/1
1 TABLET ORAL ONCE
Status: COMPLETED | OUTPATIENT
Start: 2024-12-31 | End: 2024-12-31

## 2024-12-31 RX ORDER — ONDANSETRON 2 MG/ML
4 INJECTION INTRAMUSCULAR; INTRAVENOUS EVERY 6 HOURS PRN
Status: DISCONTINUED | OUTPATIENT
Start: 2024-12-31 | End: 2025-01-06 | Stop reason: HOSPADM

## 2024-12-31 RX ORDER — IBUPROFEN 600 MG/1
600 TABLET, FILM COATED ORAL 2 TIMES DAILY
Status: ON HOLD | COMMUNITY
End: 2025-01-06

## 2024-12-31 RX ORDER — NALOXONE HYDROCHLORIDE 0.4 MG/ML
0.4 INJECTION, SOLUTION INTRAMUSCULAR; INTRAVENOUS; SUBCUTANEOUS
Status: DISCONTINUED | OUTPATIENT
Start: 2024-12-31 | End: 2025-01-06 | Stop reason: HOSPADM

## 2024-12-31 RX ORDER — GLIPIZIDE 10 MG/1
1 TABLET ORAL 4 TIMES DAILY
Status: DISCONTINUED | OUTPATIENT
Start: 2024-12-31 | End: 2025-01-06 | Stop reason: HOSPADM

## 2024-12-31 RX ORDER — NALOXONE HYDROCHLORIDE 0.4 MG/ML
0.2 INJECTION, SOLUTION INTRAMUSCULAR; INTRAVENOUS; SUBCUTANEOUS
Status: DISCONTINUED | OUTPATIENT
Start: 2024-12-31 | End: 2025-01-06 | Stop reason: HOSPADM

## 2024-12-31 RX ORDER — CEVIMELINE HYDROCHLORIDE 30 MG/1
30 CAPSULE ORAL 3 TIMES DAILY
Status: DISCONTINUED | OUTPATIENT
Start: 2024-12-31 | End: 2025-01-06 | Stop reason: HOSPADM

## 2024-12-31 RX ORDER — HYDROMORPHONE HCL IN WATER/PF 6 MG/30 ML
0.2 PATIENT CONTROLLED ANALGESIA SYRINGE INTRAVENOUS
Status: DISCONTINUED | OUTPATIENT
Start: 2024-12-31 | End: 2025-01-06 | Stop reason: HOSPADM

## 2024-12-31 RX ORDER — CLORAZEPATE DIPOTASSIUM 3.75 MG/1
15 TABLET ORAL EVERY MORNING
Status: DISCONTINUED | OUTPATIENT
Start: 2025-01-01 | End: 2025-01-06 | Stop reason: HOSPADM

## 2024-12-31 RX ORDER — ONDANSETRON 4 MG/1
4 TABLET, ORALLY DISINTEGRATING ORAL EVERY 6 HOURS PRN
Status: DISCONTINUED | OUTPATIENT
Start: 2024-12-31 | End: 2025-01-06 | Stop reason: HOSPADM

## 2024-12-31 RX ORDER — ASPIRIN 81 MG/1
81 TABLET ORAL AT BEDTIME
Status: DISCONTINUED | OUTPATIENT
Start: 2024-12-31 | End: 2025-01-06 | Stop reason: HOSPADM

## 2024-12-31 RX ORDER — AMOXICILLIN 250 MG
1 CAPSULE ORAL 2 TIMES DAILY PRN
Status: DISCONTINUED | OUTPATIENT
Start: 2024-12-31 | End: 2025-01-06 | Stop reason: HOSPADM

## 2024-12-31 RX ORDER — PANTOPRAZOLE SODIUM 40 MG/1
40 TABLET, DELAYED RELEASE ORAL DAILY
Status: DISCONTINUED | OUTPATIENT
Start: 2025-01-01 | End: 2025-01-06 | Stop reason: HOSPADM

## 2024-12-31 RX ORDER — ALBUTEROL SULFATE 90 UG/1
2 INHALANT RESPIRATORY (INHALATION) EVERY 4 HOURS PRN
Status: DISCONTINUED | OUTPATIENT
Start: 2024-12-31 | End: 2025-01-06 | Stop reason: HOSPADM

## 2024-12-31 RX ORDER — POTASSIUM CHLORIDE 1500 MG/1
20 TABLET, EXTENDED RELEASE ORAL 2 TIMES DAILY
Status: DISCONTINUED | OUTPATIENT
Start: 2024-12-31 | End: 2025-01-06 | Stop reason: HOSPADM

## 2024-12-31 RX ORDER — POLYETHYLENE GLYCOL 3350 17 G/17G
1 POWDER, FOR SOLUTION ORAL DAILY PRN
Status: ON HOLD | COMMUNITY
End: 2025-01-06

## 2024-12-31 RX ORDER — GUAIFENESIN 200 MG/10ML
200 LIQUID ORAL EVERY 4 HOURS PRN
Status: ON HOLD | COMMUNITY
End: 2025-01-06

## 2024-12-31 RX ORDER — ACETAMINOPHEN 325 MG/1
975 TABLET ORAL 3 TIMES DAILY
Status: DISCONTINUED | OUTPATIENT
Start: 2024-12-31 | End: 2025-01-06 | Stop reason: HOSPADM

## 2024-12-31 RX ORDER — AMOXICILLIN 250 MG
2 CAPSULE ORAL 2 TIMES DAILY PRN
Status: DISCONTINUED | OUTPATIENT
Start: 2024-12-31 | End: 2025-01-06 | Stop reason: HOSPADM

## 2024-12-31 RX ORDER — HYDROCHLOROTHIAZIDE 25 MG/1
25 TABLET ORAL DAILY
Status: DISCONTINUED | OUTPATIENT
Start: 2025-01-01 | End: 2025-01-06 | Stop reason: HOSPADM

## 2024-12-31 RX ORDER — HYDROMORPHONE HCL IN WATER/PF 6 MG/30 ML
0.4 PATIENT CONTROLLED ANALGESIA SYRINGE INTRAVENOUS
Status: DISCONTINUED | OUTPATIENT
Start: 2024-12-31 | End: 2025-01-06 | Stop reason: HOSPADM

## 2024-12-31 RX ORDER — LORAZEPAM 2 MG/ML
0.5 INJECTION INTRAMUSCULAR ONCE
Status: COMPLETED | OUTPATIENT
Start: 2024-12-31 | End: 2024-12-31

## 2024-12-31 RX ORDER — HYDROMORPHONE HYDROCHLORIDE 1 MG/ML
0.5 INJECTION, SOLUTION INTRAMUSCULAR; INTRAVENOUS; SUBCUTANEOUS EVERY 10 MIN PRN
Status: COMPLETED | OUTPATIENT
Start: 2024-12-31 | End: 2024-12-31

## 2024-12-31 RX ORDER — METHOCARBAMOL 500 MG/1
500 TABLET, FILM COATED ORAL 3 TIMES DAILY
Status: DISCONTINUED | OUTPATIENT
Start: 2024-12-31 | End: 2025-01-06 | Stop reason: HOSPADM

## 2024-12-31 RX ADMIN — GABAPENTIN 100 MG: 100 CAPSULE ORAL at 21:47

## 2024-12-31 RX ADMIN — HYDROMORPHONE HYDROCHLORIDE 2 MG: 2 TABLET ORAL at 21:48

## 2024-12-31 RX ADMIN — ACETAMINOPHEN 975 MG: 325 TABLET, FILM COATED ORAL at 21:48

## 2024-12-31 RX ADMIN — HYDROMORPHONE HYDROCHLORIDE 0.5 MG: 1 INJECTION, SOLUTION INTRAMUSCULAR; INTRAVENOUS; SUBCUTANEOUS at 20:39

## 2024-12-31 RX ADMIN — HYDROMORPHONE HYDROCHLORIDE 0.5 MG: 1 INJECTION, SOLUTION INTRAMUSCULAR; INTRAVENOUS; SUBCUTANEOUS at 14:56

## 2024-12-31 RX ADMIN — ASPIRIN 81 MG: 81 TABLET, COATED ORAL at 21:48

## 2024-12-31 RX ADMIN — GADOBUTROL 9 ML: 604.72 INJECTION INTRAVENOUS at 18:40

## 2024-12-31 RX ADMIN — LORAZEPAM 1 MG: 1 TABLET ORAL at 17:53

## 2024-12-31 RX ADMIN — POTASSIUM CHLORIDE 20 MEQ: 1500 TABLET, EXTENDED RELEASE ORAL at 21:47

## 2024-12-31 RX ADMIN — METHOCARBAMOL 500 MG: 500 TABLET ORAL at 21:49

## 2024-12-31 RX ADMIN — LORAZEPAM 0.5 MG: 2 INJECTION INTRAMUSCULAR; INTRAVENOUS at 14:00

## 2024-12-31 RX ADMIN — CEVIMELINE HYDROCHLORIDE 30 MG: 30 CAPSULE ORAL at 21:47

## 2024-12-31 RX ADMIN — HYDROMORPHONE HYDROCHLORIDE 0.5 MG: 1 INJECTION, SOLUTION INTRAMUSCULAR; INTRAVENOUS; SUBCUTANEOUS at 18:06

## 2024-12-31 ASSESSMENT — ACTIVITIES OF DAILY LIVING (ADL)
ADLS_ACUITY_SCORE: 55

## 2024-12-31 NOTE — H&P
[facility]  History and Physical - Hospitalist Service       Date of Admission:  12/31/2024  PRIMARY CARE PROVIDER:    José Miguel Wallace    Assessment & Plan   Agnes Saravia is a 79 year old female with PMH of RA, Sjogren's, ILD, HTN, recent L2-L3 and L3-L4 discectomy (11/12/24) who presented to the ED on 12/31 with severe back pain and found to have a new L1 superior endplate compression fracture.    Intractable back pain  New recent L1 superior endplate compression fracture  L5 superior endplate compression fracture with persistent edema and slightly worsened height loss  Recent L2-L3 and L3-L4 discectomy (11/12/24)  *Patient was recently hospitalized at [facility] 11/6-11/15 with severe low back pain, found to have right L3 nerve root impingement and s/p L2-L3 and L3-L4 discectomy by neurosurgery (Dr. Kenny) on 11/12/24. She discharged to TCU.  *Presents back to the ED 12/31 with worsening back pain over the last couple of weeks but especially the last 4 days. Pain usually does not radiate, but occasionally does into the right anterior thigh. No sensory deficits. Motor exam seems to be limited by pain.  *MR lumbar spine showed a new recent L1 superior endplate compression fracture with mild height loss and L5 superior endplate compression fracture/Schmorl's node demonstrates persistent edema and slightly worsened height loss compared to the prior. Right sided post operative changes at L2-L3 and L3-L4.  - Neurosurgery consult  - Acetaminophen 975 mg TID  - Lidoderm patch  - Robaxin 500 mg TID  - Continue PTA Gabapentin  - PO Dilaudid or IV Dilaudid PRN  - PT evaluation  -  consult for discharge planning    Rheumatoid arthritis  - Continue PTA Prednisone  - Takes PTA Methotrexate weekly on Tuesdays    Sjogren's  - Continue PTA Evoxac and eye gtts    Anxiety  - Continue PTA Clorazepate    HLD  - Can resume PTA statin on discharge    Prediabetes  *Last A1c was 6.1%    Clinically  "Significant Risk Factors Present on Admission                 # Drug Induced Platelet Defect: home medication list includes an antiplatelet medication   # Hypertension: Noted on problem list           # Obesity: Estimated body mass index is 33.66 kg/m  as calculated from the following:    Height as of this encounter: 1.6 m (5' 3\").    Weight as of this encounter: 86.2 kg (190 lb).               Diet:  regular  DVT Prophylaxis: Pneumatic Compression Devices  Andujar Catheter: Not present  Lines: None     Cardiac Monitoring: None  Code Status:  Full         Disposition Plan      Expected Discharge Date: 01/01/2025             Entered: Bhargavi Gates PA-C 12/31/2024, 5:34 PM     Medically Ready for Discharge: Anticipated Tomorrow       The patient's care was discussed with the Attending Physician, Dr. Sierra and Patient.    Bhargavi Gates PA-C  [facility]  Securely message with the Vocera Web Console (learn more here)      ______________________________________________________________________    Chief Complaint   Low back pain    History is obtained from the patient and EMR.      History of Present Illness   Agnes Saravia is a 79 year old female with PMH of RA, Sjogren's, ILD, HTN, recent L2-L3 and L3-L4 discectomy (11/12/24) who presented to the ED on 12/31 with severe back pain.    Patient was recently hospitalized at [facility] 11/6-11/15 with severe low back pain, found to have right L3 nerve root impingement and s/p L2-L3 and L3-L4 discectomy by neurosurgery (Dr. Kenny) on 11/12/24. She discharged to TCU.    Presents to the ED on 12/31 reporting back spasms which have gotten progressively worse over the last couple of weeks, but more severe in the last 4 days. She denies any new fall or injury. No numbness or tingling. Her pain radiates horizontally across the low back, occasional radiation into the R thigh, she does not think it extends past the knee but doesn't pay attention. No " saddle anesthesia, no urinary retention or fecal incontinence. No fevers, chills, redness or drainage from incision site.    In the ED, VSS. CBC with WBC 12.6, hgb 11.1. CMP with AST 51, otherwise unremarkable. CRP 43. Lactic acid 0.8. She was given IV Ativan 0.5 mg and IV Dilaudid 0.5 mg in the ED. MR lumbar spine showed a new recent L1 superior endplate compression fracture with mild height loss and L5 superior endplate compression fracture/Schmorl's node demonstrates persistent edema and slightly worsened height loss compared to the prior. Right sided post operative changes at L2-L3 and L3-L4.    Past Medical History    I have reviewed this patient's medical history and updated it with pertinent information if needed.   Past Medical History:   Diagnosis Date    Anxiety     Arthritis     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    Depressive disorder     Hyperlipidemia     Insomnia     PONV (postoperative nausea and vomiting)     Rheumatoid arthritis(714.0)     Sjogren's syndrome (H)        Prior to Admission Medications   Prior to Admission Medications   Prescriptions Last Dose Informant Patient Reported? Taking?   HYDROmorphone (DILAUDID) 2 MG tablet   No No   Sig: Take 1 tablet (2 mg) by mouth every 6 hours as needed for moderate pain.   Lidocaine (LIDOCARE) 4 % Patch   No No   Sig: Place 1 patch over 12 hours onto the skin every 24 hours. To prevent lidocaine toxicity, patient should be patch free for 12 hrs daily.   acetaminophen (TYLENOL) 500 MG tablet  Friend, Self Yes No   Sig: Take 1,000 mg by mouth at bedtime. Plus additional PRN   albuterol (PROAIR HFA/PROVENTIL HFA/VENTOLIN HFA) 108 (90 Base) MCG/ACT inhaler  Friend, Self No No   Sig: Inhale 2 puffs into the lungs every 4 hours as needed for shortness of breath / dyspnea or wheezing   aspirin 81 MG EC tablet  Friend, Self Yes No   Sig: Take 81 mg by mouth At Bedtime    cevimeline (EVOXAC) 30 MG capsule  Friend, Self Yes No   Sig: Take 30 mg by mouth 3 times daily (AM,  Noon, Dinner)   clorazepate dipotassium (TRANXENE) 15 MG tablet   No No   Sig: TAKE ONE TABLET BY MOUTH AT BEDTIME   folic acid (FOLVITE) 1 MG tablet  Friend, Self Yes No   Sig: Take 1 mg by mouth six times a week Every day except Tuesdays when methotrexate is due   gabapentin (NEURONTIN) 100 MG capsule  Friend, Self Yes No   Sig: Take 100 mg by mouth at bedtime. ~1 hour before trazodone   gabapentin (NEURONTIN) 400 MG capsule  Friend, Self Yes No   Sig: Take 400 mg by mouth 3 times daily (AM, Noon, Dinner)   hydrochlorothiazide (HYDRODIURIL) 25 MG tablet  Friend, Self No No   Sig: Take 1 tablet (25 mg) by mouth daily   methocarbamol (ROBAXIN) 500 MG tablet   No No   Sig: Take 1 tablet (500 mg) by mouth 4 times daily as needed for muscle spasms.   methotrexate 2.5 MG tablet  Friend, Self Yes No   Sig: Take 10 mg by mouth every 7 days (Takes on Tuesdays)   pantoprazole (PROTONIX) 40 MG EC tablet  Friend, Self No No   Sig: Take 1 tablet (40 mg) by mouth daily   polyethylene glycol (MIRALAX) 17 GM/Dose powder   No No   Sig: Take 17 g by mouth daily.   polyethylene glycol-propylene glycol (SYSTANE ULTRA) 0.4-0.3 % SOLN ophthalmic solution  Friend, Self Yes No   Sig: Place 1 drop into both eyes every hour as needed for dry eyes   potassium chloride ER (K-TAB/KLOR-CON) 10 MEQ CR tablet  Friend, Self No No   Sig: Take 2 tablets (20 mEq) by mouth daily.   predniSONE (DELTASONE) 5 MG tablet  Friend, Self No No   Sig: Take 1 tablet (5 mg) by mouth daily. (Hold until Decadron is done then restart)   senna-docusate (SENOKOT-S/PERICOLACE) 8.6-50 MG tablet   No No   Sig: Take 1 tablet by mouth 2 times daily as needed for constipation.   simvastatin (ZOCOR) 40 MG tablet  Friend, Self No No   Sig: Take 1 tablet (40 mg) by mouth at bedtime   traZODone (DESYREL) 50 MG tablet  Friend, Self No No   Sig: TAKE 0.5 (ONE-HALF) TABLET BY MOUTH ONCE DAILY AT BEDTIME      Facility-Administered Medications: None     Allergies   Allergies    Allergen Reactions    Mellaril [Thioridazine Hcl] Anaphylaxis    Percocet [Oxycodone-Acetaminophen]      Tolerates hydrocodone and codeine in cough medicine. Tolerates  Believes reaction to oxycodone was leg swelling. No tongue/lip/throat swelling or hives.    Duloxetine Anxiety     Made her feel weird, increased anxiety       Physical Exam   Vital Signs: Temp: 98.1  F (36.7  C) Temp src: Oral BP: (!) 140/57 Pulse: 84   Resp: 18 SpO2: 96 % O2 Device: None (Room air)    Weight: 190 lbs 0 oz    Constitutional: Awake, alert, cooperative, no apparent distress.    ENT: Normocephalic, without obvious abnormality, atraumatic. Normal sclera.    Neck: Supple, symmetrical, trachea midline  Pulmonary: No increased work of breathing, diminished  Cardiovascular: Regular rate and rhythm  Skin: Visualized skin appeared clear.  Neuro: Oriented x 3. Moves all extremities spontaneously. No focal neuro deficits.  Psych:  Normal affect and mood  Extremities: Normal muscle tone. No gross deformities noted. Calves non-tender b/l. No pitting edema b/l LE. Able to lift b/l legs off of the stretcher, slightly more limited RLE but with pain. No numbness/tingling.    Medical Decision Making       75 MINUTES SPENT BY ME on the date of service doing chart review, history, exam, documentation & further activities per the note.         Data   Data reviewed today: I reviewed all medications, new labs and imaging results over the last 24 hours. I personally reviewed no images or EKG's today.      I have personally reviewed the following data over the past 24 hrs:    12.6 (H)  \   11.1 (L)   / 340     138 98 14.1 /  120 (H)   3.6 29 0.70 \     ALT: 32 AST: 51 (H) AP: 94 TBILI: 0.8   ALB: 3.5 TOT PROTEIN: 6.1 (L) LIPASE: N/A     Procal: N/A CRP: 43.94 (H) Lactic Acid: 0.8         Imaging results reviewed over the past 24 hrs:   No results found for this or any previous visit (from the past 24 hours).     Performed

## 2024-12-31 NOTE — ED PROVIDER NOTES
Emergency Department Note      History of Present Illness     Chief Complaint   Back Pain    HPI   Agnes Saravia is a 79 year old female with a history including bilateral low back pain with right-sided sciatica, lumbar disc disease, rheumatoid arthritis on chronic prednisone and methotrexate, hypertension, and hyperlipidemia who presents to the ED via EMS from [facility] in [place] for evaluation of back pain. The patient reports having a R L3 nerve root impingement, L2 to L3 discectomy, and L3 to L4 discectomy on 11/12. Her back spasms having been getting worse over the last 3 weeks and became severe over the last 4 days. She believes she injured her back but is unsure how. Any movement causes shooting pain across her lower back and hips. She takes dilaudid, ibuprofen, and lidocaine patches. EMS administered 50 mcg of fentanyl en route lowering her pain from a 11 out of 10 to a 9 out of 10. Vital signs were stable and blood glucose was 138 en route. Denies fever. No redness or discharge out of incisional site.     Independent Historian   None    Review of External Notes   Neurosurgery procedure note from 12 November 2024    Past Medical History     Medical History and Problem List   Hypertension  Seropositive Rheumatoid Arthritis  Hyperlipidemia  Sjgren's Syndrome  Benign Paroxysmal Positional Vertigo, unspecified laterality  DANA  Hypokalemia  C. Difficile enteritis    Medications   Cevimeline HCl  Clorazepate Dipotassium  Gabapentin  Hydrochlorothiazide  Methotrexate Sodium  Pantoprazole Sodium  Prednisone  Simvastatin  Trazodone HCl  Aspirin (81 mg)    Surgical History   Blepharoplasty  Bilateral Salpingo Oophorectomy and Hysterectomy Combined  Mammoplasty Reduction, bilateral  Discectomy lumbar posterior microscopic level one  Bronchoscopy (rigid or flexible), diagnostic  Appendectomy  Lip Lower Biopsy  Colonoscopy  Shoulder Surgery  Abdomen Surgery  ENT Surgery    Physical Exam     Patient Vitals for the  "past 24 hrs:   BP Temp Temp src Pulse Resp SpO2 Height Weight   12/31/24 1242 -- 98.1  F (36.7  C) Oral -- 18 96 % -- --   12/31/24 1241 -- -- -- -- -- -- 1.6 m (5' 3\") 86.2 kg (190 lb)   12/31/24 1240 (!) 140/57 -- -- 84 -- 96 % -- --     Physical Exam  General: Alert, No distress. Nontoxic appearance.  Spasmodic pain with any movement of her or musculature or lower extremities.    Head: No signs of trauma.   Mouth/Throat: Oropharynx moist.   Eyes: Conjunctivae are normal. Pupils are equal..   Neck: Normal range of motion.    CV: Appears well perfused.  Resp:No respiratory distress.   MSK: Normal range of motion. No obvious deformity.   Neuro: The patient is alert and interactive. DAVALOS. Speech normal. GCS 15  Skin: No lesion or sign of trauma noted.   Psych: normal mood and affect. behavior is normal.       Diagnostics     Lab Results   Labs Ordered and Resulted from Time of ED Arrival to Time of ED Departure   COMPREHENSIVE METABOLIC PANEL - Abnormal       Result Value    Sodium 138      Potassium 3.6      Carbon Dioxide (CO2) 29      Anion Gap 11      Urea Nitrogen 14.1      Creatinine 0.70      GFR Estimate 86      Calcium 8.8      Chloride 98      Glucose 120 (*)     Alkaline Phosphatase 94      AST 51 (*)     ALT 32      Protein Total 6.1 (*)     Albumin 3.5      Bilirubin Total 0.8     CRP INFLAMMATION - Abnormal    CRP Inflammation 43.94 (*)    CBC WITH PLATELETS AND DIFFERENTIAL - Abnormal    WBC Count 12.6 (*)     RBC Count 3.84      Hemoglobin 11.1 (*)     Hematocrit 34.3 (*)     MCV 89      MCH 28.9      MCHC 32.4      RDW 14.6      Platelet Count 340      % Neutrophils 86      % Lymphocytes 7      % Monocytes 6      % Eosinophils 0      % Basophils 1      % Immature Granulocytes 1      NRBCs per 100 WBC 0      Absolute Neutrophils 10.8 (*)     Absolute Lymphocytes 0.9      Absolute Monocytes 0.8      Absolute Eosinophils 0.0      Absolute Basophils 0.1      Absolute Immature Granulocytes 0.1      " Absolute NRBCs 0.0     ERYTHROCYTE SEDIMENTATION RATE AUTO - Normal    Erythrocyte Sedimentation Rate 26     LACTIC ACID WHOLE BLOOD WITH 1X REPEAT IN 2 HR WHEN >2 - Normal    Lactic Acid, Initial 0.8       Imaging   MR Lumbar Spine w/o & w Contrast    (Results Pending)     Independent Interpretation   None    ED Course      Medications Administered   Medications   HYDROmorphone (PF) (DILAUDID) injection 0.5 mg (0.5 mg Intravenous $Given 12/31/24 4846)   midazolam (VERSED) injection 2 mg (has no administration in time range)   LORazepam (ATIVAN) injection 0.5 mg (0.5 mg Intravenous $Given 12/31/24 1400)     Discussion of Management   I discussed case with the admitting hospitalist    ED Course   ED Course as of 12/31/24 1657   Tue Dec 31, 2024   1252 I obtained history and examined the patient as noted above.      Additional Documentation  None    Medical Decision Making / Diagnosis     MDM   Agnes Saravia is a 79 year old female presents to the ED with intractable low back pain.  The patient has a recent history of low back surgery here at [facility] with neurosurgery.  MRI is pending to evaluate further for possible complication.  Given the patient's severe pain and inability to care for herself.  The patient will be admitted to the hospital for further evaluation and treatment.    Disposition   The patient was admitted to the hospital.     Diagnosis     ICD-10-CM    1. Intractable back pain  M54.9       2. History of back surgery  Z98.890                Scribe Disclosure:  I, Rolando Reid, am serving as a scribe at 1:04 PM on 12/31/2024 to document services personally performed by Vito Vincent MD, based on my observations and the provider's statements to me.        Vito Vincent MD  12/31/24 2462

## 2024-12-31 NOTE — ED TRIAGE NOTES
BIBA from TCU in [place], had back surgery 6 weeks ago.  A few days ago, believes she reinjured her back but was unsure how. Lower back pain from hip to hip, no falls or trauma. No pain meds helping. Takes dilaudid, ibuprofen, lido patch. Today, facility called 911 due to patient unable to do anything without severe pain.  EMS gave 50 mcg fentanyl (pain 11/10 to 9/10).  Also reports constipated fort several days.    VSS en route, .

## 2024-12-31 NOTE — ED NOTES
Patient notes that she has to go to the bathroom and was placed on a purewick per their request as patient has intense back pain with movement

## 2025-01-01 LAB
ANION GAP SERPL CALCULATED.3IONS-SCNC: 11 MMOL/L (ref 7–15)
BASOPHILS # BLD AUTO: 0.1 10E3/UL (ref 0–0.2)
BASOPHILS NFR BLD AUTO: 1 %
BUN SERPL-MCNC: 10 MG/DL (ref 8–23)
CALCIUM SERPL-MCNC: 8.8 MG/DL (ref 8.8–10.4)
CHLORIDE SERPL-SCNC: 96 MMOL/L (ref 98–107)
CREAT SERPL-MCNC: 0.53 MG/DL (ref 0.51–0.95)
EGFRCR SERPLBLD CKD-EPI 2021: >90 ML/MIN/1.73M2
EOSINOPHIL # BLD AUTO: 0.2 10E3/UL (ref 0–0.7)
EOSINOPHIL NFR BLD AUTO: 3 %
ERYTHROCYTE [DISTWIDTH] IN BLOOD BY AUTOMATED COUNT: 14.7 % (ref 10–15)
GLUCOSE SERPL-MCNC: 89 MG/DL (ref 70–99)
HCO3 SERPL-SCNC: 30 MMOL/L (ref 22–29)
HCT VFR BLD AUTO: 31.4 % (ref 35–47)
HGB BLD-MCNC: 10.3 G/DL (ref 11.7–15.7)
IMM GRANULOCYTES # BLD: 0.1 10E3/UL
IMM GRANULOCYTES NFR BLD: 1 %
LYMPHOCYTES # BLD AUTO: 1.3 10E3/UL (ref 0.8–5.3)
LYMPHOCYTES NFR BLD AUTO: 18 %
MCH RBC QN AUTO: 29.7 PG (ref 26.5–33)
MCHC RBC AUTO-ENTMCNC: 32.8 G/DL (ref 31.5–36.5)
MCV RBC AUTO: 91 FL (ref 78–100)
MONOCYTES # BLD AUTO: 0.7 10E3/UL (ref 0–1.3)
MONOCYTES NFR BLD AUTO: 10 %
NEUTROPHILS # BLD AUTO: 5.2 10E3/UL (ref 1.6–8.3)
NEUTROPHILS NFR BLD AUTO: 69 %
NRBC # BLD AUTO: 0 10E3/UL
NRBC BLD AUTO-RTO: 0 /100
PLATELET # BLD AUTO: 326 10E3/UL (ref 150–450)
POTASSIUM SERPL-SCNC: 3.2 MMOL/L (ref 3.4–5.3)
POTASSIUM SERPL-SCNC: 3.5 MMOL/L (ref 3.4–5.3)
RBC # BLD AUTO: 3.47 10E6/UL (ref 3.8–5.2)
SODIUM SERPL-SCNC: 137 MMOL/L (ref 135–145)
VIT D+METAB SERPL-MCNC: 17 NG/ML (ref 20–50)
WBC # BLD AUTO: 7.6 10E3/UL (ref 4–11)

## 2025-01-01 PROCEDURE — 99207 PR NO BILLABLE SERVICE THIS VISIT: CPT | Performed by: NURSE PRACTITIONER

## 2025-01-01 PROCEDURE — 36415 COLL VENOUS BLD VENIPUNCTURE: CPT | Performed by: PHYSICIAN ASSISTANT

## 2025-01-01 PROCEDURE — 99232 SBSQ HOSP IP/OBS MODERATE 35: CPT | Performed by: INTERNAL MEDICINE

## 2025-01-01 PROCEDURE — 250N000013 HC RX MED GY IP 250 OP 250 PS 637: Performed by: HOSPITALIST

## 2025-01-01 PROCEDURE — 250N000013 HC RX MED GY IP 250 OP 250 PS 637: Performed by: INTERNAL MEDICINE

## 2025-01-01 PROCEDURE — 84132 ASSAY OF SERUM POTASSIUM: CPT | Performed by: INTERNAL MEDICINE

## 2025-01-01 PROCEDURE — 85014 HEMATOCRIT: CPT | Performed by: PHYSICIAN ASSISTANT

## 2025-01-01 PROCEDURE — 99024 POSTOP FOLLOW-UP VISIT: CPT

## 2025-01-01 PROCEDURE — 80048 BASIC METABOLIC PNL TOTAL CA: CPT | Performed by: PHYSICIAN ASSISTANT

## 2025-01-01 PROCEDURE — 36415 COLL VENOUS BLD VENIPUNCTURE: CPT | Performed by: INTERNAL MEDICINE

## 2025-01-01 PROCEDURE — 85004 AUTOMATED DIFF WBC COUNT: CPT | Performed by: PHYSICIAN ASSISTANT

## 2025-01-01 PROCEDURE — 250N000013 HC RX MED GY IP 250 OP 250 PS 637: Performed by: PHYSICIAN ASSISTANT

## 2025-01-01 PROCEDURE — G0378 HOSPITAL OBSERVATION PER HR: HCPCS

## 2025-01-01 PROCEDURE — 250N000012 HC RX MED GY IP 250 OP 636 PS 637: Performed by: PHYSICIAN ASSISTANT

## 2025-01-01 RX ORDER — POTASSIUM CHLORIDE 1.5 G/1.58G
40 POWDER, FOR SOLUTION ORAL ONCE
Status: COMPLETED | OUTPATIENT
Start: 2025-01-01 | End: 2025-01-01

## 2025-01-01 RX ADMIN — PANTOPRAZOLE SODIUM 40 MG: 40 TABLET, DELAYED RELEASE ORAL at 07:43

## 2025-01-01 RX ADMIN — POTASSIUM CHLORIDE 40 MEQ: 1.5 POWDER, FOR SOLUTION ORAL at 11:17

## 2025-01-01 RX ADMIN — SENNOSIDES AND DOCUSATE SODIUM 2 TABLET: 50; 8.6 TABLET ORAL at 14:25

## 2025-01-01 RX ADMIN — DEXTRAN 70, GLYCERIN, HYPROMELLOSE 1 DROP: 1; 2; 3 SOLUTION/ DROPS OPHTHALMIC at 20:47

## 2025-01-01 RX ADMIN — LIDOCAINE 1 PATCH: 4 PATCH TOPICAL at 07:43

## 2025-01-01 RX ADMIN — DEXTRAN 70, GLYCERIN, HYPROMELLOSE 1 DROP: 1; 2; 3 SOLUTION/ DROPS OPHTHALMIC at 11:17

## 2025-01-01 RX ADMIN — HYDROMORPHONE HYDROCHLORIDE 2 MG: 2 TABLET ORAL at 15:17

## 2025-01-01 RX ADMIN — GABAPENTIN 100 MG: 100 CAPSULE ORAL at 20:42

## 2025-01-01 RX ADMIN — POTASSIUM CHLORIDE 20 MEQ: 1500 TABLET, EXTENDED RELEASE ORAL at 20:42

## 2025-01-01 RX ADMIN — CEVIMELINE HYDROCHLORIDE 30 MG: 30 CAPSULE ORAL at 14:21

## 2025-01-01 RX ADMIN — GABAPENTIN 400 MG: 300 CAPSULE ORAL at 14:21

## 2025-01-01 RX ADMIN — CEVIMELINE HYDROCHLORIDE 30 MG: 30 CAPSULE ORAL at 07:43

## 2025-01-01 RX ADMIN — ACETAMINOPHEN 975 MG: 325 TABLET, FILM COATED ORAL at 14:21

## 2025-01-01 RX ADMIN — DEXTRAN 70, GLYCERIN, HYPROMELLOSE 1 DROP: 1; 2; 3 SOLUTION/ DROPS OPHTHALMIC at 14:25

## 2025-01-01 RX ADMIN — HYDROCHLOROTHIAZIDE 25 MG: 25 TABLET ORAL at 07:43

## 2025-01-01 RX ADMIN — METHOCARBAMOL 500 MG: 500 TABLET ORAL at 20:41

## 2025-01-01 RX ADMIN — CEVIMELINE HYDROCHLORIDE 30 MG: 30 CAPSULE ORAL at 20:43

## 2025-01-01 RX ADMIN — GABAPENTIN 400 MG: 300 CAPSULE ORAL at 18:15

## 2025-01-01 RX ADMIN — ACETAMINOPHEN 975 MG: 325 TABLET, FILM COATED ORAL at 07:42

## 2025-01-01 RX ADMIN — CLORAZEPATE DIPOTASSIUM 15 MG: 3.75 TABLET ORAL at 08:16

## 2025-01-01 RX ADMIN — ASPIRIN 81 MG: 81 TABLET, COATED ORAL at 20:43

## 2025-01-01 RX ADMIN — GABAPENTIN 400 MG: 300 CAPSULE ORAL at 07:42

## 2025-01-01 RX ADMIN — HYDROMORPHONE HYDROCHLORIDE 1 MG: 2 TABLET ORAL at 05:46

## 2025-01-01 RX ADMIN — DEXTRAN 70, GLYCERIN, HYPROMELLOSE 1 DROP: 1; 2; 3 SOLUTION/ DROPS OPHTHALMIC at 07:43

## 2025-01-01 RX ADMIN — PREDNISONE 5 MG: 5 TABLET ORAL at 07:43

## 2025-01-01 RX ADMIN — HYDROMORPHONE HYDROCHLORIDE 2 MG: 2 TABLET ORAL at 20:42

## 2025-01-01 RX ADMIN — ACETAMINOPHEN 975 MG: 325 TABLET, FILM COATED ORAL at 20:41

## 2025-01-01 RX ADMIN — METHOCARBAMOL 500 MG: 500 TABLET ORAL at 07:42

## 2025-01-01 RX ADMIN — SENNOSIDES AND DOCUSATE SODIUM 1 TABLET: 50; 8.6 TABLET ORAL at 20:41

## 2025-01-01 RX ADMIN — POTASSIUM CHLORIDE 20 MEQ: 1500 TABLET, EXTENDED RELEASE ORAL at 07:43

## 2025-01-01 RX ADMIN — METHOCARBAMOL 500 MG: 500 TABLET ORAL at 14:21

## 2025-01-01 ASSESSMENT — ACTIVITIES OF DAILY LIVING (ADL)
ADLS_ACUITY_SCORE: 65
ADLS_ACUITY_SCORE: 57
ADLS_ACUITY_SCORE: 69
ADLS_ACUITY_SCORE: 57
ADLS_ACUITY_SCORE: 65
ADLS_ACUITY_SCORE: 62
ADLS_ACUITY_SCORE: 57
ADLS_ACUITY_SCORE: 69
ADLS_ACUITY_SCORE: 69
ADLS_ACUITY_SCORE: 65
ADLS_ACUITY_SCORE: 62
ADLS_ACUITY_SCORE: 62
ADLS_ACUITY_SCORE: 57
ADLS_ACUITY_SCORE: 69
ADLS_ACUITY_SCORE: 65
ADLS_ACUITY_SCORE: 69
ADLS_ACUITY_SCORE: 69
DEPENDENT_IADLS:: CLEANING;COOKING;LAUNDRY;SHOPPING;MEAL PREPARATION;MEDICATION MANAGEMENT;TRANSPORTATION
ADLS_ACUITY_SCORE: 65
ADLS_ACUITY_SCORE: 62
ADLS_ACUITY_SCORE: 65
ADLS_ACUITY_SCORE: 65
ADLS_ACUITY_SCORE: 69
ADLS_ACUITY_SCORE: 69

## 2025-01-01 NOTE — PLAN OF CARE
Goal Outcome Evaluation:      Plan of Care Reviewed With: patient  PRIMARY Concern: Back pain   SAFETY RISK Concerns (fall risk, behaviors, etc.): Fall      Aggression Tool Color: Green   Isolation/Type: None  Tests/Procedures for NEXT shift: RN to call X-ray once fitted for TLSO brace, needs to have XR of Lumbar Spine   Consults? (Pending/following, signed-off?) Neurosurgery and SW following; PT/Orthotics consulted    Where is patient from? (Home, TCU, etc.): [facility]  Other Important info for NEXT shift: None   Anticipated DC date & active delays: TBD, referrals sent to TCU  _____________________________________________________________________________  SUMMARY NOTE:  Orientation/Cognitive: AOx4  Observation Goals (Met/ Not Met): Not met   Mobility Level/Assist Equipment: Not OOB   Antibiotics & Plan (IV/po, length of tx left): None   Pain Management: 8/10 pain to lower back. On scheduled Tylenol, Robaxin, Gabapentin, Lidocaine patch. PRN PO Dilaudid given x 2  Tele/VS/O2: VSS on RA   ABNL Lab/BG: K 3.2, replaced, recheck for 1830  Diet: Regular   Bowel/Bladder: Incontinent of urine, no BM, senna given   Skin Concerns: 2+ Edema to BLE   Drains/Devices: PIV SL; purewick   Patient Stated Goal for Today: Pain control

## 2025-01-01 NOTE — PROGRESS NOTES
Observation goals:    -diagnostic tests and consults completed and resulted- NOT MET, needs TLSO brace and XR  -vital signs normal or at patient baseline- MET  -adequate pain control on oral analgesics- PARTIALLY MET, still having pain  -returns to baseline functional status- NOT MET  -safe disposition plan has been identified- NOT MET    Nurse to notify provider when observation goals have been met and patient is ready for discharge.

## 2025-01-01 NOTE — ED NOTES
"[facility]  ED Nurse Handoff Report    ED Chief complaint: Back Pain      ED Diagnosis:   Final diagnoses:   Intractable back pain   History of back surgery       Code Status: TBD by admitting provider    Allergies:   Allergies   Allergen Reactions    Mellaril [Thioridazine Hcl] Anaphylaxis    Percocet [Oxycodone-Acetaminophen]      Tolerates hydrocodone and codeine in cough medicine. Tolerates  Believes reaction to oxycodone was leg swelling. No tongue/lip/throat swelling or hives.    Duloxetine Anxiety     Made her feel weird, increased anxiety       Patient Story: Recently admitted for R L3 Nerve root impingement s/p L2 to L3 discectomy and L3 to L4 discectomy (11/12/2024) and discharged to TCU. Pt has had poor pain management since surgery and today was unable to ambulate or perform any ADLs, prompting TCU to call EMS.  Focused Assessment:  VSS on RA. Pt resting in bed, unable to ambulate d/t pain. Denies numbness/tingling to writer, able to move BLE minimally d/t pain. Purewick in place. PIV SL. MRI completed, see results.    Treatments and/or interventions provided: Labs, medications, imaging, comfort measures  Patient's response to treatments and/or interventions: Tolerated well    To be done/followed up on inpatient unit:  Ongoing admission orders    Does this patient have any cognitive concerns?: Forgetful    Activity level - Baseline/Home:  Unknown  Activity Level - Current:   Total Care    Patient's Preferred language: English   Needed?: No    Isolation: None  Infection: Not Applicable  Patient tested for COVID 19 prior to admission: YES  Bariatric?: Yes    Vital Signs:   Vitals:    12/31/24 1240 12/31/24 1241 12/31/24 1242 12/31/24 1806   BP: (!) 140/57      Pulse: 84      Resp:   18 18   Temp:   98.1  F (36.7  C)    TempSrc:   Oral    SpO2: 96%  96%    Weight:  86.2 kg (190 lb)     Height:  1.6 m (5' 3\")         Cardiac Rhythm:     Was the PSS-3 completed:   Yes  What " interventions are required if any?               Family Comments: Pt contacted daughter via own cell phone  OBS brochure/video discussed/provided to patient/family: Yes              Name of person given brochure if not patient: Pt              Relationship to patient: self    For the majority of the shift this patient's behavior was Green.   Behavioral interventions performed were none required.    ED NURSE PHONE NUMBER: [phone]

## 2025-01-01 NOTE — PLAN OF CARE
Goal Outcome Evaluation:      Plan of Care Reviewed With: patient, significant other          Outcome Evaluation: Return to TCU when Medically ready

## 2025-01-01 NOTE — PROGRESS NOTES
RECEIVING UNIT ED HANDOFF REVIEW    ED Nurse Handoff Report was reviewed by: Ronald Machuca RN on December 31, 2024 at 8:31 PM

## 2025-01-01 NOTE — CONSULTS
Care Management Initial Consult    General Information  Assessment completed with: Patient, Friend,  (Lima Marks)  Type of CM/SW Visit: Initial Assessment    Primary Care Provider verified and updated as needed: Yes   Readmission within the last 30 days: current reason for admission unrelated to previous admission   Return Category: New Diagnosis  Reason for Consult: discharge planning  Advance Care Planning: Advance Care Planning Reviewed: present on chart          Communication Assessment  Patient's communication style: spoken language (English or Bilingual)             Cognitive  Cognitive/Neuro/Behavioral: WDL                      Living Environment:   People in home: significant other  Selina Lima  Current living Arrangements: house      Able to return to prior arrangements: yes       Family/Social Support:  Care provided by: spouse/significant other  Provides care for: no one  Marital Status: Lives with Significant Other  Support system: Significant Other        (Lima)  Description of Support System: Supportive, Involved    Support Assessment: Adequate family and caregiver support, Adequate social supports    Current Resources:   Patient receiving home care services: No        Community Resources: Transitional Care  Equipment currently used at home: walker, rolling  Supplies currently used at home: None    Employment/Financial:  Employment Status: retired        Financial Concerns: none   Referral to Financial Worker: No  Finance Comments: Medicare    Does the patient's insurance plan have a 3 day qualifying hospital stay waiver?  Yes     Which insurance plan 3 day waiver is available? [employer]    Will the waiver be used for post-acute placement? No    Lifestyle & Psychosocial Needs:  Social Drivers of Health     Food Insecurity: Low Risk  (11/6/2024)    Food Insecurity     Within the past 12 months, did you worry that your food would run out before you got money to buy more?: No     Within the past 12  months, did the food you bought just not last and you didn t have money to get more?: No   Depression: Not at risk (10/11/2023)    PHQ-2     PHQ-2 Score: 0   Housing Stability: Low Risk  (11/6/2024)    Housing Stability     Do you have housing? : Yes     Are you worried about losing your housing?: No   Tobacco Use: Medium Risk (11/12/2024)    Patient History     Smoking Tobacco Use: Former     Smokeless Tobacco Use: Never     Passive Exposure: Not on file   Financial Resource Strain: Low Risk  (11/6/2024)    Financial Resource Strain     Within the past 12 months, have you or your family members you live with been unable to get utilities (heat, electricity) when it was really needed?: No   Recent Concern: Financial Resource Strain - High Risk (10/16/2024)    Financial Resource Strain     Within the past 12 months, have you or your family members you live with been unable to get utilities (heat, electricity) when it was really needed?: Yes   Alcohol Use: Not on file   Transportation Needs: Low Risk  (11/6/2024)    Transportation Needs     Within the past 12 months, has lack of transportation kept you from medical appointments, getting your medicines, non-medical meetings or appointments, work, or from getting things that you need?: No   Physical Activity: Inactive (10/16/2024)    Exercise Vital Sign     Days of Exercise per Week: 0 days     Minutes of Exercise per Session: 0 min   Interpersonal Safety: High Risk (11/10/2024)    Interpersonal Safety     Do you feel physically and emotionally safe where you currently live?: No     Within the past 12 months, have you been hit, slapped, kicked or otherwise physically hurt by someone?: No     Within the past 12 months, have you been humiliated or emotionally abused in other ways by your partner or ex-partner?: No   Stress: No Stress Concern Present (10/16/2024)    [employer] - Occupational Stress Questionnaire     Feeling of Stress : Not at  "all   Social Connections: Unknown (10/16/2024)    Social Connection and Isolation Panel [NHANES]     Frequency of Communication with Friends and Family: Not on file     Frequency of Social Gatherings with Friends and Family: Once a week     Attends Mosque Services: Not on file     Active Member of Clubs or Organizations: Not on file     Attends Club or Organization Meetings: Not on file     Marital Status: Not on file   Health Literacy: Not on file       Functional Status:  Prior to admission patient needed assistance:   Dependent ADLs:: Ambulation-walker, Bathing, Dressing  Dependent IADLs:: Cleaning, Cooking, Laundry, Shopping, Meal Preparation, Medication Management, Transportation  Assesssment of Functional Status: Not at baseline with ADL Functioning, Needs placement in a SNF/TCF for rehabilitation    Mental Health Status:  Mental Health Status: No Current Concerns       Chemical Dependency Status:  Chemical Dependency Status: No Current Concerns             Values/Beliefs:  Spiritual, Cultural Beliefs, Mosque Practices, Values that affect care: no          Values/Beliefs Comment: Worship    Discussed  Partnership in Safe Discharge Planning  document with patient/family: No    Additional Information:  Per care management consult for discharge planning, chart was reviewed. Patient is a very pleasant 79 year old female that presents to [facility] on 12/31/24 from [facility] [place] [facility]. According to the H & P \"PMH of RA, Sjogren's, ILD, HTN, recent L2-L3 and L3-L4 discectomy (11/12/24) who presented to the ED on 12/31 with severe back pain and found to have a new L1 superior endplate compression fracture\". Anticipated discharge date is in 1-2 days to [facility].       Writer met with Emani at bedside and introduced self and role at bedside. Patient shares that she has [facility] discharge in November. She and Lima are anticipating her to return to [facility] [place] and " confirmed they are holding the bed. Confirmed with Emani that patient will need transportation arranged at discharge.    At baseline, Emani live in their [facility] home where there is 3 GO. Once inside, everything is on the main level in their town-home. Patient utilizes a 4WW for mobility. Lima does most of the driving and assist with all IADL's. Lima is a large support system and available to assist as needed. Patient manages her own finances with Lima's help as needed. Patient retired in 2008 from [employer]. She receives social security and a pension. Patient denied any concerns. PCP confirmed. Writer explained she will send a referral to [facility] to confirm bed-hold.     Lima asked when patient's medicare period would renew. Writer explained you typically need to be out of the hospital/nursing home and in the community for 60 days before the benefit period starts over. Lima stated understanding.     Referral sent to [facility]CU via [employer]     Next Steps: Confirm bed hold      MARTHA Rendon, LICSW  Social Work- Inpatient Care Coordination  [facility]

## 2025-01-01 NOTE — PROGRESS NOTES
Admission/Transfer from: ED  2 RN skin assessment completed. Yes with Melody Adhikari RN  Significant findings include: N/A  WOC Nurse Consult Ordered?  NO

## 2025-01-01 NOTE — PLAN OF CARE
PRIMARY Concern: Lower back & R hip pain  SAFETY RISK Concerns (fall risk, behaviors, etc.): Fall risk  Aggression Tool Color: Green   Isolation/Type: N/A   Tests/Procedures for NEXT shift:   Consults? (Pending/following, signed-off?) Neurosurgery  Where is patient from? (Home, TCU, etc.):  Other Important info for NEXT shift:  ___________________________________________________________________  SUMMARY NOTE:  Orientation/Cognitive: A&Ox4   Observation Goals (Met/ Not Met): not Met  Mobility Level/Assist Equipment: A1/GB/W..  Antibiotics & Plan (IV/po, length of tx left): NA   Pain Management:   Tele/VS/O2: VSS on RA   ABNL Lab/BG: see results  Diet: Regular   Bowel/Bladder: Incontinent of urine, purewick in place  Skin Concerns:  Scattered bruising  Drains/Devices: PIV SL   Patient Stated Goal for Today:  pain control

## 2025-01-01 NOTE — CONSULTS
[facility]    Neurosurgery Consultation     Date of Admission:  12/31/2024  Date of Consult (When I saw the patient): 01/01/25    Assessment & Plan   Agnes Saravia is a 79 year old female past medical history of RA, Sjogren's, HTN, recent L2-L3 and L3-4 discectomy (11/12/2024, Dr. Kenny) who presented to the emergency department 12/31/2024 with severe back pain and found to have a new L1 superior endplate compression fracture.  NSGY consulted for new L1 superior endplate compression fracture.    Patient currently denies back pain, lower extremity radicular pain/paresthesias, weakness, saddle anesthesia, acute bowel or bladder dysfunction, recent fall/trauma.  Denies history of osteoporosis.  When asked why patient presented to the hospital, patient responds she did not want to do the physical therapy at TCU anymore.    Examination limited due to patient participation, patient intermittently following commands, does not attempt to perform hip flexion nor stated why she cannot perform it.  All other bilateral lower extremity motor exams appropriate.  Tender over lumbar spine.    Lumbar MRI demonstrates new recent L1 superior endplate compression fracture with mild height loss, L5 superior endplate compression fracture/Schmorl's node demonstrates persistent edema and slightly worsening height loss compared to prior, postoperative changes.    Imaging reviewed and discussed with patient today.  Discussed recommendations for TLSO brace when out of bed, likely will be needed for 3 months.  Discussed follow-up with outpatient NSGY and her PCP for osteoporotic evaluation/management.\    Imaging:  Imaging Interpretation provided by radiologist.   EXAM: MR LUMBAR SPINE W/O and W CONTRAST  LOCATION: [facility]  DATE: 12/31/2024     INDICATION: H/o back surgery, now worse pain.  COMPARISON: Lumbar spine MRI 11/7/2024.  CONTRAST: 9 mL GADAVIST.  TECHNIQUE: Routine Lumbar  Spine MRI without and with IV contrast.     FINDINGS:   New recent fracture involving the L1 superior endplate extending from the anterior cortex through the posterior cortex. No convincing pedicle or posterior element extension is appreciated by MRI.     L5 superior endplate compression fracture/Schmorl's node with persistent edema and slightly worsened height loss compared to the prior.     No other fractures are identified. Right laminectomy/partial facetectomy changes at L2-L3 and L3-L4. Alignment is significant for multilevel subtle grade 1 spondylolisthesis. Bone marrow also demonstrates background of scattered degenerative endplate   changes. Conus medullaris is unremarkable terminating at the level of the L1-L2 disc. Cauda equina is unremarkable.     Mild bilateral sacroiliac joint degenerative change. Presumed scattered benign renal cysts. Tiny pocket of T1 hyperintense signal within the subcutaneous soft tissues, probably contracted postoperative seroma measuring about 1 cm.     SEGMENTAL ANALYSIS:     L1-L2: Disc height maintained. No herniation. Mild bilateral facet arthropathy. No foraminal or spinal canal stenosis.     L2-L3: Right laminectomy/partial facetectomy. Severe disc height loss. Disc bulge. Moderate bilateral facet arthropathy. Moderate right foraminal stenosis. No left foraminal stenosis. No spinal canal stenosis. T1 hypointense enhancing signal within the   right lateral recess, presumed postoperative scar tissue.      L3-L4: Right partial facetectomy changes. Mild disc height loss. Disc bulge with probable right foraminal protrusion. Moderate right and mild left facet arthropathy. Moderate to severe right foraminal stenosis. No left foraminal stenosis. Mild spinal   canal stenosis with enhancing signal within the right lateral recess and right foramen, presumed postoperative scar tissue.     L4-L5: Mild disc height loss. Disc bulge. Moderate bilateral facet arthropathy. No foraminal  stenosis. Moderate spinal canal stenosis with bilateral lateral recess stenosis.     L5-S1: Mild disc height loss. Disc bulge. Mild to moderate bilateral facet arthropathy. No foraminal stenosis. Mild spinal canal stenosis with bilateral lateral recess stenosis.                                                             IMPRESSION:  1.  New recent L1 superior endplate compression fracture with mild height loss.  2.  L5 superior endplate compression fracture/Schmorl's node demonstrates persistent edema and slightly worsened height loss compared to the prior.  3.  Right-sided postoperative changes at L2-L3 and L3-L4.  4.  Degenerative change as detailed.    NSGY Recommendations:  -Orthotics consulted for TLSO brace.  Brace to be worn when out of bed.  Off for resting/sleeping/hygiene.  -Upright x-rays in brace once obtained.  -Will have patient follow-up with PCP for osteoporotic evaluation/management  - Light activity only: Please limit your lifting to no more that ten pounds and avoid excessive bending, twisting and turning at the lumbar spine. You should also avoid excessive jostling and jarring activities.      I have discussed the following assessment and plan with Dr. Adams who is in agreement with initial plan and will follow up with further consultation recommendations.    Khadra Yarbrough PA-C  [facility] Neurosurgery  [place] [id number]    Text page via [employer] Paging/Directory    Code Status    Full Code    Reason for Consult   Reason for consult: History L2-L3 and L3-L4 discectomy by Dr. Kenny 11/12/2024, now with new L1 superior endplate compression fracture    Primary Care Physician   José Miguel Wallace MD    Chief Complaint   Back pain    History is obtained from the patient and chart review    History of Present Illness   Agnes Saravia is a 79 year old female past medical history of RA, Sjogren's, HTN, recent L2-L3 and L3-4  discectomy (11/12/2024, Dr. Kenny) who presented to the emergency department 12/31/2024 with severe back pain and found to have a new L1 superior endplate compression fracture.    Patient currently denies back pain, lower extremity radicular pain/paresthesias, weakness, saddle anesthesia, acute bowel or bladder dysfunction, recent fall/trauma.  Denies history of osteoporosis.  When asked why patient presented to the hospital, patient responds she did not [facility] to [facility].    Past Medical History   I have reviewed this patient's medical history and updated it with pertinent information if needed.   Past Medical History:   Diagnosis Date    Anxiety     Arthritis     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    Depressive disorder     Hyperlipidemia     Insomnia     PONV (postoperative nausea and vomiting)     Rheumatoid arthritis(714.0)     Sjogren's syndrome (H)        Past Surgical History   I have reviewed this patient's surgical history and updated it with pertinent information if needed.  Past Surgical History:   Procedure Laterality Date    ABDOMEN SURGERY      APPENDECTOMY      BIOPSY  2014    Lip lower    BLEPHAROPLASTY      BRONCHOSCOPY (RIGID OR FLEXIBLE), DIAGNOSTIC N/A 04/11/2018    Procedure: COMBINED BRONCHOSCOPY (RIGID OR FLEXIBLE), LAVAGE;  Bronchoscopy with Lavage;  Surgeon: Manuel Conway MD;  [facility]: [facility]    COLONOSCOPY      DECOMPRESSION LUMBAR MINIMALLY INVASIVE TWO LEVELS Right 11/12/2024    Procedure: Right Lumbar 2 to Lumbar 3 minimally invasive redo discectomy and Lumbar 3 to Lumbar 4 far lateral discectomy;  Surgeon: Harjeet Kenny MD;  Location:  OR    DISCECTOMY LUMBAR POSTERIOR MICROSCOPIC ONE LEVEL  08/14/2014    Procedure: DISCECTOMY LUMBAR POSTERIOR MICROSCOPIC ONE LEVEL;  Surgeon: Dudley Bernal MD;  Location:  OR    ENT SURGERY  5/11/2017    HYSTERECTOMY TOTAL ABDOMINAL, BILATERAL SALPINGO-OOPHORECTOMY, COMBINED      HYSTERECTOMY, PAP NO  LONGER INDICATED      MAMMOPLASTY REDUCTION BILATERAL  05/14/2013    Procedure: MAMMOPLASTY REDUCTION BILATERAL;  BILATERAL REDUCTION MAMMOPLASTY;  Surgeon: Bruce Nash MD;  Location: [facility]    SHOULDER SURGERY         Prior to Admission Medications   Prior to Admission Medications   Prescriptions Last Dose Informant Patient Reported? Taking?   HYDROmorphone (DILAUDID) 2 MG tablet 12/31/2024 Morning  No Yes   Sig: Take 1 tablet (2 mg) by mouth every 6 hours as needed for moderate pain.   Lidocaine (LIDOCARE) 4 % Patch 12/31/2024 at  8:00 AM  No Yes   Sig: Place 1 patch over 12 hours onto the skin every 24 hours. To prevent lidocaine toxicity, patient should be patch free for 12 hrs daily.   acetaminophen (TYLENOL) 500 MG tablet 12/31/2024 Morning Friend, Self Yes Yes   Sig: Take 1,000 mg by mouth 3 times daily.   albuterol (PROAIR HFA/PROVENTIL HFA/VENTOLIN HFA) 108 (90 Base) MCG/ACT inhaler  Friend, Self No Yes   Sig: Inhale 2 puffs into the lungs every 4 hours as needed for shortness of breath / dyspnea or wheezing   aspirin 81 MG EC tablet 12/30/2024 Evening Friend, Self Yes Yes   Sig: Take 81 mg by mouth At Bedtime    cevimeline (EVOXAC) 30 MG capsule 12/31/2024 Morning Friend, Self Yes Yes   Sig: Take 30 mg by mouth 3 times daily.   clorazepate dipotassium (TRANXENE) 15 MG tablet 12/31/2024 Morning  No Yes   Sig: TAKE ONE TABLET BY MOUTH AT BEDTIME   Patient taking differently: Take 15 mg by mouth every morning.   folic acid (FOLVITE) 1 MG tablet 12/30/2024 Morning Friend, Self Yes Yes   Sig: Take 1 mg by mouth six times a week Every day except Tuesdays when methotrexate is due   gabapentin (NEURONTIN) 100 MG capsule 12/30/2024 Bedtime Friend, Self Yes Yes   Sig: Take 100 mg by mouth at bedtime. ~1 hour before trazodone   gabapentin (NEURONTIN) 400 MG capsule 12/31/2024 Morning Friend, Self Yes Yes   Sig: Take 400 mg by mouth 3 times daily (AM, Noon, Dinner)   guaiFENesin (ROBITUSSIN) 20 mg/mL liquid    Yes Yes   Sig: Take 200 mg by mouth every 4 hours as needed (congestion).   hydrochlorothiazide (HYDRODIURIL) 25 MG tablet 12/31/2024 Morning Friend, Self No Yes   Sig: Take 1 tablet (25 mg) by mouth daily   ibuprofen (ADVIL/MOTRIN) 600 MG tablet 12/30/2024 Evening  Yes Yes   Sig: Take 600 mg by mouth 2 times daily. At noon and 1800   methocarbamol (ROBAXIN) 500 MG tablet 12/31/2024 Morning  No Yes   Sig: Take 1 tablet (500 mg) by mouth 4 times daily as needed for muscle spasms.   methotrexate 2.5 MG tablet 12/31/2024 Morning Friend, Self Yes Yes   Sig: Take 10 mg by mouth every 7 days (Takes on Tuesdays)   pantoprazole (PROTONIX) 40 MG EC tablet 12/31/2024 Morning Friend, Self No Yes   Sig: Take 1 tablet (40 mg) by mouth daily   polyethylene glycol (MIRALAX) 17 g packet   Yes Yes   Sig: Take 1 packet by mouth daily as needed for constipation.   polyethylene glycol-propylene glycol (SYSTANE ULTRA) 0.4-0.3 % SOLN ophthalmic solution 12/31/2024 Morning Friend, Self Yes Yes   Sig: Place 1 drop Into the left eye 4 times daily.   potassium chloride kang ER (KLOR-CON M20) 20 MEQ CR tablet 12/31/2024 Morning  Yes Yes   Sig: Take 20 mEq by mouth 2 times daily.   predniSONE (DELTASONE) 5 MG tablet 12/31/2024 Morning Friend, Self No Yes   Sig: Take 1 tablet (5 mg) by mouth daily. (Hold until Decadron is done then restart)   senna-docusate (SENOKOT-S/PERICOLACE) 8.6-50 MG tablet   No Yes   Sig: Take 1 tablet by mouth 2 times daily as needed for constipation.   simvastatin (ZOCOR) 40 MG tablet 12/30/2024 Bedtime Friend, Self No Yes   Sig: Take 1 tablet (40 mg) by mouth at bedtime   traZODone (DESYREL) 50 MG tablet 12/30/2024 Bedtime Friend, Self No Yes   Sig: TAKE 0.5 (ONE-HALF) TABLET BY MOUTH ONCE DAILY AT BEDTIME   Patient taking differently: TAKE 0.5 (ONE-HALF) TABLET BY MOUTH ONCE DAILY AT BEDTIME. Give 1 hours after gabapentin      Facility-Administered Medications: None     Allergies   Allergies   Allergen Reactions     "Mellaril [Thioridazine Hcl] Anaphylaxis    Percocet [Oxycodone-Acetaminophen]      Tolerates hydrocodone and codeine in cough medicine. Tolerates  Believes reaction to oxycodone was leg swelling. No tongue/lip/throat swelling or hives.    Duloxetine Anxiety     Made her feel weird, increased anxiety       Social History   I have reviewed this patient's social history and updated it with pertinent information if needed. Agnes Saravia  reports that she quit smoking about 49 years ago. Her smoking use included cigarettes. She started smoking about 54 years ago. She has a 5 pack-year smoking history. She has never used smokeless tobacco. She reports current alcohol use. She reports that she does not use drugs.    Family History   I have reviewed this patient's family history and updated it with pertinent information if needed.   Family History   Problem Relation Age of Onset    Cerebrovascular Disease Mother     Cerebrovascular Disease Father     Colon Cancer No family hx of        ROS: 10 point ROS negative other than the symptoms noted above in the HPI.    Physical Exam   Temp: 97.5  F (36.4  C) Temp src: Oral BP: (!) 143/56 Pulse: 84   Resp: 20 SpO2: 91 % O2 Device: None (Room air)    Vital Signs with Ranges  Temp:  [97.5  F (36.4  C)-98.2  F (36.8  C)] 97.5  F (36.4  C)  Pulse:  [75-84] 84  Resp:  [16-20] 20  BP: (140-160)/(54-85) 143/56  SpO2:  [91 %-98 %] 91 %  190 lbs 0 oz     , Blood pressure (!) 143/56, pulse 84, temperature 97.5  F (36.4  C), temperature source Oral, resp. rate 20, height 1.6 m (5' 3\"), weight 86.2 kg (190 lb), SpO2 91%, not currently breastfeeding.  190 lbs 0 oz    Patient lying in hospital bed.  Physical exam limited due to patient participation, patient became more engaging and talkative throughout visit.    HEENT:  Normocephalic, atraumatic.  PERRL.  EOM s intact.   NEUROLOGICAL EXAMINATION:   Mental status:  Alert and Oriented x 3, speech is fluent.  Cranial nerves:  II-XII intact. "   Does not attempt to perform patient does not attempt to perform bilateral hip flexion nor state why she cannot do it.  Remaining BLE exam 5/5.  BLE sensation intact.  Tender to palpation over lumbar spine  Clonus negative bilaterally    Data     CBC RESULTS:   Recent Labs   Lab Test 01/01/25  0814   WBC 7.6   RBC 3.47*   HGB 10.3*   HCT 31.4*   MCV 91   MCH 29.7   MCHC 32.8   RDW 14.7        Basic Metabolic Panel:  Lab Results   Component Value Date     01/01/2025     06/16/2021      Lab Results   Component Value Date    POTASSIUM 3.2 01/01/2025    POTASSIUM 4.1 06/15/2022    POTASSIUM 4.3 06/16/2021     Lab Results   Component Value Date    CHLORIDE 96 01/01/2025    CHLORIDE 102 06/15/2022    CHLORIDE 99 06/16/2021     Lab Results   Component Value Date    CHRISTOPHER 8.8 01/01/2025    CHRISTOPHER 9.2 06/16/2021     Lab Results   Component Value Date    CO2 30 01/01/2025    CO2 32 06/15/2022    CO2 34 06/16/2021     Lab Results   Component Value Date    BUN 10.0 01/01/2025    BUN 16 06/15/2022    BUN 17 06/16/2021     Lab Results   Component Value Date    CR 0.53 01/01/2025    CR 0.72 06/16/2021     Lab Results   Component Value Date    GLC 89 01/01/2025     11/15/2024    GLC 96 06/15/2022     06/16/2021     INR:  Lab Results   Component Value Date    INR 1.02 11/10/2024    INR 2.8 05/08/2017    INR 2.6 04/11/2017    INR 2.5 03/13/2017    INR 2.2 02/23/2017    INR 2.0 02/14/2017    INR 1.9 02/09/2017    INR 1.6 02/06/2017    INR 1.8 01/30/2017    INR 1.48 01/28/2017    INR 1.8 01/16/2017    INR 1.9 01/12/2017    INR 2.1 01/02/2017    INR 1.81 12/31/2016    INR 2.1 12/23/2016    INR 1.07 11/23/2016    INR 1.00 11/22/2016    INR 0.94 11/21/2016    INR 0.92 11/20/2016

## 2025-01-01 NOTE — PROGRESS NOTES
MD Notification    Notified Person: MD    Notified Person Name: Tyler     Notification Date/Time: 1/1/25 at 10:15 am     Notification Interaction: Vocera     Purpose of Notification: Hi there, pt K 3.2 and Hgb 11.1> 10.3 this morning. Please advise, thanks!    Orders Received: RN managed potassium replacement ordered     Comments:

## 2025-01-01 NOTE — PHARMACY-ADMISSION MEDICATION HISTORY
Pharmacist Admission Medication History    Admission medication history is complete. The information provided in this note is only as accurate as the sources available at the time of the update.    Information Source(s): Facility (U/NH/) medication list/MAR and CareEverywhere/SureScripts via N/A    Pertinent Information: Mar from [facility]    Changes made to PTA medication list:  Added: None  Deleted: None  Changed:   Potassium 20 mEq daily--> 20 mEq twice daily  Clorazepate bedtime--> morning    Allergies reviewed with patient and updates made in EHR:  similar to MAR    Medication History Completed By: Luban Chavez [facility] 12/31/2024 6:05 PM    PTA Med List   Medication Sig Last Dose/Taking    acetaminophen (TYLENOL) 500 MG tablet Take 1,000 mg by mouth 3 times daily. 12/31/2024 Morning    albuterol (PROAIR HFA/PROVENTIL HFA/VENTOLIN HFA) 108 (90 Base) MCG/ACT inhaler Inhale 2 puffs into the lungs every 4 hours as needed for shortness of breath / dyspnea or wheezing Taking As Needed    aspirin 81 MG EC tablet Take 81 mg by mouth At Bedtime  12/30/2024 Evening    cevimeline (EVOXAC) 30 MG capsule Take 30 mg by mouth 3 times daily. 12/31/2024 Morning    clorazepate dipotassium (TRANXENE) 15 MG tablet TAKE ONE TABLET BY MOUTH AT BEDTIME (Patient taking differently: Take 15 mg by mouth every morning.) 12/31/2024 Morning    folic acid (FOLVITE) 1 MG tablet Take 1 mg by mouth six times a week Every day except Tuesdays when methotrexate is due 12/30/2024 Morning    gabapentin (NEURONTIN) 100 MG capsule Take 100 mg by mouth at bedtime. ~1 hour before trazodone 12/30/2024 Bedtime    gabapentin (NEURONTIN) 400 MG capsule Take 400 mg by mouth 3 times daily (AM, Noon, Dinner) 12/31/2024 Morning    guaiFENesin (ROBITUSSIN) 20 mg/mL liquid Take 200 mg by mouth every 4 hours as needed (congestion). Taking As Needed    hydrochlorothiazide (HYDRODIURIL) 25 MG tablet Take 1 tablet (25 mg) by mouth daily 12/31/2024  Morning    HYDROmorphone (DILAUDID) 2 MG tablet Take 1 tablet (2 mg) by mouth every 6 hours as needed for moderate pain. 12/31/2024 Morning    ibuprofen (ADVIL/MOTRIN) 600 MG tablet Take 600 mg by mouth 2 times daily. At noon and 1800 12/30/2024 Evening    Lidocaine (LIDOCARE) 4 % Patch Place 1 patch over 12 hours onto the skin every 24 hours. To prevent lidocaine toxicity, patient should be patch free for 12 hrs daily. 12/31/2024 at  8:00 AM    methocarbamol (ROBAXIN) 500 MG tablet Take 1 tablet (500 mg) by mouth 4 times daily as needed for muscle spasms. 12/31/2024 Morning    methotrexate 2.5 MG tablet Take 10 mg by mouth every 7 days (Takes on Tuesdays) 12/31/2024 Morning    pantoprazole (PROTONIX) 40 MG EC tablet Take 1 tablet (40 mg) by mouth daily 12/31/2024 Morning    polyethylene glycol (MIRALAX) 17 g packet Take 1 packet by mouth daily as needed for constipation. Taking As Needed    polyethylene glycol-propylene glycol (SYSTANE ULTRA) 0.4-0.3 % SOLN ophthalmic solution Place 1 drop Into the left eye 4 times daily. 12/31/2024 Morning    potassium chloride kang ER (KLOR-CON M20) 20 MEQ CR tablet Take 20 mEq by mouth 2 times daily. 12/31/2024 Morning    predniSONE (DELTASONE) 5 MG tablet Take 1 tablet (5 mg) by mouth daily. (Hold until Decadron is done then restart) 12/31/2024 Morning    senna-docusate (SENOKOT-S/PERICOLACE) 8.6-50 MG tablet Take 1 tablet by mouth 2 times daily as needed for constipation. Taking As Needed    simvastatin (ZOCOR) 40 MG tablet Take 1 tablet (40 mg) by mouth at bedtime 12/30/2024 Bedtime    traZODone (DESYREL) 50 MG tablet TAKE 0.5 (ONE-HALF) TABLET BY MOUTH ONCE DAILY AT BEDTIME (Patient taking differently: TAKE 0.5 (ONE-HALF) TABLET BY MOUTH ONCE DAILY AT BEDTIME. Give 1 hours after gabapentin) 12/30/2024 Bedtime

## 2025-01-01 NOTE — PLAN OF CARE
Observation goals  PRIOR TO DISCHARGE        Comments: -diagnostic tests and consults completed and resulted  -vital signs normal or at patient baseline - Met  -adequate pain control on oral analgesics - Not Met, awaiting Neurosurgeon consult  -returns to baseline functional status- Not met  -safe disposition plan has been identified =Not met. Awaiting SW consult            Care plan note:      Recent Vitals:  Temp: 98.2  F (36.8  C) Temp src: Oral BP: (!) 160/85 Pulse: 82   Resp: 16 SpO2: 98 % O2 Device: None (Room air)      Orientation/Neuro: Alert and Oriented x 4  Pain: The patient is not having any pain.   Safety/Concerns:  Fall Risk  Aggression Color: Green    Plan: Hospitalist following. Pain controlled this shift. Planing Neurosurg consult today.  Declined repositioning. Up A-1 GB walker. PT and Sw to consult.    Continue to monitor.      Mara Momin RN

## 2025-01-01 NOTE — PROGRESS NOTES
Vitally stable on RA ex HTN. Needs met this shift, Tolerates well Reg diet, Not OOB, Pure wick in place, Endorsed pain to the back which was managed with PRN oral Dilaudid, Discharge pending safe deposition plan.

## 2025-01-02 ENCOUNTER — APPOINTMENT (OUTPATIENT)
Dept: GENERAL RADIOLOGY | Facility: CLINIC | Age: 80
DRG: 544 | End: 2025-01-02
Payer: MEDICARE

## 2025-01-02 VITALS
SYSTOLIC BLOOD PRESSURE: 140 MMHG | HEIGHT: 63 IN | RESPIRATION RATE: 18 BRPM | WEIGHT: 190 LBS | HEART RATE: 98 BPM | BODY MASS INDEX: 33.66 KG/M2 | DIASTOLIC BLOOD PRESSURE: 71 MMHG | TEMPERATURE: 97.4 F | OXYGEN SATURATION: 94 %

## 2025-01-02 PROBLEM — S32.010A COMPRESSION FRACTURE OF L1 VERTEBRA, INITIAL ENCOUNTER (H): Status: ACTIVE | Noted: 2025-01-02

## 2025-01-02 PROCEDURE — 99232 SBSQ HOSP IP/OBS MODERATE 35: CPT | Performed by: INTERNAL MEDICINE

## 2025-01-02 PROCEDURE — 250N000012 HC RX MED GY IP 250 OP 636 PS 637: Performed by: PHYSICIAN ASSISTANT

## 2025-01-02 PROCEDURE — 96376 TX/PRO/DX INJ SAME DRUG ADON: CPT

## 2025-01-02 PROCEDURE — 250N000011 HC RX IP 250 OP 636: Performed by: PHYSICIAN ASSISTANT

## 2025-01-02 PROCEDURE — 73522 X-RAY EXAM HIPS BI 3-4 VIEWS: CPT

## 2025-01-02 PROCEDURE — 72100 X-RAY EXAM L-S SPINE 2/3 VWS: CPT

## 2025-01-02 PROCEDURE — 250N000013 HC RX MED GY IP 250 OP 250 PS 637: Performed by: PHYSICIAN ASSISTANT

## 2025-01-02 PROCEDURE — G0378 HOSPITAL OBSERVATION PER HR: HCPCS

## 2025-01-02 PROCEDURE — 120N000001 HC R&B MED SURG/OB

## 2025-01-02 PROCEDURE — 99024 POSTOP FOLLOW-UP VISIT: CPT | Performed by: NURSE PRACTITIONER

## 2025-01-02 PROCEDURE — L0457 TLSO FLEX TRNK SJ-SS PRE OTS: HCPCS

## 2025-01-02 RX ADMIN — METHOCARBAMOL 500 MG: 500 TABLET ORAL at 13:36

## 2025-01-02 RX ADMIN — DEXTRAN 70, GLYCERIN, HYPROMELLOSE 1 DROP: 1; 2; 3 SOLUTION/ DROPS OPHTHALMIC at 19:57

## 2025-01-02 RX ADMIN — ACETAMINOPHEN 975 MG: 325 TABLET, FILM COATED ORAL at 08:32

## 2025-01-02 RX ADMIN — ACETAMINOPHEN 975 MG: 325 TABLET, FILM COATED ORAL at 19:56

## 2025-01-02 RX ADMIN — ALBUTEROL SULFATE 2 PUFF: 108 INHALANT RESPIRATORY (INHALATION) at 10:41

## 2025-01-02 RX ADMIN — CEVIMELINE HYDROCHLORIDE 30 MG: 30 CAPSULE ORAL at 19:56

## 2025-01-02 RX ADMIN — LIDOCAINE 1 PATCH: 4 PATCH TOPICAL at 08:32

## 2025-01-02 RX ADMIN — HYDROMORPHONE HYDROCHLORIDE 0.2 MG: 0.2 INJECTION, SOLUTION INTRAMUSCULAR; INTRAVENOUS; SUBCUTANEOUS at 08:27

## 2025-01-02 RX ADMIN — HYDROMORPHONE HYDROCHLORIDE 0.2 MG: 0.2 INJECTION, SOLUTION INTRAMUSCULAR; INTRAVENOUS; SUBCUTANEOUS at 21:56

## 2025-01-02 RX ADMIN — CEVIMELINE HYDROCHLORIDE 30 MG: 30 CAPSULE ORAL at 13:36

## 2025-01-02 RX ADMIN — GABAPENTIN 400 MG: 300 CAPSULE ORAL at 17:01

## 2025-01-02 RX ADMIN — GABAPENTIN 400 MG: 300 CAPSULE ORAL at 08:30

## 2025-01-02 RX ADMIN — PANTOPRAZOLE SODIUM 40 MG: 40 TABLET, DELAYED RELEASE ORAL at 08:31

## 2025-01-02 RX ADMIN — POTASSIUM CHLORIDE 20 MEQ: 1500 TABLET, EXTENDED RELEASE ORAL at 19:56

## 2025-01-02 RX ADMIN — DEXTRAN 70, GLYCERIN, HYPROMELLOSE 1 DROP: 1; 2; 3 SOLUTION/ DROPS OPHTHALMIC at 17:02

## 2025-01-02 RX ADMIN — CLORAZEPATE DIPOTASSIUM 15 MG: 3.75 TABLET ORAL at 09:37

## 2025-01-02 RX ADMIN — ACETAMINOPHEN 975 MG: 325 TABLET, FILM COATED ORAL at 13:36

## 2025-01-02 RX ADMIN — DEXTRAN 70, GLYCERIN, HYPROMELLOSE 1 DROP: 1; 2; 3 SOLUTION/ DROPS OPHTHALMIC at 09:37

## 2025-01-02 RX ADMIN — METHOCARBAMOL 500 MG: 500 TABLET ORAL at 08:31

## 2025-01-02 RX ADMIN — GABAPENTIN 100 MG: 100 CAPSULE ORAL at 19:56

## 2025-01-02 RX ADMIN — DEXTRAN 70, GLYCERIN, HYPROMELLOSE 1 DROP: 1; 2; 3 SOLUTION/ DROPS OPHTHALMIC at 13:36

## 2025-01-02 RX ADMIN — HYDROCHLOROTHIAZIDE 25 MG: 25 TABLET ORAL at 08:31

## 2025-01-02 RX ADMIN — PREDNISONE 5 MG: 5 TABLET ORAL at 08:31

## 2025-01-02 RX ADMIN — HYDROMORPHONE HYDROCHLORIDE 2 MG: 2 TABLET ORAL at 15:51

## 2025-01-02 RX ADMIN — CEVIMELINE HYDROCHLORIDE 30 MG: 30 CAPSULE ORAL at 08:31

## 2025-01-02 RX ADMIN — ALBUTEROL SULFATE 2 PUFF: 108 INHALANT RESPIRATORY (INHALATION) at 15:51

## 2025-01-02 RX ADMIN — GABAPENTIN 400 MG: 300 CAPSULE ORAL at 13:36

## 2025-01-02 RX ADMIN — ASPIRIN 81 MG: 81 TABLET, COATED ORAL at 19:56

## 2025-01-02 RX ADMIN — POTASSIUM CHLORIDE 20 MEQ: 1500 TABLET, EXTENDED RELEASE ORAL at 08:31

## 2025-01-02 RX ADMIN — METHOCARBAMOL 500 MG: 500 TABLET ORAL at 19:56

## 2025-01-02 ASSESSMENT — ACTIVITIES OF DAILY LIVING (ADL)
ADLS_ACUITY_SCORE: 70
ADLS_ACUITY_SCORE: 69
ADLS_ACUITY_SCORE: 69
ADLS_ACUITY_SCORE: 70
ADLS_ACUITY_SCORE: 69
ADLS_ACUITY_SCORE: 70
ADLS_ACUITY_SCORE: 70
ADLS_ACUITY_SCORE: 68
ADLS_ACUITY_SCORE: 70
ADLS_ACUITY_SCORE: 70
ADLS_ACUITY_SCORE: 69
ADLS_ACUITY_SCORE: 70
ADLS_ACUITY_SCORE: 70
ADLS_ACUITY_SCORE: 69
ADLS_ACUITY_SCORE: 69
ADLS_ACUITY_SCORE: 70
ADLS_ACUITY_SCORE: 69
ADLS_ACUITY_SCORE: 70
ADLS_ACUITY_SCORE: 69

## 2025-01-02 NOTE — PLAN OF CARE
Goal Outcome Evaluation:      Plan of Care Reviewed With: patient                PRIMARY Concern: Back pain   SAFETY RISK Concerns (fall risk, behaviors, etc.): Fall      Aggression Tool Color: Green   Isolation/Type: None  Tests/Procedures for NEXT shift: Needs to have XR of Pelvis with TLSO brace on, RN to call down to XR to see when can fit her in this evening  Consults? (Pending/following, signed-off?) Neurosurgery and SW following; PT consulted  Where is patient from? (Home, TCU, etc.): [facility]  Other Important info for NEXT shift: None   Anticipated DC date & active delays: TBD, bed hold at [facility] when medically ready for discharge   _____________________________________________________________________________  SUMMARY NOTE:  Orientation/Cognitive: AOx4  Observation Goals (Met/ Not Met): Not met   Mobility Level/Assist Equipment: A2/GB/W  Antibiotics & Plan (IV/po, length of tx left): None   Pain Management: 10/10 pain to R hip. On scheduled Tylenol, Robaxin, Gabapentin, Lidocaine patch. PRN IV Dilaudid given x 1, PO Dilaudid given x 1  Tele/VS/O2: VSS on RA   ABNL Lab/BG: No new labs today  Diet: Regular   Bowel/Bladder: Incontinent of urine  Skin Concerns: 2+ Edema to BLE   Drains/Devices: PAT valentino   Patient Stated Goal for Today: Pain control

## 2025-01-02 NOTE — PROGRESS NOTES
"01/02/25, [facility] EXTENDED RECOVERY AND SHORT STAY 5521/5521-01, female, Height: 5' 3\", Weight: 190 lbs 0 oz, Ordered by:  Dx:    Intractable back pain  History of back surgery  Compression fracture of L1 vertebra, initial encounter (H), MRN #: [id number].    S:   Patient was seen today at 5521/5521-01 present for the measurement and delivery of a Breg backpack  thoracolumbosacral orthosis (Ref # [id number]).  Patient appears pleasant.   Health History & Progression:  Patient's history of utilizing a TLSO spinal orthosis: no history of utilizing orthoses for ailment being addressed today.      O: I donned a Horizon 456 onto the patient in bed and customized the fit log rolling her onto her L side without incident.      A:     The [place]  456 TLSO bracing modular design allows the user to adjust the amount of motion restriction to the changing needs of each patient in the lumbar and thoracolumbar spinal region. The draw string tightening system provides effective compression for patient outcomes, regardless of patient strength. This TLSO design allows one product to comfortably fit waists ranging from 26\" - 60\", without compromising effectiveness and without the need for tools. The shoulder straps allow to pull the patient into the optimal posture position for treatment of back ailment..  Patient s ailment recovery in rehabilitation is expected to be managed well with the utilization of the back brace.  Upon fitting the brace the patient vocalized satisfaction with the fit and feel of the orthosis. The trimlines and widths of the brace presented satisfactory after the waist straps were set to size 4 and brace was donned.   Patient signed the delivery ticket and was given the [employer] receipt information sheet.    Goal:   The OTS spinal brace will be used to facilitate healing following the injury to the spine and related soft tissue and to support weak spinal " muscles.     P:  Patient was given the verbal and written instructions on how to don/doff/care for the brace. Patient will utilize the orthosis for the duration of rehabilitation/PT in the hospital and at home activities upon discharge for the duration of treatment of patient ailment or until use of orthosis is no longer necessary or duration of the treatment or unless otherwise specified by the patient's provider. Will follow-up PRN.    Electronically signed by Bib BENEDICT

## 2025-01-02 NOTE — PROGRESS NOTES
[facility]  Neurosurgery Daily Progress Note    Assessment  79 year old female with history of recent L2-L3 and L3-4 discectomy on 11/12/2024 with Dr. Kenny. Patient presented to the ED on 12/31/2024 with severe back pain that developed at TCU. Imaging revealed a new L1 superior endplate compression fracture and L5 superior endplate compression fracture/Schmorl's node.     Interval History   Patient had a fall overnight. Per chart review, patient was attempting to get into bed with the assistance of nursing staff, then her feet became tangled in her walker causing her to loose her balance, she was assisted to the ground by nursing staff. Patient reports she fell on her right buttocks and developed right hip pain. She reports increased low back pain as well. She describes intermittent right leg pain that only occurs when coughing. Denies any numbness or weakness.     Plan   -Orthotics consult for TLSO brace   -Upright lumbar spine xray while wearing brace   -Recommend to wear brace when upright and out of bed   -Avoid heavy lifting, bending, twisting  -Continue pain control measures as needed  -Hospitalist ordered hip/pelvis xray   -Recommend outpatient follow-up with PCP for osteoporosis evaluation and bone health management   -Appreciate assistance from specialties     Discussed with Dr. Orosco, CNP  [facility] Neurosurgery  Clinic phone number: [phone]  Securely message via [employer]  Text page via [employer]     Physical Exam   Temp: 98.2  F (36.8  C) Temp src: Oral BP: (!) 152/55 Pulse: 84   Resp: 18 SpO2: 93 % O2 Device: None (Room air)    Vitals:    12/31/24 1241   Weight: 86.2 kg (190 lb)       Mental status:  Alert and oriented x 3, speech is fluent, following commands  Motor:  BLE 5/5 except right hip flexion limited due to right hip pain  Sensation:  Intact to light touch in BLE   Incision: Well healed

## 2025-01-02 NOTE — PROGRESS NOTES
Care Management Follow Up    Length of Stay (days): 0    Expected Discharge Date: 01/02/2025     Concerns to be Addressed: discharge planning     Patient plan of care discussed at interdisciplinary rounds: Yes    Anticipated Discharge Disposition: Transitional Care  Disposition Comments: return to TCU     Anticipated Discharge Services: None  Anticipated Discharge DME: None    Patient/family educated on Medicare website which has current facility and service quality ratings: yes  Education Provided on the Discharge Plan: Yes  Patient/Family in Agreement with the Plan: yes    Referrals Placed by CM/SW: Senior Linkage Line, Post Acute Facilities, Transportation  Private pay costs discussed: Not applicable    Discussed  Partnership in Safe Discharge Planning  document with patient/family: No     Handoff Completed: No, handoff not indicated or clinically appropriate    Additional Information:  SW confirmed pt has a bed hold at [facility] and can return when ready.    FRED Arnett  Social Work  [facility]

## 2025-01-02 NOTE — PROGRESS NOTES
[facility]    Medicine Progress Note - Hospitalist Service    Date of Admission:  12/31/2024    Assessment & Plan      Agnes Saravia is a 79 year old female with PMH of RA, Sjogren's, ILD, HTN, recent L2-L3 and L3-L4 discectomy (11/12/24) who presented to the ED on 12/31 with severe back pain and found to have a new L1 superior endplate compression fracture.     Intractable back pain  New recent L1 superior endplate compression fracture  L5 superior endplate compression fracture with persistent edema and slightly worsened height loss  Recent L2-L3 and L3-L4 discectomy (11/12/24)  *Patient was recently hospitalized at [facility] 11/6-11/15 with severe low back pain, found to have right L3 nerve root impingement and s/p L2-L3 and L3-L4 discectomy by neurosurgery (Dr. Kenny) on 11/12/24. She discharged to TCU.  *Presents back to the ED 12/31 with worsening back pain over the last couple of weeks but especially the last 4 days. Pain usually does not radiate, but occasionally does into the right anterior thigh. No sensory deficits. Motor exam seems to be limited by pain.  *MR lumbar spine showed a new recent L1 superior endplate compression fracture with mild height loss and L5 superior endplate compression fracture/Schmorl's node demonstrates persistent edema and slightly worsened height loss compared to the prior. Right sided post operative changes at L2-L3 and L3-L4.  - Neurosurgery consult  Recs as below  Orthotics consulted for TLSO brace.  Brace to be worn when out of bed.  Off for resting/sleeping/hygiene.  -Upright x-rays in brace once obtained.  -Will have patient follow-up with PCP for osteoporotic evaluation/management  - Light activity only: Please limit your lifting to no more that ten pounds and avoid excessive bending, twisting and turning at the lumbar spine. You should also avoid excessive jostling and jarring activities.       - Acetaminophen 975 mg TID  - Lidoderm patch  -  "Robaxin 500 mg TID  - Continue PTA Gabapentin  - PO Dilaudid or IV Dilaudid PRN  - PT evaluation  -  consult for discharge planning     Rheumatoid arthritis  - Continue PTA Prednisone  - Takes PTA Methotrexate weekly on Tuesdays     Sjogren's  - Continue PTA Evoxac and eye gtts     Anxiety  - Continue PTA Clorazepate     HLD  - Can resume PTA statin on discharge     Prediabetes  *Last A1c was 6.1%     Observation Goals: -diagnostic tests and consults completed and resulted, -vital signs normal or at patient baseline, -adequate pain control on oral analgesics, -returns to baseline functional status, -safe disposition plan has been identified, Nurse to notify provider when observation goals have been met and patient is ready for discharge.  Diet: Regular Diet Adult    DVT Prophylaxis: Pneumatic Compression Devices  Andujar Catheter: Not present  Lines: None     Cardiac Monitoring: None  Code Status: Full Code      Clinically Significant Risk Factors Present on Admission        # Hypokalemia: Lowest K = 3.2 mmol/L in last 2 days, will replace as needed   # Hypochloremia: Lowest Cl = 96 mmol/L in last 2 days, will monitor as appropriate        # Drug Induced Platelet Defect: home medication list includes an antiplatelet medication   # Hypertension: Noted on problem list      # Anemia: based on hgb <11       # Obesity: Estimated body mass index is 33.66 kg/m  as calculated from the following:    Height as of this encounter: 1.6 m (5' 3\").    Weight as of this encounter: 86.2 kg (190 lb).       # Financial/Environmental Concerns: none         Social Drivers of Health    Tobacco Use: Medium Risk (11/12/2024)    Patient History     Smoking Tobacco Use: Former     Smokeless Tobacco Use: Never   Financial Resource Strain: Low Risk  (11/6/2024)    Financial Resource Strain     Within the past 12 months, have you or your family members you live with been unable to get utilities (heat, electricity) when it was really needed?: No "   Recent Concern: Financial Resource Strain - High Risk (10/16/2024)    Financial Resource Strain     Within the past 12 months, have you or your family members you live with been unable to get utilities (heat, electricity) when it was really needed?: Yes   Physical Activity: Inactive (10/16/2024)    Exercise Vital Sign     Days of Exercise per Week: 0 days     Minutes of Exercise per Session: 0 min   Interpersonal Safety: High Risk (11/10/2024)    Interpersonal Safety     Do you feel physically and emotionally safe where you currently live?: No     Within the past 12 months, have you been hit, slapped, kicked or otherwise physically hurt by someone?: No     Within the past 12 months, have you been humiliated or emotionally abused in other ways by your partner or ex-partner?: No   Social Connections: Unknown (10/16/2024)    Social Connection and Isolation Panel [NHANES]     Frequency of Social Gatherings with Friends and Family: Once a week          Disposition Plan     Medically Ready for Discharge: Anticipated Tomorrow             Emily Scott MD  Hospitalist Service  [facility]  Securely message with [employer] (more info)  Text page via [place] [facility] Paging/Directory   ______________________________________________________________________    Interval History   Pt is resting in bed partner at bedside,continues to have pain especially when she coughs,no new symptoms overnight.    Physical Exam   Vital Signs: Temp: 97.7  F (36.5  C) Temp src: Oral BP: (!) 166/84 Pulse: 88   Resp: 20 SpO2: 90 % O2 Device: None (Room air)    Weight: 190 lbs 0 oz    Constitutional: Awake, alert, cooperative, no apparent distress  Respiratory: Clear to auscultation bilaterally, no crackles or wheezing  Cardiovascular: Regular rate and rhythm, normal S1 and S2, and no murmur noted  GI: Normal bowel sounds, soft, non-distended, non-tender  Skin/Integumen: No rashes, no cyanosis, no edema  Other: AxO x3,no apparent focal  deficits

## 2025-01-02 NOTE — PROGRESS NOTES
Observation goals:    -diagnostic tests and consults completed and resulted- NOT MET, needs TLSO brace and XR  -vital signs normal or at patient baseline- MET  -adequate pain control on oral analgesics- NOT MET, still having pain  -returns to baseline functional status- NOT MET  -safe disposition plan has been identified- NOT MET    Nurse to notify provider when observation goals have been met and patient is ready for discharge.

## 2025-01-02 NOTE — PROGRESS NOTES
Medicine Progress Note - Hospitalist Service    Date of Admission:  12/31/2024    Assessment & Plan   Agnes Saravia is a 79 year old female with PMH of RA, Sjogren's, ILD, HTN, recent L2-L3 and L3-L4 discectomy (11/12/24) who presented to the ED on 12/31 with severe back pain and found to have a new L1 superior endplate compression fracture.     Intractable back pain  New recent L1 superior endplate compression fracture  L5 superior endplate compression fracture with persistent edema and slightly worsened height loss  Recent L2-L3 and L3-L4 discectomy (11/12/24)  *Patient was recently hospitalized at [facility] 11/6-11/15 with severe low back pain, found to have right L3 nerve root impingement and s/p L2-L3 and L3-L4 discectomy by neurosurgery (Dr. Kenny) on 11/12/24. She discharged to TCU.  *Presents back to the ED 12/31 with worsening back pain over the last couple of weeks but especially the last 4 days. Pain usually does not radiate, but occasionally does into the right anterior thigh. No sensory deficits. Motor exam seems to be limited by pain.  *MR lumbar spine showed a new recent L1 superior endplate compression fracture with mild height loss and L5 superior endplate compression fracture/Schmorl's node demonstrates persistent edema and slightly worsened height loss compared to the prior. Right sided post operative changes at L2-L3 and L3-L4.  - Neurosurgery consult  Recs as below  Orthotics consulted for TLSO brace.  Brace to be worn when out of bed.  Off for resting/sleeping/hygiene.  -Upright x-rays in brace once obtained.  -Will have patient follow-up with PCP for osteoporotic evaluation/management  - Light activity only: Please limit your lifting to no more that ten pounds and avoid excessive bending, twisting and turning at the lumbar spine. You should also avoid excessive jostling and jarring activities.       - Acetaminophen 975 mg TID  - Lidoderm patch  - Robaxin  "500 mg TID  - Continue PTA Gabapentin  - PO Dilaudid or IV Dilaudid PRN  - PT evaluation  - SW consult for discharge planning  -Patient had a fall overnight following which is complaining of excruciating right hip pain hence pelvic x-ray with hip is ordered and pending.     Rheumatoid arthritis  - Continue PTA Prednisone  - Takes PTA Methotrexate weekly on Tuesdays     Sjogren's  - Continue PTA Evoxac and eye gtts     Anxiety  - Continue PTA Clorazepate     HLD  - Can resume PTA statin on discharge     Prediabetes  *Last A1c was 6.1%       Observation Goals: -diagnostic tests and consults completed and resulted, -vital signs normal or at patient baseline, -adequate pain control on oral analgesics, -returns to baseline functional status, -safe disposition plan has been identified, Nurse to notify provider when observation goals have been met and patient is ready for discharge.  Diet: Regular Diet Adult    DVT Prophylaxis: Pneumatic Compression Devices  Andujar Catheter: Not present  Lines: None     Cardiac Monitoring: None  Code Status: Full Code      Clinically Significant Risk Factors Present on Admission        # Hypokalemia: Lowest K = 3.2 mmol/L in last 2 days, will replace as needed   # Hypochloremia: Lowest Cl = 96 mmol/L in last 2 days, will monitor as appropriate        # Drug Induced Platelet Defect: home medication list includes an antiplatelet medication   # Hypertension: Noted on problem list      # Anemia: based on hgb <11       # Obesity: Estimated body mass index is 33.66 kg/m  as calculated from the following:    Height as of this encounter: 1.6 m (5' 3\").    Weight as of this encounter: 86.2 kg (190 lb).       # Financial/Environmental Concerns: none         Social Drivers of Health    Tobacco Use: Medium Risk (11/12/2024)    Patient History     Smoking Tobacco Use: Former     Smokeless Tobacco Use: Never   Financial Resource Strain: Low Risk  (11/6/2024)    Financial Resource Strain     Within the " past 12 months, have you or your family members you live with been unable to get utilities (heat, electricity) when it was really needed?: No   Recent Concern: Financial Resource Strain - High Risk (10/16/2024)    Financial Resource Strain     Within the past 12 months, have you or your family members you live with been unable to get utilities (heat, electricity) when it was really needed?: Yes   Physical Activity: Inactive (10/16/2024)    Exercise Vital Sign     Days of Exercise per Week: 0 days     Minutes of Exercise per Session: 0 min   Interpersonal Safety: High Risk (11/10/2024)    Interpersonal Safety     Do you feel physically and emotionally safe where you currently live?: No     Within the past 12 months, have you been hit, slapped, kicked or otherwise physically hurt by someone?: No     Within the past 12 months, have you been humiliated or emotionally abused in other ways by your partner or ex-partner?: No   Social Connections: Unknown (10/16/2024)    Social Connection and Isolation Panel [NHANES]     Frequency of Social Gatherings with Friends and Family: Once a week          Disposition Plan     Medically Ready for Discharge: Anticipated Tomorrow             Emily Scott MD  Hospitalist Service  [facility]  Securely message with [employer] (more info)  Text page via "[employer]" Paging/Directory   ______________________________________________________________________    Interval History   Patient had a brief episode of fall last night when she was being transferred to to go to the bathroom she was lowered down to the floor with assistance of nurse.  This morning patient is complaining of significant pain in the right hip which was not present earlier.  Detailed discussion was held with patient along with neurosurgery team in the room following which the new excruciating pain seems to be in the right hip following the fall hence pelvic x-ray has been ordered.  Patient is also having  ongoing cough which is exacerbating her pain.    Physical Exam   Vital Signs: Temp: 97.9  F (36.6  C) Temp src: Oral BP: 106/53 Pulse: 88   Resp: 18 SpO2: 92 % O2 Device: None (Room air)    Weight: 190 lbs 0 oz    Constitutional: Awake, alert, cooperative, no apparent distress  Respiratory: Clear to auscultation bilaterally, no crackles or wheezing  Cardiovascular: Regular rate and rhythm, normal S1 and S2, and no murmur noted  GI: Normal bowel sounds, soft, non-distended, non-tender  Skin/Integumen: No rashes, no cyanosis, no edema  Other: Alert oriented x 3, no apparent focal deficits.  Exam is very limited due to her pain.

## 2025-01-02 NOTE — PROGRESS NOTES
Plan of Care Reviewed With: patient  PRIMARY Concern: Back pain   SAFETY RISK Concerns (fall risk, behaviors, etc.): Fall      Aggression Tool Color: Green   Isolation/Type: None  Tests/Procedures for NEXT shift: RN to call X-ray once fitted for TLSO brace, needs to have XR of Lumbar Spine   Consults? (Pending/following, signed-off?) Neurosurgery and SW following; PT/Orthotics consulted    Where is patient from? (Home, TCU, etc.): [facility]  Other Important info for NEXT shift: None   Anticipated DC date & active delays: TBD, referrals sent to TCU  _____________________________________________________________________________  SUMMARY NOTE:  Orientation/Cognitive: AOx4  Observation Goals (Met/ Not Met): Not met   Mobility Level/Assist Equipment: A2 GBW. Staff assisted fall overnight.   Antibiotics & Plan (IV/po, length of tx left): None   Pain Management: Scheduled Tylenol, Robaxin, Gabapentin, Lidocaine patch. PRN PO Dilaudid given for back pain  Tele/VS/O2: VSS on RA   ABNL Lab/BG: K protocol  Diet: Regular   Bowel/Bladder: Incontinent of urine, no BM, senna given   Skin Concerns: 1+ Edema to BLE   Drains/Devices: PIV SL; purewick   Patient Stated Goal for Today: Pain control     -diagnostic tests and consults completed and resulted- NOT MET, needs TLSO brace and XR  -vital signs normal or at patient baseline- MET  -adequate pain control on oral analgesics- MET  -returns to baseline functional status- NOT MET  -safe disposition plan has been identified- NOT MET     Nurse to notify provider when observation goals have been met and patient is ready for discharge.

## 2025-01-02 NOTE — PROGRESS NOTES
Writer made aware by NA that pt fell around 2218. Writer and another staff RN went to assess situation.  2 support staff at bedside.  Pt sitting on floor.  Bedside RN was notified of situation. RRT called at 2122.  Two flying squads arrived.  Later NP arrived. Per Bedside RN, no new orders.  Instructed RN to fill compass report, fall huddle report and notified pt's contact of fall.

## 2025-01-02 NOTE — SIGNIFICANT EVENT
[facility]    Fall Note  1/1/2025   Time Called: 2123    Code Status: Full Code  Called for fall assessment    Assessment & Plan     Agnes Saravia is a 79 year old female with PMH of RA, Sjogren's, ILD, HTN, recent L2-L3 and L3-L4 discectomy (11/12/24) who presented to the ED on 12/31 with severe back pain and found to have a new L1 superior endplate compression fracture.     I was called to assess the patient following a fall. The patient reports prior to the fall she was attempting to get back into bed with the assistance of her walker and two nursing assistants when her feet became tangled in her walker, causing her to loose her balance. She was assisted to the ground by the nursing assistants. She did not hit her head or loose consciousness. She denies any chest pain, shortness of breath, nausea, vomiting, dizziness, palpitations prior to fall. Upon my evaluation, she reports continued pain in her lower back, this is not new. It is not worse than previously reported discomfort. The patient was assisted back to bed with the assistance of multiple nurses and a gait belt. Non-slip socks placed on patient.     No obvious injury on initial assessment. Given that the fall was an assisted fall by two staff members and that the patient has no new complaints, will defer imaging at this time. I informed the patient that not all injuries are obvious at the time of injury and instructed her to let staff know if new symptoms arise.     Patient remains on current unit.     Discussed with and defer further cares to bedside nursing staff.    RYAN Stark CNP  [facility]  Securely message with the Vocera Web Console (learn more here)  Text page via [employer] Paging/Directory        Allergies   Allergies   Allergen Reactions    Mellaril [Thioridazine Hcl] Anaphylaxis    Percocet [Oxycodone-Acetaminophen]      Tolerates hydrocodone and codeine in cough medicine.  Tolerates  Believes reaction to oxycodone was leg swelling. No tongue/lip/throat swelling or hives.    Duloxetine Anxiety     Made her feel weird, increased anxiety       Physical Exam   Vital Signs with Ranges:  Temp:  [97.5  F (36.4  C)-98.9  F (37.2  C)] 97.7  F (36.5  C)  Pulse:  [79-88] 88  Resp:  [16-20] 20  BP: (118-166)/(54-85) 166/84  SpO2:  [90 %-98 %] 90 %  I/O last 3 completed shifts:  In: 420 [P.O.:420]  Out: 900 [Urine:900]    Physical Exam  Vitals and nursing note reviewed.   Constitutional:       General: She is not in acute distress.     Appearance: She is obese.   HENT:      Mouth/Throat:      Mouth: Mucous membranes are moist.   Eyes:      Pupils: Pupils are equal, round, and reactive to light.   Cardiovascular:      Rate and Rhythm: Normal rate and regular rhythm.      Pulses: Normal pulses.      Heart sounds: Normal heart sounds.   Pulmonary:      Effort: Pulmonary effort is normal.      Breath sounds: Normal breath sounds.   Abdominal:      General: There is no distension.      Palpations: Abdomen is soft.      Tenderness: There is no abdominal tenderness.   Musculoskeletal:         General: Tenderness present. No signs of injury.   Skin:     General: Skin is warm and dry.      Capillary Refill: Capillary refill takes less than 2 seconds.   Neurological:      General: No focal deficit present.      Mental Status: She is alert and oriented to person, place, and time.           Data       CBC with Diff:  Recent Labs   Lab Test 01/01/25  0814 11/13/24  0808 11/10/24  1618   WBC 7.6   < >  --    HGB 10.3*   < >  --    MCV 91   < >  --       < >  --    INR  --   --  1.02    < > = values in this interval not displayed.        Lactic Acid:    Lab Results   Component Value Date    LACT 0.8 12/31/2024    LACT 1.1 10/21/2024    LACT 1.3 03/25/2020           Comprehensive Metabolic Panel:  Recent Labs   Lab 01/01/25  1843 01/01/25  0814 12/31/24  1343   NA  --  137 138   POTASSIUM 3.5 3.2* 3.6    CHLORIDE  --  96* 98   CO2  --  30* 29   ANIONGAP  --  11 11   GLC  --  89 120*   BUN  --  10.0 14.1   CR  --  0.53 0.70   GFRESTIMATED  --  >90 86   CHRISTOPHER  --  8.8 8.8   PROTTOTAL  --   --  6.1*   ALBUMIN  --   --  3.5   BILITOTAL  --   --  0.8   ALKPHOS  --   --  94   AST  --   --  51*   ALT  --   --  32       INR:    Recent Labs   Lab Test 11/10/24  1618   INR 1.02         Time Spent on this Encounter   I spent 15 minutes on the unit/floor managing the care of Agnes Saravia. Over 50% of my time was spent counseling the patient and/or coordinating care regarding services listed in this note.

## 2025-01-03 ENCOUNTER — APPOINTMENT (OUTPATIENT)
Dept: PHYSICAL THERAPY | Facility: CLINIC | Age: 80
DRG: 544 | End: 2025-01-03
Attending: PHYSICIAN ASSISTANT
Payer: MEDICARE

## 2025-01-03 LAB
POTASSIUM SERPL-SCNC: 3.4 MMOL/L (ref 3.4–5.3)
POTASSIUM SERPL-SCNC: 4.1 MMOL/L (ref 3.4–5.3)

## 2025-01-03 PROCEDURE — 99232 SBSQ HOSP IP/OBS MODERATE 35: CPT | Performed by: INTERNAL MEDICINE

## 2025-01-03 PROCEDURE — 250N000013 HC RX MED GY IP 250 OP 250 PS 637: Performed by: INTERNAL MEDICINE

## 2025-01-03 PROCEDURE — 84132 ASSAY OF SERUM POTASSIUM: CPT | Performed by: INTERNAL MEDICINE

## 2025-01-03 PROCEDURE — 97530 THERAPEUTIC ACTIVITIES: CPT | Mod: GP

## 2025-01-03 PROCEDURE — 250N000013 HC RX MED GY IP 250 OP 250 PS 637: Performed by: PHYSICIAN ASSISTANT

## 2025-01-03 PROCEDURE — 250N000012 HC RX MED GY IP 250 OP 636 PS 637: Performed by: PHYSICIAN ASSISTANT

## 2025-01-03 PROCEDURE — 36415 COLL VENOUS BLD VENIPUNCTURE: CPT | Performed by: INTERNAL MEDICINE

## 2025-01-03 PROCEDURE — 120N000001 HC R&B MED SURG/OB

## 2025-01-03 PROCEDURE — 97161 PT EVAL LOW COMPLEX 20 MIN: CPT | Mod: GP

## 2025-01-03 RX ORDER — POTASSIUM CHLORIDE 1.5 G/1.58G
40 POWDER, FOR SOLUTION ORAL ONCE
Status: COMPLETED | OUTPATIENT
Start: 2025-01-03 | End: 2025-01-03

## 2025-01-03 RX ORDER — HYDROMORPHONE HYDROCHLORIDE 2 MG/1
2 TABLET ORAL 3 TIMES DAILY
Status: DISCONTINUED | OUTPATIENT
Start: 2025-01-03 | End: 2025-01-06 | Stop reason: HOSPADM

## 2025-01-03 RX ADMIN — TRAZODONE HYDROCHLORIDE 25 MG: 50 TABLET ORAL at 20:57

## 2025-01-03 RX ADMIN — GABAPENTIN 400 MG: 300 CAPSULE ORAL at 09:09

## 2025-01-03 RX ADMIN — ACETAMINOPHEN 975 MG: 325 TABLET, FILM COATED ORAL at 19:33

## 2025-01-03 RX ADMIN — GABAPENTIN 400 MG: 300 CAPSULE ORAL at 18:05

## 2025-01-03 RX ADMIN — HYDROCHLOROTHIAZIDE 25 MG: 25 TABLET ORAL at 09:09

## 2025-01-03 RX ADMIN — LIDOCAINE 1 PATCH: 4 PATCH TOPICAL at 09:09

## 2025-01-03 RX ADMIN — DEXTRAN 70, GLYCERIN, HYPROMELLOSE 1 DROP: 1; 2; 3 SOLUTION/ DROPS OPHTHALMIC at 20:59

## 2025-01-03 RX ADMIN — HYDROMORPHONE HYDROCHLORIDE 2 MG: 2 TABLET ORAL at 14:44

## 2025-01-03 RX ADMIN — POTASSIUM CHLORIDE 20 MEQ: 1500 TABLET, EXTENDED RELEASE ORAL at 09:10

## 2025-01-03 RX ADMIN — ACETAMINOPHEN 975 MG: 325 TABLET, FILM COATED ORAL at 09:08

## 2025-01-03 RX ADMIN — ACETAMINOPHEN 975 MG: 325 TABLET, FILM COATED ORAL at 14:43

## 2025-01-03 RX ADMIN — PANTOPRAZOLE SODIUM 40 MG: 40 TABLET, DELAYED RELEASE ORAL at 09:08

## 2025-01-03 RX ADMIN — METHOCARBAMOL 500 MG: 500 TABLET ORAL at 14:44

## 2025-01-03 RX ADMIN — GABAPENTIN 400 MG: 300 CAPSULE ORAL at 14:43

## 2025-01-03 RX ADMIN — DEXTRAN 70, GLYCERIN, HYPROMELLOSE 1 DROP: 1; 2; 3 SOLUTION/ DROPS OPHTHALMIC at 09:09

## 2025-01-03 RX ADMIN — HYDROMORPHONE HYDROCHLORIDE 2 MG: 2 TABLET ORAL at 09:08

## 2025-01-03 RX ADMIN — HYDROMORPHONE HYDROCHLORIDE 2 MG: 2 TABLET ORAL at 19:33

## 2025-01-03 RX ADMIN — DEXTRAN 70, GLYCERIN, HYPROMELLOSE 1 DROP: 1; 2; 3 SOLUTION/ DROPS OPHTHALMIC at 12:47

## 2025-01-03 RX ADMIN — CEVIMELINE HYDROCHLORIDE 30 MG: 30 CAPSULE ORAL at 14:44

## 2025-01-03 RX ADMIN — CEVIMELINE HYDROCHLORIDE 30 MG: 30 CAPSULE ORAL at 09:08

## 2025-01-03 RX ADMIN — CEVIMELINE HYDROCHLORIDE 30 MG: 30 CAPSULE ORAL at 19:34

## 2025-01-03 RX ADMIN — POTASSIUM CHLORIDE 20 MEQ: 1500 TABLET, EXTENDED RELEASE ORAL at 19:33

## 2025-01-03 RX ADMIN — GABAPENTIN 100 MG: 100 CAPSULE ORAL at 20:58

## 2025-01-03 RX ADMIN — METHOCARBAMOL 500 MG: 500 TABLET ORAL at 09:08

## 2025-01-03 RX ADMIN — POTASSIUM CHLORIDE 40 MEQ: 1.5 POWDER, FOR SOLUTION ORAL at 10:51

## 2025-01-03 RX ADMIN — PREDNISONE 5 MG: 5 TABLET ORAL at 09:08

## 2025-01-03 RX ADMIN — ASPIRIN 81 MG: 81 TABLET, COATED ORAL at 20:58

## 2025-01-03 RX ADMIN — METHOCARBAMOL 500 MG: 500 TABLET ORAL at 19:33

## 2025-01-03 RX ADMIN — CLORAZEPATE DIPOTASSIUM 15 MG: 3.75 TABLET ORAL at 09:09

## 2025-01-03 ASSESSMENT — ACTIVITIES OF DAILY LIVING (ADL)
ADLS_ACUITY_SCORE: 73
ADLS_ACUITY_SCORE: 69
ADLS_ACUITY_SCORE: 69
ADLS_ACUITY_SCORE: 72
ADLS_ACUITY_SCORE: 72
ADLS_ACUITY_SCORE: 73
ADLS_ACUITY_SCORE: 72
ADLS_ACUITY_SCORE: 73
ADLS_ACUITY_SCORE: 72
ADLS_ACUITY_SCORE: 73
ADLS_ACUITY_SCORE: 69
ADLS_ACUITY_SCORE: 73
ADLS_ACUITY_SCORE: 69
ADLS_ACUITY_SCORE: 72
ADLS_ACUITY_SCORE: 69
ADLS_ACUITY_SCORE: 72
ADLS_ACUITY_SCORE: 73
ADLS_ACUITY_SCORE: 72
ADLS_ACUITY_SCORE: 73
ADLS_ACUITY_SCORE: 69

## 2025-01-03 NOTE — PROGRESS NOTES
Care Management Follow Up    Length of Stay (days): 1    Expected Discharge Date: 01/04/2025     Concerns to be Addressed: discharge planning     Patient plan of care discussed at interdisciplinary rounds: Yes    Anticipated Discharge Disposition: Transitional Care  Disposition Comments: return to TCU     Anticipated Discharge Services: None  Anticipated Discharge DME: None    Patient/family educated on Medicare website which has current facility and service quality ratings: yes  Education Provided on the Discharge Plan: Yes  Patient/Family in Agreement with the Plan: yes    Referrals Placed by CM/SW: Senior Linkage Line, Post Acute Facilities, Transportation  Private pay costs discussed: Not applicable    Discussed  Partnership in Safe Discharge Planning  document with patient/family: No     Handoff Completed: No, handoff not indicated or clinically appropriate    Additional Information:  Per hospitalist, pt is not medically ready today. Sent message to [facility], requested to know if they would accept pt back on the weekend or if we would have to wait until Monday.    Addendum 1228: received message back from [facility], they can accept back on Monday, if pt is medically ready for discharge on this date. They are requesting a transport be arranged for Monday after the 12:30pm time.     Lorie Torres, FRED  Social Work  [facility]

## 2025-01-03 NOTE — PLAN OF CARE
"PRIMARY Concern: Back pain  SAFETY RISK Concerns (fall risk, behaviors, etc.): fall risk      Aggression Tool Color: green  Isolation/Type: none  Tests/Procedures for NEXT shift: none  Consults? (Pending/following, signed-off?) Neurosurgery, SW following for dispo, orthotics came and readjusted brace today  Where is patient from? (Home[facility] TCU, etc.[facility], has a bed hold  Other Important info for NEXT shift: TLSO when OOB. Pt really dislikes wearing TLSO, states donning takes to long, \"when I have to pee, I don't have time to put this thing on.\" States she is unsure if she will continue to use it despite multiple attempts to educate by the RN throughout the day on the importance of wearing it when OOB  Anticipated DC date & active delays: TBD, per MD is not medically ready today, can return to [facility] on Monday if medically ready  _____________________________________________________________________________  SUMMARY NOTE:  Orientation/Cognitive: A&Ox4  Observation Goals (Met/ Not Met): Inpatient  Mobility Level/Assist Equipment: Ax2, gb, walker. Up in the chair today with PT, pt very anxious with moving and needing frequent reassurance that she will not fall  Antibiotics & Plan (IV/po, length of tx left): none  Pain Management: scheduled PO dilauded, lidocaine patch, scheduled robaxin  Tele/VS/O2: VSS on RA  ABNL Lab/BG: Potassium 3.4, replaced and recheck is 4.1  Diet: tolerating a regular diet  Bowel/Bladder: Intermittently continent, using purewick  Skin Concerns: scattered bruising  Drains/Devices: chelsy LABOY  Patient Stated Goal for Today: pain control, rest      "

## 2025-01-03 NOTE — PROGRESS NOTES
[facility]    Neurosurgery Progress Note    Date of Service (when I saw the patient): 01/03/2025     Assessment & Plan   79 year old female with history of recent L2-L3 and L3-4 discectomy on 11/12/2024 with Dr. Kenny. Patient presented to the ED on 12/31/2024 with severe back pain that developed at TCU. Imaging revealed a new L1 superior endplate compression fracture and L5 superior endplate compression fracture/Schmorl's node.     Patient with continued severe low back pain. Denies any radicular lower extremity pain numbness tingling or weakness. Severely limited in activity secondary to pain, states brace is uncomfortable and does not fit her well.    Plan:  Orthotics to refit brace  Activity as tolerated with PT/OT  No further neurosurgical intervention presently indicated  Will sign off please re-consult or call with any questions   Patient to complete DEXA scan as she would want to proceed with possible kyphoplasty if able and would provide referral to outpatient IR   Otherwise tentative follow up in 6 week with repeat lumbar xray        I have discussed the following assessment and plan Dr. Adams who is in agreement with initial plan and will follow up with further consultation recommendations.    KULWANT Barroso [facility]  [facility]  [address] [place] [address]  Tel [phone]  Text page via [employer] Paging/Directory     Interval History   stable    Physical Exam   Temp: 97.6  F (36.4  C) Temp src: Oral BP: 119/61 Pulse: 85   Resp: 18 SpO2: 93 % O2 Device: None (Room air)    Vitals:    12/31/24 1241   Weight: 86.2 kg (190 lb)     Vital Signs with Ranges  Temp:  [97.4  F (36.3  C)-98.1  F (36.7  C)] 97.6  F (36.4  C)  Pulse:  [78-98] 85  Resp:  [18] 18  BP: (119-140)/(61-73) 119/61  SpO2:  [92 %-94 %] 93 %  I/O last 3 completed shifts:  In: 180 [P.O.:180]  Out: 500 [Urine:500]     , Blood pressure 119/61, pulse 85,  "temperature 97.6  F (36.4  C), temperature source Oral, resp. rate 18, height 1.6 m (5' 3\"), weight 86.2 kg (190 lb), SpO2 93%, not currently breastfeeding.  190 lbs 0 oz  HEENT:  Normocephalic.  PERRLA.  EOM s intact.    Neck:  Supple, non-tender, without lymphadenopathy.  Heart:  No peripheral edema  Lungs:  No SOB  Abdomen:  Soft, non-tender, non-distended.   Skin:  Warm and dry, good capillary refill.  Extremities:  Good radial and dorsalis pedis pulses bilaterally, no edema, cyanosis or clubbing.    NEUROLOGICAL EXAMINATION:   Mental status: alert and oriented x3 speech clear and appropriate   Cranial nerves: II-XII intact  Motor strength: weakness of bilateral hip flexor secondary to pain   Hip flexors: 4/5 right, 4/5 left   Quadriceps: 5/5 right, 5/5 left  Ankle dorsiflexion: 5/5 right, 5/5 left    Ankle plantar flexion:5/5 right, 5/5 left   Extensor hallicus longus: 5/5 right, 5/5 left   Sensation:  intact  Reflexes:  Negative Babinski.  Negative Clonus.    Coordination:  Smooth finger to nose testing.   Negative pronator drift.    Gait:  not tested    Stable appearance of L1 compression fracture   IMPRESSION: Exam is mildly limited by positioning and soft tissue artifact. Mild recent L1 compression is stable. No definite new fracture.     Medications   Current Facility-Administered Medications   Medication Dose Route Frequency Provider Last Rate Last Admin     Current Facility-Administered Medications   Medication Dose Route Frequency Provider Last Rate Last Admin    acetaminophen (TYLENOL) tablet 975 mg  975 mg Oral TID Bhargavi Gates PA-C   975 mg at 01/03/25 0908    artificial tears (GENTEAL) 0.1-0.2-0.3 % ophthalmic solution 1 drop  1 drop Left Eye 4x Daily Enu-Kenisha De Luna MD   1 drop at 01/03/25 0909    aspirin EC tablet 81 mg  81 mg Oral At Bedtime Bhargavi Gates PA-C   81 mg at 01/02/25 1956    cevimeline (EVOXAC) capsule 30 mg  30 mg Oral TID Bhargavi Gates PA-C   30 mg at " 01/03/25 0908    clorazepate (TRANXENE) tablet 15 mg  15 mg Oral QAM Bhargavi Gates PA-C   15 mg at 01/03/25 0909    gabapentin (NEURONTIN) capsule 100 mg  100 mg Oral At Bedtime Bhargavi Gates PA-C   100 mg at 01/02/25 1956    gabapentin (NEURONTIN) capsule 400 mg  400 mg Oral TID Bhargavi Gates PA-C   400 mg at 01/03/25 0909    hydrochlorothiazide (HYDRODIURIL) tablet 25 mg  25 mg Oral Daily Bhargavi Gates PA-C   25 mg at 01/03/25 0909    HYDROmorphone (DILAUDID) tablet 2 mg  2 mg Oral TID Emily Scott MD        Lidocaine (LIDOCARE) 4 % Patch 1 patch  1 patch Transdermal Q24H Bhargavi Gates PA-C   1 patch at 01/03/25 0909    methocarbamol (ROBAXIN) tablet 500 mg  500 mg Oral TID Bhargavi Gates PA-C   500 mg at 01/03/25 0908    pantoprazole (PROTONIX) EC tablet 40 mg  40 mg Oral Daily Bhargavi Gates PA-C   40 mg at 01/03/25 0908    potassium chloride (KLOR-CON) Packet 40 mEq  40 mEq Oral or Feeding Tube Once Emily Scott MD        potassium chloride kang ER (KLOR-CON M20) CR tablet 20 mEq  20 mEq Oral BID Bhargavi Gates PA-C   20 mEq at 01/03/25 0910    predniSONE (DELTASONE) tablet 5 mg  5 mg Oral Daily Bhargavi Gates PA-C   5 mg at 01/03/25 0908       Data     CBC RESULTS:   Recent Labs   Lab Test 01/01/25  0814   WBC 7.6   RBC 3.47*   HGB 10.3*   HCT 31.4*   MCV 91   MCH 29.7   MCHC 32.8   RDW 14.7        Basic Metabolic Panel:  Lab Results   Component Value Date     01/01/2025     06/16/2021      Lab Results   Component Value Date    POTASSIUM 3.4 01/03/2025    POTASSIUM 4.1 06/15/2022    POTASSIUM 4.3 06/16/2021     Lab Results   Component Value Date    CHLORIDE 96 01/01/2025    CHLORIDE 102 06/15/2022    CHLORIDE 99 06/16/2021     Lab Results   Component Value Date    CHRISTOPHER 8.8 01/01/2025    CHRISTOPHER 9.2 06/16/2021     Lab Results   Component Value Date    CO2 30 01/01/2025    CO2 32 06/15/2022    CO2 34 06/16/2021     Lab Results    Component Value Date    BUN 10.0 01/01/2025    BUN 16 06/15/2022    BUN 17 06/16/2021     Lab Results   Component Value Date    CR 0.53 01/01/2025    CR 0.72 06/16/2021     Lab Results   Component Value Date    GLC 89 01/01/2025     11/15/2024    GLC 96 06/15/2022     06/16/2021     INR:  Lab Results   Component Value Date    INR 1.02 11/10/2024    INR 2.8 05/08/2017    INR 2.6 04/11/2017    INR 2.5 03/13/2017    INR 2.2 02/23/2017    INR 2.0 02/14/2017    INR 1.9 02/09/2017    INR 1.6 02/06/2017    INR 1.8 01/30/2017    INR 1.48 01/28/2017    INR 1.8 01/16/2017    INR 1.9 01/12/2017    INR 2.1 01/02/2017    INR 1.81 12/31/2016    INR 2.1 12/23/2016    INR 1.07 11/23/2016    INR 1.00 11/22/2016    INR 0.94 11/21/2016    INR 0.92 11/20/2016

## 2025-01-03 NOTE — PLAN OF CARE
PRIMARY Concern: Back pain   SAFETY RISK Concerns (fall risk, behaviors, etc.): Fall      Aggression Tool Color: Green   Isolation/Type: None  Tests/Procedures for NEXT shift: Needs to have XR of Pelvis with TLSO brace on, RN to call down to XR to see when can fit her in this evening  Consults? (Pending/following, signed-off?) Neurosurgery and SW following; PT consulted  Where is patient from? (Home, TCU, etc.): [facility]  Other Important info for NEXT shift: None   Anticipated DC date & active delays: TBD, bed hold at [facility] when medically ready for discharge   _____________________________________________________________________________  SUMMARY NOTE:  Orientation/Cognitive: AOx4  Observation Goals (Met/ Not Met): Not met   Mobility Level/Assist Equipment: A2/GB/W  Antibiotics & Plan (IV/po, length of tx left): None   Pain Management: 10/10 pain to R hip. On scheduled Tylenol, Robaxin, Gabapentin, Lidocaine patch. PRN IV Dilaudid given x 1, PO Dilaudid given x 1  Tele/VS/O2: VSS on RA   ABNL Lab/BG: No new labs today  Diet: Regular   Bowel/Bladder: Incontinent of urine  Skin Concerns: 2+ Edema to BLE   Drains/Devices: PAT valentino   Patient Stated Goal for Today: Pain control

## 2025-01-03 NOTE — PROGRESS NOTES
01/03/25 1130   Appointment Info   Signing Clinician's Name / Credentials (PT) Maddie Peña, PT, DPT   Living Environment   Living Environment Comments Patient admitted from TCU where she was getting rehab.   Self-Care   Usual Activity Tolerance fair   Current Activity Tolerance poor   Equipment Currently Used at Home walker, rolling   Fall history within last six months yes   Number of times patient has fallen within last six months 1   Activity/Exercise/Self-Care Comment Patient reports that at TCU she was ambulating ~100' at a time with a walker.   General Information   Onset of Illness/Injury or Date of Surgery 12/31/24   Referring Physician Bhargavi Gates PA-C   Patient/Family Therapy Goals Statement (PT) to go home   Pertinent History of Current Problem (include personal factors and/or comorbidities that impact the POC) Patient is 80 YO F who presented to hospital from TCU with intractable back pain, new L1 compression fracture and slightly worsened L5 compression fracture.  She was recently admitted 10/21 to 10/25/24 for COVID-19 infection with encephalopathy, then discharged to TCU before returning home on 10/30/24. She [facility] to [facility] 11/6[facility]11/15 for L2-L3 and L3-L4 discectomy by neurosurgery. Additional PMH includes RA, anxiety and hypertension.   Existing Precautions/Restrictions fall;brace worn when out of bed;spinal   General Observations Patient on L side with RN and Orthotics in room assisting with fit of TLSO. Patient agreeable to initiate PT.   Cognition   Affect/Mental Status (Cognition) WFL   Orientation Status (Cognition) oriented x 4   Follows Commands (Cognition) follows two-step commands;repetition of directions required   Behavioral Issues overwhelmed easily   Pain Assessment   Patient Currently in Pain Yes, see Vital Sign flowsheet   Integumentary/Edema   Integumentary/Edema no deficits were identifed   Posture    Posture Forward head position;Protracted shoulders   Range of  "Motion (ROM)   Range of Motion ROM is WFL   Strength (Manual Muscle Testing)   Strength (Manual Muscle Testing) Deficits observed during functional mobility   Strength Comments Functional weakness noted during transfers   Bed Mobility   Comment, (Bed Mobility) Supine to sit using log roll and Mod A to bring trunk upright.   Transfers   Comment, (Transfers) Sit to stand from EOB with FWW and Min Ax2. \"Are you guys even holding me?\"   Gait/Stairs (Locomotion)   Comment, (Gait/Stairs) Patient took small steps from bed to chair with FWW and Min-Mod Ax2 for safety, patient with heavy lean on walker and shuffling feet, focused on sensation from having brace on.   Balance   Balance Comments SBA for sitting balance on EOB, Min A for standing at FWW   Sensory Examination   Sensory Perception patient reports no sensory changes   Clinical Impression   Criteria for Skilled Therapeutic Intervention Yes, treatment indicated   PT Diagnosis (PT) Impaired mobiltiy   Influenced by the following impairments Pain, Weakness, Decreased activity tolerance, Impaired balance   Functional limitations due to impairments Increased difficulty with bed mobility, transfers and ambulation   Clinical Presentation (PT Evaluation Complexity) stable   Clinical Presentation Rationale Medical status, clinical judgement   Clinical Decision Making (Complexity) low complexity   Planned Therapy Interventions (PT) balance training;bed mobility training;gait training;lumbar stabilization;patient/family education;stair training;strengthening;transfer training;progressive activity/exercise;orthotic fitting/training   Risk & Benefits of therapy have been explained evaluation/treatment results reviewed;care plan/treatment goals reviewed;risks/benefits reviewed;current/potential barriers reviewed;participants included;participants voiced agreement with care plan;patient   PT Total Evaluation Time   PT Eval, Low Complexity Minutes (91364) 13   Physical Therapy " Goals   PT Frequency 5x/week   PT Predicted Duration/Target Date for Goal Attainment 01/12/25   PT Goals Bed Mobility;Transfers;Gait   PT: Bed Mobility Supervision/stand-by assist;Supine to/from sit;Within precautions   PT: Transfers Supervision/stand-by assist;Sit to/from stand;Bed to/from chair;Assistive device;Within precautions  (FWW, TLSO)   PT: Gait Supervision/stand-by assist;100 feet;Rolling walker   Interventions   Interventions Quick Adds Therapeutic Activity   Therapeutic Activity   Therapeutic Activities: dynamic activities to improve functional performance Minutes (72746) 13   Symptoms Noted During/After Treatment Increased pain;Fatigue   Treatment Detail/Skilled Intervention Supine to sit from HOB slightly elevated, cues for sequencing and Mod A to lift trunk upright. Sit to stand from EOB with cues for safe hand placements (repeated x3 due to patient attempting to pull up on walker) and Min Ax2. Patient anxious in standing and verbalizing fear of falling, re-assurance provided. Orthotics consultant present and adjusting fit of brace with patient standing ~ 3 minutes with CGA and FWW, cues for upright posture. Step by step cues for sequencing to transfer to bedside chair, patient increasingly anxious with mobilizing, frequent cues for direction needed, Min-Mod Ax2 to stabilize walker, guide hips and balance. In chair, PT assisted with positioning pillows at side for comfort. Reviewed education on reasoning for brace to protect back from further injury. Patient verbalizing frurstration of her multiple set-backs over the last few months, encouragement provided. Patient in chair with alarm activated at end of session.   PT Discharge Planning   PT Plan Bed mobility using log roll, independence with transfers, gait with w/c follow as able   PT Discharge Recommendation (DC Rec) Transitional Care Facility   PT Rationale for DC Rec Patient is significantly below baseline level of independence, was re-admitted  from TCU. She is requiring Ax2 for  safety during transfers with very limited activity tolerance. She will benefit from return to TCU to continue to progress strength, balance and activity tolerance.   PT Brief overview of current status Ax2 with FWW; Goals of therapy will be to address safe mobility and make recs for d/c to next level of care. Pt and RN will continue to follow all falls risk precautions as documented by RN staff while hospitalized.   Physical Therapy Time and Intention   Timed Code Treatment Minutes 13   Total Session Time (sum of timed and untimed services) 26

## 2025-01-03 NOTE — PROGRESS NOTES
[facility]    Medicine Progress Note - Hospitalist Service    Date of Admission:  12/31/2024    Assessment & Plan   Agnes Saravia is a 79 year old female with PMH of RA, Sjogren's, ILD, HTN, recent L2-L3 and L3-L4 discectomy (11/12/24) who presented to the ED on 12/31 with severe back pain and found to have a new L1 superior endplate compression fracture.     Intractable back pain  New recent L1 superior endplate compression fracture  L5 superior endplate compression fracture with persistent edema and slightly worsened height loss  Recent L2-L3 and L3-L4 discectomy (11/12/24)  *Patient was recently hospitalized at [facility] 11/6-11/15 with severe low back pain, found to have right L3 nerve root impingement and s/p L2-L3 and L3-L4 discectomy by neurosurgery (Dr. Kenny) on 11/12/24. She discharged to TCU.  *Presents back to the ED 12/31 with worsening back pain over the last couple of weeks but especially the last 4 days. Pain usually does not radiate, but occasionally does into the right anterior thigh. No sensory deficits. Motor exam seems to be limited by pain.  *MR lumbar spine showed a new recent L1 superior endplate compression fracture with mild height loss and L5 superior endplate compression fracture/Schmorl's node demonstrates persistent edema and slightly worsened height loss compared to the prior. Right sided post operative changes at L2-L3 and L3-L4.  - Neurosurgery consult  Recs as below  Orthotics consulted for TLSO brace.  Brace to be worn when out of bed.  Off for resting/sleeping/hygiene.  -Upright x-rays in brace once obtained.  -Will have patient follow-up with PCP for osteoporotic evaluation/management  - Light activity only: Please limit your lifting to no more that ten pounds and avoid excessive bending, twisting and turning at the lumbar spine. You should also avoid excessive jostling and jarring activities.       - Acetaminophen 975 mg TID  - Lidoderm patch  - Robaxin  "500 mg TID  - Continue PTA Gabapentin  - PO Dilaudid 2 mg 3 times daily to be scheduled so as to reduce the amount of IV pain medication needed  -If patient continues to have severe pain even with TLSO brace will need pain medicine consult  - PT evaluation  -  consult for discharge planning  -Patient had a fall overnight on 1/2  following which is complaining of excruciating right hip pain hence pelvic x-ray with hip negative for fracture     Rheumatoid arthritis  - Continue PTA Prednisone  - Takes PTA Methotrexate weekly on Tuesdays     Sjogren's  - Continue PTA Evoxac and eye gtts     Anxiety  - Continue PTA Clorazepate     HLD  - Can resume PTA statin on discharge     Prediabetes  *Last A1c was 6.1%          Diet: Regular Diet Adult    DVT Prophylaxis: Pneumatic Compression Devices  Andujar Catheter: Not present  Lines: None     Cardiac Monitoring: None  Code Status: Full Code      Clinically Significant Risk Factors Present on Admission                 # Drug Induced Platelet Defect: home medication list includes an antiplatelet medication   # Hypertension: Noted on problem list           # Obesity: Estimated body mass index is 33.66 kg/m  as calculated from the following:    Height as of this encounter: 1.6 m (5' 3\").    Weight as of this encounter: 86.2 kg (190 lb).       # Financial/Environmental Concerns: none         Social Drivers of Health    Tobacco Use: Medium Risk (11/12/2024)    Patient History     Smoking Tobacco Use: Former     Smokeless Tobacco Use: Never   Financial Resource Strain: Low Risk  (11/6/2024)    Financial Resource Strain     Within the past 12 months, have you or your family members you live with been unable to get utilities (heat, electricity) when it was really needed?: No   Recent Concern: Financial Resource Strain - High Risk (10/16/2024)    Financial Resource Strain     Within the past 12 months, have you or your family members you live with been unable to get utilities (heat, " electricity) when it was really needed?: Yes   Physical Activity: Inactive (10/16/2024)    Exercise Vital Sign     Days of Exercise per Week: 0 days     Minutes of Exercise per Session: 0 min   Interpersonal Safety: High Risk (11/10/2024)    Interpersonal Safety     Do you feel physically and emotionally safe where you currently live?: No     Within the past 12 months, have you been hit, slapped, kicked or otherwise physically hurt by someone?: No     Within the past 12 months, have you been humiliated or emotionally abused in other ways by your partner or ex-partner?: No   Social Connections: Unknown (10/16/2024)    Social Connection and Isolation Panel [NHANES]     Frequency of Social Gatherings with Friends and Family: Once a week          Disposition Plan     Medically Ready for Discharge: Anticipated Tomorrow             Emily Scott MD  Hospitalist Service  [facility]  Securely message with "[employer] (more info)  Text page via [employer] Paging/Directory   ______________________________________________________________________    Interval History   Patient continues to remain in significant pain especially in the lower back and right hip without any significant improvement according to her.  At the time of my evaluation although she appears comfortable when she is resting but even with minimal movement or cough her pain seems to be excruciating.  Patient did receive 1 dose of IV Dilaudid yesterday night, which seem to help hence will transition to oral Dilaudid which patient is in agreement with.  Discussed about wearing the TLSO brace, and patient is worried it does not fit well and causing to exacerbate her pain.  Will need to be reassessed.    Physical Exam   Vital Signs: Temp: 97.6  F (36.4  C) Temp src: Oral BP: 119/61 Pulse: 85   Resp: 18 SpO2: 93 % O2 Device: None (Room air)    Weight: 190 lbs 0 oz    Constitutional: Awake, alert, cooperative, no apparent distress  Respiratory: Clear  to auscultation bilaterally, no crackles or wheezing  Cardiovascular: Regular rate and rhythm, normal S1 and S2, and no murmur noted  GI: Normal bowel sounds, soft, non-distended, non-tender  Skin/Integumen: No rashes, no cyanosis, no edema  Other: Alert oriented x 3, no apparent focal deficits.

## 2025-01-04 PROCEDURE — 250N000012 HC RX MED GY IP 250 OP 636 PS 637: Performed by: PHYSICIAN ASSISTANT

## 2025-01-04 PROCEDURE — 250N000013 HC RX MED GY IP 250 OP 250 PS 637: Performed by: PHYSICIAN ASSISTANT

## 2025-01-04 PROCEDURE — 99232 SBSQ HOSP IP/OBS MODERATE 35: CPT | Performed by: INTERNAL MEDICINE

## 2025-01-04 PROCEDURE — 250N000013 HC RX MED GY IP 250 OP 250 PS 637: Performed by: INTERNAL MEDICINE

## 2025-01-04 PROCEDURE — 120N000001 HC R&B MED SURG/OB

## 2025-01-04 RX ADMIN — ASPIRIN 81 MG: 81 TABLET, COATED ORAL at 20:48

## 2025-01-04 RX ADMIN — CEVIMELINE HYDROCHLORIDE 30 MG: 30 CAPSULE ORAL at 08:04

## 2025-01-04 RX ADMIN — DEXTRAN 70, GLYCERIN, HYPROMELLOSE 1 DROP: 1; 2; 3 SOLUTION/ DROPS OPHTHALMIC at 20:48

## 2025-01-04 RX ADMIN — CEVIMELINE HYDROCHLORIDE 30 MG: 30 CAPSULE ORAL at 13:19

## 2025-01-04 RX ADMIN — DEXTRAN 70, GLYCERIN, HYPROMELLOSE 1 DROP: 1; 2; 3 SOLUTION/ DROPS OPHTHALMIC at 13:19

## 2025-01-04 RX ADMIN — DEXTRAN 70, GLYCERIN, HYPROMELLOSE 1 DROP: 1; 2; 3 SOLUTION/ DROPS OPHTHALMIC at 17:00

## 2025-01-04 RX ADMIN — CEVIMELINE HYDROCHLORIDE 30 MG: 30 CAPSULE ORAL at 20:48

## 2025-01-04 RX ADMIN — ACETAMINOPHEN 975 MG: 325 TABLET, FILM COATED ORAL at 08:04

## 2025-01-04 RX ADMIN — HYDROMORPHONE HYDROCHLORIDE 2 MG: 2 TABLET ORAL at 13:19

## 2025-01-04 RX ADMIN — METHOCARBAMOL 500 MG: 500 TABLET ORAL at 20:46

## 2025-01-04 RX ADMIN — GABAPENTIN 400 MG: 300 CAPSULE ORAL at 13:19

## 2025-01-04 RX ADMIN — ACETAMINOPHEN 975 MG: 325 TABLET, FILM COATED ORAL at 20:46

## 2025-01-04 RX ADMIN — POTASSIUM CHLORIDE 20 MEQ: 1500 TABLET, EXTENDED RELEASE ORAL at 20:48

## 2025-01-04 RX ADMIN — CLORAZEPATE DIPOTASSIUM 15 MG: 3.75 TABLET ORAL at 09:06

## 2025-01-04 RX ADMIN — POTASSIUM CHLORIDE 20 MEQ: 1500 TABLET, EXTENDED RELEASE ORAL at 08:04

## 2025-01-04 RX ADMIN — GABAPENTIN 100 MG: 100 CAPSULE ORAL at 20:48

## 2025-01-04 RX ADMIN — METHOCARBAMOL 500 MG: 500 TABLET ORAL at 13:19

## 2025-01-04 RX ADMIN — GABAPENTIN 400 MG: 300 CAPSULE ORAL at 08:04

## 2025-01-04 RX ADMIN — ACETAMINOPHEN 975 MG: 325 TABLET, FILM COATED ORAL at 13:19

## 2025-01-04 RX ADMIN — LIDOCAINE 1 PATCH: 4 PATCH TOPICAL at 08:03

## 2025-01-04 RX ADMIN — METHOCARBAMOL 500 MG: 500 TABLET ORAL at 08:03

## 2025-01-04 RX ADMIN — HYDROMORPHONE HYDROCHLORIDE 2 MG: 2 TABLET ORAL at 08:04

## 2025-01-04 RX ADMIN — GABAPENTIN 400 MG: 300 CAPSULE ORAL at 17:01

## 2025-01-04 RX ADMIN — PANTOPRAZOLE SODIUM 40 MG: 40 TABLET, DELAYED RELEASE ORAL at 08:04

## 2025-01-04 RX ADMIN — PREDNISONE 5 MG: 5 TABLET ORAL at 08:04

## 2025-01-04 RX ADMIN — HYDROCHLOROTHIAZIDE 25 MG: 25 TABLET ORAL at 08:04

## 2025-01-04 RX ADMIN — HYDROMORPHONE HYDROCHLORIDE 2 MG: 2 TABLET ORAL at 20:48

## 2025-01-04 RX ADMIN — DEXTRAN 70, GLYCERIN, HYPROMELLOSE 1 DROP: 1; 2; 3 SOLUTION/ DROPS OPHTHALMIC at 08:04

## 2025-01-04 ASSESSMENT — ACTIVITIES OF DAILY LIVING (ADL)
ADLS_ACUITY_SCORE: 69
ADLS_ACUITY_SCORE: 68
ADLS_ACUITY_SCORE: 69
ADLS_ACUITY_SCORE: 65
ADLS_ACUITY_SCORE: 69
ADLS_ACUITY_SCORE: 65

## 2025-01-04 NOTE — PLAN OF CARE
"PRIMARY Concern: Back pain   SAFETY RISK Concerns (fall risk, behaviors, etc.): Fall      Aggression Tool Color: Green   Isolation/Type: None  Tests/Procedures for NEXT shift: none  Consults? (Pending/following, signed-off?) Neurosurgery- s.o, SW following  Where is patient from? (Home, TCU, etc.): [facility] T[facility]  Other Important info for NEXT shift: TLSO when OOB. From previous shift - Pt really dislikes wearing TLSO, states donning takes to long, \"when I have to pee, I don't have time to put this thing on.\" States she is unsure if she will continue to use it despite multiple attempts to educate by the RN throughout the day on the importance of wearing it when OOB  Anticipated DC date & active delays: TBD, per MD is not medically ready today, can return to [facility] on Monday if medically ready    SUMMARY NOTE:  Orientation/Cognitive: A&Ox4  Observation Goals (Met/ Not Met): INP  Mobility Level/Assist Equipment: A2/GB/W  Antibiotics & Plan (IV/po, length of tx left): None   Pain Management: On scheduled pain meds  Tele/VS/O2: VSS on RA   ABNL Lab/BG: Refer to imaging, Hgb 10.3  Diet: Regular   Bowel/Bladder: Incontinent, purewick in placed  Skin Concerns: scattered bruising   Drains/Devices: PIV SL  Patient Stated Goal for Today: sleep  "

## 2025-01-04 NOTE — PROGRESS NOTES
[facility]    Medicine Progress Note - Hospitalist Service    Date of Admission:  12/31/2024    Assessment & Plan   Agnes Saravia is a 79 year old female with PMH of RA, Sjogren's, ILD, HTN, recent L2-L3 and L3-L4 discectomy (11/12/24) who presented to the ED on 12/31 with severe back pain and found to have a new L1 superior endplate compression fracture.     Intractable back pain  New recent L1 superior endplate compression fracture  L5 superior endplate compression fracture with persistent edema and slightly worsened height loss  Recent L2-L3 and L3-L4 discectomy (11/12/24)  *Patient was recently hospitalized at [facility] 11/6-11/15 with severe low back pain, found to have right L3 nerve root impingement and s/p L2-L3 and L3-L4 discectomy by neurosurgery (Dr. Kenny) on 11/12/24. She discharged to TCU.  *Presents back to the ED 12/31 with worsening back pain over the last couple of weeks but especially the last 4 days. Pain usually does not radiate, but occasionally does into the right anterior thigh. No sensory deficits. Motor exam seems to be limited by pain.  *MR lumbar spine showed a new recent L1 superior endplate compression fracture with mild height loss and L5 superior endplate compression fracture/Schmorl's node demonstrates persistent edema and slightly worsened height loss compared to the prior. Right sided post operative changes at L2-L3 and L3-L4.  - Neurosurgery consult  Recs as below  Orthotics consulted for TLSO brace.  Brace to be worn when out of bed.  Off for resting/sleeping/hygiene.  -Upright x-rays in brace once obtained.  -Will have patient follow-up with PCP for osteoporotic evaluation/management  - Light activity only: Please limit your lifting to no more that ten pounds and avoid excessive bending, twisting and turning at the lumbar spine. You should also avoid excessive jostling and jarring activities.       - Acetaminophen 975 mg TID  - Lidoderm patch  - Robaxin  "500 mg TID  - Continue PTA Gabapentin  - PO Dilaudid 2 mg 3 times daily to be scheduled so as to reduce the amount of IV pain medication needed  -If patient continues to have severe pain even with TLSO brace will need pain medicine consult  - PT evaluation  -  consult for discharge planning  -Patient had a fall overnight on 1/2  following which is complaining of excruciating right hip pain hence pelvic x-ray with hip negative for fracture     Rheumatoid arthritis  - Continue PTA Prednisone  - Takes PTA Methotrexate weekly on Tuesdays     Sjogren's  - Continue PTA Evoxac and eye gtts     Anxiety  - Continue PTA Clorazepate     HLD  - Can resume PTA statin on discharge     Prediabetes  *Last A1c was 6.1%          Diet: Regular Diet Adult    DVT Prophylaxis: Pneumatic Compression Devices  Andujar Catheter: Not present  Lines: None     Cardiac Monitoring: None  Code Status: Full Code      Clinically Significant Risk Factors                   # Hypertension: Noted on problem list            # Obesity: Estimated body mass index is 33.66 kg/m  as calculated from the following:    Height as of this encounter: 1.6 m (5' 3\").    Weight as of this encounter: 86.2 kg (190 lb)., PRESENT ON ADMISSION     # Financial/Environmental Concerns: none         Social Drivers of Health    Tobacco Use: Medium Risk (11/12/2024)    Patient History     Smoking Tobacco Use: Former     Smokeless Tobacco Use: Never   Financial Resource Strain: Low Risk  (11/6/2024)    Financial Resource Strain     Within the past 12 months, have you or your family members you live with been unable to get utilities (heat, electricity) when it was really needed?: No   Recent Concern: Financial Resource Strain - High Risk (10/16/2024)    Financial Resource Strain     Within the past 12 months, have you or your family members you live with been unable to get utilities (heat, electricity) when it was really needed?: Yes   Physical Activity: Inactive (10/16/2024)    " Exercise Vital Sign     Days of Exercise per Week: 0 days     Minutes of Exercise per Session: 0 min   Interpersonal Safety: High Risk (11/10/2024)    Interpersonal Safety     Do you feel physically and emotionally safe where you currently live?: No     Within the past 12 months, have you been hit, slapped, kicked or otherwise physically hurt by someone?: No     Within the past 12 months, have you been humiliated or emotionally abused in other ways by your partner or ex-partner?: No   Social Connections: Unknown (10/16/2024)    Social Connection and Isolation Panel [NHANES]     Frequency of Social Gatherings with Friends and Family: Once a week          Disposition Plan     Medically Ready for Discharge: Anticipated in 2-4 Days             Emily Scott MD  Hospitalist Service  [facility]  Securely message with [employer] (more info)  Text page via [employer] Paging/Directory   ______________________________________________________________________    Interval History   Pt is resting in bed,overnight has been struggling with the TLSO brace.Partner in room feels the scheduled dilaudid is helpful and pt is not wailing in pain especially when she coughs.Encouraged pt to move and sit in chair with the brace on.    Physical Exam   Vital Signs: Temp: 97.8  F (36.6  C) Temp src: Oral BP: 120/53 Pulse: 81   Resp: 18 SpO2: 93 % O2 Device: None (Room air)    Weight: 190 lbs 0 oz    Constitutional: Awake, alert, cooperative, no apparent distress  Respiratory: Clear to auscultation bilaterally, no crackles or wheezing  Cardiovascular: Regular rate and rhythm, normal S1 and S2, and no murmur noted  GI: Normal bowel sounds, soft, non-distended, non-tender  Skin/Integumen: No rashes, no cyanosis, no edema  Other: AxO x3,no apparent focal deficits

## 2025-01-05 LAB — POTASSIUM SERPL-SCNC: 4.1 MMOL/L (ref 3.4–5.3)

## 2025-01-05 PROCEDURE — 250N000013 HC RX MED GY IP 250 OP 250 PS 637: Performed by: PHYSICIAN ASSISTANT

## 2025-01-05 PROCEDURE — 250N000013 HC RX MED GY IP 250 OP 250 PS 637: Performed by: INTERNAL MEDICINE

## 2025-01-05 PROCEDURE — 250N000012 HC RX MED GY IP 250 OP 636 PS 637: Performed by: PHYSICIAN ASSISTANT

## 2025-01-05 PROCEDURE — 120N000001 HC R&B MED SURG/OB

## 2025-01-05 PROCEDURE — 36415 COLL VENOUS BLD VENIPUNCTURE: CPT | Performed by: INTERNAL MEDICINE

## 2025-01-05 PROCEDURE — 84132 ASSAY OF SERUM POTASSIUM: CPT | Performed by: INTERNAL MEDICINE

## 2025-01-05 PROCEDURE — 99232 SBSQ HOSP IP/OBS MODERATE 35: CPT | Performed by: INTERNAL MEDICINE

## 2025-01-05 RX ADMIN — METHOCARBAMOL 500 MG: 500 TABLET ORAL at 07:56

## 2025-01-05 RX ADMIN — HYDROMORPHONE HYDROCHLORIDE 2 MG: 2 TABLET ORAL at 07:55

## 2025-01-05 RX ADMIN — GABAPENTIN 100 MG: 100 CAPSULE ORAL at 20:38

## 2025-01-05 RX ADMIN — CEVIMELINE HYDROCHLORIDE 30 MG: 30 CAPSULE ORAL at 13:44

## 2025-01-05 RX ADMIN — DEXTRAN 70, GLYCERIN, HYPROMELLOSE 1 DROP: 1; 2; 3 SOLUTION/ DROPS OPHTHALMIC at 12:05

## 2025-01-05 RX ADMIN — PREDNISONE 5 MG: 5 TABLET ORAL at 07:56

## 2025-01-05 RX ADMIN — ASPIRIN 81 MG: 81 TABLET, COATED ORAL at 20:38

## 2025-01-05 RX ADMIN — POTASSIUM CHLORIDE 20 MEQ: 1500 TABLET, EXTENDED RELEASE ORAL at 07:55

## 2025-01-05 RX ADMIN — DEXTRAN 70, GLYCERIN, HYPROMELLOSE 1 DROP: 1; 2; 3 SOLUTION/ DROPS OPHTHALMIC at 20:38

## 2025-01-05 RX ADMIN — GABAPENTIN 400 MG: 300 CAPSULE ORAL at 17:29

## 2025-01-05 RX ADMIN — TRAZODONE HYDROCHLORIDE 25 MG: 50 TABLET ORAL at 20:45

## 2025-01-05 RX ADMIN — GABAPENTIN 400 MG: 300 CAPSULE ORAL at 07:55

## 2025-01-05 RX ADMIN — DEXTRAN 70, GLYCERIN, HYPROMELLOSE 1 DROP: 1; 2; 3 SOLUTION/ DROPS OPHTHALMIC at 07:57

## 2025-01-05 RX ADMIN — DEXTRAN 70, GLYCERIN, HYPROMELLOSE 1 DROP: 1; 2; 3 SOLUTION/ DROPS OPHTHALMIC at 17:29

## 2025-01-05 RX ADMIN — ACETAMINOPHEN 975 MG: 325 TABLET, FILM COATED ORAL at 20:38

## 2025-01-05 RX ADMIN — METHOCARBAMOL 500 MG: 500 TABLET ORAL at 13:45

## 2025-01-05 RX ADMIN — GABAPENTIN 400 MG: 300 CAPSULE ORAL at 13:45

## 2025-01-05 RX ADMIN — HYDROMORPHONE HYDROCHLORIDE 2 MG: 2 TABLET ORAL at 13:44

## 2025-01-05 RX ADMIN — ACETAMINOPHEN 975 MG: 325 TABLET, FILM COATED ORAL at 13:44

## 2025-01-05 RX ADMIN — LIDOCAINE 1 PATCH: 4 PATCH TOPICAL at 07:56

## 2025-01-05 RX ADMIN — POTASSIUM CHLORIDE 20 MEQ: 1500 TABLET, EXTENDED RELEASE ORAL at 20:38

## 2025-01-05 RX ADMIN — CEVIMELINE HYDROCHLORIDE 30 MG: 30 CAPSULE ORAL at 20:38

## 2025-01-05 RX ADMIN — CLORAZEPATE DIPOTASSIUM 15 MG: 3.75 TABLET ORAL at 08:05

## 2025-01-05 RX ADMIN — HYDROMORPHONE HYDROCHLORIDE 2 MG: 2 TABLET ORAL at 20:38

## 2025-01-05 RX ADMIN — HYDROCHLOROTHIAZIDE 25 MG: 25 TABLET ORAL at 07:55

## 2025-01-05 RX ADMIN — ACETAMINOPHEN 975 MG: 325 TABLET, FILM COATED ORAL at 07:55

## 2025-01-05 RX ADMIN — CEVIMELINE HYDROCHLORIDE 30 MG: 30 CAPSULE ORAL at 07:56

## 2025-01-05 RX ADMIN — METHOCARBAMOL 500 MG: 500 TABLET ORAL at 20:38

## 2025-01-05 RX ADMIN — PANTOPRAZOLE SODIUM 40 MG: 40 TABLET, DELAYED RELEASE ORAL at 07:56

## 2025-01-05 ASSESSMENT — ACTIVITIES OF DAILY LIVING (ADL)
ADLS_ACUITY_SCORE: 64
ADLS_ACUITY_SCORE: 65
ADLS_ACUITY_SCORE: 64
ADLS_ACUITY_SCORE: 65
ADLS_ACUITY_SCORE: 64
ADLS_ACUITY_SCORE: 64
ADLS_ACUITY_SCORE: 65
ADLS_ACUITY_SCORE: 64
ADLS_ACUITY_SCORE: 65
ADLS_ACUITY_SCORE: 64
ADLS_ACUITY_SCORE: 65
ADLS_ACUITY_SCORE: 64
ADLS_ACUITY_SCORE: 64
ADLS_ACUITY_SCORE: 65
ADLS_ACUITY_SCORE: 64
ADLS_ACUITY_SCORE: 65
ADLS_ACUITY_SCORE: 65

## 2025-01-05 NOTE — PLAN OF CARE
PRIMARY Concern: Back pain   SAFETY RISK Concerns (fall risk, behaviors, etc.): Fall      Aggression Tool Color: Green   Isolation/Type: None  Tests/Procedures for NEXT shift: none  Consults? (Pending/following, signed-off?) Neurosurgery- s.o, SW following  Where is patient from? (Home, TCU, etc.): [facility]  Other Important info for NEXT shift:   Anticipated DC date & active delays: TBD, per MD is not medically ready today, can return to [facility] on Monday if medically ready     SUMMARY NOTE:  Orientation/Cognitive: A&Ox4  Observation Goals (Met/ Not Met): INP  Mobility Level/Assist Equipment: A2/GB/W  Antibiotics & Plan (IV/po, length of tx left): None   Pain Management: On scheduled pain meds  Tele/VS/O2: VSS on RA   ABNL Lab/BG: See chart  Diet: Regular   Bowel/Bladder: Incontinent, purewick in placed  Skin Concerns: scattered bruising   Drains/Devices: PIV SL  Patient Stated Goal for Today: sleep

## 2025-01-05 NOTE — PROGRESS NOTES
Care Management Follow Up    Length of Stay (days): 3    Expected Discharge Date: 01/06/2025     Concerns to be Addressed: discharge planning     Patient plan of care discussed at interdisciplinary rounds: Yes    Anticipated Discharge Disposition: Transitional Care  Disposition Comments: return to TCU           Anticipated Discharge Services: None  Anticipated Discharge DME: None    Patient/family educated on Medicare website which has current facility and service quality ratings: yes  Education Provided on the Discharge Plan: Yes  Patient/Family in Agreement with the Plan: yes    Referrals Placed by CM/SW: Senior Linkage Line, Post Acute Facilities, Transportation  Private pay costs discussed: Transportation costs    Discussed  Partnership in Safe Discharge Planning  document with patient/family: No     Handoff Completed: No, handoff not indicated or clinically appropriate    Additional Information:  Per previous SW note on 1/2/25, [facility] and they are able to accept patient back on 1/6/25 after 1230 as they do not accept weekend admissions. Previous note stated for MHE w/c transport.  Writer met with patient who was on the phone with friend Lima for conversation regarding discharge plan. Reviewed above information and inquired if patient would still need MHE w/c - patient and Lima confirmed this is still needed. Writer reviewed private pay cost of transport - patient is aware and in agreement with cost.   Writer scheduled MHE w/c transport for 1/6/25 to return to [facility] TCU with  window of [phone].   Writer left voicemail with  Kandi in admissions at [facility] to inform her of transportation time.   Writer updated hospitalist and floor staff of transport time pending patient remains medically stable.    Next Steps: Fax orders to facility.     Will continue to follow.    MARTHA Mcdermott, LGSW    [facility]

## 2025-01-05 NOTE — PROGRESS NOTES
PRIMARY Concern: Back pain. L2-L3 and L3-L4 discectomy (11/12/24). New L1 compression fracture.   SAFETY RISK Concerns (fall risk, behaviors, etc.): Fall      Aggression Tool Color: Green   Isolation/Type: None  Tests/Procedures for NEXT shift: none  Consults? (Pending/following, signed-off?) Neurosurgery- s.o, SW following  Where is patient from? (Home, TCU, etc.): [facility]  Other Important info for NEXT shift: Up to chair for meals. TLSO brace on when OOB.   Anticipated DC date & active delays: Likely tomorrow 1/6 back to [facility]. Tentative w/c transport for tomorrow set between 1237-1322hrs.   SUMMARY NOTE:  Orientation/Cognitive: A&Ox4  Observation Goals (Met/ Not Met): INP  Mobility Level/Assist Equipment: A2/GB/W. TLSO to be worn when OOB.   Antibiotics & Plan (IV/po, length of tx left): None   Pain Management: On scheduled pain meds  Tele/VS/O2: VSS on RA   ABNL Lab/BG: See chart  Diet: Regular   Bowel/Bladder: Incontinent, purewick in place  Skin Concerns: scattered bruising   Drains/Devices: PIV SL  Patient Stated Goal for Today: Pain management.

## 2025-01-05 NOTE — PROGRESS NOTES
[facility]    Medicine Progress Note - Hospitalist Service    Date of Admission:  12/31/2024    Assessment & Plan   Agnes Saravia is a 79 year old female with PMH of RA, Sjogren's, ILD, HTN, recent L2-L3 and L3-L4 discectomy (11/12/24) who presented to the ED on 12/31 with severe back pain and found to have a new L1 superior endplate compression fracture.     Intractable back pain  New recent L1 superior endplate compression fracture  L5 superior endplate compression fracture with persistent edema and slightly worsened height loss  Recent L2-L3 and L3-L4 discectomy (11/12/24)  *Patient was recently hospitalized at [facility] 11/6-11/15 with severe low back pain, found to have right L3 nerve root impingement and s/p L2-L3 and L3-L4 discectomy by neurosurgery (Dr. Kenny) on 11/12/24. She discharged to TCU.  *Presents back to the ED 12/31 with worsening back pain over the last couple of weeks but especially the last 4 days. Pain usually does not radiate, but occasionally does into the right anterior thigh. No sensory deficits. Motor exam seems to be limited by pain.  *MR lumbar spine showed a new recent L1 superior endplate compression fracture with mild height loss and L5 superior endplate compression fracture/Schmorl's node demonstrates persistent edema and slightly worsened height loss compared to the prior. Right sided post operative changes at L2-L3 and L3-L4.  - Neurosurgery consult  Recs as below  Orthotics consulted for TLSO brace.  Brace to be worn when out of bed.  Off for resting/sleeping/hygiene.  -Upright x-rays in brace once obtained.  -Will have patient follow-up with PCP for osteoporotic evaluation/management  - Light activity only: Please limit your lifting to no more that ten pounds and avoid excessive bending, twisting and turning at the lumbar spine. You should also avoid excessive jostling and jarring activities.       - Acetaminophen 975 mg TID  - Lidoderm patch  - Robaxin  "500 mg TID  - Continue PTA Gabapentin  - PO Dilaudid 2 mg 3 times daily to be scheduled so as to reduce the amount of IV pain medication needed  -pain seems to be manageable for now,likely discharge to TCU in AM   - PT evaluation  - SW consult for discharge planning  -Patient had a fall overnight on 1/2 following which is complaining of excruciating right hip pain hence pelvic x-ray with hip negative for fracture     Rheumatoid arthritis  - Continue PTA Prednisone  - Takes PTA Methotrexate weekly on Tuesdays     Sjogren's  - Continue PTA Evoxac and eye gtts     Anxiety  - Continue PTA Clorazepate     HLD  - Can resume PTA statin on discharge     Prediabetes  *Last A1c was 6.1%          Diet: Regular Diet Adult    DVT Prophylaxis: Pneumatic Compression Devices  Andujar Catheter: Not present  Lines: None     Cardiac Monitoring: None  Code Status: Full Code      Clinically Significant Risk Factors                   # Hypertension: Noted on problem list            # Obesity: Estimated body mass index is 33.66 kg/m  as calculated from the following:    Height as of this encounter: 1.6 m (5' 3\").    Weight as of this encounter: 86.2 kg (190 lb)., PRESENT ON ADMISSION     # Financial/Environmental Concerns: none         Social Drivers of Health    Tobacco Use: Medium Risk (11/12/2024)    Patient History     Smoking Tobacco Use: Former     Smokeless Tobacco Use: Never   Financial Resource Strain: Low Risk  (11/6/2024)    Financial Resource Strain     Within the past 12 months, have you or your family members you live with been unable to get utilities (heat, electricity) when it was really needed?: No   Recent Concern: Financial Resource Strain - High Risk (10/16/2024)    Financial Resource Strain     Within the past 12 months, have you or your family members you live with been unable to get utilities (heat, electricity) when it was really needed?: Yes   Physical Activity: Inactive (10/16/2024)    Exercise Vital Sign     Days " of Exercise per Week: 0 days     Minutes of Exercise per Session: 0 min   Interpersonal Safety: High Risk (11/10/2024)    Interpersonal Safety     Do you feel physically and emotionally safe where you currently live?: No     Within the past 12 months, have you been hit, slapped, kicked or otherwise physically hurt by someone?: No     Within the past 12 months, have you been humiliated or emotionally abused in other ways by your partner or ex-partner?: No   Social Connections: Unknown (10/16/2024)    Social Connection and Isolation Panel [NHANES]     Frequency of Social Gatherings with Friends and Family: Once a week          Disposition Plan     Medically Ready for Discharge: Anticipated Tomorrow             Emily Scott MD  Hospitalist Service  [facility]  Securely message with [employer] (more info)  Text page via [email or web address] Paging/Directory   ______________________________________________________________________    Interval History   Pt is in tears is frustrated about being in the TCU and then to the hospital for so long,what started out as probably a week ended up being 2 months she just wants to go home.Discussed in detail about going to TCU and with a goals to go home partner also in room very emotional with these multiple set backs.Emotional support provided..    Physical Exam   Vital Signs: Temp: 97.9  F (36.6  C) Temp src: Oral BP: 125/78 Pulse: 84   Resp: 16 SpO2: 96 % O2 Device: None (Room air)    Weight: 190 lbs 0 oz    Constitutional: Awake, alert, cooperative, no apparent distress  Respiratory: Clear to auscultation bilaterally, no crackles or wheezing  Cardiovascular: Regular rate and rhythm, normal S1 and S2, and no murmur noted  GI: Normal bowel sounds, soft, non-distended, non-tender  Skin/Integumen: No rashes, no cyanosis, no edema  Other: AxO x3,no apparent focal deficits

## 2025-01-05 NOTE — PLAN OF CARE
Date & Time: 1/4/25 3628-2742  Orientation/Cognitive Concerns: A/O x4  Abnl VS/O2: VSS on RA  Tele: N/A  Pain Management: scheduled tylenol, robaxin, and dilaudid  Abnl Labs/BG: no new  Behavior/Aggression Tool Color: green  Mobility: A/1 GB/W, TLSO when OOB  Diet: regular  Bowel/Bladder: can be incontinent at times, up to BR  Drains/Devices: PIV SL  Test/Procedures: N/A  Anticipated DC date: discharge back to TCU Monday?

## 2025-01-06 VITALS
DIASTOLIC BLOOD PRESSURE: 76 MMHG | SYSTOLIC BLOOD PRESSURE: 139 MMHG | BODY MASS INDEX: 33.66 KG/M2 | OXYGEN SATURATION: 96 % | WEIGHT: 190 LBS | HEART RATE: 83 BPM | TEMPERATURE: 98.1 F | HEIGHT: 63 IN | RESPIRATION RATE: 18 BRPM

## 2025-01-06 LAB — POTASSIUM SERPL-SCNC: 3.4 MMOL/L (ref 3.4–5.3)

## 2025-01-06 PROCEDURE — 250N000013 HC RX MED GY IP 250 OP 250 PS 637: Performed by: INTERNAL MEDICINE

## 2025-01-06 PROCEDURE — 84132 ASSAY OF SERUM POTASSIUM: CPT | Performed by: HOSPITALIST

## 2025-01-06 PROCEDURE — 250N000013 HC RX MED GY IP 250 OP 250 PS 637: Performed by: PHYSICIAN ASSISTANT

## 2025-01-06 PROCEDURE — 36415 COLL VENOUS BLD VENIPUNCTURE: CPT | Performed by: HOSPITALIST

## 2025-01-06 PROCEDURE — 250N000012 HC RX MED GY IP 250 OP 636 PS 637: Performed by: PHYSICIAN ASSISTANT

## 2025-01-06 PROCEDURE — 250N000011 HC RX IP 250 OP 636: Performed by: PHYSICIAN ASSISTANT

## 2025-01-06 RX ORDER — GUAIFENESIN 200 MG/10ML
200 LIQUID ORAL EVERY 4 HOURS PRN
DISCHARGE
Start: 2025-01-06

## 2025-01-06 RX ORDER — METHOCARBAMOL 500 MG/1
500 TABLET, FILM COATED ORAL 4 TIMES DAILY PRN
DISCHARGE
Start: 2025-01-06

## 2025-01-06 RX ORDER — GABAPENTIN 100 MG/1
100 CAPSULE ORAL AT BEDTIME
DISCHARGE
Start: 2025-01-06

## 2025-01-06 RX ORDER — PREDNISONE 5 MG/1
5 TABLET ORAL DAILY
DISCHARGE
Start: 2025-01-06

## 2025-01-06 RX ORDER — HYDROCHLOROTHIAZIDE 25 MG/1
25 TABLET ORAL DAILY
DISCHARGE
Start: 2025-01-06

## 2025-01-06 RX ORDER — FOLIC ACID 1 MG/1
1 TABLET ORAL
DISCHARGE
Start: 2025-01-06

## 2025-01-06 RX ORDER — ASPIRIN 81 MG/1
81 TABLET ORAL AT BEDTIME
DISCHARGE
Start: 2025-01-06

## 2025-01-06 RX ORDER — LIDOCAINE 4 G/G
1 PATCH TOPICAL EVERY 24 HOURS
DISCHARGE
Start: 2025-01-06

## 2025-01-06 RX ORDER — AMOXICILLIN 250 MG
1 CAPSULE ORAL 2 TIMES DAILY PRN
DISCHARGE
Start: 2025-01-06

## 2025-01-06 RX ORDER — CLORAZEPATE DIPOTASSIUM 15 MG/1
15 TABLET ORAL EVERY MORNING
Status: SHIPPED | DISCHARGE
Start: 2025-01-06

## 2025-01-06 RX ORDER — HYDROMORPHONE HYDROCHLORIDE 2 MG/1
2 TABLET ORAL EVERY 6 HOURS PRN
Status: SHIPPED | DISCHARGE
Start: 2025-01-06

## 2025-01-06 RX ORDER — ALBUTEROL SULFATE 90 UG/1
2 INHALANT RESPIRATORY (INHALATION) EVERY 4 HOURS PRN
DISCHARGE
Start: 2025-01-06

## 2025-01-06 RX ORDER — ACETAMINOPHEN 500 MG
1000 TABLET ORAL 3 TIMES DAILY
DISCHARGE
Start: 2025-01-06

## 2025-01-06 RX ORDER — IBUPROFEN 600 MG/1
600 TABLET, FILM COATED ORAL 2 TIMES DAILY
DISCHARGE
Start: 2025-01-06

## 2025-01-06 RX ORDER — CEVIMELINE HYDROCHLORIDE 30 MG/1
30 CAPSULE ORAL 3 TIMES DAILY
DISCHARGE
Start: 2025-01-06

## 2025-01-06 RX ORDER — METHOTREXATE 2.5 MG/1
10 TABLET ORAL
DISCHARGE
Start: 2025-01-06

## 2025-01-06 RX ORDER — PANTOPRAZOLE SODIUM 40 MG/1
40 TABLET, DELAYED RELEASE ORAL DAILY
DISCHARGE
Start: 2025-01-06

## 2025-01-06 RX ORDER — SIMVASTATIN 40 MG
40 TABLET ORAL AT BEDTIME
DISCHARGE
Start: 2025-01-06

## 2025-01-06 RX ORDER — TRAZODONE HYDROCHLORIDE 50 MG/1
25 TABLET, FILM COATED ORAL
DISCHARGE
Start: 2025-01-06

## 2025-01-06 RX ORDER — GABAPENTIN 400 MG/1
400 CAPSULE ORAL 3 TIMES DAILY
DISCHARGE
Start: 2025-01-06

## 2025-01-06 RX ORDER — POTASSIUM CHLORIDE 1500 MG/1
20 TABLET, EXTENDED RELEASE ORAL 2 TIMES DAILY
DISCHARGE
Start: 2025-01-06

## 2025-01-06 RX ORDER — HYDROMORPHONE HYDROCHLORIDE 2 MG/1
2 TABLET ORAL 3 TIMES DAILY
Status: SHIPPED | DISCHARGE
Start: 2025-01-06

## 2025-01-06 RX ORDER — POLYETHYLENE GLYCOL 3350 17 G/17G
17 POWDER, FOR SOLUTION ORAL DAILY
DISCHARGE
Start: 2025-01-06

## 2025-01-06 RX ADMIN — DEXTRAN 70, GLYCERIN, HYPROMELLOSE 1 DROP: 1; 2; 3 SOLUTION/ DROPS OPHTHALMIC at 12:22

## 2025-01-06 RX ADMIN — GABAPENTIN 400 MG: 300 CAPSULE ORAL at 08:32

## 2025-01-06 RX ADMIN — HYDROMORPHONE HYDROCHLORIDE 2 MG: 2 TABLET ORAL at 08:32

## 2025-01-06 RX ADMIN — ACETAMINOPHEN 975 MG: 325 TABLET, FILM COATED ORAL at 13:38

## 2025-01-06 RX ADMIN — METHOCARBAMOL 500 MG: 500 TABLET ORAL at 13:38

## 2025-01-06 RX ADMIN — CEVIMELINE HYDROCHLORIDE 30 MG: 30 CAPSULE ORAL at 08:33

## 2025-01-06 RX ADMIN — PREDNISONE 5 MG: 5 TABLET ORAL at 08:32

## 2025-01-06 RX ADMIN — ACETAMINOPHEN 975 MG: 325 TABLET, FILM COATED ORAL at 08:33

## 2025-01-06 RX ADMIN — POTASSIUM CHLORIDE 20 MEQ: 1500 TABLET, EXTENDED RELEASE ORAL at 08:33

## 2025-01-06 RX ADMIN — HYDROCHLOROTHIAZIDE 25 MG: 25 TABLET ORAL at 08:32

## 2025-01-06 RX ADMIN — METHOCARBAMOL 500 MG: 500 TABLET ORAL at 08:32

## 2025-01-06 RX ADMIN — LIDOCAINE 1 PATCH: 4 PATCH TOPICAL at 08:26

## 2025-01-06 RX ADMIN — HYDROMORPHONE HYDROCHLORIDE 2 MG: 2 TABLET ORAL at 13:38

## 2025-01-06 RX ADMIN — HYDROMORPHONE HYDROCHLORIDE 0.2 MG: 0.2 INJECTION, SOLUTION INTRAMUSCULAR; INTRAVENOUS; SUBCUTANEOUS at 05:50

## 2025-01-06 RX ADMIN — CLORAZEPATE DIPOTASSIUM 15 MG: 3.75 TABLET ORAL at 09:44

## 2025-01-06 RX ADMIN — DEXTRAN 70, GLYCERIN, HYPROMELLOSE 1 DROP: 1; 2; 3 SOLUTION/ DROPS OPHTHALMIC at 08:26

## 2025-01-06 RX ADMIN — PANTOPRAZOLE SODIUM 40 MG: 40 TABLET, DELAYED RELEASE ORAL at 08:33

## 2025-01-06 RX ADMIN — ALBUTEROL SULFATE 2 PUFF: 108 INHALANT RESPIRATORY (INHALATION) at 08:26

## 2025-01-06 RX ADMIN — GABAPENTIN 400 MG: 300 CAPSULE ORAL at 13:37

## 2025-01-06 RX ADMIN — CEVIMELINE HYDROCHLORIDE 30 MG: 30 CAPSULE ORAL at 13:38

## 2025-01-06 ASSESSMENT — ACTIVITIES OF DAILY LIVING (ADL)
ADLS_ACUITY_SCORE: 64
ADLS_ACUITY_SCORE: 68
ADLS_ACUITY_SCORE: 68
ADLS_ACUITY_SCORE: 65
ADLS_ACUITY_SCORE: 68
ADLS_ACUITY_SCORE: 65
ADLS_ACUITY_SCORE: 68
ADLS_ACUITY_SCORE: 65

## 2025-01-06 NOTE — PROGRESS NOTES
Care Management Discharge Note    Discharge Date: 01/06/2025       Discharge Disposition: Transitional Care    Discharge Services: [facility] [employer] transport [id number]    Discharge DME: None    Discharge Transportation: agency    Private pay costs discussed: transportation costs    Does the patient's insurance plan have a 3 day qualifying hospital stay waiver?  Yes     Which insurance plan 3 day waiver is available? ACO REACH    Will the waiver be used for post-acute placement? Undetermined at this time    PAS Confirmation Code:    Patient/family educated on Medicare website which has current facility and service quality ratings: yes    Education Provided on the Discharge Plan: Yes  Persons Notified of Discharge Plans: Hospitalist, MARICRUZ, Charge RN, bedside RN  Patient/Family in Agreement with the Plan: yes    Handoff Referral Completed: No, handoff not indicated or clinically appropriate    Additional Information:  [facility]  [employer] wheelchair transport arranged for [id number]. SW updated pt yesterday. Confirmed ride time with Kandi at [facility] TCU admissions. Discharge orders faxed to TCU.    FRED Arnett  Social Work  [facility]

## 2025-01-06 NOTE — DISCHARGE SUMMARY
"  Hospitalist Discharge Summary      Date of Admission:  12/31/2024  Date of Discharge:  1/6/2025  Discharging Provider: Ana Stewart MD  Discharge Service: Hospitalist Service    Discharge Diagnoses   Intractable back pain  New recent L1 superior endplate compression fracture  L5 superior endplate compression fracture with persistent edema and slightly worsened height loss  Recent L2-L3 and L3-L4 discectomy (11/12/24)  Rheumatoid arthritis   Sjogren's  Anxiety  HLD  Prediabetes       Clinically Significant Risk Factors     # Obesity: Estimated body mass index is 33.66 kg/m  as calculated from the following:    Height as of this encounter: 1.6 m (5' 3\").    Weight as of this encounter: 86.2 kg (190 lb).       Follow-ups Needed After Discharge   Follow-up Appointments       Follow Up      Follow up as scheduled with [facility] neurosurgery with lumbar xray prior to appointment     Follow up as scheduled should your appointments need to be adjusted please contact the office [phone]        Follow Up and recommended labs and tests      Follow up with Nursing home physician.  No follow up labs or test are needed.                Discharge Disposition   Discharged to TCU  Condition at discharge: Stable    Hospital Course   Agnes Saravia is a 79 year old female with PMH of RA, Sjogren's, ILD, HTN, recent L2-L3 and L3-L4 discectomy (11/12/24) who presented to the ED on 12/31 with severe back pain and found to have a new L1 superior endplate compression fracture.     Intractable back pain  New recent L1 superior endplate compression fracture  L5 superior endplate compression fracture with persistent edema and slightly worsened height loss  Recent L2-L3 and L3-L4 discectomy (11/12/24)  *Patient was recently hospitalized at [facility] 11/6-11/15 with severe low back pain, found to have right L3 nerve root impingement and s/p L2-L3 and L3-L4 discectomy by neurosurgery (Dr. Kenny) on " 11/12/24. She discharged to TCU.  *Presents back to the ED 12/31 with worsening back pain over the last couple of weeks but especially the last 4 days. Pain usually does not radiate, but occasionally does into the right anterior thigh. No sensory deficits. Motor exam seems to be limited by pain.  *MR lumbar spine showed a new recent L1 superior endplate compression fracture with mild height loss and L5 superior endplate compression fracture/Schmorl's node demonstrates persistent edema and slightly worsened height loss compared to the prior. Right sided post operative changes at L2-L3 and L3-L4.  - Neurosurgery consult  Recs as below  Orthotics consulted for TLSO brace.  Brace to be worn when out of bed.  Off for resting/sleeping/hygiene.  -Will have patient follow-up with PCP for osteoporotic evaluation/management  - Light activity only: Please limit your lifting to no more that ten pounds and avoid excessive bending, twisting and turning at the lumbar spine. You should also avoid excessive jostling and jarring activities.    Pain control:   - Acetaminophen 1000 mg TID  - Lidoderm patch  - Robaxin 500 mg QID  - Continue PTA Gabapentin  - Ibuprofen 600mg BID  - Hydromorphone 2mg TID scheduled, with PRN available. As pain improves would discontinue the scheduled dosing.   - Pt stable for discharge to TCU      Rheumatoid arthritis  - Continue PTA Prednisone, Methotrexate weekly and folic acid      Sjogren's  - Continue PTA Evoxac and eye gtts     Anxiety  - Continue PTA Clorazepate     HLD  - Can resume PTA statin on discharge     Prediabetes  *Last A1c was 6.1%    Consultations This Hospital Stay   PHYSICAL THERAPY ADULT IP CONSULT  CARE MANAGEMENT / SOCIAL WORK IP CONSULT  NEUROSURGERY IP CONSULT  PHYSICAL THERAPY ADULT IP CONSULT  OCCUPATIONAL THERAPY ADULT IP CONSULT    Code Status   Full Code    Time Spent on this Encounter   IAna MD, personally saw the patient today and spent greater than 30  minutes discharging this patient.       Ana Stewart MD  [facility] EXTENDED RECOVERY AND SHORT STAY  [address] [address]  Phone: [phone]  ______________________________________________________________________    Physical Exam   Vital Signs: Temp: 98.1  F (36.7  C) Temp src: Oral BP: 139/76 Pulse: 83   Resp: 18 SpO2: 96 % O2 Device: None (Room air)    Weight: 190 lbs 0 oz  Constitutional: Awake, alert, cooperative, no apparent distress.  Eyes: Conjunctiva and pupils examined and normal.  HEENT: Moist mucous membranes, normal dentition.  Respiratory: Clear to auscultation bilaterally, no crackles or wheezing.  Cardiovascular: Regular rate and rhythm, normal S1 and S2, and no murmur noted.  GI: Soft, non-distended, non-tender, normal bowel sounds.  Skin: No rashes, no cyanosis, no edema.  Musculoskeletal: No joint swelling, erythema or tenderness.  Neurologic: Cranial nerves 2-12 intact, normal strength and sensation.  Psychiatric: Alert, oriented to person, place and time, no obvious anxiety or depression.    Primary Care Physician   José Miguel Wallace MD    Discharge Orders      X-ray lumbar spine 2-3 views*     Primary Care - Care Coordination Referral      Activity    *No lifting, pushing or pulling greater than 5-10 pound (this is about a gallon of milk).  *No repetitive bending, twisting, or jarring activities  *No overhead work  *No aerobic or strenuous activity  *No activities with increased risk of falls  *You may move about your home as tolerated  *You may walk up and down stairs as tolerated      WALKING PROGRAM: As you can tolerate, walk daily-start with 5-10 minutes of continuous walking. This is in addition to the walking that you do as part of your daily activities. Increase the time that you walk by 5 minutes every couple of days. Do not exceed 30-45 minutes of continuous walking until seen in follow-up.   **Listen to your body, if you find  that you are more painful or fatigued, you may need to proceed more slowly.    **Do not smoke or expose yourself to second hand smoke. Cigarette smoke can delay healing and cause complications.     DRIVING:    We recommend that you do not drive while wearing a brace, as it could limit your range of motion.       WEARING THE LUMBAR BRACE:    * Wear the brace when out of bed or sitting upright in bed.  * You should apply the brace before standing.  * You may shower seated without brace.   Sit down on shower chair, remove brace   Shower   Dry   Re-apply brace before standing.   Take care not to fall  *If your brace is not fitting call [facility] [phone]  *Check  your incision and the skin under your brace at least daily.     Follow Up    Follow up as scheduled with [facility] neurosurgery with lumbar xray prior to appointment     Follow up as scheduled should your appointments need to be adjusted please contact the office [phone]     General info for SNF    Length of Stay Estimate: Short Term Care: Estimated # of Days <30  Condition at Discharge: Improving  Level of care:skilled   Rehabilitation Potential: Good  Admission H&P remains valid and up-to-date: Yes  Recent Chemotherapy: N/A  Use Nursing Home Standing Orders: Yes     Follow Up and recommended labs and tests    Follow up with Nursing home physician.  No follow up labs or test are needed.     Reason for your hospital stay    You were hospitalized for intractable back pain from a new L1 superior endplate compression fracture.     Full Code     Physical Therapy Adult Consult    Evaluate and treat as clinically indicated.    Reason:  back pain, compression fracture     Occupational Therapy Adult Consult    Evaluate and treat as clinically indicated.    Reason:  back pain, compression fracture     Fall precautions     Diet    Follow this diet upon discharge: Current Diet:Orders Placed This Encounter      Regular Diet Adult       Significant Results  and Procedures   Most Recent 3 CBC's:  Recent Labs   Lab Test 01/01/25  0814 12/31/24  1343 11/14/24  0847   WBC 7.6 12.6* 15.2*   HGB 10.3* 11.1* 12.3   MCV 91 89 92    340 325     Most Recent 3 BMP's:  Recent Labs   Lab Test 01/06/25  0759 01/05/25  0834 01/03/25  1550 01/01/25  1843 01/01/25  0814 12/31/24  1343 11/15/24  1213 11/14/24  1442 11/14/24  0847   NA  --   --   --   --  137 138  --   --  135   POTASSIUM 3.4 4.1 4.1   < > 3.2* 3.6  --    < > 3.5   CHLORIDE  --   --   --   --  96* 98  --   --  98   CO2  --   --   --   --  30* 29  --   --  30*   BUN  --   --   --   --  10.0 14.1  --   --  18.7   CR  --   --   --   --  0.53 0.70  --   --  0.51   ANIONGAP  --   --   --   --  11 11  --   --  7   CHRISTOPHER  --   --   --   --  8.8 8.8  --   --  8.6*   GLC  --   --   --   --  89 120* 105*   < > 110*    < > = values in this interval not displayed.   ,   Results for orders placed or performed during the hospital encounter of 12/31/24   MR Lumbar Spine w/o & w Contrast    Narrative    EXAM: MR LUMBAR SPINE W/O and W CONTRAST  LOCATION: [facility]  DATE: 12/31/2024    INDICATION: H/o back surgery, now worse pain.  COMPARISON: Lumbar spine MRI 11/7/2024.  CONTRAST: 9 mL GADAVIST.  TECHNIQUE: Routine Lumbar Spine MRI without and with IV contrast.    FINDINGS:   New recent fracture involving the L1 superior endplate extending from the anterior cortex through the posterior cortex. No convincing pedicle or posterior element extension is appreciated by MRI.    L5 superior endplate compression fracture/Schmorl's node with persistent edema and slightly worsened height loss compared to the prior.    No other fractures are identified. Right laminectomy/partial facetectomy changes at L2-L3 and L3-L4. Alignment is significant for multilevel subtle grade 1 spondylolisthesis. Bone marrow also demonstrates background of scattered degenerative endplate   changes. Conus medullaris is unremarkable  terminating at the level of the L1-L2 disc. Cauda equina is unremarkable.    Mild bilateral sacroiliac joint degenerative change. Presumed scattered benign renal cysts. Tiny pocket of T1 hyperintense signal within the subcutaneous soft tissues, probably contracted postoperative seroma measuring about 1 cm.    SEGMENTAL ANALYSIS:    L1-L2: Disc height maintained. No herniation. Mild bilateral facet arthropathy. No foraminal or spinal canal stenosis.    L2-L3: Right laminectomy/partial facetectomy. Severe disc height loss. Disc bulge. Moderate bilateral facet arthropathy. Moderate right foraminal stenosis. No left foraminal stenosis. No spinal canal stenosis. T1 hypointense enhancing signal within the   right lateral recess, presumed postoperative scar tissue.     L3-L4: Right partial facetectomy changes. Mild disc height loss. Disc bulge with probable right foraminal protrusion. Moderate right and mild left facet arthropathy. Moderate to severe right foraminal stenosis. No left foraminal stenosis. Mild spinal   canal stenosis with enhancing signal within the right lateral recess and right foramen, presumed postoperative scar tissue.    L4-L5: Mild disc height loss. Disc bulge. Moderate bilateral facet arthropathy. No foraminal stenosis. Moderate spinal canal stenosis with bilateral lateral recess stenosis.    L5-S1: Mild disc height loss. Disc bulge. Mild to moderate bilateral facet arthropathy. No foraminal stenosis. Mild spinal canal stenosis with bilateral lateral recess stenosis.      Impression    IMPRESSION:  1.  New recent L1 superior endplate compression fracture with mild height loss.  2.  L5 superior endplate compression fracture/Schmorl's node demonstrates persistent edema and slightly worsened height loss compared to the prior.  3.  Right-sided postoperative changes at L2-L3 and L3-L4.  4.  Degenerative change as detailed.   XR Lumbar Spine 2/3 Views    Narrative    EXAM: XR LUMBAR SPINE 2/3  VIEWS  LOCATION: [facility] [place]  DATE: 1/2/2025    INDICATION: L1 compression fracture.  COMPARISON: Lumbar MRI from 12/31/2024.      Impression    IMPRESSION: Exam is mildly limited by positioning and soft tissue artifact. Mild recent L1 compression is stable. No definite new fracture.   XR Pelvis and Hip Bilateral 2 Views    Narrative    EXAM: XR PELVIS AND HIP BILATERAL 2 VIEWS  LOCATION: [facility] [place]  DATE: 1/2/2025    INDICATION: s p fall and pain  COMPARISON: None.      Impression    IMPRESSION: No acute fracture or malalignment. Mild bilateral hip joint degenerative changes. Osteopenia.       Discharge Medications   Discharge Medication List as of 1/6/2025  1:43 PM        START taking these medications    Details   naloxone (NARCAN) 4 MG/0.1ML nasal spray Spray 1 spray (4 mg) into one nostril alternating nostrils as needed for opioid reversal. every 2-3 minutes until assistance arrives, Transitional           CONTINUE these medications which have CHANGED    Details   acetaminophen (TYLENOL) 500 MG tablet Take 2 tablets (1,000 mg) by mouth 3 times daily., Transitional      albuterol (PROAIR HFA/PROVENTIL HFA/VENTOLIN HFA) 108 (90 Base) MCG/ACT inhaler Inhale 2 puffs into the lungs every 4 hours as needed for shortness of breath or wheezing., TransitionalPharmacy may dispense brand covered by insurance (Proair, or proventil or ventolin or generic albuterol inhaler)      aspirin 81 MG EC tablet Take 1 tablet (81 mg) by mouth at bedtime., Transitional      cevimeline (EVOXAC) 30 MG capsule Take 1 capsule (30 mg) by mouth 3 times daily., Transitional      clorazepate dipotassium (TRANXENE) 15 MG tablet Take 1 tablet (15 mg) by mouth every morning., Transitional      folic acid (FOLVITE) 1 MG tablet Take 1 tablet (1 mg) by mouth six times a week. Every day except Tuesdays when methotrexate is due, Transitional      !! gabapentin (NEURONTIN) 100 MG capsule Take 1 capsule  (100 mg) by mouth at bedtime. ~1 hour before trazodone, Transitional      !! gabapentin (NEURONTIN) 400 MG capsule Take 1 capsule (400 mg) by mouth 3 times daily. (AM, Noon, Dinner), Transitional      guaiFENesin (ROBITUSSIN) 20 mg/mL liquid Take 10 mLs (200 mg) by mouth every 4 hours as needed (congestion)., Transitional      hydrochlorothiazide (HYDRODIURIL) 25 MG tablet Take 1 tablet (25 mg) by mouth daily., Transitional      !! HYDROmorphone (DILAUDID) 2 MG tablet Take 1 tablet (2 mg) by mouth every 6 hours as needed for moderate pain., Transitional      !! HYDROmorphone (DILAUDID) 2 MG tablet Take 1 tablet (2 mg) by mouth 3 times daily., Transitional      ibuprofen (ADVIL/MOTRIN) 600 MG tablet Take 1 tablet (600 mg) by mouth 2 times daily. At noon and 1800, Transitional      Lidocaine (LIDOCARE) 4 % Patch Place 1 patch over 12 hours onto the skin every 24 hours. To prevent lidocaine toxicity, patient should be patch free for 12 hrs daily.Transitional      methocarbamol (ROBAXIN) 500 MG tablet Take 1 tablet (500 mg) by mouth 4 times daily as needed for muscle spasms., Transitional      methotrexate 2.5 MG tablet Take 4 tablets (10 mg) by mouth every 7 days. (Takes on Tuesdays), Transitional      pantoprazole (PROTONIX) 40 MG EC tablet Take 1 tablet (40 mg) by mouth daily., Transitional      polyethylene glycol (MIRALAX) 17 GM/Dose powder Take 17 g by mouth daily., Transitional      polyethylene glycol-propylene glycol (SYSTANE ULTRA) 0.4-0.3 % SOLN ophthalmic solution Place 1 drop Into the left eye 4 times daily., Transitional      potassium chloride kang ER (KLOR-CON M20) 20 MEQ CR tablet Take 1 tablet (20 mEq) by mouth 2 times daily., Transitional      predniSONE (DELTASONE) 5 MG tablet Take 1 tablet (5 mg) by mouth daily. (Hold until Decadron is done then restart), Transitional      senna-docusate (SENOKOT-S/PERICOLACE) 8.6-50 MG tablet Take 1 tablet by mouth 2 times daily as needed for constipation.,  Transitional      simvastatin (ZOCOR) 40 MG tablet Take 1 tablet (40 mg) by mouth at bedtime., Transitional      traZODone (DESYREL) 50 MG tablet Take 0.5 tablets (25 mg) by mouth nightly as needed for sleep., Transitional       !! - Potential duplicate medications found. Please discuss with provider.        Allergies   Allergies   Allergen Reactions    Mellaril [Thioridazine Hcl] Anaphylaxis    Percocet [Oxycodone-Acetaminophen]      Tolerates hydrocodone and codeine in cough medicine. Tolerates  Believes reaction to oxycodone was leg swelling. No tongue/lip/throat swelling or hives.    Duloxetine Anxiety     Made her feel weird, increased anxiety

## 2025-01-06 NOTE — PROGRESS NOTES
Patient has been discharged January 6, 2025 2:23 PM. Packet sent with transportation. Transportation to take pt back to [facility]. PIV removed. Pt had no questions. All belongings sent with pt.

## 2025-01-06 NOTE — PLAN OF CARE
Physical Therapy Discharge Summary    Reason for therapy discharge:    Discharged to transitional care facility.    Progress towards therapy goal(s). See goals on Care Plan in [facility] electronic health record for goal details.  Goals partially met.  Barriers to achieving goals:   discharge from facility.    Therapy recommendation(s):    Continued therapy is recommended.  Rationale/Recommendations: Patient is significantly below baseline level of independence, was re-admitted from TCU. She is requiring Ax2 for  safety during transfers with very limited activity tolerance. She will benefit from return to TCU to continue to progress strength, balance and activity tolerance.  PT Brief overview of current status: Ax2 with FWW; Goals of therapy will be to address safe mobility and make recs for d/c to next level of care. Pt and RN will continue to follow all falls risk precautions as documented by RN staff while hospitalized.    Recommendation above provided by last treating therapist.

## 2025-01-06 NOTE — PLAN OF CARE
PRIMARY Concern: Back pain. L2-L3 and L3-L4 discectomy (11/12/24). New L1 compression fracture.   SAFETY RISK Concerns (fall risk, behaviors, etc.): Fall      Aggression Tool Color: Green   Isolation/Type: None  Tests/Procedures for NEXT shift: none  Consults? (Pending/following, signed-off?) Neurosurgery- s.o, SW following  Where is patient from? (Home, TCU, etc.): [facility]  Other Important info for NEXT shift: Up to chair for meals. TLSO brace on when OOB.   Anticipated DC date & active delays: Likely today1/6 back to [facility]. Tentative w/c transport for today set between 1237-1322hrs.   SUMMARY NOTE:  Orientation/Cognitive: A&Ox4  Observation Goals (Met/ Not Met): INP  Mobility Level/Assist Equipment: A1/GB/W. TLSO to be worn when OOB.   Antibiotics & Plan (IV/po, length of tx left): None   Pain Management: On scheduled pain meds  Tele/VS/O2: VSS on RA   ABNL Lab/BG: See chart  Diet: Regular   Bowel/Bladder: Incontinent, purewick in place  Skin Concerns: scattered bruising   Drains/Devices: PIV SL  Patient Stated Goal for Today: Pain management.

## 2025-01-07 ENCOUNTER — PATIENT OUTREACH (OUTPATIENT)
Dept: CARE COORDINATION | Facility: CLINIC | Age: 80
End: 2025-01-07
Payer: MEDICARE

## 2025-01-07 NOTE — PROGRESS NOTES
Clinic Care Coordination Contact    Situation: Patient chart reviewed by care coordinator.    Background:   Patient was hospitalized at [facility] from 11/6/24 to 11/15/24 with diagnosis of   R L3 Nerve root impingement s/p L2 to L3 discectomy and L3 to L4 discectomy (11/12/2024).     Discharged to [facility].     Patient then was admitted to [facility] 12/31/24-1/6/25 for   Intractable back pain  New recent L1 superior endplate compression fracture  L5 superior endplate compression fracture with persistent edema and slightly worsened height loss  Recent L2-L3 and L3-L4 discectomy (11/12/24)    Assessment:   [facility]     Plan/Recommendations:   Care Coordination will follow up monthly or upon discharge notification.     Claribel Hall RN Clinic Care Coordinator  [facility] Clinics: [place], [place] (on-site Wednesdays), [facility] (on-site Thursdays) & [facility].  [email or web address]  Phone: [phone]

## 2025-01-09 ENCOUNTER — DOCUMENTATION ONLY (OUTPATIENT)
Dept: OTHER | Facility: CLINIC | Age: 80
End: 2025-01-09
Payer: MEDICARE

## 2025-01-14 ENCOUNTER — PATIENT OUTREACH (OUTPATIENT)
Dept: CARE COORDINATION | Facility: CLINIC | Age: 80
End: 2025-01-14
Payer: MEDICARE

## 2025-01-23 ENCOUNTER — MEDICAL CORRESPONDENCE (OUTPATIENT)
Dept: HEALTH INFORMATION MANAGEMENT | Facility: CLINIC | Age: 80
End: 2025-01-23
Payer: MEDICARE

## 2025-02-03 ENCOUNTER — TELEPHONE (OUTPATIENT)
Dept: FAMILY MEDICINE | Facility: CLINIC | Age: 80
End: 2025-02-03
Payer: MEDICARE

## 2025-02-03 ENCOUNTER — PATIENT OUTREACH (OUTPATIENT)
Dept: CARE COORDINATION | Facility: CLINIC | Age: 80
End: 2025-02-03
Payer: MEDICARE

## 2025-02-03 DIAGNOSIS — Z98.890 STATUS POST LUMBAR SURGERY: ICD-10-CM

## 2025-02-03 DIAGNOSIS — M54.41 ACUTE BILATERAL LOW BACK PAIN WITH RIGHT-SIDED SCIATICA: ICD-10-CM

## 2025-02-03 RX ORDER — HYDROMORPHONE HYDROCHLORIDE 2 MG/1
2 TABLET ORAL EVERY 6 HOURS PRN
Qty: 28 TABLET | Refills: 0 | Status: SHIPPED | OUTPATIENT
Start: 2025-02-03

## 2025-02-03 RX ORDER — METHOCARBAMOL 500 MG/1
500 TABLET, FILM COATED ORAL 4 TIMES DAILY PRN
Qty: 120 TABLET | Refills: 3 | Status: SHIPPED | OUTPATIENT
Start: 2025-02-03

## 2025-02-03 NOTE — TELEPHONE ENCOUNTER
Home Care is calling regarding an established patient with [facility].       Requesting orders from: José Miguel Wallace  Provider is following patient: Yes  Is this a 60-day recertification request?  No    Orders Requested    Physical Therapy  Request for delay in care, service is not able to be provided within same scheduled day.   Delay in care, discharged from [facility] on 01/30, wanted to be delayed until today.    Frequency: one time a week for 4 weeks    Diagnoses: compression fractures (mainly)    Information was gathered and will be sent to provider for review.  RN will contact Home Care with information after provider review.  Confirmed ok to leave a detailed message with call back.  Contact information confirmed and updated as needed.    Marleni Arellano RN

## 2025-02-03 NOTE — TELEPHONE ENCOUNTER
Called home care and left message on secure line to inform them of approved home care orders.     More MISHRA RN-BSN   Health Triage Nurse

## 2025-02-03 NOTE — PROGRESS NOTES
Clinic Care Coordination Contact  Transitions of Care Outreach  Chief Complaint   Patient presents with    Clinic Care Coordination - Post Hospital    Clinic Care Coordination - Homecare/TCU       Most Recent Admission Date: [facility] 12/31/24-1/6/25  Most Recent Admission Diagnosis:   Intractable back pain  New recent L1 superior endplate compression fracture  L5 superior endplate compression fracture with persistent edema and slightly worsened height loss  Recent L2-L3 and L3-L4 discectomy (11/12/24)  Rheumatoid arthritis   Sjogren's  Anxiety  HLD  Prediabetes     Most Recent Discharge Date: 1/30/25 from [facility]  Most Recent Discharge Diagnosis: Do not have discharge note.     Transitions of Care Assessment    Discharge Assessment  How are you doing now that you are home?: Making improvements everyday.  How are your symptoms? (Red Flag symptoms escalate to triage hotline per guidelines): Improved  Do you know how to contact your clinic care team if you have future questions or changes to your health status? : Yes  Does the patient have their discharge instructions? : Yes  Does the patient have questions regarding their discharge instructions? : No  Were you started on any new medications or were there changes to any of your previous medications? : Yes  Does the patient have all of their medications?: Yes  Do you have questions regarding any of your medications? : No  Do you have all of your needed medical supplies or equipment (DME)?  (i.e. oxygen tank, CPAP, cane, etc.): Yes         Post-op (Clinicians Only)  Did the patient have surgery or a procedure: No  Fever: No  Chills: No  Eating & Drinking: eating and drinking without complaints/concerns  PO Intake: regular diet  Additional Symptoms:  (Denies)  Bowel Function: normal  Date of last BM: 02/03/25  Urinary Status: voiding without complaint/concerns    Care Management   None    Follow up Plan     Patient declined Care  Coordination services at this time.   Patient is aware of how to initiate Care Coordination services with the clinic in the future.       Discharge Follow-Up  Discharge follow up appointment scheduled in alignment with recommended follow up timeframe or Transitions of Risk Category? (Low = within 30 days; Moderate= within 14 days; High= within 7 days): Yes  Discharge Follow Up Appointment Date: 02/14/25  Discharge Follow Up Appointment Scheduled with?: Primary Care Provider    Future Appointments   Date Time Provider Department Center   2/14/2025 11:30 AM José Miguel Wallace MD CSFP[facility] CS   2/21/2025  1:20 PM CSXR1 CSXRA CS   2/21/2025  2:00 PM Harjeet Kenny MD CSNESG CS       Outpatient Plan as outlined on AVS reviewed with patient.    For any urgent concerns, please contact our 24 hour nurse triage line: [phone] ([phone])       Claribel Hall RN

## 2025-02-05 ENCOUNTER — MEDICAL CORRESPONDENCE (OUTPATIENT)
Dept: HEALTH INFORMATION MANAGEMENT | Facility: CLINIC | Age: 80
End: 2025-02-05

## 2025-02-05 DIAGNOSIS — Z53.9 DIAGNOSIS NOT YET DEFINED: Primary | ICD-10-CM

## 2025-02-14 ENCOUNTER — VIRTUAL VISIT (OUTPATIENT)
Dept: FAMILY MEDICINE | Facility: CLINIC | Age: 80
End: 2025-02-14
Payer: MEDICARE

## 2025-02-14 DIAGNOSIS — F41.1 GAD (GENERALIZED ANXIETY DISORDER): ICD-10-CM

## 2025-02-14 DIAGNOSIS — Z78.0 POST-MENOPAUSAL: ICD-10-CM

## 2025-02-14 DIAGNOSIS — I10 ESSENTIAL HYPERTENSION: ICD-10-CM

## 2025-02-14 DIAGNOSIS — M54.41 ACUTE BILATERAL LOW BACK PAIN WITH RIGHT-SIDED SCIATICA: ICD-10-CM

## 2025-02-14 DIAGNOSIS — Z98.890 STATUS POST LUMBAR SURGERY: ICD-10-CM

## 2025-02-14 DIAGNOSIS — Z12.31 VISIT FOR SCREENING MAMMOGRAM: ICD-10-CM

## 2025-02-14 DIAGNOSIS — S32.010A COMPRESSION FRACTURE OF L1 VERTEBRA, INITIAL ENCOUNTER (H): Primary | ICD-10-CM

## 2025-02-14 DIAGNOSIS — Z12.31 ENCOUNTER FOR SCREENING MAMMOGRAM FOR BREAST CANCER: ICD-10-CM

## 2025-02-14 PROCEDURE — 98006 SYNCH AUDIO-VIDEO EST MOD 30: CPT | Performed by: INTERNAL MEDICINE

## 2025-02-14 RX ORDER — CLORAZEPATE DIPOTASSIUM 15 MG/1
15 TABLET ORAL EVERY MORNING
Qty: 90 TABLET | Refills: 0 | Status: SHIPPED | OUTPATIENT
Start: 2025-02-14

## 2025-02-14 RX ORDER — VITAMIN B COMPLEX
1 TABLET ORAL DAILY
COMMUNITY

## 2025-02-14 RX ORDER — LIDOCAINE 50 MG/G
1 PATCH TOPICAL EVERY 24 HOURS
Qty: 90 PATCH | Refills: 3 | Status: SHIPPED | OUTPATIENT
Start: 2025-02-14

## 2025-02-14 RX ORDER — HYDROMORPHONE HYDROCHLORIDE 2 MG/1
2 TABLET ORAL EVERY 6 HOURS PRN
Qty: 28 TABLET | Refills: 0 | Status: SHIPPED | OUTPATIENT
Start: 2025-02-14 | End: 2025-02-20

## 2025-02-14 NOTE — PROGRESS NOTES
Selina is a 79 year old who is being evaluated via a billable video visit.    How would you like to obtain your AVS? MyChart  If the video visit is dropped, the invitation should be resent by: Text to cell phone: [phone]  Will anyone else be joining your video visit? No          Subjective   Selina is a 79 year old, presenting for the following health issues:  Follow Up      Video Start Time: 11:48 AM    History of Present Illness       Reason for visit:  Check in after discharge from TCU.   She is taking medications regularly.           2/3/2025   Post Discharge Outreach   How are you doing now that you are home? Making improvements everyday.   How are your symptoms? (Red Flag symptoms escalate to triage hotline per guidelines) Improved   Does the patient have their discharge instructions?  Yes   Does the patient have questions regarding their discharge instructions?  No   Were you started on any new medications or were there changes to any of your previous medications?  Yes   Does the patient have all of their medications? Yes   Do you have questions regarding any of your medications?  No   Do you have all of your needed medical supplies or equipment (DME)?  (i.e. oxygen tank, CPAP, cane, etc.) Yes   Discharge Follow Up Appointment Date 2/14/2025   Discharge Follow Up Appointment Scheduled with? Primary Care Provider       Hospital Follow-up Visit:    Hospital/Nursing Home/IP Rehab Facility: [facility]  Date of Admission: 12/31/2024  Date of Discharge: 1/6/2025 Then to [facility]   Reason(s) for Admission: Compression Fracture of L1 Vertebra  Was the patient in the ICU or did the patient experience delirium during hospitalization?  No  Do you have any other stressors you would like to discuss with your provider? No    Problems taking medications regularly:  None  Medication changes since discharge: None  Problems adhering to non-medication therapy:  None    Summary of hospitalization:  HARSHAL  [facility] discharge summary reviewed  Diagnostic Tests/Treatments reviewed.  Follow up needed: none  Other Healthcare Providers Involved in Patient s Care:         None  Update since discharge: improved.         Plan of care communicated with patient           New recent L1 superior endplate compression fracture  L5 superior endplate compression fracture with persistent edema and slightly worsened height loss  Recent L2-L3 and L3-L4 discectomy (11/12/24)   Agnes Saravia has been managing her pain with lidoderm patches, acetaminophen and taking hydromorphine 2 mg twice per day as needed.  Not noticing any constipation or confusion.  Notes energy has been good.  She notes that her sleep schedule is back on track.  She is able to work with physical therapy and with home health aide and she is doing home exercises.  Physical therapy comes once per week.  She is using a walker to ambulate in the home with walker.        Review of Systems  Constitutional, HEENT, cardiovascular, pulmonary, GI, , musculoskeletal, neuro, skin, endocrine and psych systems are negative, except as otherwise noted.      Objective    Vitals - Patient Reported  Pain Score: No Pain (0)        Physical Exam   GENERAL: alert and no distress  EYES: Eyes grossly normal to inspection.  No discharge or erythema, or obvious scleral/conjunctival abnormalities.  RESP: No audible wheeze, cough, or visible cyanosis.    SKIN: Visible skin clear. No significant rash, abnormal pigmentation or lesions.  NEURO: Cranial nerves grossly intact.  Mentation and speech appropriate for age.  PSYCH: Appropriate affect, tone, and pace of words    (Z12.31) Visit for screening mammogram  (primary encounter diagnosis)  Comment: due for mammofram   Plan:     (Z98.890) Status post lumbar surgery  Comment: OK to refill dilaudid to have as needed   Plan: HYDROmorphone (DILAUDID) 2 MG tablet            (S32.010A) Compression fracture of L1 vertebra, initial  encounter (H)  Comment: REcommend repeat bone density scan.  She is hesitant to take a biosphosphonate, but we can reconsider this in May   Plan: DX Bone Density, lidocaine (LIDODERM) 5 % patch            (Z78.0) Post-menopausal  Comment:  [facility] (also performs diagnostic mammogram, ultrasound and biopsy) [phone].   Plan: DX Bone Density            (Z12.31) Encounter for screening mammogram for breast cancer  Comment:   Plan: MA Screening Bilateral w/ Skyler            (M54.41) Acute bilateral low back pain with right-sided sciatica  Comment: as above   Plan:     (F41.1) DANA (generalized anxiety disorder)  Comment: OK to refill clorazepate.  Discussed issues with taking both benzodiazepine and opiate and she is aware of respiratory sedation and is minimizing use of opiates.    Plan: clorazepate dipotassium (TRANXENE) 15 MG tablet            (I10) Essential hypertension  Comment: blood pressure is stable   Plan:             Video-Visit Details   Type of service:  Video Visit   Video End Time:12:15 PM   Originating Location (pt. Location): Home    Distant Location (provider location):  On-site  Platform used for Video Visit: Jennifer  Signed Electronically by: José Miguel Wallace MD, MD

## 2025-02-17 ENCOUNTER — PATIENT OUTREACH (OUTPATIENT)
Dept: CARE COORDINATION | Facility: CLINIC | Age: 80
End: 2025-02-17
Payer: MEDICARE

## 2025-02-20 DIAGNOSIS — Z98.890 STATUS POST LUMBAR SURGERY: ICD-10-CM

## 2025-02-20 RX ORDER — HYDROMORPHONE HYDROCHLORIDE 2 MG/1
2 TABLET ORAL EVERY 6 HOURS PRN
Qty: 7 TABLET | Refills: 0 | Status: SHIPPED | OUTPATIENT
Start: 2025-02-20

## 2025-02-21 ENCOUNTER — OFFICE VISIT (OUTPATIENT)
Dept: NEUROSURGERY | Facility: CLINIC | Age: 80
End: 2025-02-21
Payer: MEDICARE

## 2025-02-21 ENCOUNTER — ANCILLARY PROCEDURE (OUTPATIENT)
Dept: GENERAL RADIOLOGY | Facility: CLINIC | Age: 80
End: 2025-02-21
Attending: PHYSICIAN ASSISTANT
Payer: MEDICARE

## 2025-02-21 VITALS — DIASTOLIC BLOOD PRESSURE: 76 MMHG | OXYGEN SATURATION: 96 % | SYSTOLIC BLOOD PRESSURE: 121 MMHG | HEART RATE: 82 BPM

## 2025-02-21 DIAGNOSIS — S32.010D COMPRESSION FRACTURE OF L1 VERTEBRA WITH ROUTINE HEALING, SUBSEQUENT ENCOUNTER: Primary | ICD-10-CM

## 2025-02-21 DIAGNOSIS — S32.010A COMPRESSION FRACTURE OF L1 VERTEBRA, INITIAL ENCOUNTER (H): ICD-10-CM

## 2025-02-21 PROCEDURE — 99213 OFFICE O/P EST LOW 20 MIN: CPT | Performed by: NEUROLOGICAL SURGERY

## 2025-02-21 PROCEDURE — 72100 X-RAY EXAM L-S SPINE 2/3 VWS: CPT | Mod: TC | Performed by: RADIOLOGY

## 2025-02-21 ASSESSMENT — PAIN SCALES - GENERAL: PAINLEVEL_OUTOF10: MODERATE PAIN (4)

## 2025-02-21 NOTE — PATIENT INSTRUCTIONS
Patient Next Steps:    Order placed for imaging. Repeat lumbar Xr in 6 weeks. You can schedule at our  today or Central Scheduling will call you to make the appointment. If you do not hear from them within 1-2 business days you can call the numbers below.   [phone] (south)  [phone] (north)  [phone] ([facility])  You can call [employer] (previously known as [facility]) to schedule your imaging at [phone]    Dr Kenny would like to see you back in the clinic for follow up in 6 weeks with Xr prior to review imaging.     Ok to start to ween out of your brace. Please follow the instructions:      -To wean from your brace begin by removing the brace for 1-2 hours on the first day.     -Increase the time you are not wearing the collar slowly by 1-2 hours per day.    -Listen to your body as you wean from the brace. If you experience increased pain or back spasms, back down on how long you are out of the brace (example: go back to how may hours you were out of the brace yesterday or do not increase your time out of the brace tomorrow).    -Resume increasing your time out of the brace when ready.  When you tolerate being out of brace for eight hours, you may discontinue the brace altogether.    -Everyone is different, however, you may anticipate weaning from the collar over seven to ten days.      Please call us if you have any further questions or concerns.    [facility] Neurosurgery [facility]   Phone: [phone]  Fax: [phone]

## 2025-02-21 NOTE — LETTER
"2/21/2025      Agnes Saravia  [address]  [place] [address]      Dear Colleague,    Thank you for referring your patient, Agnes Saravia, to the [facility] NEUROLOGY CL[facility]ICS [facility]. Please see a copy of my visit note below.    It was a pleasure to see Agnes Saravia today in Neurosurgery Clinic. She is a 79 year old female who underwent:       November 12, 2024     [facility]   Operative Note     Pre-operative diagnosis: Herniation of lumbar intervertebral disc with radiculopathy [M51.16]   Post-operative diagnosis Same   Procedure: Procedure(s):  Right Lumbar 2 to Lumbar 3 minimally invasive redo discectomy and Lumbar 3 to Lumbar 4 far lateral discectomy    Surgeon(s): Surgeons and Role:     * Harjeet Kenny MD - Primary   Estimated blood loss:             25ml   Specimens: * No specimens in log *   Findings: Inflammatory arthropathy and scar tissue and herniated disc noted at L2-3 with herniated disc noted at L3-4.  Incidental durotomy noted which was repaired primarily without further CSF leakage.              She also had a L1 compression fracture at the end of December.  She is here for follow-up for both issues.  Her preoperative radicular symptoms are much improved.    Her back pain from her compression fracture is also improving.  She is doing home physical therapy at this point.    Vitals:    02/21/25 1347   BP: 121/76   Pulse: 82   SpO2: 96%     Estimated body mass index is 33.66 kg/m  as calculated from the following:    Height as of 12/31/24: 1.6 m (5' 3\").    Weight as of 12/31/24: 86.2 kg (190 lb).  Moderate Pain (4)    Well-healed incision  Bilateral lower extremity strength is 5 out of 5    Imaging: Review of her upright x-rays show  the compression fracture which appears to be healing at this point.    Assessment: 1.  Status post lumbar discectomy.  2.  L1 compression fracture with routine healing.    Plan: I would like to see " her back in 6 weeks with repeat x-rays of the lumbar spine.  She may wean out of her brace at this point.   when she is ready for outpatient physical therapy we would be happy to order this for her as well.         Again, thank you for allowing me to participate in the care of your patient.        Sincerely,        Harjeet Kenny MD    Electronically signed

## 2025-02-21 NOTE — PROGRESS NOTES
"It was a pleasure to see Agnes Saravia today in Neurosurgery Clinic. She is a 79 year old female who underwent:       November 12, 2024     [facility]   Operative Note     Pre-operative diagnosis: Herniation of lumbar intervertebral disc with radiculopathy [M51.16]   Post-operative diagnosis Same   Procedure: Procedure(s):  Right Lumbar 2 to Lumbar 3 minimally invasive redo discectomy and Lumbar 3 to Lumbar 4 far lateral discectomy    Surgeon(s): Surgeons and Role:     * Harjeet Kenny MD - Primary   Estimated blood loss:             25ml   Specimens: * No specimens in log *   Findings: Inflammatory arthropathy and scar tissue and herniated disc noted at L2-3 with herniated disc noted at L3-4.  Incidental durotomy noted which was repaired primarily without further CSF leakage.              She also had a L1 compression fracture at the end of December.  She is here for follow-up for both issues.  Her preoperative radicular symptoms are much improved.    Her back pain from her compression fracture is also improving.  She is doing home physical therapy at this point.    Vitals:    02/21/25 1347   BP: 121/76   Pulse: 82   SpO2: 96%     Estimated body mass index is 33.66 kg/m  as calculated from the following:    Height as of 12/31/24: 1.6 m (5' 3\").    Weight as of 12/31/24: 86.2 kg (190 lb).  Moderate Pain (4)    Well-healed incision  Bilateral lower extremity strength is 5 out of 5    Imaging: Review of her upright x-rays show  the compression fracture which appears to be healing at this point.    Assessment: 1.  Status post lumbar discectomy.  2.  L1 compression fracture with routine healing.    Plan: I would like to see her back in 6 weeks with repeat x-rays of the lumbar spine.  She may wean out of her brace at this point.   when she is ready for outpatient physical therapy we would be happy to order this for her as well.     "

## 2025-02-21 NOTE — NURSING NOTE
"Agnes Saravia is a 79 year old female who presents for:  Chief Complaint   Patient presents with    RECHECK     12wk follow-up - would like to get rid of the brace - pain lvl 4 in her lower back        Initial Vitals:  /76   Pulse 82   SpO2 96%  Estimated body mass index is 33.66 kg/m  as calculated from the following:    Height as of 12/31/24: 5' 3\" (1.6 m).    Weight as of 12/31/24: 190 lb (86.2 kg).. There is no height or weight on file to calculate BSA. BP completed using cuff size: small regular - wrist  Moderate Pain (4)    Nursing Comments:     Agusto Haq    "

## 2025-02-27 NOTE — PATIENT INSTRUCTIONS
1.  You were seen in the ENT Clinic today by Dr. Worthington.  If you have any questions or concerns after your appointment, please call [phone].  Press option #1 for scheduling related needs.  Press option #3 for Nurse advice.    Starla ADKINSN, RN  [facility] ENT   Head & Neck Surgery          PA started for Mounjaro

## 2025-03-16 DIAGNOSIS — E87.6 HYPOKALEMIA: ICD-10-CM

## 2025-03-18 RX ORDER — POTASSIUM CHLORIDE 750 MG/1
20 TABLET, EXTENDED RELEASE ORAL DAILY
Qty: 180 TABLET | Refills: 0 | Status: SHIPPED | OUTPATIENT
Start: 2025-03-18

## 2025-03-26 ENCOUNTER — MYC REFILL (OUTPATIENT)
Dept: FAMILY MEDICINE | Facility: CLINIC | Age: 80
End: 2025-03-26
Payer: MEDICARE

## 2025-03-26 DIAGNOSIS — Z98.890 STATUS POST LUMBAR SURGERY: ICD-10-CM

## 2025-03-26 RX ORDER — HYDROMORPHONE HYDROCHLORIDE 2 MG/1
2 TABLET ORAL EVERY 6 HOURS PRN
Qty: 7 TABLET | Refills: 0 | Status: SHIPPED | OUTPATIENT
Start: 2025-03-26

## 2025-04-09 ENCOUNTER — PATIENT OUTREACH (OUTPATIENT)
Dept: CARE COORDINATION | Facility: CLINIC | Age: 80
End: 2025-04-09
Payer: MEDICARE

## 2025-05-14 ENCOUNTER — HOSPITAL ENCOUNTER (OUTPATIENT)
Dept: BONE DENSITY | Facility: CLINIC | Age: 80
Discharge: HOME | End: 2025-05-14
Attending: INTERNAL MEDICINE
Payer: MEDICARE

## 2025-05-14 ENCOUNTER — HOSPITAL ENCOUNTER (OUTPATIENT)
Dept: MAMMOGRAPHY | Facility: CLINIC | Age: 80
Discharge: HOME | End: 2025-05-14
Attending: INTERNAL MEDICINE
Payer: MEDICARE

## 2025-05-14 DIAGNOSIS — Z78.0 POST-MENOPAUSAL: ICD-10-CM

## 2025-05-14 DIAGNOSIS — S32.010A COMPRESSION FRACTURE OF L1 VERTEBRA, INITIAL ENCOUNTER (H): ICD-10-CM

## 2025-05-14 DIAGNOSIS — Z12.31 ENCOUNTER FOR SCREENING MAMMOGRAM FOR BREAST CANCER: ICD-10-CM

## 2025-05-14 PROCEDURE — 77063 BREAST TOMOSYNTHESIS BI: CPT

## 2025-05-14 PROCEDURE — 77080 DXA BONE DENSITY AXIAL: CPT

## 2025-05-20 ENCOUNTER — RESULTS FOLLOW-UP (OUTPATIENT)
Dept: FAMILY MEDICINE | Facility: CLINIC | Age: 80
End: 2025-05-20

## 2025-05-20 NOTE — RESULT ENCOUNTER NOTE
Gurdeep Alarcon,    I have had the opportunity to review your recent results and an interpretation is as follows:  Your bone density scan does show evidence of progression of osteoporosis (thinning of the bones and increased risk of fracture).  At this time it is recommended that you continue vitamin D replacement and also that we consider starting a medication such as fosamax.  I would expect you may have questions with regards to medication interactions and side effects of this medication.  Please call to discuss or schedule an appointment and we can order this prescription if you are agreeable to your pharmacy to help prevent future fracture.         Sincerely,  José Miguel Wallace MD

## 2025-05-21 ENCOUNTER — OFFICE VISIT (OUTPATIENT)
Dept: FAMILY MEDICINE | Facility: CLINIC | Age: 80
End: 2025-05-21
Payer: MEDICARE

## 2025-05-21 ENCOUNTER — RESULTS FOLLOW-UP (OUTPATIENT)
Dept: FAMILY MEDICINE | Facility: CLINIC | Age: 80
End: 2025-05-21

## 2025-05-21 ENCOUNTER — ANCILLARY PROCEDURE (OUTPATIENT)
Dept: GENERAL RADIOLOGY | Facility: CLINIC | Age: 80
End: 2025-05-21
Attending: INTERNAL MEDICINE
Payer: MEDICARE

## 2025-05-21 VITALS
RESPIRATION RATE: 19 BRPM | SYSTOLIC BLOOD PRESSURE: 170 MMHG | OXYGEN SATURATION: 98 % | BODY MASS INDEX: 31.89 KG/M2 | TEMPERATURE: 97 F | DIASTOLIC BLOOD PRESSURE: 88 MMHG | HEIGHT: 63 IN | HEART RATE: 78 BPM | WEIGHT: 180 LBS

## 2025-05-21 DIAGNOSIS — E78.5 HYPERLIPIDEMIA LDL GOAL <130: ICD-10-CM

## 2025-05-21 DIAGNOSIS — M80.00XA AGE-RELATED OSTEOPOROSIS WITH CURRENT PATHOLOGICAL FRACTURE, INITIAL ENCOUNTER: ICD-10-CM

## 2025-05-21 DIAGNOSIS — Z00.00 ROUTINE GENERAL MEDICAL EXAMINATION AT A HEALTH CARE FACILITY: Primary | ICD-10-CM

## 2025-05-21 DIAGNOSIS — R73.01 IFG (IMPAIRED FASTING GLUCOSE): ICD-10-CM

## 2025-05-21 DIAGNOSIS — Z98.890 STATUS POST LUMBAR SURGERY: ICD-10-CM

## 2025-05-21 DIAGNOSIS — S32.010A COMPRESSION FRACTURE OF L1 VERTEBRA, INITIAL ENCOUNTER (H): ICD-10-CM

## 2025-05-21 DIAGNOSIS — R82.90 ABNORMAL URINALYSIS: ICD-10-CM

## 2025-05-21 DIAGNOSIS — F41.1 GAD (GENERALIZED ANXIETY DISORDER): ICD-10-CM

## 2025-05-21 DIAGNOSIS — I10 ESSENTIAL HYPERTENSION: Primary | ICD-10-CM

## 2025-05-21 DIAGNOSIS — I10 ESSENTIAL HYPERTENSION: ICD-10-CM

## 2025-05-21 DIAGNOSIS — R35.0 URINARY FREQUENCY: ICD-10-CM

## 2025-05-21 LAB
ALBUMIN SERPL BCG-MCNC: 4.3 G/DL (ref 3.5–5.2)
ALBUMIN UR-MCNC: NEGATIVE MG/DL
ALP SERPL-CCNC: 84 U/L (ref 40–150)
ALT SERPL W P-5'-P-CCNC: 13 U/L (ref 0–50)
ANION GAP SERPL CALCULATED.3IONS-SCNC: 11 MMOL/L (ref 7–15)
APPEARANCE UR: CLEAR
AST SERPL W P-5'-P-CCNC: 22 U/L (ref 0–45)
BACTERIA #/AREA URNS HPF: ABNORMAL /HPF
BILIRUB SERPL-MCNC: 0.5 MG/DL
BILIRUB UR QL STRIP: NEGATIVE
BUN SERPL-MCNC: 22 MG/DL (ref 8–23)
CALCIUM SERPL-MCNC: 9.6 MG/DL (ref 8.8–10.4)
CHLORIDE SERPL-SCNC: 102 MMOL/L (ref 98–107)
CHOLEST SERPL-MCNC: 219 MG/DL
COLOR UR AUTO: YELLOW
CREAT SERPL-MCNC: 0.7 MG/DL (ref 0.51–0.95)
EGFRCR SERPLBLD CKD-EPI 2021: 86 ML/MIN/1.73M2
ERYTHROCYTE [DISTWIDTH] IN BLOOD BY AUTOMATED COUNT: 14.9 % (ref 10–15)
EST. AVERAGE GLUCOSE BLD GHB EST-MCNC: 114 MG/DL
FASTING STATUS PATIENT QL REPORTED: YES
FASTING STATUS PATIENT QL REPORTED: YES
GLUCOSE SERPL-MCNC: 113 MG/DL (ref 70–99)
GLUCOSE UR STRIP-MCNC: NEGATIVE MG/DL
HBA1C MFR BLD: 5.6 % (ref 0–5.6)
HCO3 SERPL-SCNC: 29 MMOL/L (ref 22–29)
HCT VFR BLD AUTO: 41.3 % (ref 35–47)
HDLC SERPL-MCNC: 48 MG/DL
HGB BLD-MCNC: 12.8 G/DL (ref 11.7–15.7)
HGB UR QL STRIP: NEGATIVE
KETONES UR STRIP-MCNC: NEGATIVE MG/DL
LDLC SERPL CALC-MCNC: 123 MG/DL
LEUKOCYTE ESTERASE UR QL STRIP: ABNORMAL
MCH RBC QN AUTO: 29.3 PG (ref 26.5–33)
MCHC RBC AUTO-ENTMCNC: 31 G/DL (ref 31.5–36.5)
MCV RBC AUTO: 95 FL (ref 78–100)
NITRATE UR QL: NEGATIVE
NONHDLC SERPL-MCNC: 171 MG/DL
PH UR STRIP: 7 [PH] (ref 5–7)
PLATELET # BLD AUTO: 359 10E3/UL (ref 150–450)
POTASSIUM SERPL-SCNC: 4.7 MMOL/L (ref 3.4–5.3)
PROT SERPL-MCNC: 7.1 G/DL (ref 6.4–8.3)
RBC # BLD AUTO: 4.37 10E6/UL (ref 3.8–5.2)
RBC #/AREA URNS AUTO: ABNORMAL /HPF
SODIUM SERPL-SCNC: 142 MMOL/L (ref 135–145)
SP GR UR STRIP: 1.02 (ref 1–1.03)
SQUAMOUS #/AREA URNS AUTO: ABNORMAL /LPF
TRIGL SERPL-MCNC: 241 MG/DL
UROBILINOGEN UR STRIP-ACNC: 0.2 E.U./DL
WBC # BLD AUTO: 13.7 10E3/UL (ref 4–11)
WBC #/AREA URNS AUTO: ABNORMAL /HPF

## 2025-05-21 PROCEDURE — 72100 X-RAY EXAM L-S SPINE 2/3 VWS: CPT | Mod: TC | Performed by: STUDENT IN AN ORGANIZED HEALTH CARE EDUCATION/TRAINING PROGRAM

## 2025-05-21 PROCEDURE — 36415 COLL VENOUS BLD VENIPUNCTURE: CPT | Performed by: INTERNAL MEDICINE

## 2025-05-21 PROCEDURE — 85027 COMPLETE CBC AUTOMATED: CPT | Performed by: INTERNAL MEDICINE

## 2025-05-21 PROCEDURE — 80053 COMPREHEN METABOLIC PANEL: CPT | Performed by: INTERNAL MEDICINE

## 2025-05-21 PROCEDURE — 80061 LIPID PANEL: CPT | Performed by: INTERNAL MEDICINE

## 2025-05-21 PROCEDURE — 83036 HEMOGLOBIN GLYCOSYLATED A1C: CPT | Performed by: INTERNAL MEDICINE

## 2025-05-21 PROCEDURE — 81001 URINALYSIS AUTO W/SCOPE: CPT | Performed by: INTERNAL MEDICINE

## 2025-05-21 RX ORDER — HYDROCHLOROTHIAZIDE 12.5 MG/1
12.5 TABLET ORAL DAILY
Qty: 90 TABLET | Refills: 3 | Status: SHIPPED | OUTPATIENT
Start: 2025-05-21

## 2025-05-21 RX ORDER — LIDOCAINE 50 MG/G
1 PATCH TOPICAL EVERY 24 HOURS
Qty: 90 PATCH | Refills: 3 | Status: SHIPPED | OUTPATIENT
Start: 2025-05-21

## 2025-05-21 RX ORDER — HYDROMORPHONE HYDROCHLORIDE 2 MG/1
2 TABLET ORAL EVERY 6 HOURS PRN
Qty: 7 TABLET | Refills: 0 | Status: CANCELLED | OUTPATIENT
Start: 2025-05-21

## 2025-05-21 RX ORDER — CLORAZEPATE DIPOTASSIUM 15 MG/1
15 TABLET ORAL EVERY MORNING
Qty: 90 TABLET | Refills: 0 | Status: SHIPPED | OUTPATIENT
Start: 2025-05-21

## 2025-05-21 RX ORDER — SIMVASTATIN 40 MG
40 TABLET ORAL AT BEDTIME
Qty: 90 TABLET | Refills: 3 | Status: SHIPPED | OUTPATIENT
Start: 2025-05-21

## 2025-05-21 RX ORDER — CLORAZEPATE DIPOTASSIUM 15 MG/1
15 TABLET ORAL EVERY MORNING
Qty: 90 TABLET | Refills: 0 | Status: CANCELLED | OUTPATIENT
Start: 2025-05-21

## 2025-05-21 RX ORDER — HYDROMORPHONE HYDROCHLORIDE 2 MG/1
2 TABLET ORAL EVERY 6 HOURS PRN
Qty: 7 TABLET | Refills: 0 | Status: SHIPPED | OUTPATIENT
Start: 2025-05-21

## 2025-05-21 SDOH — HEALTH STABILITY: PHYSICAL HEALTH: ON AVERAGE, HOW MANY DAYS PER WEEK DO YOU ENGAGE IN MODERATE TO STRENUOUS EXERCISE (LIKE A BRISK WALK)?: 0 DAYS

## 2025-05-21 ASSESSMENT — SOCIAL DETERMINANTS OF HEALTH (SDOH): HOW OFTEN DO YOU GET TOGETHER WITH FRIENDS OR RELATIVES?: ONCE A WEEK

## 2025-05-21 ASSESSMENT — PAIN SCALES - GENERAL: PAINLEVEL_OUTOF10: NO PAIN (0)

## 2025-05-21 NOTE — PROGRESS NOTES
Bettina Marks is a 79 year old, presenting for the following health issues:  Physical and Follow Up      Via the Health Maintenance questionnaire, the patient has reported the following services have been completed DEXA: [facility] 2025-05-14, this information has not been sent to the abstraction team.  Healthy Habits:     Taking medications regularly:  0  History of Present Illness       Back Pain:  She presents for follow up of back pain. Patient's back pain is a chronic problem.  Location of back pain:  Right middle of back  Description of back pain: dull ache  Back pain spreads: nowhere    Since patient first noticed back pain, pain is: gradually improving  Does back pain interfere with her job:  Not applicable       She eats 0-1 servings of fruits and vegetables daily.She consumes 0 sweetened beverage(s) daily.She exercises with enough effort to increase her heart rate 9 or less minutes per day.  She exercises with enough effort to increase her heart rate 3 or less days per week.   She is taking medications regularly.          Annual Wellness Visit     Patient has been advised of split billing requirements and indicates understanding: Yes     Health Care Directive          5/21/2025   General Health   How would you rate your overall physical health? (!) FAIR   Feel stress (tense, anxious, or unable to sleep) Not at all          5/21/2025   Nutrition   Diet: Regular (no restrictions)         5/21/2025   Exercise   Days per week of moderate/strenous exercise 0 days     (!) EXERCISE CONCERN      5/21/2025   Social Factors   Frequency of gathering with friends or relatives Once a week   Worry food won't last until get money to buy more No   Food not last or not have enough money for food? No   Do you have housing? (Housing is defined as stable permanent housing and does not include staying outside in a car, in a tent, in an abandoned building, in an overnight shelter, or couch-surfing.) Yes   Are  you worried about losing your housing? No   Lack of transportation? No   Unable to get utilities (heat,electricity)? No           2025   Fall Risk   Fallen 2 or more times in the past year? No   Trouble with walking or balance? Yes           2025   Activities of Daily Living- Home Safety   Needs help with the following daily activites Transportation    Shopping    Preparing meals    Housework    Bathing    Laundry    Medication administration    Dressing   Safety concerns in the home None of the above       Multiple values from one day are sorted in reverse-chronological order         2025   Dental   Dentist two times every year? Yes         2025   Hearing Screening   Hearing concerns? None of the above         2025   Driving Risk Screening   Patient/family members have concerns about driving (!) DECLINE         2025   General Alertness/Fatigue Screening   Have you been more tired than usual lately? No         2025   Urinary Incontinence Screening   Bothered by leaking urine in past 6 months No         10/16/2024   TB Screening   Were you born outside of the [place]? No         2025   Substance Use   Alcohol more than 3/day or more than 7/wk No   Do you have a current opioid prescription? (!) YES   How severe/bad is pain from 1 to 10? 6/10   Do you use any other substances recreationally? No       Social History     Tobacco Use    Smoking status: Former     Current packs/day: 0.00     Average packs/day: 0.5 packs/day for 10.0 years (5.0 ttl pk-yrs)     Types: Cigarettes     Start date: 1970     Quit date: 1975     Years since quittin.9    Smokeless tobacco: Never    Tobacco comments:     N/A   Vaping Use    Vaping status: Never Used   Substance Use Topics    Alcohol use: Yes     Alcohol/week: 0.0 standard drinks of alcohol     Comment: beer in summer when mowing lawn     Drug use: No             2025   LAST FHS-7 RESULTS   1st degree relative breast or ovarian  cancer No   Any relative bilateral breast cancer No   Any male have breast cancer No   Any ONE woman have BOTH breast AND ovarian cancer No   Any woman with breast cancer before 50yrs No   2 or more relatives with breast AND/OR ovarian cancer No   2 or more relatives with breast AND/OR bowel cancer No                        Reviewed and updated as needed this visit by Provider                    Past Medical History:   Diagnosis Date    Anxiety     Arthritis     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    Depressive disorder     Hyperlipidemia     Insomnia     PONV (postoperative nausea and vomiting)     Rheumatoid arthritis(714.0)     Sjogren's syndrome        Current providers sharing in care for this patient include:  Patient Care Team:  José Miguel Wallace MD as PCP - General (Internal Medicine)  José Miguel Wallace MD as Assigned PCP  Bruce Nash MD as Ajay Grigsby MD as MD (Internal Medicine)  Dudley Bernal MD as MD (Neurological Surgery)  Harjeet Lynn MD as MD (Otolaryngology)  Juan Carcamo MD as MD (Allergy & Immunology)  Kandi Riley RN as Registered Nurse (Otolaryngology)  Rachana Huggins, PharmD as Pharmacist (Pharmacist)  José Miguel Wallace MD as Assigned Pain Medication Provider  Harjeet Kenny MD as Assigned Neuroscience Provider    The following health maintenance items are reviewed in Epic and correct as of today:  Health Maintenance   Topic Date Due    URINE DRUG SCREEN  Never done    ZOSTER IMMUNIZATION (1 of 2) Never done    RSV VACCINE (1 - 1-dose 75+ series) Never done    DTAP/TDAP/TD IMMUNIZATION (3 - Td or Tdap) 04/26/2023    COVID-19 Vaccine (7 - 2024-25 season) 09/01/2024    MEDICARE ANNUAL WELLNESS VISIT  10/11/2024    LIPID  10/11/2024    BMP  07/01/2025    A1C  11/07/2025    CBC  01/01/2026    ANNUAL REVIEW OF HM ORDERS  02/14/2026    MAMMO SCREENING  05/14/2026    FALL RISK ASSESSMENT  05/21/2026    DEXA  05/14/2027    ADVANCE  "CARE PLANNING  01/09/2030    PHQ-2 (once per calendar year)  Completed    INFLUENZA VACCINE  Completed    Pneumococcal Vaccine: 50+ Years  Completed    HPV IMMUNIZATION  Aged Out    MENINGITIS IMMUNIZATION  Aged Out    COLORECTAL CANCER SCREENING  Discontinued       Appropriate preventive services were discussed with this patient, including applicable screening as appropriate for fall prevention, nutrition, physical activity, Tobacco-use cessation, weight loss and cognition.  Checklist reviewing preventive services available has been given to the patient.           5/21/2025   Mini Cog   Clock Draw Score 2 Normal   3 Item Recall 3 objects recalled   Mini Cog Total Score 5               Hyperlipidemia LDL goal <130  Compression fracture of L1 vertebra, initial encounter (H)   Has been managing pain with hydromorphone as needed.  She is also using lidocaine.  She is walking with use of walker.  No longer using a brace.  Needs assistance with showering and dressing.   No longer receiving any home care.  Using walker in the home.  She has had some constipation and treating with laxatives   Age-related osteoporosis with current pathological fracture, initial encounter   She did have a recent bone density scan and notes that she is not interested in adding any additional bone density medications.    Essential hypertension    Has stopped hydrochlorothiazide due urinary frequency      Review of Systems  Constitutional, HEENT, cardiovascular, pulmonary, GI, , musculoskeletal, neuro, skin, endocrine and psych systems are negative, except as otherwise noted.      Objective    BP (!) 170/88 (BP Location: Left arm, Patient Position: Sitting, Cuff Size: Adult Regular)   Pulse 78   Temp 97  F (36.1  C) (Tympanic)   Resp 19   Ht 1.6 m (5' 3\")   Wt 81.6 kg (180 lb)   SpO2 98%   BMI 31.89 kg/m    Body mass index is 31.89 kg/m .  Physical Exam   GENERAL: alert and no distress  EYES: Eyes grossly normal to inspection,    NECK: " no adenopathy, no asymmetry, masses, or scars  RESP: lungs clear to auscultation - no rales, rhonchi or wheezes  CV: regular rate and rhythm, normal S1 S2, no S3 or S4, no murmur   ABDOMEN: soft, nontender, no hepatosplenomegaly   MS: limited mobility due to back pain.  Walking with use of a walker limited assistance  SKIN: no suspicious lesions or rashes  NEURO: Normal strength and tone,   PSYCH: mentation appears normal, affect normal/bright    Patient Instructions   (Z00.00) Routine general medical examination at a health care facility  (primary encounter diagnosis)  Comment: For routine exam, we will draw labs as ordered, cholesterol, diabetes mellitus check, liver function, renal function.  We will also update vaccination history  Plan:     (E78.5) Hyperlipidemia LDL goal <130  Comment: Recheck fasting lipid panel today   Plan: Lipid panel reflex to direct LDL Non-fasting,         Comprehensive metabolic panel (BMP + Alb, Alk         Phos, ALT, AST, Total. Bili, TP)            (S32.010A) Compression fracture of L1 vertebra, initial encounter (H)  Comment: Recommend update X-ray lumbar spine through spine clinic  Plan: lidocaine (LIDODERM) 5 % patch            (M80.00XA) Age-related osteoporosis with current pathological fracture, initial encounter  Comment: bone density scan confirmed osteoporosis.  Discussed bisphosphonate recommendations and declined   Plan:     (I10) Essential hypertension  Comment: blood pressure is elevated due to having stopped hydrochlorothiazide - we can restart a lower dose of 12.5 mg daily and monitor   Plan: CBC with platelets, UA Macroscopic with reflex         to Microscopic and Culture - Lab Collect,         hydrochlorothiazide 12.5 MG tablet            (Z98.890) Status post lumbar surgery  Comment: OK to continue hydromorphone   Plan:     (R35.0) Urinary frequency  Comment: Check urinalysis today and monitor frequency with resumption of hydrochlorothiazide   Plan: UA Macroscopic  with reflex to Microscopic and         Culture - Lab Collect                    Signed Electronically by: José Miguel Wallace MD, MD

## 2025-05-21 NOTE — PATIENT INSTRUCTIONS
(Z00.00) Routine general medical examination at a health care facility  (primary encounter diagnosis)  Comment: For routine exam, we will draw labs as ordered, cholesterol, diabetes mellitus check, liver function, renal function.  We will also update vaccination history  Plan:     (E78.5) Hyperlipidemia LDL goal <130  Comment: Recheck fasting lipid panel today   Plan: Lipid panel reflex to direct LDL Non-fasting,         Comprehensive metabolic panel (BMP + Alb, Alk         Phos, ALT, AST, Total. Bili, TP)            (S32.010A) Compression fracture of L1 vertebra, initial encounter (H)  Comment: Recommend update X-ray lumbar spine through spine clinic  Plan: lidocaine (LIDODERM) 5 % patch            (M80.00XA) Age-related osteoporosis with current pathological fracture, initial encounter  Comment: bone density scan confirmed osteoporosis.  Discussed bisphosphonate recommendations and declined   Plan:     (I10) Essential hypertension  Comment: blood pressure is elevated due to having stopped hydrochlorothiazide - we can restart a lower dose of 12.5 mg daily and monitor   Plan: CBC with platelets, UA Macroscopic with reflex         to Microscopic and Culture - Lab Collect,         hydrochlorothiazide 12.5 MG tablet            (Z98.890) Status post lumbar surgery  Comment: OK to continue hydromorphone   Plan:     (R35.0) Urinary frequency  Comment: Check urinalysis today and monitor frequency with resumption of hydrochlorothiazide   Plan: UA Macroscopic with reflex to Microscopic and         Culture - Lab Collect

## 2025-05-22 NOTE — RESULT ENCOUNTER NOTE
Gurdeep Alarcon,    I have had the opportunity to review your recent results and an interpretation is as follows:  Your follow-up x-ray shows no significant progression with regards to the previously seen L1 compression fracture and no new areas of vertebral body height loss.  Follow-up with Dr. Kenny as recommended    Sincerely,  José Miguel Wallace MD

## 2025-05-22 NOTE — RESULT ENCOUNTER NOTE
Gurdeep Alarcon,    I had the opportunity to review your recent labs and a summary of your labs reads as follows:    Your complete blood counts show slight elevation of your white blood cell count which appears to be stable and likely related to prednisone use  Your comprehensive metabolic panel showed normal renal function, normal liver function, and stable slightly elevated fasting blood glucose indicating no evidence of diabetes mellitus.  Your A1c shows excellent average glucose  Your fasting lipid panel show  - normal HDL (good) cholesterol -as your goal is greater than 40  - low LDL (bad) cholesterol as your goal is less than 130 -continue simvastatin  - Stable elevated triglyceride levels    Your urine microscopic does show evidence of white blood cells and red blood cells as well as some contamination of squamous cells in the urine.  I did add on a urine culture to see if there is any bacterial growth.  If you are asymptomatic and there is no growth on the urine culture we can continue to monitor as this is likely contamination    Sincerely,  Jsoé Miguel Wallace MD

## 2025-05-23 NOTE — RESULT ENCOUNTER NOTE
Gurdeep Marks,    I have had the opportunity to review your recent results and an interpretation is as follows:  Your urine culture did not grow any bacteria     Sincerely,  José Miguel Wallace MD

## 2025-06-22 DIAGNOSIS — E87.6 HYPOKALEMIA: ICD-10-CM

## 2025-06-23 RX ORDER — POTASSIUM CHLORIDE 750 MG/1
20 TABLET, EXTENDED RELEASE ORAL DAILY
Qty: 180 TABLET | Refills: 1 | Status: SHIPPED | OUTPATIENT
Start: 2025-06-23

## 2025-07-05 ENCOUNTER — HOSPITAL ENCOUNTER (OUTPATIENT)
Facility: CLINIC | Age: 80
Setting detail: OBSERVATION
Discharge: HOME | End: 2025-07-06
Attending: EMERGENCY MEDICINE | Admitting: INTERNAL MEDICINE
Payer: MEDICARE

## 2025-07-05 DIAGNOSIS — R19.7 VOMITING AND DIARRHEA: ICD-10-CM

## 2025-07-05 DIAGNOSIS — R11.10 VOMITING AND DIARRHEA: ICD-10-CM

## 2025-07-05 LAB
ALBUMIN SERPL BCG-MCNC: 3.6 G/DL (ref 3.5–5.2)
ALP SERPL-CCNC: 77 U/L (ref 40–150)
ALT SERPL W P-5'-P-CCNC: 13 U/L (ref 0–50)
ANION GAP SERPL CALCULATED.3IONS-SCNC: 12 MMOL/L (ref 7–15)
AST SERPL W P-5'-P-CCNC: 22 U/L (ref 0–45)
BASOPHILS # BLD AUTO: 0.1 10E3/UL (ref 0–0.2)
BASOPHILS NFR BLD AUTO: 0 %
BILIRUB SERPL-MCNC: 0.5 MG/DL
BUN SERPL-MCNC: 26.4 MG/DL (ref 8–23)
C CAYETANENSIS DNA STL QL NAA+NON-PROBE: NEGATIVE
C DIFF TOX B STL QL: NEGATIVE
CALCIUM SERPL-MCNC: 8.6 MG/DL (ref 8.8–10.4)
CAMPYLOBACTER DNA SPEC NAA+PROBE: NEGATIVE
CHLORIDE SERPL-SCNC: 102 MMOL/L (ref 98–107)
CREAT SERPL-MCNC: 0.59 MG/DL (ref 0.51–0.95)
CRYPTOSP DNA STL QL NAA+NON-PROBE: NEGATIVE
EC STX1+STX2 GENES STL QL NAA+NON-PROBE: NEGATIVE
EGFRCR SERPLBLD CKD-EPI 2021: 89 ML/MIN/1.73M2
EOSINOPHIL # BLD AUTO: 0.1 10E3/UL (ref 0–0.7)
EOSINOPHIL NFR BLD AUTO: 1 %
ERYTHROCYTE [DISTWIDTH] IN BLOOD BY AUTOMATED COUNT: 14.2 % (ref 10–15)
G LAMBLIA DNA STL QL NAA+NON-PROBE: NEGATIVE
GLUCOSE SERPL-MCNC: 132 MG/DL (ref 70–99)
HCO3 SERPL-SCNC: 25 MMOL/L (ref 22–29)
HCT VFR BLD AUTO: 40.3 % (ref 35–47)
HGB BLD-MCNC: 12.7 G/DL (ref 11.7–15.7)
IMM GRANULOCYTES # BLD: 0.1 10E3/UL
IMM GRANULOCYTES NFR BLD: 0 %
LIPASE SERPL-CCNC: 35 U/L (ref 13–60)
LYMPHOCYTES # BLD AUTO: 0.8 10E3/UL (ref 0.8–5.3)
LYMPHOCYTES NFR BLD AUTO: 6 %
MCH RBC QN AUTO: 30.2 PG (ref 26.5–33)
MCHC RBC AUTO-ENTMCNC: 31.5 G/DL (ref 31.5–36.5)
MCV RBC AUTO: 96 FL (ref 78–100)
MONOCYTES # BLD AUTO: 1.2 10E3/UL (ref 0–1.3)
MONOCYTES NFR BLD AUTO: 8 %
NEUTROPHILS # BLD AUTO: 11.7 10E3/UL (ref 1.6–8.3)
NEUTROPHILS NFR BLD AUTO: 84 %
NOROVIRUS GI+II RNA STL QL NAA+NON-PROBE: NEGATIVE
NRBC # BLD AUTO: 0 10E3/UL
NRBC BLD AUTO-RTO: 0 /100
PLATELET # BLD AUTO: 301 10E3/UL (ref 150–450)
POTASSIUM SERPL-SCNC: 4.4 MMOL/L (ref 3.4–5.3)
PROT SERPL-MCNC: 6.5 G/DL (ref 6.4–8.3)
RBC # BLD AUTO: 4.2 10E6/UL (ref 3.8–5.2)
SALMONELLA SP RPOD STL QL NAA+PROBE: NEGATIVE
SHIGELLA SP+EIEC IPAH ST NAA+NON-PROBE: NEGATIVE
SODIUM SERPL-SCNC: 139 MMOL/L (ref 135–145)
VIBRIO DNA SPEC NAA+PROBE: NEGATIVE
WBC # BLD AUTO: 13.9 10E3/UL (ref 4–11)
Y ENTEROCOL DNA STL QL NAA+PROBE: NEGATIVE

## 2025-07-05 PROCEDURE — 250N000011 HC RX IP 250 OP 636: Performed by: EMERGENCY MEDICINE

## 2025-07-05 PROCEDURE — 83690 ASSAY OF LIPASE: CPT | Performed by: EMERGENCY MEDICINE

## 2025-07-05 PROCEDURE — 250N000013 HC RX MED GY IP 250 OP 250 PS 637: Performed by: INTERNAL MEDICINE

## 2025-07-05 PROCEDURE — 99223 1ST HOSP IP/OBS HIGH 75: CPT | Performed by: INTERNAL MEDICINE

## 2025-07-05 PROCEDURE — 85025 COMPLETE CBC W/AUTO DIFF WBC: CPT | Performed by: EMERGENCY MEDICINE

## 2025-07-05 PROCEDURE — 258N000003 HC RX IP 258 OP 636: Performed by: EMERGENCY MEDICINE

## 2025-07-05 PROCEDURE — 84155 ASSAY OF PROTEIN SERUM: CPT | Performed by: EMERGENCY MEDICINE

## 2025-07-05 PROCEDURE — 258N000003 HC RX IP 258 OP 636: Performed by: INTERNAL MEDICINE

## 2025-07-05 PROCEDURE — 36415 COLL VENOUS BLD VENIPUNCTURE: CPT | Performed by: EMERGENCY MEDICINE

## 2025-07-05 PROCEDURE — 87493 C DIFF AMPLIFIED PROBE: CPT | Performed by: INTERNAL MEDICINE

## 2025-07-05 PROCEDURE — 250N000012 HC RX MED GY IP 250 OP 636 PS 637: Performed by: INTERNAL MEDICINE

## 2025-07-05 PROCEDURE — 96361 HYDRATE IV INFUSION ADD-ON: CPT

## 2025-07-05 PROCEDURE — 96374 THER/PROPH/DIAG INJ IV PUSH: CPT

## 2025-07-05 PROCEDURE — 96376 TX/PRO/DX INJ SAME DRUG ADON: CPT

## 2025-07-05 PROCEDURE — 99285 EMERGENCY DEPT VISIT HI MDM: CPT | Mod: 25

## 2025-07-05 PROCEDURE — G0378 HOSPITAL OBSERVATION PER HR: HCPCS

## 2025-07-05 PROCEDURE — 99207 PR NO BILLABLE SERVICE THIS VISIT: CPT | Performed by: INTERNAL MEDICINE

## 2025-07-05 PROCEDURE — 250N000011 HC RX IP 250 OP 636: Performed by: INTERNAL MEDICINE

## 2025-07-05 PROCEDURE — 87506 IADNA-DNA/RNA PROBE TQ 6-11: CPT | Performed by: INTERNAL MEDICINE

## 2025-07-05 RX ORDER — NALOXONE HYDROCHLORIDE 0.4 MG/ML
0.2 INJECTION, SOLUTION INTRAMUSCULAR; INTRAVENOUS; SUBCUTANEOUS
Status: DISCONTINUED | OUTPATIENT
Start: 2025-07-05 | End: 2025-07-06 | Stop reason: HOSPADM

## 2025-07-05 RX ORDER — ONDANSETRON 2 MG/ML
4 INJECTION INTRAMUSCULAR; INTRAVENOUS EVERY 6 HOURS PRN
Status: DISCONTINUED | OUTPATIENT
Start: 2025-07-05 | End: 2025-07-06 | Stop reason: HOSPADM

## 2025-07-05 RX ORDER — LIDOCAINE 40 MG/G
CREAM TOPICAL
Status: DISCONTINUED | OUTPATIENT
Start: 2025-07-05 | End: 2025-07-06 | Stop reason: HOSPADM

## 2025-07-05 RX ORDER — ONDANSETRON 4 MG/1
4 TABLET, ORALLY DISINTEGRATING ORAL EVERY 6 HOURS PRN
Status: DISCONTINUED | OUTPATIENT
Start: 2025-07-05 | End: 2025-07-06 | Stop reason: HOSPADM

## 2025-07-05 RX ORDER — NALOXONE HYDROCHLORIDE 0.4 MG/ML
0.4 INJECTION, SOLUTION INTRAMUSCULAR; INTRAVENOUS; SUBCUTANEOUS
Status: DISCONTINUED | OUTPATIENT
Start: 2025-07-05 | End: 2025-07-06 | Stop reason: HOSPADM

## 2025-07-05 RX ORDER — ONDANSETRON 2 MG/ML
4 INJECTION INTRAMUSCULAR; INTRAVENOUS ONCE
Status: COMPLETED | OUTPATIENT
Start: 2025-07-05 | End: 2025-07-05

## 2025-07-05 RX ORDER — PANTOPRAZOLE SODIUM 40 MG/1
40 TABLET, DELAYED RELEASE ORAL DAILY PRN
COMMUNITY

## 2025-07-05 RX ORDER — PANTOPRAZOLE SODIUM 40 MG/1
40 TABLET, DELAYED RELEASE ORAL DAILY PRN
Status: DISCONTINUED | OUTPATIENT
Start: 2025-07-05 | End: 2025-07-06 | Stop reason: HOSPADM

## 2025-07-05 RX ORDER — GABAPENTIN 100 MG/1
100 CAPSULE ORAL AT BEDTIME
Status: DISCONTINUED | OUTPATIENT
Start: 2025-07-05 | End: 2025-07-06 | Stop reason: HOSPADM

## 2025-07-05 RX ORDER — ACETAMINOPHEN 325 MG/1
650 TABLET ORAL EVERY 4 HOURS PRN
Status: DISCONTINUED | OUTPATIENT
Start: 2025-07-05 | End: 2025-07-06 | Stop reason: HOSPADM

## 2025-07-05 RX ORDER — CLORAZEPATE DIPOTASSIUM 3.75 MG/1
15 TABLET ORAL EVERY MORNING
Status: DISCONTINUED | OUTPATIENT
Start: 2025-07-06 | End: 2025-07-06 | Stop reason: HOSPADM

## 2025-07-05 RX ORDER — PREDNISONE 5 MG/1
5 TABLET ORAL DAILY
COMMUNITY

## 2025-07-05 RX ORDER — PREDNISONE 5 MG/1
5 TABLET ORAL DAILY
Status: DISCONTINUED | OUTPATIENT
Start: 2025-07-05 | End: 2025-07-06 | Stop reason: HOSPADM

## 2025-07-05 RX ORDER — GLIPIZIDE 10 MG/1
1 TABLET ORAL
Status: DISCONTINUED | OUTPATIENT
Start: 2025-07-05 | End: 2025-07-06 | Stop reason: HOSPADM

## 2025-07-05 RX ORDER — SODIUM CHLORIDE, SODIUM LACTATE, POTASSIUM CHLORIDE, CALCIUM CHLORIDE 600; 310; 30; 20 MG/100ML; MG/100ML; MG/100ML; MG/100ML
INJECTION, SOLUTION INTRAVENOUS CONTINUOUS
Status: DISCONTINUED | OUTPATIENT
Start: 2025-07-05 | End: 2025-07-06 | Stop reason: HOSPADM

## 2025-07-05 RX ORDER — SODIUM CHLORIDE, SODIUM LACTATE, POTASSIUM CHLORIDE, CALCIUM CHLORIDE 600; 310; 30; 20 MG/100ML; MG/100ML; MG/100ML; MG/100ML
INJECTION, SOLUTION INTRAVENOUS CONTINUOUS
Status: DISCONTINUED | OUTPATIENT
Start: 2025-07-05 | End: 2025-07-05

## 2025-07-05 RX ORDER — METHOCARBAMOL 500 MG/1
500 TABLET, FILM COATED ORAL 4 TIMES DAILY PRN
Status: DISCONTINUED | OUTPATIENT
Start: 2025-07-05 | End: 2025-07-06 | Stop reason: HOSPADM

## 2025-07-05 RX ORDER — HYDROMORPHONE HYDROCHLORIDE 2 MG/1
2 TABLET ORAL EVERY 6 HOURS PRN
Refills: 0 | Status: DISCONTINUED | OUTPATIENT
Start: 2025-07-05 | End: 2025-07-06 | Stop reason: HOSPADM

## 2025-07-05 RX ORDER — ACETAMINOPHEN 500 MG
500-1000 TABLET ORAL EVERY 8 HOURS PRN
COMMUNITY

## 2025-07-05 RX ORDER — POTASSIUM CHLORIDE 1500 MG/1
20 TABLET, EXTENDED RELEASE ORAL DAILY
Status: DISCONTINUED | OUTPATIENT
Start: 2025-07-05 | End: 2025-07-06 | Stop reason: HOSPADM

## 2025-07-05 RX ORDER — FOLIC ACID 1 MG/1
1 TABLET ORAL
Status: DISCONTINUED | OUTPATIENT
Start: 2025-07-05 | End: 2025-07-06 | Stop reason: HOSPADM

## 2025-07-05 RX ORDER — ALBUTEROL SULFATE 90 UG/1
2 INHALANT RESPIRATORY (INHALATION) EVERY 4 HOURS PRN
Status: DISCONTINUED | OUTPATIENT
Start: 2025-07-05 | End: 2025-07-06 | Stop reason: HOSPADM

## 2025-07-05 RX ORDER — CEVIMELINE HYDROCHLORIDE 30 MG/1
30 CAPSULE ORAL 3 TIMES DAILY
Status: DISCONTINUED | OUTPATIENT
Start: 2025-07-05 | End: 2025-07-06 | Stop reason: HOSPADM

## 2025-07-05 RX ORDER — ACETAMINOPHEN 650 MG/1
650 SUPPOSITORY RECTAL EVERY 4 HOURS PRN
Status: DISCONTINUED | OUTPATIENT
Start: 2025-07-05 | End: 2025-07-06 | Stop reason: HOSPADM

## 2025-07-05 RX ORDER — IBUPROFEN 400 MG/1
1-2 TABLET, FILM COATED ORAL 2 TIMES DAILY
COMMUNITY

## 2025-07-05 RX ADMIN — FOLIC ACID 1 MG: 1 TABLET ORAL at 16:25

## 2025-07-05 RX ADMIN — ACETAMINOPHEN 650 MG: 325 TABLET, FILM COATED ORAL at 20:57

## 2025-07-05 RX ADMIN — ACETAMINOPHEN 650 MG: 325 TABLET, FILM COATED ORAL at 14:35

## 2025-07-05 RX ADMIN — POTASSIUM CHLORIDE 20 MEQ: 1500 TABLET, EXTENDED RELEASE ORAL at 18:13

## 2025-07-05 RX ADMIN — GABAPENTIN 100 MG: 100 CAPSULE ORAL at 21:05

## 2025-07-05 RX ADMIN — SODIUM CHLORIDE, SODIUM LACTATE, POTASSIUM CHLORIDE, AND CALCIUM CHLORIDE: .6; .31; .03; .02 INJECTION, SOLUTION INTRAVENOUS at 13:52

## 2025-07-05 RX ADMIN — GABAPENTIN 400 MG: 300 CAPSULE ORAL at 18:14

## 2025-07-05 RX ADMIN — SODIUM CHLORIDE, SODIUM LACTATE, POTASSIUM CHLORIDE, AND CALCIUM CHLORIDE: .6; .31; .03; .02 INJECTION, SOLUTION INTRAVENOUS at 04:00

## 2025-07-05 RX ADMIN — ACETAMINOPHEN 650 MG: 325 TABLET, FILM COATED ORAL at 07:50

## 2025-07-05 RX ADMIN — CEVIMELINE HYDROCHLORIDE 30 MG: 30 CAPSULE ORAL at 18:13

## 2025-07-05 RX ADMIN — PREDNISONE 5 MG: 5 TABLET ORAL at 16:25

## 2025-07-05 RX ADMIN — ONDANSETRON 4 MG: 2 INJECTION, SOLUTION INTRAMUSCULAR; INTRAVENOUS at 03:28

## 2025-07-05 RX ADMIN — SODIUM CHLORIDE, SODIUM LACTATE, POTASSIUM CHLORIDE, AND CALCIUM CHLORIDE 1000 ML: .6; .31; .03; .02 INJECTION, SOLUTION INTRAVENOUS at 02:43

## 2025-07-05 RX ADMIN — ONDANSETRON 4 MG: 2 INJECTION, SOLUTION INTRAMUSCULAR; INTRAVENOUS at 07:45

## 2025-07-05 RX ADMIN — ONDANSETRON 4 MG: 2 INJECTION, SOLUTION INTRAMUSCULAR; INTRAVENOUS at 18:14

## 2025-07-05 RX ADMIN — TRAZODONE HYDROCHLORIDE 25 MG: 50 TABLET ORAL at 21:05

## 2025-07-05 ASSESSMENT — ACTIVITIES OF DAILY LIVING (ADL)
ADLS_ACUITY_SCORE: 58
ADLS_ACUITY_SCORE: 60
ADLS_ACUITY_SCORE: 65
ADLS_ACUITY_SCORE: 65
ADLS_ACUITY_SCORE: 58
ADLS_ACUITY_SCORE: 58
ADLS_ACUITY_SCORE: 60
ADLS_ACUITY_SCORE: 60
ADLS_ACUITY_SCORE: 58
ADLS_ACUITY_SCORE: 58
ADLS_ACUITY_SCORE: 65
ADLS_ACUITY_SCORE: 60
ADLS_ACUITY_SCORE: 60
ADLS_ACUITY_SCORE: 58
ADLS_ACUITY_SCORE: 58
ADLS_ACUITY_SCORE: 60
ADLS_ACUITY_SCORE: 60
ADLS_ACUITY_SCORE: 59
ADLS_ACUITY_SCORE: 60
ADLS_ACUITY_SCORE: 58

## 2025-07-05 NOTE — ED PROVIDER NOTES
Emergency Department Note      History of Present Illness     Chief Complaint   Nausea, Vomiting, & Diarrhea      HPI   Agnes Saravia is a 80 year old female presents with a history of anxiety, PONV, Sjogren's syndrome, presents with nausea, vomiting, and diarrhea. Patient states that she's been having nausea and vomiting since 2030 and has turned to dry heaving since. Patients partner states that she had a prior episode once before when she had C Diff. Patient endorses experiencing weakness, cold, shivers, lightheadedness, loss of appetite, and partner endorses she has become less responsive at times. Denies experiencing any blood in stool or vomit, recent hospitalization, or eating raw or undercooked foods.     Independent Historian   Partner/caregiver as detailed above.    Review of External Notes   Patient had routine follow-up with Dr. Wallace 5/21/2025.  She was seen with back pain.  Diagnosed with compression fracture of the L1 vertebrae.    Past Medical History     Medical History and Problem List   Past Medical History:   Diagnosis Date    Anxiety     Arthritis     Cancer (H) 2013    Depressive disorder     Hyperlipidemia     Insomnia     PONV (postoperative nausea and vomiting)     Rheumatoid arthritis(714.0)     Sjogren's syndrome        Medications   acetaminophen (TYLENOL) 500 MG tablet  albuterol (PROAIR HFA/PROVENTIL HFA/VENTOLIN HFA) 108 (90 Base) MCG/ACT inhaler  aspirin 81 MG EC tablet  cevimeline (EVOXAC) 30 MG capsule  clorazepate dipotassium (TRANXENE) 15 MG tablet  folic acid (FOLVITE) 1 MG tablet  gabapentin (NEURONTIN) 100 MG capsule  gabapentin (NEURONTIN) 400 MG capsule  hydrochlorothiazide 12.5 MG tablet  HYDROmorphone (DILAUDID) 2 MG tablet  ibuprofen (ADVIL/MOTRIN) 600 MG tablet  lidocaine (LIDODERM) 5 % patch  methocarbamol (ROBAXIN) 500 MG tablet  methotrexate 2.5 MG tablet  naloxone (NARCAN) 4 MG/0.1ML nasal spray  pantoprazole (PROTONIX) 40 MG EC tablet  polyethylene glycol  (MIRALAX) 17 GM/Dose powder  polyethylene glycol-propylene glycol (SYSTANE ULTRA) 0.4-0.3 % SOLN ophthalmic solution  potassium chloride kang ER (KLOR-CON M20) 20 MEQ CR tablet  potassium chloride ER (K-TAB/KLOR-CON) 10 MEQ CR tablet  predniSONE (DELTASONE) 5 MG tablet  senna-docusate (SENOKOT-S/PERICOLACE) 8.6-50 MG tablet  simvastatin (ZOCOR) 40 MG tablet  traZODone (DESYREL) 50 MG tablet  Vitamin D3 (CHOLECALCIFEROL) 25 mcg (1000 units) tablet        Surgical History   Past Surgical History:   Procedure Laterality Date    ABDOMEN SURGERY      APPENDECTOMY      BIOPSY  2014    Lip lower    BLEPHAROPLASTY      BRONCHOSCOPY (RIGID OR FLEXIBLE), DIAGNOSTIC N/A 04/11/2018    Procedure: COMBINED BRONCHOSCOPY (RIGID OR FLEXIBLE), LAVAGE;  Bronchoscopy with Lavage;  Surgeon: Manuel Conway MD;  Location:  GI    COLONOSCOPY      DECOMPRESSION LUMBAR MINIMALLY INVASIVE TWO LEVELS Right 11/12/2024    Procedure: Right Lumbar 2 to Lumbar 3 minimally invasive redo discectomy and Lumbar 3 to Lumbar 4 far lateral discectomy;  Surgeon: Harjeet Kenny MD;  Location:  OR    DISCECTOMY LUMBAR POSTERIOR MICROSCOPIC ONE LEVEL  08/14/2014    Procedure: DISCECTOMY LUMBAR POSTERIOR MICROSCOPIC ONE LEVEL;  Surgeon: Dudley Bernal MD;  Location:  OR    ENT SURGERY  5/11/2017    HYSTERECTOMY TOTAL ABDOMINAL, BILATERAL SALPINGO-OOPHORECTOMY, COMBINED      HYSTERECTOMY, PAP NO LONGER INDICATED      MAMMOPLASTY REDUCTION BILATERAL  05/14/2013    Procedure: MAMMOPLASTY REDUCTION BILATERAL;  BILATERAL REDUCTION MAMMOPLASTY;  Surgeon: Bruce Nash MD;  Location: [facility]    SHOULDER SURGERY         Physical Exam     Patient Vitals for the past 24 hrs:   BP Temp Temp src Pulse Resp SpO2   07/05/25 0137 116/75 97.9  F (36.6  C) Oral 96 18 96 %     Physical Exam  General: alert, lying comfortably on gurney, appears weak and pale.  HENT: mucous membranes dry  CV: regular rate, regular rhythm  Resp: normal effort,  clear throughout, no crackles or wheezing  GI: abdomen soft and nontender, no guarding  MSK: no bony tenderness  Skin: appropriately warm and dry  Extremities: no edema, calves non-tender  Neuro: alert, clear speech, oriented  Psych: normal mood and affect      Diagnostics     Lab Results   Labs Ordered and Resulted from Time of ED Arrival to Time of ED Departure   COMPREHENSIVE METABOLIC PANEL - Abnormal       Result Value    Sodium 139      Potassium 4.4      Carbon Dioxide (CO2) 25      Anion Gap 12      Urea Nitrogen 26.4 (*)     Creatinine 0.59      GFR Estimate 89      Calcium 8.6 (*)     Chloride 102      Glucose 132 (*)     Alkaline Phosphatase 77      AST 22      ALT 13      Protein Total 6.5      Albumin 3.6      Bilirubin Total 0.5     CBC WITH PLATELETS AND DIFFERENTIAL - Abnormal    WBC Count 13.9 (*)     RBC Count 4.20      Hemoglobin 12.7      Hematocrit 40.3      MCV 96      MCH 30.2      MCHC 31.5      RDW 14.2      Platelet Count 301      % Neutrophils 84      % Lymphocytes 6      % Monocytes 8      % Eosinophils 1      % Basophils 0      % Immature Granulocytes 0      NRBCs per 100 WBC 0      Absolute Neutrophils 11.7 (*)     Absolute Lymphocytes 0.8      Absolute Monocytes 1.2      Absolute Eosinophils 0.1      Absolute Basophils 0.1      Absolute Immature Granulocytes 0.1      Absolute NRBCs 0.0     LIPASE - Normal    Lipase 35         Imaging   No orders to display       EKG   none    Independent Interpretation   None    ED Course      Medications Administered   Lactated Ringer's 1000 mL  Ondansetron 4 mg  Lactated Ringer's infusion 125 mL/h.    Procedures   Procedures     Discussion of Management   Admitting Hospitalist, Dr. Renteria.    ED Course   ED Course as of 07/05/25 0212   Sat Jul 05, 2025 0211 I obtained the history and evaluated the patient.        Additional Documentation  None    Medical Decision Making / Diagnosis     CMS Diagnoses: None    MIPS   None               JIN Alarcon  PADMINI Saravia is a 80 year old female with a history of hyperlipidemia, back pain, presenting today with vomiting and diarrhea as well as generalized weakness.  On exam, the patient is afebrile.  She has a benign abdominal exam.  Labs demonstrate normal renal function, no significant electrolyte abnormality.  Clinically, she appears weak and volume depleted.  She was started on IV fluids.  At this point, she is too weak to be discharged home.  She is feeling slightly better following fluids but will require additional resuscitation.  She does have history of C. difficile, though clinical history is not highly suspicious today.  I defer stool testing to the hospitalist, the patient has not provided a stool sample in the ER yet.  Otherwise, no other high risk exposures including ill contacts, foreign travel, exposure to undercooked foods.  Patient and partner are in agreement with observation stay for continued resuscitation.    Disposition   The patient was admitted to the hospital.     Diagnosis     ICD-10-CM    1. Vomiting and diarrhea  R11.10     R19.7              Scribe Disclosure:  I, Kamini Flores, am serving as a scribe at 2:12 AM on 7/5/2025 to document services personally performed by Amaris Hill MD based on my observations and the provider's statements to me.        Amaris Hill MD  07/05/25 0702

## 2025-07-05 NOTE — PROGRESS NOTES
Admission/Transfer from: ED   2 RN skin assessment completed. Yes  Name of 2nd RN: Ron Nicolas RN  Significant findings include: skin excoriation from moisture to sacrum/coccyx, scattered scabs to BLE, blanchable redness to coccyx   Provider Paged for [facility] Nurse Consult Order? No

## 2025-07-05 NOTE — ED NOTES
[facility]  ED Nurse Handoff Report    ED Chief complaint: Nausea, Vomiting, & Diarrhea      ED Diagnosis:   Final diagnoses:   Vomiting and diarrhea       Code Status: for hospitalist to address.    Allergies:   Allergies   Allergen Reactions    Mellaril [Thioridazine Hcl] Anaphylaxis    Percocet [Oxycodone-Acetaminophen]      Tolerates hydrocodone and codeine in cough medicine. Tolerates  Believes reaction to oxycodone was leg swelling. No tongue/lip/throat swelling or hives.    Duloxetine Anxiety     Made her feel weird, increased anxiety       Patient Story: BIBA from home with N/V/D since 1500.   Focused Assessment:  ambulatory with a walker. A&Ox4, respirations even and unlabored. Skin dry, warm, color wnl. VSS.   Results for orders placed or performed during the hospital encounter of 07/05/25   Comprehensive metabolic panel     Status: Abnormal   Result Value Ref Range    Sodium 139 135 - 145 mmol/L    Potassium 4.4 3.4 - 5.3 mmol/L    Carbon Dioxide (CO2) 25 22 - 29 mmol/L    Anion Gap 12 7 - 15 mmol/L    Urea Nitrogen 26.4 (H) 8.0 - 23.0 mg/dL    Creatinine 0.59 0.51 - 0.95 mg/dL    GFR Estimate 89 >60 mL/min/1.73m2    Calcium 8.6 (L) 8.8 - 10.4 mg/dL    Chloride 102 98 - 107 mmol/L    Glucose 132 (H) 70 - 99 mg/dL    Alkaline Phosphatase 77 40 - 150 U/L    AST 22 0 - 45 U/L    ALT 13 0 - 50 U/L    Protein Total 6.5 6.4 - 8.3 g/dL    Albumin 3.6 3.5 - 5.2 g/dL    Bilirubin Total 0.5 <=1.2 mg/dL   Lipase     Status: Normal   Result Value Ref Range    Lipase 35 13 - 60 U/L   CBC with platelets and differential     Status: Abnormal   Result Value Ref Range    WBC Count 13.9 (H) 4.0 - 11.0 10e3/uL    RBC Count 4.20 3.80 - 5.20 10e6/uL    Hemoglobin 12.7 11.7 - 15.7 g/dL    Hematocrit 40.3 35.0 - 47.0 %    MCV 96 78 - 100 fL    MCH 30.2 26.5 - 33.0 pg    MCHC 31.5 31.5 - 36.5 g/dL    RDW 14.2 10.0 - 15.0 %    Platelet Count 301 150 - 450 10e3/uL    % Neutrophils 84 %    % Lymphocytes 6 %    %  Monocytes 8 %    % Eosinophils 1 %    % Basophils 0 %    % Immature Granulocytes 0 %    NRBCs per 100 WBC 0 <1 /100    Absolute Neutrophils 11.7 (H) 1.6 - 8.3 10e3/uL    Absolute Lymphocytes 0.8 0.8 - 5.3 10e3/uL    Absolute Monocytes 1.2 0.0 - 1.3 10e3/uL    Absolute Eosinophils 0.1 0.0 - 0.7 10e3/uL    Absolute Basophils 0.1 0.0 - 0.2 10e3/uL    Absolute Immature Granulocytes 0.1 <=0.4 10e3/uL    Absolute NRBCs 0.0 10e3/uL   CBC with platelets differential     Status: Abnormal    Narrative    The following orders were created for panel order CBC with platelets differential.  Procedure                               Abnormality         Status                     ---------                               -----------         ------                     CBC with platelets and ...[[id number]]  Abnormal            Final result                 Please view results for these tests on the individual orders.       Treatments and/or interventions provided:   Medications   lactated ringers infusion (has no administration in time range)   lactated ringers BOLUS 1,000 mL (1,000 mLs Intravenous $New Bag 7/5/25 0243)   ondansetron (ZOFRAN) injection 4 mg (4 mg Intravenous $Given 7/5/25 0997)     Patient's response to treatments and/or interventions: VSS, comfortably resting in stretcher.    To be done/followed up on inpatient unit:  continue with POC    Does this patient have any cognitive concerns?: n/a    Activity level - Baseline/Home:  Walker  Activity Level - Current:   Stand with assist x2    Patient's Preferred language: English   Needed?: No    Isolation: None  Infection: Not Applicable  Sepsis treatment initiated: No  Patient tested for COVID 19 prior to admission: NO  Bariatric?: No    Vital Signs:   Vitals:    07/05/25 0137 07/05/25 0230 07/05/25 0300   BP: 116/75 124/63 118/63   Pulse: 96 99 98   Resp: 18     Temp: 97.9  F (36.6  C)     TempSrc: Oral     SpO2: 96% 92% 93%       Cardiac Rhythm:     Was the PSS-3  completed:   Yes  Family Comments: pt by herself in ED  OBS brochure/video discussed/provided to patient/family: N/A  For the majority of the shift this patient's behavior was Green.   Behavioral interventions performed were n/a.    ED NURSE PHONE NUMBER: [phone]

## 2025-07-05 NOTE — PLAN OF CARE
.DATE & SHIFT: 7/5/2025 - 1604-9363  PRIMARY Concern: Vomiting and Diarrhea  SAFETY RISK Concerns (fall risk, behaviors, etc.): Fall risk  Aggression Tool Color: Green  Isolation/Type: Enteric - Rule out C-Diff, stool sample pending  Tests/Procedures for NEXT shift: N/A  Consults? (Pending/following, signed-off?) N/A  Where is patient from? (Home, TCU, etc.): Home  Other Important info for NEXT shift: Tender back due to past surgery, Zofran given, effective per pt  Anticipated DC date & active delays: Discharge pending  _____________________________________________________________________________  SUMMARY NOTE:   Orientation/Cognitive: A&OX4  Observation Goals (Met/ Not Met): Not met  Mobility Level/Assist Equipment: AX1, walker  Antibiotics & Plan (IV/po, length of tx left): N/A  Pain Management: Tylenol PRN  Complete Pain Reassessment: Y/N Yes Due next: Next shift  Tele/VS/O2: VSS on RA  ABNL Lab/BG: Stool sample pending  Diet: Low fiber, adat  Bowel/Bladder: Continent for stool, incontinent for urine  Skin Concerns: Skin excoriation from moisture to sacrum/coccyx, scattered scabs to BLE, blanchable redness to coccyx   Drains/Devices: Purewick, PIV infusing LR at 75mL  Patient Stated Goal for Today:

## 2025-07-05 NOTE — PROGRESS NOTES
RECEIVING UNIT ED HANDOFF REVIEW    ED Nurse Handoff Report was reviewed by: Sindi Valentino RN on July 5, 2025 at 8:02 AM

## 2025-07-05 NOTE — PROGRESS NOTES
Observation goals  PRIOR TO DISCHARGE        Comments:   -diagnostic tests and consults completed and resulted: not met  -vital signs normal or at patient baseline: met   -tolerating oral intake to maintain hydration: partially met, IV infusing   Nurse to notify provider when observation goals have been met and patient is ready for discharge.

## 2025-07-05 NOTE — ED NOTES
Bed: ED06  Expected date:   Expected time:   Means of arrival:   Comments:  HEMS [phone] [id number] nausea/vomiting/diarrhea

## 2025-07-05 NOTE — PHARMACY-ADMISSION MEDICATION HISTORY
Pharmacy Intern Admission Medication History    Admission medication history is complete. The information provided in this note is only as accurate as the sources available at the time of the update.    Information Source(s): Patient and CareEverywhere/SureScripts via in-person    Pertinent Information: Last doses taken yesterday but was unable to hold down    Changes made to PTA medication list:  Added: None  Deleted: PEG, senna-S, vitamin D3, no longer on hydrochlorothiazide  Changed: Systane ultra left eye --> both eyes    Allergies reviewed with patient and updates made in EHR: yes    Medication History Completed By: JARED LOWERY 7/5/2025 8:45 AM    PTA Med List   Medication Sig Note Last Dose/Taking    acetaminophen (TYLENOL) 500 MG tablet Take 500-1,000 mg by mouth every 8 hours as needed for mild pain.  Noon    albuterol (PROAIR HFA/PROVENTIL HFA/VENTOLIN HFA) 108 (90 Base) MCG/ACT inhaler Inhale 2 puffs into the lungs every 4 hours as needed for shortness of breath or wheezing. 7/5/2025: LF 10/2024. Has supply at home as needed Taking As Needed    aspirin 81 MG EC tablet Take 1 tablet (81 mg) by mouth at bedtime.  7/4/2025    cevimeline (EVOXAC) 30 MG capsule Take 1 capsule (30 mg) by mouth 3 times daily.  7/4/2025    clorazepate dipotassium (TRANXENE) 15 MG tablet Take 1 tablet (15 mg) by mouth every morning.  7/4/2025    folic acid (FOLVITE) 1 MG tablet Take 1 tablet (1 mg) by mouth six times a week. Every day except Tuesdays when methotrexate is due  7/4/2025    gabapentin (NEURONTIN) 100 MG capsule Take 1 capsule (100 mg) by mouth at bedtime. ~1 hour before trazodone 7/5/2025: LF 9/19/24 #90/90ds. Has supply remaining at home 7/4/2025    gabapentin (NEURONTIN) 400 MG capsule Take 1 capsule (400 mg) by mouth 3 times daily. (AM, Noon, Dinner)  7/4/2025    HYDROmorphone (DILAUDID) 2 MG tablet Take 1 tablet (2 mg) by mouth every 6 hours as needed for moderate pain. 7/5/2025: LF 5/21/25. Has supply at home  as needed Taking As Needed    ibuprofen (ADVIL/MOTRIN) 400 MG tablet Take 1-2 tablets by mouth 2 times daily.  7/4/2025 Evening    lidocaine (LIDODERM) 5 % patch Place 1 patch over 12 hours onto the skin every 24 hours. To prevent lidocaine toxicity, patient should be patch free for 12 hrs daily. 7/5/2025: Not currently in place Taking    methocarbamol (ROBAXIN) 500 MG tablet Take 1 tablet (500 mg) by mouth 4 times daily as needed for muscle spasms.  Taking As Needed    methotrexate 2.5 MG tablet Take 4 tablets (10 mg) by mouth every 7 days. (Takes on Tuesdays)  7/1/2025    naloxone (NARCAN) 4 MG/0.1ML nasal spray Spray 1 spray (4 mg) into one nostril alternating nostrils as needed for opioid reversal. every 2-3 minutes until assistance arrives 7/5/2025: Does not have any left at home Taking As Needed    pantoprazole (PROTONIX) 40 MG EC tablet Take 40 mg by mouth daily as needed for heartburn. 7/5/2025:  9/2024 #90/90ds. Takes about once weekly as needed Taking As Needed    polyethylene glycol-propylene glycol (SYSTANE ULTRA) 0.4-0.3 % SOLN ophthalmic solution Place 1 drop into both eyes every hour as needed for dry eyes.  Taking As Needed    potassium chloride ER (K-TAB/KLOR-CON) 10 MEQ CR tablet Take 2 tablets (20 mEq) by mouth daily.  7/4/2025    predniSONE (DELTASONE) 5 MG tablet Take 5 mg by mouth daily.  7/4/2025    simvastatin (ZOCOR) 40 MG tablet Take 1 tablet (40 mg) by mouth at bedtime.  7/4/2025 Bedtime    traZODone (DESYREL) 50 MG tablet TAKE 0.5 (ONE-HALF) TABLET BY MOUTH ONCE DAILY AT BEDTIME  7/4/2025 Bedtime    Vitamin D3 (CHOLECALCIFEROL) 25 mcg (1000 units) tablet Take 1 tablet by mouth daily.  7/4/2025

## 2025-07-05 NOTE — PROGRESS NOTES
[facility]    Medicine Progress Note - Hospitalist Service    Date of Admission:  7/5/2025    Assessment & Plan     Agnes Saravia is a 80 year old female with past medical history of RA, Sjogren's syndrome, compression fractures with chronic back pain, anxiety, dyslipidemia, C. difficile colitis and prediabetes who presented to ER for evaluation of diarrhea and vomiting.     Nausea/vomiting/diarrhea  Suspect viral gastroenteritis  Rule out C. Difficile  -She presented with nausea, vomiting and diarrhea that started yesterday around 3:30 PM  -Denies sick contacts, not eating out  - No abdominal pain, no fever  -Labs significant for mildly elevated white blood cells of 13.9; bili 35, LFTs within normal limits  -Has history of C. difficile and she states that her symptoms are similar to that episode  -No recent antibiotic use    -- Patient has been admitted for further evaluation and management   --Check stool for C. difficile and stool panel   --Clear liquid diet, advance diet as tolerated   --Supportive management  --IV fluids LR at 75 cc/h  --Tylenol as needed, antiemetics as needed  -- Hold off on any imaging   -- Plan of care was discussed with SO at bedside also      Chronic back pain  History of pression fractures  History of lumbar discectomy   RA  --Apparently on prednisone, methotrexate and folic acid  --Resume PTA prednisone, hold methotrexate in the hospital, resume after discharge  --Start patient on PTA Folic acid, gabapentin 400 mg 3 times daily, oral as needed Dilaudid, Robaxin     History of Sjogren syndrome  --Resume PTA Evoxac     Anxiety  -- Continue PTA Clorazepate     HLD  -- Can resume PTA statin on discharge     Prediabetes  *Last A1c was 5.6% on 5/25 (6.1 on 11/2024)    Diet:   Advance diet as tolerated   DVT Prophylaxis: Pneumatic Compression Devices  Andujar Catheter: Not present  Lines: None     Cardiac Monitoring: None  Code Status:  Full Code     Clinically  Significant Risk Factors Present on Admission           # Hypocalcemia: Lowest Ca = 8.6 mg/dL in last 2 days, will monitor and replace as appropriate       # Drug Induced Platelet Defect: home medication list includes an antiplatelet medication   # Hypertension: Noted on problem list               # Financial/Environmental Concerns:           Social Drivers of Health    Tobacco Use: Medium Risk (5/21/2025)    Patient History     Smoking Tobacco Use: Former     Smokeless Tobacco Use: Never   Physical Activity: Inactive (5/21/2025)    Exercise Vital Sign     Days of Exercise per Week: 0 days     Minutes of Exercise per Session: 0 min   Interpersonal Safety: High Risk (11/10/2024)    Interpersonal Safety     Do you feel physically and emotionally safe where you currently live?: No     Within the past 12 months, have you been hit, slapped, kicked or otherwise physically hurt by someone?: No     Within the past 12 months, have you been humiliated or emotionally abused in other ways by your partner or ex-partner?: No   Social Connections: Unknown (5/21/2025)    Social Connection and Isolation Panel [NHANES]     Frequency of Social Gatherings with Friends and Family: Once a week          Disposition Plan     Medically Ready for Discharge: Anticipated in 2-4 Days    Yi Carmona MD  Hospitalist Service  [facility]  Securely message with [employer] (more info)  Text page via [employer] Paging/Directory   ______________________________________________________________________    Interval History     It was assumed this morning, patient was seen and examined, evaluated discussed with her regarding this-stool studies to check for C. difficile colitis tolerated, denying abdominal pain at this point, discussed plan of care in detail with significant other also who was present at bedside.    Physical Exam   Vital Signs: Temp: 97.9  F (36.6  C) Temp src: Oral BP: 118/63 Pulse: 98   Resp: 18 SpO2: 93 % O2 Device:  None (Room air)    Weight: 0 lbs 0 oz    Physical Exam  Vitals and nursing note reviewed.   Constitutional:       Appearance: She is well-developed.   HENT:      Head: Normocephalic and atraumatic.   Eyes:      Conjunctiva/sclera: Conjunctivae normal.      Pupils: Pupils are equal, round, and reactive to light.   Neck:      Thyroid: No thyromegaly.   Cardiovascular:      Rate and Rhythm: Normal rate and regular rhythm.      Heart sounds: Normal heart sounds. No murmur heard.  Pulmonary:      Effort: Pulmonary effort is normal. No respiratory distress.      Breath sounds: Normal breath sounds. No wheezing.   Abdominal:      General: Bowel sounds are normal.      Palpations: Abdomen is soft.      Tenderness: There is no abdominal tenderness. There is no guarding or rebound.   Musculoskeletal:         General: No deformity. Normal range of motion.      Cervical back: Normal range of motion and neck supple.   Skin:     General: Skin is warm and dry.   Neurological:      Mental Status: She is alert and oriented to person, place, and time.   Psychiatric:         Behavior: Behavior normal.       Medical Decision Making       20 MINUTES SPENT BY ME on the date of service doing chart review, history, exam, documentation & further activities per the note.    This time was spent in addition to the time spent by my colleague  earlier this morning on admission     Data     I have personally reviewed the following data over the past 24 hrs:    13.9 (H)  \   12.7   / 301     139 102 26.4 (H) /  132 (H)   4.4 25 0.59 \     ALT: 13 AST: 22 AP: 77 TBILI: 0.5   ALB: 3.6 TOT PROTEIN: 6.5 LIPASE: 35       Imaging results reviewed over the past 24 hrs:   No results found for this or any previous visit (from the past 24 hours).  Recent Labs   Lab 07/05/25  0154   WBC 13.9*   HGB 12.7   MCV 96         POTASSIUM 4.4   CHLORIDE 102   CO2 25   BUN 26.4*   CR 0.59   ANIONGAP 12   CHRISTOPHER 8.6*   *   ALBUMIN 3.6    PROTTOTAL 6.5   BILITOTAL 0.5   ALKPHOS 77   ALT 13   AST 22   LIPASE 35

## 2025-07-05 NOTE — H&P
[facility]    History and Physical - Hospitalist Service       Date of Admission:  7/5/2025    Assessment & Plan      Agnes Saravia is a 80 year old female admitted on 7/5/2025.   Agnes Saravia is a 80 year old female with past medical history of RA, Sjogren's syndrome, compression fractures with chronic back pain, anxiety, dyslipidemia, C. difficile colitis and prediabetes who presented to ER for evaluation of diarrhea and vomiting.    # Nausea/vomiting/diarrhea  # Suspect viral gastroenteritis  # Rule out C. Difficile  -She presented with nausea, vomiting and diarrhea that started yesterday around 3:30 PM  -Denies sick contacts, not eating out  - No abdominal pain, no fever  -Labs significant for mildly elevated white blood cells of 13.9; bili 35, LFTs within normal limits  -Has history of C. difficile and she states that her symptoms are similar to that episode  -No recent antibiotic use  -Check stool for C. difficile and stool panel   -Clear liquid diet  -Supportive management-IV fluids LR at 100 cc/h  -Tylenol as needed, antiemetics as needed    # Chronic back pain  # History of pression fractures  # History of lumbar discectomy  -Resume PTA pain regiment after verified by the pharmacy    # RA  -Apparently on prednisone, methotrexate and folic acid  -Resume PTA prednisone once verified by the pharmacy, hold methotrexate in the hospital, resume after discharge    # History of Sjogren syndrome  -Resume PTA Evoxac    # Anxiety  - Continue PTA Clorazepate     # HLD  - Can resume PTA statin on discharge     # Prediabetes  *Last A1c was 5.6% on 5/25 (6.1 on 11/2024)        Diet:  clear liquid diet  DVT Prophylaxis: Pneumatic Compression Devices  Andujar Catheter: Not present  Lines: None     Cardiac Monitoring: None  Code Status:  Full code, discussed with the patient    Clinically Significant Risk Factors Present on Admission           # Hypocalcemia: Lowest Ca = 8.6 mg/dL in last 2  days, will monitor and replace as appropriate       # Drug Induced Platelet Defect: home medication list includes an antiplatelet medication   # Hypertension: Noted on problem list               # Financial/Environmental Concerns:           Disposition Plan     Medically Ready for Discharge: Anticipated Today versus tomorrow, pending improvement of her symptoms and tolerating diet.           Emily Renteria MD  Hospitalist Service  [facility]  Securely message with [employer] (more info)  Text page via [facility] Paging/Directory     ______________________________________________________________________    Chief Complaint   Diarrhea, vomiting    History is obtained from the patient who is a relatively good historian.  I discussed with ER attending Dr. Hill and I reviewed her EMR.    History of Present Illness   Agnes Saravia is a 80 year old female with past medical history of RA, Sjogren's syndrome, compression fractures with chronic back pain, anxiety, dyslipidemia, C. difficile colitis and prediabetes who presented to ER for evaluation of diarrhea and vomiting.    The patient reports that she woke up yesterday morning not feeling well.  Later on in the afternoon around 3:30 PM she started having diarrhea.  She also reports associated nausea and vomited few times.  She describes multiple episodes of diarrhea, no blood was seen in diarrhea or emesis.  She denies fever, no abdominal pain.  She denies chest pain, no shortness of breath, no headache.  She denies dysuria.  She reports that she had C. difficile infection in the past and her current symptoms are similar to that episode.  She denies recent antibiotic use.  She denies recent travel.    In ER she was seen by Dr. Hill.  Vital signs:  Temp: 97.9  F (36.6  C) Temp src: Oral BP: 118/63 Pulse: 98   Resp: 18 SpO2: 93 % O2 Device: None (Room air)        Estimated body mass index is 31.89 kg/m  as calculated from the following:     "Height as of 5/21/25: 1.6 m (5' 3\").    Weight as of 5/21/25: 81.6 kg (180 lb).     CBC RESULTS:   Recent Labs   Lab Test 07/05/25 0154   WBC 13.9*   RBC 4.20   HGB 12.7   HCT 40.3   MCV 96   MCH 30.2   MCHC 31.5   RDW 14.2         Last Comprehensive Metabolic Panel:  Lab Results   Component Value Date     07/05/2025    POTASSIUM 4.4 07/05/2025    CHLORIDE 102 07/05/2025    CO2 25 07/05/2025    ANIONGAP 12 07/05/2025     (H) 07/05/2025    BUN 26.4 (H) 07/05/2025    CR 0.59 07/05/2025    GFRESTIMATED 89 07/05/2025    CHRISTOPHER 8.6 (L) 07/05/2025     Liver Function Studies -   Recent Labs   Lab Test 07/05/25 0154   PROTTOTAL 6.5   ALBUMIN 3.6   BILITOTAL 0.5   ALKPHOS 77   AST 22   ALT 13      Lipase 35    In ER she was given a bolus LR 1000 cc and 1 dose of Zofran and hospitalist service was called regarding admission.      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    Depressive disorder     Hyperlipidemia     Insomnia     PONV (postoperative nausea and vomiting)     Rheumatoid arthritis(714.0)     Sjogren's syndrome        Past Surgical History   Past Surgical History:   Procedure Laterality Date    ABDOMEN SURGERY      APPENDECTOMY      BIOPSY  2014    Lip lower    BLEPHAROPLASTY      BRONCHOSCOPY (RIGID OR FLEXIBLE), DIAGNOSTIC N/A 04/11/2018    Procedure: COMBINED BRONCHOSCOPY (RIGID OR FLEXIBLE), LAVAGE;  Bronchoscopy with Lavage;  Surgeon: Manuel Conway MD;  Location: [facility]    COLONOSCOPY      DECOMPRESSION LUMBAR MINIMALLY INVASIVE TWO LEVELS Right 11/12/2024    Procedure: Right Lumbar 2 to Lumbar 3 minimally invasive redo discectomy and Lumbar 3 to Lumbar 4 far lateral discectomy;  Surgeon: Harjeet Kenny MD;  Location:  OR    DISCECTOMY LUMBAR POSTERIOR MICROSCOPIC ONE LEVEL  08/14/2014    Procedure: DISCECTOMY LUMBAR POSTERIOR MICROSCOPIC ONE LEVEL;  Surgeon: Dudley Bernal MD;  Location:  " OR    ENT SURGERY  5/11/2017    HYSTERECTOMY TOTAL ABDOMINAL, BILATERAL SALPINGO-OOPHORECTOMY, COMBINED      HYSTERECTOMY, PAP NO LONGER INDICATED      MAMMOPLASTY REDUCTION BILATERAL  05/14/2013    Procedure: MAMMOPLASTY REDUCTION BILATERAL;  BILATERAL REDUCTION MAMMOPLASTY;  Surgeon: Bruce Nash MD;  Location: [facility]    SHOULDER SURGERY         Prior to Admission Medications   Prior to Admission Medications   Prescriptions Last Dose Informant Patient Reported? Taking?   HYDROmorphone (DILAUDID) 2 MG tablet   No No   Sig: Take 1 tablet (2 mg) by mouth every 6 hours as needed for moderate pain.   Vitamin D3 (CHOLECALCIFEROL) 25 mcg (1000 units) tablet   Yes No   Sig: Take 1 tablet by mouth daily.   acetaminophen (TYLENOL) 500 MG tablet   No No   Sig: Take 2 tablets (1,000 mg) by mouth 3 times daily.   albuterol (PROAIR HFA/PROVENTIL HFA/VENTOLIN HFA) 108 (90 Base) MCG/ACT inhaler   No No   Sig: Inhale 2 puffs into the lungs every 4 hours as needed for shortness of breath or wheezing.   aspirin 81 MG EC tablet   No No   Sig: Take 1 tablet (81 mg) by mouth at bedtime.   cevimeline (EVOXAC) 30 MG capsule   No No   Sig: Take 1 capsule (30 mg) by mouth 3 times daily.   clorazepate dipotassium (TRANXENE) 15 MG tablet   No No   Sig: Take 1 tablet (15 mg) by mouth every morning.   folic acid (FOLVITE) 1 MG tablet   No No   Sig: Take 1 tablet (1 mg) by mouth six times a week. Every day except Tuesdays when methotrexate is due   gabapentin (NEURONTIN) 100 MG capsule   No No   Sig: Take 1 capsule (100 mg) by mouth at bedtime. ~1 hour before trazodone   gabapentin (NEURONTIN) 400 MG capsule   No No   Sig: Take 1 capsule (400 mg) by mouth 3 times daily. (AM, Noon, Dinner)   hydrochlorothiazide 12.5 MG tablet   No No   Sig: Take 1 tablet (12.5 mg) by mouth daily.   ibuprofen (ADVIL/MOTRIN) 600 MG tablet   No No   Sig: Take 1 tablet (600 mg) by mouth 2 times daily. At noon and 1800   lidocaine (LIDODERM) 5 % patch   No No    Sig: Place 1 patch over 12 hours onto the skin every 24 hours. To prevent lidocaine toxicity, patient should be patch free for 12 hrs daily.   methocarbamol (ROBAXIN) 500 MG tablet   No No   Sig: Take 1 tablet (500 mg) by mouth 4 times daily as needed for muscle spasms.   methotrexate 2.5 MG tablet   No No   Sig: Take 4 tablets (10 mg) by mouth every 7 days. (Takes on Tuesdays)   naloxone (NARCAN) 4 MG/0.1ML nasal spray   No No   Sig: Spray 1 spray (4 mg) into one nostril alternating nostrils as needed for opioid reversal. every 2-3 minutes until assistance arrives   pantoprazole (PROTONIX) 40 MG EC tablet   No No   Sig: Take 1 tablet (40 mg) by mouth daily.   polyethylene glycol (MIRALAX) 17 GM/Dose powder   No No   Sig: Take 17 g by mouth daily.   polyethylene glycol-propylene glycol (SYSTANE ULTRA) 0.4-0.3 % SOLN ophthalmic solution   No No   Sig: Place 1 drop Into the left eye 4 times daily.   potassium chloride ER (K-TAB/KLOR-CON) 10 MEQ CR tablet   No No   Sig: Take 2 tablets (20 mEq) by mouth daily.   potassium chloride kang ER (KLOR-CON M20) 20 MEQ CR tablet   No No   Sig: Take 1 tablet (20 mEq) by mouth 2 times daily.   predniSONE (DELTASONE) 5 MG tablet   No No   Sig: Take 1 tablet (5 mg) by mouth daily. (Hold until Decadron is done then restart)   senna-docusate (SENOKOT-S/PERICOLACE) 8.6-50 MG tablet   No No   Sig: Take 1 tablet by mouth 2 times daily as needed for constipation.   simvastatin (ZOCOR) 40 MG tablet   No No   Sig: Take 1 tablet (40 mg) by mouth at bedtime.   traZODone (DESYREL) 50 MG tablet   No No   Sig: TAKE 0.5 (ONE-HALF) TABLET BY MOUTH ONCE DAILY AT BEDTIME      Facility-Administered Medications: None        Review of Systems    The 10 point Review of Systems is negative other than noted in the HPI or here.     Social History   I have reviewed this patient's social history and updated it with pertinent information if needed.  Social History     Tobacco Use    Smoking status: Former      Current packs/day: 0.00     Average packs/day: 0.5 packs/day for 10.0 years (5.0 ttl pk-yrs)     Types: Cigarettes     Start date: 1970     Quit date: 1975     Years since quittin.0    Smokeless tobacco: Never    Tobacco comments:     N/A   Vaping Use    Vaping status: Never Used   Substance Use Topics    Alcohol use: Yes     Alcohol/week: 0.0 standard drinks of alcohol     Comment: beer in summer when mowing lawn     Drug use: No         Family History   I have reviewed this patient's family history and updated it with pertinent information if needed.  Family History   Problem Relation Age of Onset    Cerebrovascular Disease Mother     Cerebrovascular Disease Father     Colon Cancer No family hx of          Allergies   Allergies   Allergen Reactions    Mellaril [Thioridazine Hcl] Anaphylaxis    Percocet [Oxycodone-Acetaminophen]      Tolerates hydrocodone and codeine in cough medicine. Tolerates  Believes reaction to oxycodone was leg swelling. No tongue/lip/throat swelling or hives.    Duloxetine Anxiety     Made her feel weird, increased anxiety        Physical Exam   Vital Signs: Temp: 97.9  F (36.6  C) Temp src: Oral BP: 118/63 Pulse: 98   Resp: 18 SpO2: 93 % O2 Device: None (Room air)    Weight: 0 lbs 0 oz    General Appearance: Awake and alert, no acute distress, very pleasant  Respiratory: Bilateral air entry, no wheezing, no rales, no crackles  Cardiovascular: S1-S2, RRR, no murmurs, rubs  GI: Abdomen is soft, obese, nontender, bowel sounds are present  Skin: No rashes, no cyanosis  Neuro: Awake and alert, oriented x 3, forgetful, no focal neurological deficits    Medical Decision Making       65 MINUTES SPENT BY ME on the date of service doing chart review, history, exam, documentation & further activities per the note.      Data     I have personally reviewed the following data over the past 24 hrs:    13.9 (H)  \   12.7   / 301     139 102 26.4 (H) /  132 (H)   4.4 25 0.59 \     ALT:  13 AST: 22 AP: 77 TBILI: 0.5   ALB: 3.6 TOT PROTEIN: 6.5 LIPASE: 35       Imaging results reviewed over the past 24 hrs:   No results found for this or any previous visit (from the past 24 hours).

## 2025-07-06 VITALS
OXYGEN SATURATION: 93 % | HEART RATE: 84 BPM | TEMPERATURE: 98.5 F | RESPIRATION RATE: 18 BRPM | DIASTOLIC BLOOD PRESSURE: 64 MMHG | SYSTOLIC BLOOD PRESSURE: 108 MMHG

## 2025-07-06 LAB
ANION GAP SERPL CALCULATED.3IONS-SCNC: 10 MMOL/L (ref 7–15)
BUN SERPL-MCNC: 11.4 MG/DL (ref 8–23)
CALCIUM SERPL-MCNC: 9 MG/DL (ref 8.8–10.4)
CHLORIDE SERPL-SCNC: 105 MMOL/L (ref 98–107)
CREAT SERPL-MCNC: 0.61 MG/DL (ref 0.51–0.95)
EGFRCR SERPLBLD CKD-EPI 2021: 89 ML/MIN/1.73M2
ERYTHROCYTE [DISTWIDTH] IN BLOOD BY AUTOMATED COUNT: 13.9 % (ref 10–15)
GLUCOSE SERPL-MCNC: 88 MG/DL (ref 70–99)
HCO3 SERPL-SCNC: 28 MMOL/L (ref 22–29)
HCT VFR BLD AUTO: 33.4 % (ref 35–47)
HGB BLD-MCNC: 11 G/DL (ref 11.7–15.7)
MCH RBC QN AUTO: 31 PG (ref 26.5–33)
MCHC RBC AUTO-ENTMCNC: 32.9 G/DL (ref 31.5–36.5)
MCV RBC AUTO: 94 FL (ref 78–100)
PLATELET # BLD AUTO: 261 10E3/UL (ref 150–450)
POTASSIUM SERPL-SCNC: 3.9 MMOL/L (ref 3.4–5.3)
RBC # BLD AUTO: 3.55 10E6/UL (ref 3.8–5.2)
SODIUM SERPL-SCNC: 143 MMOL/L (ref 135–145)
WBC # BLD AUTO: 8.1 10E3/UL (ref 4–11)

## 2025-07-06 PROCEDURE — 250N000013 HC RX MED GY IP 250 OP 250 PS 637: Performed by: INTERNAL MEDICINE

## 2025-07-06 PROCEDURE — 258N000003 HC RX IP 258 OP 636: Performed by: INTERNAL MEDICINE

## 2025-07-06 PROCEDURE — 99238 HOSP IP/OBS DSCHRG MGMT 30/<: CPT | Performed by: INTERNAL MEDICINE

## 2025-07-06 PROCEDURE — 36415 COLL VENOUS BLD VENIPUNCTURE: CPT | Performed by: INTERNAL MEDICINE

## 2025-07-06 PROCEDURE — G0378 HOSPITAL OBSERVATION PER HR: HCPCS

## 2025-07-06 PROCEDURE — 80048 BASIC METABOLIC PNL TOTAL CA: CPT | Performed by: INTERNAL MEDICINE

## 2025-07-06 PROCEDURE — 85018 HEMOGLOBIN: CPT | Performed by: INTERNAL MEDICINE

## 2025-07-06 PROCEDURE — 250N000012 HC RX MED GY IP 250 OP 636 PS 637: Performed by: INTERNAL MEDICINE

## 2025-07-06 RX ADMIN — GABAPENTIN 400 MG: 300 CAPSULE ORAL at 08:13

## 2025-07-06 RX ADMIN — GABAPENTIN 400 MG: 300 CAPSULE ORAL at 12:37

## 2025-07-06 RX ADMIN — POTASSIUM CHLORIDE 20 MEQ: 1500 TABLET, EXTENDED RELEASE ORAL at 08:14

## 2025-07-06 RX ADMIN — CLORAZEPATE DIPOTASSIUM 15 MG: 3.75 TABLET ORAL at 09:31

## 2025-07-06 RX ADMIN — SODIUM CHLORIDE, SODIUM LACTATE, POTASSIUM CHLORIDE, AND CALCIUM CHLORIDE: .6; .31; .03; .02 INJECTION, SOLUTION INTRAVENOUS at 03:23

## 2025-07-06 RX ADMIN — ACETAMINOPHEN 650 MG: 325 TABLET, FILM COATED ORAL at 03:22

## 2025-07-06 RX ADMIN — PREDNISONE 5 MG: 5 TABLET ORAL at 08:13

## 2025-07-06 RX ADMIN — CEVIMELINE HYDROCHLORIDE 30 MG: 30 CAPSULE ORAL at 08:12

## 2025-07-06 ASSESSMENT — ACTIVITIES OF DAILY LIVING (ADL)
ADLS_ACUITY_SCORE: 60
ADLS_ACUITY_SCORE: 61
ADLS_ACUITY_SCORE: 60
ADLS_ACUITY_SCORE: 61
ADLS_ACUITY_SCORE: 60
ADLS_ACUITY_SCORE: 61
ADLS_ACUITY_SCORE: 60

## 2025-07-06 NOTE — PLAN OF CARE
.DATE & SHIFT: 7/6/2025 - [id number]  PRIMARY Concern: Vomiting and Diarrhea  SAFETY RISK Concerns (fall risk, behaviors, etc.): Fall risk  Aggression Tool Color: Green  Isolation/Type: N/A  Tests/Procedures for NEXT shift: N/A  Consults? (Pending/following, signed-off?) N/A  Where is patient from? (Home, TCU, etc.): Home  Other Important info for NEXT shift: Tender back due to past surgery, N/V resolved. No diarrhea.   Anticipated DC date & active delays: 7/6/2025  _____________________________________________________________________________  SUMMARY NOTE:   Orientation/Cognitive: A&OX4  Observation Goals (Met/ Not Met): Not met  Mobility Level/Assist Equipment: AX1, walker  Antibiotics & Plan (IV/po, length of tx left): N/A  Pain Management: Tylenol PRN  Complete Pain Reassessment: Y/N Yes Due next: Next shift  Tele/VS/O2: VSS on RA  ABNL Lab/BG: N/A  Diet: Reg diet, tolerating well  Bowel/Bladder: Continent for stool, incontinent for urine  Skin Concerns: Skin excoriation from moisture to sacrum/coccyx, scattered scabs to BLE, blanchable redness to coccyx   Drains/Devices: N/A  Patient Stated Goal for Today: Go home!

## 2025-07-06 NOTE — DISCHARGE SUMMARY
[facility]  Hospitalist Discharge Summary      Date of Admission:  7/5/2025  Date of Discharge:  7/6/2025  Discharging Provider: Yi Carmona MD  Discharge Service: Hospitalist Service    Discharge Diagnoses     Nausea associated with vomiting and diarrhea.  Possible viral gastroenteritis, symptoms resolved.  C. difficile ruled out.  Chronic back pain.  History of compression fractures.  History of lumbar dissected me.  History of Sjogren syndrome.  Anxiety.  Hyperlipidemia.  Prediabetes    Clinically Significant Risk Factors          Follow-ups Needed After Discharge   Follow-up Appointments       Hospital Follow-up with Existing Primary Care Provider (PCP)          Schedule Primary Care visit within: 14 Days   Recommended labs and Imaging (to be ordered by Primary Care Provider): BMP               Unresulted Labs Ordered in the Past 30 Days of this Admission       No orders found for last 31 day(s).            Discharge Disposition   Discharged to home  Condition at discharge: Stable    Hospital Course     Agnes Saravia is a 80 year old female with past medical history of RA, Sjogren's syndrome, compression fractures with chronic back pain, anxiety, dyslipidemia, C. difficile colitis and prediabetes who presented to ER for evaluation of diarrhea and vomiting.  Here are further details regarding her current hospitalization      Nausea/vomiting/diarrhea: resolved   Possible  viral gastroenteritis  Ruled out C. Difficile    -She presented with nausea, vomiting and diarrhea   - No abdominal pain, no fever  -Labs significant for mildly elevated white blood cells of 13.9; bili 35, LFTs within normal limits  -Has history of C. difficile and she states that her symptoms are similar to that episode  -No recent antibiotic use     -- Admitted for further evaluation and management   -- Stool for C. difficile PCR and stool enteric pathogen panel came back negative   -- Clear liquid diet, advanced  diet to regular diet which she was able to tolerate   --Tylenol as needed, antiemetics as needed  -- As patient's symptoms have significantly resolved, labs from this morning showed normal electrolytes and renal function, WBC count has now resolved with slight drop in hemoglobin of 11.0 from hemodilution, discussed with patient that if she continues to tolerate diet she should be able to go home, patient was in agreement with the plan.       Chronic back pain  History of pression fractures  History of lumbar discectomy   RA  -- Apparently on prednisone, methotrexate and folic acid  -- Continue PTA prednisone, methotrexate , Folic acid, Gabapentin 400 mg 3 times daily, PTA oral PRN Dilaudid and Robaxin     History of Sjogren syndrome  -- Continue PTA Evoxac on discharge      Anxiety  -- Continue PTA Clorazepate     HLD  -- Continue PTA statin on discharge     Prediabetes  Last A1c was 5.6% on 5/25 (6.1 on 11/2024)    Patient was seen and examined on the day of discharge ,she is feeling well, does not have any complaints , I did review the discharge medications and instructions with the patient and plan for her to follow up with the PCP after the hospitalization .patient was in agreement , she is discharged in stable condition to her home with her partner.    Consultations This Hospital Stay   None    Code Status   Full Code    Time Spent on this Encounter   I, Yi Carmona MD, personally saw the patient today and spent less than or equal to 30 minutes discharging this patient.       Yi Carmona MD  [facility] EXTENDED RECOVERY AND SHORT STAY  [address] [facility] [address] [address]  Phone: [phone]  ______________________________________________________________________    Physical Exam   Vital Signs: Temp: 98.5  F (36.9  C) Temp src: Oral BP: 108/64 Pulse: 84   Resp: 18 SpO2: 93 % O2 Device: None (Room air)    Weight: 0 lbs 0 oz    Physical Exam  Vitals and nursing note reviewed.    Constitutional:       Appearance: She is well-developed.   HENT:      Head: Normocephalic and atraumatic.   Eyes:      Conjunctiva/sclera: Conjunctivae normal.      Pupils: Pupils are equal, round, and reactive to light.   Neck:      Thyroid: No thyromegaly.   Cardiovascular:      Rate and Rhythm: Normal rate and regular rhythm.      Heart sounds: Normal heart sounds. No murmur heard.  Pulmonary:      Effort: Pulmonary effort is normal. No respiratory distress.      Breath sounds: Normal breath sounds. No wheezing.   Abdominal:      General: Bowel sounds are normal.      Palpations: Abdomen is soft.      Tenderness: There is no abdominal tenderness. There is no guarding or rebound.   Musculoskeletal:         General: No deformity. Normal range of motion.      Cervical back: Normal range of motion and neck supple.   Skin:     General: Skin is warm and dry.   Neurological:      Mental Status: She is alert and oriented to person, place, and time.   Psychiatric:         Behavior: Behavior normal.          Primary Care Physician   José Miguel Wallace MD    Discharge Orders      Reason for your hospital stay    You were admitted to the hospital secondary to gastroenteritis symptoms and diarrhea, your infection workup came back negative.     Activity    Your activity upon discharge: activity as tolerated and no driving for today     Discharge Instructions    You were admitted to the hospital secondary to gastroenteritis symptoms and diarrhea, your infection workup came back negative.  Your C. difficile PCR and enteric panel came back negative.     Full Code     Diet    Follow this diet upon discharge: Orders Placed This Encounter      Advance Diet as Tolerated: Regular Diet Adult; Regular Diet Adult       Hospital Follow-up with Existing Primary Care Provider (PCP)            Significant Results and Procedures   Results for orders placed or performed in visit on 05/21/25   XR Lumbar Spine 2/3 Views    Narrative     EXAM: XR LUMBAR SPINE 2/3 VIEWS  LOCATION: [facility]  DATE: 5/21/2025    INDICATION:  Compression fracture of L1 vertebra, initial encounter (H), Age-related osteoporosis with current pathological fracture, initial encounter  COMPARISON: Lumbar spine radiographs: 2/21/2025.      Impression    IMPRESSION:     1.  No significant progressive height loss associated with known L1 superior endplate fracture with approximately 50% height loss. Additional chronic fracture involving the L5 superior endplate is also similar to prior. No new areas of vertebral body   height loss in the lumbar spine. Osteopenia.  2.  Mild kyphosis centered at T12-L1, slightly increased from last prior study. No substantial listhesis.  3.  Multilevel degenerative disc disease with disc height loss greatest and advanced at L2-L3.  4.  Calcified atherosclerosis of the abdominal aorta.       Discharge Medications      Review of your medicines        CONTINUE these medicines which have NOT CHANGED        Dose / Directions   acetaminophen 500 MG tablet  Commonly known as: TYLENOL      Dose: 500-1,000 mg  Take 500-1,000 mg by mouth every 8 hours as needed for mild pain.  Refills: 0     albuterol 108 (90 Base) MCG/ACT inhaler  Commonly known as: PROAIR HFA/PROVENTIL HFA/VENTOLIN HFA  Used for: Pleuritis      Dose: 2 puff  Inhale 2 puffs into the lungs every 4 hours as needed for shortness of breath or wheezing.  Refills: 0     aspirin 81 MG EC tablet  Used for: Sjogren's syndrome, with unspecified organ involvement      Dose: 81 mg  Take 1 tablet (81 mg) by mouth at bedtime.  Refills: 0     cevimeline 30 MG capsule  Commonly known as: EVOXAC  Used for: Sjogren's syndrome, with unspecified organ involvement      Dose: 30 mg  Take 1 capsule (30 mg) by mouth 3 times daily.  Refills: 0     clorazepate dipotassium 15 MG tablet  Commonly known as: TRANXENE  Used for: DANA (generalized anxiety disorder)      Dose: 15 mg  Take 1 tablet (15  mg) by mouth every morning.  Quantity: 90 tablet  Refills: 0     folic acid 1 MG tablet  Commonly known as: FOLVITE  Used for: Rheumatoid arthritis involving both knees, unspecified whether rheumatoid factor present (H)      Dose: 1 mg  Take 1 tablet (1 mg) by mouth six times a week. Every day except Tuesdays when methotrexate is due  Refills: 0     * gabapentin 100 MG capsule  Commonly known as: NEURONTIN  Used for: Acute bilateral low back pain with right-sided sciatica      Dose: 100 mg  Take 1 capsule (100 mg) by mouth at bedtime. ~1 hour before trazodone  Refills: 0     * gabapentin 400 MG capsule  Commonly known as: NEURONTIN  Used for: Acute bilateral low back pain with right-sided sciatica      Dose: 400 mg  Take 1 capsule (400 mg) by mouth 3 times daily. (AM, Noon, Dinner)  Refills: 0     HYDROmorphone 2 MG tablet  Commonly known as: DILAUDID  Used for: Status post lumbar surgery      Dose: 2 mg  Take 1 tablet (2 mg) by mouth every 6 hours as needed for moderate pain.  Quantity: 7 tablet  Refills: 0     ibuprofen 400 MG tablet  Commonly known as: ADVIL/MOTRIN      Dose: 1-2 tablet  Take 1-2 tablets by mouth 2 times daily.  Refills: 0     lidocaine 5 % patch  Commonly known as: LIDODERM  Used for: Compression fracture of L1 vertebra, initial encounter (H)      Dose: 1 patch  Place 1 patch over 12 hours onto the skin every 24 hours. To prevent lidocaine toxicity, patient should be patch free for 12 hrs daily.  Quantity: 90 patch  Refills: 3     methocarbamol 500 MG tablet  Commonly known as: ROBAXIN  Used for: Acute bilateral low back pain with right-sided sciatica      Dose: 500 mg  Take 1 tablet (500 mg) by mouth 4 times daily as needed for muscle spasms.  Quantity: 120 tablet  Refills: 3     methotrexate 2.5 MG tablet  Used for: Rheumatoid arthritis involving both knees, unspecified whether rheumatoid factor present (H)      Dose: 10 mg  Take 4 tablets (10 mg) by mouth every 7 days. (Takes on  Tuesdays)  Refills: 0     naloxone 4 MG/0.1ML nasal spray  Commonly known as: NARCAN  Used for: Acute on chronic low back pain      Dose: 4 mg  Spray 1 spray (4 mg) into one nostril alternating nostrils as needed for opioid reversal. every 2-3 minutes until assistance arrives  Refills: 0     pantoprazole 40 MG EC tablet  Commonly known as: PROTONIX      Dose: 40 mg  Take 40 mg by mouth daily as needed for heartburn.  Refills: 0     polyethylene glycol-propylene glycol 0.4-0.3 % Soln ophthalmic solution  Commonly known as: SYSTANE ULTRA      Dose: 1 drop  Place 1 drop into both eyes every hour as needed for dry eyes.  Refills: 0     potassium chloride ER 10 MEQ CR tablet  Commonly known as: K-TAB/KLOR-CON  Used for: Hypokalemia      Dose: 20 mEq  Take 2 tablets (20 mEq) by mouth daily.  Quantity: 180 tablet  Refills: 1     predniSONE 5 MG tablet  Commonly known as: DELTASONE      Dose: 5 mg  Take 5 mg by mouth daily.  Refills: 0     simvastatin 40 MG tablet  Commonly known as: ZOCOR  Used for: Hyperlipidemia LDL goal <130      Dose: 40 mg  Take 1 tablet (40 mg) by mouth at bedtime.  Quantity: 90 tablet  Refills: 3     traZODone 50 MG tablet  Commonly known as: DESYREL  Used for: Insomnia, unspecified type      TAKE 0.5 (ONE-HALF) TABLET BY MOUTH ONCE DAILY AT BEDTIME  Quantity: 45 tablet  Refills: 0     Vitamin D3 25 mcg (1000 units) tablet  Commonly known as: CHOLECALCIFEROL      Dose: 1 tablet  Take 1 tablet by mouth daily.  Refills: 0           * This list has 2 medication(s) that are the same as other medications prescribed for you. Read the directions carefully, and ask your doctor or other care provider to review them with you.                Allergies   Allergies   Allergen Reactions    Mellaril [Thioridazine Hcl] Anaphylaxis    Percocet [Oxycodone-Acetaminophen]      Tolerates hydrocodone and codeine in cough medicine. Tolerates  Believes reaction to oxycodone was leg swelling. No tongue/lip/throat swelling  or hives.    Duloxetine Anxiety     Made her feel weird, increased anxiety

## 2025-07-06 NOTE — PROGRESS NOTES
Observation goals  PRIOR TO DISCHARGE       Comments:   -diagnostic tests and consults completed and resulted: Not met  -vital signs normal or at patient baseline: Met   -tolerating oral intake to maintain hydration: Partially met, IV infusing

## 2025-07-06 NOTE — PROGRESS NOTES
Discharge instructions given, AVS printed and reviewed with pt. Pt is A/Ox4, VSS on room air. Pt denies loose stool, tolerating regular diet. IV removed. PT discharged home, lives with PCA. PCA provided ride home. Pt discharged from door 2.

## 2025-07-06 NOTE — PROGRESS NOTES
MD Notification    Notified Person: MD    Notified Person Name: Dr. Hardwick    Notification Date/Time:    Notification Interaction:    Purpose of Notification:  FYI. Could you please review the c-diff result. Thanks  she is in enteric isolation  Orders Received:    Comments:

## 2025-07-11 ENCOUNTER — HOSPITAL ENCOUNTER (OUTPATIENT)
Dept: GENERAL RADIOLOGY | Facility: CLINIC | Age: 80
Discharge: HOME | End: 2025-07-11
Attending: NEUROLOGICAL SURGERY | Admitting: NEUROLOGICAL SURGERY
Payer: MEDICARE

## 2025-07-11 ENCOUNTER — OFFICE VISIT (OUTPATIENT)
Dept: NEUROSURGERY | Facility: CLINIC | Age: 80
End: 2025-07-11
Payer: MEDICARE

## 2025-07-11 VITALS — SYSTOLIC BLOOD PRESSURE: 133 MMHG | DIASTOLIC BLOOD PRESSURE: 66 MMHG | HEART RATE: 75 BPM | OXYGEN SATURATION: 93 %

## 2025-07-11 DIAGNOSIS — S32.010D COMPRESSION FRACTURE OF L1 VERTEBRA WITH ROUTINE HEALING, SUBSEQUENT ENCOUNTER: ICD-10-CM

## 2025-07-11 DIAGNOSIS — S32.010D COMPRESSION FRACTURE OF L1 VERTEBRA WITH ROUTINE HEALING, SUBSEQUENT ENCOUNTER: Primary | ICD-10-CM

## 2025-07-11 PROCEDURE — 1125F AMNT PAIN NOTED PAIN PRSNT: CPT | Performed by: NEUROLOGICAL SURGERY

## 2025-07-11 PROCEDURE — 3075F SYST BP GE 130 - 139MM HG: CPT | Performed by: NEUROLOGICAL SURGERY

## 2025-07-11 PROCEDURE — 3078F DIAST BP <80 MM HG: CPT | Performed by: NEUROLOGICAL SURGERY

## 2025-07-11 PROCEDURE — 72100 X-RAY EXAM L-S SPINE 2/3 VWS: CPT

## 2025-07-11 PROCEDURE — 99213 OFFICE O/P EST LOW 20 MIN: CPT | Performed by: NEUROLOGICAL SURGERY

## 2025-07-11 ASSESSMENT — PAIN SCALES - GENERAL: PAINLEVEL_OUTOF10: MODERATE PAIN (6)

## 2025-07-11 NOTE — PROGRESS NOTES
"It was a pleasure to see Agnes Saravia today in [facility]surgery Clinic. She is a 80 year old female who is here for follow-up of her L1 compression fracture.  She had follow-up x-rays today.    She continues to have pain in the back which seems to be worse in the morning.  The main focus of the pain seems to be the right paraspinal region.  Approximately 5 to 10 cm off midline.    Vitals:    07/11/25 1500   BP: 133/66   BP Location: Left arm   Patient Position: Sitting   Cuff Size: Adult Regular   Pulse: 75   SpO2: 93%     Estimated body mass index is 31.89 kg/m  as calculated from the following:    Height as of 5/21/25: 1.6 m (5' 3\").    Weight as of 5/21/25: 81.6 kg (180 lb).  Moderate Pain (6)    Neurologically intact    Imaging: Review of her lumbar x-rays shows no change in her compression fracture at this point when compared with May and February.  The imaging was reviewed with the patient shown to the patient in clinic today.    Assessment: L1 compression fracture    Plan: At this point I do not have any other specific recommendations for her.  She would like to try an LSO brace which I think is reasonable and we will provide her a prescription for this.  She may follow-up on an as-needed basis.     "

## 2025-07-11 NOTE — LETTER
"7/11/2025      Agnes Saravia  [address]  [place] [address]      Dear Colleague,    Thank you for referring your patient, Agnes Saravia, to the [facility]. Please see a copy of my visit note below.    It was a pleasure to see Agnes Saravia today in Neurosurgery Clinic. She is a 80 year old female who is here for follow-up of her L1 compression fracture.  She had follow-up x-rays today.    She continues to have pain in the back which seems to be worse in the morning.  The main focus of the pain seems to be the right paraspinal region.  Approximately 5 to 10 cm off midline.    Vitals:    07/11/25 1500   BP: 133/66   BP Location: Left arm   Patient Position: Sitting   Cuff Size: Adult Regular   Pulse: 75   SpO2: 93%     Estimated body mass index is 31.89 kg/m  as calculated from the following:    Height as of 5/21/25: 1.6 m (5' 3\").    Weight as of 5/21/25: 81.6 kg (180 lb).  Moderate Pain (6)    Neurologically intact    Imaging: Review of her lumbar x-rays shows no change in her compression fracture at this point when compared with May and February.  The imaging was reviewed with the patient shown to the patient in clinic today.    Assessment: L1 compression fracture    Plan: At this point I do not have any other specific recommendations for her.  She would like to try an LSO brace which I think is reasonable and we will provide her a prescription for this.  She may follow-up on an as-needed basis.       Again, thank you for allowing me to participate in the care of your patient.        Sincerely,        Harjeet Kenny MD    Electronically signed"

## 2025-07-11 NOTE — PATIENT INSTRUCTIONS
Patient Next Steps:    Order placed for LSO brace.  If you have not received your supplies by the time you leave your appointment, please contact [facility] to follow up and receive what you need.   Marlene: [phone]     Follow-up with Dr. Kenny as needed.    Please call us if you have any further questions or concerns.    [facility]   Phone: [phone]  Fax: [phone]

## 2025-07-11 NOTE — NURSING NOTE
"Agnes Saravia is a 80 year old female who presents for:  Chief Complaint   Patient presents with    RECHECK        Initial Vitals:  /66 (BP Location: Left arm, Patient Position: Sitting, Cuff Size: Adult Regular)   Pulse 75   SpO2 93%  Estimated body mass index is 31.89 kg/m  as calculated from the following:    Height as of 5/21/25: 5' 3\" (1.6 m).    Weight as of 5/21/25: 180 lb (81.6 kg).  BP completed using cuff size: regular  Moderate Pain (6)    Garry Clay    "

## 2025-07-14 DIAGNOSIS — M54.41 ACUTE BILATERAL LOW BACK PAIN WITH RIGHT-SIDED SCIATICA: ICD-10-CM

## 2025-07-14 RX ORDER — METHOCARBAMOL 500 MG/1
500 TABLET, FILM COATED ORAL 4 TIMES DAILY PRN
Qty: 120 TABLET | Refills: 0 | Status: SHIPPED | OUTPATIENT
Start: 2025-07-14

## 2025-07-15 DIAGNOSIS — G47.00 INSOMNIA, UNSPECIFIED TYPE: ICD-10-CM

## 2025-07-15 RX ORDER — TRAZODONE HYDROCHLORIDE 50 MG/1
TABLET ORAL
Qty: 45 TABLET | Refills: 2 | Status: SHIPPED | OUTPATIENT
Start: 2025-07-15

## 2025-07-26 ENCOUNTER — HEALTH MAINTENANCE LETTER (OUTPATIENT)
Age: 80
End: 2025-07-26

## 2025-08-26 DIAGNOSIS — M54.41 ACUTE BILATERAL LOW BACK PAIN WITH RIGHT-SIDED SCIATICA: ICD-10-CM

## 2025-08-26 DIAGNOSIS — F41.1 GAD (GENERALIZED ANXIETY DISORDER): ICD-10-CM

## 2025-08-26 RX ORDER — CLORAZEPATE DIPOTASSIUM 15 MG/1
15 TABLET ORAL EVERY MORNING
Qty: 90 TABLET | Refills: 0 | Status: SHIPPED | OUTPATIENT
Start: 2025-08-26

## 2025-08-26 RX ORDER — METHOCARBAMOL 500 MG/1
500 TABLET, FILM COATED ORAL 4 TIMES DAILY PRN
Qty: 120 TABLET | Refills: 0 | Status: SHIPPED | OUTPATIENT
Start: 2025-08-26

## (undated) DEVICE — DECANTER BAG 2002S

## (undated) DEVICE — SPONGE SURGIFOAM 50

## (undated) DEVICE — ESU PENCIL W/HOLSTER E2350H

## (undated) DEVICE — DRAPE MICROSCOPE LEICA 54X150" AR8033650

## (undated) DEVICE — ESU GROUND PAD UNIVERSAL W/O CORD

## (undated) DEVICE — SYR BULB IRRIG DOVER 60 ML LATEX FREE 67000

## (undated) DEVICE — LINEN TOWEL PACK X5 5464

## (undated) DEVICE — SU VICRYL 2-0 CT-2 CR 8X18" J726D

## (undated) DEVICE — SU VICRYL 0 UR-6 27" J603H

## (undated) DEVICE — PACK SPINE SM CUSTOM SNE15SSFSK

## (undated) DEVICE — POSITIONER PT PRONESAFE HEAD REST W/DERMAPROX INSERT 40599

## (undated) DEVICE — GLOVE BIOGEL PI MICRO SZ 7.5 48575

## (undated) DEVICE — GLOVE BIOGEL PI MICRO INDICATOR UNDERGLOVE SZ 8.5 48985

## (undated) DEVICE — PREP CHLORAPREP 26ML TINTED HI-LITE ORANGE 930815

## (undated) DEVICE — DRAPE MAYO STAND 23X54 8337

## (undated) DEVICE — RX SURGIFLO HEMOSTATIC MATRIX W/THROMBIN 8ML 2994

## (undated) DEVICE — DRAPE C-ARM 60X42" 1013

## (undated) DEVICE — SU VICRYL 0 CT-1 CR 8X18" J740D

## (undated) DEVICE — GOWN XXL 9575

## (undated) DEVICE — NDL BLUNT 17GA 1.5" 8881202330

## (undated) DEVICE — TOOL DISSECT MIDAS MR8 12CM TELESC MATCH 2.5 MR8-T12MH25

## (undated) DEVICE — NDL BLUNT 19GA 1.5"

## (undated) DEVICE — DEVICE SUTURING SFT TISS XPND STRL F/LIGATION GZL-002

## (undated) DEVICE — NDL SPINAL 18GA 3.5" 405184

## (undated) DEVICE — MANIFOLD NEPTUNE 4 PORT 700-20

## (undated) DEVICE — SOL WATER IRRIG 1000ML BOTTLE 2F7114

## (undated) DEVICE — ESU ELEC BLADE 6" COATED/INSULATED E1455-6

## (undated) DEVICE — DRAPE SHEET REV FOLD 3/4 9349

## (undated) RX ORDER — ONDANSETRON 2 MG/ML
INJECTION INTRAMUSCULAR; INTRAVENOUS
Status: DISPENSED
Start: 2024-11-12

## (undated) RX ORDER — GLYCOPYRROLATE 0.2 MG/ML
INJECTION, SOLUTION INTRAMUSCULAR; INTRAVENOUS
Status: DISPENSED
Start: 2024-11-12

## (undated) RX ORDER — ALBUTEROL SULFATE 0.83 MG/ML
SOLUTION RESPIRATORY (INHALATION)
Status: DISPENSED
Start: 2018-04-11

## (undated) RX ORDER — ALBUTEROL SULFATE 0.83 MG/ML
SOLUTION RESPIRATORY (INHALATION)
Status: DISPENSED
Start: 2018-03-22

## (undated) RX ORDER — FENTANYL CITRATE 50 UG/ML
INJECTION, SOLUTION INTRAMUSCULAR; INTRAVENOUS
Status: DISPENSED
Start: 2024-11-12

## (undated) RX ORDER — CEFAZOLIN SODIUM/WATER 2 G/20 ML
SYRINGE (ML) INTRAVENOUS
Status: DISPENSED
Start: 2024-11-12

## (undated) RX ORDER — FENTANYL CITRATE 50 UG/ML
INJECTION, SOLUTION INTRAMUSCULAR; INTRAVENOUS
Status: DISPENSED
Start: 2018-04-11

## (undated) RX ORDER — HYDROMORPHONE HYDROCHLORIDE 1 MG/ML
INJECTION, SOLUTION INTRAMUSCULAR; INTRAVENOUS; SUBCUTANEOUS
Status: DISPENSED
Start: 2024-11-12

## (undated) RX ORDER — BUPIVACAINE HYDROCHLORIDE AND EPINEPHRINE 2.5; 5 MG/ML; UG/ML
INJECTION, SOLUTION EPIDURAL; INFILTRATION; INTRACAUDAL; PERINEURAL
Status: DISPENSED
Start: 2024-11-12

## (undated) RX ORDER — LIDOCAINE HYDROCHLORIDE 10 MG/ML
INJECTION, SOLUTION EPIDURAL; INFILTRATION; INTRACAUDAL; PERINEURAL
Status: DISPENSED
Start: 2018-04-11

## (undated) RX ORDER — DIPHENHYDRAMINE HYDROCHLORIDE 50 MG/ML
INJECTION INTRAMUSCULAR; INTRAVENOUS
Status: DISPENSED
Start: 2018-04-11